# Patient Record
Sex: MALE | Race: WHITE | Employment: OTHER | URBAN - NONMETROPOLITAN AREA
[De-identification: names, ages, dates, MRNs, and addresses within clinical notes are randomized per-mention and may not be internally consistent; named-entity substitution may affect disease eponyms.]

---

## 2017-01-01 DIAGNOSIS — M54.16 LUMBAR RADICULOPATHY: ICD-10-CM

## 2017-01-13 RX ORDER — GABAPENTIN 100 MG/1
CAPSULE ORAL
Qty: 90 CAPSULE | Refills: 5 | OUTPATIENT
Start: 2017-01-13

## 2017-03-16 PROBLEM — S61.401A OPEN WOUND OF RIGHT HAND: Status: ACTIVE | Noted: 2017-03-16

## 2017-03-16 PROBLEM — M46.42 DISCITIS OF CERVICAL REGION: Status: ACTIVE | Noted: 2017-03-16

## 2017-03-17 PROBLEM — Z91.199 NONCOMPLIANCE BY REFUSING INTERVENTION OR SUPPORT: Status: ACTIVE | Noted: 2017-03-17

## 2017-03-17 PROBLEM — E87.20 LACTIC ACIDOSIS: Status: ACTIVE | Noted: 2017-03-17

## 2018-07-26 ENCOUNTER — HOSPITAL ENCOUNTER (OUTPATIENT)
Age: 63
Discharge: HOME | End: 2018-07-26

## 2018-07-26 ENCOUNTER — HOSPITAL ENCOUNTER (EMERGENCY)
Age: 63
End: 2018-07-26

## 2018-07-26 PROCEDURE — 2709999900 HC NON-CHARGEABLE SUPPLY

## 2018-07-26 PROCEDURE — 4500000002 HC ER NO CHARGE

## 2020-11-03 PROBLEM — I25.10 CAD (CORONARY ARTERY DISEASE): Status: RESOLVED | Noted: 2020-11-03 | Resolved: 2020-11-03

## 2020-11-03 PROBLEM — I10 HYPERTENSION: Status: RESOLVED | Noted: 2020-11-03 | Resolved: 2020-11-03

## 2020-11-12 ENCOUNTER — HOSPITAL ENCOUNTER (OUTPATIENT)
Age: 65
Discharge: HOME | End: 2020-11-12
Payer: COMMERCIAL

## 2020-11-12 LAB
BASOPHILS # BLD: 0.5 %
BASOPHILS ABSOLUTE: 0 THOU/MM3 (ref 0–0.1)
BUN BLDV-MCNC: 9 MG/DL (ref 7–22)
CREAT SERPL-MCNC: 0.9 MG/DL (ref 0.4–1.2)
EOSINOPHIL # BLD: 2.5 %
EOSINOPHILS ABSOLUTE: 0.2 THOU/MM3 (ref 0–0.4)
ERYTHROCYTE [DISTWIDTH] IN BLOOD BY AUTOMATED COUNT: 15.3 % (ref 11.5–14.5)
ERYTHROCYTE [DISTWIDTH] IN BLOOD BY AUTOMATED COUNT: 48.2 FL (ref 35–45)
GFR SERPL CREATININE-BSD FRML MDRD: 85 ML/MIN/1.73M2
HCT VFR BLD CALC: 45.1 % (ref 42–52)
HEMOGLOBIN: 14.3 GM/DL (ref 14–18)
IMMATURE GRANS (ABS): 0.03 THOU/MM3 (ref 0–0.07)
IMMATURE GRANULOCYTES: 0.3 %
LYMPHOCYTES # BLD: 34.4 %
LYMPHOCYTES ABSOLUTE: 3.1 THOU/MM3 (ref 1–4.8)
MCH RBC QN AUTO: 27.2 PG (ref 26–33)
MCHC RBC AUTO-ENTMCNC: 31.7 GM/DL (ref 32.2–35.5)
MCV RBC AUTO: 85.9 FL (ref 80–94)
MONOCYTES # BLD: 5.9 %
MONOCYTES ABSOLUTE: 0.5 THOU/MM3 (ref 0.4–1.3)
NUCLEATED RED BLOOD CELLS: 0 /100 WBC
PLATELET # BLD: 287 THOU/MM3 (ref 130–400)
PMV BLD AUTO: 12 FL (ref 9.4–12.4)
RBC # BLD: 5.25 MILL/MM3 (ref 4.7–6.1)
SEG NEUTROPHILS: 56.4 %
SEGMENTED NEUTROPHILS ABSOLUTE COUNT: 5 THOU/MM3 (ref 1.8–7.7)
WBC # BLD: 8.9 THOU/MM3 (ref 4.8–10.8)

## 2020-11-12 PROCEDURE — 85025 COMPLETE CBC W/AUTO DIFF WBC: CPT

## 2020-11-12 PROCEDURE — 82565 ASSAY OF CREATININE: CPT

## 2020-11-12 PROCEDURE — 36415 COLL VENOUS BLD VENIPUNCTURE: CPT

## 2020-11-12 PROCEDURE — 84520 ASSAY OF UREA NITROGEN: CPT

## 2021-03-18 ENCOUNTER — HOSPITAL ENCOUNTER (OUTPATIENT)
Age: 66
Setting detail: SPECIMEN
Discharge: HOME | End: 2021-03-18
Payer: COMMERCIAL

## 2021-03-18 LAB
ABSOLUTE EOS #: 0.28 K/UL (ref 0–0.44)
ABSOLUTE IMMATURE GRANULOCYTE: 0.03 K/UL (ref 0–0.3)
ABSOLUTE LYMPH #: 2.98 K/UL (ref 1.1–3.7)
ABSOLUTE MONO #: 0.68 K/UL (ref 0.1–1.2)
ALBUMIN SERPL-MCNC: 4.3 G/DL (ref 3.5–5.2)
ALBUMIN/GLOBULIN RATIO: 1.2 (ref 1–2.5)
ALP BLD-CCNC: 81 U/L (ref 40–129)
ALT SERPL-CCNC: 9 U/L (ref 5–41)
ANION GAP SERPL CALCULATED.3IONS-SCNC: 15 MMOL/L (ref 9–17)
AST SERPL-CCNC: 15 U/L
BASOPHILS # BLD: 1 % (ref 0–2)
BASOPHILS ABSOLUTE: 0.1 K/UL (ref 0–0.2)
BILIRUB SERPL-MCNC: 0.21 MG/DL (ref 0.3–1.2)
BUN BLDV-MCNC: 13 MG/DL (ref 8–23)
BUN/CREAT BLD: ABNORMAL (ref 9–20)
CALCIUM SERPL-MCNC: 9.9 MG/DL (ref 8.6–10.4)
CHLORIDE BLD-SCNC: 103 MMOL/L (ref 98–107)
CHOLESTEROL/HDL RATIO: 4.3
CHOLESTEROL: 121 MG/DL
CO2: 24 MMOL/L (ref 20–31)
CREAT SERPL-MCNC: 0.87 MG/DL (ref 0.7–1.2)
DIFFERENTIAL TYPE: ABNORMAL
EOSINOPHILS RELATIVE PERCENT: 3 % (ref 1–4)
GFR AFRICAN AMERICAN: >60 ML/MIN
GFR NON-AFRICAN AMERICAN: >60 ML/MIN
GFR SERPL CREATININE-BSD FRML MDRD: ABNORMAL ML/MIN/{1.73_M2}
GFR SERPL CREATININE-BSD FRML MDRD: ABNORMAL ML/MIN/{1.73_M2}
GLUCOSE BLD-MCNC: 120 MG/DL (ref 70–99)
HCT VFR BLD CALC: 44.3 % (ref 40.7–50.3)
HDLC SERPL-MCNC: 28 MG/DL
HEMOGLOBIN: 13.6 G/DL (ref 13–17)
IMMATURE GRANULOCYTES: 0 %
LDL CHOLESTEROL: 66 MG/DL (ref 0–130)
LYMPHOCYTES # BLD: 30 % (ref 24–43)
MCH RBC QN AUTO: 25.1 PG (ref 25.2–33.5)
MCHC RBC AUTO-ENTMCNC: 30.7 G/DL (ref 28.4–34.8)
MCV RBC AUTO: 81.7 FL (ref 82.6–102.9)
MONOCYTES # BLD: 7 % (ref 3–12)
NRBC AUTOMATED: 0 PER 100 WBC
PDW BLD-RTO: 15.2 % (ref 11.8–14.4)
PLATELET # BLD: 339 K/UL (ref 138–453)
PLATELET ESTIMATE: ABNORMAL
PMV BLD AUTO: 11.4 FL (ref 8.1–13.5)
POTASSIUM SERPL-SCNC: 4.6 MMOL/L (ref 3.7–5.3)
PROSTATE SPECIFIC ANTIGEN: 2.14 UG/L
RBC # BLD: 5.42 M/UL (ref 4.21–5.77)
RBC # BLD: ABNORMAL 10*6/UL
SEG NEUTROPHILS: 59 % (ref 36–65)
SEGMENTED NEUTROPHILS ABSOLUTE COUNT: 5.76 K/UL (ref 1.5–8.1)
SODIUM BLD-SCNC: 142 MMOL/L (ref 135–144)
TOTAL PROTEIN: 7.9 G/DL (ref 6.4–8.3)
TRIGL SERPL-MCNC: 134 MG/DL
TSH SERPL DL<=0.05 MIU/L-ACNC: 1.02 MIU/L (ref 0.3–5)
VLDLC SERPL CALC-MCNC: ABNORMAL MG/DL (ref 1–30)
WBC # BLD: 9.8 K/UL (ref 3.5–11.3)
WBC # BLD: ABNORMAL 10*3/UL

## 2021-11-18 ENCOUNTER — HOSPITAL ENCOUNTER (INPATIENT)
Age: 66
LOS: 5 days | Discharge: HOME | DRG: 192 | End: 2021-11-23
Attending: EMERGENCY MEDICINE | Admitting: FAMILY MEDICINE
Payer: COMMERCIAL

## 2021-11-18 ENCOUNTER — APPOINTMENT (OUTPATIENT)
Dept: CT IMAGING | Age: 66
DRG: 192 | End: 2021-11-18
Payer: COMMERCIAL

## 2021-11-18 ENCOUNTER — APPOINTMENT (OUTPATIENT)
Dept: INTERVENTIONAL RADIOLOGY/VASCULAR | Age: 66
DRG: 192 | End: 2021-11-18
Payer: COMMERCIAL

## 2021-11-18 DIAGNOSIS — L97.912 ULCERS OF BOTH LOWER EXTREMITIES WITH FAT LAYER EXPOSED (HCC): ICD-10-CM

## 2021-11-18 DIAGNOSIS — I48.91 ATRIAL FIBRILLATION, UNSPECIFIED TYPE (HCC): ICD-10-CM

## 2021-11-18 DIAGNOSIS — R06.09 DOE (DYSPNEA ON EXERTION): ICD-10-CM

## 2021-11-18 DIAGNOSIS — T14.8XXA WOUND INFECTION: ICD-10-CM

## 2021-11-18 DIAGNOSIS — L89.323 PRESSURE INJURY OF LEFT PERINEAL ISCHIAL REGION, STAGE 3 (HCC): Primary | ICD-10-CM

## 2021-11-18 DIAGNOSIS — L08.9 WOUND INFECTION: ICD-10-CM

## 2021-11-18 DIAGNOSIS — R77.8 ELEVATED TROPONIN: ICD-10-CM

## 2021-11-18 DIAGNOSIS — L97.922 ULCERS OF BOTH LOWER EXTREMITIES WITH FAT LAYER EXPOSED (HCC): ICD-10-CM

## 2021-11-18 DIAGNOSIS — I50.9 ACUTE CONGESTIVE HEART FAILURE, UNSPECIFIED HEART FAILURE TYPE (HCC): ICD-10-CM

## 2021-11-18 DIAGNOSIS — L97.919 NONHEALING ULCER OF RIGHT LOWER LEG, UNSPECIFIED ULCER STAGE (HCC): ICD-10-CM

## 2021-11-18 LAB
ALBUMIN SERPL-MCNC: 3.7 G/DL (ref 3.5–5.1)
ALP BLD-CCNC: 81 U/L (ref 38–126)
ALT SERPL-CCNC: 11 U/L (ref 11–66)
ANION GAP SERPL CALCULATED.3IONS-SCNC: 17 MEQ/L (ref 8–16)
AST SERPL-CCNC: 16 U/L (ref 5–40)
BASOPHILS # BLD: 0.5 %
BASOPHILS ABSOLUTE: 0.1 THOU/MM3 (ref 0–0.1)
BILIRUB SERPL-MCNC: 0.3 MG/DL (ref 0.3–1.2)
BILIRUBIN DIRECT: < 0.2 MG/DL (ref 0–0.3)
BUN BLDV-MCNC: 11 MG/DL (ref 7–22)
CALCIUM SERPL-MCNC: 8.6 MG/DL (ref 8.5–10.5)
CHLORIDE BLD-SCNC: 102 MEQ/L (ref 98–111)
CO2: 24 MEQ/L (ref 23–33)
CREAT SERPL-MCNC: 0.8 MG/DL (ref 0.4–1.2)
EOSINOPHIL # BLD: 1.1 %
EOSINOPHILS ABSOLUTE: 0.1 THOU/MM3 (ref 0–0.4)
ERYTHROCYTE [DISTWIDTH] IN BLOOD BY AUTOMATED COUNT: 15.5 % (ref 11.5–14.5)
ERYTHROCYTE [DISTWIDTH] IN BLOOD BY AUTOMATED COUNT: 48.1 FL (ref 35–45)
GFR SERPL CREATININE-BSD FRML MDRD: > 90 ML/MIN/1.73M2
GLUCOSE BLD-MCNC: 108 MG/DL (ref 70–108)
HCT VFR BLD CALC: 39.7 % (ref 42–52)
HEMOGLOBIN: 12 GM/DL (ref 14–18)
IMMATURE GRANS (ABS): 0.07 THOU/MM3 (ref 0–0.07)
IMMATURE GRANULOCYTES: 0.5 %
LACTIC ACID, SEPSIS: 2.6 MMOL/L (ref 0.5–1.9)
LACTIC ACID, SEPSIS: 2.8 MMOL/L (ref 0.5–1.9)
LYMPHOCYTES # BLD: 24.1 %
LYMPHOCYTES ABSOLUTE: 3.2 THOU/MM3 (ref 1–4.8)
MCH RBC QN AUTO: 26 PG (ref 26–33)
MCHC RBC AUTO-ENTMCNC: 30.2 GM/DL (ref 32.2–35.5)
MCV RBC AUTO: 86.1 FL (ref 80–94)
MONOCYTES # BLD: 5.3 %
MONOCYTES ABSOLUTE: 0.7 THOU/MM3 (ref 0.4–1.3)
NUCLEATED RED BLOOD CELLS: 0 /100 WBC
OSMOLALITY CALCULATION: 284.9 MOSMOL/KG (ref 275–300)
PLATELET # BLD: 439 THOU/MM3 (ref 130–400)
PMV BLD AUTO: 10.6 FL (ref 9.4–12.4)
POTASSIUM REFLEX MAGNESIUM: 3.6 MEQ/L (ref 3.5–5.2)
PRO-BNP: 3616 PG/ML (ref 0–900)
PROCALCITONIN: 0.06 NG/ML (ref 0.01–0.09)
RBC # BLD: 4.61 MILL/MM3 (ref 4.7–6.1)
SEG NEUTROPHILS: 68.5 %
SEGMENTED NEUTROPHILS ABSOLUTE COUNT: 9.2 THOU/MM3 (ref 1.8–7.7)
SODIUM BLD-SCNC: 143 MEQ/L (ref 135–145)
TOTAL PROTEIN: 7.7 G/DL (ref 6.1–8)
TROPONIN T: 0.05 NG/ML
WBC # BLD: 13.4 THOU/MM3 (ref 4.8–10.8)

## 2021-11-18 PROCEDURE — 4A023N7 MEASUREMENT OF CARDIAC SAMPLING AND PRESSURE, LEFT HEART, PERCUTANEOUS APPROACH: ICD-10-PCS | Performed by: INTERNAL MEDICINE

## 2021-11-18 PROCEDURE — 84484 ASSAY OF TROPONIN QUANT: CPT

## 2021-11-18 PROCEDURE — 87040 BLOOD CULTURE FOR BACTERIA: CPT

## 2021-11-18 PROCEDURE — 6360000002 HC RX W HCPCS: Performed by: STUDENT IN AN ORGANIZED HEALTH CARE EDUCATION/TRAINING PROGRAM

## 2021-11-18 PROCEDURE — 6360000004 HC RX CONTRAST MEDICATION: Performed by: EMERGENCY MEDICINE

## 2021-11-18 PROCEDURE — 83605 ASSAY OF LACTIC ACID: CPT

## 2021-11-18 PROCEDURE — 80048 BASIC METABOLIC PNL TOTAL CA: CPT

## 2021-11-18 PROCEDURE — 2580000003 HC RX 258: Performed by: STUDENT IN AN ORGANIZED HEALTH CARE EDUCATION/TRAINING PROGRAM

## 2021-11-18 PROCEDURE — 99282 EMERGENCY DEPT VISIT SF MDM: CPT

## 2021-11-18 PROCEDURE — 80076 HEPATIC FUNCTION PANEL: CPT

## 2021-11-18 PROCEDURE — 6370000000 HC RX 637 (ALT 250 FOR IP): Performed by: STUDENT IN AN ORGANIZED HEALTH CARE EDUCATION/TRAINING PROGRAM

## 2021-11-18 PROCEDURE — 93005 ELECTROCARDIOGRAM TRACING: CPT | Performed by: STUDENT IN AN ORGANIZED HEALTH CARE EDUCATION/TRAINING PROGRAM

## 2021-11-18 PROCEDURE — B2111ZZ FLUOROSCOPY OF MULTIPLE CORONARY ARTERIES USING LOW OSMOLAR CONTRAST: ICD-10-PCS | Performed by: INTERNAL MEDICINE

## 2021-11-18 PROCEDURE — 36415 COLL VENOUS BLD VENIPUNCTURE: CPT

## 2021-11-18 PROCEDURE — 84145 PROCALCITONIN (PCT): CPT

## 2021-11-18 PROCEDURE — 1200000003 HC TELEMETRY R&B

## 2021-11-18 PROCEDURE — 6370000000 HC RX 637 (ALT 250 FOR IP)

## 2021-11-18 PROCEDURE — 93925 LOWER EXTREMITY STUDY: CPT

## 2021-11-18 PROCEDURE — 99222 1ST HOSP IP/OBS MODERATE 55: CPT | Performed by: FAMILY MEDICINE

## 2021-11-18 PROCEDURE — 4A0335C MEASUREMENT OF ARTERIAL FLOW, CORONARY, PERCUTANEOUS APPROACH: ICD-10-PCS | Performed by: INTERNAL MEDICINE

## 2021-11-18 PROCEDURE — 85025 COMPLETE CBC W/AUTO DIFF WBC: CPT

## 2021-11-18 PROCEDURE — B2151ZZ FLUOROSCOPY OF LEFT HEART USING LOW OSMOLAR CONTRAST: ICD-10-PCS | Performed by: INTERNAL MEDICINE

## 2021-11-18 PROCEDURE — 83880 ASSAY OF NATRIURETIC PEPTIDE: CPT

## 2021-11-18 PROCEDURE — 96365 THER/PROPH/DIAG IV INF INIT: CPT

## 2021-11-18 RX ORDER — ONDANSETRON 4 MG/1
4 TABLET, ORALLY DISINTEGRATING ORAL EVERY 8 HOURS PRN
Status: DISCONTINUED | OUTPATIENT
Start: 2021-11-18 | End: 2021-11-19

## 2021-11-18 RX ORDER — ASPIRIN 81 MG/1
81 TABLET ORAL DAILY
Status: DISCONTINUED | OUTPATIENT
Start: 2021-11-19 | End: 2021-11-21

## 2021-11-18 RX ORDER — ACETAMINOPHEN 325 MG/1
650 TABLET ORAL EVERY 6 HOURS PRN
Status: DISCONTINUED | OUTPATIENT
Start: 2021-11-18 | End: 2021-11-22 | Stop reason: SDUPTHER

## 2021-11-18 RX ORDER — ONDANSETRON 2 MG/ML
4 INJECTION INTRAMUSCULAR; INTRAVENOUS EVERY 6 HOURS PRN
Status: DISCONTINUED | OUTPATIENT
Start: 2021-11-18 | End: 2021-11-19

## 2021-11-18 RX ORDER — ACETAMINOPHEN 650 MG/1
650 SUPPOSITORY RECTAL EVERY 6 HOURS PRN
Status: DISCONTINUED | OUTPATIENT
Start: 2021-11-18 | End: 2021-11-23 | Stop reason: HOSPADM

## 2021-11-18 RX ORDER — TRAZODONE HYDROCHLORIDE 50 MG/1
50 TABLET ORAL NIGHTLY PRN
Status: DISCONTINUED | OUTPATIENT
Start: 2021-11-19 | End: 2021-11-19

## 2021-11-18 RX ORDER — 0.9 % SODIUM CHLORIDE 0.9 %
1000 INTRAVENOUS SOLUTION INTRAVENOUS ONCE
Status: DISCONTINUED | OUTPATIENT
Start: 2021-11-19 | End: 2021-11-19

## 2021-11-18 RX ORDER — PANTOPRAZOLE SODIUM 40 MG/1
40 TABLET, DELAYED RELEASE ORAL DAILY
Status: DISCONTINUED | OUTPATIENT
Start: 2021-11-19 | End: 2021-11-23 | Stop reason: HOSPADM

## 2021-11-18 RX ORDER — POLYETHYLENE GLYCOL 3350 17 G/17G
17 POWDER, FOR SOLUTION ORAL DAILY PRN
Status: DISCONTINUED | OUTPATIENT
Start: 2021-11-18 | End: 2021-11-23 | Stop reason: HOSPADM

## 2021-11-18 RX ORDER — CLOPIDOGREL BISULFATE 75 MG/1
75 TABLET ORAL DAILY
Status: DISCONTINUED | OUTPATIENT
Start: 2021-11-19 | End: 2021-11-23 | Stop reason: HOSPADM

## 2021-11-18 RX ORDER — SODIUM CHLORIDE 0.9 % (FLUSH) 0.9 %
5-40 SYRINGE (ML) INJECTION PRN
Status: DISCONTINUED | OUTPATIENT
Start: 2021-11-18 | End: 2021-11-22 | Stop reason: SDUPTHER

## 2021-11-18 RX ORDER — SODIUM CHLORIDE 0.9 % (FLUSH) 0.9 %
5-40 SYRINGE (ML) INJECTION EVERY 12 HOURS SCHEDULED
Status: DISCONTINUED | OUTPATIENT
Start: 2021-11-18 | End: 2021-11-22 | Stop reason: SDUPTHER

## 2021-11-18 RX ORDER — ASPIRIN 81 MG/1
324 TABLET, CHEWABLE ORAL ONCE
Status: COMPLETED | OUTPATIENT
Start: 2021-11-18 | End: 2021-11-18

## 2021-11-18 RX ORDER — GABAPENTIN 100 MG/1
100 CAPSULE ORAL 3 TIMES DAILY
Status: DISCONTINUED | OUTPATIENT
Start: 2021-11-19 | End: 2021-11-23 | Stop reason: HOSPADM

## 2021-11-18 RX ORDER — SODIUM CHLORIDE 9 MG/ML
25 INJECTION, SOLUTION INTRAVENOUS PRN
Status: DISCONTINUED | OUTPATIENT
Start: 2021-11-18 | End: 2021-11-22 | Stop reason: SDUPTHER

## 2021-11-18 RX ORDER — NICOTINE 21 MG/24HR
PATCH, TRANSDERMAL 24 HOURS TRANSDERMAL
Status: COMPLETED
Start: 2021-11-18 | End: 2021-11-19

## 2021-11-18 RX ORDER — 0.9 % SODIUM CHLORIDE 0.9 %
1000 INTRAVENOUS SOLUTION INTRAVENOUS ONCE
Status: COMPLETED | OUTPATIENT
Start: 2021-11-18 | End: 2021-11-18

## 2021-11-18 RX ORDER — NICOTINE 21 MG/24HR
1 PATCH, TRANSDERMAL 24 HOURS TRANSDERMAL DAILY
Status: DISCONTINUED | OUTPATIENT
Start: 2021-11-19 | End: 2021-11-23 | Stop reason: HOSPADM

## 2021-11-18 RX ADMIN — ASPIRIN 81 MG CHEWABLE TABLET 324 MG: 81 TABLET CHEWABLE at 21:47

## 2021-11-18 RX ADMIN — CEFTRIAXONE SODIUM 1000 MG: 1 INJECTION, POWDER, FOR SOLUTION INTRAMUSCULAR; INTRAVENOUS at 20:49

## 2021-11-18 RX ADMIN — SODIUM CHLORIDE 1000 ML: 9 INJECTION, SOLUTION INTRAVENOUS at 20:46

## 2021-11-18 RX ADMIN — IOPAMIDOL 80 ML: 755 INJECTION, SOLUTION INTRAVENOUS at 20:58

## 2021-11-18 RX ADMIN — VANCOMYCIN HYDROCHLORIDE 1500 MG: 5 INJECTION, POWDER, LYOPHILIZED, FOR SOLUTION INTRAVENOUS at 21:47

## 2021-11-18 ASSESSMENT — ENCOUNTER SYMPTOMS
COUGH: 0
SORE THROAT: 0
CONSTIPATION: 0
NAUSEA: 0
DIARRHEA: 0
ABDOMINAL PAIN: 0
SINUS PRESSURE: 0
COLOR CHANGE: 1
BACK PAIN: 0
VOMITING: 0
SHORTNESS OF BREATH: 1
SINUS PAIN: 0

## 2021-11-18 ASSESSMENT — PAIN SCALES - GENERAL: PAINLEVEL_OUTOF10: 5

## 2021-11-18 NOTE — ED PROVIDER NOTES
My student went in to evaluate the patient and completed an exam.  When he told the patient that I (a female) would be in to see him, he requested a male provider due to the wounds being on his buttocks. I discussed with Dr. Senia Ramirez who agreed to assume care of the patient.         Gallo Richardson, JAVAN - CNP  11/18/21 [id number]

## 2021-11-18 NOTE — ED TRIAGE NOTES
Pt presents to the ED for a wound check on his buttock. Pt states he has had it for 1 month. Pt states he has been putting a cream on it.  Pt denies pain upon arrival.

## 2021-11-19 ENCOUNTER — APPOINTMENT (OUTPATIENT)
Dept: GENERAL RADIOLOGY | Age: 66
DRG: 192 | End: 2021-11-19
Payer: COMMERCIAL

## 2021-11-19 LAB
ANION GAP SERPL CALCULATED.3IONS-SCNC: 15 MEQ/L (ref 8–16)
ANION GAP SERPL CALCULATED.3IONS-SCNC: 16 MEQ/L (ref 8–16)
BASOPHILS # BLD: 0.4 %
BASOPHILS ABSOLUTE: 0.1 THOU/MM3 (ref 0–0.1)
BUN BLDV-MCNC: 8 MG/DL (ref 7–22)
BUN BLDV-MCNC: 9 MG/DL (ref 7–22)
CALCIUM SERPL-MCNC: 7.9 MG/DL (ref 8.5–10.5)
CALCIUM SERPL-MCNC: 8.3 MG/DL (ref 8.5–10.5)
CHLORIDE BLD-SCNC: 102 MEQ/L (ref 98–111)
CHLORIDE BLD-SCNC: 102 MEQ/L (ref 98–111)
CO2: 21 MEQ/L (ref 23–33)
CO2: 24 MEQ/L (ref 23–33)
CREAT SERPL-MCNC: 0.8 MG/DL (ref 0.4–1.2)
CREAT SERPL-MCNC: 0.8 MG/DL (ref 0.4–1.2)
EKG ATRIAL RATE: 93 BPM
EKG P AXIS: 74 DEGREES
EKG P-R INTERVAL: 174 MS
EKG Q-T INTERVAL: 374 MS
EKG Q-T INTERVAL: 392 MS
EKG QRS DURATION: 78 MS
EKG QRS DURATION: 98 MS
EKG QTC CALCULATION (BAZETT): 487 MS
EKG QTC CALCULATION (BAZETT): 494 MS
EKG R AXIS: 43 DEGREES
EKG R AXIS: 55 DEGREES
EKG T AXIS: 70 DEGREES
EKG T AXIS: 74 DEGREES
EKG VENTRICULAR RATE: 105 BPM
EKG VENTRICULAR RATE: 93 BPM
EOSINOPHIL # BLD: 0.4 %
EOSINOPHILS ABSOLUTE: 0.1 THOU/MM3 (ref 0–0.4)
ERYTHROCYTE [DISTWIDTH] IN BLOOD BY AUTOMATED COUNT: 15.4 % (ref 11.5–14.5)
ERYTHROCYTE [DISTWIDTH] IN BLOOD BY AUTOMATED COUNT: 47.7 FL (ref 35–45)
GFR SERPL CREATININE-BSD FRML MDRD: > 90 ML/MIN/1.73M2
GFR SERPL CREATININE-BSD FRML MDRD: > 90 ML/MIN/1.73M2
GLUCOSE BLD-MCNC: 131 MG/DL (ref 70–108)
GLUCOSE BLD-MCNC: 146 MG/DL (ref 70–108)
GLUCOSE BLD-MCNC: 157 MG/DL (ref 70–108)
GLUCOSE BLD-MCNC: 159 MG/DL (ref 70–108)
GLUCOSE BLD-MCNC: 165 MG/DL (ref 70–108)
GLUCOSE BLD-MCNC: 167 MG/DL (ref 70–108)
HCT VFR BLD CALC: 41.7 % (ref 42–52)
HEMOGLOBIN: 12.7 GM/DL (ref 14–18)
IMMATURE GRANS (ABS): 0.08 THOU/MM3 (ref 0–0.07)
IMMATURE GRANULOCYTES: 0.5 %
LV EF: 35 %
LVEF MODALITY: NORMAL
LYMPHOCYTES # BLD: 14.1 %
LYMPHOCYTES ABSOLUTE: 2.2 THOU/MM3 (ref 1–4.8)
MAGNESIUM: 0.9 MG/DL (ref 1.6–2.4)
MCH RBC QN AUTO: 26 PG (ref 26–33)
MCHC RBC AUTO-ENTMCNC: 30.5 GM/DL (ref 32.2–35.5)
MCV RBC AUTO: 85.5 FL (ref 80–94)
MONOCYTES # BLD: 5.1 %
MONOCYTES ABSOLUTE: 0.8 THOU/MM3 (ref 0.4–1.3)
NUCLEATED RED BLOOD CELLS: 0 /100 WBC
PLATELET # BLD: 454 THOU/MM3 (ref 130–400)
PMV BLD AUTO: 10.9 FL (ref 9.4–12.4)
POTASSIUM SERPL-SCNC: 3.4 MEQ/L (ref 3.5–5.2)
POTASSIUM SERPL-SCNC: 3.9 MEQ/L (ref 3.5–5.2)
RBC # BLD: 4.88 MILL/MM3 (ref 4.7–6.1)
SEG NEUTROPHILS: 79.5 %
SEGMENTED NEUTROPHILS ABSOLUTE COUNT: 12.4 THOU/MM3 (ref 1.8–7.7)
SODIUM BLD-SCNC: 138 MEQ/L (ref 135–145)
SODIUM BLD-SCNC: 142 MEQ/L (ref 135–145)
WBC # BLD: 15.6 THOU/MM3 (ref 4.8–10.8)

## 2021-11-19 PROCEDURE — 93306 TTE W/DOPPLER COMPLETE: CPT

## 2021-11-19 PROCEDURE — 51798 US URINE CAPACITY MEASURE: CPT

## 2021-11-19 PROCEDURE — 51701 INSERT BLADDER CATHETER: CPT

## 2021-11-19 PROCEDURE — 93010 ELECTROCARDIOGRAM REPORT: CPT | Performed by: NUCLEAR MEDICINE

## 2021-11-19 PROCEDURE — 36415 COLL VENOUS BLD VENIPUNCTURE: CPT

## 2021-11-19 PROCEDURE — 93005 ELECTROCARDIOGRAM TRACING: CPT | Performed by: STUDENT IN AN ORGANIZED HEALTH CARE EDUCATION/TRAINING PROGRAM

## 2021-11-19 PROCEDURE — 80048 BASIC METABOLIC PNL TOTAL CA: CPT

## 2021-11-19 PROCEDURE — 2580000003 HC RX 258: Performed by: STUDENT IN AN ORGANIZED HEALTH CARE EDUCATION/TRAINING PROGRAM

## 2021-11-19 PROCEDURE — 6360000002 HC RX W HCPCS: Performed by: STUDENT IN AN ORGANIZED HEALTH CARE EDUCATION/TRAINING PROGRAM

## 2021-11-19 PROCEDURE — 99232 SBSQ HOSP IP/OBS MODERATE 35: CPT | Performed by: STUDENT IN AN ORGANIZED HEALTH CARE EDUCATION/TRAINING PROGRAM

## 2021-11-19 PROCEDURE — 71045 X-RAY EXAM CHEST 1 VIEW: CPT

## 2021-11-19 PROCEDURE — 6370000000 HC RX 637 (ALT 250 FOR IP): Performed by: STUDENT IN AN ORGANIZED HEALTH CARE EDUCATION/TRAINING PROGRAM

## 2021-11-19 PROCEDURE — 83735 ASSAY OF MAGNESIUM: CPT

## 2021-11-19 PROCEDURE — 2060000000 HC ICU INTERMEDIATE R&B

## 2021-11-19 PROCEDURE — 2500000003 HC RX 250 WO HCPCS: Performed by: STUDENT IN AN ORGANIZED HEALTH CARE EDUCATION/TRAINING PROGRAM

## 2021-11-19 PROCEDURE — 85025 COMPLETE CBC W/AUTO DIFF WBC: CPT

## 2021-11-19 PROCEDURE — 82948 REAGENT STRIP/BLOOD GLUCOSE: CPT

## 2021-11-19 RX ORDER — TRAZODONE HYDROCHLORIDE 50 MG/1
50 TABLET ORAL NIGHTLY PRN
Status: DISCONTINUED | OUTPATIENT
Start: 2021-11-19 | End: 2021-11-23 | Stop reason: HOSPADM

## 2021-11-19 RX ORDER — NICOTINE POLACRILEX 4 MG
15 LOZENGE BUCCAL PRN
Status: DISCONTINUED | OUTPATIENT
Start: 2021-11-19 | End: 2021-11-23 | Stop reason: HOSPADM

## 2021-11-19 RX ORDER — CYCLOBENZAPRINE HCL 10 MG
10 TABLET ORAL EVERY 8 HOURS PRN
Status: DISCONTINUED | OUTPATIENT
Start: 2021-11-19 | End: 2021-11-23 | Stop reason: HOSPADM

## 2021-11-19 RX ORDER — DEXTROSE MONOHYDRATE 25 G/50ML
12.5 INJECTION, SOLUTION INTRAVENOUS PRN
Status: DISCONTINUED | OUTPATIENT
Start: 2021-11-19 | End: 2021-11-23 | Stop reason: HOSPADM

## 2021-11-19 RX ORDER — DEXTROSE MONOHYDRATE 50 MG/ML
100 INJECTION, SOLUTION INTRAVENOUS PRN
Status: DISCONTINUED | OUTPATIENT
Start: 2021-11-19 | End: 2021-11-23 | Stop reason: HOSPADM

## 2021-11-19 RX ORDER — LISINOPRIL 20 MG/1
20 TABLET ORAL DAILY
Status: DISCONTINUED | OUTPATIENT
Start: 2021-11-19 | End: 2021-11-22

## 2021-11-19 RX ORDER — FUROSEMIDE 10 MG/ML
20 INJECTION INTRAMUSCULAR; INTRAVENOUS ONCE
Status: COMPLETED | OUTPATIENT
Start: 2021-11-19 | End: 2021-11-19

## 2021-11-19 RX ORDER — MAGNESIUM SULFATE IN WATER 40 MG/ML
4000 INJECTION, SOLUTION INTRAVENOUS ONCE
Status: COMPLETED | OUTPATIENT
Start: 2021-11-20 | End: 2021-11-20

## 2021-11-19 RX ADMIN — ACETAMINOPHEN 650 MG: 325 TABLET ORAL at 09:34

## 2021-11-19 RX ADMIN — METOPROLOL TARTRATE 25 MG: 25 TABLET, FILM COATED ORAL at 03:17

## 2021-11-19 RX ADMIN — LISINOPRIL 20 MG: 20 TABLET ORAL at 09:34

## 2021-11-19 RX ADMIN — SODIUM CHLORIDE 1000 ML: 9 INJECTION, SOLUTION INTRAVENOUS at 00:09

## 2021-11-19 RX ADMIN — GABAPENTIN 100 MG: 100 CAPSULE ORAL at 20:59

## 2021-11-19 RX ADMIN — ENOXAPARIN SODIUM 100 MG: 100 INJECTION SUBCUTANEOUS at 22:45

## 2021-11-19 RX ADMIN — VANCOMYCIN HYDROCHLORIDE 1000 MG: 1 INJECTION, POWDER, LYOPHILIZED, FOR SOLUTION INTRAVENOUS at 14:43

## 2021-11-19 RX ADMIN — AMIODARONE HYDROCHLORIDE 150 MG: 1.5 INJECTION, SOLUTION INTRAVENOUS at 23:54

## 2021-11-19 RX ADMIN — INSULIN LISPRO 1 UNITS: 100 INJECTION, SOLUTION INTRAVENOUS; SUBCUTANEOUS at 20:57

## 2021-11-19 RX ADMIN — SODIUM CHLORIDE, PRESERVATIVE FREE 10 ML: 5 INJECTION INTRAVENOUS at 21:01

## 2021-11-19 RX ADMIN — ASPIRIN 81 MG: 81 TABLET ORAL at 08:37

## 2021-11-19 RX ADMIN — GABAPENTIN 100 MG: 100 CAPSULE ORAL at 09:35

## 2021-11-19 RX ADMIN — METOPROLOL TARTRATE 25 MG: 25 TABLET, FILM COATED ORAL at 09:34

## 2021-11-19 RX ADMIN — GABAPENTIN 100 MG: 100 CAPSULE ORAL at 14:44

## 2021-11-19 RX ADMIN — TRAZODONE HYDROCHLORIDE 50 MG: 50 TABLET ORAL at 03:17

## 2021-11-19 RX ADMIN — PIPERACILLIN AND TAZOBACTAM 3375 MG: 3; .375 INJECTION, POWDER, LYOPHILIZED, FOR SOLUTION INTRAVENOUS at 06:49

## 2021-11-19 RX ADMIN — FUROSEMIDE 20 MG: 10 INJECTION, SOLUTION INTRAMUSCULAR; INTRAVENOUS at 02:46

## 2021-11-19 RX ADMIN — SODIUM CHLORIDE 25 ML: 9 INJECTION, SOLUTION INTRAVENOUS at 06:54

## 2021-11-19 RX ADMIN — PIPERACILLIN AND TAZOBACTAM 3375 MG: 3; .375 INJECTION, POWDER, LYOPHILIZED, FOR SOLUTION INTRAVENOUS at 14:41

## 2021-11-19 RX ADMIN — TRAZODONE HYDROCHLORIDE 50 MG: 50 TABLET ORAL at 21:04

## 2021-11-19 RX ADMIN — PANTOPRAZOLE SODIUM 40 MG: 40 TABLET, DELAYED RELEASE ORAL at 06:51

## 2021-11-19 RX ADMIN — METOPROLOL TARTRATE 25 MG: 25 TABLET, FILM COATED ORAL at 20:59

## 2021-11-19 ASSESSMENT — PAIN SCALES - GENERAL: PAINLEVEL_OUTOF10: 4

## 2021-11-19 NOTE — ED PROVIDER NOTES
stiffness. Skin: Positive for color change and wound. Neurological: Positive for numbness (Bilateral lower extremities, chronic.). Negative for dizziness, weakness, light-headedness and headaches. PAST MEDICAL AND SURGICAL HISTORY     Past Medical History:   Diagnosis Date    CAD (coronary artery disease)     COPD [facility]ronic obstructive [facility] disease) ([facility] [address]     Hx of blood clots 1996    LEFT LEG    Hyperlipidemia     Hypertension     Leg wound, left     Leg wound, right     Lumbar radiculopathy     Osteoarthritis     Seizure disorder ([facility] [address]     Spinal stenosis, lumbar      Past Surgical History:   Procedure Laterality Date    ANKLE SURGERY      ball joint    BACK SURGERY  1994    CARDIAC CATHETERIZATION  2005    1 STENT PLACED    CARDIAC [facility]HETERIZ[facility]  5/9/13    [facility]    CORONARY ANGIOPLASTY WITH STENT PLACEMENT      FOREARM SURGERY      left    FRACTURE SURGERY Right 2000    LEG    KNEE SURGERY      left    LEG SURGERY      LUMBAR SPINE SURGERY      TOE SURGERY      left middle toe    TYMPANOSTOMY TUBE PLACEMENT  1979         MEDICATIONS     Current Facility-Administered Medications:     0.9 % sodium chloride bolus, 1,000 mL, IntraVENous, Once, Jed Us DO, Last Rate: 1,000 mL/hr at 11/18/21 2046, 1,000 mL at 11/18/21 2046    vancomycin (VANCOCIN) 1,500 mg in dextrose 5 % 500 mL IVPB, 1,500 mg, IntraVENous, Once, Jed Us DO    aspirin chewable tablet 324 mg, 324 mg, Oral, Once, Jed Us DO    Current Outpatient Medications:     HYDROcodone-acetaminophen (NORCO) 5-325 MG per tablet, Take 1 tablet by mouth every 6 hours as needed for Pain  WARNING: This medication may make you drowsy. Do not operate heavy machinery or motor vehicles during use. ., Disp: 12 tablet, Rfl: 0    aspirin EC 81 MG EC tablet, Take 1 tablet by mouth daily, Disp: 30 tablet, Rfl: 11    metoprolol tartrate (LOPRESSOR) 25 MG tablet, take 1 tablet by mouth twice a day, Disp: 60 tablet, Rfl: 5    SPIRIVA HANDIHALER 18 MCG inhalation capsule, inhale the contents of one capsule in the handihaler once daily, Disp: 30 capsule, Rfl: 5    clopidogrel (PLAVIX) 75 MG tablet, take 1 tablet by mouth once daily, Disp: 30 tablet, Rfl: 5    albuterol sulfate HFA (PROAIR HFA) 108 (90 BASE) MCG/ACT inhaler, Inhale 2 puffs into the lungs every 4 hours as needed for Wheezing, Disp: 1 Inhaler, Rfl: 5    gabapentin (NEURONTIN) 100 MG capsule, TAKE ONE CAPSULE BY MOUTH 3 TIMES A DAY, Disp: 90 capsule, Rfl: 5    pantoprazole (PROTONIX) 40 MG tablet, Take 1 tablet by mouth daily, Disp: 30 tablet, Rfl: 5    cyclobenzaprine (FLEXERIL) 10 MG tablet, Take 1 tablet by mouth every 8 hours as needed for Muscle spasms, Disp: 90 tablet, Rfl: 5    lisinopril (PRINIVIL;ZESTRIL) 20 MG tablet, Take 1 tablet by mouth daily, Disp: 30 tablet, Rfl: 5    traZODone (DESYREL) 50 MG tablet, take 1 tablet by mouth at bedtime, Disp: 30 tablet, Rfl: 3    traZODone (DESYREL) 50 MG tablet, take 1 tablet by mouth at bedtime, Disp: 30 tablet, Rfl: 5    nitroGLYCERIN (NITROSTAT) 0.4 MG SL tablet, Place 1 tablet under the tongue every 5 minutes as needed for Chest pain, Disp: 25 tablet, Rfl: 3    aclidinium (TUDORZA PRESSAIR) 400 MCG/ACT AEPB, Inhale 1 each into the lungs 2 times daily, Disp: 1 each, Rfl: 5    naratriptan (AMERGE) 2.5 MG tablet, Take 1 tablet by mouth once as needed for Migraine for up to 1 dose., Disp: 9 tablet, Rfl: 2    Elastic Bandages & Supports (LUMBAR BACK BRACE/SUPPORT PAD) MISC, 1 each by Does not apply route daily as needed. , Disp: 1 each, Rfl: 0    naratriptan (AMERGE) 2.5 MG tablet, Take 1 tablet by mouth once as needed for Migraine for up to 1 dose. 2.5 mg at onset of headache, may repeat in 4 hours if needed, Disp: 9 tablet, Rfl: 3    cyclobenzaprine (FLEXERIL) 10 MG tablet, take 1 tablet by mouth three times a day if needed, Disp: 90 tablet, Rfl: 3    Tens Unit MISC, by Does not apply route. Use as directed., Disp: 1 each, Rfl: 0    traMADol (ULTRAM) 50 MG tablet, Take 1 tablet by mouth daily as needed for Pain., Disp: 90 tablet, Rfl: 1    Respiratory Therapy Supplies (NEBULIZER COMPRESSOR) KIT, by Does not apply route., Disp: 1 kit, Rfl: 0    albuterol (PROVENTIL) (2.5 MG/3ML) 0.083% nebulizer solution, Take 3 mLs by nebulization every 6 hours as needed for Wheezing., Disp: 120 each, Rfl: 3    acetaminophen (TYLENOL) 325 MG tablet, Take 650 mg by mouth every 6 hours as needed. , Disp: , Rfl:       SOCIAL HISTORY     Social History     Social History Narrative    Not on file     Social History     Tobacco Use    Smoking status: Current Some Day Smoker     Packs/day: 0.50     Years: 40.00     Pack years: 20.00     Types: Cigarettes    Smokeless tobacco: Never Used   Substance Use Topics    Alcohol use: No     Alcohol/week: 0.0 standard drinks    Drug use: No         ALLERGIES     Allergies   Allergen Reactions    Influenza Vaccines Anaphylaxis         FAMILY HISTORY     Family History   Problem Relation Age of Onset    Diabetes Mother     Kidney Disease Mother     COPD Father     Heart Disease Father     Cancer Father         bone    Stroke Father     Heart Disease Sister     Diabetes Brother          PREVIOUS RECORDS   Previous records reviewed: This is this patient's first visit to [facility] ED, no previous records available on EMR. .        PHYSICAL EXAM     ED Triage Vitals [11/18/21 1756]   BP Temp Temp Source Pulse Resp SpO2 Height Weight   (!) 154/90 98.1 °F (36.7 °C) Oral 100 18 97 % -- --     Initial vital signs and nursing assessment reviewed and normal. Body mass index is 29.69 kg/m². Pulsoximetry is normal per my interpretation. Additional Vital Signs:  Vitals:    11/18/21 1917   BP:    Pulse:    Resp:    Temp:    SpO2: 93%       Physical Exam  Constitutional:       General: He is not in acute distress. Appearance: Normal appearance. He is not ill-appearing or diaphoretic. HENT:      Head: Normocephalic and atraumatic. Mouth/Throat:      Mouth: Mucous membranes are moist.      Pharynx: Oropharynx is clear. No oropharyngeal exudate or posterior oropharyngeal erythema. Eyes:      Extraocular Movements: Extraocular movements intact. Conjunctiva/sclera: Conjunctivae normal.   Cardiovascular:      Rate and Rhythm: Normal rate. Rhythm irregular. Pulses: Normal pulses. Heart sounds: Normal heart sounds. No murmur heard. No friction rub. No gallop. Pulmonary:      Effort: Pulmonary effort is normal.      Breath sounds: Rales (Bilateral basilar) present. No wheezing or rhonchi. Abdominal:      Palpations: Abdomen is soft. Tenderness: There is no abdominal tenderness. There is no guarding or rebound. Musculoskeletal:         General: Swelling, tenderness and signs of injury (Left ischial ulcer and bilateral lower extremity ulcers pictures attached) present. Cervical back: Normal range of motion. No rigidity. No muscular tenderness. Right lower leg: No edema. Left lower leg: No edema. Skin:     General: Skin is warm and dry. Capillary Refill: Capillary refill takes less than 2 seconds. Findings: No bruising, erythema or lesion. Neurological:      General: No focal deficit present. Mental Status: He is alert and oriented to person, place, and time. Sensory: Sensory deficit (Bilateral lower extremities) present. MEDICAL DECISION MAKING   Initial Assessment:   1. Pleasant 51-year-old male resting comfortably cot. Concern for worsening ulcers. Stage III left ischial decubitus and bilateral chronic lower extremity. Concern for worsening infection with lack of wound care. Also complains of orthopnea during his stay which is supported by BNP elevation and atrial fibrillation with normal ventricular response.   Plan:    Podiatry consult   Infectious lab work-up   Wound care   Aspirin   Troponin   EKG   Empiric antibiotics   Blood cultures   Expect disposition admission to the hospital for work-up of patient's comorbidities and poor vascular status with wound care.         ED RESULTS   Laboratory results:  Labs Reviewed   CBC WITH AUTO DIFFERENTIAL - Abnormal; Notable for the following components:       Result Value    WBC 13.4 (*)     RBC 4.61 (*)     Hemoglobin 12.0 (*)     Hematocrit 39.7 (*)     MCHC 30.2 (*)     RDW-CV 15.5 (*)     RDW-SD 48.1 (*)     Platelets 176 (*)     Segs Absolute 9.2 (*)     All other components within normal limits   TROPONIN - Abnormal; Notable for the following components:    Troponin T 0.048 (*)     All other components within normal limits   BRAIN NATRIURETIC PEPTIDE - Abnormal; Notable for the following components:    Pro-BNP 3616.0 (*)     All other components within normal limits   LACTATE, SEPSIS - Abnormal; Notable for the following components:    Lactic Acid, Sepsis 2.6 (*)     All other components within normal limits   ANION GAP - Abnormal; Notable for the following components:    Anion Gap 17.0 (*)     All other components within normal limits   CULTURE, BLOOD 1   CULTURE, BLOOD 2   BASIC METABOLIC PANEL W/ REFLEX TO MG FOR LOW K   HEPATIC FUNCTION PANEL   PROCALCITONIN   GLOMERULAR FILTRATION RATE, ESTIMATED   OSMOLALITY   LACTATE, SEPSIS       Radiologic studies results:  VL DUP LOWER EXTREMITY ARTERIES BILATERAL    (Results Pending)       ED Medications administered this visit:   Medications   0.9 % sodium chloride bolus (1,000 mLs IntraVENous New Bag 11/18/21 2046)   vancomycin (VANCOCIN) 1,500 mg in dextrose 5 % 500 mL IVPB (has no administration in time range)   aspirin chewable tablet 324 mg (has no administration in time range)   cefTRIAXone (ROCEPHIN) 1000 mg IVPB in 50 mL D5W minibag (1,000 mg IntraVENous New Bag 11/18/21 2049)   iopamidol (ISOVUE-370) 76 % injection 80 mL (80 mLs IntraVENous Given 11/18/21 2058)         ED

## 2021-11-19 NOTE — PROGRESS NOTES
Hospitalist Progress Note      Patient:  Андрей Henson    Unit/Bed:5K-07/007-A  YOB: 1955  MRN: [id number]   Acct: [de-identified]   PCP: Carolynn Kuo MD  Date of Admission: 11/18/2021    Assessment/Plan:    1. Peripheral arterial disease with nonhealing arterial ulceration:  a. Patient has previously undergone arteriogram with attempted recannulization 11/2020.  b. ABIs obtained in the ED showed mild stenosis of the proximal popliteal region on the right with occluded arteries on the left which appear chronic in nature. c. Continue ASA, Plavix, and statin  d. Ischemia appears chronic and stable. If concerns for worsening symptoms, may benefit from CTA with runoff to further vascular system  e. Podiatry following for management and wound care. 2. Stage III perineum pressure ulcer:   a. Patient states that he has apartment that he lives in. Complains of foul smell from ulcer, but denies any pain. b. Empiric vancomycin/zosyn. c. Wound care consultation. 3. Acute exacerbation of CHF, unspecified type:   a. Chest x-ray with evidence of volume overload and vascular congestion. b. Patient received IV Lasix upon presentation. Will hold on further diuretics as patient is asymptomatic following diuretic therapy and not requiring supplemental oxygen. c. Strict I's and O's. Daily weights. d. Repeat echocardiogram pending. 4. Elevated troponin:   a. Suspect this is likely due to #3.  b. EKG without acute ischemia. c. Patient with known severe vascular disease. If reduced EF on echocardiogram, would likely benefit from ischemic work up. 5. Diabetes mellitus type 2:  a. A1c pending.  b. SSI and Accu-Cheks. c. Hypoglycemic orders in place. 6. Hypertension:   a. Continue home medications  7. COPD, not in acute exacerbation:   a. Continue home medications  8. CAD status post PCI:  a.  Continue aspirin, statin, Plavix, beta-blocker, and Temp 98.3 °F (36.8 °C) (Oral)   Resp 20   Ht 6' 1\" (1.854 m)   Wt 225 lb (102.1 kg)   SpO2 94%   BMI 29.69 kg/m²   General appearance: Elderly chronically ill-appearing male. Resting bed. No acute distress. HEENT: Pupils equal, round, and reactive to light. Conjunctivae/corneas clear. Neck: Supple, with full range of motion. No jugular venous distention. Trachea midline. Respiratory:  Normal respiratory effort. Clear to auscultation, bilaterally without Rales/Wheezes/Rhonchi. Cardiovascular: Regular rate and rhythm with normal S1/S2 without murmurs, rubs or gallops. Lower extremities have delayed capillary refill. Unable to palpate either posterior tibial or dorsalis pedal pulses. Abdomen: Soft, non-tender, non-distended with normal bowel sounds. Musculoskeletal: passive and active ROM x 4 extremities. Skin: Skin color, texture, turgor normal.  No rashes or lesions. Stage III pressure ulcer noted to the patient's lateral left buttocks. Neurologic:  Neurovascularly intact without any focal sensory/motor deficits. Cranial nerves: II-XII intact, grossly non-focal.  Psychiatric: Alert and oriented, thought content appropriate, normal insight  Capillary Refill: Brisk,< 3 seconds   Peripheral Pulses: +2 palpable, equal bilaterally     Labs:   Recent Labs     11/18/21 1922 11/19/21  0802   WBC 13.4* 15.6*   HGB 12.0* 12.7*   HCT 39.7* 41.7*   * 454*     Recent Labs     11/18/21 1922 11/19/21  0802    142   K 3.6 3.9    102   CO2 24 24   BUN 11 9   CREATININE 0.8 0.8   CALCIUM 8.6 8.3*     Recent Labs     11/18/21 1922   AST 16   ALT 11   BILIDIR <0.2   BILITOT 0.3   ALKPHOS 81     No results for input(s): INR in the last 72 hours. No results for input(s): Kalpana Middleton in the last 72 hours.     Microbiology:    Blood culture #1:   Lab Results   Component Value Date    BC No growth-preliminary  11/18/2021       Blood culture #2:No results found for: Tania Olvera    Organism:No results found for: ORG    No results found for: LABGRAM    MRSA culture only:No results found for: [facility]    Urine culture: No results found for: LABURIN    Respiratory culture: No results found for: CULTRESP    Aerobic and Anaerobic :  No results found for: LABAERO  No results found for: LABANAE    Urinalysis:      Lab Results   Component Value Date    NITRU NEGATIVE 03/16/2017    WBCUA 2-4 03/16/2017    BACTERIA NONE 03/16/2017    RBCUA 0-2 03/16/2017    BLOODU NEGATIVE 03/16/2017    SPECGRAV 1.023 03/16/2017    GLUCOSEU NEGATIVE 01/20/2013       Radiology:  XR CHEST PORTABLE   Final Result   1. Borderline heart size. No effusion. 2. Mildly increased initial markings left lung base and throughout the right lung, consistent with interstitial pneumonia/edema. Central pulmonary vascular markings are prominent, suggesting possible low-grade or incipient heart failure. **This report has been created using voice recognition software. It may contain minor errors which are inherent in voice recognition technology. **      Final report electronically signed by Dr. Rolf Seaman on 11/19/2021 12:23 AM      VL DUP LOWER EXTREMITY ARTERIES BILATERAL   Final Result   1. Findings suggestive of mild stenosis in the proximal popliteal region right side. Good pulsatility is seen in the right posterior tibial and peroneal arteries. 2. The patient's femoral/tibial bypass graft left side is known to be totally occluded and could not be visualized on today's study. . Severely dampened pulsatility in the left common femoral artery, suggesting moderate-severe inflow stenosis. Total    occlusion left SFA, popliteal artery, as well as the peroneal and posterior tibial arteries. Recanalized flow is seen in the left anterior tibial artery with severely dampened. 3. Despite the findings mentioned above, patient denies any problems with the left leg. **This report has been created using voice recognition software. It may contain minor errors which are inherent in voice recognition technology. **      Final report electronically signed by Dr. Rica Perez on 11/18/2021 10:07 PM        XR CHEST PORTABLE    Result Date: 11/19/2021  PROCEDURE: XR CHEST PORTABLE CLINICAL INFORMATION: SOB COMPARISON: 3/16/2017 TECHNIQUE: A single mobile view of the chest was obtained. 1. Borderline heart size. No effusion. 2. Mildly increased initial markings left lung base and throughout the right lung, consistent with interstitial pneumonia/edema. Central pulmonary vascular markings are prominent, suggesting possible low-grade or incipient heart failure. **This report has been created using voice recognition software. It may contain minor errors which are inherent in voice recognition technology. ** Final report electronically signed by Dr. Rica Perez on 11/19/2021 12:23 AM    VL DUP LOWER EXTREMITY ARTERIES BILATERAL    Result Date: 11/18/2021  PROCEDURE: VL DUP LOWER EXTREMITY ARTERIES BILATERAL CLINICAL INFORMATION: Patient came in for wound on buttock. ER unable to palpate DP and PT pulses - patient states his legs are at their baseline. . Patient has a left fem-tib graft. Occluded per US 11/5/20-, performed at outside institution. COMPARISON: No prior study. TECHNIQUE: Multiple permanent grayscale and color flow sonographic images of the major arteries of both lower extremities were obtained from the level of the groin to the level of the ankle . Spectral Doppler waveforms were obtained and velocity measurements were measured. FINDINGS: RIGHT ARTERY (PSV cm/sec) ? ??????????????????????????????????? CFA ---------------> 168 PSV cm/sec PROF -------------> 129 PSV cm/sec SFA PROX ------->108 PSV cm/sec SFA MID ---------->53 PSV cm/sec SFA DIST -------->68 PSV cm/sec POP A PROX --->26 PSV cm/sec POP A DIST ---->48 PSV cm/sec PTA --------------->52 PSV cm/sec PERONEAL ----->41 PSV cm/sec ELYSIA ----------------> 20 PSV cm/sec LEFT ARTERY (PSV cm/sec) ? ??????????????????????? CFA ---------------> 31 PSV cm/sec PROF -------------> 90 PSV cm/sec SFA PROX ------->14 PSV cm/sec SFA MID ---------->occluded SFA DIST -------->occluded POP A PROX --->occluded POP A DIST ---->occluded PTA --------------->occluded PERONEAL ----->occluded ELYSIA ----------------> 8 PSV cm/sec VELOCITY MEASUREMENTS: ABIs and not performed due to patient's refusal. RIGHT LEG: Excellent pulsatility is seen in the common femoral artery, profunda, and throughout the SFA. Moderate multifocal atherosclerotic plaque is scattered throughout the SFA and in the common femoral artery. There is a small plaque in the right common femoral artery demonstrates mild mobility. There appears be moderately dampened pulsatility in the proximal popliteal artery. Good pulsatility seen distally in the popliteal artery. Good pulsatility is seen in the posterior limb peroneal arteries. Moderately dampened pulsatility in the right anterior tibial artery. LEFT LEG: Severely dampened pulsatility is seen in the left common femoral artery, indicative of significant inflow stenosis. There is more than doubling of velocity in the left profunda, suggesting at least 50% stenosis at the origin of the profunda. Severely dampened pulsatility is also seen in the proximal SFA. The mid and distal SFA is totally occluded. The popliteal artery is also totally occluded as are the posterior tibial and peroneal arteries. Pulsatile flow is seen in the left anterior tibial artery but is severely dampened. The femoral-tibial bypass graft could not be seen sonographically. 1. Findings suggestive of mild stenosis in the proximal popliteal region right side. Good pulsatility is seen in the right posterior tibial and peroneal arteries. 2. The patient's femoral/tibial bypass graft left side is known to be totally occluded and could not be visualized on today's study. . Severely dampened pulsatility in the left common femoral artery, suggesting moderate-severe inflow stenosis. Total occlusion left SFA, popliteal artery, as well as the peroneal and posterior tibial arteries. Recanalized flow is seen in the left anterior tibial artery with severely dampened. 3. Despite the findings mentioned above, patient denies any problems with the left leg. **This report has been created using voice recognition software. It may contain minor errors which are inherent in voice recognition technology. ** Final report electronically signed by Dr. Rolf Seaman on 11/18/2021 10:07 PM      Electronically signed by Sonny Guadarrama DO on 11/19/2021 at 2:59 PM

## 2021-11-19 NOTE — PROGRESS NOTES
Pt admitted to  5K7 via stretcher D from ED. Complaints: wounds. IV none infusing into the antecubital left, condition patent and no redness at a rate of 0 mls/ hour. IV site free of s/s of infection or infiltration. Vital signs obtained. Assessment and data collection initiated. Two nurse skin assessment performed by Jalil Randle and Rebekah Stroud RN. Oriented to room. Policies and procedures for  explained. All questions answered with no further questions at this time. Fall prevention and safety brochure discussed with patient. Bed alarm on. Call light in reach. Oriented to room. Renetta Ardon, RN, RN 11/19/2021 6:04 AM     Explained patients right to have family, representative or physician notified of their admission. Patient has Declined for physician to be notified. Patient has Declined for family/representative to be notified.

## 2021-11-19 NOTE — ED NOTES
Pt reporting having an incontinent episode, pt says he went on himself due to not being allowed to go to the bathroom despite having call light beside him, wife beside him and being offered the toilet 30 min prior.            Mally Bell RN  11/19/21 [id number]

## 2021-11-19 NOTE — ED NOTES
Pt sitting in bed with wife at bedside. Offered to take pt to restroom after his wife went and pt said \"No, I will wait until I go upstairs\".       Xochilt Catherine RN  11/19/21 [id number]

## 2021-11-19 NOTE — PROGRESS NOTES
Pharmacy Note  Vancomycin Consult    Arina Lozano is a 61 y.o. male started on Vancomycin for skin and soft tissue infection; consult received from Dr. Zak Graff to manage therapy. Also receiving the following antibiotics: zosyn. Patient Active Problem List   Diagnosis    Seizure disorder ([facility]    Osteoarthritis    COPD (chronic obstructive pulmonary disease) ([email or web address]    Spinal stenosis, lumbar    Lumbar radiculopathy    Tobacco abuse    GERD (gastroesophageal reflux disease)    Skin ulceration (HCC)    Non-healing ulcer of lower leg (HCC)    Bilateral claudication of lower limb ([facility]    Current smoker    Dyslipidemia    Abnormal cardiovascular function study    Obesity    CAD, multiple vessel    Chronic total occlusion of artery of the extremities (HCC)    Coronary artery chronic total occlusion    DAVILA (dyspnea on exertion)    HTN (hypertension)    Discitis of cervical region    Open wound of right hand    Noncompliance by refusing intervention or support    Lactic acidosis    Wound infection     Allergies:  Influenza vaccines     Temp max: 98.4    Recent Labs     11/18/21  1922   BUN 11   CREATININE 0.8   WBC 13.4*       Intake/Output Summary (Last 24 hours) at 11/19/2021 0751  Last data filed at 11/19/2021 0644  Gross per 24 hour   Intake 500 ml   Output 1000 ml   Net -500 ml       Cultures/Sensitivites  Date Source Result   11/18/21 Blood cx X2 ngtd         Ht Readings from Last 1 Encounters:   11/18/21 6' 1\" (1.854 m)        Wt Readings from Last 1 Encounters:   11/18/21 225 lb (102.1 kg)       Body mass index is 29.69 kg/m². Estimated Creatinine Clearance: 117 mL/min (based on SCr of 0.8 mg/dL). Goal Trough AUC: < 500 mg/L.hr, trough 10-15 mg/L    Assessment/Plan:  Will initiate Vancomycin with a one time loading dose of 1500 mg x1 (already given on 11/18/21 @ 2147), followed by 1000 mg IV every 12 hours.  Timing of trough level will be determined based on culture results, renal function, and clinical response. Thank you for the consult. Will continue to follow.      Gabriel FayeD, BCPS   11/19/2021  1:39 PM

## 2021-11-19 NOTE — PROGRESS NOTES
Physician Progress Note      Chinyere Singh  CSN #:                  [id number]  :                       1955  ADMIT DATE:       2021 5:52 PM  DISCH DATE:  RESPONDING  PROVIDER #:        ELMER LUCAS          QUERY TEXT:    Pt admitted with PAD with non-healing arterial ulcers, stage 3 perineum   pressure ulcer, and chronic BLE wounds. Pt noted to have lactic acid 2.6,   2.8. If possible, please respond below document in the progress notes and   discharge summary if you are evaluating and/or treating any of the following: The medical record reflects the following:  Risk Factors: PAD with non-healing arterial ulcers, stage 3 perineum pressure   ulcer, chronic BLE wounds, possible CHF exacerbation, DM, age 61  Clinical Indicators: lactic acid 2.6, 2.8  Treatment: IVF, labs    Thank you! Raymond Acevedo, RN, BSN, RHIT, CCDS  RN Clinical   [phone]  Options provided:  -- Lactic Acidosis  -- Clinically insignificant elevated lactic acid  -- Other - I will add my own diagnosis  -- Disagree - Not applicable / Not valid  -- Disagree - Clinically unable to determine / Unknown  -- Refer to Clinical Documentation Reviewer    PROVIDER RESPONSE TEXT:    This patient has lactic acidosis.     Query created by: Sundeep Arechiga on 2021 11:28 AM      Electronically signed by:  Tiffanie Newman 2021 2:59 PM

## 2021-11-19 NOTE — CONSULTS
Podiatric Consult Note  Shilpi Granados  Subjective :     Patient seen bedside today. Patient appeared pleasant, was oriented to person, place and time and in no acute distress. The patient is diabetic and unaware of his blood sugar levels or A1C. The patient states that he came to the ED for a wound on his buttocks. The patient states that he has \"heart problems\". Patient states that the wounds noted to the left dorsal foot have been present for months and not changed. The patient was unaware of the wound noted to the left medial hallux. The patient states that the lateral leg wound has been present for 10 years, since he had leg surgery. The patient relates  the 2 wounds on the right leg have been present for a couple weeks, since using paper tape. The patient states that the wound located on the right 2nd toe had a scab that fell off. The patient states that he has been doing dressing changes with gauze and a cream. Patient denies any N/V/F/C/SOB or CP. Patient has no further pedal concerns at this point in time. Current Medications:    Current Facility-Administered Medications: 0.9 % sodium chloride bolus, 1,000 mL, IntraVENous, Once    Objective     BP (!) 154/90   Pulse 100   Temp 98.1 °F (36.7 °C) (Oral)   Resp 18   SpO2 97%      I/O:No intake or output data in the 24 hours ending 11/18/21 2015           Wt Readings from Last 3 Encounters:   03/16/17 224 lb 1.6 oz (101.7 kg)   03/16/17 265 lb (120.2 kg)   03/08/17 265 lb (120.2 kg)       LABS:    Recent Labs     11/18/21 1922   WBC 13.4*   HGB 12.0*   HCT 39.7*   *        Recent Labs     11/18/21 1922      K 3.6      CO2 24   BUN 11   CREATININE 0.8        Recent Labs     11/18/21 1922   PROT 7.7      No results for input(s): CKTOTAL, CKMB, CKMBINDEX, TROPONINI in the last 72 hours.     Focused Lower Extremity Examination:    Vitals:    BP (!) 154/90   Pulse 100   Temp 98.1 °F (36.7 °C) (Oral)   Resp 18   SpO2 97% Dermatologic: There are 2 full thickness wounds noted to the dorsal aspect of the left foot. The bases are fibrogranular with hyperkeratotic rim. There is a full thickness wounds with a fibrotic base noted to the left medial aspect of the hallux. There are 2 wounds noted to the anterior aspect of the right leg that have a granular base. There is a wound noted to the lateral aspect of the right leg with a fibrogranular base. There is a wound noted to the distal end of the right 2nd toe that is granular and has mild sanguinous drainage present. No wounds probe to bone. There is no malodor, no purulence present. Vascular: Dorsalis pedis and posterior tibial pulses are non-palpable bilaterally. Left DP and PT pulse is non-audible on doppler. Right DP pulse is audible on doppler. Skin temperature is cool to cool from proximal tibial tuberosity to distal digits. Edema noted to the left foot. There is erythema present that is cool to touch consistent with vascular changes. Neurovascular: Light touch sensation grossly intact to the RLE. Light touch sensation grossly absent to the LLE. 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 Pain with palpation of the right leg wounds. No pain with palpation noted to the left foot wounds. Images  CT ABDOMEN PELVIS W IV CONTRAST Additional Contrast? None    (Results Pending)       ASSESSMENT: Pt. is a 61 y.o. male with:  Principle  1.  Chronic, stable wounds b/l lower extermities     Chronic  Patient Active Problem List   Diagnosis    Seizure disorder ([address]    Osteoarthritis    COPD (chronic obstructive pulmonary disease) ([address]    Spinal stenosis, lumbar    Lumbar radiculopathy    Tobacco abuse    GERD (gastroesophageal reflux disease)    Skin ulceration (HCC)    Non-healing ulcer of lower leg ([address]    Bilateral claudication of lower limb ([address]    Current smoker    Dyslipidemia    Abnormal cardiovascular function study  Obesity    CAD, multiple vessel    Chronic total occlusion of artery of the extremities (HCC)    Coronary artery chronic total occlusion    DAVILA (dyspnea on exertion)    HTN (hypertension)    Discitis of cervical region    Open wound of right hand    Noncompliance by refusing intervention or support    Lactic acidosis       PLAN:     Patient was examined and evaluated  WBC 13.4; afebrile; patient has another wound located in the perineum region  Continue dressing changes consisting of betadine, gauze, kerlix. Arterial studies order placed. All of patient's and patient's wife's questions were answered to satisfaction.    Podiatry will continue to follow    Michelle Gudino DPM-PGY1  11/18/2021   8:15 PM

## 2021-11-19 NOTE — CARE COORDINATION
11/19/21, 2:25 PM EST  DISCHARGE PLANNING EVALUATION:    Shilpi Granados       Admitted: 11/18/2021/ [facility] day: 1   Location: -07/007-A Reason for admit: Wound infection [T14. 8XXA, L08.9]  Elevated troponin [R77.8]  Ulcers of both lower extremities with fat layer exposed ([facility] [address] [K86.406, L97.922]  Atrial fibrillation, unspecified type (HCC) [I48.91]  Acute congestive heart failure, unspecified heart failure type ([address] [I50.9]  Pressure injury of left perineal ischial region, stage 3 ([facility] [address] [L89.323]   PMH:  has a past medical history of CAD (coronary artery disease), COPD (chronic obstructive pulmonary disease) ([facility] [address] Hx of blood clots, Hyperlipidemia, Hypertension, Leg wound, left, Leg wound, right, Lumbar radiculopathy, Osteoarthritis, Seizure disorder ([facility] [address] and Spinal stenosis, lumbar. Procedure: N/A  Barriers to Discharge:  Patient presents due to wounds on his bilateral lower extremities and buttock. General Surgery consult, WBC 15.6. Podiatry consult, Rocephin and Lasix x 1 dose, Nicotine patch, Lovenox, Zosyn, IV Vancomycin q 12 hr. Per pharmacy dosing, prn medications, HmgA1c in a.m., carb choice diet, daily weights, strict I & O, telemetry, turn q 2 hr., up as tolerated, wound/ostomy consult. PCP: Vani Asher MD  Readmission Risk Score: 14 ( )%    Patient Goals/Plan/Treatment Preferences: Met with Nicky Bermeo. He resides in an apartment with his S/O. Nicky Bermeo verifies his insurance and PCP. He is able to afford his medications and denies a need for DME. He may need assistance with transportation at discharge. Nicky Bermeo states he and his S/O were residing in a vehicle for a period of time due to places that they were renting were full of mold. They have secured an apartment that is \"mold free\". Nicky Bermeo states his S/O is able to manage his wound care and dressing changes at home. He denies a need for [place]. He states his S/O's son is very helpful and he has 13 grandchildren. Transportation/Food Security/Housekeeping Addressed:  No issues identified.

## 2021-11-19 NOTE — PROGRESS NOTES
Podiatric Progress Note  Daphne Gonzalez  Subjective :   11/19/2021  Patient seen at bedside this morning on behalf of Dr. Rick Jaquez. Patient appeared pleasant, was oriented to person, place and time and in no acute distress. Patient denies any pain to bilateral lower extremities. He also denies any N/V/F/C/SOB/CP or bilateral calf tenderness. Patient has no further pedal concerns at this point in time. HPI:  Patient seen bedside today. Patient appeared pleasant, was oriented to person, place and time and in no acute distress. The patient is diabetic and unaware of his blood sugar levels or A1C. The patient states that he came to the ED for a wound on his buttocks. The patient states that he has \"heart problems\". Patient states that the wounds noted to the left dorsal foot have been present for months and not changed. The patient was unaware of the wound noted to the left medial hallux. The patient states that the lateral leg wound has been present for 10 years, since he had leg surgery. The patient relates  the 2 wounds on the right leg have been present for a couple weeks, since using paper tape. The patient states that the wound located on the right 2nd toe had a scab that fell off. The patient states that he has been doing dressing changes with gauze and a cream. Patient denies any N/V/F/C/SOB or CP. Patient has no further pedal concerns at this point in time.     Current Medications:    Current Facility-Administered Medications: metoprolol tartrate (LOPRESSOR) tablet 25 mg, 25 mg, Oral, BID  lisinopril (PRINIVIL;ZESTRIL) tablet 20 mg, 20 mg, Oral, Daily  cyclobenzaprine (FLEXERIL) tablet 10 mg, 10 mg, Oral, Q8H PRN  traZODone (DESYREL) tablet 50 mg, 50 mg, Oral, Nightly PRN  piperacillin-tazobactam (ZOSYN) 3,375 mg in dextrose 5 % 50 mL IVPB extended infusion (mini-bag), 3,375 mg, IntraVENous, Q8H  vancomycin 1000 mg IVPB in 250 mL D5W addavial, 1,000 mg, IntraVENous, Q12H  vancomycin (VANCOCIN) intermittent dosing (placeholder), , Other, RX Placeholder  insulin lispro (HUMALOG) injection vial 0-6 Units, 0-6 Units, SubCUTAneous, TID WC  insulin lispro (HUMALOG) injection vial 0-3 Units, 0-3 Units, SubCUTAneous, Nightly  glucose (GLUTOSE) 40 % oral gel 15 g, 15 g, Oral, PRN  dextrose 50 % IV solution, 12.5 g, IntraVENous, PRN  glucagon (rDNA) injection 1 mg, 1 mg, IntraMUSCular, PRN  dextrose 5 % solution, 100 mL/hr, IntraVENous, PRN  nicotine (NICODERM CQ) 21 MG/24HR 1 patch, 1 patch, TransDERmal, Daily  sodium chloride flush 0.9 % injection 5-40 mL, 5-40 mL, IntraVENous, 2 times per day  sodium chloride flush 0.9 % injection 5-40 mL, 5-40 mL, IntraVENous, PRN  0.9 % sodium chloride infusion, 25 mL, IntraVENous, PRN  enoxaparin (LOVENOX) injection 40 mg, 40 mg, SubCUTAneous, Daily  polyethylene glycol (GLYCOLAX) packet 17 g, 17 g, Oral, Daily PRN  acetaminophen (TYLENOL) tablet 650 mg, 650 mg, Oral, Q6H PRN **OR** acetaminophen (TYLENOL) suppository 650 mg, 650 mg, Rectal, Q6H PRN  aspirin EC tablet 81 mg, 81 mg, Oral, Daily  clopidogrel (PLAVIX) tablet 75 mg, 75 mg, Oral, Daily  gabapentin (NEURONTIN) capsule 100 mg, 100 mg, Oral, TID  pantoprazole (PROTONIX) tablet 40 mg, 40 mg, Oral, Daily    Objective     /84   Pulse 110   Temp 98.4 °F (36.9 °C) (Oral)   Resp 20   Ht 6' 1\" (1.854 m)   Wt 225 lb (102.1 kg)   SpO2 94%   BMI 29.69 kg/m²      I/O:    Intake/Output Summary (Last 24 hours) at 11/19/2021 0808  Last data filed at 11/19/2021 0644  Gross per 24 hour   Intake 500 ml   Output 1000 ml   Net -500 ml              Wt Readings from Last 3 Encounters:   11/18/21 225 lb (102.1 kg)   03/16/17 224 lb 1.6 oz (101.7 kg)   03/16/17 265 lb (120.2 kg)       LABS:    Recent Labs     11/18/21 1922   WBC 13.4*   HGB 12.0*   HCT 39.7*   *        Recent Labs     11/18/21 1922      K 3.6      CO2 24   BUN 11   CREATININE 0.8        Recent Labs     11/18/21 1922   PROT 7.7      No results for input(s): CKTOTAL, CKMB, CKMBINDEX, TROPONINI in the last 72 hours. Focused Lower Extremity Examination:    Vitals:    /84   Pulse 110   Temp 98.4 °F (36.9 °C) (Oral)   Resp 20   Ht 6' 1\" (1.854 m)   Wt 225 lb (102.1 kg)   SpO2 94%   BMI 29.69 kg/m²        Dermatologic: There are 2 full thickness wounds noted to the dorsal lateral aspect of the left foot. The bases are fibrogranular with hyperkeratotic rim. There is a full thickness wound with a fibrotic base noted to the left medial aspect of the hallux. There are 2 wounds noted to the anterior aspect of the right leg that have a granular base. There is a wound noted to the lateral aspect of the right leg with a fibrogranular base. There is a wound noted to the distal end of the right 2nd toe with fibrogranular wound bed. No malodor, drainage, undermining, or probe to bone noted 20 of the above wound.      Vascular: Dorsalis pedis and posterior tibial pulses are non-palpable bilaterally. Left DP and PT pulse is non-audible on doppler. Right DP pulse is audible on doppler. Skin temperature is cool to cool from proximal tibial tuberosity to distal digits. Edema noted to the left foot. There is erythema present that is cool to touch consistent with vascular changes.     Neurovascular: Light touch sensation grossly intact to the RLE. Light touch sensation grossly absent to the LLE.      Musculoskeletal: Prior left 2nd toe amputation noted. Pain with palpation of the right leg wounds. No pain with palpation noted to the left foot wounds. Foot and ankle in grossly rectus alignment bilaterally. IMAGING:  Arterial Doppler, bilateral lower extremities  Impression   1. Findings suggestive of mild stenosis in the proximal popliteal region right side. Good pulsatility is seen in the right posterior tibial and peroneal arteries.    2. The patient's femoral/tibial bypass graft left side is known to be totally occluded and could not be visualized on today's study. . Severely dampened pulsatility in the left common femoral artery, suggesting moderate-severe inflow stenosis. Total    occlusion left SFA, popliteal artery, as well as the peroneal and posterior tibial arteries. Recanalized flow is seen in the left anterior tibial artery with severely dampened. 3. Despite the findings mentioned above, patient denies any problems with the left leg.               **This report has been created using voice recognition software.  It may contain minor errors which are inherent in voice recognition technology. **       Final report electronically signed by Dr. Reanna De La Cruz on 11/18/2021 10:07 PM         ASSESSMENT: Pt. is a 61 y.o. male with:  Principle  Chronic, stable wounds b/l lower extermities     Chronic  Patient Active Problem List   Diagnosis    Seizure disorder ([address]    Osteoarthritis    COPD (chronic obstructive pulmonary disease) ([address]    Spinal stenosis, lumbar    Lumbar radiculopathy    Tobacco abuse    GERD (gastroesophageal reflux disease)    Skin ulceration ([address]    Non-healing ulcer of lower leg ([address]    Bilateral claudication of lower limb ([address]    Current smoker    Dyslipidemia    Abnormal cardiovascular function study    Obesity    CAD, multiple vessel    Chronic total occlusion of artery of the extremities (HCC)    Coronary artery chronic total occlusion    DAVILA (dyspnea on exertion)    HTN (hypertension)    Discitis of cervical region    Open wound of right hand    Noncompliance by refusing intervention or support    Lactic acidosis    Wound infection       PLAN:   Chronic, stable wounds b/l lower extermities   -Patient was examined and evaluated  -WBC 15.6; patient afebrile  -Patient currently on IV Rocephin  -Reviewed arterial Doppler studies; see impression above  -Change dressings; applied Betadine to all lesions, and covered with dry sterile dressings  -Nursing to change dressings (Betadine to all lesions, and cover with dry sterile dressings)  -Discussed with patient results of arterial studies, and how poor blood flow can contribute to chronic wounds to lower extremities  -Discussed with patient we will continue with conservative treatment for now  -Discussed with patient that he would benefit from being discharged into an SNF/ECF for further care; patient expressed that he would prefer to be sent home as he states that he and his significant other are capable of changing dressings on their own  -Podiatry will continue to follow patient while he is in-house  -Patient verbalized understanding and agreement with the treatment plan as stated. All of his questions were answered to his satisfaction.       Angela Zamudio DPM, PGY-2  Podiatric Surgical Resident  11/19/2021   8:08 AM

## 2021-11-19 NOTE — H&P
History & Physical       Patient: Estella Desouza  YOB: 1955    MRN: [id number]     Acct: [de-identified]    PCP: Nandini Zayas MD    Date of Admission: 11/18/2021    Date of Service: Patient seen / examined on 11/18/21 and admitted to Inpatient with expected LOS greater than two midnights due to medical therapy. ASSESSMENT / PLAN:    PAD with Non-healing Arterial Ulcers  Patient has history of PAD with history of an arteriogram done in 11/2020 where he underwent arteriogram with attempted recanalization of the left from distal vein graft with subsequent angioplasty of the left profunda femoris and placement of a TPA infusion catheter within the profunda femoris along with thrombolytic therapy. had pedal pulses with Dopplers bilaterally and was on Plavix and aspirin and discharged home. Continues to have nonhealing ulcers in left and right leg. EDOUARD done in ED showed mild stenosis in the proximal popliteal region on the right side. Patient's multiple bypass grafts left side is noted to be totally occluded. Total occlusion of the left SFA, popliteal artery, as well as peroneal and posterior tibial artery was seen. Patient notes intermittent clear drainage with ulcers however denies any pain and is able to ambulate  -Podiatry Following  -May need a CTA run off due to hx of procedural intervention done previously  -Resume statin, asa, and plavix   -Will get A1c to assess diabetic control  -Tobacco Cession counseling   -Wrap legs    Stage 3 Perineum Pressure Ulcer   Patient has been noted to live in the car for the past 2 months. Notes clear drainage and some foul-smelling with the ulcer.  -Wound Care   -Please turn patient every 2 hours  -Zosyn and Vanc for antibiotic coverage   -Anaerobic and aerobic culture    Acute on ? Heart Failure exacerbation, Underdetermined which type currently. No prior hx of HF diagnosis made. Patient has complaints of orthopnea.  Elevated Pro-BNP along with vascular congestion seen on CXR  -Trial one time dose of 20 mg of IV lasix once  -Strict I/Os  -Obtain complete ECHO  -Daily weights  -If reduced EF, will need ischemic workup    Elevated Troponin  Likely demand due to HF exacerbation  -No need to further trend     [id number]  Last A1c in 2012 was 5.8. Patient is carb controlled. -Get A1c   -Monitor Sugars  With Accu checks    HTN  -Controlled  -Resume Home meds     COPD  -Currently on RA  -Resume Home inhalars    CAD s/p PCI  Patient states he had a stent placed in heart last year at [facility]. Denies any chest pain currently. EKG unchanged  -Resume statin, ace, BB, plavix and asa    GERD  Resume Protonix    Chronic tobacco Use  Patient smokes 1PPD  -Order Nicotine    Homeless   -Patient has been homeless for 2 months and sleeping in his car  -Social work consult    Chief Complaint:  Sacral Wound     History of Present Illness:  Job Jarrett is a 61 y.o. male who presents to the emergency department for evaluation of wounds. Care was assumed from nurse practitioner Dejon Parry after the patient refused female provider. Patient has been having worsening left buttock pain after his fourth live in his car for approximately 1 month. He states that he began noticing tissue necrosis and ulcer formation. It is started leaking in the past few days which is what drove him to be seen at the hospital today. Chart review reveals multiple comorbidities and significant peripheral vascular disease as well as diabetes. Patient denies any headache, cough, shortness of breath, chest pain, abdominal pain. He does admit to decreased sensation of the bilateral lower extremities which is chronic.     After further questioning he does say that when he lays down flat he does get fairly short of breath.     The patient has no other acute complaints at this time. EKG done in ED read as Atrial Fibrillation, upon examining, EKG showed p waves.  Recheck ECG was also in Sinus. Past Medical History:    Past Medical History:   Diagnosis Date    CAD (coronary artery disease)     [facility]     Hx of blood clots 1996    LEFT LEG    Hyperlipidemia     Hypertension     Leg wound, left     Leg wound, right     Lumbar radiculopathy     Osteoarthritis     Seizure disorder ([facility]     Spinal stenosis, lumbar      Past Surgical History:    Past Surgical History:   Procedure Laterality Date    ANKLE SURGERY      ball joint    BACK SURGERY  1994    CARDIAC CATHETERIZATION  2005    1 STENT PLACED    CARDIAC [facility]HETERIZATION  5/9/13    [facility]    CORONARY ANGIOPLASTY WITH STENT PLACEMENT      FOREARM SURGERY      left    FRACTURE SURGERY Right 2000    LEG    KNEE SURGERY      left    LEG SURGERY      LUMBAR SPINE SURGERY      TOE SURGERY      left middle toe    TYMPANOSTOMY TUBE PLACEMENT  1979      Medications Prior to Admission:   No current facility-administered medications on file prior to encounter. Current Outpatient Medications on File Prior to Encounter   Medication Sig Dispense Refill    HYDROcodone-acetaminophen (NORCO) 5-325 MG per tablet Take 1 tablet by mouth every 6 hours as needed for Pain  WARNING: This medication may make you drowsy. Do not operate heavy machinery or motor vehicles during use. . 12 tablet 0    aspirin EC 81 MG EC tablet Take 1 tablet by mouth daily 30 tablet 11    metoprolol tartrate (LOPRESSOR) 25 MG tablet take 1 tablet by mouth twice a day 60 tablet 5    SPIRIVA HANDIHALER 18 MCG inhalation capsule inhale the contents of one capsule in the handihaler once daily 30 capsule 5    clopidogrel (PLAVIX) 75 MG tablet take 1 tablet by mouth once daily 30 tablet 5    albuterol sulfate HFA (PROAIR HFA) 108 (90 BASE) MCG/ACT inhaler Inhale 2 puffs into the lungs every 4 hours as needed for Wheezing 1 Inhaler 5    gabapentin (NEURONTIN) 100 MG capsule TAKE ONE CAPSULE BY MOUTH 3 TIMES A DAY 90 capsule 5    pantoprazole (PROTONIX) 40 MG tablet Take 1 tablet by mouth daily 30 tablet 5    cyclobenzaprine (FLEXERIL) 10 MG tablet Take 1 tablet by mouth every 8 hours as needed for Muscle spasms 90 tablet 5    lisinopril (PRINIVIL;ZESTRIL) 20 MG tablet Take 1 tablet by mouth daily 30 tablet 5    traZODone (DESYREL) 50 MG tablet take 1 tablet by mouth at bedtime 30 tablet 3    traZODone (DESYREL) 50 MG tablet take 1 tablet by mouth at bedtime 30 tablet 5    nitroGLYCERIN (NITROSTAT) 0.4 MG SL tablet Place 1 tablet under the tongue every 5 minutes as needed for Chest pain 25 tablet 3    aclidinium (TUDORZA PRESSAIR) 400 MCG/ACT AEPB Inhale 1 each into the lungs 2 times daily 1 each 5    naratriptan (AMERGE) 2.5 MG tablet Take 1 tablet by mouth once as needed for Migraine for up to 1 dose. 9 tablet 2    Elastic Bandages & Supports (LUMBAR BACK BRACE/SUPPORT PAD) MISC 1 each by Does not apply route daily as needed. 1 each 0    naratriptan (AMERGE) 2.5 MG tablet Take 1 tablet by mouth once as needed for Migraine for up to 1 dose. 2.5 mg at onset of headache, may repeat in 4 hours if needed 9 tablet 3    cyclobenzaprine (FLEXERIL) 10 MG tablet take 1 tablet by mouth three times a day if needed 90 tablet 3    Tens Unit MISC by Does not apply route. Use as directed. 1 each 0    traMADol (ULTRAM) 50 MG tablet Take 1 tablet by mouth daily as needed for Pain. 90 tablet 1    Respiratory Therapy Supplies (NEBULIZER COMPRESSOR) KIT by Does not apply route. 1 kit 0    albuterol (PROVENTIL) (2.5 MG/3ML) 0.083% nebulizer solution Take 3 mLs by nebulization every 6 hours as needed for Wheezing. 120 each 3    acetaminophen (TYLENOL) 325 MG tablet Take 650 mg by mouth every 6 hours as needed. Allergies:   Influenza vaccines    Social History:   Social History     Socioeconomic History    Marital status:       Spouse name: Not on file    Number of children: 2    Years of education: 5    Highest education level: Not on file   Occupational History    Occupation: [employer]   Tobacco Use    Smoking status: Current Some Day Smoker     Packs/day: 0.50     Years: 40.00     Pack years: 20.00     Types: Cigarettes    Smokeless tobacco: Never Used   Substance and Sexual Activity    Alcohol use: No     Alcohol/week: 0.0 standard drinks    Drug use: No    Sexual activity: Not on file   Other Topics Concern    Not on file   Social History Narrative    Not on file     Social Determinants of Health     Financial Resource Strain:     Difficulty of Paying Living Expenses: Not on file   Food Insecurity:     Worried About Running Out of Food in the Last Year: Not on file    Genie of Food in the Last Year: Not on file   Transportation Needs:     Lack of Transportation (Medical): Not on file    Lack of Transportation (Non-Medical):  Not on file   Physical Activity:     Days of Exercise per Week: Not on file    Minutes of Exercise per Session: Not on file   Stress:     Feeling of Stress : Not on file   Social Connections:     Frequency of Communication with Friends and Family: Not on file    Frequency of Social Gatherings with Friends and Family: Not on file    Attends Restoration Services: Not on file    Active Member of [address] [place] [address] [employer] or Organizations: Not on file    Attends Club or Organization Meetings: Not on file    Marital Status: Not on file   Intimate Partner Violence:     Fear of Current or Ex-Partner: Not on file    Emotionally Abused: Not on file    Physically Abused: Not on file    Sexually Abused: Not on file   Housing Stability:     Unable to Pay for Housing in the Last Year: Not on file    Number of [place] in the Last Year: Not on file    Unstable Housing in the Last Year: Not on file     Family History:    Family History   Problem Relation Age of Onset    Diabetes Mother     Kidney Disease Mother     COPD Father     Heart Disease Father     Cancer Father         bone    Stroke Father  Heart Disease Sister     Diabetes Brother      REVIEW OF SYSTEMS:  A 14-point ROS was obtained and negative, with the exception of pertinent positives as listed below:    PHYSICAL EXAM:  Vitals:    11/18/21 1914 11/18/21 1917 11/18/21 2046 11/18/21 2151   BP: (!) 134/56      Pulse:    99   Resp:       Temp:       TempSrc:       SpO2:  93%  93%   Weight:   225 lb (102.1 kg)    Height:   6' 1\" (1.854 m)      General appearance: Alert / well-appearing. Cooperative. NAD. HEENT:  Normocephalic / atraumatic. PERRL. EOM intact. Conjunctivae appear normal.  Neck: Supple. No JVD. Respiratory: Normal respiratory effort on RA. CTAB. No wheezes / rales / rhonchi. Cardiovascular: RRR. Normal S1/S2. No murmurs / rubs / gallops. Abdomen: Soft / non-tender / non-distended. BS present. Musculoskeletal: No cyanosis or edema. Skin: Warm / dry. Normal turgor. Arterial Ulcers present in Left and Right legs; No drainage at this time; Pressure Ulcer in [place]   Neurologic: A/O x 3. Speech normal. Answers questions appropriately. CN intact. No obvious focal neurologic deficits. Psychiatric: Thought content / judgment / insight appear appropriate. Capillary refill: Brisk bilaterally. Peripheral pulses: +2 bilaterally.     Labs:   Results for orders placed or performed during the hospital encounter of 11/18/21   CBC Auto Differential   Result Value Ref Range    WBC 13.4 (H) 4.8 - 10.8 thou/mm3    RBC 4.61 (L) 4.70 - 6.10 mill/mm3    Hemoglobin 12.0 (L) 14.0 - 18.0 gm/dl    Hematocrit 39.7 (L) 42.0 - 52.0 %    MCV 86.1 80.0 - 94.0 fL    MCH 26.0 26.0 - 33.0 pg    MCHC 30.2 (L) 32.2 - 35.5 gm/dl    RDW-CV 15.5 (H) 11.5 - 14.5 %    RDW-SD 48.1 (H) 35.0 - 45.0 fL    Platelets 902 (H) 190 - 400 thou/mm3    MPV 10.6 9.4 - 12.4 fL    Seg Neutrophils 68.5 %    Lymphocytes 24.1 %    Monocytes 5.3 %    Eosinophils 1.1 %    Basophils 0.5 %    Immature Granulocytes 0.5 %    Segs Absolute 9.2 (H) 1.8 - 7.7 thou/mm3    Lymphocytes Absolute 3.2 1.0 - 4.8 thou/mm3    Monocytes Absolute 0.7 0.4 - 1.3 thou/mm3    Eosinophils Absolute 0.1 0.0 - 0.4 thou/mm3    Basophils Absolute 0.1 0.0 - 0.1 thou/mm3    Immature Grans (Abs) 0.07 0.00 - 0.07 thou/mm3    nRBC 0 /100 wbc   Basic Metabolic Panel w/ Reflex to MG   Result Value Ref Range    Sodium 143 135 - 145 meq/L    Potassium reflex Magnesium 3.6 3.5 - 5.2 meq/L    Chloride 102 98 - 111 meq/L    CO2 24 23 - 33 meq/L    Glucose 108 70 - 108 mg/dL    BUN 11 7 - 22 mg/dL    CREATININE 0.8 0.4 - 1.2 mg/dL    Calcium 8.6 8.5 - 10.5 mg/dL   Hepatic Function Panel   Result Value Ref Range    Albumin 3.7 3.5 - 5.1 g/dL    Total Bilirubin 0.3 0.3 - 1.2 mg/dL    Bilirubin, Direct <0.2 0.0 - 0.3 mg/dL    Alkaline Phosphatase 81 38 - 126 U/L    AST 16 5 - 40 U/L    ALT 11 11 - 66 U/L    Total Protein 7.7 6.1 - 8.0 g/dL   Troponin   Result Value Ref Range    Troponin T 0.048 (A) ng/ml   Brain Natriuretic Peptide   Result Value Ref Range    Pro-BNP 3616.0 (H) 0.0 - 900.0 pg/mL   Lactate, Sepsis   Result Value Ref Range    Lactic Acid, Sepsis 2.6 (H) 0.5 - 1.9 mmol/L   Lactate, Sepsis   Result Value Ref Range    Lactic Acid, Sepsis 2.8 (H) 0.5 - 1.9 mmol/L   Procalcitonin   Result Value Ref Range    Procalcitonin 0.06 0.01 - 0.09 ng/mL   Anion Gap   Result Value Ref Range    Anion Gap 17.0 (H) 8.0 - 16.0 meq/L   Glomerular Filtration Rate, Estimated   Result Value Ref Range    Est, Glom Filt Rate >90 ml/min/1.73m2   Osmolality   Result Value Ref Range    Osmolality Calc 284.9 275.0 - 300.0 mOsmol/kg   EKG Emergency   Result Value Ref Range    Ventricular Rate 105 BPM    QRS Duration 78 ms    Q-T Interval 374 ms    QTc Calculation (Bazett) 494 ms    R Axis 55 degrees    T Axis 70 degrees       EKG / Radiology:     EKG:  Reviewed by me --    CXR:   Reviewed by me --    SHELLEY DUP LOWER EXTREMITY ARTERIES BILATERAL    Result Date: 11/18/2021  PROCEDURE: SHELLEY DUP LOWER EXTREMITY ARTERIES BILATERAL CLINICAL INFORMATION: Patient came in for wound on buttock. ER unable to palpate DP and PT pulses - patient states his legs are at their baseline. . Patient has a left fem-tib graft. Occluded per US 11/5/20-, performed at outside institution. COMPARISON: No prior study. TECHNIQUE: Multiple permanent grayscale and color flow sonographic images of the major arteries of both lower extremities were obtained from the level of the groin to the level of the ankle . Spectral Doppler waveforms were obtained and velocity measurements were measured. FINDINGS: RIGHT ARTERY (PSV cm/sec) ? ??????????????????????????????????? CFA ---------------> 168 PSV cm/sec PROF -------------> 129 PSV cm/sec SFA PROX ------->108 PSV cm/sec SFA MID ---------->53 PSV cm/sec SFA DIST -------->68 PSV cm/sec POP A PROX --->26 PSV cm/sec POP A DIST ---->48 PSV cm/sec PTA --------------->52 PSV cm/sec PERONEAL ----->41 PSV cm/sec ELYSIA ----------------> 20 PSV cm/sec LEFT ARTERY (PSV cm/sec) ? ??????????????????????? CFA ---------------> 31 PSV cm/sec PROF -------------> 90 PSV cm/sec SFA PROX ------->14 PSV cm/sec SFA MID ---------->occluded SFA DIST -------->occluded POP A PROX --->occluded POP A DIST ---->occluded PTA --------------->occluded PERONEAL ----->occluded ELYSIA ----------------> 8 PSV cm/sec VELOCITY MEASUREMENTS: ABIs and not performed due to patient's refusal. RIGHT LEG: Excellent pulsatility is seen in the common femoral artery, profunda, and throughout the SFA. Moderate multifocal atherosclerotic plaque is scattered throughout the SFA and in the common femoral artery. There is a small plaque in the right common femoral artery demonstrates mild mobility. There appears be moderately dampened pulsatility in the proximal popliteal artery. Good pulsatility seen distally in the popliteal artery. Good pulsatility is seen in the posterior limb peroneal arteries. Moderately dampened pulsatility in the right anterior tibial artery.  LEFT LEG: Severely dampened pulsatility is seen in the left common femoral artery, indicative of significant inflow stenosis. There is more than doubling of velocity in the left profunda, suggesting at least 50% stenosis at the origin of the profunda. Severely dampened pulsatility is also seen in the proximal SFA. The mid and distal SFA is totally occluded. The popliteal artery is also totally occluded as are the posterior tibial and peroneal arteries. Pulsatile flow is seen in the left anterior tibial artery but is severely dampened. The femoral-tibial bypass graft could not be seen sonographically. 1. Findings suggestive of mild stenosis in the proximal popliteal region right side. Good pulsatility is seen in the right posterior tibial and peroneal arteries. 2. The patient's femoral/tibial bypass graft left side is known to be totally occluded and could not be visualized on today's study. . Severely dampened pulsatility in the left common femoral artery, suggesting moderate-severe inflow stenosis. Total occlusion left SFA, popliteal artery, as well as the peroneal and posterior tibial arteries. Recanalized flow is seen in the left anterior tibial artery with severely dampened. 3. Despite the findings mentioned above, patient denies any problems with the left leg. **This report has been created using voice recognition software. It may contain minor errors which are inherent in voice recognition technology. ** Final report electronically signed by Dr. Jigar Knapp on 11/18/2021 10:07 PM    FEN/GI/DVT:  IVF: None  Electrolytes: Monitor and replace per protocols  Diet: Fluid Restricted @ 2000 ml  GI PPX: No  DVT Prophylaxis: Lovenox    CODE STATUS:  Full    Thank you Kendrick Berumen MD for the opportunity to be involved in this patient's care.     Electronically signed by Anitra Nelson DO on 11/18/2021 at 10:54 PM

## 2021-11-20 LAB
ANION GAP SERPL CALCULATED.3IONS-SCNC: 15 MEQ/L (ref 8–16)
ANION GAP SERPL CALCULATED.3IONS-SCNC: 17 MEQ/L (ref 8–16)
AVERAGE GLUCOSE: 153 MG/DL (ref 70–126)
AVERAGE GLUCOSE: 153 MG/DL (ref 70–126)
BUN BLDV-MCNC: 7 MG/DL (ref 7–22)
BUN BLDV-MCNC: 8 MG/DL (ref 7–22)
CALCIUM IONIZED: 0.92 MMOL/L (ref 1.12–1.32)
CALCIUM SERPL-MCNC: 7.9 MG/DL (ref 8.5–10.5)
CALCIUM SERPL-MCNC: 8.5 MG/DL (ref 8.5–10.5)
CHLORIDE BLD-SCNC: 100 MEQ/L (ref 98–111)
CHLORIDE BLD-SCNC: 100 MEQ/L (ref 98–111)
CHOLESTEROL, TOTAL: 96 MG/DL (ref 100–199)
CO2: 22 MEQ/L (ref 23–33)
CO2: 23 MEQ/L (ref 23–33)
CREAT SERPL-MCNC: 0.9 MG/DL (ref 0.4–1.2)
CREAT SERPL-MCNC: 0.9 MG/DL (ref 0.4–1.2)
GFR SERPL CREATININE-BSD FRML MDRD: 85 ML/MIN/1.73M2
GFR SERPL CREATININE-BSD FRML MDRD: 85 ML/MIN/1.73M2
GLUCOSE BLD-MCNC: 142 MG/DL (ref 70–108)
GLUCOSE BLD-MCNC: 152 MG/DL (ref 70–108)
GLUCOSE BLD-MCNC: 174 MG/DL (ref 70–108)
GLUCOSE BLD-MCNC: 178 MG/DL (ref 70–108)
GLUCOSE BLD-MCNC: 187 MG/DL (ref 70–108)
HBA1C MFR BLD: 7.1 % (ref 4.4–6.4)
HBA1C MFR BLD: 7.1 % (ref 4.4–6.4)
HDLC SERPL-MCNC: 25 MG/DL
LACTIC ACID: 1.6 MMOL/L (ref 0.5–2)
LDL CHOLESTEROL CALCULATED: 48 MG/DL
MAGNESIUM: 0.9 MG/DL (ref 1.6–2.4)
MAGNESIUM: 1.8 MG/DL (ref 1.6–2.4)
POTASSIUM SERPL-SCNC: 3.5 MEQ/L (ref 3.5–5.2)
POTASSIUM SERPL-SCNC: 4.1 MEQ/L (ref 3.5–5.2)
SODIUM BLD-SCNC: 137 MEQ/L (ref 135–145)
SODIUM BLD-SCNC: 140 MEQ/L (ref 135–145)
TRIGL SERPL-MCNC: 116 MG/DL (ref 0–199)
TSH SERPL DL<=0.05 MIU/L-ACNC: 2.09 UIU/ML (ref 0.4–4.2)
VANCOMYCIN TROUGH: 7 UG/ML (ref 10–20)

## 2021-11-20 PROCEDURE — 83036 HEMOGLOBIN GLYCOSYLATED A1C: CPT

## 2021-11-20 PROCEDURE — 6370000000 HC RX 637 (ALT 250 FOR IP): Performed by: STUDENT IN AN ORGANIZED HEALTH CARE EDUCATION/TRAINING PROGRAM

## 2021-11-20 PROCEDURE — 36415 COLL VENOUS BLD VENIPUNCTURE: CPT

## 2021-11-20 PROCEDURE — 80048 BASIC METABOLIC PNL TOTAL CA: CPT

## 2021-11-20 PROCEDURE — 99223 1ST HOSP IP/OBS HIGH 75: CPT | Performed by: INTERNAL MEDICINE

## 2021-11-20 PROCEDURE — 2060000000 HC ICU INTERMEDIATE R&B

## 2021-11-20 PROCEDURE — 99233 SBSQ HOSP IP/OBS HIGH 50: CPT | Performed by: STUDENT IN AN ORGANIZED HEALTH CARE EDUCATION/TRAINING PROGRAM

## 2021-11-20 PROCEDURE — 80061 LIPID PANEL: CPT

## 2021-11-20 PROCEDURE — 84443 ASSAY THYROID STIM HORMONE: CPT

## 2021-11-20 PROCEDURE — 83735 ASSAY OF MAGNESIUM: CPT

## 2021-11-20 PROCEDURE — 83605 ASSAY OF LACTIC ACID: CPT

## 2021-11-20 PROCEDURE — 82330 ASSAY OF CALCIUM: CPT

## 2021-11-20 PROCEDURE — 6360000002 HC RX W HCPCS: Performed by: STUDENT IN AN ORGANIZED HEALTH CARE EDUCATION/TRAINING PROGRAM

## 2021-11-20 PROCEDURE — 82948 REAGENT STRIP/BLOOD GLUCOSE: CPT

## 2021-11-20 PROCEDURE — 80202 ASSAY OF VANCOMYCIN: CPT

## 2021-11-20 PROCEDURE — 2500000003 HC RX 250 WO HCPCS: Performed by: STUDENT IN AN ORGANIZED HEALTH CARE EDUCATION/TRAINING PROGRAM

## 2021-11-20 PROCEDURE — 2580000003 HC RX 258: Performed by: STUDENT IN AN ORGANIZED HEALTH CARE EDUCATION/TRAINING PROGRAM

## 2021-11-20 RX ORDER — POTASSIUM CHLORIDE 7.45 MG/ML
10 INJECTION INTRAVENOUS PRN
Status: DISCONTINUED | OUTPATIENT
Start: 2021-11-20 | End: 2021-11-23 | Stop reason: HOSPADM

## 2021-11-20 RX ORDER — POTASSIUM CHLORIDE 20 MEQ/1
40 TABLET, EXTENDED RELEASE ORAL PRN
Status: DISCONTINUED | OUTPATIENT
Start: 2021-11-20 | End: 2021-11-23 | Stop reason: HOSPADM

## 2021-11-20 RX ADMIN — POTASSIUM BICARBONATE 40 MEQ: 782 TABLET, EFFERVESCENT ORAL at 05:02

## 2021-11-20 RX ADMIN — VANCOMYCIN HYDROCHLORIDE 1000 MG: 1 INJECTION, POWDER, LYOPHILIZED, FOR SOLUTION INTRAVENOUS at 04:04

## 2021-11-20 RX ADMIN — GABAPENTIN 100 MG: 100 CAPSULE ORAL at 19:45

## 2021-11-20 RX ADMIN — GABAPENTIN 100 MG: 100 CAPSULE ORAL at 14:04

## 2021-11-20 RX ADMIN — METOPROLOL TARTRATE 25 MG: 25 TABLET, FILM COATED ORAL at 19:45

## 2021-11-20 RX ADMIN — PIPERACILLIN AND TAZOBACTAM 3375 MG: 3; .375 INJECTION, POWDER, LYOPHILIZED, FOR SOLUTION INTRAVENOUS at 20:43

## 2021-11-20 RX ADMIN — ENOXAPARIN SODIUM 100 MG: 100 INJECTION SUBCUTANEOUS at 23:20

## 2021-11-20 RX ADMIN — PANTOPRAZOLE SODIUM 40 MG: 40 TABLET, DELAYED RELEASE ORAL at 05:18

## 2021-11-20 RX ADMIN — SODIUM CHLORIDE, PRESERVATIVE FREE 10 ML: 5 INJECTION INTRAVENOUS at 19:48

## 2021-11-20 RX ADMIN — MAGNESIUM SULFATE HEPTAHYDRATE 4000 MG: 4 INJECTION, SOLUTION INTRAVENOUS at 00:07

## 2021-11-20 RX ADMIN — ACETAMINOPHEN 650 MG: 325 TABLET ORAL at 09:38

## 2021-11-20 RX ADMIN — INSULIN LISPRO 1 UNITS: 100 INJECTION, SOLUTION INTRAVENOUS; SUBCUTANEOUS at 19:43

## 2021-11-20 RX ADMIN — PIPERACILLIN AND TAZOBACTAM 3375 MG: 3; .375 INJECTION, POWDER, LYOPHILIZED, FOR SOLUTION INTRAVENOUS at 05:07

## 2021-11-20 RX ADMIN — METOPROLOL TARTRATE 25 MG: 25 TABLET, FILM COATED ORAL at 09:54

## 2021-11-20 RX ADMIN — AMIODARONE HYDROCHLORIDE 0.5 MG/MIN: 1.8 INJECTION, SOLUTION INTRAVENOUS at 06:07

## 2021-11-20 RX ADMIN — AMIODARONE HYDROCHLORIDE 1 MG/MIN: 1.8 INJECTION, SOLUTION INTRAVENOUS at 00:14

## 2021-11-20 RX ADMIN — PIPERACILLIN AND TAZOBACTAM 3375 MG: 3; .375 INJECTION, POWDER, LYOPHILIZED, FOR SOLUTION INTRAVENOUS at 14:04

## 2021-11-20 RX ADMIN — ASPIRIN 81 MG: 81 TABLET ORAL at 09:38

## 2021-11-20 RX ADMIN — SODIUM CHLORIDE, PRESERVATIVE FREE 10 ML: 5 INJECTION INTRAVENOUS at 09:43

## 2021-11-20 RX ADMIN — GABAPENTIN 100 MG: 100 CAPSULE ORAL at 09:38

## 2021-11-20 RX ADMIN — ENOXAPARIN SODIUM 100 MG: 100 INJECTION SUBCUTANEOUS at 11:35

## 2021-11-20 RX ADMIN — CLOPIDOGREL BISULFATE 75 MG: 75 TABLET ORAL at 09:39

## 2021-11-20 RX ADMIN — INSULIN LISPRO 1 UNITS: 100 INJECTION, SOLUTION INTRAVENOUS; SUBCUTANEOUS at 11:33

## 2021-11-20 RX ADMIN — VANCOMYCIN HYDROCHLORIDE 1000 MG: 1 INJECTION, POWDER, LYOPHILIZED, FOR SOLUTION INTRAVENOUS at 15:40

## 2021-11-20 RX ADMIN — AMIODARONE HYDROCHLORIDE 0.5 MG/MIN: 1.8 INJECTION, SOLUTION INTRAVENOUS at 15:43

## 2021-11-20 RX ADMIN — INSULIN LISPRO 1 UNITS: 100 INJECTION, SOLUTION INTRAVENOUS; SUBCUTANEOUS at 16:24

## 2021-11-20 RX ADMIN — CALCIUM GLUCONATE 2000 MG: 98 INJECTION, SOLUTION INTRAVENOUS at 04:06

## 2021-11-20 ASSESSMENT — PAIN SCALES - GENERAL
PAINLEVEL_OUTOF10: 0

## 2021-11-20 NOTE — PROGRESS NOTES
Report called to 4A to nurse, Lilian Rios. All questions answered. Patient sent with all belongings.

## 2021-11-20 NOTE — PROGRESS NOTES
Hospitalist Progress Note      Patient:  Juan Barker    Unit/Bed:4A-03/003-A  YOB: 1955  MRN: [id number]   Acct: [de-identified]   PCP: Sherine Bustos MD  Date of Admission: 11/18/2021    Assessment/Plan:    1. New onset atrial fibrillation with RVR  a. Patient with A. fib RVR overnight 11/19/2021.  b. Blood pressure borderline low. Started on amiodarone. c. Continue treatment dose Lovenox. d. Cardiology consultation for ischemic evaluation. e. Check TSH, A1c, and lipid panel  f. Patient likely has underlying sleep apnea which may have contributed to his atrial fibrillation. 2. Peripheral arterial disease with nonhealing arterial ulceration:  a. Patient has previously undergone arteriogram with attempted recannulization 11/2020.  b. ABIs obtained in the ED showed mild stenosis of the proximal popliteal region on the right with occluded arteries on the left which appear chronic in nature. c. Continue ASA, Plavix, and statin  d. Podiatry following for management and wound care. 3. Stage III perineum pressure ulcer:   a. Patient states that he has apartment that he lives in. Complains of foul smell from ulcer, but denies any pain. b. Continue empiric vancomycin/zosyn. c. Wound care consultation. d. General surgery consultation. Appreciate their assistance. 4. Acute exacerbation of CHF, unspecified type:   a. Chest x-ray with evidence of volume overload and vascular congestion. b. Melissa Cates on further IV diuresis as patient is not requiring supplemental oxygen  c. Strict I's and O's. Daily weights. d. Repeat echocardiogram pending. 5. Elevated troponin:   a. Suspect this is likely due to #3.  b. EKG without acute ischemia. c. Cardiology following. 6. Diabetes mellitus type 2:  a. A1c pending.  b. SSI and Accu-Cheks. c. Hypoglycemic orders in place. 7. Hypertension:   a. Continue home medications  8.  COPD, not in acute exacerbation:   a. Continue home medications  9. CAD status post PCI:  a. Continue aspirin, statin, Plavix, beta-blocker, and ACE inhibitor  10. GERD:   a. Continue home Protonix      Disposition: Patient may benefit from SNF at time of discharge as he has reportedly been living out of his car for 1-2 months. Chief Complaint:  Sacral wound    Hospital Course:    Patient was to the hospital from home with complaints of a foul-smelling wound on his left buttock region. Patient states has been there for several days and has noticed leaking/drainage from the site. He denies any pain, fevers, chills. Subjective (past 24 hours):   Overnight patient did have atrial fibrillation with RVR. Patient reports that he was sleeping. Denied chest pain or shortness of breath. Denies any history of arrhythmias including atrial fibrillation. He does state that he has previously been worked up for obstructive sleep apnea, but this was greater than 30 years ago. Patient's wife reports that he does have apneic episodes and snores. Past medical history, family history, social history and allergies reviewed again and is unchanged since admission. ROS (12 point review of systems completed. Pertinent positives noted.  Otherwise ROS is negative)     Medications:  Reviewed    Infusion Medications    dextrose      amiodarone 0.5 mg/min (11/20/21 0607)    sodium chloride 25 mL (11/19/21 0654)     Scheduled Medications    calcium replacement protocol   Other RX Placeholder    metoprolol tartrate  25 mg Oral BID    lisinopril  20 mg Oral Daily    piperacillin-tazobactam  3,375 mg IntraVENous Q8H    vancomycin  1,000 mg IntraVENous Q12H    vancomycin (VANCOCIN) intermittent dosing (placeholder)   Other RX Placeholder    insulin lispro  0-6 Units SubCUTAneous TID     insulin lispro  0-3 Units SubCUTAneous Nightly    enoxaparin  1 mg/kg SubCUTAneous Q12H    nicotine  1 patch TransDERmal Daily    sodium chloride flush  5-40 mL IntraVENous 2 times per day    aspirin EC  81 mg Oral Daily    clopidogrel  75 mg Oral Daily    gabapentin  100 mg Oral TID    pantoprazole  40 mg Oral Daily     PRN Meds: potassium chloride **OR** potassium alternative oral replacement **OR** potassium chloride, potassium chloride, cyclobenzaprine, traZODone, glucose, dextrose, glucagon (rDNA), dextrose, sodium chloride flush, sodium chloride, polyethylene glycol, acetaminophen **OR** acetaminophen      Intake/Output Summary (Last 24 hours) at 11/20/2021 1127  Last data filed at 11/20/2021 0346  Gross per 24 hour   Intake 545.82 ml   Output 375 ml   Net 170.82 ml       Diet:  ADULT DIET; Regular; 4 carb choices (60 gm/meal); 2000 ml    Exam:  /72   Pulse 81   Temp 97.5 °F (36.4 °C) (Oral)   Resp 16   Ht 6' 1\" (1.854 m)   Wt 239 lb 4.8 oz (108.5 kg)   SpO2 97%   BMI 31.57 kg/m²   General appearance: Elderly chronically ill-appearing male. Resting bed. No acute distress. HEENT: Pupils equal, round, and reactive to light. Conjunctivae/corneas clear. Neck: Supple, with full range of motion. No jugular venous distention. Trachea midline. Respiratory:  Normal respiratory effort. Clear to auscultation, bilaterally without Rales/Wheezes/Rhonchi. Cardiovascular: Regular rate and rhythm with normal S1/S2 without murmurs, rubs or gallops. Lower extremities have delayed capillary refill. Unable to palpate either posterior tibial or dorsalis pedal pulses. Abdomen: Soft, non-tender, non-distended with normal bowel sounds. Musculoskeletal: passive and active ROM x 4 extremities. Skin: Skin color, texture, turgor normal.  No rashes or lesions. Stage III pressure ulcer noted to the patient's lateral left buttocks. Neurologic:  Neurovascularly intact without any focal sensory/motor deficits.  Cranial nerves: II-XII intact, grossly non-focal.  Psychiatric: Alert and oriented, thought content appropriate, normal insight  Capillary Refill: Brisk,< 3 seconds   Peripheral Pulses: +2 palpable, equal bilaterally     Labs:   Recent Labs     11/18/21 1922 11/19/21  0802   WBC 13.4* 15.6*   HGB 12.0* 12.7*   HCT 39.7* 41.7*   * 454*     Recent Labs     11/19/21  2246 11/20/21  0015 11/20/21  0541    137 140   K 3.4* 3.5 4.1    100 100   CO2 21* 22* 23   BUN 8 8 7   CREATININE 0.8 0.9 0.9   CALCIUM 7.9* 7.9* 8.5     Recent Labs     11/18/21 1922   AST 16   ALT 11   BILIDIR <0.2   BILITOT 0.3   ALKPHOS 81     No results for input(s): INR in the last 72 hours. No results for input(s): Ivett Mattoon in the last 72 hours. Microbiology:    Blood culture #1:   Lab Results   Component Value Date    BC No growth-preliminary  11/18/2021       Blood culture #2:No results found for: Modesto Viramontes    Organism:No results found for: ORG    No results found for: LABGRAM    MRSA culture only:No results found for: [facility]    Urine culture: No results found for: LABURIN    Respiratory culture: No results found for: CULTRESP    Aerobic and Anaerobic :  No results found for: LABAERO  No results found for: LABANAE    Urinalysis:      Lab Results   Component Value Date    NITRU NEGATIVE 03/16/2017    WBCUA 2-4 03/16/2017    BACTERIA NONE 03/16/2017    RBCUA 0-2 03/16/2017    BLOODU NEGATIVE 03/16/2017    SPECGRAV 1.023 03/16/2017    GLUCOSEU NEGATIVE 01/20/2013       Radiology:  XR CHEST PORTABLE   Final Result   1. Normal heart size. No effusion seen. 2. Mildly increased interstitial markings scattered in the right lung and left lung base, consistent with mild interstitial pneumonitis/edema. 3. Overall appearance improved from prior. **This report has been created using voice recognition software. It may contain minor errors which are inherent in voice recognition technology. **      Final report electronically signed by Dr. Ramo Yanez on 11/19/2021 10:30 PM      XR CHEST PORTABLE   Final Result   1. Borderline heart size. No effusion. 2. Mildly increased initial markings left lung base and throughout the right lung, consistent with interstitial pneumonia/edema. Central pulmonary vascular markings are prominent, suggesting possible low-grade or incipient heart failure. **This report has been created using voice recognition software. It may contain minor errors which are inherent in voice recognition technology. **      Final report electronically signed by Dr. Kemar Pillai on 11/19/2021 12:23 AM      VL DUP LOWER EXTREMITY ARTERIES BILATERAL   Final Result   1. Findings suggestive of mild stenosis in the proximal popliteal region right side. Good pulsatility is seen in the right posterior tibial and peroneal arteries. 2. The patient's femoral/tibial bypass graft left side is known to be totally occluded and could not be visualized on today's study. . Severely dampened pulsatility in the left common femoral artery, suggesting moderate-severe inflow stenosis. Total    occlusion left SFA, popliteal artery, as well as the peroneal and posterior tibial arteries. Recanalized flow is seen in the left anterior tibial artery with severely dampened. 3. Despite the findings mentioned above, patient denies any problems with the left leg. **This report has been created using voice recognition software. It may contain minor errors which are inherent in voice recognition technology. **      Final report electronically signed by Dr. Kemar Pillai on 11/18/2021 10:07 PM        XR CHEST PORTABLE    Result Date: 11/19/2021  PROCEDURE: XR CHEST PORTABLE CLINICAL INFORMATION: SOB COMPARISON: 3/16/2017 TECHNIQUE: A single mobile view of the chest was obtained. 1. Borderline heart size. No effusion. 2. Mildly increased initial markings left lung base and throughout the right lung, consistent with interstitial pneumonia/edema. Central pulmonary vascular markings are prominent, suggesting possible low-grade or incipient heart failure. **This report has been created using voice recognition software. It may contain minor errors which are inherent in voice recognition technology. ** Final report electronically signed by Dr. Dell Lee on 11/19/2021 12:23 AM    VL DUP LOWER EXTREMITY ARTERIES BILATERAL    Result Date: 11/18/2021  PROCEDURE: VL DUP LOWER EXTREMITY ARTERIES BILATERAL CLINICAL INFORMATION: Patient came in for wound on buttock. ER unable to palpate DP and PT pulses - patient states his legs are at their baseline. . Patient has a left fem-tib graft. Occluded per US 11/5/20-, performed at outside institution. COMPARISON: No prior study. TECHNIQUE: Multiple permanent grayscale and color flow sonographic images of the major arteries of both lower extremities were obtained from the level of the groin to the level of the ankle . Spectral Doppler waveforms were obtained and velocity measurements were measured. FINDINGS: RIGHT ARTERY (PSV cm/sec) ? ??????????????????????????????????? CFA ---------------> 168 PSV cm/sec PROF -------------> 129 PSV cm/sec SFA PROX ------->108 PSV cm/sec SFA MID ---------->53 PSV cm/sec SFA DIST -------->68 PSV cm/sec POP A PROX --->26 PSV cm/sec POP A DIST ---->48 PSV cm/sec PTA --------------->52 PSV cm/sec PERONEAL ----->41 PSV cm/sec ELYSIA ----------------> 20 PSV cm/sec LEFT ARTERY (PSV cm/sec) ? ??????????????????????? CFA ---------------> 31 PSV cm/sec PROF -------------> 90 PSV cm/sec SFA PROX ------->14 PSV cm/sec SFA MID ---------->occluded SFA DIST -------->occluded POP A PROX --->occluded POP A DIST ---->occluded PTA --------------->occluded PERONEAL ----->occluded ELYSIA ----------------> 8 PSV cm/sec VELOCITY MEASUREMENTS: ABIs and not performed due to patient's refusal. RIGHT LEG: Excellent pulsatility is seen in the common femoral artery, profunda, and throughout the SFA. Moderate multifocal atherosclerotic plaque is scattered throughout the SFA and in the common femoral artery.  There is a small plaque in the right common femoral artery demonstrates mild mobility. There appears be moderately dampened pulsatility in the proximal popliteal artery. Good pulsatility seen distally in the popliteal artery. Good pulsatility is seen in the posterior limb peroneal arteries. Moderately dampened pulsatility in the right anterior tibial artery. LEFT LEG: Severely dampened pulsatility is seen in the left common femoral artery, indicative of significant inflow stenosis. There is more than doubling of velocity in the left profunda, suggesting at least 50% stenosis at the origin of the profunda. Severely dampened pulsatility is also seen in the proximal SFA. The mid and distal SFA is totally occluded. The popliteal artery is also totally occluded as are the posterior tibial and peroneal arteries. Pulsatile flow is seen in the left anterior tibial artery but is severely dampened. The femoral-tibial bypass graft could not be seen sonographically. 1. Findings suggestive of mild stenosis in the proximal popliteal region right side. Good pulsatility is seen in the right posterior tibial and peroneal arteries. 2. The patient's femoral/tibial bypass graft left side is known to be totally occluded and could not be visualized on today's study. . Severely dampened pulsatility in the left common femoral artery, suggesting moderate-severe inflow stenosis. Total occlusion left SFA, popliteal artery, as well as the peroneal and posterior tibial arteries. Recanalized flow is seen in the left anterior tibial artery with severely dampened. 3. Despite the findings mentioned above, patient denies any problems with the left leg. **This report has been created using voice recognition software. It may contain minor errors which are inherent in voice recognition technology. ** Final report electronically signed by Dr. Pablo Lucia on 11/18/2021 10:07 PM      Electronically signed by Gabrielle Amador DO on 11/20/2021 at 11:27 AM

## 2021-11-20 NOTE — SIGNIFICANT EVENT
Patient became tachypnic at 36 and heart rate tachycardic at 160s overnight. Patient was asymptomatic at that time. EKG obtained showed atrial fibrillation with RVR. This is new onset A. fib. I ordered therapeutic Lovenox at that time due to Jas-Vasc>2. Patient had received his Lopressor 25 mg around 8 PM last night. Patient's heart rate had come down to 120s however patient was seen in the telemetry what looks to be an atrial flutter with nonconducted beats. CXR was ordered and looked overall improved from previous. ECHO just done on 11/19 was reviewed and showed a new low EF of 35%. Moderate global hypokinesis of the left ventricle was also seen. Last echo was done in 2017 with an EF of 55%. He was transferred to [facility]. Decision to start amiodarone bolus and gtt due to soft pressures and new EKG findings. Tele briefly also showed 23 beats of V-tach. Stat labs were ordered and showed Ionized Calcium at 0.92, K 3.4, and Mg at 0.9. 4,000 mg of Magnesium was ordered and to start STAT. K and Ca replacement protocol was also ordered at this time. Will check Repeat BMP and Mg when Mg replacement is finished. Cardiology Consult placed. Patient likely needs a cath to evaluate for new ischemia due to new low EF and rhythm abnormalities.        Electronically signed by Luz Rick DO on 11/20/21 at 1:20 AM EST

## 2021-11-20 NOTE — CONSULTS
[facility]  Cardiology Consult        Patient:  Uma Damico  YOB: 1955  MRN: [id number]     Acct: [de-identified]    PCP: Ramin Atkinson MD    Date of Admission: 11/18/2021      Reason for Consultation:  LV dysfunction, Acute CHF exacerbation      History Of Present Illness:    61 y.o.  male with history of CAD status post PCI, PAD, COPD, smoker, hypertension, hyperlipidemia, seizure disorder who initially presented to the hospital with complaints of perineal pressure ulcer. According to the spouse there is a foul-smelling discharge from the ulcer. He also has multiple ulcers in lower extremities. Patient has been to multiple podiatrists in the past, who have told him that he needs bilateral below the knee amputation due to severe PAD. Currently patient is on IV antibiotics. Patient does report some shortness of breath and orthopnea, has elevated proBNP and congestive pattern on the chest x-ray. He was given 1 dose of Lasix. He underwent echocardiogram yesterday which showed ejection fraction of 35%. His prior echocardiogram from 2017 shows ejection fraction of 55%. Patient reports that he has followed up with cardiology at [facility] and underwent multiple stents there several years ago. He has not followed up with any cardiologist since that. Electrocardiogram shows atrial fibrillation with RVR at a rate of 137 bpm.  Troponin was mildly elevated 0.048.       Past Medical History:          Diagnosis Date    CAD (coronary artery disease)     [facility] [address]     Hx of blood clots 1996    LEFT LEG    Hyperlipidemia     Hypertension     Leg wound, left     Leg wound, right     Lumbar radiculopathy     Osteoarthritis     Se[facility] disorder [facility] [address]     Spinal stenosis, lumbar        Past Surgical History:          Procedure Laterality Date    ANKLE SURGERY      ball joint   [address] [facility] tablet take 1 tablet by mouth once daily 7/25/16   Curtis Curtis MD   gabapentin (NEURONTIN) 100 MG capsule TAKE ONE CAPSULE BY MOUTH 3 TIMES A DAY 6/13/16   Curtis Curtis MD   traZODone (DESYREL) 50 MG tablet take 1 tablet by mouth at bedtime 6/7/16   Curtis Curtis MD   nitroGLYCERIN (NITROSTAT) 0.4 MG SL tablet Place 1 tablet under the tongue every 5 minutes as needed for Chest pain 2/2/16   Curtis Curtis MD   aclidinium [facility]) 400 MCG/ACT AEPB Inhale 1 each into the lungs 2 times daily 9/24/15   Curtis Curtis MD   naratriptan (AMERGE) 2.5 MG tablet Take 1 tablet by mouth once as needed for Migraine for up to 1 dose. 10/6/14 10/6/14  Linden Barahona DO   Elastic Bandages & Supports (LUMBAR BACK BRACE/SUPPORT PAD) MISC 1 each by Does not apply route daily as needed. 10/6/14   Linden Barahona DO   naratriptan (AMERGE) 2.5 MG tablet Take 1 tablet by mouth once as needed for Migraine for up to 1 dose. 2.5 mg at onset of headache, may repeat in 4 hours if needed 8/21/14 8/21/14  Linden Barahona DO   cyclobenzaprine (FLEXERIL) 10 MG tablet take 1 tablet by mouth three times a day if needed 5/26/14   Linden Barahona DO   Tens Unit MISC by Does not apply route. Use as directed. 4/24/14   Linden Barahona DO   traMADol (ULTRAM) 50 MG tablet Take 1 tablet by mouth daily as needed for Pain. 4/24/14   Linden Barahona DO   Respiratory Therapy Supplies (NEBULIZER COMPRESSOR) KIT by Does not apply route. 11/6/13   Linden Barahona DO   albuterol (PROVENTIL) (2.5 MG/3ML) 0.083% nebulizer solution Take 3 mLs by nebulization every 6 hours as needed for Wheezing.  11/6/13   Linden Barahona DO       Current Facility-Administered Medications   Medication Dose Route Frequency Provider Last Rate Last Admin    calcium replacement protocol   Other RX Placeholder Rebeka Brito, DO        potassium chloride (KLOR-CON M) extended release tablet 40 mEq  40 mEq Oral PRN Rebeka Brito, DO Or    potassium bicarb-citric acid (EFFER-K) effervescent tablet 40 mEq  40 mEq Oral PRN Rebeka Bux, DO   40 mEq at 11/20/21 0502    Or    potassium chloride 10 mEq/100 mL IVPB (Peripheral Line)  10 mEq IntraVENous PRN Rebeka Bux, DO        potassium chloride 10 mEq/100 mL IVPB (Peripheral Line)  10 mEq IntraVENous PRN Rebeka Bux, DO        metoprolol tartrate (LOPRESSOR) tablet 25 mg  25 mg Oral BID Rebeka Bux, DO   25 mg at 11/20/21 0954    lisinopril (PRINIVIL;ZESTRIL) tablet 20 mg  20 mg Oral Daily Rebeka Bux, DO   20 mg at 11/19/21 0934    cyclobenzaprine (FLEXERIL) tablet 10 mg  10 mg Oral Q8H PRN Rebeka Bux, DO        traZODone (DESYREL) tablet 50 mg  50 mg Oral Nightly PRN Rebeka Bux, DO   50 mg at 11/19/21 2104    piperacillin-tazobactam (ZOSYN) 3,375 mg in dextrose 5 % 50 mL IVPB extended infusion (mini-bag)  3,375 mg IntraVENous Q8H Rebeka Bux, DO 12.5 mL/hr at 11/20/21 1404 3,375 mg at 11/20/21 1404    vancomycin 1000 mg IVPB in 250 mL D5W addavial  1,000 mg IntraVENous Q12H Rebeka Bux,  mL/hr at 11/20/21 1540 1,000 mg at 11/20/21 1540    vancomycin (VANCOCIN) intermittent dosing (placeholder)   Other RX Placeholder Rebeka Bux, DO        insulin lispro (HUMALOG) injection vial 0-6 Units  0-6 Units SubCUTAneous TID [facility], DO   1 Units at 11/20/21 1624    insulin lispro (HUMALOG) injection vial 0-3 Units  0-3 Units SubCUTAneous Nightly [facility], DO   1 Units at 11/19/21 2057    glucose (GLUTOSE) 40 % oral gel 15 g  15 g Oral PRN Will South, DO        dextrose 50 % IV solution  12.5 g IntraVENous PRN Will South, DO        glucagon (rDNA) injection 1 mg  1 mg IntraMUSCular PRN [facility], DO        dextrose 5 % solution  100 mL/hr IntraVENous PRN Will Dos Santos DO        enoxaparin (LOVENOX) injection 100 mg  1 mg/kg SubCUTAneous Q12H Rebeka Brito DO   100 mg at 11/20/21 1135    amiodarone (NEXTERONE) 360 mg in dextrose 5% 200 ml  0.5 mg/min IntraVENous Continuous Rebeka Bux, DO 16.7 mL/hr at 11/20/21 1543 0.5 mg/min at 11/20/21 1543    nicotine (NICODERM CQ) 21 MG/24HR 1 patch  1 patch TransDERmal Daily Rebeka Bux, DO   1 patch at 11/20/21 0954    sodium chloride flush 0.9 % injection 5-40 mL  5-40 mL IntraVENous 2 times per day Rebeka Bux, DO   10 mL at 11/20/21 0943    sodium chloride flush 0.9 % injection 5-40 mL  5-40 mL IntraVENous PRN Rebeka Bux, DO        0.9 % sodium chloride infusion  25 mL IntraVENous PRN Rebeka Bux,  mL/hr at 11/19/21 0654 25 mL at 11/19/21 0654    polyethylene glycol (GLYCOLAX) packet 17 g  17 g Oral Daily PRN Rebeka Bux, DO        acetaminophen (TYLENOL) tablet 650 mg  650 mg Oral Q6H PRN Rebeka Bux, DO   650 mg at 11/20/21 5115    Or    acetaminophen (TYLENOL) suppository 650 mg  650 mg Rectal Q6H PRN Rebeka Bux, DO        aspirin EC tablet 81 mg  81 mg Oral Daily Rebeka Bux, DO   81 mg at 11/20/21 0938    clopidogrel (PLAVIX) tablet 75 mg  75 mg Oral Daily Rebeka Bux, DO   75 mg at 11/20/21 7715    gabapentin (NEURONTIN) capsule 100 mg  100 mg Oral TID Rebeka Bux, DO   100 mg at 11/20/21 1404    pantoprazole (PROTONIX) tablet 40 mg  40 mg Oral Daily Rebeka Bux, DO   40 mg at 11/20/21 0518       Allergies:  Influenza vaccines    Social History:    TOBACCO:   reports that he has been smoking cigarettes. He has a 20.00 pack-year smoking history. He has never used smokeless tobacco.  ETOH:   reports no history of alcohol use. Family History:        Problem Relation Age of Onset    Diabetes Mother     Kidney Disease Mother     COPD Father     Heart Disease Father     Cancer Father         bone    Stroke Father     Heart Disease Sister     Diabetes Brother          Review of Systems -   General ROS: negative  Psychological ROS: negative  Hematological and Lymphatic ROS: No history of blood clots or bleeding disorder.    Respiratory ROS: no cough, shortness of breath, or wheezing  Cardiovascular ROS: As per HPI  Gastrointestinal ROS: negative  Genito-Urinary ROS: no dysuria, trouble voiding, or hematuria  Musculoskeletal ROS: negative  Neurological ROS: no TIA or stroke symptoms  Dermatological ROS: negative    All others reviewed and are negative.        /78   Pulse 88   Temp 98 °F (36.7 °C) (Oral)   Resp 16   Ht 6' 1\" (1.854 m)   Wt 239 lb 4.8 oz (108.5 kg)   SpO2 97%   BMI 31.57 kg/m²       Physical Examination:   General appearance - alert, in no distress  Mental status - alert, oriented to person, place, and time  Neck - supple, no significant adenopathy, no JVD, or carotid bruits  Chest - clear to auscultation, positive Rales   heart - normal rate, irregular rhythm, normal S1, S2, no murmurs, rubs, clicks or gallops  Abdomen - soft, nontender, nondistended, no masses or organomegaly  Neurological - alert, oriented, normal speech, no focal findings or movement disorder noted  Musculoskeletal - no joint tenderness, deformity or swelling  Extremities -multiple nonhealing ulcers  Skin -multiple long nonhealing ulcers on the lower extremities      LABS:    Recent Labs     11/18/21 1922   TROPONINT 0.048*     CBC:   Lab Results   Component Value Date    WBC 15.6 11/19/2021    RBC 4.88 11/19/2021    RBC 5.49 02/02/2012    HGB 12.7 11/19/2021    HCT 41.7 11/19/2021    MCV 85.5 11/19/2021    MCH 26.0 11/19/2021    MCHC 30.5 11/19/2021    RDW 15.2 03/18/2021     11/19/2021    MPV 10.9 11/19/2021     BMP:    Lab Results   Component Value Date     11/20/2021    K 4.1 11/20/2021    K 3.6 11/18/2021     11/20/2021    CO2 23 11/20/2021    BUN 7 11/20/2021    LABALBU 3.7 11/18/2021    LABALBU 4.7 02/02/2012    CREATININE 0.9 11/20/2021    CALCIUM 8.5 11/20/2021    GFRAA >60 03/18/2021    LABGLOM 85 11/20/2021    GLUCOSE 174 11/20/2021    GLUCOSE 117 02/02/2012     Hepatic Function Panel:    Lab Results   Component Value Date    ALKPHOS 81 11/18/2021    ALT 11 11/18/2021    AST 16 11/18/2021    PROT 7.7 11/18/2021    BILITOT 0.3 11/18/2021    BILIDIR <0.2 11/18/2021    LABALBU 3.7 11/18/2021    LABALBU 4.7 02/02/2012     Magnesium:    Lab Results   Component Value Date    MG 1.8 11/20/2021     Warfarin PT/INR:  No components found for: Cherre Olp  HgBA1c:    Lab Results   Component Value Date    LABA1C 7.1 11/20/2021     FLP:    Lab Results   Component Value Date    TRIG 116 11/20/2021    HDL 25 11/20/2021    LDLCALC 48 11/20/2021     TSH:    Lab Results   Component Value Date    TSH 2.090 11/20/2021     BNP: No components found for: PRO-BNP      Assessment/Plan:    Patient Active Problem List   Diagnosis    Se[facility] disorder [facility] [address]    Osteoarthritis    COPD (chronic obstructive pulmonary disease) ([facility] [address]    Spinal stenosis, lumbar    Lumbar radiculopathy    Tobacco abuse    GERD (gastroesophageal reflux disease)    Skin ulceration (HCC)    Non-healing ulcer of lower leg (HCC)    Bilateral claudication of lower limb (HCC)    Current smoker    Dyslipidemia    Abnormal cardiovascular function study    Obesity    CAD, multiple vessel    Chronic total occlusion of artery of the extremities (HCC)    Coronary artery chronic total occlusion    DAVILA (dyspnea on exertion)    HTN (hypertension)    Discitis of cervical region    Open wound of right hand    Noncompliance by refusing intervention or support    Lactic acidosis    Wound infection     Briefly this is a 79-year-old poorly kept man with history of CAD status post PCI, PAD status post PTA/lower extremity bypass, multiple nonhealing lower extremity ulcers, smoker, COPD, medically noncompliant who presents initially for a perineal ulcer. His electrocardiogram shows atrial fibrillation with RVR, mildly elevated troponins, new echocardiographic findings of LV dysfunction compared to his echo from 2017.   Cardiology was consulted for further management    Telemetry  Needs ID evaluation and clearance for invasive ischemic work-up  Discussed diagnostic cardiac catheterization  Risks benefits and alternatives were discussed  Continue aspirin, statin, Plavix, metoprolol  Will need anticoagulation on discharge  We will schedule for cath after clearance from ID  Wound care  Continue rest of the management    Please do note hesitate to contact me for any further questions. Thank you for the opportunity to be involved in this patient's care.     Code Status: Full Code    Electronically signed by Khurram Em MD on 11/20/2021 at 5:05 PM

## 2021-11-20 NOTE — PROGRESS NOTES
Pharmacy Vancomycin Consult     Vancomycin Day: 2  Current Dosinmg q12h  Current indication: SSTI    Temp max:  afebrile    Recent Labs     21  1922 21  1922 21  0802 21  2246 21  0015 21  0541   BUN 11   < > 9   < > 8 7   CREATININE 0.8   < > 0.8   < > 0.9 0.9   WBC 13.4*  --  15.6*  --   --   --     < > = values in this interval not displayed. Intake/Output Summary (Last 24 hours) at 2021 1709  Last data filed at 2021 1338  Gross per 24 hour   Intake 1677.8 ml   Output 375 ml   Net 1302.8 ml     Cultures/Sensitivites  Date Source Result   21 Blood cx X2 ngtd       Ht Readings from Last 1 Encounters:   21 6' 1\" (1.854 m)        Wt Readings from Last 1 Encounters:   21 239 lb 4.8 oz (108.5 kg)       Body mass index is 31.57 kg/m². Estimated Creatinine Clearance: 107 mL/min (based on SCr of 0.9 mg/dL). Trough: 7.0    Assessment/Plan:  Increase dose to 1250mg q12h        ERNA Escobedo [facility]. D., BCPS, BCCCP 2021 5:25 PM

## 2021-11-21 LAB
ANION GAP SERPL CALCULATED.3IONS-SCNC: 15 MEQ/L (ref 8–16)
BASOPHILS # BLD: 0.7 %
BASOPHILS ABSOLUTE: 0.1 THOU/MM3 (ref 0–0.1)
BUN BLDV-MCNC: 9 MG/DL (ref 7–22)
CALCIUM SERPL-MCNC: 8.3 MG/DL (ref 8.5–10.5)
CHLORIDE BLD-SCNC: 97 MEQ/L (ref 98–111)
CO2: 23 MEQ/L (ref 23–33)
CREAT SERPL-MCNC: 0.9 MG/DL (ref 0.4–1.2)
EKG ATRIAL RATE: 129 BPM
EKG Q-T INTERVAL: 216 MS
EKG Q-T INTERVAL: 348 MS
EKG Q-T INTERVAL: 354 MS
EKG Q-T INTERVAL: 392 MS
EKG QRS DURATION: 104 MS
EKG QRS DURATION: 108 MS
EKG QRS DURATION: 78 MS
EKG QRS DURATION: 98 MS
EKG QTC CALCULATION (BAZETT): 374 MS
EKG QTC CALCULATION (BAZETT): 456 MS
EKG QTC CALCULATION (BAZETT): 495 MS
EKG QTC CALCULATION (BAZETT): 525 MS
EKG R AXIS: 39 DEGREES
EKG R AXIS: 47 DEGREES
EKG R AXIS: 49 DEGREES
EKG R AXIS: 58 DEGREES
EKG T AXIS: -128 DEGREES
EKG T AXIS: 58 DEGREES
EKG T AXIS: 74 DEGREES
EKG T AXIS: 79 DEGREES
EKG VENTRICULAR RATE: 100 BPM
EKG VENTRICULAR RATE: 137 BPM
EKG VENTRICULAR RATE: 180 BPM
EKG VENTRICULAR RATE: 96 BPM
EOSINOPHIL # BLD: 1.9 %
EOSINOPHILS ABSOLUTE: 0.3 THOU/MM3 (ref 0–0.4)
ERYTHROCYTE [DISTWIDTH] IN BLOOD BY AUTOMATED COUNT: 15.5 % (ref 11.5–14.5)
ERYTHROCYTE [DISTWIDTH] IN BLOOD BY AUTOMATED COUNT: 46.6 FL (ref 35–45)
GFR SERPL CREATININE-BSD FRML MDRD: 85 ML/MIN/1.73M2
GLUCOSE BLD-MCNC: 130 MG/DL (ref 70–108)
GLUCOSE BLD-MCNC: 171 MG/DL (ref 70–108)
GLUCOSE BLD-MCNC: 185 MG/DL (ref 70–108)
GLUCOSE BLD-MCNC: 191 MG/DL (ref 70–108)
GLUCOSE BLD-MCNC: 211 MG/DL (ref 70–108)
HCT VFR BLD CALC: 36.6 % (ref 42–52)
HEMOGLOBIN: 11.5 GM/DL (ref 14–18)
IMMATURE GRANS (ABS): 0.07 THOU/MM3 (ref 0–0.07)
IMMATURE GRANULOCYTES: 0.5 %
LYMPHOCYTES # BLD: 16.9 %
LYMPHOCYTES ABSOLUTE: 2.2 THOU/MM3 (ref 1–4.8)
MCH RBC QN AUTO: 26.4 PG (ref 26–33)
MCHC RBC AUTO-ENTMCNC: 31.4 GM/DL (ref 32.2–35.5)
MCV RBC AUTO: 83.9 FL (ref 80–94)
MONOCYTES # BLD: 6.6 %
MONOCYTES ABSOLUTE: 0.9 THOU/MM3 (ref 0.4–1.3)
NUCLEATED RED BLOOD CELLS: 0 /100 WBC
PLATELET # BLD: 396 THOU/MM3 (ref 130–400)
PMV BLD AUTO: 10.6 FL (ref 9.4–12.4)
POTASSIUM SERPL-SCNC: 3.8 MEQ/L (ref 3.5–5.2)
RBC # BLD: 4.36 MILL/MM3 (ref 4.7–6.1)
SEG NEUTROPHILS: 73.4 %
SEGMENTED NEUTROPHILS ABSOLUTE COUNT: 9.7 THOU/MM3 (ref 1.8–7.7)
SODIUM BLD-SCNC: 135 MEQ/L (ref 135–145)
WBC # BLD: 13.2 THOU/MM3 (ref 4.8–10.8)

## 2021-11-21 PROCEDURE — 2580000003 HC RX 258: Performed by: PHARMACIST

## 2021-11-21 PROCEDURE — 85025 COMPLETE CBC W/AUTO DIFF WBC: CPT

## 2021-11-21 PROCEDURE — 99232 SBSQ HOSP IP/OBS MODERATE 35: CPT | Performed by: NURSE PRACTITIONER

## 2021-11-21 PROCEDURE — 93010 ELECTROCARDIOGRAM REPORT: CPT | Performed by: NUCLEAR MEDICINE

## 2021-11-21 PROCEDURE — 6370000000 HC RX 637 (ALT 250 FOR IP): Performed by: STUDENT IN AN ORGANIZED HEALTH CARE EDUCATION/TRAINING PROGRAM

## 2021-11-21 PROCEDURE — 82948 REAGENT STRIP/BLOOD GLUCOSE: CPT

## 2021-11-21 PROCEDURE — 2580000003 HC RX 258: Performed by: STUDENT IN AN ORGANIZED HEALTH CARE EDUCATION/TRAINING PROGRAM

## 2021-11-21 PROCEDURE — 6360000002 HC RX W HCPCS: Performed by: PHARMACIST

## 2021-11-21 PROCEDURE — 6370000000 HC RX 637 (ALT 250 FOR IP): Performed by: NURSE PRACTITIONER

## 2021-11-21 PROCEDURE — 6360000002 HC RX W HCPCS: Performed by: STUDENT IN AN ORGANIZED HEALTH CARE EDUCATION/TRAINING PROGRAM

## 2021-11-21 PROCEDURE — 99233 SBSQ HOSP IP/OBS HIGH 50: CPT | Performed by: STUDENT IN AN ORGANIZED HEALTH CARE EDUCATION/TRAINING PROGRAM

## 2021-11-21 PROCEDURE — 93005 ELECTROCARDIOGRAM TRACING: CPT | Performed by: NURSE PRACTITIONER

## 2021-11-21 PROCEDURE — 80048 BASIC METABOLIC PNL TOTAL CA: CPT

## 2021-11-21 PROCEDURE — 2500000003 HC RX 250 WO HCPCS: Performed by: STUDENT IN AN ORGANIZED HEALTH CARE EDUCATION/TRAINING PROGRAM

## 2021-11-21 PROCEDURE — 6360000002 HC RX W HCPCS: Performed by: NURSE PRACTITIONER

## 2021-11-21 PROCEDURE — 36415 COLL VENOUS BLD VENIPUNCTURE: CPT

## 2021-11-21 PROCEDURE — 2060000000 HC ICU INTERMEDIATE R&B

## 2021-11-21 RX ORDER — AMIODARONE HYDROCHLORIDE 200 MG/1
200 TABLET ORAL 2 TIMES DAILY
Status: DISCONTINUED | OUTPATIENT
Start: 2021-11-21 | End: 2021-11-23 | Stop reason: HOSPADM

## 2021-11-21 RX ORDER — FUROSEMIDE 10 MG/ML
20 INJECTION INTRAMUSCULAR; INTRAVENOUS 2 TIMES DAILY
Status: DISCONTINUED | OUTPATIENT
Start: 2021-11-21 | End: 2021-11-22

## 2021-11-21 RX ORDER — SODIUM HYPOCHLORITE 1.25 MG/ML
SOLUTION TOPICAL DAILY
Status: DISCONTINUED | OUTPATIENT
Start: 2021-11-21 | End: 2021-11-21

## 2021-11-21 RX ORDER — POTASSIUM CHLORIDE 20 MEQ/1
20 TABLET, EXTENDED RELEASE ORAL 2 TIMES DAILY WITH MEALS
Status: DISCONTINUED | OUTPATIENT
Start: 2021-11-21 | End: 2021-11-23 | Stop reason: HOSPADM

## 2021-11-21 RX ADMIN — POTASSIUM CHLORIDE 20 MEQ: 1500 TABLET, EXTENDED RELEASE ORAL at 10:04

## 2021-11-21 RX ADMIN — METOPROLOL TARTRATE 25 MG: 25 TABLET, FILM COATED ORAL at 15:36

## 2021-11-21 RX ADMIN — AMIODARONE HYDROCHLORIDE 0.5 MG/MIN: 1.8 INJECTION, SOLUTION INTRAVENOUS at 05:21

## 2021-11-21 RX ADMIN — METOPROLOL TARTRATE 25 MG: 25 TABLET, FILM COATED ORAL at 08:59

## 2021-11-21 RX ADMIN — PIPERACILLIN AND TAZOBACTAM 3375 MG: 3; .375 INJECTION, POWDER, LYOPHILIZED, FOR SOLUTION INTRAVENOUS at 20:38

## 2021-11-21 RX ADMIN — GABAPENTIN 100 MG: 100 CAPSULE ORAL at 08:59

## 2021-11-21 RX ADMIN — GABAPENTIN 100 MG: 100 CAPSULE ORAL at 13:53

## 2021-11-21 RX ADMIN — INSULIN LISPRO 1 UNITS: 100 INJECTION, SOLUTION INTRAVENOUS; SUBCUTANEOUS at 11:25

## 2021-11-21 RX ADMIN — ENOXAPARIN SODIUM 100 MG: 100 INJECTION SUBCUTANEOUS at 23:53

## 2021-11-21 RX ADMIN — AMIODARONE HYDROCHLORIDE 200 MG: 200 TABLET ORAL at 10:04

## 2021-11-21 RX ADMIN — LISINOPRIL 20 MG: 20 TABLET ORAL at 08:59

## 2021-11-21 RX ADMIN — FUROSEMIDE 20 MG: 40 INJECTION, SOLUTION INTRAMUSCULAR; INTRAVENOUS at 10:05

## 2021-11-21 RX ADMIN — INSULIN LISPRO 2 UNITS: 100 INJECTION, SOLUTION INTRAVENOUS; SUBCUTANEOUS at 07:34

## 2021-11-21 RX ADMIN — POTASSIUM CHLORIDE 20 MEQ: 1500 TABLET, EXTENDED RELEASE ORAL at 18:00

## 2021-11-21 RX ADMIN — TRAZODONE HYDROCHLORIDE 50 MG: 50 TABLET ORAL at 00:41

## 2021-11-21 RX ADMIN — VANCOMYCIN HYDROCHLORIDE 1250 MG: 5 INJECTION, POWDER, LYOPHILIZED, FOR SOLUTION INTRAVENOUS at 00:39

## 2021-11-21 RX ADMIN — METOPROLOL TARTRATE 25 MG: 25 TABLET, FILM COATED ORAL at 22:13

## 2021-11-21 RX ADMIN — AMIODARONE HYDROCHLORIDE 200 MG: 200 TABLET ORAL at 20:36

## 2021-11-21 RX ADMIN — PANTOPRAZOLE SODIUM 40 MG: 40 TABLET, DELAYED RELEASE ORAL at 05:20

## 2021-11-21 RX ADMIN — SODIUM CHLORIDE, PRESERVATIVE FREE 10 ML: 5 INJECTION INTRAVENOUS at 20:36

## 2021-11-21 RX ADMIN — TRAZODONE HYDROCHLORIDE 50 MG: 50 TABLET ORAL at 21:42

## 2021-11-21 RX ADMIN — ASPIRIN 81 MG: 81 TABLET ORAL at 08:59

## 2021-11-21 RX ADMIN — PIPERACILLIN AND TAZOBACTAM 3375 MG: 3; .375 INJECTION, POWDER, LYOPHILIZED, FOR SOLUTION INTRAVENOUS at 04:33

## 2021-11-21 RX ADMIN — PIPERACILLIN AND TAZOBACTAM 3375 MG: 3; .375 INJECTION, POWDER, LYOPHILIZED, FOR SOLUTION INTRAVENOUS at 13:59

## 2021-11-21 RX ADMIN — GABAPENTIN 100 MG: 100 CAPSULE ORAL at 20:36

## 2021-11-21 RX ADMIN — ENOXAPARIN SODIUM 100 MG: 100 INJECTION SUBCUTANEOUS at 10:05

## 2021-11-21 RX ADMIN — INSULIN LISPRO 1 UNITS: 100 INJECTION, SOLUTION INTRAVENOUS; SUBCUTANEOUS at 20:29

## 2021-11-21 RX ADMIN — CLOPIDOGREL BISULFATE 75 MG: 75 TABLET ORAL at 08:59

## 2021-11-21 RX ADMIN — FUROSEMIDE 20 MG: 40 INJECTION, SOLUTION INTRAMUSCULAR; INTRAVENOUS at 18:01

## 2021-11-21 ASSESSMENT — PAIN SCALES - GENERAL
PAINLEVEL_OUTOF10: 0
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
## 2021-11-21 NOTE — PROGRESS NOTES
Cardiology Progress Note      Patient:  Daphne Gonzalez  YOB: 1955  MRN: [id number]   Acct: [de-identified]  Admit Date:  11/18/2021  Primary Cardiologist: [facility] cardiology  Seen by Dr. Violeta Dodd    Per prior cardiology consult note-  Reason for Consultation:  LV dysfunction, Acute CHF exacerbation        History Of Present Illness:    61 y.o.  male with history of CAD status post PCI, PAD, COPD, smoker, hypertension, hyperlipidemia, seizure disorder who initially presented to the hospital with complaints of perineal pressure ulcer. According to the spouse there is a foul-smelling discharge from the ulcer. He also has multiple ulcers in lower extremities. Patient has been to multiple podiatrists in the past, who have told him that he needs bilateral below the knee amputation due to severe PAD. Currently patient is on IV antibiotics. Patient does report some shortness of breath and orthopnea, has elevated proBNP and congestive pattern on the chest x-ray. He was given 1 dose of Lasix. He underwent echocardiogram yesterday which showed ejection fraction of 35%. His prior echocardiogram from 2017 shows ejection fraction of 55%. Patient reports that he has followed up with cardiology at [facility] and underwent multiple stents there several years ago. He has not followed up with any cardiologist since that. Electrocardiogram shows atrial fibrillation with RVR at a rate of 137 bpm.  Troponin was mildly elevated 0.048.     Subjective (Events in last 24 hours):     Pt sitting at bedside   States \"I feel great - ready to go home\"    Tele PAF RVR to SR -- pt denies palpitations - dizziness - chest pain     BP stable     ID to see today for Pressure ulcers - give recs        Objective:   /87   Pulse 98   Temp 98.2 °F (36.8 °C) (Oral)   Resp 16   Ht 6' 1\" (1.854 m)   Wt 247 lb 6.4 oz (112.2 kg)   SpO2 93%   BMI 32.64 kg/m²        TELEMETRY: PAFB RVR HR SR 90 to AFB 's with mild activity     Physical Exam:  General Appearance: alert and oriented to person, place and time, in no acute distress  Cardiovascular: irregular rhythm, normal S1 and S2, no murmurs, rubs, clicks, or gallops, distal pulses intact,  Pulmonary/Chest: clear upper - diminished lower to auscultation bilaterally- no wheezes, rales or rhonchi, normal air movement, no respiratory distress  Abdomen: soft, non-tender, non-distended, normal bowel sounds, no masses Extremities: no cyanosis, clubbing or edema, pulses weak    Musculoskeletal: normal range of motion, no joint swelling, deformity or tenderness  Neurological: alert, oriented, normal speech, no focal findings or movement disorder noted    Medications:    amiodarone  200 mg Oral BID    calcium replacement protocol   Other RX Placeholder    vancomycin  1,250 mg IntraVENous Q12H    metoprolol tartrate  25 mg Oral BID    lisinopril  20 mg Oral Daily    piperacillin-tazobactam  3,375 mg IntraVENous Q8H    vancomycin (VANCOCIN) intermittent dosing (placeholder)   Other RX Placeholder    insulin lispro  0-6 Units SubCUTAneous TID WC    insulin lispro  0-3 Units SubCUTAneous Nightly    enoxaparin  1 mg/kg SubCUTAneous Q12H    nicotine  1 patch TransDERmal Daily    sodium chloride flush  5-40 mL IntraVENous 2 times per day    aspirin EC  81 mg Oral Daily    clopidogrel  75 mg Oral Daily    gabapentin  100 mg Oral TID    pantoprazole  40 mg Oral Daily      dextrose      amiodarone 0.5 mg/min (11/21/21 0521)    sodium chloride 25 mL (11/19/21 0654)     potassium chloride, 40 mEq, PRN   Or  potassium alternative oral replacement, 40 mEq, PRN   Or  potassium chloride, 10 mEq, PRN  potassium chloride, 10 mEq, PRN  cyclobenzaprine, 10 mg, Q8H PRN  traZODone, 50 mg, Nightly PRN  glucose, 15 g, PRN  dextrose, 12.5 g, PRN  glucagon (rDNA), 1 mg, PRN  dextrose, 100 mL/hr, PRN  sodium chloride flush, 5-40 mL, PRN  sodium chloride, 25 mL, PRN  polyethylene glycol, 17 g, Daily PRN  acetaminophen, 650 mg, Q6H PRN   Or  acetaminophen, 650 mg, Q6H PRN        Diagnostics:    Echo:   Electronically signed by Jason Muniz MD (Interpreting   physician) on 11/19/2021 at 04:19 PM   ----------------------------------------------------------------      Findings      Mitral Valve   Trace mitral regurgitation is present. Aortic Valve   The aortic valve leaflets were not well visualized. Aortic valve appears tricuspid. Aortic valve leaflets are somewhat thickened. Tricuspid Valve   Trivial tricuspid regurgitation visualized. Pulmonic Valve   The pulmonic valve was not well visualized . Trivial pulmonic regurgitation visualized. Left Atrium   Left atrial size was normal.      Left Ventricle   Ejection fraction is visually estimated at 35%. There was moderate global hypokinesis of the left ventricle. Right Atrium   Right atrial size was normal.      Right Ventricle   The right ventricular size was normal with normal systolic function and   wall thickness. Pericardial Effusion   The pericardium was normal in appearance with no evidence of a pericardial   effusion. Pleural Effusion   No evidence of pleural effusion. Aorta / Great Vessels   -Aortic root dimension within normal limits.   -The Pulmonary artery is within normal limits. -IVC size is within normal limits with normal respiratory phasic changes. Stress:     Left Heart Cath:     Lab Data:    Cardiac Enzymes:  No results for input(s): CKTOTAL, CKMB, CKMBINDEX, TROPONINI in the last 72 hours.     CBC:   Lab Results   Component Value Date    WBC 13.2 11/21/2021    RBC 4.36 11/21/2021    RBC 5.49 02/02/2012    HGB 11.5 11/21/2021    HCT 36.6 11/21/2021     11/21/2021       CMP:    Lab Results   Component Value Date     11/21/2021    K 3.8 11/21/2021    K 3.6 11/18/2021    CL 97 11/21/2021    CO2 23 11/21/2021    BUN 9 11/21/2021    CREATININE 0.9 11/21/2021    GFRAA >60 03/18/2021    LABGLOM 85 11/21/2021    GLUCOSE 191 11/21/2021    GLUCOSE 117 02/02/2012    CALCIUM 8.3 11/21/2021       Hepatic Function Panel:    Lab Results   Component Value Date    ALKPHOS 81 11/18/2021    ALT 11 11/18/2021    AST 16 11/18/2021    PROT 7.7 11/18/2021    BILITOT 0.3 11/18/2021    BILIDIR <0.2 11/18/2021    LABALBU 3.7 11/18/2021    LABALBU 4.7 02/02/2012       Magnesium:    Lab Results   Component Value Date    MG 1.8 11/20/2021       PT/INR:    Lab Results   Component Value Date    PROTIME 12.3 11/17/2020    INR 1.07 11/17/2020       HgBA1c:    Lab Results   Component Value Date    LABA1C 7.1 11/20/2021       FLP:    Lab Results   Component Value Date    TRIG 116 11/20/2021    HDL 25 11/20/2021    LDLCALC 48 11/20/2021       TSH:    Lab Results   Component Value Date    TSH 2.090 11/20/2021         Assessment:    Perineal pressure ulcer -- ID to make recs     New onset AFB RVR of unknown duration    [id number]- at least 3     New CDMP EF 35% (last echo here 2017 55% EF)    Acute diastolic chf w/ new EF 35%    Mild elevated trop - demand ischemia       Unknown CAD - pt states stents @ [facility]  Unknown PAD - pt states stents and fem-pop @ [facility]      HLP   LDL 48  HTN    Current smoker    Noncompliance with MD follow-ups / ? Medications       Plan:  · Need records from [facility] of cath- stress -echo- last office note - peripheral angiogram  · ID to see today for recs  · Pt will need cardiac cath for new low EF   · Pt will need NOAC for AFB - he may need CV if we cant achieve rate control  · Change to lopressor 25 mg po QID    · Change to amiodarone 200 mg po BID   ·  in AFB   · Add lasix 20 mg iv BID   · Hold ACE for possible cath need   · Follow        CHF guidelines:  BB: yes  ACE / ARB/ entresto: off for possible cath   Diuretics: yes  Aldactone: no   Farxiga: check with insurance    Weigh yourself daily: Should you gain 2-3 pounds in 1 days time or 3-5 pounds in 2 days time-- call our office ([phone]) for further recommendations    Salt restriction 2 gm ( 2,000 mg) daily   Fluid restriction 2 L daily      Electronically signed by JAVAN Garcia CNP on 11/21/2021 at 9:02 AM

## 2021-11-21 NOTE — PROGRESS NOTES
Call placed to [facility] to leave a message to obtain patient's cardiology records per order.  left with patient's information and 4a unit's fax and phone numbers.

## 2021-11-21 NOTE — PROGRESS NOTES
Accu-Cheks. c. Hypoglycemic orders in place. 7. Hypertension:   a. Continue home medications  8. COPD, not in acute exacerbation:   a. Continue home medications  9. CAD status post PCI:  a. Continue aspirin, statin, Plavix, and beta-blocker. ACE inhibitor held prior to left heart catheterization. 10. GERD:   a. Continue home Protonix      Disposition: Pending left heart catheterization and ID clearance. Potential discharge in 48 to 72 hours. Chief Complaint:  Sacral wound    Hospital Course:    11/18: Patient was to the hospital from home with complaints of a foul-smelling wound on his left buttock region. Patient states has been there for several days and has noticed leaking/drainage from the site. He denies any pain, fevers, chills. 11/20: Overnight, patient noted of atrial fibrillation with RVR. Patient was asymptomatic and sleeping during this episode. He was transferred to cardiovascular unit and started on amiodarone due to borderline low blood pressures. Cardiology was consulted for ischemic evaluation. Echocardiogram obtained shows new reduced EF. Subjective (past 24 hours):   Patient states he feels well today. Denies any pain in his lower extremities or buttock wound. Denies any fevers or chills. Denies any palpitations or chest pains. Past medical history, family history, social history and allergies reviewed again and is unchanged since admission. ROS (12 point review of systems completed. Pertinent positives noted.  Otherwise ROS is negative)     Medications:  Reviewed    Infusion Medications    dextrose      amiodarone 0.5 mg/min (11/21/21 0521)    sodium chloride 25 mL (11/19/21 1960)     Scheduled Medications    amiodarone  200 mg Oral BID    metoprolol tartrate  25 mg Oral Q6H    furosemide  20 mg IntraVENous BID    potassium chloride  20 mEq Oral BID WC    calcium replacement protocol   Other RX Placeholder    vancomycin  1,250 mg IntraVENous Q12H    [Held by provider] lisinopril  20 mg Oral Daily    piperacillin-tazobactam  3,375 mg IntraVENous Q8H    vancomycin (VANCOCIN) intermittent dosing (placeholder)   Other RX Placeholder    insulin lispro  0-6 Units SubCUTAneous TID WC    insulin lispro  0-3 Units SubCUTAneous Nightly    enoxaparin  1 mg/kg SubCUTAneous Q12H    nicotine  1 patch TransDERmal Daily    sodium chloride flush  5-40 mL IntraVENous 2 times per day    clopidogrel  75 mg Oral Daily    gabapentin  100 mg Oral TID    pantoprazole  40 mg Oral Daily     PRN Meds: potassium chloride **OR** potassium alternative oral replacement **OR** potassium chloride, potassium chloride, cyclobenzaprine, traZODone, glucose, dextrose, glucagon (rDNA), dextrose, sodium chloride flush, sodium chloride, polyethylene glycol, acetaminophen **OR** acetaminophen      Intake/Output Summary (Last 24 hours) at 11/21/2021 1044  Last data filed at 11/21/2021 0315  Gross per 24 hour   Intake 2493.22 ml   Output 500 ml   Net 1993.22 ml       Diet:  ADULT DIET; Regular; 4 carb choices (60 gm/meal); Low Sodium (2 gm); 2000 ml    Exam:  /87   Pulse 98   Temp 98.2 °F (36.8 °C) (Oral)   Resp 16   Ht 6' 1\" (1.854 m)   Wt 247 lb 6.4 oz (112.2 kg)   SpO2 93%   BMI 32.64 kg/m²   General appearance: Elderly chronically ill-appearing male. Resting bed. No acute distress. HEENT: Pupils equal, round, and reactive to light. Conjunctivae/corneas clear. Neck: Supple, with full range of motion. No jugular venous distention. Trachea midline. Respiratory:  Normal respiratory effort. Clear to auscultation, bilaterally without Rales/Wheezes/Rhonchi. Cardiovascular: Irregularly irregular rhythm. Rate approximately 8090. No appreciable murmurs. Extremities are warm and well perfused. I am not able to palpate either lower extremity dorsalis pedal or posterior tibial pulses. Abdomen: Soft, non-tender, non-distended with normal bowel sounds.   Musculoskeletal: passive and active ROM x 4 extremities. Skin: Skin color, texture, turgor normal.  No rashes or lesions. Stage III pressure ulcer noted to the patient's lateral left buttocks. Neurologic:  Neurovascularly intact without any focal sensory/motor deficits. Cranial nerves: II-XII intact, grossly non-focal.  Psychiatric: Alert and oriented, thought content appropriate, normal insight  Capillary Refill: Brisk,< 3 seconds   Peripheral Pulses: +2 palpable, equal bilaterally     Labs:   Recent Labs     11/18/21 1922 11/19/21  0802 11/21/21  0752   WBC 13.4* 15.6* 13.2*   HGB 12.0* 12.7* 11.5*   HCT 39.7* 41.7* 36.6*   * 454* 396     Recent Labs     11/20/21  0015 11/20/21  0541 11/21/21  0752    140 135   K 3.5 4.1 3.8    100 97*   CO2 22* 23 23   BUN 8 7 9   CREATININE 0.9 0.9 0.9   CALCIUM 7.9* 8.5 8.3*     Recent Labs     11/18/21 1922   AST 16   ALT 11   BILIDIR <0.2   BILITOT 0.3   ALKPHOS 81     No results for input(s): INR in the last 72 hours. No results for input(s): [place] in the last 72 hours. Microbiology:    Blood culture #1:   Lab Results   Component Value Date    BC No growth-preliminary  11/18/2021       Blood culture #2:No results found for: Tania Olvera    Organism:No results found for: ORG    No results found for: LABGRAM    MRSA culture only:No results found for: [facility]    Urine culture: No results found for: LABURIN    Respiratory culture: No results found for: CULTRESP    Aerobic and Anaerobic :  No results found for: LABAERO  No results found for: LABANAE    Urinalysis:      Lab Results   Component Value Date    NITRU NEGATIVE 03/16/2017    WBCUA 2-4 03/16/2017    BACTERIA NONE 03/16/2017    RBCUA 0-2 03/16/2017    BLOODU NEGATIVE 03/16/2017    SPECGRAV 1.023 03/16/2017    GLUCOSEU NEGATIVE 01/20/2013       Radiology:  XR CHEST PORTABLE   Final Result   1. Normal heart size. No effusion seen.    2. Mildly increased interstitial markings scattered in the right lung and left lung base, consistent with mild interstitial pneumonitis/edema. 3. Overall appearance improved from prior. **This report has been created using voice recognition software. It may contain minor errors which are inherent in voice recognition technology. **      Final report electronically signed by Dr. Reanna De La Cruz on 11/19/2021 10:30 PM      XR CHEST PORTABLE   Final Result   1. Borderline heart size. No effusion. 2. Mildly increased initial markings left lung base and throughout the right lung, consistent with interstitial pneumonia/edema. Central pulmonary vascular markings are prominent, suggesting possible low-grade or incipient heart failure. **This report has been created using voice recognition software. It may contain minor errors which are inherent in voice recognition technology. **      Final report electronically signed by Dr. Reanna De La Cruz on 11/19/2021 12:23 AM      VL DUP LOWER EXTREMITY ARTERIES BILATERAL   Final Result   1. Findings suggestive of mild stenosis in the proximal popliteal region right side. Good pulsatility is seen in the right posterior tibial and peroneal arteries. 2. The patient's femoral/tibial bypass graft left side is known to be totally occluded and could not be visualized on today's study. . Severely dampened pulsatility in the left common femoral artery, suggesting moderate-severe inflow stenosis. Total    occlusion left SFA, popliteal artery, as well as the peroneal and posterior tibial arteries. Recanalized flow is seen in the left anterior tibial artery with severely dampened. 3. Despite the findings mentioned above, patient denies any problems with the left leg. **This report has been created using voice recognition software. It may contain minor errors which are inherent in voice recognition technology. **      Final report electronically signed by Dr. Reanna De La Cruz on 11/18/2021 10:07 PM        XR CHEST PORTABLE    Result Date: 11/19/2021  PROCEDURE: XR CHEST PORTABLE CLINICAL INFORMATION: SOB COMPARISON: 3/16/2017 TECHNIQUE: A single mobile view of the chest was obtained. 1. Borderline heart size. No effusion. 2. Mildly increased initial markings left lung base and throughout the right lung, consistent with interstitial pneumonia/edema. Central pulmonary vascular markings are prominent, suggesting possible low-grade or incipient heart failure. **This report has been created using voice recognition software. It may contain minor errors which are inherent in voice recognition technology. ** Final report electronically signed by Dr. Adamaris Landis on 11/19/2021 12:23 AM    VL DUP LOWER EXTREMITY ARTERIES BILATERAL    Result Date: 11/18/2021  PROCEDURE: VL DUP LOWER EXTREMITY ARTERIES BILATERAL CLINICAL INFORMATION: Patient came in for wound on buttock. ER unable to palpate DP and PT pulses - patient states his legs are at their baseline. . Patient has a left fem-tib graft. Occluded per US 11/5/20-, performed at outside institution. COMPARISON: No prior study. TECHNIQUE: Multiple permanent grayscale and color flow sonographic images of the major arteries of both lower extremities were obtained from the level of the groin to the level of the ankle . Spectral Doppler waveforms were obtained and velocity measurements were measured. FINDINGS: RIGHT ARTERY (PSV cm/sec) ? ??????????????????????????????????? CFA ---------------> 168 PSV cm/sec PROF -------------> 129 PSV cm/sec SFA PROX ------->108 PSV cm/sec SFA MID ---------->53 PSV cm/sec SFA DIST -------->68 PSV cm/sec POP A PROX --->26 PSV cm/sec POP A DIST ---->48 PSV cm/sec PTA --------------->52 PSV cm/sec PERONEAL ----->41 PSV cm/sec ELYSIA ----------------> 20 PSV cm/sec LEFT ARTERY (PSV cm/sec) ? ??????????????????????? CFA ---------------> 31 PSV cm/sec PROF -------------> 90 PSV cm/sec SFA PROX ------->14 PSV cm/sec SFA MID ---------->occluded SFA DIST -------->occluded POP A artery with severely dampened. 3. Despite the findings mentioned above, patient denies any problems with the left leg. **This report has been created using voice recognition software. It may contain minor errors which are inherent in voice recognition technology. ** Final report electronically signed by Dr. Moise Johansen on 11/18/2021 10:07 PM      Electronically signed by Freya Bowman DO on 11/21/2021 at 10:44 AM

## 2021-11-22 ENCOUNTER — APPOINTMENT (OUTPATIENT)
Dept: CARDIAC CATH/INVASIVE PROCEDURES | Age: 66
DRG: 192 | End: 2021-11-22
Payer: COMMERCIAL

## 2021-11-22 LAB
ACTIVATED CLOTTING TIME: 238 SECONDS (ref 1–150)
ANION GAP SERPL CALCULATED.3IONS-SCNC: 13 MEQ/L (ref 8–16)
BASOPHILS # BLD: 0.8 %
BASOPHILS ABSOLUTE: 0.1 THOU/MM3 (ref 0–0.1)
BUN BLDV-MCNC: 10 MG/DL (ref 7–22)
CALCIUM SERPL-MCNC: 8.3 MG/DL (ref 8.5–10.5)
CHLORIDE BLD-SCNC: 101 MEQ/L (ref 98–111)
CO2: 25 MEQ/L (ref 23–33)
CREAT SERPL-MCNC: 1.1 MG/DL (ref 0.4–1.2)
EOSINOPHIL # BLD: 3.4 %
EOSINOPHILS ABSOLUTE: 0.4 THOU/MM3 (ref 0–0.4)
ERYTHROCYTE [DISTWIDTH] IN BLOOD BY AUTOMATED COUNT: 15.7 % (ref 11.5–14.5)
ERYTHROCYTE [DISTWIDTH] IN BLOOD BY AUTOMATED COUNT: 48.3 FL (ref 35–45)
GFR SERPL CREATININE-BSD FRML MDRD: 67 ML/MIN/1.73M2
GLUCOSE BLD-MCNC: 122 MG/DL (ref 70–108)
GLUCOSE BLD-MCNC: 160 MG/DL (ref 70–108)
GLUCOSE BLD-MCNC: 162 MG/DL (ref 70–108)
GLUCOSE BLD-MCNC: 164 MG/DL (ref 70–108)
GLUCOSE BLD-MCNC: 175 MG/DL (ref 70–108)
HCT VFR BLD CALC: 36.9 % (ref 42–52)
HEMOGLOBIN: 11.2 GM/DL (ref 14–18)
IMMATURE GRANS (ABS): 0.06 THOU/MM3 (ref 0–0.07)
IMMATURE GRANULOCYTES: 0.5 %
LYMPHOCYTES # BLD: 28.4 %
LYMPHOCYTES ABSOLUTE: 3.4 THOU/MM3 (ref 1–4.8)
MCH RBC QN AUTO: 26.1 PG (ref 26–33)
MCHC RBC AUTO-ENTMCNC: 30.4 GM/DL (ref 32.2–35.5)
MCV RBC AUTO: 86 FL (ref 80–94)
MONOCYTES # BLD: 8.1 %
MONOCYTES ABSOLUTE: 1 THOU/MM3 (ref 0.4–1.3)
NUCLEATED RED BLOOD CELLS: 0 /100 WBC
PLATELET # BLD: 403 THOU/MM3 (ref 130–400)
PMV BLD AUTO: 11.1 FL (ref 9.4–12.4)
POTASSIUM SERPL-SCNC: 4.1 MEQ/L (ref 3.5–5.2)
RBC # BLD: 4.29 MILL/MM3 (ref 4.7–6.1)
SEG NEUTROPHILS: 58.8 %
SEGMENTED NEUTROPHILS ABSOLUTE COUNT: 7 THOU/MM3 (ref 1.8–7.7)
SODIUM BLD-SCNC: 139 MEQ/L (ref 135–145)
WBC # BLD: 11.9 THOU/MM3 (ref 4.8–10.8)

## 2021-11-22 PROCEDURE — 6370000000 HC RX 637 (ALT 250 FOR IP): Performed by: INTERNAL MEDICINE

## 2021-11-22 PROCEDURE — 94760 N-INVAS EAR/PLS OXIMETRY 1: CPT

## 2021-11-22 PROCEDURE — 82948 REAGENT STRIP/BLOOD GLUCOSE: CPT

## 2021-11-22 PROCEDURE — 93458 L HRT ARTERY/VENTRICLE ANGIO: CPT | Performed by: INTERNAL MEDICINE

## 2021-11-22 PROCEDURE — 6370000000 HC RX 637 (ALT 250 FOR IP): Performed by: STUDENT IN AN ORGANIZED HEALTH CARE EDUCATION/TRAINING PROGRAM

## 2021-11-22 PROCEDURE — 2580000003 HC RX 258: Performed by: STUDENT IN AN ORGANIZED HEALTH CARE EDUCATION/TRAINING PROGRAM

## 2021-11-22 PROCEDURE — 2060000000 HC ICU INTERMEDIATE R&B

## 2021-11-22 PROCEDURE — 94640 AIRWAY INHALATION TREATMENT: CPT

## 2021-11-22 PROCEDURE — C1887 CATHETER, GUIDING: HCPCS

## 2021-11-22 PROCEDURE — 6370000000 HC RX 637 (ALT 250 FOR IP): Performed by: NURSE PRACTITIONER

## 2021-11-22 PROCEDURE — C1769 GUIDE WIRE: HCPCS

## 2021-11-22 PROCEDURE — C1894 INTRO/SHEATH, NON-LASER: HCPCS

## 2021-11-22 PROCEDURE — 6360000002 HC RX W HCPCS: Performed by: NURSE PRACTITIONER

## 2021-11-22 PROCEDURE — 93571 IV DOP VEL&/PRESS C FLO 1ST: CPT | Performed by: INTERNAL MEDICINE

## 2021-11-22 PROCEDURE — 80048 BASIC METABOLIC PNL TOTAL CA: CPT

## 2021-11-22 PROCEDURE — 93571 IV DOP VEL&/PRESS C FLO 1ST: CPT

## 2021-11-22 PROCEDURE — 6360000002 HC RX W HCPCS

## 2021-11-22 PROCEDURE — 93458 L HRT ARTERY/VENTRICLE ANGIO: CPT

## 2021-11-22 PROCEDURE — 99233 SBSQ HOSP IP/OBS HIGH 50: CPT | Performed by: STUDENT IN AN ORGANIZED HEALTH CARE EDUCATION/TRAINING PROGRAM

## 2021-11-22 PROCEDURE — 6360000004 HC RX CONTRAST MEDICATION: Performed by: INTERNAL MEDICINE

## 2021-11-22 PROCEDURE — 2580000003 HC RX 258: Performed by: INTERNAL MEDICINE

## 2021-11-22 PROCEDURE — 2500000003 HC RX 250 WO HCPCS

## 2021-11-22 PROCEDURE — 85347 COAGULATION TIME ACTIVATED: CPT

## 2021-11-22 PROCEDURE — 36415 COLL VENOUS BLD VENIPUNCTURE: CPT

## 2021-11-22 PROCEDURE — 85025 COMPLETE CBC W/AUTO DIFF WBC: CPT

## 2021-11-22 PROCEDURE — 6360000002 HC RX W HCPCS: Performed by: STUDENT IN AN ORGANIZED HEALTH CARE EDUCATION/TRAINING PROGRAM

## 2021-11-22 PROCEDURE — 99232 SBSQ HOSP IP/OBS MODERATE 35: CPT | Performed by: NURSE PRACTITIONER

## 2021-11-22 RX ORDER — SODIUM CHLORIDE 0.9 % (FLUSH) 0.9 %
5-40 SYRINGE (ML) INJECTION PRN
Status: DISCONTINUED | OUTPATIENT
Start: 2021-11-22 | End: 2021-11-23 | Stop reason: HOSPADM

## 2021-11-22 RX ORDER — SODIUM CHLORIDE 0.9 % (FLUSH) 0.9 %
5-40 SYRINGE (ML) INJECTION EVERY 12 HOURS SCHEDULED
Status: DISCONTINUED | OUTPATIENT
Start: 2021-11-22 | End: 2021-11-23 | Stop reason: HOSPADM

## 2021-11-22 RX ORDER — SPIRONOLACTONE 25 MG/1
25 TABLET ORAL DAILY
Status: DISCONTINUED | OUTPATIENT
Start: 2021-11-22 | End: 2021-11-23 | Stop reason: HOSPADM

## 2021-11-22 RX ORDER — METOPROLOL SUCCINATE 100 MG/1
100 TABLET, EXTENDED RELEASE ORAL DAILY
Status: DISCONTINUED | OUTPATIENT
Start: 2021-11-22 | End: 2021-11-22

## 2021-11-22 RX ORDER — SODIUM CHLORIDE 9 MG/ML
25 INJECTION, SOLUTION INTRAVENOUS PRN
Status: DISCONTINUED | OUTPATIENT
Start: 2021-11-22 | End: 2021-11-23 | Stop reason: HOSPADM

## 2021-11-22 RX ORDER — METOPROLOL SUCCINATE 25 MG/1
25 TABLET, EXTENDED RELEASE ORAL 2 TIMES DAILY
Status: DISCONTINUED | OUTPATIENT
Start: 2021-11-22 | End: 2021-11-23

## 2021-11-22 RX ORDER — FUROSEMIDE 20 MG/1
20 TABLET ORAL DAILY
Status: DISCONTINUED | OUTPATIENT
Start: 2021-11-23 | End: 2021-11-23 | Stop reason: HOSPADM

## 2021-11-22 RX ORDER — LISINOPRIL 5 MG/1
5 TABLET ORAL DAILY
Status: DISCONTINUED | OUTPATIENT
Start: 2021-11-23 | End: 2021-11-23 | Stop reason: HOSPADM

## 2021-11-22 RX ORDER — ACETAMINOPHEN 325 MG/1
650 TABLET ORAL EVERY 4 HOURS PRN
Status: DISCONTINUED | OUTPATIENT
Start: 2021-11-22 | End: 2021-11-23 | Stop reason: HOSPADM

## 2021-11-22 RX ORDER — ALBUTEROL SULFATE 90 UG/1
2 AEROSOL, METERED RESPIRATORY (INHALATION) EVERY 6 HOURS PRN
Status: DISCONTINUED | OUTPATIENT
Start: 2021-11-22 | End: 2021-11-23 | Stop reason: HOSPADM

## 2021-11-22 RX ADMIN — IOPAMIDOL 70 ML: 755 INJECTION, SOLUTION INTRAVENOUS at 12:53

## 2021-11-22 RX ADMIN — GABAPENTIN 100 MG: 100 CAPSULE ORAL at 09:14

## 2021-11-22 RX ADMIN — GABAPENTIN 100 MG: 100 CAPSULE ORAL at 13:34

## 2021-11-22 RX ADMIN — ENOXAPARIN SODIUM 100 MG: 100 INJECTION SUBCUTANEOUS at 23:17

## 2021-11-22 RX ADMIN — PIPERACILLIN AND TAZOBACTAM 3375 MG: 3; .375 INJECTION, POWDER, LYOPHILIZED, FOR SOLUTION INTRAVENOUS at 05:26

## 2021-11-22 RX ADMIN — POTASSIUM CHLORIDE 20 MEQ: 1500 TABLET, EXTENDED RELEASE ORAL at 09:14

## 2021-11-22 RX ADMIN — SODIUM HYPOCHLORITE: 2.5 SOLUTION TOPICAL at 09:14

## 2021-11-22 RX ADMIN — METOPROLOL TARTRATE 25 MG: 25 TABLET, FILM COATED ORAL at 09:14

## 2021-11-22 RX ADMIN — SODIUM CHLORIDE, PRESERVATIVE FREE 10 ML: 5 INJECTION INTRAVENOUS at 09:15

## 2021-11-22 RX ADMIN — INSULIN LISPRO 1 UNITS: 100 INJECTION, SOLUTION INTRAVENOUS; SUBCUTANEOUS at 16:10

## 2021-11-22 RX ADMIN — SODIUM CHLORIDE, PRESERVATIVE FREE 10 ML: 5 INJECTION INTRAVENOUS at 21:51

## 2021-11-22 RX ADMIN — ALBUTEROL SULFATE 2 PUFF: 90 AEROSOL, METERED RESPIRATORY (INHALATION) at 22:18

## 2021-11-22 RX ADMIN — GABAPENTIN 100 MG: 100 CAPSULE ORAL at 21:51

## 2021-11-22 RX ADMIN — CLOPIDOGREL BISULFATE 75 MG: 75 TABLET ORAL at 09:14

## 2021-11-22 RX ADMIN — METOPROLOL TARTRATE 25 MG: 25 TABLET, FILM COATED ORAL at 03:14

## 2021-11-22 RX ADMIN — POTASSIUM CHLORIDE 20 MEQ: 1500 TABLET, EXTENDED RELEASE ORAL at 16:10

## 2021-11-22 RX ADMIN — TRAZODONE HYDROCHLORIDE 50 MG: 50 TABLET ORAL at 21:51

## 2021-11-22 RX ADMIN — FUROSEMIDE 20 MG: 40 INJECTION, SOLUTION INTRAMUSCULAR; INTRAVENOUS at 09:13

## 2021-11-22 RX ADMIN — AMIODARONE HYDROCHLORIDE 200 MG: 200 TABLET ORAL at 09:14

## 2021-11-22 RX ADMIN — METOPROLOL SUCCINATE 25 MG: 25 TABLET, FILM COATED, EXTENDED RELEASE ORAL at 22:03

## 2021-11-22 RX ADMIN — INSULIN LISPRO 1 UNITS: 100 INJECTION, SOLUTION INTRAVENOUS; SUBCUTANEOUS at 21:52

## 2021-11-22 RX ADMIN — AMIODARONE HYDROCHLORIDE 200 MG: 200 TABLET ORAL at 21:51

## 2021-11-22 ASSESSMENT — PAIN SCALES - GENERAL
PAINLEVEL_OUTOF10: 0

## 2021-11-22 NOTE — CARE COORDINATION
DISCHARGE/PLANNING EVALUATION  11/22/21, 9:27 AM EST    Reason for Referral: discharge planning  Mental Status: alert and orientated  Decision Making: self  Family/Social/Home Environment: lives with girlfriend. Per staff, pt was living in a car as apartment had mold in it, however now has a new apartment. Pt lives with his girlfriend, sister lives nearby. Pt's girlfriend has two sons in the area and grandchildren that are supportive  Current Services including food security, transportation and housekeeping: girlfriend does the housekeeping, cooking, laundry. Current Equipment:manual wheelchair, canes, shower chair   [facility]  Concerns or Barriers to Discharge: none  Post acute provider list with quality measures, geographic area and applicable managed care information provided. Questions regarding selection process answered:offered and declines    Teach Back Method used with pt and girlfriend at bedside regarding care plan and needs  Patient and girlfriend verbalize understanding of the plan of care and contribute to goal setting. Patient goals, treatment preferences and discharge plan: Return to home with girlfriend. Girlfriend will do dressing changes, pt denies needing home health. Pt first states he may need a ride home, however in conversation, girlfriend's son works nearby and can transport after getting off work.      Electronically signed by Camila Hobbs on 11/22/2021 at 9:27 AM

## 2021-11-22 NOTE — PLAN OF CARE
Problem: Discharge Planning:  Goal: Discharged to appropriate level of care  Description: Discharged to appropriate level of care  Outcome: Completed   SW consult received and completed. See  note 11/22.

## 2021-11-22 NOTE — PROGRESS NOTES
Podiatric Progress Note  Oralia Peterson  Subjective :   111/22/2021  Patient seen at bedside on behalf of Dr. Brandyn Nair. Patient appeared pleasant, was oriented to person, place, time, and in no acute distress. Patient says that he is supposed to get a cardiac catheterization today. He says that his legs are not bothering him right now. He is curious when he will be able to leave the hospital.  Patient denies any nausea, vomiting, fever, chills, chest pain, shortness of breath. No other pedal complaints    11/19/2021  Patient seen at bedside this morning on behalf of Dr. Brandyn Nair. Patient appeared pleasant, was oriented to person, place and time and in no acute distress. Patient denies any pain to bilateral lower extremities. He also denies any N/V/F/C/SOB/CP or bilateral calf tenderness. Patient has no further pedal concerns at this point in time. HPI:  Patient seen bedside today. Patient appeared pleasant, was oriented to person, place and time and in no acute distress. The patient is diabetic and unaware of his blood sugar levels or A1C. The patient states that he came to the ED for a wound on his buttocks. The patient states that he has \"heart problems\". Patient states that the wounds noted to the left dorsal foot have been present for months and not changed. The patient was unaware of the wound noted to the left medial hallux. The patient states that the lateral leg wound has been present for 10 years, since he had leg surgery. The patient relates  the 2 wounds on the right leg have been present for a couple weeks, since using paper tape. The patient states that the wound located on the right 2nd toe had a scab that fell off. The patient states that he has been doing dressing changes with gauze and a cream. Patient denies any N/V/F/C/SOB or CP. Patient has no further pedal concerns at this point in time.     Current Medications:    Current Facility-Administered Medications: amiodarone (CORDARONE) tablet 200 mg, 200 mg, Oral, BID  metoprolol tartrate (LOPRESSOR) tablet 25 mg, 25 mg, Oral, Q6H  furosemide (LASIX) injection 20 mg, 20 mg, IntraVENous, BID  potassium chloride (KLOR-CON M) extended release tablet 20 mEq, 20 mEq, Oral, BID WC  Sodium Hypochlorite (DAKINS) 0.25 % external solution, , Irrigation, Daily  calcium replacement protocol, , Other, RX Placeholder  potassium chloride (KLOR-CON M) extended release tablet 40 mEq, 40 mEq, Oral, PRN **OR** potassium bicarb-citric acid (EFFER-K) effervescent tablet 40 mEq, 40 mEq, Oral, PRN **OR** potassium chloride 10 mEq/100 mL IVPB (Peripheral Line), 10 mEq, IntraVENous, PRN  potassium chloride 10 mEq/100 mL IVPB (Peripheral Line), 10 mEq, IntraVENous, PRN  [Held by provider] lisinopril (PRINIVIL;ZESTRIL) tablet 20 mg, 20 mg, Oral, Daily  cyclobenzaprine (FLEXERIL) tablet 10 mg, 10 mg, Oral, Q8H PRN  traZODone (DESYREL) tablet 50 mg, 50 mg, Oral, Nightly PRN  piperacillin-tazobactam (ZOSYN) 3,375 mg in dextrose 5 % 50 mL IVPB extended infusion (mini-bag), 3,375 mg, IntraVENous, Q8H  insulin lispro (HUMALOG) injection vial 0-6 Units, 0-6 Units, SubCUTAneous, TID WC  insulin lispro (HUMALOG) injection vial 0-3 Units, 0-3 Units, SubCUTAneous, Nightly  glucose (GLUTOSE) 40 % oral gel 15 g, 15 g, Oral, PRN  dextrose 50 % IV solution, 12.5 g, IntraVENous, PRN  glucagon (rDNA) injection 1 mg, 1 mg, IntraMUSCular, PRN  dextrose 5 % solution, 100 mL/hr, IntraVENous, PRN  enoxaparin (LOVENOX) injection 100 mg, 1 mg/kg, SubCUTAneous, Q12H  [COMPLETED] amiodarone (NEXTERONE) 150 mg in dextrose 5% 100 ml, 150 mg, IntraVENous, Once **FOLLOWED BY** [] amiodarone (NEXTERONE) 360 mg in dextrose 5% 200 ml, 1 mg/min, IntraVENous, Continuous **FOLLOWED BY** amiodarone (NEXTERONE) 360 mg in dextrose 5% 200 ml, 0.5 mg/min, IntraVENous, Continuous  nicotine (NICODERM CQ) 21 MG/24HR 1 patch, 1 patch, TransDERmal, Daily  sodium chloride flush 0.9 % injection 5-40 mL, 5-40 mL, IntraVENous, 2 times per day  sodium chloride flush 0.9 % injection 5-40 mL, 5-40 mL, IntraVENous, PRN  0.9 % sodium chloride infusion, 25 mL, IntraVENous, PRN  polyethylene glycol (GLYCOLAX) packet 17 g, 17 g, Oral, Daily PRN  acetaminophen (TYLENOL) tablet 650 mg, 650 mg, Oral, Q6H PRN **OR** acetaminophen (TYLENOL) suppository 650 mg, 650 mg, Rectal, Q6H PRN  clopidogrel (PLAVIX) tablet 75 mg, 75 mg, Oral, Daily  gabapentin (NEURONTIN) capsule 100 mg, 100 mg, Oral, TID  pantoprazole (PROTONIX) tablet 40 mg, 40 mg, Oral, Daily    Objective     /67   Pulse 80   Temp 98.4 °F (36.9 °C) (Oral)   Resp 14   Ht 6' 1\" (1.854 m)   Wt 245 lb 3.2 oz (111.2 kg)   SpO2 91%   BMI 32.35 kg/m²      I/O:    Intake/Output Summary (Last 24 hours) at 11/22/2021 0821  Last data filed at 11/22/2021 0314  Gross per 24 hour   Intake 4118.17 ml   Output 2125 ml   Net 1993.17 ml              Wt Readings from Last 3 Encounters:   11/22/21 245 lb 3.2 oz (111.2 kg)   03/16/17 224 lb 1.6 oz (101.7 kg)   03/16/17 265 lb (120.2 kg)       LABS:    Recent Labs     11/21/21  0752 11/22/21  0344   WBC 13.2* 11.9*   HGB 11.5* 11.2*   HCT 36.6* 36.9*    403*        Recent Labs     11/22/21  0344      K 4.1      CO2 25   BUN 10   CREATININE 1.1        No results for input(s): PROT, INR, APTT in the last 72 hours. No results for input(s): CKTOTAL, CKMB, CKMBINDEX, TROPONINI in the last 72 hours. Focused Lower Extremity Examination:    Vitals:    /67   Pulse 80   Temp 98.4 °F (36.9 °C) (Oral)   Resp 14   Ht 6' 1\" (1.854 m)   Wt 245 lb 3.2 oz (111.2 kg)   SpO2 91%   BMI 32.35 kg/m²        Dermatologic: There are 2 full thickness wounds noted to the dorsal lateral aspect of the left foot. The bases are fibrogranular with hyperkeratotic rim. There is a full thickness wound with a fibrotic base noted to the left medial aspect of the hallux.   There is a stable gangrenous eschar to the dorsal aspect of the left fourth digit. There are 2 wounds noted to the anterior aspect of the right leg that have a granular base. There is a wound noted to the lateral aspect of the right leg with a fibrogranular base. There is a wound noted to the distal end of the right 2nd toe with fibrogranular wound bed. No malodor, drainage, undermining, or probe to bone noted to any of the wounds mentioned previously.      Vascular: Dorsalis pedis and posterior tibial pulses are non-palpable bilaterally. Left DP and PT pulse is non-audible on doppler. Right DP pulse is audible on doppler. Skin temperature is cool to cool from proximal tibial tuberosity to distal digits. Edema noted to the right leg more so than the left. There is erythema present that is cool to touch consistent with vascular changes.     Neurovascular: Light touch sensation grossly intact to the RLE. Light touch sensation grossly absent to the LLE.      Musculoskeletal: Prior left 2nd toe amputation noted. Pain with palpation of the right leg wounds. No pain with palpation noted to the left foot wounds. Foot and ankle in grossly rectus alignment bilaterally. Right leg    Right leg    Right foot    Left foot    Left foot          IMAGING:  Arterial Doppler, bilateral lower extremities  Impression   1. Findings suggestive of mild stenosis in the proximal popliteal region right side. Good pulsatility is seen in the right posterior tibial and peroneal arteries. 2. The patient's femoral/tibial bypass graft left side is known to be totally occluded and could not be visualized on today's study. . Severely dampened pulsatility in the left common femoral artery, suggesting moderate-severe inflow stenosis. Total    occlusion left SFA, popliteal artery, as well as the peroneal and posterior tibial arteries. Recanalized flow is seen in the left anterior tibial artery with severely dampened.    3. Despite the findings mentioned above, patient denies any problems with the left leg.             **This report has been created using voice recognition software.  It may contain minor errors which are inherent in voice recognition technology. **       Final report electronically signed by Dr. Daniela Orellana on 11/18/2021 10:07 PM         ASSESSMENT: Pt. is a 61 y.o. male with:  Principle  Chronic, stable wounds b/l lower extermities     Chronic  Patient Active Problem List   Diagnosis    Seizure disorder ([address]    Osteoarthritis    COPD (chronic obstructive pulmonary disease) ([address]    Spinal stenosis, lumbar    Lumbar radiculopathy    Tobacco abuse    GERD (gastroesophageal reflux disease)    Skin ulceration ([address]    Non-healing ulcer of lower leg ([address]    Bilateral claudication of lower limb ([address]    Current smoker    Dyslipidemia    Abnormal cardiovascular function study    Obesity    CAD, multiple vessel    Chronic total occlusion of artery of the extremities (HCC)    Coronary artery chronic total occlusion    DAVILA (dyspnea on exertion)    HTN (hypertension)    Discitis of cervical region    Open wound of right hand    Noncompliance by refusing intervention or support    Lactic acidosis    Wound infection       PLAN:   Chronic, stable wounds b/l lower extermities   -Patient was examined and evaluated  -WBC 11.9; patient afebrile  -Continue Zosyn per infectious disease  -Reviewed arterial Doppler studies; see impression above  -Change dressings; applied Betadine to all lesions, and covered with dry sterile dressings  -Nursing to change dressings (Betadine to all lesions, and cover with dry sterile dressings)  -Discussed with patient results of arterial studies, and how poor blood flow can contribute to chronic wounds to lower extremities  -Discussed with patient we will continue with conservative treatment for now  -Discussed with patient that he would benefit from being discharged into an SNF/ECF for further care; patient expressed that he would prefer to be sent home as he states that he and his significant other are capable of changing dressings on their own  -Podiatry will continue to follow patient while he is in-house  -Patient verbalized understanding and agreement with the treatment plan as stated. All of his questions were answered to his satisfaction. DISPO: Patient is stable for discharge per podiatry, continue daily dressing changes. Follow-up outpatient with Dr. Kimberlyn Barnes.     Kayy Yap DPM, PGY-2  Podiatric Surgical Resident  11/22/2021   8:21 AM

## 2021-11-22 NOTE — PRE SEDATION
[facility]  Sedation/Analgesia History & Physical    Pt Name: Anastasiya Gonzalez  Account number: [de-identified]  MRN: [id number]  YOB: 1955  Provider Performing Procedure: Christiano Ramirez MD MD  Referring Provider: Christa Altman DO   Primary Care Physician: Phoenix Nam MD  Date: 11/22/2021    PRE-PROCEDURE    Code Status: FULL CODE  Brief History/Pre-Procedure Diagnosis:   New onset CMP  EF 35%  CAD  Prior PCI     Consent: : I have discussed with the patient risks, benefits, and alternatives (and relevant risks, benefits, and side effects related to alternatives or not receiving care), and likelihood of the success. The patient and/or representative appear to understand and agree to proceed. The discussion encompasses risks, benefits, and side effects related to the alternatives and the risks related to not receiving the proposed care, treatment, and services. The indication, risks and benefits of the procedure and possible therapeutic consequences and alternatives were discussed with the patient. The patient was given the opportunity to ask questions and to have them answered to his/her satisfaction. Risks of the procedure include but are not limited to the following: Bleeding, hematoma including retroperitoneal hemmorhage, infection, pain and discomfort, injury to the aorta and other blood vessels, rhythm disturbance, low blood pressure, myocardial infarction, stroke, kidney damage/failure, myocardial perforation, allergic reactions to sedatives/contrast material, loss of pulse/vascular compromise requiring surgery, aneurysm/pseudoaneurysm formation, possible loss of a limb/hand/leg, needing blood transfusion, requiring emergent open heart surgery or emergent coronary intervention, the need for intubation/respiratory support, the requirement for defibrillation/cardioversion, and death. Alternatives to and omission of the suggested procedure were discussed.  The patient had no further questions and wished to proceed; the consent form was signed. MEDICAL HISTORY  []ASHD/ANGINA/MI/CHF   []Hypertension  []Diabetes  []Hyperlipidemia  []Smoking  []Family Hx of ASHD  []Additional information:       has a past medical history of CAD (coronary artery disease), COPD (chronic obstructive pulmonary disease) ([facility] [address] Hx of blood clots, Hyperlipidemia, Hypertension, Leg wound, left, Leg wound, right, Lumbar radiculopathy, Osteoarthritis, Seizure disorder ([facility] [address] and Spinal stenosis, lumbar. SURGICAL HISTORY   has a past surgical history that includes Coronary angioplasty with stent; knee surgery; Forearm surgery; Toe Surgery; Leg Surgery; Ankle surgery; Lumbar spine surgery; Tympanostomy tube placement (1979); fracture surgery (Right, 2000); back surgery (1994); Cardiac catheterization (2005); and Cardiac catheterization (5/9/13).   Additional information:       ALLERGIES   Allergies as of 11/18/2021 - Fully Reviewed 11/18/2021   Allergen Reaction Noted    Influenza vaccines Anaphylaxis 03/08/2017     Additional information:       MEDICATIONS   Aspirin  [] 81 mg  [] 325 mg  [] None  Antiplatelet drug therapy use last 5 days  []No []Yes  Coumadin Use Last 5 Days []No []Yes  Other anticoagulant use last 5 days  []No []Yes    Current Facility-Administered Medications:     [START ON 11/23/2021] furosemide (LASIX) tablet 20 mg, 20 mg, Oral, Daily, JAVAN Neal CNP    spironolactone (ALDACTONE) tablet 25 mg, 25 mg, Oral, Daily, JAVAN Vernon CNP    [START ON 11/23/2021] lisinopril (PRINIVIL;ZESTRIL) tablet 5 mg, 5 mg, Oral, Daily, JAVAN Vernon CNP    metoprolol succinate (TOPROL XL) extended release tablet 100 mg, 100 mg, Oral, Daily, JAVAN Vernon CNP    amiodarone (CORDARONE) tablet 200 mg, 200 mg, Oral, BID, JAVAN Vernon - CNP, 200 mg at 11/22/21 0914    potassium chloride (KLOR-CON M) extended release tablet 20 mEq, 20 mEq, Oral, BID , María Polanco, APRN - CNP, 20 mEq at 11/22/21 0914    Sodium Hypochlorite (DAKINS) 0.25 % external solution, , Irrigation, Daily, Dina Quijano MD, Given at 11/22/21 0914    calcium replacement protocol, , Other, RX Placeholder, Rebeka Bux, DO    potassium chloride (KLOR-CON M) extended release tablet 40 mEq, 40 mEq, Oral, PRN **OR** potassium bicarb-citric acid (EFFER-K) effervescent tablet 40 mEq, 40 mEq, Oral, PRN, 40 mEq at 11/20/21 0502 **OR** potassium chloride 10 mEq/100 mL IVPB (Peripheral Line), 10 mEq, IntraVENous, PRN, Rebeka Carox, DO    potassium chloride 10 mEq/100 mL IVPB (Peripheral Line), 10 mEq, IntraVENous, PRN, Rebeka Carox, DO    cyclobenzaprine (FLEXERIL) tablet 10 mg, 10 mg, Oral, Q8H PRN, Rebeka Carox, DO    traZODone (DESYREL) tablet 50 mg, 50 mg, Oral, Nightly PRN, Rebeka Carox, DO, 50 mg at 11/21/21 2142    piperacillin-tazobactam (ZOSYN) 3,375 mg in dextrose 5 % 50 mL IVPB extended infusion (mini-bag), 3,375 mg, IntraVENous, Q8H, Rebeka Brito DO, Stopped at 11/22/21 0914    insulin lispro (HUMALOG) injection vial 0-6 Units, 0-6 Units, SubCUTAneous, TID Martine JJ DO, 1 Units at 11/21/21 1125    insulin lispro (HUMALOG) injection vial 0-3 Units, 0-3 Units, SubCUTAneous, Nightly, Martine Gonzalez DO, 1 Units at 11/21/21 2029    glucose (GLUTOSE) 40 % oral gel 15 g, 15 g, Oral, PRN, Martine Gonzalez DO    dextrose 50 % IV solution, 12.5 g, IntraVENous, PRN, Martine Gonzalez DO    glucagon (rDNA) injection 1 mg, 1 mg, IntraMUSCular, PRN, Martine Gonzalez DO    dextrose 5 % solution, 100 mL/hr, IntraVENous, PRN, Martine Gonzalez DO    enoxaparin (LOVENOX) injection 100 mg, 1 mg/kg, SubCUTAneous, Q12H, Rebeka Brito DO, 100 mg at 11/21/21 3583    nicotine (NICODERM CQ) 21 MG/24HR 1 patch, 1 patch, TransDERmal, Daily, Rebeka Brito DO, 1 patch at 11/22/21 0914    sodium chloride flush 0.9 % injection 5-40 mL, 5-40 mL, IntraVENous, 2 times per day, Rebeka Bux, DO, 10 mL at 11/22/21 0915    sodium chloride flush 0.9 % injection 5-40 mL, 5-40 mL, IntraVENous, PRN, Rebeka Bux, DO    0.9 % sodium chloride infusion, 25 mL, IntraVENous, PRN, Rebeka Bux, DO, Stopped at 11/19/21 1934    polyethylene glycol (GLYCOLAX) packet 17 g, 17 g, Oral, Daily PRN, Rebeka Bux, DO    acetaminophen (TYLENOL) tablet 650 mg, 650 mg, Oral, Q6H PRN, 650 mg at 11/20/21 0938 **OR** acetaminophen (TYLENOL) suppository 650 mg, 650 mg, Rectal, Q6H PRN, Rebeka Bux, DO    clopidogrel (PLAVIX) tablet 75 mg, 75 mg, Oral, Daily, Rebeka Bux, DO, 75 mg at 11/22/21 4921    gabapentin (NEURONTIN) capsule 100 mg, 100 mg, Oral, TID, Rebeka Bux, DO, 100 mg at 11/22/21 0914    pantoprazole (PROTONIX) tablet 40 mg, 40 mg, Oral, Daily, Rebeka Bux, DO, 40 mg at 11/21/21 0520  Prior to Admission medications    Medication Sig Start Date End Date Taking? Authorizing Provider   aspirin EC 81 MG EC tablet Take 1 tablet by mouth daily 11/28/16  Yes JAVAN Darling CNP   metoprolol tartrate (LOPRESSOR) 25 MG tablet take 1 tablet by mouth twice a day 11/7/16  Yes Miranda Nolen MD   albuterol sulfate HFA (PROAIR HFA) 108 (90 BASE) MCG/ACT inhaler Inhale 2 puffs into the lungs every 4 hours as needed for Wheezing 6/13/16  Yes Miranda Nolen MD   pantoprazole (PROTONIX) 40 MG tablet Take 1 tablet by mouth daily 6/13/16  Yes Miranda Nolen MD   cyclobenzaprine (FLEXERIL) 10 MG tablet Take 1 tablet by mouth every 8 hours as needed for Muscle spasms 6/13/16  Yes Miranda Nolen MD   lisinopril (PRINIVIL;ZESTRIL) 20 MG tablet Take 1 tablet by mouth daily 6/13/16  Yes Miranda Nolen MD   traZODone (DESYREL) 50 MG tablet take 1 tablet by mouth at bedtime 6/6/16  Yes Miranda Nolen MD   acetaminophen (TYLENOL) 325 MG tablet Take 650 mg by mouth every 6 hours as needed.      Yes Historical Provider, MD HYDROcodone-acetaminophen (NORCO) 5-325 MG per tablet Take 1 tablet by mouth every 6 hours as needed for Pain  WARNING: This medication may make you drowsy. Do not operate heavy machinery or motor vehicles during use. . 3/8/17   Cheli Hendrix PA-C   SPIRIVA HANDIHALER 18 MCG inhalation capsule inhale the contents of one capsule in the handihaler once daily 8/1/16   María Tomas MD   clopidogrel (PLAVIX) 75 MG tablet take 1 tablet by mouth once daily 7/25/16   María Tomas MD   gabapentin (NEURONTIN) 100 MG capsule TAKE ONE CAPSULE BY MOUTH 3 TIMES A DAY 6/13/16   María oTmas MD   traZODone (DESYREL) 50 MG tablet take 1 tablet by mouth at bedtime 6/7/16   María Tomas MD   nitroGLYCERIN (NITROSTAT) 0.4 MG SL tablet Place 1 tablet under the tongue every 5 minutes as needed for Chest pain 2/2/16   María Tomas MD   aclidinium (TUDORZA PRESSAIR) 400 MCG/ACT AEPB Inhale 1 each into the lungs 2 times daily 9/24/15   María Tomas MD   naratriptan (AMERGE) 2.5 MG tablet Take 1 tablet by mouth once as needed for Migraine for up to 1 dose. 10/6/14 10/6/14  Joe Gilliland, DO   Elastic Bandages & Supports (LUMBAR BACK BRACE/SUPPORT PAD) MISC 1 each by Does not apply route daily as needed. 10/6/14   Joe Gilliland, DO   naratriptan (AMERGE) 2.5 MG tablet Take 1 tablet by mouth once as needed for Migraine for up to 1 dose. 2.5 mg at onset of headache, may repeat in 4 hours if needed 8/21/14 8/21/14  Joe Gilliland, DO   cyclobenzaprine (FLEXERIL) 10 MG tablet take 1 tablet by mouth three times a day if needed 5/26/14   Joe Gilliland, DO   Tens Unit MISC by Does not apply route. Use as directed. 4/24/14   Joe Gilliland, DO   traMADol (ULTRAM) 50 MG tablet Take 1 tablet by mouth daily as needed for Pain. 4/24/14   Joe Gilliland, DO   Respiratory Therapy Supplies (NEBULIZER COMPRESSOR) KIT by Does not apply route.  11/6/13   DO genaro Hernandezol (PROVENTIL) (2.5 MG/3ML) 0.083% nebulizer solution Take 3 mLs by nebulization every 6 hours as needed for Wheezing. 11/6/13   Linden Barahona DO     Additional information:       VITAL SIGNS   Vitals:    11/22/21 1108   BP: 117/79   Pulse: 76   Resp: 20   Temp: 97.7 °F (36.5 °C)   SpO2: 94%       PHYSICAL:   General: No acute distress  HEENT:  Unremarkable for age  Neck: without increased JVD, carotid pulses 2+ bilaterally without bruits  Heart: RRR, S1 & S2 WNL. No murmurs   Lungs: Clear to auscultation    Abdomen: BS present, without HSM, masses, or tenderness    Extremities: without C,C,E.  Pulses 2+ bilaterally   Mental Status: Alert & Oriented        PLANNED PROCEDURE   [x]Cath  [x]PCI                []Pacemaker/AICD  []ELLIE             []Cardioversion []Peripheral angiography/PTA  []Other:      SEDATION  Planned agent:[]Midazolam []Meperidine []Sublimaze []Morphine  []Diazepam  []Other:     ASA Classification:  []1 []2 [x]3 []4 []5   Class 1: A normal healthy patient  Class 2: Pt with mild to moderate systemic disease  Class 3: Severe systemic disease or disturbance  Class 4: Severe systemic disorders that are already life threatening. Class 5: Moribund pt with little chances of survival, for more than 24 hours. Mallampati I Airway Classification:   []1 []2 [x]3 []4     [x]Pre-procedure diagnostic studies complete and results available. Comment:    [x]Previous sedation/anesthesia experiences assessed. Comment:    [x]The patient is an appropriate candidate to undergo the planned procedure sedation and anesthesia. (Refer to nursing sedation/analgesia documentation record)  [x]Formulation and discussion of sedation/procedure plan, risks, and expectations with patient and/or responsible adult completed. [x]Patient examined immediately prior to the procedure.  (Refer to nursing sedation/analgesia documentation record)      Piotr Mera MD, Amol Bass    Electronically signed 11/22/2021 at 12:10 PM

## 2021-11-22 NOTE — PROCEDURES
[address] [place] [address]                            CARDIAC CATHETERIZATION    PATIENT NAME: Chayito Pinedo                   :        1955  MED REC NO:   [id number]                           ROOM:       0003  ACCOUNT NO:   [de-identified]                           ADMIT DATE: 2021  PROVIDER:     Nacho Acevedo MD    DATE OF PROCEDURE:  2021    INDICATION:  1. New-onset moderate-to-severe cardiomyopathy. 2.  Echocardiogram revealed drop in ejection fraction of 35%. 3.  Elevated troponin. 4.  Angina. PROCEDURES PERFORMED:  1. Left cardiac catheterization with selective coronary angiogram.  2.  Left ventriculography. 3.  Fractional flow reserve measurement of the left anterior descending  artery. DESCRIPTION OF PROCEDURE:  After informed consent, the patient was  brought to the cardiac catheterization room. He was prepped and draped  in a sterile fashion. 2% lidocaine was injected in the skin and  subcutaneous tissue overlying the right radial artery. Under ultrasound  guidance using modified Seldinger technique, I obtained access in the  right radial artery. 5/6 Slender sheath was inserted. Standard  antithrombotic/antispasmodic medications were given. I used 6-Syriac  JR4, 6-Syriac JL3.5 diagnostic catheter to complete the cardiac  catheterization procedure. Heparin was given. ACT was confirmed to be  above 250.  6-Syriac EBU 3.5 guide catheter was used to cannulate the  left main coronary artery. Fractional flow reserve wire was prepared  outside the patient's body per 's recommendations. I then  advanced the FFR wire to the left main coronary artery after flushing  the guide catheter with normal saline. Equalization of pressures was  performed. I then advanced the FFR wire distal to the target lesion  area in the mid LAD. At baseline, FFR was 0.93.   Adenosine at 140  mcg/kg per minute was initiated and then was stopped at 3 minutes. At  maximal hyperemia, FFR was measured at 0.91. Repeat angiogram revealed  no complications including no dissection, distal embolization or  perforation. FINDINGS:  LEFT VENTRICULOGRAM:  Moderate global hypokinesis was noticed. Ejection  fraction estimated at 35% to 40%. HEMODYNAMIC RESULTS:  Left ventricular end-diastolic pressure 21 mmHg. No significant pressure gradient across the aortic valve upon pullback. Fractional flow reserve measurement of the left anterior descending  artery baseline 0.93, at maximal hyperemia 0.91. CORONARY ANGIOGRAM:  1. Left main coronary artery:  Distal left main coronary artery has 10%  to 20% stenosis. It bifurcates into left circumflex and left anterior  descending artery. 2.  Left circumflex artery:  Proximal LCX with 30% stenosis. Mid  segment of left circumflex artery has chronic total occlusion. The  distal part of the vessel fills via left-to-left collaterals from the  LAD. 3.  Left anterior descending artery:  Proximal LAD is patent with mild  luminal irregularities. D1 is a small vessel with mild diffuse disease. D2 is a large vessel that is patent with mild luminal irregularities. D3 is a small vessel with an ostial 60% to 70% stenosis. Mid segment of  LAD has a 50% lesion just after D2. The stent in mid LAD has mild 30%  in-stent restenosis. Distal LAD with luminal irregularities. 4.  Right coronary artery:  Dominant vessel. Proximal RCA has chronic  total occlusion. The distal part of the RCA fills faintly via bridging  collaterals with RPL and RPDA receiving collaterals from left-to-right  as mentioned above. MEDICATIONS:  See MAR. COMPLICATIONS:  None. ESTIMATED BLOOD LOSS:  Less than 50 mL. ACCESS:  Right radial artery access. Vasc Band was applied. Hemostasis  was achieved. IMPRESSION:  1. 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   2.  Chronic total occlusion of the mid left circumflex artery. 3.  Distal RCA and distal left circumflex artery receives collateral  flow. 4.  50% intermediate stenosis of mid segment of left anterior descending  artery. Hemodynamically insignificant by FFR measurement, FFR 0.91.  5.  Moderate ischemic cardiomyopathy, ejection fraction 35% to 40%    RECOMMENDATIONS:  Aggressive risk factor modification and optimal  medical management for coronary artery disease. Optimize antianginal  medications. Guideline-directed medical therapy for congestive heart  failure with reduced ejection fraction. The patient also was diagnosed  with atrial fibrillation during this hospitalization, will need  anticoagulation and good control of AFib. At this time, continue  aggressive medical therapy for coronary artery disease. Should the  patient develop symptoms resistant to medical therapy, may consider  referral for coronary artery bypass graft surgery in the future. Outpatient followup with Cardiology.         Carmel Ramirez MD    D: 11/22/2021 13:12:40       T: 11/22/2021 13:17:44     [email or web address]  Job#: [id number]     Doc#: [id number]    CC:

## 2021-11-22 NOTE — PROGRESS NOTES
Hospitalist Progress Note      Patient:  Sandee Villegas    Unit/Bed:4A-03/003-A  YOB: 1955  MRN: [id number]   Acct: [de-identified]   PCP: Thaddeus Alejandro MD  Date of Admission: 11/18/2021    Assessment/Plan:    1. New onset atrial fibrillation with RVR  a. Patient with A. fib RVR overnight 11/19/2021.  b. Continue with amiodarone 200 mg twice daily and metoprolol. Patient has been in and out of atrial fibrillation overnight. Heart rate is pretty well controlled. Patient without chest pain or palpitations. c. Patient scheduled for left heart catheterization today for ischemic evaluation. Patient's been cleared by ID for infection standpoint. Antibiotics to be discontinued. d. Case was discussed extensively with cardiology. Patient their assistance.  e. Patient would likely benefit from outpatient sleep study evaluating for possible sleep apnea. 2. Peripheral arterial disease with nonhealing arterial ulceration:  a. Patient has previously undergone arteriogram with attempted recannulization 11/2020.  b. ABIs obtained in the ED showed mild stenosis of the proximal popliteal region on the right with occluded arteries on the left which appear chronic in nature. c. Continue ASA, Plavix, and statin  d. Podiatry following for management and wound care. 3. Stage III perineum pressure ulcer:   a. Antibiotics have been discontinued. Suspect colonization rather than active infection. b. ID following. Appreciate their assistance. 4. Acute exacerbation of CHF, unspecified type:   a. Patient appears euvolemic at this time. Holding on further IV diuresis. Will reassess following heart catheterization. b. Strict I's and O's. Daily weights. c. Repeat echocardiogram pending. 5. Elevated troponin:   a. Patient undergo left heart catheterization for ischemic evaluation today given new onset atrial fibrillation. b. Cardiology following. 6. Diabetes mellitus type 2:  a. A1c 7.1.  b. SSI and Accu-Cheks. c. Hypoglycemic orders in place. 7. Hypertension:   a. Continue home medications  8. COPD, not in acute exacerbation:   a. Continue home medications  9. CAD status post PCI:  a. Continue aspirin, statin, Plavix, and beta-blocker. ACE inhibitor held prior to left heart catheterization. 10. GERD:   a. Continue home Protonix      Disposition: Pending left heart catheterization and ID clearance. Potential discharge in 48 to 72 hours. Chief Complaint:  Sacral wound    Hospital Course:    11/18: Patient was to the hospital from home with complaints of a foul-smelling wound on his left buttock region. Patient states has been there for several days and has noticed leaking/drainage from the site. He denies any pain, fevers, chills. 11/20: Overnight, patient noted of atrial fibrillation with RVR. Patient was asymptomatic and sleeping during this episode. He was transferred to cardiovascular unit and started on amiodarone due to borderline low blood pressures. Cardiology was consulted for ischemic evaluation. Echocardiogram obtained shows new reduced EF. Subjective (past 24 hours):   Patient doing well. No acute events overnight. Heart rate remains well controlled. Patient has any chest pains or palpitations. Denies fevers or cold chills. Patient anticipating discharge from the hospital 1 to 2 days. Past medical history, family history, social history and allergies reviewed again and is unchanged since admission. ROS (12 point review of systems completed. Pertinent positives noted.  Otherwise ROS is negative)     Medications:  Reviewed    Infusion Medications    dextrose      sodium chloride Stopped (11/19/21 1934)     Scheduled Medications    [START ON 11/23/2021] furosemide  20 mg Oral Daily    spironolactone  25 mg Oral Daily    [START ON 11/23/2021] lisinopril  5 mg Oral Daily    metoprolol succinate  100 mg Oral Daily    amiodarone  200 mg Oral BID    potassium chloride  20 mEq Oral BID     Sodium Hypochlorite   Irrigation Daily    calcium replacement protocol   Other RX Placeholder    insulin lispro  0-6 Units SubCUTAneous TID     insulin lispro  0-3 Units SubCUTAneous Nightly    enoxaparin  1 mg/kg SubCUTAneous Q12H    nicotine  1 patch TransDERmal Daily    sodium chloride flush  5-40 mL IntraVENous 2 times per day    clopidogrel  75 mg Oral Daily    gabapentin  100 mg Oral TID    pantoprazole  40 mg Oral Daily     PRN Meds: potassium chloride **OR** potassium alternative oral replacement **OR** potassium chloride, potassium chloride, cyclobenzaprine, traZODone, glucose, dextrose, glucagon (rDNA), dextrose, sodium chloride flush, sodium chloride, polyethylene glycol, acetaminophen **OR** acetaminophen      Intake/Output Summary (Last 24 hours) at 11/22/2021 1225  Last data filed at 11/22/2021 1003  Gross per 24 hour   Intake 3718.17 ml   Output 1875 ml   Net 1843.17 ml       Diet:  ADULT DIET; Regular; 4 carb choices (60 gm/meal); Low Sodium (2 gm); 2000 ml    Exam:  /79   Pulse 76   Temp 97.7 °F (36.5 °C) (Oral)   Resp 20   Ht 6' 1\" (1.854 m)   Wt 245 lb 3.2 oz (111.2 kg)   SpO2 94%   BMI 32.35 kg/m²   General appearance: Elderly chronically ill-appearing male. Resting bed. No acute distress. HEENT: Pupils equal, round, and reactive to light. Conjunctivae/corneas clear. Neck: Supple, with full range of motion. No jugular venous distention. Trachea midline. Respiratory:  Normal respiratory effort. Clear to auscultation, bilaterally without Rales/Wheezes/Rhonchi. Cardiovascular: Irregularly irregular rhythm. Rate approximately 8090. No appreciable murmurs. Extremities are warm and well perfused. I am not able to palpate either lower extremity dorsalis pedal or posterior tibial pulses. Abdomen: Soft, non-tender, non-distended with normal bowel sounds.   Musculoskeletal: passive and active ROM x 4 extremities. Skin: Skin color, texture, turgor normal.  No rashes or lesions. Stage III pressure ulcer noted to the patient's lateral left buttocks. Neurologic:  Neurovascularly intact without any focal sensory/motor deficits. Cranial nerves: II-XII intact, grossly non-focal.  Psychiatric: Alert and oriented, thought content appropriate, normal insight  Capillary Refill: Brisk,< 3 seconds   Peripheral Pulses: +2 palpable, equal bilaterally     Labs:   Recent Labs     11/21/21  0752 11/22/21  0344   WBC 13.2* 11.9*   HGB 11.5* 11.2*   HCT 36.6* 36.9*    403*     Recent Labs     11/20/21  0541 11/21/21  0752 11/22/21  0344    135 139   K 4.1 3.8 4.1    97* 101   CO2 23 23 25   BUN 7 9 10   CREATININE 0.9 0.9 1.1   CALCIUM 8.5 8.3* 8.3*     No results for input(s): AST, ALT, BILIDIR, BILITOT, ALKPHOS in the last 72 hours. No results for input(s): INR in the last 72 hours. No results for input(s): Patrisha Meigs in the last 72 hours. Microbiology:    Blood culture #1:   Lab Results   Component Value Date    BC No growth-preliminary  11/18/2021       Blood culture #2:No results found for: Nathanael Bazzi    Organism:No results found for: ORG    No results found for: LABGRAM    MRSA culture only:No results found for: [facility]    Urine culture: No results found for: LABURIN    Respiratory culture: No results found for: CULTRESP    Aerobic and Anaerobic :  No results found for: LABAERO  No results found for: LABANAE    Urinalysis:      Lab Results   Component Value Date    NITRU NEGATIVE 03/16/2017    WBCUA 2-4 03/16/2017    BACTERIA NONE 03/16/2017    RBCUA 0-2 03/16/2017    BLOODU NEGATIVE 03/16/2017    SPECGRAV 1.023 03/16/2017    GLUCOSEU NEGATIVE 01/20/2013       Radiology:  XR CHEST PORTABLE   Final Result   1. Normal heart size. No effusion seen.    2. Mildly increased interstitial markings scattered in the right lung and left lung base, consistent with mild interstitial pneumonitis/edema. 3. Overall appearance improved from prior. **This report has been created using voice recognition software. It may contain minor errors which are inherent in voice recognition technology. **      Final report electronically signed by Dr. Akua Walton on 11/19/2021 10:30 PM      XR CHEST PORTABLE   Final Result   1. Borderline heart size. No effusion. 2. Mildly increased initial markings left lung base and throughout the right lung, consistent with interstitial pneumonia/edema. Central pulmonary vascular markings are prominent, suggesting possible low-grade or incipient heart failure. **This report has been created using voice recognition software. It may contain minor errors which are inherent in voice recognition technology. **      Final report electronically signed by Dr. Akua Walton on 11/19/2021 12:23 AM      VL DUP LOWER EXTREMITY ARTERIES BILATERAL   Final Result   1. Findings suggestive of mild stenosis in the proximal popliteal region right side. Good pulsatility is seen in the right posterior tibial and peroneal arteries. 2. The patient's femoral/tibial bypass graft left side is known to be totally occluded and could not be visualized on today's study. . Severely dampened pulsatility in the left common femoral artery, suggesting moderate-severe inflow stenosis. Total    occlusion left SFA, popliteal artery, as well as the peroneal and posterior tibial arteries. Recanalized flow is seen in the left anterior tibial artery with severely dampened. 3. Despite the findings mentioned above, patient denies any problems with the left leg. **This report has been created using voice recognition software. It may contain minor errors which are inherent in voice recognition technology. **      Final report electronically signed by Dr. Akua Walton on 11/18/2021 10:07 PM        XR CHEST PORTABLE    Result Date: 11/19/2021  PROCEDURE: XR CHEST PORTABLE CLINICAL INFORMATION: SOB COMPARISON: 3/16/2017 TECHNIQUE: A single mobile view of the chest was obtained. 1. Borderline heart size. No effusion. 2. Mildly increased initial markings left lung base and throughout the right lung, consistent with interstitial pneumonia/edema. Central pulmonary vascular markings are prominent, suggesting possible low-grade or incipient heart failure. **This report has been created using voice recognition software. It may contain minor errors which are inherent in voice recognition technology. ** Final report electronically signed by Dr. Moise Johansen on 11/19/2021 12:23 AM    VL DUP LOWER EXTREMITY ARTERIES BILATERAL    Result Date: 11/18/2021  PROCEDURE: VL DUP LOWER EXTREMITY ARTERIES BILATERAL CLINICAL INFORMATION: Patient came in for wound on buttock. ER unable to palpate DP and PT pulses - patient states his legs are at their baseline. . Patient has a left fem-tib graft. Occluded per US 11/5/20-, performed at outside institution. COMPARISON: No prior study. TECHNIQUE: Multiple permanent grayscale and color flow sonographic images of the major arteries of both lower extremities were obtained from the level of the groin to the level of the ankle . Spectral Doppler waveforms were obtained and velocity measurements were measured. FINDINGS: RIGHT ARTERY (PSV cm/sec) ? ??????????????????????????????????? CFA ---------------> 168 PSV cm/sec PROF -------------> 129 PSV cm/sec SFA PROX ------->108 PSV cm/sec SFA MID ---------->53 PSV cm/sec SFA DIST -------->68 PSV cm/sec POP A PROX --->26 PSV cm/sec POP A DIST ---->48 PSV cm/sec PTA --------------->52 PSV cm/sec PERONEAL ----->41 PSV cm/sec ELYSIA ----------------> 20 PSV cm/sec LEFT ARTERY (PSV cm/sec) ? ??????????????????????? CFA ---------------> 31 PSV cm/sec PROF -------------> 90 PSV cm/sec SFA PROX ------->14 PSV cm/sec SFA MID ---------->occluded SFA DIST -------->occluded POP A PROX --->occluded POP A DIST ---->occluded PTA --------------->occluded PERONEAL ----->occluded ELYSIA ----------------> 8 PSV cm/sec VELOCITY MEASUREMENTS: ABIs and not performed due to patient's refusal. RIGHT LEG: Excellent pulsatility is seen in the common femoral artery, profunda, and throughout the SFA. Moderate multifocal atherosclerotic plaque is scattered throughout the SFA and in the common femoral artery. There is a small plaque in the right common femoral artery demonstrates mild mobility. There appears be moderately dampened pulsatility in the proximal popliteal artery. Good pulsatility seen distally in the popliteal artery. Good pulsatility is seen in the posterior limb peroneal arteries. Moderately dampened pulsatility in the right anterior tibial artery. LEFT LEG: Severely dampened pulsatility is seen in the left common femoral artery, indicative of significant inflow stenosis. There is more than doubling of velocity in the left profunda, suggesting at least 50% stenosis at the origin of the profunda. Severely dampened pulsatility is also seen in the proximal SFA. The mid and distal SFA is totally occluded. The popliteal artery is also totally occluded as are the posterior tibial and peroneal arteries. Pulsatile flow is seen in the left anterior tibial artery but is severely dampened. The femoral-tibial bypass graft could not be seen sonographically. 1. Findings suggestive of mild stenosis in the proximal popliteal region right side. Good pulsatility is seen in the right posterior tibial and peroneal arteries. 2. The patient's femoral/tibial bypass graft left side is known to be totally occluded and could not be visualized on today's study. . Severely dampened pulsatility in the left common femoral artery, suggesting moderate-severe inflow stenosis. Total occlusion left SFA, popliteal artery, as well as the peroneal and posterior tibial arteries. Recanalized flow is seen in the left anterior tibial artery with severely dampened.  3. Despite the findings mentioned above, patient denies any problems with the left leg. **This report has been created using voice recognition software. It may contain minor errors which are inherent in voice recognition technology. ** Final report electronically signed by Dr. Cynthia Perez on 11/18/2021 10:07 PM      Electronically signed by Delene Spatz, DO on 11/22/2021 at 12:25 PM

## 2021-11-22 NOTE — PROGRESS NOTES
Cardiology Progress Note      Patient:  Jose Pittman  YOB: 1955  MRN: [id number]   Acct: [de-identified]  Admit Date:  11/18/2021  Primary Cardiologist: [facility] cardiology  Seen by Dr. Dejan Benito    Per prior cardiology consult note-  Reason for Consultation:  LV dysfunction, Acute CHF exacerbation        History Of Present Illness:    61 y.o.  male with history of CAD status post PCI, PAD, COPD, smoker, hypertension, hyperlipidemia, seizure disorder who initially presented to the hospital with complaints of perineal pressure ulcer. According to the spouse there is a foul-smelling discharge from the ulcer. He also has multiple ulcers in lower extremities. Patient has been to multiple podiatrists in the past, who have told him that he needs bilateral below the knee amputation due to severe PAD. Currently patient is on IV antibiotics. Patient does report some shortness of breath and orthopnea, has elevated proBNP and congestive pattern on the chest x-ray. He was given 1 dose of Lasix. He underwent echocardiogram yesterday which showed ejection fraction of 35%. His prior echocardiogram from 2017 shows ejection fraction of 55%. Patient reports that he has followed up with cardiology at [facility] and underwent multiple stents there several years ago. He has not followed up with any cardiologist since that. Electrocardiogram shows atrial fibrillation with RVR at a rate of 137 bpm.  Troponin was mildly elevated 0.048.     Subjective (Events in last 24 hours):     Pt sitting at bedside   States \"I feel great - ready to go home\"    Tele PAF RVR to SR -- pt denies palpitations - dizziness - chest pain     BP stable     ID to see today for Pressure ulcers - give recs      11/22/2021  Pt at bedside - states \"im feeling great today - ready to go home\"     Discussed the need for cath -   Discussed pt home status - he states they were homeless for 2 months but are living in an apartment at present and he is taking his medications and he will take his medications and follow with us     He understand that he has been told he will likely need leg amputations in the future but for now hes doing ok    Tele SR - occ PAFB RVR   bp stable     Discussed the need for possible ICD in future and the importance of taking care of his heart and taking meds and following up with cardiology to avoid this       Pt agrees to cardiac cath today     Objective:   /78   Pulse 77   Temp 97.3 °F (36.3 °C) (Oral)   Resp 18   Ht 6' 1\" (1.854 m)   Wt 245 lb 3.2 oz (111.2 kg)   SpO2 93%   BMI 32.35 kg/m²        TELEMETRY: SR - occ AFB RVR noted on tele - mostly SR HR 60-70     Physical Exam:  General Appearance: alert and oriented to person, place and time, in no acute distress  Cardiovascular: irregular rhythm, normal S1 and S2, no murmurs, rubs, clicks, or gallops, distal pulses intact,  Pulmonary/Chest: clear upper - diminished lower to auscultation bilaterally- no wheezes, rales or rhonchi, normal air movement, no respiratory distress  Abdomen: soft, non-tender, non-distended, normal bowel sounds, no masses Extremities: no cyanosis, clubbing or edema, pulses weak    Musculoskeletal: normal range of motion, no joint swelling, deformity or tenderness  Neurological: alert, oriented, normal speech, no focal findings or movement disorder noted    Medications:    amiodarone  200 mg Oral BID    metoprolol tartrate  25 mg Oral Q6H    furosemide  20 mg IntraVENous BID    potassium chloride  20 mEq Oral BID WC    Sodium Hypochlorite   Irrigation Daily    calcium replacement protocol   Other RX Placeholder    [Held by provider] lisinopril  20 mg Oral Daily    piperacillin-tazobactam  3,375 mg IntraVENous Q8H    insulin lispro  0-6 Units SubCUTAneous TID WC    insulin lispro  0-3 Units SubCUTAneous Nightly    enoxaparin  1 mg/kg SubCUTAneous Q12H    nicotine  1 patch TransDERmal Daily    sodium chloride flush  5-40 mL IntraVENous 2 times per day    clopidogrel  75 mg Oral Daily    gabapentin  100 mg Oral TID    pantoprazole  40 mg Oral Daily      dextrose      amiodarone Stopped (11/21/21 1202)    sodium chloride Stopped (11/19/21 1934)     potassium chloride, 40 mEq, PRN   Or  potassium alternative oral replacement, 40 mEq, PRN   Or  potassium chloride, 10 mEq, PRN  potassium chloride, 10 mEq, PRN  cyclobenzaprine, 10 mg, Q8H PRN  traZODone, 50 mg, Nightly PRN  glucose, 15 g, PRN  dextrose, 12.5 g, PRN  glucagon (rDNA), 1 mg, PRN  dextrose, 100 mL/hr, PRN  sodium chloride flush, 5-40 mL, PRN  sodium chloride, 25 mL, PRN  polyethylene glycol, 17 g, Daily PRN  acetaminophen, 650 mg, Q6H PRN   Or  acetaminophen, 650 mg, Q6H PRN        Diagnostics:    Echo:   Electronically signed by Darren Thomas MD (Interpreting   physician) on 11/19/2021 at 04:19 PM   ----------------------------------------------------------------      Findings      Mitral Valve   Trace mitral regurgitation is present. Aortic Valve   The aortic valve leaflets were not well visualized. Aortic valve appears tricuspid. Aortic valve leaflets are somewhat thickened. Tricuspid Valve   Trivial tricuspid regurgitation visualized. Pulmonic Valve   The pulmonic valve was not well visualized . Trivial pulmonic regurgitation visualized. Left Atrium   Left atrial size was normal.      Left Ventricle   Ejection fraction is visually estimated at 35%. There was moderate global hypokinesis of the left ventricle. Right Atrium   Right atrial size was normal.      Right Ventricle   The right ventricular size was normal with normal systolic function and   wall thickness. Pericardial Effusion   The pericardium was normal in appearance with no evidence of a pericardial   effusion. Pleural Effusion   No evidence of pleural effusion.       Aorta / Great Vessels   -Aortic root dimension within normal limits.   -The Pulmonary artery is within normal limits. -IVC size is within normal limits with normal respiratory phasic changes. Stress:     Left Heart Cath:     Lab Data:    Cardiac Enzymes:  No results for input(s): CKTOTAL, CKMB, CKMBINDEX, TROPONINI in the last 72 hours. CBC:   Lab Results   Component Value Date    WBC 11.9 11/22/2021    RBC 4.29 11/22/2021    RBC 5.49 02/02/2012    HGB 11.2 11/22/2021    HCT 36.9 11/22/2021     11/22/2021       CMP:    Lab Results   Component Value Date     11/22/2021    K 4.1 11/22/2021    K 3.6 11/18/2021     11/22/2021    CO2 25 11/22/2021    BUN 10 11/22/2021    CREATININE 1.1 11/22/2021    GFRAA >60 03/18/2021    LABGLOM 67 11/22/2021    GLUCOSE 122 11/22/2021    GLUCOSE 117 02/02/2012    CALCIUM 8.3 11/22/2021       Hepatic Function Panel:    Lab Results   Component Value Date    ALKPHOS 81 11/18/2021    ALT 11 11/18/2021    AST 16 11/18/2021    PROT 7.7 11/18/2021    BILITOT 0.3 11/18/2021    BILIDIR <0.2 11/18/2021    LABALBU 3.7 11/18/2021    LABALBU 4.7 02/02/2012       Magnesium:    Lab Results   Component Value Date    MG 1.8 11/20/2021       PT/INR:    Lab Results   Component Value Date    PROTIME 12.3 11/17/2020    INR 1.07 11/17/2020       HgBA1c:    Lab Results   Component Value Date    LABA1C 7.1 11/20/2021       FLP:    Lab Results   Component Value Date    TRIG 116 11/20/2021    HDL 25 11/20/2021    LDLCALC 48 11/20/2021       TSH:    Lab Results   Component Value Date    TSH 2.090 11/20/2021         Assessment:    Perineal pressure ulcer -- ID note seen      New onset AFB RVR of unknown duration   Mostly SR - occ AFB RVR    [id number]- at least 3     New CDMP EF 35% (last echo here 2017 55% EF)  ?  CAD or tachy induced    Acute diastolic chf w/ new EF 51%- resolving     Mild elevated trop - demand ischemia       Unknown CAD - pt states stents @ [facility]- knows may need amputation in furture     Unknown PAD - pt states stents and LT nonfunctional fem-pop @ [facility]      HLP LDL 48  HTN - stable     Current smoker -- cessation discussed     Noncompliance with MD follow-ups / ? Medications       Plan:  · Need records from [facility] of cath- stress -echo- last office note - peripheral angiogram  · Cath today - pt taking medds - gets from Dr. Khoa Johnson (PCP)   · Change to po lasix - add aldactone - ACE back for chf guidelines   · farxiga free   · Follow - likely home tomorrow        CHF guidelines:  BB: yes  ACE / ARB/ entresto: add today to start tomorrow  Diuretics: yes  Aldactone: no   Add today   Farxiga: free    Weigh yourself daily: Should you gain 2-3 pounds in 1 days time or 3-5 pounds in 2 days time-- call our office ([phone]) for further recommendations    Salt restriction 2 gm ( 2,000 mg) daily   Fluid restriction 2 L daily      Electronically signed by JAVAN Reynaga CNP on 11/22/2021 at 9:44 AM

## 2021-11-22 NOTE — BRIEF OP NOTE
[facility]  Sedation/Analgesia Post Sedation Record    Pt Name: Melissa Bingham  Account number: [de-identified]  MRN: [id number]  YOB: 1955  Procedure Performed By: Layla Phoenix MD MD   Primary Care Physician: Simona Doran MD  Date: 11/22/2021    POST-PROCEDURE    Physicians/Assistants: Layla Phoenix MD MD     Procedure Performed:Cath / FFR      Sedation/Anesthesia: Versed/ Fentanyl and 2% xylocaine local anesthesia. Estimated Blood Loss: < 50 ml. Specimens Removed: None         Disposition of Specimen: N/A        Complications: No Immediate Complications. Post-procedure Diagnosis/Findings:         of RCA (fills via collaterals)     of mid LCX (fills via collaterals)    Intermediate mid LAD stenosis, hemodynamically insignificant for FFR (FFR 0.91)   Moderate ischemic CMP, EF 35-40%    Recommendations:  Bed rest for the next 2 hours  Access site care  Medical therapy is recommended at this time. Will need outpatient follow up with Cardiology. May consider CABG in the future if patient develops continued symptoms despite medical therapy   Aggressive risk factor modification for CAD  GDMT for HFrEF       Above findings and plan of care were discussed with patient, questions were answered, agreeable with plan.        Layla Phoenix MD, Richard Taylor   Electronically signed 11/22/2021 at 1:07 PM  Interventional Cardiology

## 2021-11-22 NOTE — CARE COORDINATION
11/22/21, 2:05 PM EST    DISCHARGE ON GOING EVALUATION    María Valdivia day: 4  Location: -03/003-A Reason for admit: Wound infection [T14. 8XXA, L08.9]  Elevated troponin [R77.8]  Ulcers of both lower extremities with fat layer exposed ([facility] [address] [B91.615, L97.922]  Atrial fibrillation, unspecified type ([facility]) [I48.91]  Acute congestive heart failure, unspecified heart failure type ([facility] [address] [I50.9]  Pressure injury of left perineal ischial region, stage 3 [facility]) [H53.263]   Procedure:   11/22 Cardiac Cath  Barriers to Discharge: telemetry, PT/OT, Cardiology, Podiatry following. Plavix, Lovenox, Amiodarone po, diabetes management, Protonix, electrolyte replacement protocols. PCP: Geeta English MD  Readmission Risk Score: 14 ( )%  Patient Goals/Plan/Treatment Preferences: From home with sig other. Denies needs. Plan is to return.

## 2021-11-22 NOTE — CARE COORDINATION
11/22/21, 7:24 AM EST    DISCHARGE BARRIERS        Patient transferred to . Report given to unit Gwinda Brunner, regarding discharge plan for this patient.

## 2021-11-23 VITALS
DIASTOLIC BLOOD PRESSURE: 86 MMHG | HEART RATE: 96 BPM | HEIGHT: 73 IN | BODY MASS INDEX: 32.32 KG/M2 | RESPIRATION RATE: 16 BRPM | SYSTOLIC BLOOD PRESSURE: 143 MMHG | WEIGHT: 243.9 LBS | OXYGEN SATURATION: 96 % | TEMPERATURE: 98.4 F

## 2021-11-23 LAB
ANION GAP SERPL CALCULATED.3IONS-SCNC: 16 MEQ/L (ref 8–16)
BASOPHILS # BLD: 0.4 %
BASOPHILS ABSOLUTE: 0 THOU/MM3 (ref 0–0.1)
BUN BLDV-MCNC: 10 MG/DL (ref 7–22)
CALCIUM SERPL-MCNC: 8.7 MG/DL (ref 8.5–10.5)
CHLORIDE BLD-SCNC: 101 MEQ/L (ref 98–111)
CO2: 22 MEQ/L (ref 23–33)
CREAT SERPL-MCNC: 1.1 MG/DL (ref 0.4–1.2)
EOSINOPHIL # BLD: 1.8 %
EOSINOPHILS ABSOLUTE: 0.2 THOU/MM3 (ref 0–0.4)
ERYTHROCYTE [DISTWIDTH] IN BLOOD BY AUTOMATED COUNT: 15.6 % (ref 11.5–14.5)
ERYTHROCYTE [DISTWIDTH] IN BLOOD BY AUTOMATED COUNT: 49.6 FL (ref 35–45)
GFR SERPL CREATININE-BSD FRML MDRD: 67 ML/MIN/1.73M2
GLUCOSE BLD-MCNC: 128 MG/DL (ref 70–108)
GLUCOSE BLD-MCNC: 131 MG/DL (ref 70–108)
GLUCOSE BLD-MCNC: 155 MG/DL (ref 70–108)
HCT VFR BLD CALC: 38.2 % (ref 42–52)
HEMOGLOBIN: 11.3 GM/DL (ref 14–18)
IMMATURE GRANS (ABS): 0.06 THOU/MM3 (ref 0–0.07)
IMMATURE GRANULOCYTES: 0.5 %
LYMPHOCYTES # BLD: 16.8 %
LYMPHOCYTES ABSOLUTE: 2 THOU/MM3 (ref 1–4.8)
MCH RBC QN AUTO: 25.9 PG (ref 26–33)
MCHC RBC AUTO-ENTMCNC: 29.6 GM/DL (ref 32.2–35.5)
MCV RBC AUTO: 87.6 FL (ref 80–94)
MONOCYTES # BLD: 6.3 %
MONOCYTES ABSOLUTE: 0.7 THOU/MM3 (ref 0.4–1.3)
NUCLEATED RED BLOOD CELLS: 0 /100 WBC
PLATELET # BLD: 378 THOU/MM3 (ref 130–400)
PMV BLD AUTO: 11.1 FL (ref 9.4–12.4)
POTASSIUM SERPL-SCNC: 4.3 MEQ/L (ref 3.5–5.2)
RBC # BLD: 4.36 MILL/MM3 (ref 4.7–6.1)
SEG NEUTROPHILS: 74.2 %
SEGMENTED NEUTROPHILS ABSOLUTE COUNT: 8.7 THOU/MM3 (ref 1.8–7.7)
SODIUM BLD-SCNC: 139 MEQ/L (ref 135–145)
WBC # BLD: 11.7 THOU/MM3 (ref 4.8–10.8)

## 2021-11-23 PROCEDURE — 6370000000 HC RX 637 (ALT 250 FOR IP): Performed by: STUDENT IN AN ORGANIZED HEALTH CARE EDUCATION/TRAINING PROGRAM

## 2021-11-23 PROCEDURE — 6370000000 HC RX 637 (ALT 250 FOR IP): Performed by: NURSE PRACTITIONER

## 2021-11-23 PROCEDURE — 36415 COLL VENOUS BLD VENIPUNCTURE: CPT

## 2021-11-23 PROCEDURE — 82948 REAGENT STRIP/BLOOD GLUCOSE: CPT

## 2021-11-23 PROCEDURE — 85025 COMPLETE CBC W/AUTO DIFF WBC: CPT

## 2021-11-23 PROCEDURE — 80048 BASIC METABOLIC PNL TOTAL CA: CPT

## 2021-11-23 PROCEDURE — 2580000003 HC RX 258: Performed by: INTERNAL MEDICINE

## 2021-11-23 PROCEDURE — 99239 HOSP IP/OBS DSCHRG MGMT >30: CPT | Performed by: INTERNAL MEDICINE

## 2021-11-23 PROCEDURE — 99232 SBSQ HOSP IP/OBS MODERATE 35: CPT | Performed by: NURSE PRACTITIONER

## 2021-11-23 RX ORDER — METOPROLOL SUCCINATE 50 MG/1
50 TABLET, EXTENDED RELEASE ORAL DAILY
Status: DISCONTINUED | OUTPATIENT
Start: 2021-11-24 | End: 2021-11-23 | Stop reason: HOSPADM

## 2021-11-23 RX ORDER — NICOTINE 21 MG/24HR
1 PATCH, TRANSDERMAL 24 HOURS TRANSDERMAL EVERY 24 HOURS
Qty: 30 PATCH | Refills: 0 | Status: SHIPPED | OUTPATIENT
Start: 2021-11-23 | End: 2021-12-17

## 2021-11-23 RX ORDER — LISINOPRIL 5 MG/1
5 TABLET ORAL DAILY
Qty: 30 TABLET | Refills: 3 | Status: SHIPPED | OUTPATIENT
Start: 2021-11-24 | End: 2022-03-28 | Stop reason: SDUPTHER

## 2021-11-23 RX ORDER — METOPROLOL SUCCINATE 50 MG/1
50 TABLET, EXTENDED RELEASE ORAL DAILY
Qty: 30 TABLET | Refills: 3 | Status: SHIPPED | OUTPATIENT
Start: 2021-11-23 | End: 2021-12-07

## 2021-11-23 RX ORDER — SPIRONOLACTONE 25 MG/1
25 TABLET ORAL DAILY
Qty: 30 TABLET | Refills: 3 | Status: SHIPPED | OUTPATIENT
Start: 2021-11-24 | End: 2022-04-12

## 2021-11-23 RX ORDER — FUROSEMIDE 20 MG/1
20 TABLET ORAL DAILY
Qty: 60 TABLET | Refills: 3 | Status: SHIPPED | OUTPATIENT
Start: 2021-11-24 | End: 2022-04-12

## 2021-11-23 RX ORDER — AMIODARONE HYDROCHLORIDE 200 MG/1
200 TABLET ORAL DAILY
Qty: 30 TABLET | Refills: 3 | Status: SHIPPED | OUTPATIENT
Start: 2021-11-23 | End: 2022-03-28 | Stop reason: SDUPTHER

## 2021-11-23 RX ORDER — METOPROLOL SUCCINATE 25 MG/1
25 TABLET, EXTENDED RELEASE ORAL ONCE
Status: COMPLETED | OUTPATIENT
Start: 2021-11-23 | End: 2021-11-23

## 2021-11-23 RX ADMIN — INSULIN LISPRO 1 UNITS: 100 INJECTION, SOLUTION INTRAVENOUS; SUBCUTANEOUS at 11:38

## 2021-11-23 RX ADMIN — CLOPIDOGREL BISULFATE 75 MG: 75 TABLET ORAL at 08:20

## 2021-11-23 RX ADMIN — SODIUM CHLORIDE, PRESERVATIVE FREE 10 ML: 5 INJECTION INTRAVENOUS at 08:24

## 2021-11-23 RX ADMIN — PANTOPRAZOLE SODIUM 40 MG: 40 TABLET, DELAYED RELEASE ORAL at 06:12

## 2021-11-23 RX ADMIN — FUROSEMIDE 20 MG: 20 TABLET ORAL at 08:20

## 2021-11-23 RX ADMIN — POTASSIUM CHLORIDE 20 MEQ: 1500 TABLET, EXTENDED RELEASE ORAL at 08:20

## 2021-11-23 RX ADMIN — SPIRONOLACTONE 25 MG: 25 TABLET ORAL at 08:20

## 2021-11-23 RX ADMIN — AMIODARONE HYDROCHLORIDE 200 MG: 200 TABLET ORAL at 08:21

## 2021-11-23 RX ADMIN — GABAPENTIN 100 MG: 100 CAPSULE ORAL at 08:20

## 2021-11-23 RX ADMIN — METOPROLOL SUCCINATE 25 MG: 25 TABLET, FILM COATED, EXTENDED RELEASE ORAL at 08:20

## 2021-11-23 RX ADMIN — SODIUM HYPOCHLORITE: 2.5 SOLUTION TOPICAL at 08:22

## 2021-11-23 RX ADMIN — LISINOPRIL 5 MG: 5 TABLET ORAL at 08:20

## 2021-11-23 RX ADMIN — METOPROLOL SUCCINATE 25 MG: 100 TABLET, EXTENDED RELEASE ORAL at 11:36

## 2021-11-23 RX ADMIN — APIXABAN 5 MG: 5 TABLET, FILM COATED ORAL at 11:36

## 2021-11-23 ASSESSMENT — PAIN SCALES - GENERAL: PAINLEVEL_OUTOF10: 0

## 2021-11-23 NOTE — PROGRESS NOTES
Discharge instructions reviewed with patient/significant other. Understanding verbalized. Pt given [employer] solution sent from [employer]. Cab requested for 3:45. Awaiting E to deliver equipment.

## 2021-11-23 NOTE — PROGRESS NOTES
Arina Lozano was evaluated today and a DME order was entered for a standard wheelchair because he requires this to successfully complete daily living tasks of eating, bathing, toileting, personal cares, ambulating, grooming, hygiene, dressing upper body, dressing lower body, meal preparation and taking own medications. A standard manual wheelchair is necessary due to patient's impaired ambulation and mobility restrictions and would be unable to resolve these daily living tasks using a cane or walker. The patient is capable of using a standard wheelchair safely in their home and can maneuver within their home with adequate access. There is a caregiver available to provide necessary assistance. The need for this equipment was discussed with the patient and he understands, is in agreement, and has not expressed an unwillingness to use the wheelchair.

## 2021-11-23 NOTE — DISCHARGE SUMMARY
Hospitalist discharge note      Patient:  Supriya Prasad    Unit/Bed:4A-03/003-A  YOB: 1955  MRN: [id number]   Acct: [de-identified]   PCP: Sylvester Schlatter, MD  Date of Admission: 11/18/2021    Assessment/Plan:    1. New onset atrial fibrillation with RVR-see management started on Lopressor as well as apixaban by cardiology  a. Patient with A. fib RVR overnight 11/19/2021.  b. Continue with amiodarone 200 mg twice daily and metoprolol. Patient has been in and out of atrial fibrillation overnight. Heart rate is pretty well controlled. Patient without chest pain or palpitations. c. Patient scheduled for left heart catheterization today for ischemic evaluation. Patient's been cleared by ID for infection standpoint. Antibiotics to be discontinued. d. Case was discussed extensively with cardiology. Patient their assistance.  e. Patient would likely benefit from outpatient sleep study evaluating for possible sleep apnea. 2. Peripheral arterial disease with nonhealing arterial ulceration:  a. Patient has previously undergone arteriogram with attempted recannulization 11/2020.  b. ABIs obtained in the ED showed mild stenosis of the proximal popliteal region on the right with occluded arteries on the left which appear chronic in nature. c. Continue ASA, Plavix, and statin  d. Podiatry following for management and wound care. 3. Stage III perineum pressure ulcer:   a. Antibiotics have been discontinued by ID. Suspect colonization rather than active infection. b. ID following. Appreciate their assistance. 4. Acute exacerbation of CHF, acute systolic  a. Compensated at discharge-had cardiac cath done on 11/22-only medical management per cardiologist-CHF clinic  5. Elevated troponin:   a. Patient undergo left heart catheterization for ischemic evaluation today given new onset atrial fibrillation.    Cardiac catheterization done on 11/8: Medical management  anticoagulated per cardiology-cardiology following. 6. Diabetes mellitus type 2:  a. A1c 7.1.  b. SSI and Accu-Cheks. c. Hypoglycemic orders in place. 7. Hypertension:   a. Continue home medications  8. COPD, not in acute exacerbation:   a. Continue home medications  9. CAD status post PCI:  a. Continue aspirin, statin, Plavix, and beta-blocker. ACE inhibitor held prior to left heart catheterization. 10. GERD:   a. Continue home Protonix    Stable for discharge today home  PCP in 1 week   cardiology scheduled as outpatient      Discharge time 35 minutes  Discharge  Chief Complaint:  Sacral wound    Hospital Course:    11/18: Patient was to the hospital from home with complaints of a foul-smelling wound on his left buttock region. Patient states has been there for several days and has noticed leaking/drainage from the site. He denies any pain, fevers, chills. 11/20: Overnight, patient noted of atrial fibrillation with RVR. Patient was asymptomatic and sleeping during this episode. He was transferred to cardiovascular unit and started on amiodarone due to borderline low blood pressures. Cardiology was consulted for ischemic evaluation. Echocardiogram obtained shows new reduced EF.            Medication List      START taking these medications    amiodarone 200 MG tablet  Commonly known as: CORDARONE  Take 1 tablet by mouth daily     apixaban 5 MG Tabs tablet  Commonly known as: ELIQUIS  Take 1 tablet by mouth 2 times daily     dapagliflozin 10 MG tablet  Commonly known as: Farxiga  Take 1 tablet by mouth every morning     furosemide 20 MG tablet  Commonly known as: LASIX  Take 1 tablet by mouth daily  Start taking on: November 24, 2021     metoprolol succinate 50 MG extended release tablet  Commonly known as: TOPROL XL  Take 1 tablet by mouth daily     nicotine 21 MG/24HR  Commonly known as: NICODERM CQ  Place 1 patch onto the skin every 24 hours     Sodium Hypochlorite 0.25 % Soln  Commonly known as: DAKINS  Irrigate with 473 mLs as directed daily USE ON THE WOUNDS AS DIRECTED     spironolactone 25 MG tablet  Commonly known as: ALDACTONE  Take 1 tablet by mouth daily  Start taking on: November 24, 2021        CHANGE how you take these medications    lisinopril 5 MG tablet  Commonly known as: PRINIVIL;ZESTRIL  Take 1 tablet by mouth daily  Start taking on: November 24, 2021  What changed:   · medication strength  · how much to take        CONTINUE taking these medications    acetaminophen 325 MG tablet  Commonly known as: TYLENOL  Notes to patient: As directed     aclidinium 400 MCG/ACT Aepb inhaler  Commonly known as: Tudorza Pressair  Inhale 1 each into the lungs 2 times daily     * albuterol (2.5 MG/3ML) 0.083% nebulizer solution  Commonly known as: PROVENTIL  Take 3 mLs by nebulization every 6 hours as needed for Wheezing. * albuterol sulfate  (90 Base) MCG/ACT inhaler  Commonly known as: ProAir HFA  Inhale 2 puffs into the lungs every 4 hours as needed for Wheezing     clopidogrel 75 MG tablet  Commonly known as: PLAVIX  take 1 tablet by mouth once daily     * cyclobenzaprine 10 MG tablet  Commonly known as: FLEXERIL  take 1 tablet by mouth three times a day if needed     * cyclobenzaprine 10 MG tablet  Commonly known as: Flexeril  Take 1 tablet by mouth every 8 hours as needed for Muscle spasms     gabapentin 100 MG capsule  Commonly known as: NEURONTIN  TAKE ONE CAPSULE BY MOUTH 3 TIMES A DAY     HYDROcodone-acetaminophen 5-325 MG per tablet  Commonly known as: Norco  Take 1 tablet by mouth every 6 hours as needed for Pain  WARNING: This medication may make you drowsy. Do not operate heavy machinery or motor vehicles during use. .     Lumbar Back Brace/Support Pad Misc  1 each by Does not apply route daily as needed. * naratriptan 2.5 MG tablet  Commonly known as: AMERGE  Take 1 tablet by mouth once as needed for Migraine for up to 1 dose.  2.5 mg at onset of headache, may repeat in 4 hours if needed     * naratriptan 2.5 MG tablet  Commonly known as: AMERGE  Take 1 tablet by mouth once as needed for Migraine for up to 1 dose. Nebulizer Compressor Kit  by Does not apply route. nitroGLYCERIN 0.4 MG SL tablet  Commonly known as: Nitrostat  Place 1 tablet under the tongue every 5 minutes as needed for Chest pain     pantoprazole 40 MG tablet  Commonly known as: Protonix  Take 1 tablet by mouth daily     Spiriva HandiHaler 18 MCG inhalation capsule  Generic drug: tiotropium  inhale the contents of one capsule in the handihaler once daily     Tens Unit Misc  by Does not apply route. Use as directed. traMADol 50 MG tablet  Commonly known as: ULTRAM  Take 1 tablet by mouth daily as needed for Pain. * traZODone 50 MG tablet  Commonly known as: DESYREL  take 1 tablet by mouth at bedtime     * traZODone 50 MG tablet  Commonly known as: DESYREL  take 1 tablet by mouth at bedtime         * This list has 8 medication(s) that are the same as other medications prescribed for you. Read the directions carefully, and ask your doctor or other care provider to review them with you.             STOP taking these medications    aspirin EC 81 MG EC tablet     metoprolol tartrate 25 MG tablet  Commonly known as: LOPRESSOR           Where to Get Your Medications      These medications were sent to [facility] , [address]  [address] [place], [place] [address]HAN[facility] [address]    Phone: [phone]   · amiodarone 200 MG tablet  · apixaban 5 MG Tabs tablet  · dapagliflozin 10 MG tablet  · furosemide 20 MG tablet  · lisinopril 5 MG tablet  · metoprolol succinate 50 MG extended release tablet  · nicotine 21 MG/24HR  · Sodium Hypochlorite 0.25 % Soln  · spironolactone 25 MG tablet         Intake/Output Summary (Last 24 hours) at 11/23/2021 1546  Last data filed at 11/23/2021 1248  Gross per 24 hour   Intake 790 ml   Output 250 ml   Net 540 ml       Diet:  ADULT DIET; Regular    Exam:  BP (!) 143/86   Pulse 96   Temp 98.4 °F (36.9 °C) (Oral)   Resp 16   Ht 6' 1\" (1.854 m)   Wt 243 lb 14.4 oz (110.6 kg)   SpO2 96%   BMI 32.18 kg/m²   General appearance: Elderly chronically ill-appearing male. Resting bed. No acute distress. HEENT: Pupils equal, round, and reactive to light. Conjunctivae/corneas clear. Neck: Supple, with full range of motion. No jugular venous distention. Trachea midline. Respiratory:  Normal respiratory effort. Clear to auscultation, bilaterally without Rales/Wheezes/Rhonchi. Cardiovascular: Irregularly irregular rhythm. Rate approximately 8090. No appreciable murmurs. Extremities are warm and well perfused. I am not able to palpate either lower extremity dorsalis pedal or posterior tibial pulses. Abdomen: Soft, non-tender, non-distended with normal bowel sounds. Musculoskeletal: passive and active ROM x 4 extremities. Skin: Skin color, texture, turgor normal.  No rashes or lesions. Stage III pressure ulcer noted to the patient's lateral left buttocks. Neurologic:  Neurovascularly intact without any focal sensory/motor deficits. Cranial nerves: II-XII intact, grossly non-focal.  Psychiatric: Alert and oriented, thought content appropriate, normal insight  Capillary Refill: Brisk,< 3 seconds   Peripheral Pulses: +2 palpable, equal bilaterally     Labs:   Recent Labs     11/21/21  0752 11/22/21  0344 11/23/21  0413   WBC 13.2* 11.9* 11.7*   HGB 11.5* 11.2* 11.3*   HCT 36.6* 36.9* 38.2*    403* 378     Recent Labs     11/21/21  0752 11/22/21  0344 11/23/21  0413    139 139   K 3.8 4.1 4.3   CL 97* 101 101   CO2 23 25 22*   BUN 9 10 10   CREATININE 0.9 1.1 1.1   CALCIUM 8.3* 8.3* 8.7     No results for input(s): AST, ALT, BILIDIR, BILITOT, ALKPHOS in the last 72 hours. No results for input(s): INR in the last 72 hours.   No results for input(s): [place] Gil in the last 72 hours.    Microbiology:    Blood culture #1:   Lab Results   Component Value Date    BC No growth-preliminary  11/18/2021       Blood culture #2:No results found for: [place]    Organism:No results found for: ORG    No results found for: LABGRAM    MRSA culture only:No results found for: [facility]    Urine culture: No results found for: LABURIN    Respiratory culture: No results found for: CULTRESP    Aerobic and Anaerobic :  No results found for: LABAERO  No results found for: LABANAE    Urinalysis:      Lab Results   Component Value Date    NITRU NEGATIVE 03/16/2017    WBCUA 2-4 03/16/2017    BACTERIA NONE 03/16/2017    RBCUA 0-2 03/16/2017    BLOODU NEGATIVE 03/16/2017    SPECGRAV 1.023 03/16/2017    GLUCOSEU NEGATIVE 01/20/2013       Radiology:  XR CHEST PORTABLE   Final Result   1. Normal heart size. No effusion seen. 2. Mildly increased interstitial markings scattered in the right lung and left lung base, consistent with mild interstitial pneumonitis/edema. 3. Overall appearance improved from prior. **This report has been created using voice recognition software. It may contain minor errors which are inherent in voice recognition technology. **      Final report electronically signed by Dr. Zaria Trivedi on 11/19/2021 10:30 PM      XR CHEST PORTABLE   Final Result   1. Borderline heart size. No effusion. 2. Mildly increased initial markings left lung base and throughout the right lung, consistent with interstitial pneumonia/edema. Central pulmonary vascular markings are prominent, suggesting possible low-grade or incipient heart failure. **This report has been created using voice recognition software. It may contain minor errors which are inherent in voice recognition technology. **      Final report electronically signed by Dr. Zaria Trivedi on 11/19/2021 12:23 AM      VL DUP LOWER EXTREMITY ARTERIES BILATERAL   Final Result   1.  Findings suggestive of mild stenosis in the proximal popliteal region right side. Good pulsatility is seen in the right posterior tibial and peroneal arteries. 2. The patient's femoral/tibial bypass graft left side is known to be totally occluded and could not be visualized on today's study. . Severely dampened pulsatility in the left common femoral artery, suggesting moderate-severe inflow stenosis. Total    occlusion left SFA, popliteal artery, as well as the peroneal and posterior tibial arteries. Recanalized flow is seen in the left anterior tibial artery with severely dampened. 3. Despite the findings mentioned above, patient denies any problems with the left leg. **This report has been created using voice recognition software. It may contain minor errors which are inherent in voice recognition technology. **      Final report electronically signed by Dr. Ruma Griffin on 11/18/2021 10:07 PM        XR CHEST PORTABLE    Result Date: 11/19/2021  PROCEDURE: XR CHEST PORTABLE CLINICAL INFORMATION: SOB COMPARISON: 3/16/2017 TECHNIQUE: A single mobile view of the chest was obtained. 1. Borderline heart size. No effusion. 2. Mildly increased initial markings left lung base and throughout the right lung, consistent with interstitial pneumonia/edema. Central pulmonary vascular markings are prominent, suggesting possible low-grade or incipient heart failure. **This report has been created using voice recognition software. It may contain minor errors which are inherent in voice recognition technology. ** Final report electronically signed by Dr. Ruma Griffin on 11/19/2021 12:23 AM    VL DUP LOWER EXTREMITY ARTERIES BILATERAL    Result Date: 11/18/2021  PROCEDURE: VL DUP LOWER EXTREMITY ARTERIES BILATERAL CLINICAL INFORMATION: Patient came in for wound on buttock. ER unable to palpate DP and PT pulses - patient states his legs are at their baseline. . Patient has a left fem-tib graft. Occluded per US 11/5/20-, performed at outside institution.  COMPARISON: No prior study. TECHNIQUE: Multiple permanent grayscale and color flow sonographic images of the major arteries of both lower extremities were obtained from the level of the groin to the level of the ankle . Spectral Doppler waveforms were obtained and velocity measurements were measured. FINDINGS: RIGHT ARTERY (PSV cm/sec) ? ??????????????????????????????????? CFA ---------------> 168 PSV cm/sec PROF -------------> 129 PSV cm/sec SFA PROX ------->108 PSV cm/sec SFA MID ---------->53 PSV cm/sec SFA DIST -------->68 PSV cm/sec POP A PROX --->26 PSV cm/sec POP A DIST ---->48 PSV cm/sec PTA --------------->52 PSV cm/sec PERONEAL ----->41 PSV cm/sec ELYSIA ----------------> 20 PSV cm/sec LEFT ARTERY (PSV cm/sec) ? ??????????????????????? CFA ---------------> 31 PSV cm/sec PROF -------------> 90 PSV cm/sec SFA PROX ------->14 PSV cm/sec SFA MID ---------->occluded SFA DIST -------->occluded POP A PROX --->occluded POP A DIST ---->occluded PTA --------------->occluded PERONEAL ----->occluded ELYSIA ----------------> 8 PSV cm/sec VELOCITY MEASUREMENTS: ABIs and not performed due to patient's refusal. RIGHT LEG: Excellent pulsatility is seen in the common femoral artery, profunda, and throughout the SFA. Moderate multifocal atherosclerotic plaque is scattered throughout the SFA and in the common femoral artery. There is a small plaque in the right common femoral artery demonstrates mild mobility. There appears be moderately dampened pulsatility in the proximal popliteal artery. Good pulsatility seen distally in the popliteal artery. Good pulsatility is seen in the posterior limb peroneal arteries. Moderately dampened pulsatility in the right anterior tibial artery. LEFT LEG: Severely dampened pulsatility is seen in the left common femoral artery, indicative of significant inflow stenosis. There is more than doubling of velocity in the left profunda, suggesting at least 50% stenosis at the origin of the profunda. Severely dampened pulsatility is also seen in the proximal SFA. The mid and distal SFA is totally occluded. The popliteal artery is also totally occluded as are the posterior tibial and peroneal arteries. Pulsatile flow is seen in the left anterior tibial artery but is severely dampened. The femoral-tibial bypass graft could not be seen sonographically. 1. Findings suggestive of mild stenosis in the proximal popliteal region right side. Good pulsatility is seen in the right posterior tibial and peroneal arteries. 2. The patient's femoral/tibial bypass graft left side is known to be totally occluded and could not be visualized on today's study. . Severely dampened pulsatility in the left common femoral artery, suggesting moderate-severe inflow stenosis. Total occlusion left SFA, popliteal artery, as well as the peroneal and posterior tibial arteries. Recanalized flow is seen in the left anterior tibial artery with severely dampened. 3. Despite the findings mentioned above, patient denies any problems with the left leg. **This report has been created using voice recognition software. It may contain minor errors which are inherent in voice recognition technology. ** Final report electronically signed by Dr. Florentin Hudson on 11/18/2021 10:07 PM      Electronically signed by Stephanie Lomeli MD on 11/23/2021 at 3:46 PM

## 2021-11-23 NOTE — PROGRESS NOTES
Cardiology Progress Note      Patient:  Fabiano Gomes  YOB: 1955  MRN: [id number]   Acct: [de-identified]  Admit Date:  11/18/2021  Primary Cardiologist: [facility] cardiology- not seen for 5 years or so   Seen by Dr. Katerin Coulter    Per prior cardiology consult note-  Reason for Consultation:  LV dysfunction, Acute CHF exacerbation        History Of Present Illness:    61 y.o.  male with history of CAD status post PCI, PAD, COPD, smoker, hypertension, hyperlipidemia, seizure disorder who initially presented to the hospital with complaints of perineal pressure ulcer. According to the spouse there is a foul-smelling discharge from the ulcer. He also has multiple ulcers in lower extremities. Patient has been to multiple podiatrists in the past, who have told him that he needs bilateral below the knee amputation due to severe PAD. Currently patient is on IV antibiotics. Patient does report some shortness of breath and orthopnea, has elevated proBNP and congestive pattern on the chest x-ray. He was given 1 dose of Lasix. He underwent echocardiogram yesterday which showed ejection fraction of 35%. His prior echocardiogram from 2017 shows ejection fraction of 55%. Patient reports that he has followed up with cardiology at [facility] and underwent multiple stents there several years ago. He has not followed up with any cardiologist since that. Electrocardiogram shows atrial fibrillation with RVR at a rate of 137 bpm.  Troponin was mildly elevated 0.048.     Subjective (Events in last 24 hours):     Pt sitting at bedside   States \"I feel great - ready to go home\"    Tele PAF RVR to SR -- pt denies palpitations - dizziness - chest pain     BP stable     ID to see today for Pressure ulcers - give recs      11/22/2021  Pt at bedside - states \"im feeling great today - ready to go home\"     Discussed the need for cath -   Discussed pt home status - he states they were homeless for 2 months but are living in an apartment at present and he is taking his medications and he will take his medications and follow with us     He understand that he has been told he will likely need leg amputations in the future but for now hes doing ok    Tele SR - occ PAFB RVR   bp stable     Discussed the need for possible ICD in future and the importance of taking care of his heart and taking meds and following up with cardiology to avoid this       Pt agrees to cardiac cath today         11/23/21  Pt in bed - he looks good   He states he is felling great and ready to leave     VSS  Tele SR w/ PVC's HR 90  BP stable       Discussed cath results and followups -- he agrees to this       Objective:   /65   Pulse 96   Temp 99.4 °F (37.4 °C) (Oral)   Resp 16   Ht 6' 1\" (1.854 m)   Wt 243 lb 14.4 oz (110.6 kg)   SpO2 92%   BMI 32.18 kg/m²        TELEMETRY: SR- occ PVC's HR 90    Physical Exam:  General Appearance: alert and oriented to person, place and time, in no acute distress  Cardiovascular: regular rhythm, normal S1 and S2, no murmurs, rubs, clicks, or gallops, distal pulses intact,  Pulmonary/Chest: clear upper - diminished lower to auscultation bilaterally- no wheezes, rales or rhonchi, normal air movement, no respiratory distress  Abdomen: soft, non-tender, non-distended, normal bowel sounds, no masses Extremities: no cyanosis, clubbing or edema, pulses weak    Musculoskeletal: normal range of motion, no joint swelling, deformity or tenderness  Neurological: alert, oriented, normal speech, no focal findings or movement disorder noted    Medications:    furosemide  20 mg Oral Daily    spironolactone  25 mg Oral Daily    lisinopril  5 mg Oral Daily    sodium chloride flush  5-40 mL IntraVENous 2 times per day    metoprolol succinate  25 mg Oral BID    amiodarone  200 mg Oral BID    potassium chloride  20 mEq Oral BID WC    Sodium Hypochlorite   Irrigation Daily    calcium replacement protocol   Other RX Placeholder    insulin lispro  0-6 Units SubCUTAneous TID     insulin lispro  0-3 Units SubCUTAneous Nightly    enoxaparin  1 mg/kg SubCUTAneous Q12H    nicotine  1 patch TransDERmal Daily    clopidogrel  75 mg Oral Daily    gabapentin  100 mg Oral TID    pantoprazole  40 mg Oral Daily      sodium chloride      dextrose       sodium chloride flush, 5-40 mL, PRN  sodium chloride, 25 mL, PRN  acetaminophen, 650 mg, Q4H PRN  albuterol sulfate HFA, 2 puff, Q6H PRN  potassium chloride, 40 mEq, PRN   Or  potassium alternative oral replacement, 40 mEq, PRN   Or  potassium chloride, 10 mEq, PRN  potassium chloride, 10 mEq, PRN  cyclobenzaprine, 10 mg, Q8H PRN  traZODone, 50 mg, Nightly PRN  glucose, 15 g, PRN  dextrose, 12.5 g, PRN  glucagon (rDNA), 1 mg, PRN  dextrose, 100 mL/hr, PRN  polyethylene glycol, 17 g, Daily PRN  acetaminophen, 650 mg, Q6H PRN        Diagnostics:    Echo:   Electronically signed by Zoe Huddleston MD (Interpreting   physician) on 11/19/2021 at 04:19 PM   ----------------------------------------------------------------      Findings      Mitral Valve   Trace mitral regurgitation is present. Aortic Valve   The aortic valve leaflets were not well visualized. Aortic valve appears tricuspid. Aortic valve leaflets are somewhat thickened. Tricuspid Valve   Trivial tricuspid regurgitation visualized. Pulmonic Valve   The pulmonic valve was not well visualized . Trivial pulmonic regurgitation visualized. Left Atrium   Left atrial size was normal.      Left Ventricle   Ejection fraction is visually estimated at 35%. There was moderate global hypokinesis of the left ventricle. Right Atrium   Right atrial size was normal.      Right Ventricle   The right ventricular size was normal with normal systolic function and   wall thickness. Pericardial Effusion   The pericardium was normal in appearance with no evidence of a pericardial   effusion.       Pleural Effusion   No evidence of pleural effusion. Aorta / Great Vessels   -Aortic root dimension within normal limits.   -The Pulmonary artery is within normal limits. -IVC size is within normal limits with normal respiratory phasic changes. Left Heart Cath:   FINDINGS:  LEFT VENTRICULOGRAM:  Moderate global hypokinesis was noticed. Ejection  fraction estimated at 35% to 40%.     HEMODYNAMIC RESULTS:  Left ventricular end-diastolic pressure 21 mmHg. No significant pressure gradient across the aortic valve upon pullback.     Fractional flow reserve measurement of the left anterior descending  artery baseline 0.93, at maximal hyperemia 0.91.     CORONARY ANGIOGRAM:  1. Left main coronary artery:  Distal left main coronary artery has 10%  to 20% stenosis. It bifurcates into left circumflex and left anterior  descending artery. 2.  Left circumflex artery:  Proximal LCX with 30% stenosis. Mid  segment of left circumflex artery has chronic total occlusion. The  distal part of the vessel fills via left-to-left collaterals from the  LAD. 3.  Left anterior descending artery:  Proximal LAD is patent with mild  luminal irregularities. D1 is a small vessel with mild diffuse disease. D2 is a large vessel that is patent with mild luminal irregularities. D3 is a small vessel with an ostial 60% to 70% stenosis. Mid segment of  LAD has a 50% lesion just after D2. The stent in mid LAD has mild 30%  in-stent restenosis. Distal LAD with luminal irregularities. 4.  Right coronary artery:  Dominant vessel. Proximal RCA has chronic  total occlusion. The distal part of the RCA fills faintly via bridging  collaterals with RPL and RPDA receiving collaterals from left-to-right  as mentioned above. IMPRESSION:  1. 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 2.  Chronic total occlusion of the mid left circumflex artery. 3.  Distal RCA and distal left circumflex artery receives collateral  flow.   4.  50% intermediate stenosis of mid segment of left anterior descending  artery. Hemodynamically insignificant by FFR measurement, FFR 0.91.  5.  Moderate ischemic cardiomyopathy, ejection fraction 35% to 40%     RECOMMENDATIONS:  Aggressive risk factor modification and optimal  medical management for coronary artery disease. Optimize antianginal  medications. Guideline-directed medical therapy for congestive heart  failure with reduced ejection fraction. The patient also was diagnosed  with atrial fibrillation during this hospitalization, will need  anticoagulation and good control of AFib. At this time, continue  aggressive medical therapy for coronary artery disease. Should the  patient develop symptoms resistant to medical therapy, may consider  referral for coronary artery bypass graft surgery in the future. Outpatient followup with Cardiology.           Rene Jacobsen MD  D: 11/22/2021       Lab Data:    Cardiac Enzymes:  No results for input(s): CKTOTAL, CKMB, CKMBINDEX, TROPONINI in the last 72 hours.     CBC:   Lab Results   Component Value Date    WBC 11.7 11/23/2021    RBC 4.36 11/23/2021    RBC 5.49 02/02/2012    HGB 11.3 11/23/2021    HCT 38.2 11/23/2021     11/23/2021       CMP:    Lab Results   Component Value Date     11/23/2021    K 4.3 11/23/2021    K 3.6 11/18/2021     11/23/2021    CO2 22 11/23/2021    BUN 10 11/23/2021    CREATININE 1.1 11/23/2021    GFRAA >60 03/18/2021    LABGLOM 67 11/23/2021    GLUCOSE 128 11/23/2021    GLUCOSE 117 02/02/2012    CALCIUM 8.7 11/23/2021       Hepatic Function Panel:    Lab Results   Component Value Date    ALKPHOS 81 11/18/2021    ALT 11 11/18/2021    AST 16 11/18/2021    PROT 7.7 11/18/2021    BILITOT 0.3 11/18/2021    BILIDIR <0.2 11/18/2021    LABALBU 3.7 11/18/2021    LABALBU 4.7 02/02/2012       Magnesium:    Lab Results   Component Value Date    MG 1.8 11/20/2021       PT/INR:    Lab Results   Component Value Date    PROTIME 12.3 11/17/2020    INR 1.07 11/17/2020       HgBA1c:    Lab Results   Component Value Date    LABA1C 7.1 11/20/2021       FLP:    Lab Results   Component Value Date    TRIG 116 11/20/2021    HDL 25 11/20/2021    LDLCALC 48 11/20/2021       TSH:    Lab Results   Component Value Date    TSH 2.090 11/20/2021         Assessment:    Perineal pressure ulcer -- ID note seen      New onset AFB RVR of unknown duration   Mostly SR - occ AFB RVR    [id number]- at least 3     New CDMP EF 35% (last echo here 2017 55% EF)  ? CAD or tachy induced    Acute diastolic chf w/ new EF 52%- resolving     Mild elevated trop - demand ischemia     S\p cardiac cath 11/22/2021:  1. 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 2.  Chronic total occlusion of the mid left circumflex artery. 3.  Distal RCA and distal left circumflex artery receives collateral  flow. 4.  50% intermediate stenosis of mid segment of left anterior descending  artery.   Hemodynamically insignificant by FFR measurement, FFR 0.91.  5.  Moderate ischemic cardiomyopathy, ejection fraction 35% to 40%         Unknown PAD - pt states stents and LT nonfunctional fem-pop @ [facility]   knows may need amputation in furture         HLP   LDL 48  HTN - stable     Current smoker -- cessation discussed     Noncompliance with MD follow-ups / ? Medications       Plan:  · Ok to DC from cardiology   · chf clinic referral   · farxiga free   · noac pricing per pharmacy - eliquis free         CHF guidelines:  BB: yes  ACE / ARB/ entresto:yes  Diuretics: yes  Aldactone: yes  Dorethia Lombard: free    Weigh yourself daily: Should you gain 2-3 pounds in 1 days time or 3-5 pounds in 2 days time-- call our chf office [phone]) for further recommendations    Salt restriction 2 gm ( 2,000 mg) daily   Fluid restriction 2 L daily      Electronically signed by JAVAN Koehler CNP on 11/23/2021 at 8:41 AM

## 2021-11-23 NOTE — PROGRESS NOTES
Educated patient regarding heart failure management by explaining the importance of obtaining daily weights at the same time every day with approximately the same amount of clothing, how to recognize symptoms, low sodium diet and taking prescribed medications as ordered. Patient was given our heart failure booklet, new patient appointment scheduled in OP CHF clinic . Patient was receptive to the education given and verbalized understanding.  Wife at bedside at time of education

## 2021-11-23 NOTE — FLOWSHEET NOTE
Encouragement      11/23/21 1534   Encounter Summary   Services provided to: Patient   Referral/Consult From: [facility] Significant other   Continue Visiting No  (11/23)   Complexity of Encounter Low   Length of Encounter 15 minutes   Routine   Type Initial   Assessment Approachable; Calm   Intervention Nurtured hope;  Active listening   Outcome Acceptance; Encouraged

## 2021-11-23 NOTE — CARE COORDINATION
Radha Joliet requires a bedside commode due to being confined to one level of the home, and is physically incapable of utilizing regular toilet facilities. Current body weight is Weight: 243 lb 14.4 oz (110.6 kg).

## 2021-11-23 NOTE — PLAN OF CARE
Problem: Falls - Risk of:  Goal: Will remain free from falls  Description: Will remain free from falls  Outcome: Ongoing  Goal: Absence of physical injury  Description: Absence of physical injury  Outcome: Ongoing     Problem: Skin Integrity:  Goal: Will show no infection signs and symptoms  Description: Will show no infection signs and symptoms  Outcome: Ongoing  Goal: Absence of new skin breakdown  Description: Absence of new skin breakdown  Outcome: Ongoing   Care plan reviewed with patient and s/o. Patient and s/o verbalize understanding of the plan of care and contribute to goal setting.

## 2021-11-23 NOTE — CARE COORDINATION
11/23/21, 2:18 PM EST    DISCHARGE PLANNING EVALUATION      Nurse reports pt needing a cab home. SW met with pt and girlfriend again, reports pt's step son vehicle broke this morning. Pt reports he cannot pay for cab. SW did give nurse cab transport form, she will fax closer to discharge time.

## 2021-11-23 NOTE — PROGRESS NOTES
ID Progress Note    Patient - Renan Child,  Age - 61 y.o.    - 1955      Room Number - 4A-03/003-A   MRN -  [id number]   Acct # - [de-identified]  Date of Admission -  2021  5:52 PM    SUBJECTIVE:   No new issues. Feeling good. Would like to go home. OBJECTIVE   VITALS    height is 6' 1\" (1.854 m) and weight is 243 lb 14.4 oz (110.6 kg). His oral temperature is 99.4 °F (37.4 °C). His blood pressure is 134/65 and his pulse is 96. His respiration is 16 and oxygen saturation is 92%. Wt Readings from Last 3 Encounters:   21 243 lb 14.4 oz (110.6 kg)   17 224 lb 1.6 oz (101.7 kg)   17 265 lb (120.2 kg)       I/O (24 Hours)    Intake/Output Summary (Last 24 hours) at 2021 1471  Last data filed at 2021 0330  Gross per 24 hour   Intake 310 ml   Output 750 ml   Net -440 ml       General Appearance  Awake, alert, oriented,  not  In acute distress  HEENT - normocephalic, atraumatic, pink conjunctiva,  anicteric sclera  Neck - Supple, no mass  Lungs -  Bilateral air entry, diminished breath sounds  Cardiovascular - Heart sounds are normal.  Regular rate and rhythm without murmur, gallop or rub. Abdomen - soft, not distended, nontender, no organomegally,  Neurologic - Alert and oriented. No focal deficit. Skin - 8 x 8 cm left gluteal wound.   Extremities - No edema, no cyanosis                            MEDICATIONS:      [START ON 2021] metoprolol succinate  50 mg Oral Daily    metoprolol succinate  25 mg Oral Once    furosemide  20 mg Oral Daily    spironolactone  25 mg Oral Daily    lisinopril  5 mg Oral Daily    sodium chloride flush  5-40 mL IntraVENous 2 times per day    amiodarone  200 mg Oral BID    potassium chloride  20 mEq Oral BID WC    Sodium Hypochlorite   Irrigation Daily    calcium replacement protocol   Other RX Placeholder    insulin lispro  0-6 Units SubCUTAneous TID WC    insulin lispro  0-3 Units SubCUTAneous Nightly    enoxaparin  1 mg/kg SubCUTAneous Q12H    nicotine  1 patch TransDERmal Daily    clopidogrel  75 mg Oral Daily    gabapentin  100 mg Oral TID    pantoprazole  40 mg Oral Daily      sodium chloride      dextrose       sodium chloride flush, sodium chloride, acetaminophen, albuterol sulfate HFA, potassium chloride **OR** potassium alternative oral replacement **OR** potassium chloride, potassium chloride, cyclobenzaprine, traZODone, glucose, dextrose, glucagon (rDNA), dextrose, polyethylene glycol, [DISCONTINUED] acetaminophen **OR** acetaminophen  LABS:   CBC   Recent Labs     11/23/21 0413   WBC 11.7*   HGB 11.3*   HCT 38.2*   MCV 87.6        BMP:   Recent Labs     11/23/21 0413      K 4.3      CO2 22*   BUN 10   CREATININE 1.1   GLUCOSE 128*     LIVER PROFILE No results for input(s): AST, ALT, LIPASE, BILIDIR, BILITOT, ALKPHOS in the last 72 hours. Invalid input(s): AMYLASE,  ALB  INR No results for input(s): INR in the last 72 hours. PTT   Lab Results   Component Value Date    APTT 62.6 (H) 11/17/2020       CULTURES:   UA: No results for input(s): SPECGRAV, PHUR, COLORU, CLARITYU, MUCUS, PROTEINU, BLOODU, RBCUA, WBCUA, BACTERIA, NITRU, GLUCOSEU, BILIRUBINUR, UROBILINOGEN, KETUA, LABCAST, LABCASTTY, AMORPHOS in the last 72 hours. Invalid input(s): CRYSTALS  Micro:   Lab Results   Component Value Date    BC No growth-preliminary  11/18/2021         Impression and Recommendation:   -Nonhealing left gluteal wound: will apply Dakin quarter strength to the wound daily and as needed  -Nonhealing chronic leg wound: will apply Dakin solution to keep the wounds clean. -Advised patient to wash his legs with soap and water and put moisturizing cream.   -Discussed that on offloading to help the gluteal wound.  -His wounds are chronic and likely colonized - low suspicion for cellulitis: No need for antibiotic treatment  -Ok with discharge.   Advised against smoking.  -Follow-up at the wound clinic with [place] CNP.  I am not in net work with his insurance      Fabiana Monahan MD 11/23/2021 9:21 AM

## 2021-11-23 NOTE — CARE COORDINATION
11/23/21, 2:16 PM EST    Patient goals/plan/ treatment preferences discussed by  and . Patient goals/plan/ treatment preferences reviewed with patient/ family. Patient/ family verbalize understanding of discharge plan and are in agreement with goal/plan/treatment preferences. Understanding was demonstrated using the teach back method. AVS provided by RN at time of discharge, which includes all necessary medical information pertaining to the patients current course of illness, treatment, post-discharge goals of care, and treatment preferences. Pt to be discharged to home with sig other. New DME of bedside commode and wheelchair. He denies any other needs or services.

## 2021-11-24 ENCOUNTER — TELEPHONE (OUTPATIENT)
Dept: WOUND CARE | Age: 66
End: 2021-11-24

## 2021-11-24 LAB
BLOOD CULTURE, ROUTINE: NORMAL
BLOOD CULTURE, ROUTINE: NORMAL

## 2021-11-30 ENCOUNTER — TELEPHONE (OUTPATIENT)
Dept: WOUND CARE | Age: 66
End: 2021-11-30

## 2021-11-30 NOTE — TELEPHONE ENCOUNTER
Referral received, called patient to schedule him. Offered patient 12/10/21, patient needed an appointment after 12/14/21. Offered patient 12/16/21 at 8 am. Patient requested afternoon. Offered patient 12/17/21 at 1pm, patient accepted with appointment time and day.

## 2021-12-07 ENCOUNTER — OFFICE VISIT (OUTPATIENT)
Dept: CARDIOLOGY CLINIC | Age: 66
End: 2021-12-07
Payer: COMMERCIAL

## 2021-12-07 VITALS
HEIGHT: 73 IN | BODY MASS INDEX: 32.87 KG/M2 | HEART RATE: 88 BPM | WEIGHT: 248 LBS | OXYGEN SATURATION: 96 % | SYSTOLIC BLOOD PRESSURE: 122 MMHG | DIASTOLIC BLOOD PRESSURE: 70 MMHG

## 2021-12-07 DIAGNOSIS — I50.22 CHRONIC SYSTOLIC CONGESTIVE HEART FAILURE (HCC): ICD-10-CM

## 2021-12-07 DIAGNOSIS — I50.22 CHRONIC SYSTOLIC CHF (CONGESTIVE HEART FAILURE), NYHA CLASS 2 (HCC): Primary | ICD-10-CM

## 2021-12-07 LAB
ANION GAP SERPL CALCULATED.3IONS-SCNC: 13 MEQ/L (ref 8–16)
BUN BLDV-MCNC: 15 MG/DL (ref 7–22)
CALCIUM SERPL-MCNC: 9.6 MG/DL (ref 8.5–10.5)
CHLORIDE BLD-SCNC: 98 MEQ/L (ref 98–111)
CO2: 23 MEQ/L (ref 23–33)
CREAT SERPL-MCNC: 0.9 MG/DL (ref 0.4–1.2)
GFR SERPL CREATININE-BSD FRML MDRD: 85 ML/MIN/1.73M2
GLUCOSE BLD-MCNC: 126 MG/DL (ref 70–108)
POTASSIUM SERPL-SCNC: 4.9 MEQ/L (ref 3.5–5.2)
SODIUM BLD-SCNC: 134 MEQ/L (ref 135–145)

## 2021-12-07 PROCEDURE — 3017F COLORECTAL CA SCREEN DOC REV: CPT | Performed by: PHYSICIAN ASSISTANT

## 2021-12-07 PROCEDURE — 99214 OFFICE O/P EST MOD 30 MIN: CPT | Performed by: PHYSICIAN ASSISTANT

## 2021-12-07 PROCEDURE — 4004F PT TOBACCO SCREEN RCVD TLK: CPT | Performed by: PHYSICIAN ASSISTANT

## 2021-12-07 PROCEDURE — G8484 FLU IMMUNIZE NO ADMIN: HCPCS | Performed by: PHYSICIAN ASSISTANT

## 2021-12-07 PROCEDURE — G8427 DOCREV CUR MEDS BY ELIG CLIN: HCPCS | Performed by: PHYSICIAN ASSISTANT

## 2021-12-07 PROCEDURE — 1111F DSCHRG MED/CURRENT MED MERGE: CPT | Performed by: PHYSICIAN ASSISTANT

## 2021-12-07 PROCEDURE — G8417 CALC BMI ABV UP PARAM F/U: HCPCS | Performed by: PHYSICIAN ASSISTANT

## 2021-12-07 PROCEDURE — 36415 COLL VENOUS BLD VENIPUNCTURE: CPT | Performed by: PHYSICIAN ASSISTANT

## 2021-12-07 RX ORDER — ISOSORBIDE MONONITRATE 30 MG/1
30 TABLET, EXTENDED RELEASE ORAL DAILY
COMMUNITY
End: 2022-04-12

## 2021-12-07 RX ORDER — METOPROLOL SUCCINATE 25 MG/1
25 TABLET, EXTENDED RELEASE ORAL DAILY
Qty: 30 TABLET | Refills: 3 | Status: SHIPPED | OUTPATIENT
Start: 2021-12-07 | End: 2022-04-25 | Stop reason: SDUPTHER

## 2021-12-07 RX ORDER — PENTOXIFYLLINE 400 MG/1
400 TABLET, EXTENDED RELEASE ORAL
COMMUNITY
End: 2022-06-10

## 2021-12-07 RX ORDER — ATORVASTATIN CALCIUM 40 MG/1
40 TABLET, FILM COATED ORAL DAILY
COMMUNITY

## 2021-12-07 NOTE — PATIENT INSTRUCTIONS
For some reason toprol xl was not prescribed - add toprol xl 25 daily  BMP today in office  Order lifevest  F/up 6 weeks  Repeat limited echo 2/18/22 to reevaluate EF

## 2021-12-07 NOTE — PROGRESS NOTES
Venipuncture obtained from [place] [facility]. Patient tolerated procedure without complications or complaints.

## 2021-12-07 NOTE — PROGRESS NOTES
Heart Failure Clinic       Visit Date: 12/7/2021  Cardiologist:  Dr. Grace Rosa  Primary Care Physician: Dr. Azalea Collins MD    Pepper Morales is a 61 y.o. male who presents today for:  Chief Complaint   Patient presents with    New Patient    Congestive Heart Failure       HPI:   Pepper Morales is a 61 y.o. male who presents to the office for a follow a new patient visit in the heart failure clinic. Accompanied by girlfriend    TYPE HF: chronic systolic CHF   Cause: ICMP  Device: no    Dry Wt:  Not able to stand well due to injury to spine in past, fell off roof long ago around age [age]    Hospitalization:  11/18/21 - 11/23/21  Presented to ED for evaluation of wounds. Pt treated for new afib rvr, PAD with nonhealing arterial ulceration, stage III perineum pressure ulcer, acute systolic CHF, s/p cath with CAD, DM, HTN, COPD, GERD    Concerns today: overall doing well since discharge from hospital. No complaints  Activity: unable to ambulate due he hx spine injury, uses wheelchair  Diet: tries to avoid salt, makes him dizzy.   Drinks 20 oz pop daily, 1 cup coffee daily, and <1 bottle water daily    Patient has:  Chest Pain: no   SOB: not at rest  Orthopnea/PND: no  PETER: never tested, pt doesn't want it done  Edema: no  Fatigue: no  Abdominal bloating: no  Cough: yes, chronic with white sputum production  Appetite: good  Home weight: unable to weigh due to spine injury in past  Home blood pressure: doesn't check    Past Medical History:   Diagnosis Date    CAD (coronary artery disease)     COPD (chronic obstructive pulmonary disease) ([address]     Hx of blood clots 1996    LEFT LEG    Hyperlipidemia     Hypertension     Leg wound, left     Leg wound, right     Lumbar radiculopathy     Osteoarthritis     Seizure disorder ([address]     Spinal stenosis, lumbar      Past Surgical History:   Procedure Laterality Date    ANKLE SURGERY      ball joint   [address]    CARDIAC CATHETERIZATION  2005    1 STENT PLACED    CARDIAC [facility]/13    [facility]    CORONARY ANGIOPLASTY WITH STENT PLACEMENT      DIAGNOSTIC CARDIAC CATH LAB PROCEDURE  11/18/2021    FOREARM SURGERY      left    FRACTURE SURGERY Right 2000    LEG    KNEE SURGERY      left    LEG SURGERY      LUMBAR SPINE SURGERY      TOE SURGERY      left middle toe    TYMPANOSTOMY TUBE PLACEMENT  1979     Family History   Problem Relation Age of Onset    Diabetes Mother     Kidney Disease Mother     COPD Father     Heart Disease Father     Cancer Father         bone    Stroke Father     Heart Disease Sister     Diabetes Brother      Social History     Tobacco Use    Smoking status: Current Some Day Smoker     Packs/day: 0.50     Years: 40.00     Pack years: 20.00     Types: Cigarettes    Smokeless tobacco: Never Used   Substance Use Topics    Alcohol use: No     Alcohol/week: 0.0 standard drinks     Current Outpatient Medications   Medication Sig Dispense Refill    atorvastatin (LIPITOR) 40 MG tablet Take 40 mg by mouth daily      pentoxifylline (TRENTAL) 400 MG extended release tablet Take 400 mg by mouth 3 times daily (with meals)      isosorbide mononitrate (IMDUR) 30 MG extended release tablet Take 30 mg by mouth daily      metFORMIN (GLUCOPHAGE) 1000 MG tablet Take 1,000 mg by mouth 2 times daily (with meals)      amiodarone (CORDARONE) 200 MG tablet Take 1 tablet by mouth daily 30 tablet 3    lisinopril (PRINIVIL;ZESTRIL) 5 MG tablet Take 1 tablet by mouth daily 30 tablet 3    furosemide (LASIX) 20 MG tablet Take 1 tablet by mouth daily 60 tablet 3    spironolactone (ALDACTONE) 25 MG tablet Take 1 tablet by mouth daily 30 tablet 3    dapagliflozin (FARXIGA) 10 MG tablet Take 1 tablet by mouth every morning 90 tablet 1    apixaban (ELIQUIS) 5 MG TABS tablet Take 1 tablet by mouth 2 times daily 60 tablet 3    Sodium Hypochlorite (DAKINS) 0.25 % SOLN Irrigate with 473 mLs as directed daily USE ON THE WOUNDS AS DIRECTED 473 mL 0    nicotine (NICODERM CQ) 21 MG/24HR Place 1 patch onto the skin every 24 hours 30 patch 0    clopidogrel (PLAVIX) 75 MG tablet take 1 tablet by mouth once daily 30 tablet 5    albuterol sulfate HFA (PROAIR HFA) 108 (90 BASE) MCG/ACT inhaler Inhale 2 puffs into the lungs every 4 hours as needed for Wheezing 1 Inhaler 5    gabapentin (NEURONTIN) 100 MG capsule TAKE ONE CAPSULE BY MOUTH 3 TIMES A DAY 90 capsule 5    pantoprazole (PROTONIX) 40 MG tablet Take 1 tablet by mouth daily 30 tablet 5    traZODone (DESYREL) 50 MG tablet take 1 tablet by mouth at bedtime 30 tablet 3    nitroGLYCERIN (NITROSTAT) 0.4 MG SL tablet Place 1 tablet under the tongue every 5 minutes as needed for Chest pain 25 tablet 3    Elastic Bandages & Supports (LUMBAR BACK BRACE/SUPPORT PAD) MISC 1 each by Does not apply route daily as needed. 1 each 0    Tens Unit MISC by Does not apply route. Use as directed. 1 each 0    Respiratory Therapy Supplies (NEBULIZER COMPRESSOR) KIT by Does not apply route. 1 kit 0    acetaminophen (TYLENOL) 325 MG tablet Take 650 mg by mouth every 6 hours as needed.  cyclobenzaprine (FLEXERIL) 10 MG tablet Take 1 tablet by mouth every 8 hours as needed for Muscle spasms 90 tablet 5    naratriptan (AMERGE) 2.5 MG tablet Take 1 tablet by mouth once as needed for Migraine for up to 1 dose. 9 tablet 2    naratriptan (AMERGE) 2.5 MG tablet Take 1 tablet by mouth once as needed for Migraine for up to 1 dose. 2.5 mg at onset of headache, may repeat in 4 hours if needed 9 tablet 3    cyclobenzaprine (FLEXERIL) 10 MG tablet take 1 tablet by mouth three times a day if needed 90 tablet 3     No current facility-administered medications for this visit.      Allergies   Allergen Reactions    Influenza Vaccines Anaphylaxis    Pneumococcal Vaccines        SUBJECTIVE:   ROS:  As stated above otherwise unremarkable    OBJECTIVE:   Today's Vitals:  /70   Pulse 88   Ht 6' 1\" (1.854 m)   Wt 248 lb (112.5 kg)   SpO2 96%   BMI 32.72 kg/m²     Physical Exam:  General Appearance: alert and oriented to person, place and time, in no acute distress  Cardiovascular: normal rate, regular rhythm, normal S1 and S2, no murmurs, rubs, clicks, or gallops, distal pulses intact, no carotid bruits, no JVD  Pulmonary/Chest: clear to auscultation bilaterally- no wheezes, rales or rhonchi, normal air movement, no respiratory distress  Abdomen: soft, non-tender, non-distended, normal bowel sounds, no masses   Extremities: no cyanosis, clubbing or edema, pulse   Skin: warm and dry  Head: normocephalic and atraumatic  Eyes: pupils equal, round, and reactive to light  Neck: supple and non-tender without mass, no thyromegaly     Wt Readings from Last 3 Encounters:   12/07/21 248 lb (112.5 kg)   11/23/21 243 lb 14.4 oz (110.6 kg)   03/16/17 224 lb 1.6 oz (101.7 kg)     BP Readings from Last 3 Encounters:   12/07/21 122/70   11/23/21 (!) 143/86   03/18/17 137/79     Pulse Readings from Last 3 Encounters:   12/07/21 88   11/23/21 96   03/18/17 89     Body mass index is 32.72 kg/m². ECHO:   TTE 11/19/21  Summary   Technically difficult examination. Ejection fraction is visually estimated at 35%. There was moderate global hypokinesis of the left ventricle. The aortic valve leaflets were not well visualized. Aortic valve appears tricuspid. Aortic valve leaflets are somewhat thickened. Signature      ----------------------------------------------------------------   Electronically signed by Kely Fernandez MD (Interpreting   physician) on 11/19/2021 at 04:19 PM    CATH/STRESS:   Cath 11/22/21  FINDINGS:  LEFT VENTRICULOGRAM:  Moderate global hypokinesis was noticed. Ejection  fraction estimated at 35% to 40%.     HEMODYNAMIC RESULTS:  Left ventricular end-diastolic pressure 21 mmHg.    No significant pressure gradient across the aortic valve upon pullback.     Fractional flow reserve measurement of the left anterior descending  artery baseline 0.93, at maximal hyperemia 0.91.     CORONARY ANGIOGRAM:  1. Left main coronary artery:  Distal left main coronary artery has 10%  to 20% stenosis. It bifurcates into left circumflex and left anterior  descending artery. 2.  Left circumflex artery:  Proximal LCX with 30% stenosis. Mid  segment of left circumflex artery has chronic total occlusion. The  distal part of the vessel fills via left-to-left collaterals from the  LAD. 3.  Left anterior descending artery:  Proximal LAD is patent with mild  luminal irregularities. D1 is a small vessel with mild diffuse disease. D2 is a large vessel that is patent with mild luminal irregularities. D3 is a small vessel with an ostial 60% to 70% stenosis. Mid segment of  LAD has a 50% lesion just after D2. The stent in mid LAD has mild 30%  in-stent restenosis. Distal LAD with luminal irregularities. 4.  Right coronary artery:  Dominant vessel. Proximal RCA has chronic  total occlusion. The distal part of the RCA fills faintly via bridging  collaterals with RPL and RPDA receiving collaterals from left-to-right  as mentioned above.     MEDICATIONS:  See MAR.     COMPLICATIONS:  None.     ESTIMATED BLOOD LOSS:  Less than 50 mL.     ACCESS:  Right radial artery access. Vasc Band was applied. Hemostasis  was achieved.     IMPRESSION:  1. 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 2.  Chronic total occlusion of the mid left circumflex artery. 3.  Distal RCA and distal left circumflex artery receives collateral  flow. 4.  50% intermediate stenosis of mid segment of left anterior descending  artery. Hemodynamically insignificant by FFR measurement, FFR 0.91.  5.  Moderate ischemic cardiomyopathy, ejection fraction 35% to 40%     RECOMMENDATIONS:  Aggressive risk factor modification and optimal  medical management for coronary artery disease. Optimize antianginal  medications. Guideline-directed medical therapy for congestive heart  failure with reduced ejection fraction. The patient also was diagnosed  with atrial fibrillation during this hospitalization, will need  anticoagulation and good control of AFib. At this time, continue  aggressive medical therapy for coronary artery disease. Should the  patient develop symptoms resistant to medical therapy, may consider  referral for coronary artery bypass graft surgery in the future. Outpatient followup with Cardiology.           Eula Hager MD      Results reviewed:  BNP:   Lab Results   Component Value Date    BNP 28.1 01/20/2013     CBC:   Lab Results   Component Value Date    WBC 11.7 11/23/2021    RBC 4.36 11/23/2021    RBC 5.49 02/02/2012    HGB 11.3 11/23/2021    HCT 38.2 11/23/2021     11/23/2021     CMP:    Lab Results   Component Value Date     11/23/2021    K 4.3 11/23/2021    K 3.6 11/18/2021     11/23/2021    CO2 22 11/23/2021    BUN 10 11/23/2021    CREATININE 1.1 11/23/2021    GFRAA >60 03/18/2021    LABGLOM 67 11/23/2021    GLUCOSE 128 11/23/2021    GLUCOSE 117 02/02/2012    CALCIUM 8.7 11/23/2021     Hepatic Function Panel:    Lab Results   Component Value Date    ALKPHOS 81 11/18/2021    ALT 11 11/18/2021    AST 16 11/18/2021    PROT 7.7 11/18/2021    BILITOT 0.3 11/18/2021    BILIDIR <0.2 11/18/2021    LABALBU 3.7 11/18/2021    LABALBU 4.7 02/02/2012     Magnesium:    Lab Results   Component Value Date    MG 1.8 11/20/2021     PT/INR:    Lab Results   Component Value Date    PROTIME 12.3 11/17/2020    INR 1.07 11/17/2020     Lipids:    Lab Results   Component Value Date    TRIG 116 11/20/2021    HDL 25 11/20/2021    LDLCALC 48 11/20/2021       ASSESSMENT AND PLAN:   The patient's condition/symptoms are Stable: No clinical evidence of fluid overload today. Continue current medical regimen without changes at present time.      Chronic systolic CHF  ICMP - ef 35 per TTE 11/19/21, ef 55 per TTE 3/18/17    CAD - s/p cath 11/22/21 -   1. 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 2.  Chronic total occlusion of the mid left circumflex artery. 3.  Distal RCA and distal left circumflex artery receives collateral  flow. 4.  50% intermediate stenosis of mid segment of left anterior descending  artery. Hemodynamically insignificant by FFR measurement, FFR 0.91. PAD - hx PTA/lower ext bypass  HTN  Dyslipidemia  DM  afib  COPD  Tobacco abuse - 1/2 ppd since age 7  Hx noncompliance with f/ups if sick - now taking his meds for past 12 years     Diagnosis Orders   1. Chronic systolic CHF (congestive heart failure), NYHA class 2 (HCC)       Continue:  GDMT:   ACE/ARB/ARNI - lisinopril 5 daily   BB - no?? But was suppose to be upon [facility]   Diuretic - lasix 20 daily  AA - aldactone 25 daily  SGLT2 -  farxiga 10 daily  Vasodilator - imdur 30 daily  Current medications lipitor 40, amio 200 daily, eliquis 5 bid, plavix 75 daily    For some reason toprol xl was not prescribed - add toprol xl 25 daily  BMP today in office  Order lifevest  F/up 6 weeks  Repeat limited echo 2/18/22 to reevaluate EF      Tolerating above noted HF meds, no ill side effects noted. Will continue to monitor kidney function and electrolytes. Will optimize as tolerated. Pt is compliant w/ medications. Total visit time of 40 minutes has been spent with patient on education of symptoms, management, medication, and plan of care; as well as review of chart: labs, ECHO, radiology reports, etc.   I personally spent more then 50% of the appt time face to face with the patient. · Daily weights  · Fluid restriction of 2 Liters per day  · Limit sodium in diet to around 5213-5928 mg/day  · Monitor BP  · Activity as tolerated     Patient was instructed to call the [facility] for any changes in symptoms as noted in AVS.      No follow-ups on file.  or sooner if needed     Patient given educational materials - see patient instructions. We discussed the importance of weighing oneself and recording daily. We also discussed the importance of a low sodium diet, higher sodium foods to avoid and better low sodium food options. Patient verbalizes understanding of plan of care using teach back method, and is agreeable to the treatment plan.        Electronically signed by Erendira Riley PA-C on 12/7/2021 at 10:53 AM

## 2021-12-14 ENCOUNTER — TELEPHONE (OUTPATIENT)
Dept: CARDIOLOGY CLINIC | Age: 66
End: 2021-12-14

## 2021-12-14 DIAGNOSIS — I50.22 CHRONIC SYSTOLIC CHF (CONGESTIVE HEART FAILURE), NYHA CLASS 2 (HCC): Primary | ICD-10-CM

## 2021-12-14 NOTE — TELEPHONE ENCOUNTER
----- Message from Clemente Woo PA-C sent at 12/14/2021 10:02 AM EST -----  Repeat BMP 1/18/21 - before visit with CHF clinic

## 2021-12-17 ENCOUNTER — HOSPITAL ENCOUNTER (OUTPATIENT)
Dept: WOUND CARE | Age: 66
Discharge: HOME | End: 2021-12-17
Payer: COMMERCIAL

## 2021-12-17 VITALS
TEMPERATURE: 97.7 F | DIASTOLIC BLOOD PRESSURE: 62 MMHG | OXYGEN SATURATION: 94 % | BODY MASS INDEX: 29.03 KG/M2 | HEIGHT: 73 IN | SYSTOLIC BLOOD PRESSURE: 112 MMHG | WEIGHT: 219 LBS | HEART RATE: 75 BPM | RESPIRATION RATE: 16 BRPM

## 2021-12-17 DIAGNOSIS — L89.323 PRESSURE INJURY OF LEFT BUTTOCK, STAGE 3 (HCC): ICD-10-CM

## 2021-12-17 DIAGNOSIS — L89.323 PRESSURE INJURY OF LEFT PERINEAL ISCHIAL REGION, STAGE 3 (HCC): Primary | ICD-10-CM

## 2021-12-17 PROCEDURE — 99203 OFFICE O/P NEW LOW 30 MIN: CPT | Performed by: NURSE PRACTITIONER

## 2021-12-17 PROCEDURE — 99213 OFFICE O/P EST LOW 20 MIN: CPT

## 2021-12-17 RX ORDER — BACITRACIN, NEOMYCIN, POLYMYXIN B 400; 3.5; 5 [USP'U]/G; MG/G; [USP'U]/G
OINTMENT TOPICAL ONCE
Status: CANCELLED | OUTPATIENT
Start: 2021-12-17 | End: 2021-12-17

## 2021-12-17 RX ORDER — BETAMETHASONE DIPROPIONATE 0.05 %
OINTMENT (GRAM) TOPICAL ONCE
Status: CANCELLED | OUTPATIENT
Start: 2021-12-17 | End: 2021-12-17

## 2021-12-17 RX ORDER — LIDOCAINE HYDROCHLORIDE 20 MG/ML
JELLY TOPICAL ONCE
Status: CANCELLED | OUTPATIENT
Start: 2021-12-17 | End: 2021-12-17

## 2021-12-17 RX ORDER — GINSENG 100 MG
CAPSULE ORAL ONCE
Status: CANCELLED | OUTPATIENT
Start: 2021-12-17 | End: 2021-12-17

## 2021-12-17 RX ORDER — CLOBETASOL PROPIONATE 0.5 MG/G
OINTMENT TOPICAL ONCE
Status: CANCELLED | OUTPATIENT
Start: 2021-12-17 | End: 2021-12-17

## 2021-12-17 RX ORDER — BACITRACIN ZINC AND POLYMYXIN B SULFATE 500; 1000 [USP'U]/G; [USP'U]/G
OINTMENT TOPICAL ONCE
Status: CANCELLED | OUTPATIENT
Start: 2021-12-17 | End: 2021-12-17

## 2021-12-17 RX ORDER — LIDOCAINE 50 MG/G
OINTMENT TOPICAL ONCE
Status: CANCELLED | OUTPATIENT
Start: 2021-12-17 | End: 2021-12-17

## 2021-12-17 RX ORDER — LIDOCAINE 40 MG/G
CREAM TOPICAL ONCE
Status: CANCELLED | OUTPATIENT
Start: 2021-12-17 | End: 2021-12-17

## 2021-12-17 RX ORDER — LIDOCAINE HYDROCHLORIDE 40 MG/ML
SOLUTION TOPICAL ONCE
Status: CANCELLED | OUTPATIENT
Start: 2021-12-17 | End: 2021-12-17

## 2021-12-17 RX ORDER — GENTAMICIN SULFATE 1 MG/G
OINTMENT TOPICAL ONCE
Status: CANCELLED | OUTPATIENT
Start: 2021-12-17 | End: 2021-12-17

## 2021-12-17 RX ORDER — SODIUM HYPOCHLORITE 1.25 MG/ML
SOLUTION TOPICAL DAILY
Qty: 1 EACH | Refills: 0 | Status: SHIPPED | OUTPATIENT
Start: 2021-12-17 | End: 2022-09-29

## 2021-12-17 ASSESSMENT — PAIN SCALES - GENERAL: PAINLEVEL_OUTOF10: 0

## 2021-12-17 NOTE — PROGRESS NOTES
[address] [facility] and Procedure Note      Renan Child  MEDICAL RECORD NUMBER:  [id number]  AGE: 61 y.o. GENDER: male  : 1955  EPISODE DATE:  2021    SUBJECTIVE:     Chief Complaint   Patient presents with    Wound Check     Gluteal wound         HISTORY OF PRESENT ILLNESS      Renan Child is a 61 y.o. male who presents today for evaluation of gluteal wound. Patient states that wound developed several months ago at at time when he was homeless and sleeping in his car. He states he is currently living in a home, spends majority of his time now in recliner or bed. Current wound care includes Dakin's soaked gauze, currently being changed every other day by his significant other. No further needs or concerns identified.     PAST MEDICAL HISTORY             Diagnosis Date    CAD (coronary artery disease)     COPD (chronic obstructive pulmonary disease) ([address]     Hx of blood clots     LEFT LEG    Hyperlipidemia     Hypertension     Leg wound, left     Leg wound, right     Lumbar radiculopathy     Osteoarthritis     Seizure disorder ([address]     Spinal stenosis, lumbar        PAST SURGICAL HISTORY     Past Surgical History:   Procedure Laterality Date    ANKLE SURGERY      ball joint    BACK SURGERY      CARDIAC CATHETERIZATION  2005    1 STENT PLACED    CARDIAC CATHETERIZATION  13    [facility]    CORONARY ANGIOPLASTY WITH STENT PLACEMENT      DIAGNOSTIC CARDIAC CATH LAB PROCEDURE  2021    FOREARM SURGERY      left    FRACTURE SURGERY Right 2000    LEG    KNEE SURGERY      left    LEG SURGERY      LUMBAR SPINE SURGERY      TOE SURGERY      left middle toe    TYMPANOSTOMY TUBE PLACEMENT  1979       FAMILY HISTORY     Family History   Problem Relation Age of Onset    Diabetes Mother     Kidney Disease Mother     COPD Father     Heart Disease Father     Cancer Father         bone    Stroke Father     Heart Disease Sister     Diabetes Brother        SOCIAL HISTORY     Social History     Tobacco Use    Smoking status: Current Some Day Smoker     Packs/day: 0.50     Years: 40.00     Pack years: 20.00     Types: Cigarettes    Smokeless tobacco: Never Used   Substance Use Topics    Alcohol use: No     Alcohol/week: 0.0 standard drinks    Drug use: No       ALLERGIES     Allergies   Allergen Reactions    Influenza Vaccines Anaphylaxis    Pneumococcal Vaccines        MEDICATIONS     Current Outpatient Medications on File Prior to Encounter   Medication Sig Dispense Refill    atorvastatin (LIPITOR) 40 MG tablet Take 40 mg by mouth daily      pentoxifylline (TRENTAL) 400 MG extended release tablet Take 400 mg by mouth 3 times daily (with meals)      isosorbide mononitrate (IMDUR) 30 MG extended release tablet Take 30 mg by mouth daily      metFORMIN (GLUCOPHAGE) 1000 MG tablet Take 1,000 mg by mouth 2 times daily (with meals)      metoprolol succinate (TOPROL XL) 25 MG extended release tablet Take 1 tablet by mouth daily 30 tablet 3    amiodarone (CORDARONE) 200 MG tablet Take 1 tablet by mouth daily 30 tablet 3    lisinopril (PRINIVIL;ZESTRIL) 5 MG tablet Take 1 tablet by mouth daily 30 tablet 3    furosemide (LASIX) 20 MG tablet Take 1 tablet by mouth daily 60 tablet 3    spironolactone (ALDACTONE) 25 MG tablet Take 1 tablet by mouth daily 30 tablet 3    dapagliflozin (FARXIGA) 10 MG tablet Take 1 tablet by mouth every morning 90 tablet 1    apixaban (ELIQUIS) 5 MG TABS tablet Take 1 tablet by mouth 2 times daily 60 tablet 3    clopidogrel (PLAVIX) 75 MG tablet take 1 tablet by mouth once daily 30 tablet 5    gabapentin (NEURONTIN) 100 MG capsule TAKE ONE CAPSULE BY MOUTH 3 TIMES A DAY 90 capsule 5    pantoprazole (PROTONIX) 40 MG tablet Take 1 tablet by mouth daily 30 tablet 5    traZODone (DESYREL) 50 MG tablet take 1 tablet by mouth at bedtime 30 tablet 3    acetaminophen (TYLENOL) 325 MG tablet Take 650 mg by mouth every 6 intolerance  Hematology: Denies easy brusing or bleeding, hx of clotting disorder  Dermatology: +wound Denies skin rash, eczema, pruritis    PHYSICAL EXAM:     /62   Pulse 75   Temp 97.7 °F (36.5 °C) (Infrared)   Resp 16   Ht 6' 1\" (1.854 m)   Wt 219 lb (99.3 kg)   SpO2 94%   BMI 28.89 kg/m²   Wt Readings from Last 3 Encounters:   12/17/21 219 lb (99.3 kg)   12/07/21 248 lb (112.5 kg)   11/23/21 243 lb 14.4 oz (110.6 kg)       General:  Awake, alert, no apparent distress. Appears stated age  [de-identified]: conjuctivae are clear without exudate or hemorrhage, anicteric sclera, moist oral mucosa. Chest:  Respirations regular, non-labored. Chest rise and fall equal bilaterally. Neurological: Awake, alert  Psychiatric:  Appropriate mood and affect  Skin:  Warm and dry  Wound:    Left buttock wound bed granular with slough. Periwound macerated with blanchable erythema. Wound 11/19/21 Coccyx Stage 2 (Active)   Wound Etiology Pressure Stage  2 11/23/21 0815   Dressing Status Clean; Dry; Intact 11/23/21 0815   Dressing/Treatment Foam 11/23/21 0815   Wound Assessment Pink/red 11/23/21 0330   Drainage Amount None 11/23/21 0815   Odor None 11/23/21 0330   Aura-wound Assessment Blanchable erythema 11/23/21 0815   Number of days: 28       Wound 11/19/21 Buttocks Left  (Active)   Wound Image   12/17/21 1313   Wound Etiology Pressure Stage  3 12/17/21 1313   Dressing Status Intact;  New dressing applied 12/17/21 1313   Wound Cleansed Cleansed with saline 12/17/21 1313   Dressing/Treatment Other (comment); ABD 12/17/21 1313   Offloading for Diabetic Foot Ulcers No offloading required 12/17/21 1313   Wound Length (cm) 4.5 cm 12/17/21 1313   Wound Width (cm) 4.6 cm 12/17/21 1313   Wound Depth (cm) 0.2 cm 12/17/21 1313   Wound Surface Area (cm^2) 20.7 cm^2 12/17/21 1313   Wound Volume (cm^3) 4.14 cm^3 12/17/21 1313   Undermining Starts ___ O'Clock 3 12/17/21 1313   Undermining Ends___ O'Clock 6 12/17/21 1313 Undermining Maxium Distance (cm) 0.2 12/17/21 1313   Wound Assessment Granulation tissue; [facility] 12/17/21 1313   Drainage Amount Large 12/17/21 1313   Drainage Description Yellow; Green 12/17/21 1313   Odor None 12/17/21 1313   Aura-wound Assessment Blanchable erythema; Maceration; Dry/flaky 12/17/21 1313   Margins Attached edges 12/17/21 1313   Wound Thickness Description not for Pressure Injury Full thickness 12/17/21 1313   Number of days: 28       Wound 11/20/21 Pretibial Right; Mid; Lower x2 (Active)   Wound Etiology Venous 11/23/21 0815   Dressing Status Clean; Dry; Intact 11/23/21 0815   Wound Cleansed Betadine/povidone iodine 11/23/21 0815   Dressing/Treatment Gauze dressing/dressing sponge; Roll gauze 11/23/21 0815   Wound Assessment Dry 11/23/21 0815   Drainage Amount None 11/23/21 0815   Odor None 11/23/21 0330   Aura-wound Assessment Dry/flaky 11/23/21 0815   Number of days: 27       Wound 11/20/21 Foot Anterior; Right; Lateral (Active)   Wound Etiology Venous 11/23/21 0815   Dressing Status Clean; Dry; Intact 11/23/21 0815   Wound Cleansed Betadine/povidone iodine 11/23/21 0815   Dressing/Treatment Gauze dressing/dressing sponge; Roll gauze 11/23/21 0815   Wound Assessment Dry 11/23/21 0815   Drainage Amount None 11/23/21 0815   Odor None 11/23/21 0330   Aura-wound Assessment Dry/flaky 11/23/21 0815   Number of days: 27       Wound 11/20/21 Toe (Comment  which one) Anterior; Right 2nd toe (Active)   Wound Etiology Venous 11/23/21 0815   Dressing Status Clean; Dry; Intact 11/23/21 0815   Wound Cleansed Betadine/povidone iodine 11/23/21 0815   Dressing/Treatment Gauze dressing/dressing sponge; Roll gauze 11/23/21 0815   Wound Assessment Dry 11/23/21 0815   Drainage Amount None 11/23/21 0815   Odor None 11/23/21 0330   Aura-wound Assessment Dry/flaky 11/23/21 0815   Number of days: 27       Wound 11/20/21 Toe (Comment  which one) Anterior;  Left x3 - Great toe, 3rd, and 4th toe (Active)   Wound Etiology Venous 11/23/21 0815   Dressing Status Clean; Dry; Intact 11/23/21 0815   Wound Cleansed Betadine/povidone iodine 11/23/21 0815   Dressing/Treatment Gauze dressing/dressing sponge; Roll gauze 11/23/21 0815   Wound Assessment Dry 11/23/21 0815   Drainage Amount None 11/23/21 0815   Odor None 11/23/21 0330   Aura-wound Assessment Dry/flaky 11/23/21 0815   Number of days: 27       Wound 11/20/21 Pretibial Left; Lateral; Lower x2 (Active)   Wound Etiology Venous 11/23/21 0815   Dressing Status Clean; Dry; Intact 11/23/21 0815   Wound Cleansed Betadine/povidone iodine 11/23/21 0815   Dressing/Treatment Gauze dressing/dressing sponge; Roll gauze 11/23/21 0815   Wound Assessment Dry 11/23/21 0330   Drainage Amount None 11/23/21 0815   Odor None 11/23/21 0330   Aura-wound Assessment Dry/flaky 11/23/21 0815   Number of days: 27         LABS/IMAGING   UA: No results for input(s): SPECGRAV, PHUR, COLORU, CLARITYU, MUCUS, PROTEINU, BLOODU, RBCUA, WBCUA, BACTERIA, NITRU, GLUCOSEU, BILIRUBINUR, UROBILINOGEN, KETUA, LABCAST, LABCASTTY, AMORPHOS in the last 72 hours.     Invalid input(s): CRYSTALS  Micro:   Lab Results   Component Value Date    BC No growth-preliminary No growth  11/18/2021     ASSESSMENT     -Stage 3 pressure injury, left buttock    Ulcer Identification:  Ulcer Type: pressure  Contributing Factors: chronic pressure, decreased mobility, shear force and smoking    Depth of Diabetic/Pressure/Non Pressure Ulcers or Wound:  Pressure ulcer, Stage 3     Patient Active Problem List   Diagnosis Code    Seizure disorder ([facility] G40.909    Osteoarthritis M19.90    COPD (chronic obstructive pulmonary disease) ([address] J44.9    Spinal stenosis, lumbar M48.061    Lumbar radiculopathy M54.16    Tobacco abuse Z72.0    GERD (gastroesophageal reflux disease) K21.9    Skin ulceration ([address] L98.499    Non-healing ulcer of lower leg ([address] L97.909    Bilateral claudication of lower limb ([address] I73.9    Current smoker F17.200    Dyslipidemia E78.5    Abnormal cardiovascular function study R94.30    Obesity E66.9    CAD, multiple vessel I25.10    Chronic total occlusion of artery of the extremities (HCC) I70.92    Coronary artery chronic total occlusion I25.82    DAVILA (dyspnea on exertion) R06.00    HTN (hypertension) I10    Discitis of cervical region M46.42    Open wound of right hand S61.401A    Noncompliance by refusing intervention or support Z53.29    Lactic acidosis E87.2    Wound infection T14. 8XXA, L08.9    Pressure injury of left perineal ischial region, stage 3 ([address] W40.518       PLAN     Patient examined and evaluated. All available lab work, radiology studies, and progress notes pertaining to Sandee Villegas reviewed prior to or during patient visit today.    -Stage 3 pressure ulcer, left buttock- Educated Sandee Villegas on the importance of offloading wound(s) for wound healing/prevention. Pressure reduction techniques reviewed. Waffle cushion given to patient today, educated on proper use. Patient verbalized understanding. Will continue Dakin's moistened gauze. Discussed importance of changing daily as opposed to every other day. Also discussed importance of squeezing out excess solution prior to application. Gauze should be moist, not soaking on application. Apply thin layer of zinc oxide cream to periwound to protect from moisture. Patient and visitor, present for visit, verbalized understanding.      -On arrival to room patient visitor found to be in supply cupboard, placing handful's of supplies into her book-bag. Explained to her that these cupboards are for staff use only, this behavior is not acceptable, and patient will be discharged from clinic should this be observed again.      -No indications of infection to wound at today's visit. Education provided on signs and symptoms of infection.   Call clinic or seek emergency care should these occur.    -Patient has multiple wounds to lower extremities for which he follows with Dr. Adela Li. Not assessed at today's visit, continue management per Dr. Adela Li. Benjamín Molina continues to smoke 0.5 PPD. Discussed importance of smoking cessation on improvement of general health. Educated patient on the effects of smoking on wound progression and the importance of cutting back/quitting to promote better healing. Patient denies readiness to quit at this time. Follow up 3 weeks. Call clinic with any needs or concerns prior to scheduled visit. All questions and concerns addressed prior to discharge from today's visit. Please see attached Discharge Instructions    Written patient dismissal instructions given to patient and signed by patient or POA. Treatment:   Orders Placed This Encounter   Procedures    Apply dressing     Left buttock- Apply zinc oxide cream to toyn wound. Apply dakins moist gauze to wound bed. Cover with ABD pad. Standing Status:   Standing     Number of Occurrences:   1         I spent a total of 35 minutes reviewing previous notes, test results and face to face with Benjamín Molina  on 12/17/2021. Greater than 50% of this time was spent on counseling, reviewing and discussing test results, answering questions, instructions on treatment and/or medications ordered, and coordinating the care based on my plan and assessment as noted. Discharge Instructions         Discharge Instructions       Visit Discharge/Physician Orders:  -Need to keep pressure off of buttock. Turn and reposition frequently, at least every 2 hours while awake. -Use waffle cushion in wheelchair and any chair you sit in. You can put a pillowcase on it. -Prescription for Dakins solution sent into pharmacy. -Dressing supplies ordered through [facility] and will be delivered to your home. Call if you do not receive.     Wound Location: Left buttock    Dressing orders:     1) Gather wound care supplies and arrange on clean table. 2) Wash your hands with soap and water or use alcohol based hand  for 20 seconds (sing \"Happy Birthday\" twice). 3) Cleanse wounds with normal saline or wound cleanser and gauze. Pat dry with clean gauze. 4) Left buttock- Apply zinc oxide cream to skin around wound to protect it. Apply Dakins moistened gauze (squeeze out excess) to wound to wound bed. Cover with ABD pad. Secure with tape. Change daily. Keep all dressings clean & dry. Do not shower, take baths or get wound wet, unless otherwise instructed by your Wound Care doctor. Follow up visit:   3 Weeks on Monday January 10th at 3:00 pm    Keep next scheduled appointment. Please give 24 hour notice if unable to keep appointment. [phone]    If you experience any of the following, please call the Wound Care Service during business hours: Monday through Friday 8:00 am - 4:30 pm  ([phone]). *Increase in pain   *Temperature over 101   *Increase in drainage from your wound or a foul odor   *Uncontrolled swelling   *Need for compression bandage changes due to slippage, breakthrough drainage    If you need medical attention outside of business hours, please contact your Primary Care Doctor or go to the nearest emergency room.                    Electronically signed by JAVAN Macias CNP on 12/17/2021 at 2:58 PM

## 2021-12-17 NOTE — PLAN OF CARE
Problem: Wound:  Goal: Will show signs of wound healing; wound closure and no evidence of infection  Description: Will show signs of wound healing; wound closure and no evidence of infection  Outcome: Ongoing   Patient presents to wound clinic for left buttock wound. Dressing supplies ordered through [facility]. Left buttock- Apply zinc oxide cream to skin around wound to protect it. Apply Dakins moistened gauze to wound to wound bed. Cover with ABD pad. Secure with tape. Change daily. Follow up visit:   3 Weeks on Monday January 10th at 3:00 pm.    Care plan reviewed with patient and s/o. Patient and s/o verbalize understanding of the plan of care and contribute to goal setting.

## 2021-12-17 NOTE — PROGRESS NOTES
[address] f: [phone] f: [phone] p: [phone] [email or web address]      Ordering Center:     [place] [address] [address] [id number]  [phone]  WOUND CARE Dept: [phone]   FAX NUMBER [phone]    Patient Information:      Anastasiya Gonzalez  [address] [facility]  [address] [place] [address] Road [address]   [phone]   : 1955  AGE: 61 y.o. GENDER: male   EPISODE DATE: 2021    Insurance:      PRIMARY INSURANCE:  Plan: [facility] MEDICAID  Coverage: [employer]  Effective Date: 2015  Group Number: [unfilled]  Subscriber Number: [id number] - (Medicaid Managed)    Payor/Plan Subscr  Sex Relation Sub. Ins. ID Effective Group Num   1. Kelly Maxwell 1955 Male Self [id number] 8/1/15 [id number]                                   [address]       Patient Wound Information:      Problem List Items Addressed This Visit     None          WOUNDS REQUIRING DRESSING SUPPLIES:     Wound 21 Coccyx Stage 2 (Active)   Wound Etiology Pressure Stage  2 21 0815   Dressing Status Clean; Dry; Intact 21 0815   Dressing/Treatment Foam 21 0815   Wound Assessment Pink/red 21 0330   Drainage Amount None 21 0815   Odor None 21 0330   Aura-wound Assessment Blanchable erythema 21 0815   Number of days: 28       Wound 21 Buttocks Left Stage 4, tunneling 0.5mm (Active)   Wound Image   21 1313   Wound Etiology Pressure Stage  3 21 1313   Dressing Status Intact;  New dressing applied 21 1313   Wound Cleansed Cleansed with saline 21 1313   Dressing/Treatment Other (comment); ABD 21 1313   Offloading for Diabetic Foot Ulcers No offloading required 21 1313   Wound Length (cm) 4.5 cm 21 1313   Wound Width (cm) 4.6 cm 21 1313   Wound Depth (cm) 0.2 cm 21 1313   Wound Surface Area (cm^2) 20.7 cm^2 12/17/21 1313   Wound Volume (cm^3) 4.14 cm^3 12/17/21 1313   Undermining Starts ___ O'Clock 3 12/17/21 1313   Undermining Ends___ O'Clock 6 12/17/21 1313   Undermining Maxium Distance (cm) 0.2 12/17/21 1313   Wound Assessment Granulation tissue; [facility] 12/17/21 1313   Drainage Amount Large 12/17/21 1313   Drainage Description Yellow; Green 12/17/21 1313   Odor None 12/17/21 1313   Aura-wound Assessment Blanchable erythema;  Maceration; Dry/flaky 12/17/21 1313   Margins Attached edges 12/17/21 1313   Wound Thickness Description not for Pressure Injury Full thickness 12/17/21 1313   Number of days: 28       Wound 11/20/21 Pretibial Right; Mid; Lower x2 (Active)   Wound Etiology Venous 11/23/21 0815   Dressing Status Clean; Dry; Intact 11/23/21 0815   Wound Cleansed Betadine/povidone iodine 11/23/21 0815   Dressing/Treatment Gauze dressing/dressing sponge; Roll gauze 11/23/21 0815   Wound Assessment Dry 11/23/21 0815   Drainage Amount None 11/23/21 0815   Odor None 11/23/21 0330   Aura-wound Assessment Dry/flaky 11/23/21 0815   Number of days: 27       Wound 11/20/21 Foot Anterior; Right; Lateral (Active)   Wound Etiology Venous 11/23/21 0815   Dressing Status Clean; Dry; Intact 11/23/21 0815   Wound Cleansed Betadine/povidone iodine 11/23/21 0815   Dressing/Treatment Gauze dressing/dressing sponge; Roll gauze 11/23/21 0815   Wound Assessment Dry 11/23/21 0815   Drainage Amount None 11/23/21 0815   Odor None 11/23/21 0330   Aura-wound Assessment Dry/flaky 11/23/21 0815   Number of days: 27       Wound 11/20/21 Toe (Comment  which one) Anterior; Right 2nd toe (Active)   Wound Etiology Venous 11/23/21 0815   Dressing Status Clean; Dry; Intact 11/23/21 0815   Wound Cleansed Betadine/povidone iodine 11/23/21 0815   Dressing/Treatment Gauze dressing/dressing sponge; Roll gauze 11/23/21 0815   Wound Assessment Dry 11/23/21 0815   Drainage Amount None 11/23/21 0815   Odor None 11/23/21 0330   Aura-wound Assessment Dry/flaky 11/23/21 0815   Number of days: 27       Wound 11/20/21 Toe (Comment  which one) Anterior;  Left x3 - Great toe, 3rd, and 4th toe (Active)   Wound Etiology Venous 11/23/21 0815   Dressing Status Clean; Dry; Intact 11/23/21 0815   Wound Cleansed Betadine/povidone iodine 11/23/21 0815   Dressing/Treatment Gauze dressing/dressing sponge; Roll gauze 11/23/21 0815   Wound Assessment Dry 11/23/21 0815   Drainage Amount None 11/23/21 0815   Odor None 11/23/21 0330   Aura-wound Assessment Dry/flaky 11/23/21 0815   Number of days: 27       Wound 11/20/21 Pretibial Left; Lateral; Lower x2 (Active)   Wound Etiology Venous 11/23/21 0815   Dressing Status Clean; Dry; Intact 11/23/21 0815   Wound Cleansed Betadine/povidone iodine 11/23/21 0815   Dressing/Treatment Gauze dressing/dressing sponge; Roll gauze 11/23/21 0815   Wound Assessment Dry 11/23/21 0330   Drainage Amount None 11/23/21 0815   Odor None 11/23/21 0330   Aura-wound Assessment Dry/flaky 11/23/21 0815   Number of days: 27          Supplies Requested :      WOUND #: 1   PRIMARY DRESSING:  Other: 4x4 gauze   Cover and Secure with: 4X4 non woven gauze pad  ABD pad     FREQUENCY OF DRESSING CHANGES:  Daily       ADDITIONAL ITEMS:  [] Gloves Small  [x] Gloves Medium [] Gloves Large [] Gloves XLarge  [] Tape 1\" [x] Tape 2\" [] Tape 3\"  [x] Medipore Tape  [x] Saline  [] Skin Prep   [] Adhesive Remover   [] Cotton Tip Applicators   [] Other:    Patient Wound(s) Debrided: [x] Yes 12/17/2021   [] No    Debribement Type: Mechanical     Is the patient currently on an antibiotic for their Wound(s): [] Yes [x] No    Patient currently being seen by Home Health: [] Yes   [x] No    Duration for needed supplies:  []15  []30  []60  [x]90 Days    Electronically signed by Radha Duncan RN on 12/17/2021 at 2:05 PM     Provider Information:      Lindsey Jones NAME: Felicitas Carr [facility]     NPI: [id number]

## 2022-01-10 ENCOUNTER — HOSPITAL ENCOUNTER (OUTPATIENT)
Dept: WOUND CARE | Age: 67
Discharge: HOME | End: 2022-01-10
Payer: COMMERCIAL

## 2022-01-10 VITALS
RESPIRATION RATE: 16 BRPM | TEMPERATURE: 97.3 F | SYSTOLIC BLOOD PRESSURE: 126 MMHG | OXYGEN SATURATION: 96 % | HEART RATE: 94 BPM | DIASTOLIC BLOOD PRESSURE: 62 MMHG

## 2022-01-10 DIAGNOSIS — L89.323 PRESSURE INJURY OF LEFT BUTTOCK, STAGE 3 (HCC): Primary | ICD-10-CM

## 2022-01-10 PROBLEM — L08.9 WOUND INFECTION: Status: RESOLVED | Noted: 2021-11-18 | Resolved: 2022-01-10

## 2022-01-10 PROBLEM — T14.8XXA WOUND INFECTION: Status: RESOLVED | Noted: 2021-11-18 | Resolved: 2022-01-10

## 2022-01-10 PROCEDURE — 99213 OFFICE O/P EST LOW 20 MIN: CPT | Performed by: NURSE PRACTITIONER

## 2022-01-10 PROCEDURE — 99213 OFFICE O/P EST LOW 20 MIN: CPT

## 2022-01-10 NOTE — PROGRESS NOTES
Liejuan carlosensee-Ufer 61 [address] f: [phone] f: [phone] p: [phone] [email or web address]      Ordering Center:   [place] [address] [address] [id number]  [phone]  WOUND CARE Dept: [phone]   FAX NUMBER [phone]    Patient Information:      Amita Greenwood  [address] [facility]   [phone]   : 1955  AGE: 61 y.o. GENDER: male   EPISODE DATE: 1/10/2022    Insurance:      PRIMARY INSURANCE:  Plan: [facility] MEDICAID  Coverage: [employer]  Effective Date: 2015  Group Number: [unfilled]  Subscriber Number: [id number] - (Medicaid Managed)    Payor/Plan Subscr  Sex Relation Sub. Ins. ID Effective Group Num   1Ronnie Delgado 1955 Male Self [id number] 8/1/15 [facility] [address]       Patient Wound Information:      Problem List Items Addressed This Visit     * (Principal) Pressure injury of left buttock, stage 3 (HCC) - Primary          WOUNDS REQUIRING DRESSING SUPPLIES:     Wound 21 Coccyx Stage 2 (Active)   Number of days: 52       Wound 21 Buttocks Left Stage 4, tunneling 0.5mm (Active)   Wound Image   21 1313   Wound Etiology Pressure Stage  3 01/10/22 1416   Dressing Status Intact; Old drainage noted;New dressing applied 01/10/22 1416   Wound Cleansed Cleansed with saline 01/10/22 141   Dressing/Treatment Alginate with Ag;ABD 01/10/22 141   Offloading for Diabetic Foot Ulcers No offloading required 01/10/22 1416   Wound Length (cm) 5.8 cm 01/10/22 1416   Wound Width (cm) 3.8 cm 01/10/22 141   Wound Depth (cm) 0.1 cm 01/10/22 141   Wound Surface Area (cm^2) 22.04 cm^2 01/10/22 141   Change in Wound Size % (l*w) -6.47 01/10/22 1416   Wound Volume (cm^3) 2. 204 cm^3 01/10/22 141   Wound Healing % 47 01/10/22 141   Undermining Starts ___ O'Clock 3 01/10/22 1416   Undermining Ends___ O'Clock 6 01/10/22 1416   Undermining Maxium Distance (cm) 0.6 01/10/22 1416   Wound Assessment Pale granulation tissue;Slough 01/10/22 1416   Drainage Amount Moderate 01/10/22 1416   Drainage Description Serosanguinous;Green 01/10/22 1416   Odor None 01/10/22 1416   Aura-wound Assessment Hyperpigmented;Blanchable erythema 01/10/22 1416   Margins Attached edges; Unattached edges 01/10/22 1416   Wound Thickness Description not for Pressure Injury Full thickness 01/10/22 1416   Number of days: 52       Wound 11/20/21 Pretibial Right; Mid; Lower x2 (Active)   Number of days: 51       Wound 11/20/21 Foot Anterior; Right; Lateral (Active)   Number of days: 51       Wound 11/20/21 Toe (Comment  which one) Anterior; Right 2nd toe (Active)   Number of days: 51       Wound 11/20/21 Toe (Comment  which one) Anterior;  Left x3 - Great toe, 3rd, and 4th toe (Active)   Number of days: 51       Wound 11/20/21 Pretibial Left; Lateral; Lower x2 (Active)   Number of days: 51          Supplies Requested :      WOUND #: 1   PRIMARY DRESSING:  Alginate with silver pad   Cover and Secure with: ABD pad     FREQUENCY OF DRESSING CHANGES:  Daily           ADDITIONAL ITEMS:  [] Gloves Small  [] Gloves Medium [x] Gloves Large [] Gloves XLarge  [] Tape 1\" [x] Tape 2\" [] Tape 3\"  [] Medipore Tape  [x] Saline  [] Skin Prep   [] Adhesive Remover   [] Cotton Tip Applicators   [] Other:    Patient Wound(s) Debrided: [x] Yes if yes please add date 1/10/22   [] No    Debribement Type: Mechanical     Patient currently being seen by Home Health: [] Yes   [x] No    Duration for needed supplies:  []15  [x]30  []60  []90 Days    Electronically signed by Salena Huynh RN on 1/10/2022 at 3:43 PM     Provider Information:      Dalila Hamlin NAME: Maral Walker KATIE     NPI: [id number]

## 2022-01-10 NOTE — PROGRESS NOTES
[facility] and Procedure Note      Sharri Reid  MEDICAL RECORD NUMBER:  [id number]  AGE: 61 y.o. GENDER: male  : 1955  EPISODE DATE:  1/10/2022    SUBJECTIVE:     Chief Complaint   Patient presents with    Wound Check         HISTORY OF PRESENT ILLNESS      Sharri Reid is a 61 y.o. male who presents today for evaluation of gluteal wound. Patient states that wound developed several months ago at at time when he was homeless and sleeping in his car. He states he is currently living in a home, spends majority of his time now in recliner or bed. Current wound care includes Dakin's soaked gauze, currently being changed daily by his significant other. Patient was educated on importance of offloading wound for healing, was provided with offloading cushion at last visit. Patient states that he continues to sit for long periods of time, does not use waffle cushion. No further needs or concerns identified.     PAST MEDICAL HISTORY             Diagnosis Date    CAD (coronary artery disease)     COPD (chronic obstructive pulmonary disease) ([address]     Hx of blood clots     LEFT LEG    Hyperlipidemia     Hypertension     Leg wound, left     Leg wound, right     Lumbar radiculopathy     Osteoarthritis     [facility]ure disorder [facility]     Spinal stenosis, lumbar        PAST SURGICAL HISTORY     Past Surgical History:   Procedure Laterality Date    ANKLE SURGERY      ball joint    BACK SURGERY      CARDIAC CATHETERIZATION      1 STENT PLACED    CARDIAC CATHETERIZATION  13    [facility]    CORONARY ANGIOPLASTY WITH STENT PLACEMENT      DIAGNOSTIC CARDIAC CATH LAB PROCEDURE  2021    FOREARM SURGERY      left    FRACTURE SURGERY Right 2000    LEG    KNEE SURGERY      left    LEG SURGERY      LUMBAR SPINE SURGERY      TOE SURGERY      left middle toe    TYMPANOSTOMY TUBE PLACEMENT  1979       FAMILY HISTORY     Family History   Problem Relation Age of Onset    Diabetes Mother     Kidney Disease Mother     COPD Father     Heart Disease Father     Cancer Father         bone    Stroke Father     Heart Disease Sister     Diabetes Brother        SOCIAL HISTORY     Social History     Tobacco Use    Smoking status: Current Some Day Smoker     Packs/day: 0.50     Years: 40.00     Pack years: 20.00     Types: Cigarettes    Smokeless tobacco: Never Used   Vaping Use    Vaping Use: Not on file   Substance Use Topics    Alcohol use: No     Alcohol/week: 0.0 standard drinks    Drug use: No       ALLERGIES     Allergies   Allergen Reactions    Influenza Vaccines Anaphylaxis    Pneumococcal Vaccines        MEDICATIONS     Current Outpatient Medications on File Prior to Encounter   Medication Sig Dispense Refill    sodium hypochlorite (DAKINS) 0.125 % SOLN external solution Apply topically daily 1 each 0    atorvastatin (LIPITOR) 40 MG tablet Take 40 mg by mouth daily      pentoxifylline (TRENTAL) 400 MG extended release tablet Take 400 mg by mouth 3 times daily (with meals)      isosorbide mononitrate (IMDUR) 30 MG extended release tablet Take 30 mg by mouth daily      metFORMIN (GLUCOPHAGE) 1000 MG tablet Take 1,000 mg by mouth 2 times daily (with meals)      Incentive Logic Lifevest Wearable Defibrillator  1 each 0    metoprolol succinate (TOPROL XL) 25 MG extended release tablet Take 1 tablet by mouth daily 30 tablet 3    amiodarone (CORDARONE) 200 MG tablet Take 1 tablet by mouth daily 30 tablet 3    lisinopril (PRINIVIL;ZESTRIL) 5 MG tablet Take 1 tablet by mouth daily 30 tablet 3    furosemide (LASIX) 20 MG tablet Take 1 tablet by mouth daily 60 tablet 3    spironolactone (ALDACTONE) 25 MG tablet Take 1 tablet by mouth daily 30 tablet 3    dapagliflozin (FARXIGA) 10 MG tablet Take 1 tablet by mouth every morning 90 tablet 1    apixaban (ELIQUIS) 5 MG TABS tablet Take 1 tablet by mouth 2 times daily 60 tablet 3    clopidogrel (PLAVIX) 75 MG tablet take 1 tablet by mouth once daily 30 tablet 5    albuterol sulfate HFA (PROAIR HFA) 108 (90 BASE) MCG/ACT inhaler Inhale 2 puffs into the lungs every 4 hours as needed for Wheezing 1 Inhaler 5    gabapentin (NEURONTIN) 100 MG capsule TAKE ONE CAPSULE BY MOUTH 3 TIMES A DAY 90 capsule 5    pantoprazole (PROTONIX) 40 MG tablet Take 1 tablet by mouth daily 30 tablet 5    cyclobenzaprine (FLEXERIL) 10 MG tablet Take 1 tablet by mouth every 8 hours as needed for Muscle spasms 90 tablet 5    traZODone (DESYREL) 50 MG tablet take 1 tablet by mouth at bedtime 30 tablet 3    nitroGLYCERIN (NITROSTAT) 0.4 MG SL tablet Place 1 tablet under the tongue every 5 minutes as needed for Chest pain 25 tablet 3    naratriptan (AMERGE) 2.5 MG tablet Take 1 tablet by mouth once as needed for Migraine for up to 1 dose. 9 tablet 2    Elastic Bandages & Supports (LUMBAR BACK BRACE/SUPPORT PAD) MISC 1 each by Does not apply route daily as needed. 1 each 0    naratriptan (AMERGE) 2.5 MG tablet Take 1 tablet by mouth once as needed for Migraine for up to 1 dose. 2.5 mg at onset of headache, may repeat in 4 hours if needed 9 tablet 3    cyclobenzaprine (FLEXERIL) 10 MG tablet take 1 tablet by mouth three times a day if needed 90 tablet 3    Tens Unit MISC by Does not apply route. Use as directed. 1 each 0    Respiratory Therapy Supplies (NEBULIZER COMPRESSOR) KIT by Does not apply route. 1 kit 0    acetaminophen (TYLENOL) 325 MG tablet Take 650 mg by mouth every 6 hours as needed. No current facility-administered medications on file prior to encounter.        REVIEW OF SYSTEMS:     Constitutional: Denies fever, chills, night sweats, fatigue, weight loss/gain, loss of appetite   Head: Denies headache,  dizziness, loss of consciousness  Respiratory: Denies shortness of breath, cough, wheezing, dyspnea with exertion  Cardiovascular:Denies chest pain, palpitations, edema  Gastrointestinal: Denies nausea, vomiting, constipation, diarrhea, abdominal pain   JORJE: Denies dysuria, frequency, urgency, hematuria  Musculoskeletal: +weakness Denies joint pain, swelling , stiffness  Endocrine: Denies polyuria, polydipsia, cold or heat intolerance  Hematology: Denies easy brusing or bleeding, hx of clotting disorder  Dermatology: +wound Denies skin rash, eczema, pruritis    PHYSICAL EXAM:     /62   Pulse 94   Temp 97.3 °F (36.3 °C) (Infrared)   Resp 16   SpO2 96%   Wt Readings from Last 3 Encounters:   12/17/21 219 lb (99.3 kg)   12/07/21 248 lb (112.5 kg)   11/23/21 243 lb 14.4 oz (110.6 kg)       General:  Awake, alert, no apparent distress. Appears stated age  [de-identified]: conjuctivae are clear without exudate or hemorrhage, anicteric sclera, moist oral mucosa. Chest:  Respirations regular, non-labored. Chest rise and fall equal bilaterally. Lifevest in place  Neurological: Awake, alert  Psychiatric:  Appropriate mood and affect  Skin:  Warm and dry  Wound:    Left buttock wound bed granular with slough, undermining 0.6 mm from 3-6 O'clock. Periwound macerated with blanchable erythema. Appearance slightly worsened from last visit    Wound 11/19/21 Coccyx Stage 2 (Active)   Number of days: 52       Wound 11/19/21 Buttocks Left Stage 4, tunneling 0.5mm (Active)   Wound Image   12/17/21 1313   Wound Etiology Pressure Stage  3 01/10/22 1416   Dressing Status Intact; Old drainage noted;New dressing applied 01/10/22 1416   Wound Cleansed Cleansed with saline 01/10/22 1416   Dressing/Treatment Alginate with Ag;ABD 01/10/22 1416   Offloading for Diabetic Foot Ulcers No offloading required 01/10/22 1416   Wound Length (cm) 5.8 cm 01/10/22 1416   Wound Width (cm) 3.8 cm 01/10/22 1416   Wound Depth (cm) 0.1 cm 01/10/22 1416   Wound Surface Area (cm^2) 22.04 cm^2 01/10/22 1416   Change in Wound Size % (l*w) -6.47 01/10/22 1416   Wound Volume (cm^3) 2. 204 cm^3 01/10/22 1416   Wound Healing % 47 01/10/22 1416   Undermining Starts ___ O'Clock 3 01/10/22 1416   Undermining Ends___ O'Clock 6 01/10/22 1416   Undermining Maxium Distance (cm) 0.6 01/10/22 1416   Wound Assessment Pale granulation tissue;Slough 01/10/22 1416   Drainage Amount Moderate 01/10/22 1416   Drainage Description Serosanguinous;Green 01/10/22 1416   Odor None 01/10/22 1416   Aura-wound Assessment Hyperpigmented;Blanchable erythema 01/10/22 1416   Margins Attached edges; Unattached edges 01/10/22 1416   Wound Thickness Description not for Pressure Injury Full thickness 01/10/22 1416   Number of days: 52       Wound 11/20/21 Pretibial Right; Mid; Lower x2 (Active)   Number of days: 51       Wound 11/20/21 Foot Anterior; Right; Lateral (Active)   Number of days: 51       Wound 11/20/21 Toe (Comment  which one) Anterior; Right 2nd toe (Active)   Number of days: 51       Wound 11/20/21 Toe (Comment  which one) Anterior; Left x3 - Great toe, 3rd, and 4th toe (Active)   Number of days: 51       Wound 11/20/21 Pretibial Left; Lateral; Lower x2 (Active)   Number of days: 51          LABS/IMAGING   UA: No results for input(s): SPECGRAV, PHUR, COLORU, CLARITYU, MUCUS, PROTEINU, BLOODU, RBCUA, WBCUA, BACTERIA, NITRU, GLUCOSEU, BILIRUBINUR, UROBILINOGEN, KETUA, LABCAST, LABCASTTY, AMORPHOS in the last 72 hours.     Invalid input(s): CRYSTALS  Micro:   Lab Results   Component Value Date    BC No growth-preliminary No growth  11/18/2021     ASSESSMENT     -Stage 3 pressure injury, left buttock    Ulcer Identification:  Ulcer Type: pressure  Contributing Factors: chronic pressure, decreased mobility, shear force and smoking    Depth of Diabetic/Pressure/Non Pressure Ulcers or Wound:  Pressure ulcer, Stage 3     Patient Active Problem List   Diagnosis Code    Seizure disorder ([facility] G40.909    Osteoarthritis M19.90    COPD [facility] disease) [facility] J44.9    Spinal stenosis, lumbar M48.061    Lumbar radiculopathy M54.16    Tobacco abuse Z72.0    GERD (gastroesophageal reflux disease) K21.9  Skin ulceration ([facility] L98.499    Non-healing ulcer of lower leg ([facility] L97.909    Bilateral claudication of lower limb (HCC) I73.9    Current smoker F17.200    Dyslipidemia E78.5    Abnormal cardiovascular function study R94.30    Obesity E66.9    CAD, multiple vessel I25.10    Chronic total occlusion of artery of the extremities (HCC) I70.92    Coronary artery chronic total occlusion I25.82    DAVILA (dyspnea on exertion) R06.00    HTN (hypertension) I10    Discitis of cervical region M46.42    Open wound of right hand S61.401A    Noncompliance by refusing intervention or support Z53.29    Lactic acidosis E87.2    Wound infection T14. 8XXA, L08.9    Pressure injury of left buttock, stage 3 ([facility] B06.325       PLAN     Patient examined and evaluated. All available lab work, radiology studies, and progress notes pertaining to Kaden Aquino reviewed prior to or during patient visit today.    -Stage 3 pressure ulcer, left buttock- RE-educated patient and significant other on the importance of offloading wound(s) for wound healing/prevention. Pressure reduction techniques reviewed. Explained that wound will not heal and may continue to worsen with ongoing pressure. Patient verbalized understanding. Patient was to be using Dakin's moistened gauze to wound, patient significant other states that they have instead been using alcohol, they did not get supplies as ordered. Will discontinue Dakin's, start use of silver alginate alternatively, supply order placed today. Call clinic or supply company if supplies are not received within the next few days. Patient and significant other  verbalized understanding.      -No indications of infection to wound at today's visit. Education provided on signs and symptoms of infection. Call clinic or seek emergency care should these occur.    -Patient has multiple wounds to lower extremities for which he follows with Dr. Babar Allen.   Not assessed at today's visit, continue management per Dr. Nito Cruz. Slick Johnston continues to smoke 0.5 PPD. Discussed importance of smoking cessation on improvement of general health. Educated patient on the effects of smoking on wound progression and the importance of cutting back/quitting to promote better healing. Patient denies readiness to quit at this time. Follow up 3 weeks. Call clinic with any needs or concerns prior to scheduled visit. All questions and concerns addressed prior to discharge from today's visit. Please see attached Discharge Instructions    Written patient dismissal instructions given to patient and signed by patient or POA. I spent a total of 25 minutes reviewing previous notes, test results and face to face with Slick Johnston  on 1/10/2022. Greater than 50% of this time was spent on counseling, reviewing and discussing test results, answering questions, instructions on treatment and/or medications ordered, and coordinating the care based on my plan and assessment as noted. Discharge Instructions         Discharge Instructions       Visit Discharge/Physician Orders:  -Need to keep pressure off of buttock. Turn and reposition frequently, at least every 2 hours while awake. -Use waffle cushion in wheelchair and any chair you sit in. You can put a pillowcase on it if needed. -Dressing supplies ordered through [facility] and will be delivered to your home. Call if you do not receive.     Wound Location: Left buttock     Dressing orders:      1) Gather wound care supplies and arrange on clean table.      2) Wash your hands with soap and water or use alcohol based hand  for 20 seconds (sing \"Happy Birthday\" twice).    3) Cleanse wounds with normal saline or wound cleanser and gauze. Pat dry with clean gauze.    4) Left buttock- Skin prep to tony wound. Apply silver alginate to wound. Cover with ABD pad. Secure with tape.  Change daily.     Keep all dressings clean & dry.     Do not shower, take baths or get wound wet, unless otherwise instructed by your Wound Care doctor.      Follow up visit:   3 Weeks on Wednesday February 2nd at 2:00 pm       Keep next scheduled appointment. Please give 24 hour notice if unable to keep appointment. [phone]     If you experience any of the following, please call the Wound Care Service during business hours: Monday through Friday 8:00 am - 4:30 pm  ([phone]).               *Increase in pain              *Temperature over 101              *Increase in drainage from your wound or a foul odor              *Uncontrolled swelling              *Need for compression bandage changes due to slippage, breakthrough drainage     If you need medical attention outside of business hours, please contact your Primary Care Doctor or go to the nearest emergency room.                  Electronically signed by JAVAN Castanon CNP on 1/10/2022 at 3:05 PM

## 2022-01-10 NOTE — PLAN OF CARE
Problem: Wound:  Goal: Will show signs of wound healing; wound closure and no evidence of infection  Description: Will show signs of wound healing; wound closure and no evidence of infection  Outcome: Ongoing  Note: Patient seen for buttocks wound. Wound is measuring bigger today. No s/s of infection noted. Dressings supplies changed today. New supplies ordered through [employer]. Follow up in 3 weeks. See AVS for order changes. Care plan reviewed with patient and wife. Patient and wife verbalize understanding of the plan of care and contribute to goal setting.

## 2022-01-18 ENCOUNTER — TELEPHONE (OUTPATIENT)
Dept: CARDIOLOGY CLINIC | Age: 67
End: 2022-01-18

## 2022-01-18 NOTE — TELEPHONE ENCOUNTER
Pt was in-patient and monitor was placed. Pt states monitor is causing a rash and pt is asking if he can take monitor off and send it back.

## 2022-01-19 ENCOUNTER — TELEPHONE (OUTPATIENT)
Dept: CARDIOLOGY CLINIC | Age: 67
End: 2022-01-19

## 2022-01-19 NOTE — TELEPHONE ENCOUNTER
Marin Pink, on emergency contact, called to cancel appt in chf clinic today 1/19/22, patient sick. They did not want to r/s at this time and will call office back to r/s.    Sees Dr Malachi Reynolds 1/31/22

## 2022-02-02 ENCOUNTER — HOSPITAL ENCOUNTER (OUTPATIENT)
Dept: WOUND CARE | Age: 67
Discharge: HOME | End: 2022-02-02

## 2022-02-17 ENCOUNTER — TELEPHONE (OUTPATIENT)
Dept: CARDIOLOGY CLINIC | Age: 67
End: 2022-02-17

## 2022-02-17 NOTE — TELEPHONE ENCOUNTER
Ischemic cardiomyopathy patient. Should have a life vest on. Lets try to get the patient rescheduled for both ECHO and f/u with me and Dr. Sierra Brumfield.

## 2022-02-17 NOTE — TELEPHONE ENCOUNTER
Informed of importance of getting Echo for reevaluation of EF and if needing ICD   They noted understanding but still wanted to cancel appointment and not reschedule

## 2022-02-17 NOTE — TELEPHONE ENCOUNTER
Mattert Score on emergency contact list called to cancel echo scheduled for tomorrow   Patient has diarrhea and feels sick  Patient has canceled Cardiology appointment, CHF clinic appointment as well and have not been rescheduled because patient is sick

## 2022-03-02 ENCOUNTER — TELEPHONE (OUTPATIENT)
Dept: WOUND CARE | Age: 67
End: 2022-03-02

## 2022-03-10 ENCOUNTER — TELEPHONE (OUTPATIENT)
Dept: WOUND CARE | Age: 67
End: 2022-03-10

## 2022-03-28 RX ORDER — LISINOPRIL 5 MG/1
5 TABLET ORAL DAILY
Qty: 30 TABLET | Refills: 3 | Status: SHIPPED | OUTPATIENT
Start: 2022-03-28 | End: 2022-06-10

## 2022-03-28 RX ORDER — AMIODARONE HYDROCHLORIDE 200 MG/1
200 TABLET ORAL DAILY
Qty: 90 TABLET | Refills: 3 | Status: ON HOLD | OUTPATIENT
Start: 2022-03-28 | End: 2022-10-13 | Stop reason: HOSPADM

## 2022-04-11 ENCOUNTER — HOSPITAL ENCOUNTER (OUTPATIENT)
Dept: NON INVASIVE DIAGNOSTICS | Age: 67
Discharge: HOME | End: 2022-04-11
Payer: COMMERCIAL

## 2022-04-11 DIAGNOSIS — I50.22 CHRONIC SYSTOLIC CHF (CONGESTIVE HEART FAILURE), NYHA CLASS 2 (HCC): ICD-10-CM

## 2022-04-11 PROCEDURE — 93307 TTE W/O DOPPLER COMPLETE: CPT

## 2022-04-12 ENCOUNTER — OFFICE VISIT (OUTPATIENT)
Dept: CARDIOLOGY CLINIC | Age: 67
End: 2022-04-12
Payer: COMMERCIAL

## 2022-04-12 VITALS
HEART RATE: 70 BPM | SYSTOLIC BLOOD PRESSURE: 138 MMHG | BODY MASS INDEX: 32.74 KG/M2 | WEIGHT: 247 LBS | HEIGHT: 73 IN | DIASTOLIC BLOOD PRESSURE: 68 MMHG | OXYGEN SATURATION: 94 %

## 2022-04-12 DIAGNOSIS — I50.22 CHRONIC SYSTOLIC CHF (CONGESTIVE HEART FAILURE), NYHA CLASS 2 (HCC): Primary | ICD-10-CM

## 2022-04-12 PROCEDURE — G8427 DOCREV CUR MEDS BY ELIG CLIN: HCPCS | Performed by: NURSE PRACTITIONER

## 2022-04-12 PROCEDURE — G8417 CALC BMI ABV UP PARAM F/U: HCPCS | Performed by: NURSE PRACTITIONER

## 2022-04-12 PROCEDURE — 1123F ACP DISCUSS/DSCN MKR DOCD: CPT | Performed by: NURSE PRACTITIONER

## 2022-04-12 PROCEDURE — 4040F PNEUMOC VAC/ADMIN/RCVD: CPT | Performed by: NURSE PRACTITIONER

## 2022-04-12 PROCEDURE — 4004F PT TOBACCO SCREEN RCVD TLK: CPT | Performed by: NURSE PRACTITIONER

## 2022-04-12 PROCEDURE — 99213 OFFICE O/P EST LOW 20 MIN: CPT | Performed by: NURSE PRACTITIONER

## 2022-04-12 PROCEDURE — 3017F COLORECTAL CA SCREEN DOC REV: CPT | Performed by: NURSE PRACTITIONER

## 2022-04-12 RX ORDER — FUROSEMIDE 20 MG/1
20 TABLET ORAL DAILY
Qty: 60 TABLET | Refills: 3 | Status: SHIPPED | OUTPATIENT
Start: 2022-04-12

## 2022-04-12 RX ORDER — LISINOPRIL 5 MG/1
5 TABLET ORAL DAILY
Qty: 30 TABLET | Refills: 3 | Status: CANCELLED | OUTPATIENT
Start: 2022-04-12

## 2022-04-12 RX ORDER — ISOSORBIDE MONONITRATE 30 MG/1
30 TABLET, EXTENDED RELEASE ORAL DAILY
Qty: 90 TABLET | Refills: 3 | Status: CANCELLED | OUTPATIENT
Start: 2022-04-12

## 2022-04-12 ASSESSMENT — ENCOUNTER SYMPTOMS
ABDOMINAL DISTENTION: 0
SHORTNESS OF BREATH: 1
COUGH: 0
VOMITING: 0
NAUSEA: 0

## 2022-04-12 NOTE — PROGRESS NOTES
Heart Failure Clinic       Visit Date: 4/12/2022  Cardiologist:  Dr. Searcy Meckel  Primary Care Physician: Dr. Perfecto Mcclure MD    Jeri Romero is a 74 y.o. male who presents today for:  Chief Complaint   Patient presents with    Congestive Heart Failure       HPI:   Jeri Romero is a 74 y.o. male who presents to the office for a follow up patient visit in the heart failure clinic. New patient on 12/7/21  Accompanied by girlfriend Haja Hopkins    TYPE HF: HFrEF (40-45%, 4/2022)  (35%, 11/2021)  Cause: ischemic improved form 35 to 40-45%  Device: none  HX: Obstructive CAD s/p stent 2013 , HTN, HLD, COPD    Dry Wt:  (248 on 12/7/22) (247 on 4/12/22)    Hospitalization:      Date of Admission: 11/18/2021    1. New onset atrial fibrillation with RVR-see management started on Lopressor as well as apixaban by cardiology  a. Patient with A. fib RVR overnight 11/19/2021.  b. Continue with amiodarone 200 mg twice daily and metoprolol. Patient has been in and out of atrial fibrillation overnight. Heart rate is pretty well controlled. Patient without chest pain or palpitations. c. Patient scheduled for left heart catheterization today for ischemic evaluation. Patient's been cleared by ID for infection standpoint. Antibiotics to be discontinued. d. Case was discussed extensively with cardiology. Patient their assistance.  e. Patient would likely benefit from outpatient sleep study evaluating for possible sleep apnea. 2. Peripheral arterial disease with nonhealing arterial ulceration:  a. Patient has previously undergone arteriogram with attempted recannulization 11/2020.  b. ABIs obtained in the ED showed mild stenosis of the proximal popliteal region on the right with occluded arteries on the left which appear chronic in nature. c. Continue ASA, Plavix, and statin  d. Podiatry following for management and wound care. 3. Stage III perineum pressure ulcer:   a. Antibiotics have been discontinued by ID. Suspect colonization rather than active infection. b. ID following. Appreciate their assistance. 4. Acute exacerbation of CHF, acute systolic  a. Compensated at discharge-had cardiac cath done on 11/22-only medical management per cardiologist-CHF clinic      Concerns today: patient has not been taking his lasix, eliquis, aldactone, lisinopril. Has not gained weight but notes bloating, DAVILA, and fatigue.  Improved EF on ECHO    Activity: ADLS performed, wheelchair mostly  Diet: educated today    Patient has:  Chest Pain: no  SOB: yes  Orthopnea/PND: yes  PETER: no  Edema: no  Fatigue: yes  Abdominal bloating: yes  Cough: no  Appetite: good  Home weight: no  Home blood pressure: no    Past Medical History:   Diagnosis Date    CAD (coronary artery disease)     COPD [facility]chronic obstructive [facility] disease) [facility] [address]     Hx of blood clots 1996    LEFT LEG    Hyperlipidemia     Hypertension     Leg wound, left     Leg wound, right     Lumbar radiculopathy     Osteoarthritis     Seizure disorder ([facility] [address]     Spinal stenosis, lumbar      Past Surgical History:   Procedure Laterality Date    ANKLE SURGERY      ball joint    BACK SURGERY  1994    CARDIAC CATHETERIZATION  2005    1 STENT PLACED    C[facility]9/[facility]    CORONARY ANGIOPLASTY WITH STENT PLACEMENT      DIAGNOSTIC CARDIAC CATH LAB PROCEDURE  11/18/2021    FOREARM SURGERY      left    FRACTURE SURGERY Right 2000    LEG    KNEE SURGERY      left    LEG SURGERY      LUMBAR SPINE SURGERY      TOE SURGERY      left middle toe    TYMPANOSTOMY TUBE PLACEMENT  1979     Family History   Problem Relation Age of Onset    Diabetes Mother     Kidney Disease Mother     COPD Father     Heart Disease Father     Cancer Father         bone    Stroke Father     Heart Disease Sister     Diabetes Brother      Social History     Tobacco Use    Smoking status: Current Some Day Smoker     Packs/day: 0.50     Years: 40.00     Pack years: 20.00 Types: Cigarettes    Smokeless tobacco: Never Used   Substance Use Topics    Alcohol use: No     Alcohol/week: 0.0 standard drinks     Current Outpatient Medications   Medication Sig Dispense Refill    apixaban (ELIQUIS) 5 MG TABS tablet Take 1 tablet by mouth 2 times daily 180 tablet 3    furosemide (LASIX) 20 MG tablet Take 1 tablet by mouth daily 60 tablet 3    lisinopril (PRINIVIL;ZESTRIL) 5 MG tablet Take 1 tablet by mouth daily 30 tablet 3    amiodarone (CORDARONE) 200 MG tablet Take 1 tablet by mouth daily 90 tablet 3    sodium hypochlorite (DAKINS) 0.125 % SOLN external solution Apply topically daily 1 each 0    atorvastatin (LIPITOR) 40 MG tablet Take 40 mg by mouth daily      pentoxifylline (TRENTAL) 400 MG extended release tablet Take 400 mg by mouth 3 times daily (with meals)      metFORMIN (GLUCOPHAGE) 1000 MG tablet Take 1,000 mg by mouth 2 times daily (with meals)      metoprolol succinate (TOPROL XL) 25 MG extended release tablet Take 1 tablet by mouth daily 30 tablet 3    dapagliflozin (FARXIGA) 10 MG tablet Take 1 tablet by mouth every morning 90 tablet 1    clopidogrel (PLAVIX) 75 MG tablet take 1 tablet by mouth once daily 30 tablet 5    albuterol sulfate HFA (PROAIR HFA) 108 (90 BASE) MCG/ACT inhaler Inhale 2 puffs into the lungs every 4 hours as needed for Wheezing 1 Inhaler 5    gabapentin (NEURONTIN) 100 MG capsule TAKE ONE CAPSULE BY MOUTH 3 TIMES A DAY 90 capsule 5    pantoprazole (PROTONIX) 40 MG tablet Take 1 tablet by mouth daily 30 tablet 5    traZODone (DESYREL) 50 MG tablet take 1 tablet by mouth at bedtime 30 tablet 3    nitroGLYCERIN (NITROSTAT) 0.4 MG SL tablet Place 1 tablet under the tongue every 5 minutes as needed for Chest pain 25 tablet 3    Elastic Bandages & Supports (LUMBAR BACK BRACE/SUPPORT PAD) MISC 1 each by Does not apply route daily as needed. 1 each 0    Tens Unit MISC by Does not apply route. Use as directed.  1 each 0    Respiratory Therapy Supplies (NEBULIZER COMPRESSOR) KIT by Does not apply route. 1 kit 0    acetaminophen (TYLENOL) 325 MG tablet Take 650 mg by mouth every 6 hours as needed.  naratriptan (AMERGE) 2.5 MG tablet Take 1 tablet by mouth once as needed for Migraine for up to 1 dose. 9 tablet 2    naratriptan (AMERGE) 2.5 MG tablet Take 1 tablet by mouth once as needed for Migraine for up to 1 dose. 2.5 mg at onset of headache, may repeat in 4 hours if needed 9 tablet 3     No current facility-administered medications for this visit. Allergies   Allergen Reactions    Influenza Vaccines Anaphylaxis    Pneumococcal Vaccines        SUBJECTIVE:   Review of Systems   Constitutional: Positive for fatigue. Negative for activity change, appetite change and diaphoresis. Respiratory: Positive for shortness of breath. Negative for cough. Cardiovascular: Negative for chest pain, palpitations and leg swelling. Gastrointestinal: Negative for abdominal distention, nausea and vomiting. Neurological: Negative for weakness, light-headedness and headaches. Hematological: Negative for adenopathy. Psychiatric/Behavioral: Negative for sleep disturbance. OBJECTIVE:   Today's Vitals:  /68   Pulse 70   Ht 6' 1\" (1.854 m)   Wt 247 lb (112 kg)   SpO2 94%   BMI 32.59 kg/m²     Physical Exam  Vitals reviewed. Constitutional:       General: He is not in acute distress. Appearance: Normal appearance. He is well-developed. He is not diaphoretic. HENT:      Head: Normocephalic and atraumatic. Eyes:      Conjunctiva/sclera: Conjunctivae normal.   Cardiovascular:      Rate and Rhythm: Normal rate and regular rhythm. Heart sounds: Normal heart sounds. No murmur heard. Pulmonary:      Effort: Pulmonary effort is normal. No respiratory distress. Breath sounds: Normal breath sounds. No wheezing or rales. Abdominal:      General: Bowel sounds are normal. There is no distension.       Palpations: Abdomen is soft.      Tenderness: There is no abdominal tenderness. Musculoskeletal:         General: Normal range of motion. Cervical back: Normal range of motion and neck supple. Right lower leg: No edema. Left lower leg: No edema. Skin:     General: Skin is warm and dry. Capillary Refill: Capillary refill takes less than 2 seconds. Neurological:      Mental Status: He is alert and oriented to person, place, and time. Coordination: Coordination normal.   Psychiatric:         Behavior: Behavior normal.         Wt Readings from Last 3 Encounters:   04/12/22 247 lb (112 kg)   12/17/21 219 lb (99.3 kg)   12/07/21 248 lb (112.5 kg)     BP Readings from Last 3 Encounters:   04/12/22 138/68   01/10/22 126/62   12/17/21 112/62     Pulse Readings from Last 3 Encounters:   04/12/22 70   01/10/22 94   12/17/21 75     Body mass index is 32.59 kg/m². ECHO:     Summary   Technically difficult examination. Limited Echo   Left ventricular size is normal and systolic function is moderately   reduced. Ejection fraction was estimated at 40-45%. LV wall thickness is   within normal limits. There was moderate global hypokinesis of the left ventricle. The right ventricular size was normal with normal systolic function and   wall thickness. Compared to the echo from 11/2021, the EF has mildly improved. Signature      ----------------------------------------------------------------   Electronically signed by Epifanio Pierce MD (Interpreting   physician) on 04/12/2022 at 12:22 PM      Summary   Technically difficult examination. Ejection fraction is visually estimated at 35%. There was moderate global hypokinesis of the left ventricle. The aortic valve leaflets were not well visualized. Aortic valve appears tricuspid. Aortic valve leaflets are somewhat thickened.       Signature      ----------------------------------------------------------------   Electronically signed by Braeden Hall MD (Interpreting   physician) on 11/19/2021 at 04:19 PM    CATH/STRESS:       HEMODYNAMIC RESULTS:  Left ventricular end-diastolic pressure 21 mmHg. No significant pressure gradient across the aortic valve upon pullback. IMPRESSION:  1. 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 2.  Chronic total occlusion of the mid left circumflex artery. 3.  Distal RCA and distal left circumflex artery receives collateral  flow. 4.  50% intermediate stenosis of mid segment of left anterior descending  artery. Hemodynamically insignificant by FFR measurement, FFR 0.91.  5.  Moderate ischemic cardiomyopathy, ejection fraction 35% to 40%     RECOMMENDATIONS:  Aggressive risk factor modification and optimal  medical management for coronary artery disease. Optimize antianginal  medications. Guideline-directed medical therapy for congestive heart  failure with reduced ejection fraction. The patient also was diagnosed  with atrial fibrillation during this hospitalization, will need  anticoagulation and good control of AFib. At this time, continue  aggressive medical therapy for coronary artery disease. Should the  patient develop symptoms resistant to medical therapy, may consider  referral for coronary artery bypass graft surgery in the future.    Outpatient followup with Cardiology.           Luis Enrique Piña MD     D: 11/22/2021 13:12:40       T: 11/22/2021 13:17:44     [email or web address]  Job#: [id number]     Doc#: [id number]         Results reviewed:  BNP:   Lab Results   Component Value Date    BNP 28.1 01/20/2013     CBC:   Lab Results   Component Value Date    WBC 11.7 11/23/2021    RBC 4.36 11/23/2021    RBC 5.49 02/02/2012    HGB 11.3 11/23/2021    HCT 38.2 11/23/2021     11/23/2021     CMP:    Lab Results   Component Value Date     12/07/2021    K 4.9 12/07/2021    K 3.6 11/18/2021    CL 98 12/07/2021    CO2 23 12/07/2021    BUN 15 12/07/2021    CREATININE 0.9 12/07/2021    GFRAA >60 03/18/2021 LABGLOM 85 12/07/2021    GLUCOSE 126 12/07/2021    GLUCOSE 117 02/02/2012    CALCIUM 9.6 12/07/2021     Hepatic Function Panel:    Lab Results   Component Value Date    ALKPHOS 81 11/18/2021    ALT 11 11/18/2021    AST 16 11/18/2021    PROT 7.7 11/18/2021    BILITOT 0.3 11/18/2021    BILIDIR <0.2 11/18/2021    LABALBU 3.7 11/18/2021    LABALBU 4.7 02/02/2012     Magnesium:    Lab Results   Component Value Date    MG 1.8 11/20/2021     PT/INR:    Lab Results   Component Value Date    PROTIME 12.3 11/17/2020    INR 1.07 11/17/2020     Lipids:    Lab Results   Component Value Date    TRIG 116 11/20/2021    HDL 25 11/20/2021    LDLCALC 48 11/20/2021       ASSESSMENT AND PLAN:   The patient's condition/symptoms are stable       Diagnosis Orders   1. Chronic systolic CHF (congestive heart failure), NYHA class 2 (HCC)  Basic Metabolic Panel    Magnesium    Brain Natriuretic Peptide     Continue:  GDMT:   ACE/ARB/ARNI - not taking   BB - Toprol 25 daily   Diuretic - not taking  AA - not taking  SGLT2 -  farxiga  Vasodilator - nitro (not taking Imdur)  Other - Plavix, (not taking eliquis)    Tolerating above noted HF meds, no ill side effects noted. Will continue to monitor kidney function and electrolytes. Will optimize as tolerated. Pt is NOT compliant w/ medications. Total visit time of 25 minutes has been spent with patient on education of symptoms, management, medication, and plan of care; as well as review of chart: labs, ECHO, radiology reports, etc.   I personally spent more then 50% of the appt time face to face with the patient. · Daily weights  · Fluid restriction of 2 Liters per day  · Limit sodium in diet to around 2764-3615 mg/day  · Monitor BP  · Activity as tolerated       Ischemic Cardiomyopathy improved. Not compliant w/ meds, seems to be lower functioning. Stable, compensated today on exam  Lab reviewed - KL 4.9, Cr. .9  Repeat blood work in two weeks as we are starting the lasix back up.  Waiting on Aldactone to see where K level is with next lab draw. BP/HR stable   Start taking medications as prescribed, list printed. If you do not get these medications please call our office. Continue diet/fluid adherence  Continue daily wts. F/U w/ Cardiology  F/U in clinic in 6 months      Patient was instructed to call the [address] for any changes in symptoms as noted in AVS.      Return in about 6 months (around 10/12/2022). or sooner if needed     Patient given educational materials - see patient instructions. We discussed the importance of weighing oneself and recording daily. We also discussed the importance of a low sodium diet, higher sodium foods to avoid and better low sodium food options. Patient verbalizes understanding of plan of care using teach back method, and is agreeable to the treatment plan.        Electronically signed by JAVAN Soriano CNP on 4/12/2022 at 1:44 PM

## 2022-04-12 NOTE — PATIENT INSTRUCTIONS
You may receive a survey regarding the care you received during your visit. Your input is valuable to us. We encourage you to complete and return your survey. We hope you will choose us in the future for your healthcare needs. Continue:  · Continue current medications  · Daily weights and record  · Fluid restriction of 2 Liters per day  · Limit sodium in diet to around 5120-0245 mg/day  · Monitor BP  · Activity as tolerated     Call the Heart Failure Clinic for any of the following symptoms: [phone]   Weight gain of 2-3 pounds in 1 day or 5 pounds in 1 week   Increased shortness of breath   Shortness of breath while laying down   Cough   Chest pain   Swelling in feet, ankles or legs   Tenderness or bloating in the abdomen   Fatigue    Decreased appetite or nausea    Confusion       Repeat blood work in two weeks  BP/HR stable   Start taking medications as prescribed, list printed. If you do not get these medications please call our office. Continue diet/fluid adherence  Continue daily wts.   F/U w/ Cardiology  F/U in clinic in 6 months

## 2022-04-22 ENCOUNTER — HOSPITAL ENCOUNTER (OUTPATIENT)
Age: 67
Discharge: HOME | End: 2022-04-22
Payer: COMMERCIAL

## 2022-04-22 DIAGNOSIS — I50.22 CHRONIC SYSTOLIC CHF (CONGESTIVE HEART FAILURE), NYHA CLASS 2 (HCC): ICD-10-CM

## 2022-04-22 LAB
ANION GAP SERPL CALCULATED.3IONS-SCNC: 17 MEQ/L (ref 8–16)
BUN BLDV-MCNC: 20 MG/DL (ref 7–22)
CALCIUM SERPL-MCNC: 10.2 MG/DL (ref 8.5–10.5)
CHLORIDE BLD-SCNC: 99 MEQ/L (ref 98–111)
CO2: 23 MEQ/L (ref 23–33)
CREAT SERPL-MCNC: 1 MG/DL (ref 0.4–1.2)
GFR SERPL CREATININE-BSD FRML MDRD: 75 ML/MIN/1.73M2
GLUCOSE BLD-MCNC: 182 MG/DL (ref 70–108)
MAGNESIUM: 2.1 MG/DL (ref 1.6–2.4)
POTASSIUM SERPL-SCNC: 4.7 MEQ/L (ref 3.5–5.2)
PRO-BNP: 127.1 PG/ML (ref 0–900)
SODIUM BLD-SCNC: 139 MEQ/L (ref 135–145)

## 2022-04-22 PROCEDURE — 83735 ASSAY OF MAGNESIUM: CPT

## 2022-04-22 PROCEDURE — 80048 BASIC METABOLIC PNL TOTAL CA: CPT

## 2022-04-22 PROCEDURE — 36415 COLL VENOUS BLD VENIPUNCTURE: CPT

## 2022-04-22 PROCEDURE — 83880 ASSAY OF NATRIURETIC PEPTIDE: CPT

## 2022-04-25 ENCOUNTER — TELEPHONE (OUTPATIENT)
Dept: CARDIOLOGY CLINIC | Age: 67
End: 2022-04-25

## 2022-04-25 DIAGNOSIS — I50.22 CHRONIC SYSTOLIC CHF (CONGESTIVE HEART FAILURE), NYHA CLASS 2 (HCC): Primary | ICD-10-CM

## 2022-04-25 RX ORDER — METOPROLOL SUCCINATE 25 MG/1
25 TABLET, EXTENDED RELEASE ORAL DAILY
Qty: 30 TABLET | Refills: 0 | Status: SHIPPED | OUTPATIENT
Start: 2022-04-25 | End: 2022-06-10 | Stop reason: SDUPTHER

## 2022-04-25 NOTE — TELEPHONE ENCOUNTER
Was going to add Aldactone however K is 4.7 with no k. Now that he is taking his lasix, how are his symptoms?

## 2022-04-25 NOTE — TELEPHONE ENCOUNTER
Spoke with girlfriend  Someone else signed Christoph's name for the medication delivery   The signature was not how he normally signs his name so the pharmacy is going to contact insurance for an override to get new fills for the patient and deliver meds again.   Girlfriend will contact office when they received medications

## 2022-04-25 NOTE — TELEPHONE ENCOUNTER
Patient says he has SOB. Asked if taking his Lasix, does not have??? Has not been taking  Called to pharmacy [facility], Eliquis and Lasix were picked up the same day the RX was sent. Called back to patient and GF. Insisted they do not have medications and did not pick them up as they don't have transportation. So blood work patient had done, was not based off any medication changes. Called to pharmacy again. Eliquis and Lasix were delivered on 4/13 and signed by patient. Pharmacy did confirm they did deliever Farxiga, Metoprolol, Amiodarone, Lisinopril, Lipitor, Plavix, and Imdur. Called back to patient.  No answer, UTLM

## 2022-05-16 ENCOUNTER — HOSPITAL ENCOUNTER (OUTPATIENT)
Age: 67
Discharge: HOME | End: 2022-05-16
Payer: COMMERCIAL

## 2022-05-16 DIAGNOSIS — I50.22 CHRONIC SYSTOLIC CHF (CONGESTIVE HEART FAILURE), NYHA CLASS 2 (HCC): ICD-10-CM

## 2022-05-16 LAB
ANION GAP SERPL CALCULATED.3IONS-SCNC: 17 MEQ/L (ref 8–16)
BUN BLDV-MCNC: 15 MG/DL (ref 7–22)
CALCIUM SERPL-MCNC: 9.6 MG/DL (ref 8.5–10.5)
CHLORIDE BLD-SCNC: 94 MEQ/L (ref 98–111)
CO2: 24 MEQ/L (ref 23–33)
CREAT SERPL-MCNC: 1 MG/DL (ref 0.4–1.2)
GFR SERPL CREATININE-BSD FRML MDRD: 75 ML/MIN/1.73M2
GLUCOSE BLD-MCNC: 132 MG/DL (ref 70–108)
POTASSIUM SERPL-SCNC: 4.4 MEQ/L (ref 3.5–5.2)
SODIUM BLD-SCNC: 135 MEQ/L (ref 135–145)

## 2022-05-16 PROCEDURE — 36415 COLL VENOUS BLD VENIPUNCTURE: CPT

## 2022-05-16 PROCEDURE — 80048 BASIC METABOLIC PNL TOTAL CA: CPT

## 2022-06-10 ENCOUNTER — OFFICE VISIT (OUTPATIENT)
Dept: CARDIOLOGY CLINIC | Age: 67
End: 2022-06-10
Payer: COMMERCIAL

## 2022-06-10 VITALS
WEIGHT: 244 LBS | HEART RATE: 60 BPM | HEIGHT: 73 IN | DIASTOLIC BLOOD PRESSURE: 72 MMHG | BODY MASS INDEX: 32.34 KG/M2 | SYSTOLIC BLOOD PRESSURE: 132 MMHG

## 2022-06-10 DIAGNOSIS — I73.9 PAD (PERIPHERAL ARTERY DISEASE) (HCC): Primary | ICD-10-CM

## 2022-06-10 PROCEDURE — G8417 CALC BMI ABV UP PARAM F/U: HCPCS | Performed by: INTERNAL MEDICINE

## 2022-06-10 PROCEDURE — 99214 OFFICE O/P EST MOD 30 MIN: CPT | Performed by: INTERNAL MEDICINE

## 2022-06-10 PROCEDURE — G8427 DOCREV CUR MEDS BY ELIG CLIN: HCPCS | Performed by: INTERNAL MEDICINE

## 2022-06-10 PROCEDURE — 4004F PT TOBACCO SCREEN RCVD TLK: CPT | Performed by: INTERNAL MEDICINE

## 2022-06-10 PROCEDURE — 1124F ACP DISCUSS-NO DSCNMKR DOCD: CPT | Performed by: INTERNAL MEDICINE

## 2022-06-10 PROCEDURE — 3017F COLORECTAL CA SCREEN DOC REV: CPT | Performed by: INTERNAL MEDICINE

## 2022-06-10 RX ORDER — ISOSORBIDE MONONITRATE 30 MG/1
30 TABLET, EXTENDED RELEASE ORAL DAILY
Status: ON HOLD | COMMUNITY
End: 2022-10-13 | Stop reason: HOSPADM

## 2022-06-10 RX ORDER — METOPROLOL TARTRATE 50 MG/1
50 TABLET, FILM COATED ORAL 2 TIMES DAILY
Status: ON HOLD | COMMUNITY
End: 2022-10-13 | Stop reason: HOSPADM

## 2022-06-10 NOTE — PROGRESS NOTES
[id number] [facility] [address] [facility] [address]  Dept: [phone]  Dept Fax: [phone]  Loc: [phone]    Visit Date: 6/10/2022    Mr. Valentino Sheets is a 74 y.o. male  who presented for:  No chief complaint on file. CAD ; follow up    HPI:   75 yo F m ch xof Severe CAD s/p PCI, PAD, COPD, Smoker, HTN, HLD, Seizure disorder is here after hospital follow up  Found to have Afib, placed on [address] [place] [address] Road. Does reports pains in the both legs. He states he sometimes hyatt snot feels anything in the left leg. Had vascular duplex which showed severe inflow disease. Had left lower bypass which failed in the past.. has seen Dr. Moreno Hayes 3 yrs ago. Was told his leg needs to be cut off.           Current Outpatient Medications:     isosorbide mononitrate (IMDUR) 30 MG extended release tablet, Take 30 mg by mouth daily, Disp: , Rfl:     metoprolol tartrate (LOPRESSOR) 50 MG tablet, Take 50 mg by mouth 2 times daily, Disp: , Rfl:     apixaban (ELIQUIS) 5 MG TABS tablet, Take 1 tablet by mouth 2 times daily, Disp: 180 tablet, Rfl: 3    furosemide (LASIX) 20 MG tablet, Take 1 tablet by mouth daily, Disp: 60 tablet, Rfl: 3    amiodarone (CORDARONE) 200 MG tablet, Take 1 tablet by mouth daily, Disp: 90 tablet, Rfl: 3    sodium hypochlorite (DAKINS) 0.125 % SOLN external solution, Apply topically daily, Disp: 1 each, Rfl: 0    atorvastatin (LIPITOR) 40 MG tablet, Take 40 mg by mouth daily, Disp: , Rfl:     metFORMIN (GLUCOPHAGE) 1000 MG tablet, Take 1,000 mg by mouth 2 times daily (with meals), Disp: , Rfl:     dapagliflozin (FARXIGA) 10 MG tablet, Take 1 tablet by mouth every morning, Disp: 90 tablet, Rfl: 1    clopidogrel (PLAVIX) 75 MG tablet, take 1 tablet by mouth once daily, Disp: 30 tablet, Rfl: 5    albuterol sulfate HFA (PROAIR HFA) 108 (90 BASE) MCG/ACT inhaler, Inhale 2 puffs into the lungs every 4 hours as needed for Wheezing, Disp: 1 Inhaler, Rfl: 5    gabapentin (NEURONTIN) 100 MG capsule, TAKE ONE CAPSULE BY MOUTH 3 TIMES A DAY, Disp: 90 capsule, Rfl: 5    pantoprazole (PROTONIX) 40 MG tablet, Take 1 tablet by mouth daily, Disp: 30 tablet, Rfl: 5    traZODone (DESYREL) 50 MG tablet, take 1 tablet by mouth at bedtime, Disp: 30 tablet, Rfl: 3    nitroGLYCERIN (NITROSTAT) 0.4 MG SL tablet, Place 1 tablet under the tongue every 5 minutes as needed for Chest pain, Disp: 25 tablet, Rfl: 3    Elastic Bandages & Supports (LUMBAR BACK BRACE/SUPPORT PAD) MISC, 1 each by Does not apply route daily as needed. , Disp: 1 each, Rfl: 0    Tens Unit MISC, by Does not apply route. Use as directed., Disp: 1 each, Rfl: 0    Respiratory Therapy Supplies (NEBULIZER COMPRESSOR) KIT, by Does not apply route., Disp: 1 kit, Rfl: 0    acetaminophen (TYLENOL) 325 MG tablet, Take 650 mg by mouth every 6 hours as needed. , Disp: , Rfl:     naratriptan (AMERGE) 2.5 MG tablet, Take 1 tablet by mouth once as needed for Migraine for up to 1 dose., Disp: 9 tablet, Rfl: 2    Past Medical History  Judy Willams  has a past medical history of CAD (coronary artery disease), COPD (chronic obstructive pulmonary disease) ([facility] [address] Hx of blood clots, Hyperlipidemia, Hypertension, Leg wound, left, Leg wound, right, Lumbar radiculopathy, Osteoarthritis, Seizure disorder ([facility] [address] and Spinal stenosis, lumbar. Social History  Judy Willams  reports that he has been smoking cigarettes. He has a 20.00 pack-year smoking history. He has never used smokeless tobacco. He reports that he does not drink alcohol and does not use drugs. Family History  Ruben family history includes COPD in his father; Cancer in his father; Diabetes in his brother and mother; Heart Disease in his father and sister; Kidney Disease in his mother; Stroke in his father.     Past Surgical History   Past Surgical History:   Procedure Laterality Date    ANKLE SURGERY      ball joint    BACK SURGERY  1994    CARDIAC CATHETERIZATION 2005 [address]  5/9/13    [facility]    CORONARY ANGIOPLASTY WITH STENT PLACEMENT      DIAGNOSTIC CARDIAC CATH LAB PROCEDURE  11/18/2021    FOREARM SURGERY      left    FRACTURE SURGERY Right 2000    LEG    KNEE SURGERY      left    LEG SURGERY      LUMBAR SPINE SURGERY      TOE SURGERY      left middle toe    TYMPANOSTOMY TUBE PLACEMENT  1979       Subjective:     REVIEW OF SYSTEMS  Constitutional: denies sweats, chills and fever  HENT: denies  congestion, sinus pressure, sneezing and sore throat. Eyes: denies  pain, discharge, redness and itching. Respiratory: denies apnea, cough  Gastrointestinal: denies blood in stool, constipation, diarrhea   Endocrine: denies cold intolerance, heat intolerance, polydipsia. Genitourinary: denies dysuria, enuresis, flank pain and hematuria. Musculoskeletal: denies arthralgias, joint swelling and neck pain. Neurological: denies numbness and headaches. Psychiatric/Behavioral: denies agitation, confusion, decreased concentration and dysphoric mood    All others reviewed and are negative. Objective:     /72   Pulse 60   Ht 6' 1\" (1.854 m)   Wt 244 lb (110.7 kg)   BMI 32.19 kg/m²     Wt Readings from Last 3 Encounters:   06/10/22 244 lb (110.7 kg)   04/12/22 247 lb (112 kg)   12/17/21 219 lb (99.3 kg)     BP Readings from Last 3 Encounters:   06/10/22 132/72   04/12/22 138/68   01/10/22 126/62       PHYSICAL EXAM  Constitutional: Oriented to person, place, and time. Appears well-developed and well-nourished. HENT:   Head: Normocephalic and atraumatic. Eyes: EOM are normal. Pupils are equal, round, and reactive to light. Neck: Normal range of motion. Neck supple. No JVD present. Cardiovascular: Normal rate , normal heart sounds and intact distal pulses. Pulmonary/Chest: Effort normal and breath sounds normal. No respiratory distress. No wheezes. No rales. Abdominal: Soft.  Bowel sounds are normal. No distension. There is no tenderness. Musculoskeletal: Normal range of motion. No edema. Neurological: Alert and oriented to person, place, and time. No cranial nerve deficit. Coordination normal.   Skin: Skin is warm and dry. Psychiatric: Normal mood and affect.        No results found for: CKTOTAL, CKMB, CKMBINDEX    Lab Results   Component Value Date    WBC 11.7 11/23/2021    RBC 4.36 11/23/2021    RBC 5.49 02/02/2012    HGB 11.3 11/23/2021    HCT 38.2 11/23/2021    MCV 87.6 11/23/2021    MCH 25.9 11/23/2021    MCHC 29.6 11/23/2021    RDW 15.2 03/18/2021     11/23/2021    MPV 11.1 11/23/2021       Lab Results   Component Value Date     05/16/2022    K 4.4 05/16/2022    K 3.6 11/18/2021    CL 94 05/16/2022    CO2 24 05/16/2022    BUN 15 05/16/2022    LABALBU 3.7 11/18/2021    LABALBU 4.7 02/02/2012    CREATININE 1.0 05/16/2022    CALCIUM 9.6 05/16/2022    GFRAA >60 03/18/2021    LABGLOM 75 05/16/2022    GLUCOSE 132 05/16/2022    GLUCOSE 117 02/02/2012       Lab Results   Component Value Date    ALKPHOS 81 11/18/2021    ALT 11 11/18/2021    AST 16 11/18/2021    PROT 7.7 11/18/2021    BILITOT 0.3 11/18/2021    BILIDIR <0.2 11/18/2021    LABALBU 3.7 11/18/2021    LABALBU 4.7 02/02/2012       Lab Results   Component Value Date    MG 2.1 04/22/2022       Lab Results   Component Value Date    INR 1.07 11/17/2020    INR 1.12 11/17/2020    INR 1.11 11/17/2020    PROTIME 12.3 11/17/2020    PROTIME 12.9 11/17/2020    PROTIME 12.8 11/17/2020         Lab Results   Component Value Date    LABA1C 7.1 11/20/2021       Lab Results   Component Value Date    TRIG 116 11/20/2021    HDL 25 11/20/2021    LDLCALC 48 11/20/2021       Lab Results   Component Value Date    TSH 2.090 11/20/2021         Testing Reviewed:      I haveindividually reviewed the below cardiac tests    EKG:    ECHO: Results for orders placed during the hospital encounter of 11/18/21    ECHO Complete 2D W Doppler W Color    Narrative  Transthoracic Echocardiography Report (TTE)    Demographics    Patient Name    Stephany Rodriguez  Gender               Male  C    MR #            [id number]       Race                     Ethnicity    Account #       [de-identified]       Room Number          0007    Accession       [id number]      Date of Study        11/19/2021  Number    Date of Birth   1955      Referring Physician  Dillan Parr    Age             61 year(s)      Sonographer          Jase Anderson RUST    Interpreting         Echo reader of the  Physician            tyler Miller MD    Procedure    Type of Study    TTE procedure:ECHOCARDIOGRAM COMPLETE 2D W DOPPLER W COLOR. Procedure Date  Date: 11/19/2021 Start: 11:34 AM    Study Location: Bedside  Technical Quality: Poor visualization due to poor acoustical window. Indications:Heart Failure. Additional Medical History:Smoker, Osteoarthritis, COPD, GERD, Dyslipidemia,  Coronary artery disease, Dyspnea on exertion, Hypertension, Hyperlipidemia. Patient Status: Routine    Height: 73 inches Weight: 225 pounds BSA: 2.26 m^2 BMI: 29.69 kg/m^2    BP: 125/84 mmHg    Conclusions    Summary  Technically difficult examination. Ejection fraction is visually estimated at 35%. There was moderate global hypokinesis of the left ventricle. The aortic valve leaflets were not well visualized. Aortic valve appears tricuspid. Aortic valve leaflets are somewhat thickened. Signature    ----------------------------------------------------------------  Electronically signed by Sarnaya Miller MD (Interpreting  physician) on 11/19/2021 at 04:19 PM  ----------------------------------------------------------------    Findings    Mitral Valve  Trace mitral regurgitation is present. Aortic Valve  The aortic valve leaflets were not well visualized. Aortic valve appears tricuspid. Aortic valve leaflets are somewhat thickened.     Tricuspid Valve  Trivial tricuspid 57.4  MR Velocity: 486 cm/s                             cm/s  PV Peak Gradient: 1.32  mmHg    [email or web address]      STRESS:    CATH:    Assessment/Plan       Diagnosis Orders   1. PAD (peripheral artery disease) (HCC)       Severe bilateral leg pains  Severe CAD, RCA   Afib  COPD  Smoker    Advised patient to call office or seek immediate medical attention if there is any new onset of  any chest pain, sob, palpitations, lightheadedness, dizziness, orthopnea, PND or pedal edema. EF is 40-45%  Reviewed ABIs  Absent pulses  Failed fem-pop on left  Needs CTA with runoff to evlauate further  Will likley also need IR angiogram post runoff  The patient is asked to make an attempt to improve diet and exercise patterns to aid in medical management of this problem. Advised more plant based nutrition/meditarrean diet   Advised patient to call office or seek immediate medical attention if there is any new onset of  any chest pain, sob, palpitations, lightheadedness, dizziness, orthopnea, PND or pedal edema. All medication side effects were discussed in details. Thank youfor allowing me to participate in the care of this patient. Please do not hesitate to contact me for any further questions. Return in about 4 weeks (around 7/8/2022), or if symptoms worsen or fail to improve, for Review testing, Regular follow up.        Electronically signed by Ravi Boland MD [facility]  6/10/2022 at 12:01 PM EDT

## 2022-06-27 ENCOUNTER — HOSPITAL ENCOUNTER (OUTPATIENT)
Dept: CT IMAGING | Age: 67
Discharge: HOME | End: 2022-06-27
Payer: COMMERCIAL

## 2022-06-27 DIAGNOSIS — I73.9 PAD (PERIPHERAL ARTERY DISEASE) (HCC): ICD-10-CM

## 2022-06-27 LAB — POC CREATININE WHOLE BLOOD: 1.3 MG/DL (ref 0.5–1.2)

## 2022-06-27 PROCEDURE — 6360000004 HC RX CONTRAST MEDICATION: Performed by: INTERNAL MEDICINE

## 2022-06-27 PROCEDURE — 82565 ASSAY OF CREATININE: CPT

## 2022-06-27 PROCEDURE — 75635 CT ANGIO ABDOMINAL ARTERIES: CPT

## 2022-06-27 RX ADMIN — IOPAMIDOL 120 ML: 755 INJECTION, SOLUTION INTRAVENOUS at 13:07

## 2022-09-29 ENCOUNTER — OFFICE VISIT (OUTPATIENT)
Dept: CARDIOLOGY CLINIC | Age: 67
End: 2022-09-29
Payer: COMMERCIAL

## 2022-09-29 VITALS
BODY MASS INDEX: 32.19 KG/M2 | DIASTOLIC BLOOD PRESSURE: 60 MMHG | SYSTOLIC BLOOD PRESSURE: 128 MMHG | HEIGHT: 73 IN | HEART RATE: 76 BPM

## 2022-09-29 DIAGNOSIS — L03.90 CELLULITIS, UNSPECIFIED CELLULITIS SITE: ICD-10-CM

## 2022-09-29 DIAGNOSIS — I73.9 PAD (PERIPHERAL ARTERY DISEASE) (HCC): Primary | ICD-10-CM

## 2022-09-29 PROCEDURE — 1124F ACP DISCUSS-NO DSCNMKR DOCD: CPT | Performed by: INTERNAL MEDICINE

## 2022-09-29 PROCEDURE — 99214 OFFICE O/P EST MOD 30 MIN: CPT | Performed by: INTERNAL MEDICINE

## 2022-09-29 PROCEDURE — G8427 DOCREV CUR MEDS BY ELIG CLIN: HCPCS | Performed by: INTERNAL MEDICINE

## 2022-09-29 PROCEDURE — G8417 CALC BMI ABV UP PARAM F/U: HCPCS | Performed by: INTERNAL MEDICINE

## 2022-09-29 PROCEDURE — 4004F PT TOBACCO SCREEN RCVD TLK: CPT | Performed by: INTERNAL MEDICINE

## 2022-09-29 PROCEDURE — 3017F COLORECTAL CA SCREEN DOC REV: CPT | Performed by: INTERNAL MEDICINE

## 2022-09-29 NOTE — PROGRESS NOTES
[id number] [facility] [address] [facility] [address]  Dept: [phone]  Dept Fax: [phone]  Loc: [phone]    Visit Date: 9/29/2022    Mr. Gael Hu is a 74 y.o. male  who presented for:  Chief Complaint   Patient presents with    Check-Up    Congestive Heart Failure     PAD      CAD ; follow up    HPI:   75 yo M ch xof Severe CAD s/p PCI, PAD, COPD, Smoker, HTN, HLD, Seizure disorder is here after hospital follow up. Found to have Afib, placed on [address]. Does reports pains in the both legs. He states he sometimes hyatt snot feels anything in the left leg. Had vascular duplex which showed severe inflow disease. Had left lower bypass which failed in the past.. has seen Dr. Chelly Staley 3 yrs ago. Was told his leg needs to be cut off. He underwent CTA with LE runoff in June 27th which showed occluded Left bypass with collateral flow. He comes today with both legs with very high likely of cellulitis. Thinks this has started about about in mid July. He cannot go to wound care clinic.           Current Outpatient Medications:     isosorbide mononitrate (IMDUR) 30 MG extended release tablet, Take 30 mg by mouth daily, Disp: , Rfl:     metoprolol tartrate (LOPRESSOR) 50 MG tablet, Take 50 mg by mouth 2 times daily, Disp: , Rfl:     apixaban (ELIQUIS) 5 MG TABS tablet, Take 1 tablet by mouth 2 times daily, Disp: 180 tablet, Rfl: 3    furosemide (LASIX) 20 MG tablet, Take 1 tablet by mouth daily, Disp: 60 tablet, Rfl: 3    amiodarone (CORDARONE) 200 MG tablet, Take 1 tablet by mouth daily, Disp: 90 tablet, Rfl: 3    atorvastatin (LIPITOR) 40 MG tablet, Take 40 mg by mouth daily, Disp: , Rfl:     metFORMIN (GLUCOPHAGE) 1000 MG tablet, Take 1,000 mg by mouth 2 times daily (with meals), Disp: , Rfl:     dapagliflozin (FARXIGA) 10 MG tablet, Take 1 tablet by mouth every morning, Disp: 90 tablet, Rfl: 1    clopidogrel (PLAVIX) 75 MG tablet, take 1 tablet by mouth once daily, Disp: 30 tablet, Rfl: 5    albuterol sulfate HFA (PROAIR HFA) 108 (90 BASE) MCG/ACT inhaler, Inhale 2 puffs into the lungs every 4 hours as needed for Wheezing, Disp: 1 Inhaler, Rfl: 5    gabapentin (NEURONTIN) 100 MG capsule, TAKE ONE CAPSULE BY MOUTH 3 TIMES A DAY, Disp: 90 capsule, Rfl: 5    pantoprazole (PROTONIX) 40 MG tablet, Take 1 tablet by mouth daily, Disp: 30 tablet, Rfl: 5    traZODone (DESYREL) 50 MG tablet, take 1 tablet by mouth at bedtime, Disp: 30 tablet, Rfl: 3    nitroGLYCERIN (NITROSTAT) 0.4 MG SL tablet, Place 1 tablet under the tongue every 5 minutes as needed for Chest pain, Disp: 25 tablet, Rfl: 3    acetaminophen (TYLENOL) 325 MG tablet, Take 650 mg by mouth every 6 hours as needed. , Disp: , Rfl:     naratriptan (AMERGE) 2.5 MG tablet, Take 1 tablet by mouth once as needed for Migraine for up to 1 dose., Disp: 9 tablet, Rfl: 2    Elastic Bandages & Supports (LUMBAR BACK BRACE/SUPPORT PAD) MISC, 1 each by Does not apply route daily as needed. , Disp: 1 each, Rfl: 0    Tens Unit MISC, by Does not apply route. Use as directed., Disp: 1 each, Rfl: 0    Respiratory Therapy Supplies (NEBULIZER COMPRESSOR) KIT, by Does not apply route., Disp: 1 kit, Rfl: 0    Past Medical History  Matthew Herrera  has a past medical history of CAD (coronary artery disease), COPD (chronic obstructive pulmonary disease) ([facility] [address] Hx of blood clots, Hyperlipidemia, Hypertension, Leg wound, left, Leg wound, right, Lumbar radiculopathy, Osteoarthritis, Seizure disorder ([address] and Spinal stenosis, lumbar. Social History  Matthew Herrera  reports that he has been smoking cigarettes. He has a 20.00 pack-year smoking history. He has never used smokeless tobacco. He reports that he does not drink alcohol and does not use drugs.     Family History  Ruben family history includes COPD in his father; Cancer in his father; Diabetes in his brother and mother; Heart Disease in his father and sister; Kidney Disease in his Pulmonary/Chest: Effort normal and breath sounds normal. No respiratory distress. No wheezes. No rales. Abdominal: Soft. Bowel sounds are normal. No distension. There is no tenderness. Musculoskeletal: Normal range of motion. No edema. Neurological: Alert and oriented to person, place, and time. No cranial nerve deficit. Coordination normal.   Skin: Skin is warm and dry. Psychiatric: Normal mood and affect.        No results found for: CKTOTAL, CKMB, CKMBINDEX    Lab Results   Component Value Date/Time    WBC 11.7 11/23/2021 04:13 AM    RBC 4.36 11/23/2021 04:13 AM    RBC 5.49 02/02/2012 11:40 AM    HGB 11.3 11/23/2021 04:13 AM    HCT 38.2 11/23/2021 04:13 AM    MCV 87.6 11/23/2021 04:13 AM    MCH 25.9 11/23/2021 04:13 AM    MCHC 29.6 11/23/2021 04:13 AM    RDW 15.2 03/18/2021 12:01 PM     11/23/2021 04:13 AM    MPV 11.1 11/23/2021 04:13 AM       Lab Results   Component Value Date/Time     05/16/2022 02:00 PM    K 4.4 05/16/2022 02:00 PM    K 3.6 11/18/2021 07:22 PM    CL 94 05/16/2022 02:00 PM    CO2 24 05/16/2022 02:00 PM    BUN 15 05/16/2022 02:00 PM    LABALBU 3.7 11/18/2021 07:22 PM    LABALBU 4.7 02/02/2012 11:40 AM    CREATININE 1.0 05/16/2022 02:00 PM    CALCIUM 9.6 05/16/2022 02:00 PM    GFRAA >60 03/18/2021 12:01 PM    LABGLOM 75 05/16/2022 02:00 PM    GLUCOSE 132 05/16/2022 02:00 PM    GLUCOSE 117 02/02/2012 11:40 AM       Lab Results   Component Value Date/Time    ALKPHOS 81 11/18/2021 07:22 PM    ALT 11 11/18/2021 07:22 PM    AST 16 11/18/2021 07:22 PM    PROT 7.7 11/18/2021 07:22 PM    BILITOT 0.3 11/18/2021 07:22 PM    BILIDIR <0.2 11/18/2021 07:22 PM    LABALBU 3.7 11/18/2021 07:22 PM    LABALBU 4.7 02/02/2012 11:40 AM       Lab Results   Component Value Date/Time    MG 2.1 04/22/2022 02:02 PM       Lab Results   Component Value Date    INR 1.07 11/17/2020    INR 1.12 11/17/2020    INR 1.11 11/17/2020    PROTIME 12.3 11/17/2020    PROTIME 12.9 11/17/2020    PROTIME 12.8 11/17/2020         Lab Results   Component Value Date/Time    LABA1C 7.1 11/20/2021 12:30 PM       Lab Results   Component Value Date/Time    TRIG 116 11/20/2021 12:30 PM    HDL 25 11/20/2021 12:30 PM    LDLCALC 48 11/20/2021 12:30 PM       Lab Results   Component Value Date/Time    TSH 2.090 11/20/2021 12:30 PM         Testing Reviewed:      I haveindividually reviewed the below cardiac tests    EKG:    ECHO: Results for orders placed during the hospital encounter of 11/18/21    ECHO Complete 2D W Doppler W Color    Narrative  Transthoracic Echocardiography Report (TTE)    Demographics    Patient Name    Earney Head  Gender               Male  C    MR #            [id number]       Race                     Ethnicity    Account #       [de-identified]       Room Number          0007    Accession       [id number]      Date of Study        11/19/2021  Number    Date of Birth   1955      Referring Physician  Yesika Park    Age             61 year(s)      Sonographer          Amalia Paiz RDCS    Interpreting         Echo reader of the  Physician            week Calla Lesch MD    Procedure    Type of Study    TTE procedure:ECHOCARDIOGRAM COMPLETE 2D W DOPPLER W COLOR. Procedure Date  Date: 11/19/2021 Start: 11:34 AM    Study Location: Bedside  Technical Quality: Poor visualization due to poor acoustical window. Indications:Heart Failure. Additional Medical History:Smoker, Osteoarthritis, COPD, GERD, Dyslipidemia,  Coronary artery disease, Dyspnea on exertion, Hypertension, Hyperlipidemia. Patient Status: Routine    Height: 73 inches Weight: 225 pounds BSA: 2.26 m^2 BMI: 29.69 kg/m^2    BP: 125/84 mmHg    Conclusions    Summary  Technically difficult examination. Ejection fraction is visually estimated at 35%. There was moderate global hypokinesis of the left ventricle. The aortic valve leaflets were not well visualized. Aortic valve appears tricuspid.   Aortic valve leaflets are somewhat thickened. Signature    ----------------------------------------------------------------  Electronically signed by Jason Muniz MD (Interpreting  physician) on 11/19/2021 at 04:19 PM  ----------------------------------------------------------------    Findings    Mitral Valve  Trace mitral regurgitation is present. Aortic Valve  The aortic valve leaflets were not well visualized. Aortic valve appears tricuspid. Aortic valve leaflets are somewhat thickened. Tricuspid Valve  Trivial tricuspid regurgitation visualized. Pulmonic Valve  The pulmonic valve was not well visualized . Trivial pulmonic regurgitation visualized. Left Atrium  Left atrial size was normal.    Left Ventricle  Ejection fraction is visually estimated at 35%. There was moderate global hypokinesis of the left ventricle. Right Atrium  Right atrial size was normal.    Right Ventricle  The right ventricular size was normal with normal systolic function and  wall thickness. Pericardial Effusion  The pericardium was normal in appearance with no evidence of a pericardial  effusion. Pleural Effusion  No evidence of pleural effusion. Aorta / Great Vessels  -Aortic root dimension within normal limits.  -The Pulmonary artery is within normal limits. -IVC size is within normal limits with normal respiratory phasic changes.     M-Mode/2D Measurements & Calculations    LV Diastolic    LV Systolic Dimension: 4.4  AV Cusp Separation: 2.2 cmAO  Dimension: 5.3  cm                          Root Dimension: 3.2 cmLA Area:  cm              LV Volume Diastolic: 383.2  65.7 cm^2  LV FS:17 %      ml  LV PW           LV Volume Systolic: 53.0 ml  Diastolic: 1 cm LV EDV/LV EDV Index: 147.1  Septum          ml/65 m^2LV ESV/LV ESV      RV Diastolic Dimension: 3 cm  Diastolic: 1 cm Index: 52.9 ml/42 m^2  EF Calculated: 35.8 %  LA volume/Index: 47.7 ml  LV Length: 9.2 cm           /21m^2  LV Area  Diastolic: 39.9  cm^2  LV Area  Systolic: 31  cm^2    Doppler Measurements & Calculations    MV Peak E-Wave: 114 cm/s  AV Peak Velocity: 116   LVOT Peak Velocity: 72  cm/s                    cm/s  MV Peak Gradient: 5.2     AV Peak Gradient: 5.38  LVOT Peak Gradient: 2  mmHg                      mmHg                    mmHg    MV Deceleration Time: 183                         TV Peak E-Wave: 63.8  msec                                              cm/s    IVRT: 53 msec           TV Peak Gradient: 1.63  mmHg    AV DVI (Vmax):0.62      PV Peak Velocity: 57.4  MR Velocity: 486 cm/s                             cm/s  PV Peak Gradient: 1.32  mmHg    [email or web address]      STRESS:    CATH:    Assessment/Plan       Diagnosis Orders   1. PAD (peripheral artery disease) ([facility] [address]        2. Cellulitis, unspecified cellulitis site            Severe bilateral leg pains and wounds/ Cellulitis  PAD with occluded LLE fem-popl bypass  Severe CAD, RCA   Afib  COPD  Smoker    Non-compliant patient  Discussed about ER admission and IV antibiotics  Patient does nto want to go  Wants to go home and come back tomorrow  Needs peripheral angiogram but first needs infection of bilateral feet treated  Needs wound care and pain management  EF is 40-45%  Failed fem-pop on left  The patient is asked to make an attempt to improve diet and exercise patterns to aid in medical management of this problem. Advised more plant based nutrition/meditarrean diet   Advised patient to call office or seek immediate medical attention if there is any new onset of  any chest pain, sob, palpitations, lightheadedness, dizziness, orthopnea, PND or pedal edema. All medication side effects were discussed in details. Thank youfor allowing me to participate in the care of this patient. Please do not hesitate to contact me for any further questions. Return in about 6 months (around 3/29/2023), or if symptoms worsen or fail to improve, for Review testing, Regular follow up.        Electronically signed by Shea Jacob MD [address]  9/29/2022 at 12:01 PM EDT

## 2022-09-29 NOTE — PROGRESS NOTES
Pt here for 3 mo check up     Pt c/o both feet, sores, red, seeping, sores on bottom also , pain bilateral up to knees,notice sores for a over a month

## 2022-10-03 ENCOUNTER — HOSPITAL ENCOUNTER (INPATIENT)
Age: 67
LOS: 10 days | Discharge: HOME | DRG: 710 | End: 2022-10-13
Attending: EMERGENCY MEDICINE | Admitting: HOSPITALIST
Payer: COMMERCIAL

## 2022-10-03 ENCOUNTER — APPOINTMENT (OUTPATIENT)
Dept: GENERAL RADIOLOGY | Age: 67
DRG: 710 | End: 2022-10-03
Payer: COMMERCIAL

## 2022-10-03 ENCOUNTER — APPOINTMENT (OUTPATIENT)
Dept: CT IMAGING | Age: 67
DRG: 710 | End: 2022-10-03
Payer: COMMERCIAL

## 2022-10-03 DIAGNOSIS — L03.116 CELLULITIS OF LEFT LOWER EXTREMITY: ICD-10-CM

## 2022-10-03 DIAGNOSIS — L03.115 CELLULITIS OF RIGHT LOWER EXTREMITY: Primary | ICD-10-CM

## 2022-10-03 DIAGNOSIS — I73.9 SEVERE PERIPHERAL ARTERIAL DISEASE (HCC): ICD-10-CM

## 2022-10-03 DIAGNOSIS — M79.604 PAIN IN BOTH LOWER EXTREMITIES: ICD-10-CM

## 2022-10-03 DIAGNOSIS — M79.605 PAIN IN BOTH LOWER EXTREMITIES: ICD-10-CM

## 2022-10-03 DIAGNOSIS — S88.912A AMPUTATION OF LEFT LOWER EXTREMITY, INITIAL ENCOUNTER (HCC): ICD-10-CM

## 2022-10-03 LAB
ALBUMIN SERPL-MCNC: 2.5 G/DL (ref 3.5–5.1)
ALP BLD-CCNC: 103 U/L (ref 38–126)
ALT SERPL-CCNC: 11 U/L (ref 11–66)
ANION GAP SERPL CALCULATED.3IONS-SCNC: 17 MEQ/L (ref 8–16)
AST SERPL-CCNC: 13 U/L (ref 5–40)
AVERAGE GLUCOSE: 216 MG/DL (ref 70–126)
BASOPHILS # BLD: 0.3 %
BASOPHILS ABSOLUTE: 0.1 THOU/MM3 (ref 0–0.1)
BILIRUB SERPL-MCNC: 0.3 MG/DL (ref 0.3–1.2)
BILIRUBIN DIRECT: < 0.2 MG/DL (ref 0–0.3)
BUN BLDV-MCNC: 6 MG/DL (ref 7–22)
C-REACTIVE PROTEIN: 26.66 MG/DL (ref 0–1)
CALCIUM SERPL-MCNC: 9.1 MG/DL (ref 8.5–10.5)
CHLORIDE BLD-SCNC: 99 MEQ/L (ref 98–111)
CHP ED QC CHECK: YES
CO2: 24 MEQ/L (ref 23–33)
CREAT SERPL-MCNC: 0.9 MG/DL (ref 0.4–1.2)
EOSINOPHIL # BLD: 0.2 %
EOSINOPHILS ABSOLUTE: 0 THOU/MM3 (ref 0–0.4)
ERYTHROCYTE [DISTWIDTH] IN BLOOD BY AUTOMATED COUNT: 18.4 % (ref 11.5–14.5)
ERYTHROCYTE [DISTWIDTH] IN BLOOD BY AUTOMATED COUNT: 50.8 FL (ref 35–45)
GFR SERPL CREATININE-BSD FRML MDRD: 85 ML/MIN/1.73M2
GLUCOSE BLD-MCNC: 173 MG/DL (ref 70–108)
GLUCOSE BLD-MCNC: 202 MG/DL
GLUCOSE BLD-MCNC: 202 MG/DL (ref 70–108)
HBA1C MFR BLD: 9.2 % (ref 4.4–6.4)
HCT VFR BLD CALC: 40.1 % (ref 42–52)
HEMOGLOBIN: 12.6 GM/DL (ref 14–18)
IMMATURE GRANS (ABS): 0.23 THOU/MM3 (ref 0–0.07)
IMMATURE GRANULOCYTES: 1.3 %
LACTIC ACID: 3.6 MMOL/L (ref 0.5–2)
LYMPHOCYTES # BLD: 8.9 %
LYMPHOCYTES ABSOLUTE: 1.6 THOU/MM3 (ref 1–4.8)
MAGNESIUM: 1.6 MG/DL (ref 1.6–2.4)
MCH RBC QN AUTO: 25.4 PG (ref 26–33)
MCHC RBC AUTO-ENTMCNC: 31.4 GM/DL (ref 32.2–35.5)
MCV RBC AUTO: 80.7 FL (ref 80–94)
MONOCYTES # BLD: 6.4 %
MONOCYTES ABSOLUTE: 1.1 THOU/MM3 (ref 0.4–1.3)
NUCLEATED RED BLOOD CELLS: 0 /100 WBC
PLATELET # BLD: 556 THOU/MM3 (ref 130–400)
PMV BLD AUTO: 9.3 FL (ref 9.4–12.4)
POTASSIUM REFLEX MAGNESIUM: 3.3 MEQ/L (ref 3.5–5.2)
RBC # BLD: 4.97 MILL/MM3 (ref 4.7–6.1)
SEDIMENTATION RATE, ERYTHROCYTE: 90 MM/HR (ref 0–10)
SEG NEUTROPHILS: 82.9 %
SEGMENTED NEUTROPHILS ABSOLUTE COUNT: 14.6 THOU/MM3 (ref 1.8–7.7)
SODIUM BLD-SCNC: 140 MEQ/L (ref 135–145)
TOTAL PROTEIN: 7.6 G/DL (ref 6.1–8)
WBC # BLD: 17.6 THOU/MM3 (ref 4.8–10.8)

## 2022-10-03 PROCEDURE — 80076 HEPATIC FUNCTION PANEL: CPT

## 2022-10-03 PROCEDURE — 73600 X-RAY EXAM OF ANKLE: CPT

## 2022-10-03 PROCEDURE — 83036 HEMOGLOBIN GLYCOSYLATED A1C: CPT

## 2022-10-03 PROCEDURE — 87205 SMEAR GRAM STAIN: CPT

## 2022-10-03 PROCEDURE — 2060000000 HC ICU INTERMEDIATE R&B

## 2022-10-03 PROCEDURE — 87040 BLOOD CULTURE FOR BACTERIA: CPT

## 2022-10-03 PROCEDURE — 2580000003 HC RX 258

## 2022-10-03 PROCEDURE — 83550 IRON BINDING TEST: CPT

## 2022-10-03 PROCEDURE — 85651 RBC SED RATE NONAUTOMATED: CPT

## 2022-10-03 PROCEDURE — 73590 X-RAY EXAM OF LOWER LEG: CPT

## 2022-10-03 PROCEDURE — 36415 COLL VENOUS BLD VENIPUNCTURE: CPT

## 2022-10-03 PROCEDURE — 93005 ELECTROCARDIOGRAM TRACING: CPT

## 2022-10-03 PROCEDURE — 82728 ASSAY OF FERRITIN: CPT

## 2022-10-03 PROCEDURE — 83605 ASSAY OF LACTIC ACID: CPT

## 2022-10-03 PROCEDURE — 99285 EMERGENCY DEPT VISIT HI MDM: CPT

## 2022-10-03 PROCEDURE — 87147 CULTURE TYPE IMMUNOLOGIC: CPT

## 2022-10-03 PROCEDURE — 83735 ASSAY OF MAGNESIUM: CPT

## 2022-10-03 PROCEDURE — 86140 C-REACTIVE PROTEIN: CPT

## 2022-10-03 PROCEDURE — 6360000004 HC RX CONTRAST MEDICATION: Performed by: EMERGENCY MEDICINE

## 2022-10-03 PROCEDURE — 75635 CT ANGIO ABDOMINAL ARTERIES: CPT

## 2022-10-03 PROCEDURE — 6370000000 HC RX 637 (ALT 250 FOR IP)

## 2022-10-03 PROCEDURE — 87070 CULTURE OTHR SPECIMN AEROBIC: CPT

## 2022-10-03 PROCEDURE — 73620 X-RAY EXAM OF FOOT: CPT

## 2022-10-03 PROCEDURE — 80048 BASIC METABOLIC PNL TOTAL CA: CPT

## 2022-10-03 PROCEDURE — 85025 COMPLETE CBC W/AUTO DIFF WBC: CPT

## 2022-10-03 PROCEDURE — 83540 ASSAY OF IRON: CPT

## 2022-10-03 PROCEDURE — 82948 REAGENT STRIP/BLOOD GLUCOSE: CPT

## 2022-10-03 PROCEDURE — 6360000002 HC RX W HCPCS: Performed by: EMERGENCY MEDICINE

## 2022-10-03 PROCEDURE — 87186 SC STD MICRODIL/AGAR DIL: CPT

## 2022-10-03 PROCEDURE — 87075 CULTR BACTERIA EXCEPT BLOOD: CPT

## 2022-10-03 PROCEDURE — 87077 CULTURE AEROBIC IDENTIFY: CPT

## 2022-10-03 PROCEDURE — 96374 THER/PROPH/DIAG INJ IV PUSH: CPT

## 2022-10-03 RX ORDER — INSULIN LISPRO 100 [IU]/ML
0-4 INJECTION, SOLUTION INTRAVENOUS; SUBCUTANEOUS
Status: DISCONTINUED | OUTPATIENT
Start: 2022-10-04 | End: 2022-10-04

## 2022-10-03 RX ORDER — POLYETHYLENE GLYCOL 3350 17 G/17G
17 POWDER, FOR SOLUTION ORAL DAILY PRN
Status: DISCONTINUED | OUTPATIENT
Start: 2022-10-03 | End: 2022-10-08

## 2022-10-03 RX ORDER — TRAZODONE HYDROCHLORIDE 50 MG/1
50 TABLET ORAL NIGHTLY PRN
Status: DISCONTINUED | OUTPATIENT
Start: 2022-10-04 | End: 2022-10-13 | Stop reason: HOSPADM

## 2022-10-03 RX ORDER — ATORVASTATIN CALCIUM 40 MG/1
40 TABLET, FILM COATED ORAL DAILY
Status: DISCONTINUED | OUTPATIENT
Start: 2022-10-04 | End: 2022-10-13 | Stop reason: HOSPADM

## 2022-10-03 RX ORDER — ONDANSETRON 4 MG/1
4 TABLET, ORALLY DISINTEGRATING ORAL EVERY 8 HOURS PRN
Status: DISCONTINUED | OUTPATIENT
Start: 2022-10-03 | End: 2022-10-13 | Stop reason: HOSPADM

## 2022-10-03 RX ORDER — INSULIN LISPRO 100 [IU]/ML
0-4 INJECTION, SOLUTION INTRAVENOUS; SUBCUTANEOUS NIGHTLY
Status: DISCONTINUED | OUTPATIENT
Start: 2022-10-03 | End: 2022-10-04

## 2022-10-03 RX ORDER — ALBUTEROL SULFATE 90 UG/1
2 AEROSOL, METERED RESPIRATORY (INHALATION) EVERY 4 HOURS PRN
Status: DISCONTINUED | OUTPATIENT
Start: 2022-10-03 | End: 2022-10-11

## 2022-10-03 RX ORDER — PANTOPRAZOLE SODIUM 40 MG/1
40 TABLET, DELAYED RELEASE ORAL
Status: DISCONTINUED | OUTPATIENT
Start: 2022-10-04 | End: 2022-10-13 | Stop reason: HOSPADM

## 2022-10-03 RX ORDER — SODIUM CHLORIDE, SODIUM LACTATE, POTASSIUM CHLORIDE, CALCIUM CHLORIDE 600; 310; 30; 20 MG/100ML; MG/100ML; MG/100ML; MG/100ML
INJECTION, SOLUTION INTRAVENOUS CONTINUOUS
Status: DISCONTINUED | OUTPATIENT
Start: 2022-10-03 | End: 2022-10-05

## 2022-10-03 RX ORDER — ISOSORBIDE MONONITRATE 30 MG/1
30 TABLET, EXTENDED RELEASE ORAL DAILY
Status: DISCONTINUED | OUTPATIENT
Start: 2022-10-04 | End: 2022-10-06

## 2022-10-03 RX ORDER — SODIUM CHLORIDE 0.9 % (FLUSH) 0.9 %
10 SYRINGE (ML) INJECTION EVERY 12 HOURS SCHEDULED
Status: DISCONTINUED | OUTPATIENT
Start: 2022-10-03 | End: 2022-10-13 | Stop reason: HOSPADM

## 2022-10-03 RX ORDER — METOPROLOL TARTRATE 50 MG/1
50 TABLET, FILM COATED ORAL 2 TIMES DAILY
Status: DISCONTINUED | OUTPATIENT
Start: 2022-10-03 | End: 2022-10-07

## 2022-10-03 RX ORDER — SODIUM CHLORIDE 9 MG/ML
INJECTION, SOLUTION INTRAVENOUS PRN
Status: DISCONTINUED | OUTPATIENT
Start: 2022-10-03 | End: 2022-10-13 | Stop reason: HOSPADM

## 2022-10-03 RX ORDER — NICOTINE 21 MG/24HR
1 PATCH, TRANSDERMAL 24 HOURS TRANSDERMAL DAILY
Status: DISCONTINUED | OUTPATIENT
Start: 2022-10-04 | End: 2022-10-04

## 2022-10-03 RX ORDER — ONDANSETRON 2 MG/ML
4 INJECTION INTRAMUSCULAR; INTRAVENOUS EVERY 6 HOURS PRN
Status: DISCONTINUED | OUTPATIENT
Start: 2022-10-03 | End: 2022-10-13 | Stop reason: HOSPADM

## 2022-10-03 RX ORDER — AMIODARONE HYDROCHLORIDE 200 MG/1
200 TABLET ORAL DAILY
Status: DISCONTINUED | OUTPATIENT
Start: 2022-10-04 | End: 2022-10-05

## 2022-10-03 RX ORDER — FUROSEMIDE 20 MG/1
20 TABLET ORAL DAILY
Status: DISCONTINUED | OUTPATIENT
Start: 2022-10-04 | End: 2022-10-10

## 2022-10-03 RX ORDER — SODIUM CHLORIDE 0.9 % (FLUSH) 0.9 %
10 SYRINGE (ML) INJECTION PRN
Status: DISCONTINUED | OUTPATIENT
Start: 2022-10-03 | End: 2022-10-13 | Stop reason: HOSPADM

## 2022-10-03 RX ORDER — MORPHINE SULFATE 4 MG/ML
4 INJECTION, SOLUTION INTRAMUSCULAR; INTRAVENOUS EVERY 4 HOURS PRN
Status: DISCONTINUED | OUTPATIENT
Start: 2022-10-03 | End: 2022-10-13 | Stop reason: HOSPADM

## 2022-10-03 RX ORDER — GABAPENTIN 100 MG/1
100 CAPSULE ORAL 3 TIMES DAILY
Status: DISCONTINUED | OUTPATIENT
Start: 2022-10-03 | End: 2022-10-13 | Stop reason: HOSPADM

## 2022-10-03 RX ORDER — ACETAMINOPHEN 325 MG/1
650 TABLET ORAL EVERY 6 HOURS PRN
Status: DISCONTINUED | OUTPATIENT
Start: 2022-10-03 | End: 2022-10-13 | Stop reason: HOSPADM

## 2022-10-03 RX ORDER — DEXTROSE MONOHYDRATE 100 MG/ML
INJECTION, SOLUTION INTRAVENOUS CONTINUOUS PRN
Status: DISCONTINUED | OUTPATIENT
Start: 2022-10-03 | End: 2022-10-13 | Stop reason: HOSPADM

## 2022-10-03 RX ADMIN — MORPHINE SULFATE 4 MG: 4 INJECTION, SOLUTION INTRAMUSCULAR; INTRAVENOUS at 20:01

## 2022-10-03 RX ADMIN — METOPROLOL TARTRATE 50 MG: 50 TABLET, FILM COATED ORAL at 23:37

## 2022-10-03 RX ADMIN — IOPAMIDOL 80 ML: 755 INJECTION, SOLUTION INTRAVENOUS at 21:18

## 2022-10-03 RX ADMIN — SODIUM CHLORIDE, PRESERVATIVE FREE 10 ML: 5 INJECTION INTRAVENOUS at 23:37

## 2022-10-03 RX ADMIN — GABAPENTIN 100 MG: 100 CAPSULE ORAL at 23:37

## 2022-10-03 ASSESSMENT — PAIN DESCRIPTION - DESCRIPTORS: DESCRIPTORS: ACHING;SORE

## 2022-10-03 ASSESSMENT — PAIN - FUNCTIONAL ASSESSMENT: PAIN_FUNCTIONAL_ASSESSMENT: PREVENTS OR INTERFERES SOME ACTIVE ACTIVITIES AND ADLS

## 2022-10-03 ASSESSMENT — PAIN DESCRIPTION - ORIENTATION: ORIENTATION: RIGHT;LEFT

## 2022-10-03 ASSESSMENT — PAIN SCALES - GENERAL
PAINLEVEL_OUTOF10: 5
PAINLEVEL_OUTOF10: 10

## 2022-10-03 ASSESSMENT — ENCOUNTER SYMPTOMS
COLOR CHANGE: 1
WHEEZING: 0
SHORTNESS OF BREATH: 1
EYE REDNESS: 0
ABDOMINAL PAIN: 1
RHINORRHEA: 1
EYE PAIN: 0
DIARRHEA: 1

## 2022-10-03 ASSESSMENT — PAIN DESCRIPTION - PAIN TYPE: TYPE: ACUTE PAIN

## 2022-10-03 ASSESSMENT — PAIN DESCRIPTION - LOCATION: LOCATION: BUTTOCKS;LEG;BACK

## 2022-10-03 NOTE — ED NOTES
Assumed care at this time. Lab at bedside. Pt resting on cot. No distress noted.       Weston Almanza RN  10/03/22 1921

## 2022-10-03 NOTE — ED PROVIDER NOTES
[place] ENCOUNTER          Pt Name: Anya Keys  MRN: [id number]  [place] 1955  Date of evaluation: 10/3/2022  Treating Resident Physician: Jocelin Corado DO  Supervising Physician: Odalis Soliman DO    History obtained from the patient. CHIEF COMPLAINT       Chief Complaint   Patient presents with    Wound Infection    Leg Pain           HISTORY OF PRESENT ILLNESS    HPI  Anya Keys is a 74 y.o. male with a h/o PAD, COPD, ulcers, DM, CAD, and noncompliance who presents to the emergency department for evaluation of severely infected BLE x1 week. Pt with red, weeping sores up to the mid tibia, and foul smell. Also has sacral decubitus ulcer that is weeping. Pt says that his legs have been getting worse over the past week. He c/o abdominal pain and profuse diarrhea. Per chart review these sores have been present for over a month. Has had vascular duplex in the past which showed severe inflow disease. Has had left lower bypass in the past which failed. 3 years ago he was told he needed to have his leg cut off. Pt denies fever, chills, chest pain. The patient has no other acute complaints at this time. REVIEW OF SYSTEMS   Review of Systems   Constitutional:  Negative for chills and fever. HENT:  Positive for congestion and rhinorrhea. Eyes:  Negative for pain and redness. Respiratory:  Positive for shortness of breath. Negative for wheezing. Cardiovascular:  Negative for chest pain and palpitations. Gastrointestinal:  Positive for abdominal pain and diarrhea. Genitourinary:  Negative for difficulty urinating and dysuria. Musculoskeletal:  Positive for gait problem and myalgias. Skin:  Positive for color change and wound. Neurological:  Negative for facial asymmetry and speech difficulty.        PAST MEDICAL AND SURGICAL HISTORY     Past Medical History:   Diagnosis Date    CAD (coronary artery disease) COPD (chronic obstructive pulmonary disease) (HCC)     Diabetes mellitus (HCC)     Hx of blood clots 1996    LEFT LEG    Hyperlipidemia     Hypertension     Leg wound, left     Leg wound, right     Lumbar radiculopathy     Osteoarthritis     Seizure disorder ([facility] [address]     Spinal stenosis, lumbar      Past Surgical History:   Procedure Laterality Date    ANKLE SURGERY      ball joint    [facility]  2005    1 STENT PLACED    CARDIAC CATHETERIZATION  5/9/13    [facility]    CORONARY ANGIOPLASTY WITH STENT PLACEMENT      DIAGNOSTIC CARDIAC CATH LAB PROCEDURE  11/18/2021    FOREARM SURGERY      left    FRACTURE SURGERY Right 2000    LEG    KNEE SURGERY      left    LEG SURGERY      LUMBAR SPINE SURGERY      TOE SURGERY      left middle toe    TYMPANOSTOMY TUBE PLACEMENT  1979         MEDICATIONS     Current Facility-Administered Medications:     morphine injection 4 mg, 4 mg, IntraVENous, Q4H PRN, Radha Wilson DO, 4 mg at 10/03/22 2001    Current Outpatient Medications:     isosorbide mononitrate (IMDUR) 30 MG extended release tablet, Take 30 mg by mouth daily, Disp: , Rfl:     metoprolol tartrate (LOPRESSOR) 50 MG tablet, Take 50 mg by mouth 2 times daily, Disp: , Rfl:     apixaban (ELIQUIS) 5 MG TABS tablet, Take 1 tablet by mouth 2 times daily, Disp: 180 tablet, Rfl: 3    furosemide (LASIX) 20 MG tablet, Take 1 tablet by mouth daily, Disp: 60 tablet, Rfl: 3    amiodarone (CORDARONE) 200 MG tablet, Take 1 tablet by mouth daily, Disp: 90 tablet, Rfl: 3    atorvastatin (LIPITOR) 40 MG tablet, Take 40 mg by mouth daily, Disp: , Rfl:     metFORMIN (GLUCOPHAGE) 1000 MG tablet, Take 1,000 mg by mouth 2 times daily (with meals), Disp: , Rfl:     dapagliflozin (FARXIGA) 10 MG tablet, Take 1 tablet by mouth every morning, Disp: 90 tablet, Rfl: 1    clopidogrel (PLAVIX) 75 MG tablet, take 1 tablet by mouth once daily, Disp: 30 tablet, Rfl: 5    albuterol sulfate HFA (PROAIR HFA) 108 (90 BASE) MCG/ACT inhaler, Inhale 2 puffs into the lungs every 4 hours as needed for Wheezing, Disp: 1 Inhaler, Rfl: 5    gabapentin (NEURONTIN) 100 MG capsule, TAKE ONE CAPSULE BY MOUTH 3 TIMES A DAY, Disp: 90 capsule, Rfl: 5    pantoprazole (PROTONIX) 40 MG tablet, Take 1 tablet by mouth daily, Disp: 30 tablet, Rfl: 5    traZODone (DESYREL) 50 MG tablet, take 1 tablet by mouth at bedtime, Disp: 30 tablet, Rfl: 3    nitroGLYCERIN (NITROSTAT) 0.4 MG SL tablet, Place 1 tablet under the tongue every 5 minutes as needed for Chest pain, Disp: 25 tablet, Rfl: 3    naratriptan (AMERGE) 2.5 MG tablet, Take 1 tablet by mouth once as needed for Migraine for up to 1 dose., Disp: 9 tablet, Rfl: 2    Elastic Bandages & Supports (LUMBAR BACK BRACE/SUPPORT PAD) MISC, 1 each by Does not apply route daily as needed. , Disp: 1 each, Rfl: 0    Tens Unit MISC, by Does not apply route. Use as directed., Disp: 1 each, Rfl: 0    Respiratory Therapy Supplies (NEBULIZER COMPRESSOR) KIT, by Does not apply route., Disp: 1 kit, Rfl: 0    acetaminophen (TYLENOL) 325 MG tablet, Take 650 mg by mouth every 6 hours as needed. , Disp: , Rfl:       SOCIAL HISTORY     Social History     Social History Narrative    Not on file     Social History     Tobacco Use    Smoking status: Some Days     Packs/day: 0.50     Years: 40.00     Pack years: 20.00     Types: Cigarettes    Smokeless tobacco: Never   Substance Use Topics    Alcohol use: No     Alcohol/week: 0.0 standard drinks    Drug use: No         ALLERGIES     Allergies   Allergen Reactions    Influenza Vaccines Anaphylaxis    Pneumococcal Vaccines          FAMILY HISTORY     Family History   Problem Relation Age of Onset    Diabetes Mother     Kidney Disease Mother     COPD Father     Heart Disease Father     Cancer Father         bone    Stroke Father     Heart Disease Sister     Diabetes Brother          PREVIOUS RECORDS   Previous records reviewed:  Last seen on 11/20/2021 for wound check .         PHYSICAL EXAM ED Triage Vitals [10/03/22 1843]   BP Temp Temp Source Heart Rate Resp SpO2 Height Weight   134/79 98.6 °F (37 °C) Oral (!) 103 20 98 % 6' 1\" (1.854 m) 224 lb (101.6 kg)     Initial vital signs and nursing assessment reviewed and abnormal from heart rate 103 . Body mass index is 29.55 kg/m². Pulsoximetry is normal per my interpretation. Additional Vital Signs:  Vitals:    10/03/22 2004   BP: 118/66   Pulse: (!) 107   Resp: 20   Temp:    SpO2: 98%       Physical Exam  Constitutional:       Appearance: Normal appearance. He is ill-appearing. He is not diaphoretic. HENT:      Head: Normocephalic and atraumatic. Mouth/Throat:      Mouth: Mucous membranes are moist.   Cardiovascular:      Rate and Rhythm: Normal rate and regular rhythm. Heart sounds: No murmur heard. No friction rub. No gallop. Pulmonary:      Effort: Pulmonary effort is normal.      Breath sounds: Normal breath sounds. No wheezing, rhonchi or rales. Abdominal:      Palpations: Abdomen is soft. Tenderness: There is abdominal tenderness. There is no guarding or rebound. Comments: obese   Musculoskeletal:      Cervical back: Normal range of motion and neck supple. Skin:     Comments: 4x4 cm sacral decubitus ulcer, weeping pus, erythematous. Additional sacral decubitus ulcer 1x1 cm. Hyperemic BLE up to the mid tibia, erythematous, weeping, foul smell. Chronic appearing wound on left elbow   Neurological:      Mental Status: He is alert and oriented to person, place, and time. Comments: Decreased movement in BLE, L>R. This is chronic. Pt does not walk           MEDICAL DECISION MAKING   Initial Assessment:   Pt is a 73 y/o male with a h/o DM, PAD, ulcers, arterial occlusion, and CAD who presents with BLE that look severely infected. They are foul smelling, erythematous, hyperemic up to the mid tibia, weeping. We are unable to doppler pulses in the DP, PT, and popliteal fossa region.  Suspect this is a chronic issue 2/2 arterial occlusion. He also has a 4 cm x 4 cm sacral decubitus ulcer that is not new, it is weeping pus, and a wound to the left elbow. Also concerned that the Pt could be septic. Ddx: Sepsis, cellulitis, osteomyelitis, nec fasc, arterial occlusion, sacral decubitus ulcer  Plan:   CBC, BMP, LFTs, blood culture x2, ESR, CRP, lactic acid, wound culture.   XR from foot to tib/fib BLE  Will give abx based upon culture results  Morphine for pain  Handoff to Dr Ele Honeycutt        ED RESULTS   Laboratory results:  Labs Reviewed   CBC WITH AUTO DIFFERENTIAL - Abnormal; Notable for the following components:       Result Value    WBC 17.6 (*)     Hemoglobin 12.6 (*)     Hematocrit 40.1 (*)     MCH 25.4 (*)     MCHC 31.4 (*)     RDW-CV 18.4 (*)     RDW-SD 50.8 (*)     Platelets 017 (*)     MPV 9.3 (*)     Segs Absolute 14.6 (*)     Immature Grans (Abs) 0.23 (*)     All other components within normal limits   BASIC METABOLIC PANEL W/ REFLEX TO MG FOR LOW K - Abnormal; Notable for the following components:    Potassium reflex Magnesium 3.3 (*)     Glucose 173 (*)     BUN 6 (*)     All other components within normal limits   HEPATIC FUNCTION PANEL - Abnormal; Notable for the following components:    Albumin 2.5 (*)     All other components within normal limits   C-REACTIVE PROTEIN - Abnormal; Notable for the following components:    CRP 26.66 (*)     All other components within normal limits   LACTIC ACID - Abnormal; Notable for the following components:    Lactic Acid 3.6 (*)     All other components within normal limits   ANION GAP - Abnormal; Notable for the following components:    Anion Gap 17.0 (*)     All other components within normal limits   GLOMERULAR FILTRATION RATE, ESTIMATED - Abnormal; Notable for the following components:    Est, Glom Filt Rate 85 (*)     All other components within normal limits   POCT GLUCOSE - Abnormal; Notable for the following components:    POC Glucose 202 (*)     All other components within normal limits   POCT GLUCOSE - Normal   CULTURE, BLOOD 1   CULTURE, BLOOD 2   CULTURE, ANAEROBIC AND AEROBIC   MAGNESIUM   SEDIMENTATION RATE       Radiologic studies results:  XR TIBIA FIBULA RIGHT (2 VIEWS)    (Results Pending)   XR TIBIA FIBULA LEFT (2 VIEWS)    (Results Pending)   XR FOOT RIGHT (2 VIEWS)    (Results Pending)   XR FOOT LEFT (2 VIEWS)    (Results Pending)   XR ANKLE LEFT (2 VIEWS)    (Results Pending)   XR ANKLE RIGHT (2 VIEWS)    (Results Pending)       ED Medications administered this visit:   Medications   morphine injection 4 mg (4 mg IntraVENous Given 10/3/22 2001)         ED COURSE     ED Course as of 10/03/22 2011   Mon Oct 03, 2022   1950 Lactic Acid(!): 3.6 [AC]      ED Course User Index  [AC] Pravin Kramer DO             MEDICATION CHANGES     New Prescriptions    No medications on file         FINAL DISPOSITION     Final diagnoses:   Cellulitis of right lower extremity   Cellulitis of left lower extremity     Condition: condition: stable  Dispo: Hand off to Dr Barbara Conklin      This transcription was electronically signed. Parts of this transcriptions may have been dictated by use of voice recognition software and electronically transcribed, and parts may have been transcribed with the assistance of an ED scribe. The transcription may contain errors not detected in proofreading. Please refer to my supervising physician's documentation if my documentation differs.     Electronically Signed: Pravin Kramer DO, 10/03/22, 8:11 PM        Pravin Kramer DO  Resident  10/03/22 2011

## 2022-10-04 LAB
ADENOVIRUS F 40 41 PCR: NOT DETECTED
ANION GAP SERPL CALCULATED.3IONS-SCNC: 14 MEQ/L (ref 8–16)
ASTROVIRUS PCR: NOT DETECTED
BUN BLDV-MCNC: 5 MG/DL (ref 7–22)
CALCIUM SERPL-MCNC: 8.3 MG/DL (ref 8.5–10.5)
CAMPYLOBACTER PCR: NOT DETECTED
CHLORIDE BLD-SCNC: 100 MEQ/L (ref 98–111)
CLOSTRIDIUM DIFFICILE, PCR: NOT DETECTED
CO2: 25 MEQ/L (ref 23–33)
CREAT SERPL-MCNC: 0.9 MG/DL (ref 0.4–1.2)
CRYPTOSPORIDIUM PCR: NOT DETECTED
CYCLOSPORA CAYETANENSIS PCR: NOT DETECTED
E COLI 0157 PCR: ABNORMAL
E COLI ENTEROAGGREGATIVE PCR: NOT DETECTED
E COLI ENTEROPATHOGENIC PCR: DETECTED
E COLI ENTEROTOXIGENIC PCR: NOT DETECTED
E COLI SHIGA LIKE TOXIN PCR: NOT DETECTED
E COLI SHIGELLA/ENTEROINVASIVE PCR: NOT DETECTED
E HISTOLYTICA GI FILM ARRAY: NOT DETECTED
EKG Q-T INTERVAL: 292 MS
EKG QRS DURATION: 88 MS
EKG QTC CALCULATION (BAZETT): 402 MS
EKG R AXIS: 71 DEGREES
EKG T AXIS: 130 DEGREES
EKG VENTRICULAR RATE: 114 BPM
ERYTHROCYTE [DISTWIDTH] IN BLOOD BY AUTOMATED COUNT: 17.6 % (ref 11.5–14.5)
ERYTHROCYTE [DISTWIDTH] IN BLOOD BY AUTOMATED COUNT: 51 FL (ref 35–45)
FERRITIN: 128 NG/ML (ref 22–322)
GFR SERPL CREATININE-BSD FRML MDRD: 85 ML/MIN/1.73M2
GIARDIA LAMBLIA PCR: NOT DETECTED
GLUCOSE BLD-MCNC: 171 MG/DL (ref 70–108)
GLUCOSE BLD-MCNC: 212 MG/DL (ref 70–108)
GLUCOSE BLD-MCNC: 243 MG/DL (ref 70–108)
GLUCOSE BLD-MCNC: 261 MG/DL (ref 70–108)
GLUCOSE BLD-MCNC: 330 MG/DL (ref 70–108)
GLUCOSE BLD-MCNC: 342 MG/DL (ref 70–108)
HCT VFR BLD CALC: 35.3 % (ref 42–52)
HEMOGLOBIN: 10.8 GM/DL (ref 14–18)
INR BLD: 1.47 (ref 0.85–1.13)
IRON SATURATION: 9 % (ref 20–50)
IRON: 20 UG/DL (ref 65–195)
LACTIC ACID: 1.8 MMOL/L (ref 0.5–2)
LACTIC ACID: 2.1 MMOL/L (ref 0.5–2)
LACTIC ACID: 2.7 MMOL/L (ref 0.5–2)
MAGNESIUM: 1.7 MG/DL (ref 1.6–2.4)
MCH RBC QN AUTO: 25.1 PG (ref 26–33)
MCHC RBC AUTO-ENTMCNC: 30.6 GM/DL (ref 32.2–35.5)
MCV RBC AUTO: 82.1 FL (ref 80–94)
NOROVIRUS GI GII PCR: NOT DETECTED
PLATELET # BLD: 471 THOU/MM3 (ref 130–400)
PLESIOMONAS SHIGELLOIDES PCR: NOT DETECTED
PMV BLD AUTO: 9.5 FL (ref 9.4–12.4)
POTASSIUM REFLEX MAGNESIUM: 3.2 MEQ/L (ref 3.5–5.2)
RBC # BLD: 4.3 MILL/MM3 (ref 4.7–6.1)
ROTAVIRUS A PCR: NOT DETECTED
SALMONELLA PCR: NOT DETECTED
SAPOVIRUS PCR: NOT DETECTED
SODIUM BLD-SCNC: 139 MEQ/L (ref 135–145)
TOTAL IRON BINDING CAPACITY: 213 UG/DL (ref 171–450)
VIBRIO CHOLERAE PCR: NOT DETECTED
VIBRIO PCR: NOT DETECTED
WBC # BLD: 18.2 THOU/MM3 (ref 4.8–10.8)
YERSINIA ENTEROCOLITICA PCR: NOT DETECTED

## 2022-10-04 PROCEDURE — 6360000002 HC RX W HCPCS: Performed by: PHARMACIST

## 2022-10-04 PROCEDURE — 87507 IADNA-DNA/RNA PROBE TQ 12-25: CPT

## 2022-10-04 PROCEDURE — 83735 ASSAY OF MAGNESIUM: CPT

## 2022-10-04 PROCEDURE — 83605 ASSAY OF LACTIC ACID: CPT

## 2022-10-04 PROCEDURE — 2060000000 HC ICU INTERMEDIATE R&B

## 2022-10-04 PROCEDURE — 6360000002 HC RX W HCPCS

## 2022-10-04 PROCEDURE — 99255 IP/OBS CONSLTJ NEW/EST HI 80: CPT | Performed by: INTERNAL MEDICINE

## 2022-10-04 PROCEDURE — 85027 COMPLETE CBC AUTOMATED: CPT

## 2022-10-04 PROCEDURE — 93010 ELECTROCARDIOGRAM REPORT: CPT | Performed by: INTERNAL MEDICINE

## 2022-10-04 PROCEDURE — 6370000000 HC RX 637 (ALT 250 FOR IP): Performed by: INTERNAL MEDICINE

## 2022-10-04 PROCEDURE — 2580000003 HC RX 258: Performed by: PHARMACIST

## 2022-10-04 PROCEDURE — 6370000000 HC RX 637 (ALT 250 FOR IP)

## 2022-10-04 PROCEDURE — 2580000003 HC RX 258

## 2022-10-04 PROCEDURE — 80048 BASIC METABOLIC PNL TOTAL CA: CPT

## 2022-10-04 PROCEDURE — 36415 COLL VENOUS BLD VENIPUNCTURE: CPT

## 2022-10-04 PROCEDURE — 85610 PROTHROMBIN TIME: CPT

## 2022-10-04 PROCEDURE — 82948 REAGENT STRIP/BLOOD GLUCOSE: CPT

## 2022-10-04 PROCEDURE — 6360000002 HC RX W HCPCS: Performed by: EMERGENCY MEDICINE

## 2022-10-04 RX ORDER — INSULIN LISPRO 100 [IU]/ML
0-8 INJECTION, SOLUTION INTRAVENOUS; SUBCUTANEOUS
Status: DISCONTINUED | OUTPATIENT
Start: 2022-10-04 | End: 2022-10-10

## 2022-10-04 RX ORDER — POTASSIUM CHLORIDE 20 MEQ/1
40 TABLET, EXTENDED RELEASE ORAL ONCE
Status: COMPLETED | OUTPATIENT
Start: 2022-10-04 | End: 2022-10-04

## 2022-10-04 RX ORDER — INSULIN LISPRO 100 [IU]/ML
0-4 INJECTION, SOLUTION INTRAVENOUS; SUBCUTANEOUS NIGHTLY
Status: DISCONTINUED | OUTPATIENT
Start: 2022-10-04 | End: 2022-10-10

## 2022-10-04 RX ORDER — SODIUM HYPOCHLORITE 1.25 MG/ML
SOLUTION TOPICAL DAILY
Status: DISCONTINUED | OUTPATIENT
Start: 2022-10-04 | End: 2022-10-13 | Stop reason: HOSPADM

## 2022-10-04 RX ORDER — INSULIN GLARGINE 100 [IU]/ML
5 INJECTION, SOLUTION SUBCUTANEOUS NIGHTLY
Status: DISCONTINUED | OUTPATIENT
Start: 2022-10-04 | End: 2022-10-06

## 2022-10-04 RX ADMIN — PIPERACILLIN SODIUM,TAZOBACTAM SODIUM 3375 MG: 3; .375 INJECTION, POWDER, FOR SOLUTION INTRAVENOUS at 12:13

## 2022-10-04 RX ADMIN — SODIUM HYPOCHLORITE: 1.25 SOLUTION TOPICAL at 17:10

## 2022-10-04 RX ADMIN — PIPERACILLIN SODIUM,TAZOBACTAM SODIUM 3375 MG: 3; .375 INJECTION, POWDER, FOR SOLUTION INTRAVENOUS at 17:02

## 2022-10-04 RX ADMIN — GABAPENTIN 100 MG: 100 CAPSULE ORAL at 20:02

## 2022-10-04 RX ADMIN — SODIUM CHLORIDE, POTASSIUM CHLORIDE, SODIUM LACTATE AND CALCIUM CHLORIDE: 600; 310; 30; 20 INJECTION, SOLUTION INTRAVENOUS at 12:07

## 2022-10-04 RX ADMIN — SODIUM CHLORIDE, POTASSIUM CHLORIDE, SODIUM LACTATE AND CALCIUM CHLORIDE: 600; 310; 30; 20 INJECTION, SOLUTION INTRAVENOUS at 00:01

## 2022-10-04 RX ADMIN — ACETAMINOPHEN 650 MG: 325 TABLET ORAL at 13:26

## 2022-10-04 RX ADMIN — VANCOMYCIN HYDROCHLORIDE 1250 MG: 5 INJECTION, POWDER, LYOPHILIZED, FOR SOLUTION INTRAVENOUS at 23:38

## 2022-10-04 RX ADMIN — PANTOPRAZOLE SODIUM 40 MG: 40 TABLET, DELAYED RELEASE ORAL at 03:18

## 2022-10-04 RX ADMIN — INSULIN GLARGINE 5 UNITS: 100 INJECTION, SOLUTION SUBCUTANEOUS at 20:53

## 2022-10-04 RX ADMIN — AMIODARONE HYDROCHLORIDE 200 MG: 200 TABLET ORAL at 09:14

## 2022-10-04 RX ADMIN — INSULIN LISPRO 1 UNITS: 100 INJECTION, SOLUTION INTRAVENOUS; SUBCUTANEOUS at 12:45

## 2022-10-04 RX ADMIN — FUROSEMIDE 20 MG: 20 TABLET ORAL at 09:14

## 2022-10-04 RX ADMIN — MORPHINE SULFATE 4 MG: 4 INJECTION, SOLUTION INTRAMUSCULAR; INTRAVENOUS at 00:26

## 2022-10-04 RX ADMIN — INSULIN LISPRO 3 UNITS: 100 INJECTION, SOLUTION INTRAVENOUS; SUBCUTANEOUS at 17:15

## 2022-10-04 RX ADMIN — ISOSORBIDE MONONITRATE 30 MG: 30 TABLET, EXTENDED RELEASE ORAL at 09:14

## 2022-10-04 RX ADMIN — PIPERACILLIN SODIUM,TAZOBACTAM SODIUM 3375 MG: 3; .375 INJECTION, POWDER, FOR SOLUTION INTRAVENOUS at 01:52

## 2022-10-04 RX ADMIN — ATORVASTATIN CALCIUM 40 MG: 40 TABLET, FILM COATED ORAL at 09:14

## 2022-10-04 RX ADMIN — GABAPENTIN 100 MG: 100 CAPSULE ORAL at 09:14

## 2022-10-04 RX ADMIN — ACETAMINOPHEN 650 MG: 325 TABLET ORAL at 00:25

## 2022-10-04 RX ADMIN — VANCOMYCIN HYDROCHLORIDE 1000 MG: 1 INJECTION, POWDER, LYOPHILIZED, FOR SOLUTION INTRAVENOUS at 00:33

## 2022-10-04 RX ADMIN — VANCOMYCIN HYDROCHLORIDE 1250 MG: 5 INJECTION, POWDER, LYOPHILIZED, FOR SOLUTION INTRAVENOUS at 12:30

## 2022-10-04 RX ADMIN — METOPROLOL TARTRATE 50 MG: 50 TABLET, FILM COATED ORAL at 09:14

## 2022-10-04 RX ADMIN — INSULIN LISPRO 4 UNITS: 100 INJECTION, SOLUTION INTRAVENOUS; SUBCUTANEOUS at 00:33

## 2022-10-04 RX ADMIN — EMPAGLIFLOZIN 10 MG: 10 TABLET, FILM COATED ORAL at 09:14

## 2022-10-04 RX ADMIN — SODIUM CHLORIDE, POTASSIUM CHLORIDE, SODIUM LACTATE AND CALCIUM CHLORIDE: 600; 310; 30; 20 INJECTION, SOLUTION INTRAVENOUS at 10:33

## 2022-10-04 RX ADMIN — ACETAMINOPHEN 650 MG: 325 TABLET ORAL at 23:27

## 2022-10-04 RX ADMIN — GABAPENTIN 100 MG: 100 CAPSULE ORAL at 13:26

## 2022-10-04 RX ADMIN — POTASSIUM CHLORIDE 40 MEQ: 1500 TABLET, EXTENDED RELEASE ORAL at 03:17

## 2022-10-04 RX ADMIN — METOPROLOL TARTRATE 50 MG: 50 TABLET, FILM COATED ORAL at 20:02

## 2022-10-04 RX ADMIN — SODIUM CHLORIDE, PRESERVATIVE FREE 10 ML: 5 INJECTION INTRAVENOUS at 20:02

## 2022-10-04 ASSESSMENT — PAIN DESCRIPTION - ORIENTATION
ORIENTATION: LEFT
ORIENTATION: RIGHT;LEFT

## 2022-10-04 ASSESSMENT — PAIN DESCRIPTION - FREQUENCY
FREQUENCY: CONTINUOUS
FREQUENCY: CONTINUOUS

## 2022-10-04 ASSESSMENT — PAIN DESCRIPTION - ONSET
ONSET: ON-GOING
ONSET: ON-GOING

## 2022-10-04 ASSESSMENT — PAIN DESCRIPTION - DESCRIPTORS
DESCRIPTORS: PINS AND NEEDLES
DESCRIPTORS: DULL
DESCRIPTORS: PINS AND NEEDLES
DESCRIPTORS: ACHING;SORE;STABBING

## 2022-10-04 ASSESSMENT — PAIN DESCRIPTION - LOCATION
LOCATION: LEG;FOOT
LOCATION: LEG

## 2022-10-04 ASSESSMENT — PAIN SCALES - GENERAL
PAINLEVEL_OUTOF10: 8
PAINLEVEL_OUTOF10: 4
PAINLEVEL_OUTOF10: 10
PAINLEVEL_OUTOF10: 4
PAINLEVEL_OUTOF10: 6
PAINLEVEL_OUTOF10: 7
PAINLEVEL_OUTOF10: 7

## 2022-10-04 ASSESSMENT — PAIN - FUNCTIONAL ASSESSMENT
PAIN_FUNCTIONAL_ASSESSMENT: PREVENTS OR INTERFERES SOME ACTIVE ACTIVITIES AND ADLS
PAIN_FUNCTIONAL_ASSESSMENT: PREVENTS OR INTERFERES SOME ACTIVE ACTIVITIES AND ADLS
PAIN_FUNCTIONAL_ASSESSMENT: PREVENTS OR INTERFERES WITH MANY ACTIVE NOT PASSIVE ACTIVITIES

## 2022-10-04 ASSESSMENT — PAIN DESCRIPTION - PAIN TYPE
TYPE: CHRONIC PAIN
TYPE: CHRONIC PAIN

## 2022-10-04 NOTE — FLOWSHEET NOTE
10/04/22 1003   Treatment Team Notification   Reason for Communication Evaluate  (Podiatry Consult)   Team Member Name Dr. Ruben Conklin   Treatment Team Role Resident   Method of Communication Secure Message   Response Waiting for response   Notification Time 1003     Perfect Serve message sent to  regarding podiatry consult. Pt has PAD and chronic non-healing wounds.

## 2022-10-04 NOTE — CONSULTS
[facility]'s  Consult    Patient's Name/Date of Birth: Suzy Becker / 1955 (88 y.o.)  Date: October 4, 2022   Referring Provider: Paul Garcia MD    CHIEF COMPLAINT: Bilateral leg pain     HPI: This is a pleasant 74 y.o. male presents with hx severe bilateral PAD with previous LE failed bypass, obstructive CAD s/p DANA LAD x2 at [facility], paroxysmal Atrial Fibrillation on Eliquis, chronic Systolic HFimpEF 48% on GDMT, stage III sacral ulcer, peripheral neuropathy, COPD, HTN, Seizure disorder, and daily smoker who presents to [facility] ED for bilateral LE pain 2/2 his severe PAD. Patient unable to ambulate. Pain is severe and he had just seen Dr. Kamini Hood a week ago for this. Used to see Dr. Bayard Duverney years ago who back then told him eventually would need bilateral amputations. CTA abdomen & runoff showing chronic occlusions all throughout LLE. We have been consulted for further evaluation for possibility of any intervention for his PVD. Echo: Results for orders placed during the hospital encounter of 11/18/21    ECHO Complete 2D W Doppler W Color    Narrative  Transthoracic Echocardiography Report (TTE)    Demographics    Patient Name    Letty James  Gender               Male  C    MR #            [id number]       Race                     Ethnicity    Account #       [de-identified]       Room Number          0007    Accession       [id number]      Date of Study        11/19/2021  Number    Date of Birth   1955      Referring Physician  Alanna Murphy    Age             61 year(s)      Sonographer          Michael Littlejohn RDCS    Interpreting         Echo reader of the  Physician            tyler Thornton MD    Procedure    Type of Study    TTE procedure:ECHOCARDIOGRAM COMPLETE 2D W DOPPLER W COLOR.     Procedure Date  Date: 11/19/2021 Start: 11:34 AM    Study Location: Bedside  Technical Quality: Poor visualization due to poor acoustical window. Indications:Heart Failure. Additional Medical History:Smoker, Osteoarthritis, COPD, GERD, Dyslipidemia,  Coronary artery disease, Dyspnea on exertion, Hypertension, Hyperlipidemia. Patient Status: Routine    Height: 73 inches Weight: 225 pounds BSA: 2.26 m^2 BMI: 29.69 kg/m^2    BP: 125/84 mmHg    Conclusions    Summary  Technically difficult examination. Ejection fraction is visually estimated at 35%. There was moderate global hypokinesis of the left ventricle. The aortic valve leaflets were not well visualized. Aortic valve appears tricuspid. Aortic valve leaflets are somewhat thickened. Signature    ----------------------------------------------------------------  Electronically signed by Ilya Wade MD (Interpreting  physician) on 11/19/2021 at 04:19 PM  ----------------------------------------------------------------    Findings    Mitral Valve  Trace mitral regurgitation is present. Aortic Valve  The aortic valve leaflets were not well visualized. Aortic valve appears tricuspid. Aortic valve leaflets are somewhat thickened. Tricuspid Valve  Trivial tricuspid regurgitation visualized. Pulmonic Valve  The pulmonic valve was not well visualized . Trivial pulmonic regurgitation visualized. Left Atrium  Left atrial size was normal.    Left Ventricle  Ejection fraction is visually estimated at 35%. There was moderate global hypokinesis of the left ventricle. Right Atrium  Right atrial size was normal.    Right Ventricle  The right ventricular size was normal with normal systolic function and  wall thickness. Pericardial Effusion  The pericardium was normal in appearance with no evidence of a pericardial  effusion. Pleural Effusion  No evidence of pleural effusion. Aorta / Great Vessels  -Aortic root dimension within normal limits.  -The Pulmonary artery is within normal limits.   -IVC size is within normal limits with normal respiratory phasic changes. M-Mode/2D Measurements & Calculations    LV Diastolic    LV Systolic Dimension: 4.4  AV Cusp Separation: 2.2 cmAO  Dimension: 5.3  cm                          Root Dimension: 3.2 cmLA Area:  cm              LV Volume Diastolic: 216.1  07.5 cm^2  LV FS:17 %      ml  LV PW           LV Volume Systolic: 64.1 ml  Diastolic: 1 cm LV EDV/LV EDV Index: 147.1  Septum          ml/65 m^2LV ESV/LV ESV      RV Diastolic Dimension: 3 cm  Diastolic: 1 cm Index: 55.9 ml/42 m^2  EF Calculated: 35.8 %  LA volume/Index: 47.7 ml  LV Length: 9.2 cm           /21m^2  LV Area  Diastolic: 55.7  cm^2  LV Area  Systolic: 31  cm^2    Doppler Measurements & Calculations    MV Peak E-Wave: 114 cm/s  AV Peak Velocity: 116   LVOT Peak Velocity: 72  cm/s                    cm/s  MV Peak Gradient: 5.2     AV Peak Gradient: 5.38  LVOT Peak Gradient: 2  mmHg                      mmHg                    mmHg    MV Deceleration Time: 183                         TV Peak E-Wave: 63.8  msec                                              cm/s    IVRT: 53 msec           TV Peak Gradient: 1.63  mmHg    AV DVI (Vmax):0.62      PV Peak Velocity: 57.4  MR Velocity: 486 cm/s                             cm/s  PV Peak Gradient: 1.32  mmHg    [email or web address] [id number]       All labs, EKG's, diagnostic testing and images as well as cardiac cath, stress testing were reviewed during this encounter    Past Medical History:   Diagnosis Date    CAD (coronary artery disease)     COPD (chronic obstructive pulmonary disease) (HCC)     Diabetes mellitus ([facility] [address]     Hx of blood clots 1996    LEFT LEG    Hyperlipidemia     Hypertension     Leg wound, left     Leg wound, right     Lumbar radiculopathy     Osteoarthritis     Seizure disorder ([facility] [address]     Spinal stenosis, lumbar      Past Surgical History:   Procedure Laterality Date    ANKLE SURGERY ball joint    [address]    CARDIAC CATHETERIZATION  2005    1 STENT PLACED    CARDIAC CATHETERIZATION  5/9/13    [facility]    CORONARY ANGIOPLASTY WITH STENT PLACEMENT      DIAGNOSTIC CARDIAC CATH LAB PROCEDURE  11/18/2021    FOREARM SURGERY      left    FRACTURE SURGERY Right 2000    LEG    KNEE SURGERY      left    LEG SURGERY      LUMBAR SPINE SURGERY      TOE SURGERY      left middle toe    TYMPANOSTOMY TUBE PLACEMENT  1979     Current Facility-Administered Medications   Medication Dose Route Frequency Provider Last Rate Last Admin    piperacillin-tazobactam (ZOSYN) 3,375 mg in dextrose 5 % 50 mL IVPB extended infusion (mini-bag)  3,375 mg IntraVENous Q8H James Greenwood MD 12.5 mL/hr at 10/04/22 1213 3,375 mg at 10/04/22 1213    vancomycin (VANCOCIN) 1250 mg in dextrose 5 % 250 mL IVPB  1,250 mg IntraVENous Q12H Lethea Blew, .7 mL/hr at 10/04/22 1230 1,250 mg at 10/04/22 1230    sodium hypochlorite (DAKINS) 0.125 % external solution   Irrigation Daily Day Alvarado MD        morphine injection 4 mg  4 mg IntraVENous Q4H PRN Burnie Right, DO   4 mg at 10/04/22 0026    albuterol sulfate HFA (PROVENTIL;VENTOLIN;PROAIR) 108 (90 Base) MCG/ACT inhaler 2 puff  2 puff Inhalation Q4H PRN James Greenwood MD        amiodarone (CORDARONE) tablet 200 mg  200 mg Oral Daily James Greenwood MD   200 mg at 10/04/22 0914    atorvastatin (LIPITOR) tablet 40 mg  40 mg Oral Daily James Greenwood MD   40 mg at 10/04/22 0914    empagliflozin (JARDIANCE) tablet 10 mg  10 mg Oral Daily James Greenwood MD   10 mg at 10/04/22 0914    furosemide (LASIX) tablet 20 mg  20 mg Oral Daily James Greenwood MD   20 mg at 10/04/22 0914    gabapentin (NEURONTIN) capsule 100 mg  100 mg Oral TID James Greenwood MD   100 mg at 10/04/22 0914    isosorbide mononitrate (IMDUR) extended release tablet 30 mg  30 mg Oral Daily James Greenwood MD   30 mg at 10/04/22 0914    metoprolol tartrate (LOPRESSOR) tablet 50 mg  50 mg Oral BID Johana Homans, MD   50 mg at 10/04/22 0914    pantoprazole (PROTONIX) tablet 40 mg  40 mg Oral QAM AC Johana Homans, MD   40 mg at 10/04/22 0318    traZODone (DESYREL) tablet 50 mg  50 mg Oral Nightly PRN Johana Homans, MD        sodium chloride flush 0.9 % injection 10 mL  10 mL IntraVENous 2 times per day Johana Homans, MD   10 mL at 10/03/22 2337    sodium chloride flush 0.9 % injection 10 mL  10 mL IntraVENous PRN Johana Homans, MD        0.9 % sodium chloride infusion   IntraVENous PRN Johana Homans, MD        ondansetron (ZOFRAN-ODT) disintegrating tablet 4 mg  4 mg Oral Q8H PRN Johana Homans, MD        Or    ondansetron [facility]) injection 4 mg  4 mg IntraVENous Q6H PRN Johana Homans, MD        polyethylene glycol (GLYCOLAX) packet 17 g  17 g Oral Daily PRN Johana Homans, MD        acetaminophen (TYLENOL) tablet 650 mg  650 mg Oral Q6H PRN Johana Homans, MD   650 mg at 10/04/22 0025    Or    acetaminophen (TYLENOL) suppository 650 mg  650 mg Rectal Q6H PRN Johana Homans, MD        insulin lispro (HUMALOG) injection vial 0-4 Units  0-4 Units SubCUTAneous TID WC Johana Homans, MD   1 Units at 10/04/22 1245    insulin lispro (HUMALOG) injection vial 0-4 Units  0-4 Units SubCUTAneous Nightly Johana Homans, MD   4 Units at 10/04/22 0033    glucose chewable tablet 16 g  4 tablet Oral PRN Johana Homans, MD        dextrose bolus 10% 125 mL  125 mL IntraVENous PRN Johana Homans, MD        Or    dextrose bolus 10% 250 mL  250 mL IntraVENous PRN Johana Homans, MD        glucagon (rDNA) injection 1 mg  1 mg SubCUTAneous PRN Johana Homans, MD        dextrose 10 % infusion   IntraVENous Continuous PRN Johana Homans, MD        nicotine (NICODERM CQ) 21 MG/24HR 1 patch  1 patch TransDERmal Daily Johana Homans, MD   1 patch at 10/04/22 0153    lactated ringers infusion   IntraVENous Continuous Johana Homans, MD 75 mL/hr at 10/04/22 1207 New Bag at 10/04/22 1207    vancomycin (VANCOCIN) intermittent dosing (placeholder)   Other RX Placeholder Fredrick Calderon MD         Prior to Admission medications    Medication Sig Start Date End Date Taking?  Authorizing Provider   isosorbide mononitrate (IMDUR) 30 MG extended release tablet Take 30 mg by mouth daily    Historical Provider, MD   metoprolol tartrate (LOPRESSOR) 50 MG tablet Take 50 mg by mouth 2 times daily    Historical Provider, MD   apixaban (ELIQUIS) 5 MG TABS tablet Take 1 tablet by mouth 2 times daily 4/12/22   JAVAN Mohr CNP   furosemide (LASIX) 20 MG tablet Take 1 tablet by mouth daily 4/12/22   JAVAN Mohr CNP   amiodarone (CORDARONE) 200 MG tablet Take 1 tablet by mouth daily 3/28/22   JAVAN Foote CNP   atorvastatin (LIPITOR) 40 MG tablet Take 40 mg by mouth daily    Historical Provider, MD   metFORMIN (GLUCOPHAGE) 1000 MG tablet Take 1,000 mg by mouth 2 times daily (with meals)    Historical Provider, MD   dapagliflozin (FARXIGA) 10 MG tablet Take 1 tablet by mouth every morning 11/23/21   JAVAN Foote CNP   clopidogrel (PLAVIX) 75 MG tablet take 1 tablet by mouth once daily 7/25/16   Sneha Solorio MD   albuterol sulfate HFA (PROAIR HFA) 108 (90 BASE) MCG/ACT inhaler Inhale 2 puffs into the lungs every 4 hours as needed for Wheezing 6/13/16   Sneha Solorio MD   gabapentin (NEURONTIN) 100 MG capsule TAKE ONE CAPSULE BY MOUTH 3 TIMES A DAY 6/13/16   Sneha Solorio MD   pantoprazole (PROTONIX) 40 MG tablet Take 1 tablet by mouth daily 6/13/16   Sneha Solorio MD   traZODone (DESYREL) 50 MG tablet take 1 tablet by mouth at bedtime 6/7/16   Sneha Solorio MD   nitroGLYCERIN (NITROSTAT) 0.4 MG SL tablet Place 1 tablet under the tongue every 5 minutes as needed for Chest pain 2/2/16   Sneha Solorio MD   naratriptan (AMERGE) 2.5 MG tablet Take 1 tablet by mouth once as needed for Migraine for up to 1 dose. 10/6/14 10/6/14  Eligio Bores, DO   Elastic Bandages & Supports (LUMBAR BACK BRACE/SUPPORT PAD) MISC 1 each by Does not apply route daily as needed. 10/6/14   Eligio Bores, DO   Tens Unit MISC by Does not apply route. Use as directed. 4/24/14   [place] Bores, DO   Respiratory Therapy Supplies (NEBULIZER COMPRESSOR) KIT by Does not apply route. 11/6/13   Eligio Bores, DO   acetaminophen (TYLENOL) 325 MG tablet Take 650 mg by mouth every 6 hours as needed.       Historical Provider, MD   Scheduled Meds:   piperacillin-tazobactam  3,375 mg IntraVENous Q8H    vancomycin  1,250 mg IntraVENous Q12H    sodium hypochlorite   Irrigation Daily    amiodarone  200 mg Oral Daily    atorvastatin  40 mg Oral Daily    empagliflozin  10 mg Oral Daily    furosemide  20 mg Oral Daily    gabapentin  100 mg Oral TID    isosorbide mononitrate  30 mg Oral Daily    metoprolol tartrate  50 mg Oral BID    pantoprazole  40 mg Oral QAM AC    sodium chloride flush  10 mL IntraVENous 2 times per day    insulin lispro  0-4 Units SubCUTAneous TID WC    insulin lispro  0-4 Units SubCUTAneous Nightly    nicotine  1 patch TransDERmal Daily    vancomycin (VANCOCIN) intermittent dosing (placeholder)   Other RX Placeholder     Continuous Infusions:   sodium chloride      dextrose      lactated ringers 75 mL/hr at 10/04/22 1207     PRN Meds:.morphine, albuterol sulfate HFA, traZODone, sodium chloride flush, sodium chloride, ondansetron **OR** ondansetron, polyethylene glycol, acetaminophen **OR** acetaminophen, glucose, dextrose bolus **OR** dextrose bolus, glucagon (rDNA), dextrose    Allergies   Allergen Reactions    Influenza Vaccines Anaphylaxis    Pneumococcal Vaccines      Family History   Problem Relation Age of Onset    Diabetes Mother     Kidney Disease Mother     COPD Father     Heart Disease Father     Cancer Father         bone    Stroke Father     Heart Disease Sister     Diabetes Brother      Social History     Socioeconomic History    Marital status:      Spouse name: Not on file    Number of children: 2    Years of education: 9    Highest education level: Not on file   Occupational History    Occupation: [employer]SI   Tobacco Use    Smoking status: Some Days     Packs/day: 0.50     Years: 40.00     Pack years: 20.00     Types: Cigarettes    Smokeless tobacco: Never   Vaping Use    Vaping Use: Not on file   Substance and Sexual Activity    Alcohol use: No     Alcohol/week: 0.0 standard drinks    Drug use: No    Sexual activity: Not on file   Other Topics Concern    Not on file   Social History Narrative    Not on file     Social Determinants of Health     Financial Resource Strain: Not on file   Food Insecurity: Not on file   Transportation Needs: Not on file   Physical Activity: Not on file   Stress: Not on file   Social Connections: Not on file   Intimate Partner Violence: Not on file   Housing Stability: Not on file     ROS:   Constitutional: + recent wt change. Eyes:  Denies any blurring or double vision, no glaucoma  Ears/Nose/Mouth/Throat:  Denies any chronic sinus/rhinitis, bleeding gums  Cardiovascular:  As described above. Respiratory:  Denies any frequent cough, wheezing or coughing up blood  Genitourinary:  Denies difficulty with urination and kidney stones  Gastrointestinal:  Denies any chronic problems with abdominal pain, nausea, vomiting or diarrhea  Musculoskeletal:  Denies any joint pain, +back pain, +difficulty walking  Integumentary:  +rash  Neurological: +numbness & tingling  Endocrine:  Denies any polydipsia. Hematologic/Lymphatic:  Denies any hemorrhage or lymphatic drainage problems.     Labs:  CBC:   Recent Labs     10/03/22  1910 10/04/22  0323   WBC 17.6* 18.2*   HGB 12.6* 10.8*   HCT 40.1* 35.3*   MCV 80.7 82.1   * 471*     BMP:   Recent Labs     10/03/22  1910 10/04/22  0323    139   K 3.3* 3.2*   CL 99 100   CO2 24 25   BUN 6* 5*   CREATININE 0.9 0.9   MG 1.6 1.7 Accucheck Glucoses:   Recent Labs     10/03/22  1901 10/04/22  0030 10/04/22  0814 10/04/22  1244   POCGLU 202* 330* 212* 243*     Cardiac Enzymes: No results for input(s): CKTOTAL, CKMB, CKMBINDEX, TROPONINI in the last 72 hours. PT/INR:   Recent Labs     10/04/22  1113   INR 1.47*     APTT: No results for input(s): APTT in the last 72 hours.   Liver Profile:  Lab Results   Component Value Date/Time    AST 13 10/03/2022 07:10 PM    ALT 11 10/03/2022 07:10 PM    BILIDIR <0.2 10/03/2022 07:10 PM    BILITOT 0.3 10/03/2022 07:10 PM    ALKPHOS 103 10/03/2022 07:10 PM     Lab Results   Component Value Date/Time    CHOL 96 11/20/2021 12:30 PM    HDL 25 11/20/2021 12:30 PM    TRIG 116 11/20/2021 12:30 PM     TSH:   Lab Results   Component Value Date/Time    TSH 2.090 11/20/2021 12:30 PM     UA:   Lab Results   Component Value Date/Time    COLORU YELLOW 03/16/2017 09:15 PM    PHUR 5.5 03/16/2017 09:15 PM    LABCAST NONE SEEN 03/16/2017 09:15 PM    LABCAST NONE SEEN 03/16/2017 09:15 PM    WBCUA 2-4 03/16/2017 09:15 PM    RBCUA 0-2 03/16/2017 09:15 PM    YEAST NONE SEEN 03/16/2017 09:15 PM    BACTERIA NONE 03/16/2017 09:15 PM    SPECGRAV 1.023 03/16/2017 09:15 PM    LEUKOCYTESUR TRACE 03/16/2017 09:15 PM    LEUKOCYTESUR NEGATIVE 01/20/2013 06:30 PM    UROBILINOGEN 0.2 03/16/2017 09:15 PM    BILIRUBINUR NEGATIVE 03/16/2017 09:15 PM    BLOODU NEGATIVE 03/16/2017 09:15 PM    GLUCOSEU NEGATIVE 01/20/2013 06:30 PM     Physical Exam:  Vitals:    10/04/22 1230   BP: (!) 98/54   Pulse: 88   Resp: 18   Temp: 97.9 °F (36.6 °C)   SpO2: 93%      Intake/Output Summary (Last 24 hours) at 10/4/2022 1314  Last data filed at 10/4/2022 0958  Gross per 24 hour   Intake 1114.52 ml   Output 250 ml   Net 864.52 ml      General:  Chronically ill appearing   Neck: Supple, no JVD, no carotid bruits  Heart: Regular rhythm normal S1 and S2, no rubs, murmurs or gallops  Lungs: clear to ascultation no rales, wheezes, or rhonchi  Abdomen: positive bowel sounds, soft, non-tender, non-distended, no bruits, no masses  Extremities:absent pulses in LLE, see below   Neurologic: alert and oriented x 3, cranial nerves 2-12 grossly intact, motor and sensory intact, moving all extremities  Skin: see below  Psych: AO x 3, no depression/jay, no pressured speech, normal affect  Lymph: No obvious LAD              Assessment:  Severe Bilateral PAD   Hx previous LE failed bypass  Obstructive CAD s/p DANA LAD x2 at [facility]   Dyspnea on exertion  Preoperative cardiac risk assessment   Paroxysmal Atrial Fibrillation on Eliquis   Chronic Systolic HFimpEF 42% on GDMT   Bilateral LE cellulitis   Peripheral neuropathy   Nonhealing Sacral Ulcer, Stage III   Sepsis   Thrombocytosis   COPD   HTN   Seizure disorder   Active Tobacco Smoker     Plan:  Surgery was consulted for evaluation for possible LLE amputation(s) per primary   He has known h/o PAD. Prior left Fem-pop bypass, occluded  Vascular duplex which showed severe inflow disease  May benefit from evaluation for possible PTA to improve flow to lower extremities, enhance wound healing   Plan for peripheral angiogram in AM with Dr. Chanel Key (familiar with patient, recently saw in office)  NPO at midnight   Wound care and ID on board   Continue high-intensity statin   Patient is being considered for surgery (amputation). Preoperative cardiac risk assessment is warranted. Patient has extensive cardiovascular disease history, CAD, prior PCI, PAD, HFrEF, PAF. He has no chest pain, but reports significant dyspnea on exertion. Also, he has limited functional capacity   Nuclear stress test in AM  Revised Pre-Op Cardiac Index Risk score = Class IV (15%)   Further risk assessment pending stress test findings   Discontinue nicotine patch due to peripheral vasoconstrictive properties   Smoking cessation imperative   Cardiology to follow     Thank you for allowing us to participate in the care of this patient.   Please do not hesitate to call us with questions.     Electronically signed by Tahir Yusuf DO on 10/4/2022 at 1:14 PM    Interventional Cardiology - [facility]

## 2022-10-04 NOTE — CARE COORDINATION
Case Management Assessment  Initial Evaluation    Date/Time of Evaluation: 10/4/2022 2:08 PM  Assessment Completed by: Annette Almendarez RN    If patient is discharged prior to next notation, then this note serves as note for discharge by case management. Patient Name: Koby Schmid                   YOB: 1955  Diagnosis: PAD (peripheral artery disease) ([facility] [address] [I73.9]  Cellulitis of right lower extremity [L03.115]  Cellulitis of left lower extremity [O44.997]                   Date / Time: 10/3/2022  6:11 PM    Patient Admission Status: Inpatient     Current PCP: Azalea Dominguez MD  PCP verified by CM? Yes    Chart Reviewed: Yes      History Provided by: Patient, Significant Other  Patient Orientation: Alert and Oriented    Patient Cognition: Alert    Hospitalization in the last 30 days (Readmission):  No    If yes, Readmission Assessment in  Navigator will be completed. Advance Directives:     Code Status: Full Code       Discharge Planning  Patient lives with: Spouse/Significant Other Type of Home: Acute Rehab  Primary Care Giver: Private caregiver  Patient Support Systems include: Spouse/Significant Other   Current Financial resources: Medicaid  Current community resources:    Current services prior to admission: Durable Medical Equipment   Type of Home Care services:  has nebulizer, WC; will need BSC, grab bars, walker, tub transfer bench, ramp after IPR stay per family  ADLS  Prior functional level: Independent in ADLs/IADLs  Current functional level: Independent in ADLs/IADLs    Family can provide assistance at DC: Yes  Would you like Case Management to discuss the discharge plan with any other family members/significant others, and if so, who?  Janessa/CAMILLE Burks  Plans to Return to Present Housing: Other (see comment) (IPR or [facility] needed post-op)  Other Identified Issues/Barriers to RETURNING to current housing: LLE amputation v BLE amputation being considered and will need IPR (precert)  Potential Assistance needed at discharge: [facility]  Patient expects to discharge to: Acute rehab  Plan for transportation at discharge: car of family/friends  Financial  Payor: Lucy Loaiza / Plan: Iris Sheehan / Product Type: *No Product type* /     Does insurance require precert for IPR: Yes    Potential assistance Purchasing Medications: No  Meds-to-Beds request: Yes      [facility] [id number] - [place], [place] - [place] [id number]  Phone: [phone] Fax: [place], [place] - [address]  [address] [place], [place] [address] [phone] Sharron Lucio [phone]  [address] [place] [address]  [address] [place] [address]  Phone: [phone] Fax: [phone]      Factors facilitating achievement of predicted outcomes: Caregiver support, Friend support, Motivated, and Knowledge about rehab    Barriers to discharge:     BLE PAD  History: PCI, COPD, Smoker, AICD, EF 35%, non-healing sacral wound    10/3 CTA Abdominal Aorta w B/L Runoff  1. 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 Left dorsalis pedis and plantaris arteries are opacified. 2. No significant opacification in the distal right anterior tibial   artery, right dorsalis pedis artery, or distal right peroneal artery. This   may reflect underlying diminished flow or occlusion. This is stable. 3. Scattered atherosclerotic plaque in the aorta and mesenteric/renal   vessels without significant stenosis. 4. Asymmetric fatty atrophy of the left lower extremity musculature. Mild   subcutaneous edema in the dorsum of both feet. 5. Stable chronic hardware fracture of the left interpedicular screw at L4.       10/3 LLE (foot XR)  1.  Soft tissue swelling in the left foot with several soft tissue   ulceration defects. 2. Chronic appearing erosive changes in the left 1st metatarsophalangeal   joint with medial subluxation. 3. 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 4. No other acute appearing cortical erosions radiographically to suggest   osteomyelitis. 10/3 RLE (foot) XR  1. Ulceration defect and adjacent swelling in the dorsum of the midfoot. 2. No cortical erosions to suggest acute osteomyelitis. 10/6 BLE Angiogram planned     IV AB, IVF  V-tach Episode last 24h; lytes replacement    Unrealistic expectations, Lower extremity weakness, Medical complications, Wound Care, and only interested in Left AKA amputation but may need BLE amputation    Additional Case Management Notes:   Monitor therapy/IPR/Physiatry evals post-operatively    The Plan for Transition of Care is related to the following treatment goals of PAD (peripheral artery disease) ([facility] [address] [I73.9]  Cellulitis of right lower extremity [B50.624]  Cellulitis of left lower extremity [B25.556]    IF APPLICABLE: The Patient and/or patient representative Katerin Marquez and his family were provided with a choice of provider and agrees with the discharge plan. Freedom of choice list with basic dialogue that supports the patient's individualized plan of care/goals and shares the quality data associated with the providers was provided to: Patient, Patient Representative   Patient Representative Name: patient and SO/Reggie Mike Loaiza   offered provider list with abbreviated version of the quality measures, geographic area, and applicable managed care information provided. Offered option of searching Medicare. gov website by using the computer in patient's room to review complete quality measures information.  Questions regarding selection process answered:patient/reggie Burks  1st choice is IPR, 2nd choice is [facility] but only if SO allowed to stay overnight    The Patient and/or Patient Representative Agree with the Discharge Plan?  Yes    Blanca Cowan RN  Case Management Department

## 2022-10-04 NOTE — FLOWSHEET NOTE
10/04/22 0560   Safe Environment   Safety Measures Other (comment)  (virtual nurse safety round complete , pt replies to audio , stated hes getting cleaned up no camera activation , pt voiced no needs at this time)   Reviewed med list with Raina Gale at [facility] aid Rx , pt listed docs complete

## 2022-10-04 NOTE — ED PROVIDER NOTES
Transfer of Care Note:   Physician Signing out: Dr. Jaiden Molina  Receiving Physician: Abhishek Duke DO  Sign out time: 8:10PM      Brief history: This is a 73 y/o male presenting for evaluation of infections of bilateral lower extremities that have been worsening over the last week. Patient also has a sacral decubitus ulcer with purulent drainage. He has had left lower bypass graft in the past which has failed and has previously years ago been told will require amputation of lower extremity. Items pending that need to be checked:  Creatinine pending prior to CTA abdomen being performed        Tentative Impression of patient:  Stable, patient will likely require admission for IV antibiotics and pain control and discussion regarding AKA vs BKA bilaterally. Unable to detect any pulses with US in room on exam.    Expected disposition of patient:  Pending results, admitted. Additional Assessment and results:   I have personally performed a face to face diagnostic evaluation on this patient. The patient's initial evaluation and plan have been discussed with the prior physician who initially evaluated the patient. Nursing Notes, Past Medical Hx, Past Surgical Hx, Social Hx, Allergies, vital signs and Family Hx were all reviewed. Vitals:    10/03/22 2004   BP: 118/66   Pulse: (!) 107   Resp: 20   Temp:    SpO2: 98%     Physical Exam  Constitutional:       Appearance: Normal appearance. He is ill-appearing. HENT:      Head: Normocephalic and atraumatic. Cardiovascular:      Rate and Rhythm: Normal rate and regular rhythm. Heart sounds: Normal heart sounds. Pulmonary:      Effort: Pulmonary effort is normal. No respiratory distress. Breath sounds: Normal breath sounds. No wheezing. Abdominal:      General: Abdomen is flat. Palpations: Abdomen is soft. Tenderness: There is abdominal tenderness. Musculoskeletal:      Comments: Decreased ROM of bilateral lower extremities, chronic. Does not ambulate    Skin:     Findings: Erythema and lesion present. Comments: 4x4 cm sacral decubitus ulcer, weeping pus, erythematous. Additional sacral decubitus ulcer 1x1 cm. Hyperemic BLE up to the mid tibia, erythematous, weeping, foul smell. Chronic appearing wound on left elbow    Neurological:      Mental Status: He is alert.          Labs Reviewed   CBC WITH AUTO DIFFERENTIAL - Abnormal; Notable for the following components:       Result Value    WBC 17.6 (*)     Hemoglobin 12.6 (*)     Hematocrit 40.1 (*)     MCH 25.4 (*)     MCHC 31.4 (*)     RDW-CV 18.4 (*)     RDW-SD 50.8 (*)     Platelets 137 (*)     MPV 9.3 (*)     Segs Absolute 14.6 (*)     Immature Grans (Abs) 0.23 (*)     All other components within normal limits   BASIC METABOLIC PANEL W/ REFLEX TO MG FOR LOW K - Abnormal; Notable for the following components:    Potassium reflex Magnesium 3.3 (*)     Glucose 173 (*)     BUN 6 (*)     All other components within normal limits   HEPATIC FUNCTION PANEL - Abnormal; Notable for the following components:    Albumin 2.5 (*)     All other components within normal limits   C-REACTIVE PROTEIN - Abnormal; Notable for the following components:    CRP 26.66 (*)     All other components within normal limits   LACTIC ACID - Abnormal; Notable for the following components:    Lactic Acid 3.6 (*)     All other components within normal limits   ANION GAP - Abnormal; Notable for the following components:    Anion Gap 17.0 (*)     All other components within normal limits   GLOMERULAR FILTRATION RATE, ESTIMATED - Abnormal; Notable for the following components:    Est, Glom Filt Rate 85 (*)     All other components within normal limits   POCT GLUCOSE - Abnormal; Notable for the following components:    POC Glucose 202 (*)     All other components within normal limits   POCT GLUCOSE - Normal   CULTURE, BLOOD 1   CULTURE, BLOOD 2   CULTURE, ANAEROBIC AND AEROBIC   MAGNESIUM   SEDIMENTATION RATE         Medications morphine injection 4 mg (4 mg IntraVENous Given 10/3/22 2001)         XR TIBIA FIBULA RIGHT (2 VIEWS)    (Results Pending)   XR TIBIA FIBULA LEFT (2 VIEWS)    (Results Pending)   XR FOOT RIGHT (2 VIEWS)    (Results Pending)   XR FOOT LEFT (2 VIEWS)    (Results Pending)   XR ANKLE LEFT (2 VIEWS)    (Results Pending)   XR ANKLE RIGHT (2 VIEWS)    (Results Pending)         ED Course as of 10/03/22 2124   [facility] Oct 03, 2022   1950 Lactic Acid(!): 3.6 [AC]      ED Course User Index  [AC] Renato Rhoades DO         Further MDM and disposition:   Assessment: This is a 73 y/o male presenting with severely infected bilateral lower extremities with no pulses found on doppler exam in the room. This patient will require treatment with IV antibiotics and pain control and evaluation of vascular flow with CT abdomen with runoff. Plan:   Admit to hospitalist service. Final diagnoses:   Cellulitis of right lower extremity   Cellulitis of left lower extremity     New Prescriptions    No medications on file         Condition: condition: stable  Dispo: Admit to step-down unit     This transcription was electronically signed. It was dictated by use of voice recognition software and electronically transcribed. The transcription may contain errors not detected in proofreading.        Jose G Ham DO  Resident  10/03/22 2129

## 2022-10-04 NOTE — PLAN OF CARE
Problem: Pain  Goal: Verbalizes/displays adequate comfort level or baseline comfort level  Outcome: Progressing  Flowsheets (Taken 10/4/2022 0126)  Verbalizes/displays adequate comfort level or baseline comfort level:   Encourage patient to monitor pain and request assistance   Assess pain using appropriate pain scale   Administer analgesics based on type and severity of pain and evaluate response   Implement non-pharmacological measures as appropriate and evaluate response   Consider cultural and social influences on pain and pain management   Notify Licensed Independent Practitioner if interventions unsuccessful or patient reports new pain     Problem: ABCDS Injury Assessment  Goal: Absence of physical injury  Outcome: Progressing  Flowsheets (Taken 10/4/2022 0126)  Absence of Physical Injury: Implement safety measures based on patient assessment     Problem: Skin/Tissue Integrity  Goal: Absence of new skin breakdown  Description: 1. Monitor for areas of redness and/or skin breakdown  2. Assess vascular access sites hourly  3. Every 4-6 hours minimum:  Change oxygen saturation probe site  4. Every 4-6 hours:  If on nasal continuous positive airway pressure, respiratory therapy assess nares and determine need for appliance change or resting period. Outcome: Progressing  Note: Encourage turns every 2 years   Care plan reviewed with patient. Patient verbalize understanding of the plan of care and contribute to goal setting.

## 2022-10-04 NOTE — PLAN OF CARE
Problem: Discharge Planning  Goal: Discharge to home or other facility with appropriate resources  Outcome: Progressing  Flowsheets (Taken 10/4/2022 0900)  Discharge to home or other facility with appropriate resources:   Identify barriers to discharge with patient and caregiver   Arrange for needed discharge resources and transportation as appropriate   Identify discharge learning needs (meds, wound care, etc)   Refer to discharge planning if patient needs post-hospital services based on physician order or complex needs related to functional status, cognitive ability or social support system     Problem: Pain  Goal: Verbalizes/displays adequate comfort level or baseline comfort level  10/4/2022 1506 by Leelee Shin RN  Outcome: Progressing  Flowsheets (Taken 10/4/2022 0915)  Verbalizes/displays adequate comfort level or baseline comfort level:   Encourage patient to monitor pain and request assistance   Assess pain using appropriate pain scale   Administer analgesics based on type and severity of pain and evaluate response   Implement non-pharmacological measures as appropriate and evaluate response     Problem: ABCDS Injury Assessment  Goal: Absence of physical injury  10/4/2022 1506 by Leelee Sihn RN  Outcome: Progressing  Flowsheets (Taken 10/4/2022 0126 by Marck Glass RN)  Absence of Physical Injury: Implement safety measures based on patient assessment     Problem: Skin/Tissue Integrity  Goal: Absence of new skin breakdown  Description: 1. Monitor for areas of redness and/or skin breakdown  2. Assess vascular access sites hourly  3. Every 4-6 hours minimum:  Change oxygen saturation probe site  4. Every 4-6 hours:  If on nasal continuous positive airway pressure, respiratory therapy assess nares and determine need for appliance change or resting period.   10/4/2022 1506 by Leelee Shin RN  Outcome: Progressing     Problem: Chronic Conditions and Co-morbidities  Goal: Patient's chronic conditions and co-morbidity symptoms are monitored and maintained or improved  Outcome: Progressing  Flowsheets (Taken 10/4/2022 0900)  Care Plan - Patient's Chronic Conditions and Co-Morbidity Symptoms are Monitored and Maintained or Improved:   Monitor and assess patient's chronic conditions and comorbid symptoms for stability, deterioration, or improvement   Collaborate with multidisciplinary team to address chronic and comorbid conditions and prevent exacerbation or deterioration   Update acute care plan with appropriate goals if chronic or comorbid symptoms are exacerbated and prevent overall improvement and discharge    Care plan reviewed with patient and family. Patient and family verbalize understanding of the plan of care and contribute to goal setting.

## 2022-10-04 NOTE — PROGRESS NOTES
Vancomycin Day: 1  Current Regimen: 1000 mg IV every 12 hours     Patient's labs, cultures, vitals, and vancomycin regimen reviewed. SCr stable    Plan: Will increase vancomycin to 1250 mg IV q12h and check a level 10/05/22 @ 0900.     Kimberly Hussein, PharmD, BCPS   10/4/2022  9:23 AM

## 2022-10-04 NOTE — PROGRESS NOTES
Internal Medicine Resident Progress Note    Patient:  Meche Meraz    YOB: 1955  Unit/Bed:[facility]04/004-A  MRN: [id number]    Acct: [de-identified]   PCP: Elliot Ibarra MD    Date of Admission: 10/3/2022      Assessment/Plan:    Sepsis:   - SIRS 2/4 (, WBC 17.6) with suspected cellulitis/chronic LE wound source. Upon examination, patient does not appear to be critically ill,   maintaining good saturations on room air in setting of COPD. No fluids administered in the ED. Blood pressure stable upon examination. Plan:   - IV LR started at 75 mL/h  - IV vancomycin and Zosyn started  - Continue to monitor for any acute signs/symptoms of decompensation  - Blood cultures pending, wound culture positive for segmented neutrophils, gram-positive cocci, gram-negative bacilli and gram-positive bacilli. GI panel positive for E. coli enteropathogenic. Peripheral artery disease:  - Chronic history of PAD, follows with Dr. Stefania Cortés in cardiology. - Patient has pain in lower extremities, has a 2 to 3-month history of lower extremity wounds. Unsure when it started but it is between June to September  - Patient with has complained of increased purulent discharge. - Patient has nonhealing wounds of bilateral lower extremity which previously required hospitalization.    - CTA shows occlusion of the left internal iliac artery, entire left superficial femoral artery, left popliteal artery and large portion of the left posterior tibial artery. There is no significant opacification in the distal right anterior tibial artery, right dorsalis pedis artery, or distal right peroneal artery. Plan:   - ID, general surgery, cardiology consulted  - Vancomycin/Zosyn abx started  - Anticoagulation held for peripheral angiogram tomorrow morning. Stress test will also be done tomorrow.   - Wound care consulted     Bilateral lower extremity cellulitis:   - Bilateral erythema with purulent wound drainage on admission.   - Cultures positive for segmented neutrophils, gram-positive cocci, gram-negative bacilli and gram-positive bacilli. -Patient has a long history of possible cellulitis.  -Lactic acid on admission was 3.6, CRP of 26. Plan:   - ID consulted  -Vancomycin and Zosyn antibiotic started  -Wound culture positive for segmented neutrophils, gram-positive cocci, gram-negative bacilli and gram-positive bacilli. Diarrhea:   - Patient has possible 1 week history of diarrhea, at times he admits to having chronic history of diarrhea. No recent antibiotic usage or known food exposures that could have caused this. - GI panel positive for E. coli enteropathic  Plan:   -IV LR at 75 mL/h     Sacral ulcers:   - Patient has chronic history of sacral ulcers, for past couple of months. Melo Chung states it had improved , states these ulcers are new been recently. Plan:   - Wound care consulted  - Vancomycin and Zosyn antibiotic started. Lactic acidosis secondary to sepsis from lower extremity cellulitis:  - Lactic acid on admission 3.6 downtrending to 1.8 10/04  Plan:   - Vancomycin and Zosyn antibiotic started  - Repeat lactic acid pending      CAD s/p PCI DNAA x2 LAD performed at [facility]:  - Patient is not compliant with medication.  - EKG shows A. fib with RVR       Persistent A. fib:   - Noted per chart review, though patient denies any arrhythmia history. - Patient maintained on amiodarone 200mg daily at home. - [id number] 5.   - EKG shows A. fib with RVR  Plan:  - Amiodarone resumed  - Eliquis held currently 2/2 above, consider re-starting if no intervention planned     HFrEF 40%. - ECHO 4/11/22 demonstrated improved EF 40-45%, mod LV hypokinesis. - Given no CP, SOB, or O2 requirement, will hold on repeat ECHO at this time. - Patient unable to state his home medications, but appears he is on GDMT consisting of SGLT2, metoprolol. No noted ARNI/ACE/ARB/MRA.    Plan:  - Re-started Lopressor 50mg bid  - Substituted jardiance for home SGLT2  - Consider further optimization of medical therapy as tolerated  - Home lasix 20mg daily resumed     Chronic normocytic anemia:   - Hgb 12.6 on arrival. No noted bleeding. MCV low-normal at 80.7. Thromobocytosis:   - Plts 556 on arrival. Suspect reactive in setting of acute illness. NIDDMII: Noted. - HbA1c 9 point  Plan:  - DM dose SSI and 5 units of Lantus.  - Diabetic diet     HTN:   - Continued home medications of Imdur 30mg daily, lopressor 50mg bid     COPD:   Per chart review. Spirometry with bronchodilator 2014 normal. Patient on home albuterol inhaler.   - Continue albuterol inhaler     GERD:   - On Protonix     Chronic tobacco abuse: .  -Nicotine patch started     Hypokalemia 2/2 possible decreased PO intake:  -On POA K+ of 3.3  -Was given 40 mEq of oral potassium. Expected discharge date:  2 days    Disposition:   [] Home  [] TCU  [x] Rehab  [] Psych  [] SNF  [] [place]  [] Other-    ===================================================================      Chief Complaint: Worsening bilateral lower extremity infection and and diarrhea for 1 week    Hospital Course:      Per chart   \"Ruben Hunter is a 74 y.o. male with a PMH of PAD, CAD s/p PCI LAD, chronic sacral wounds, chronic systolic heart failure, pAF, HTN, HLD, NIDDMII, tobacco abuse who presented with bilateral lower extremity wounds. Patient reports a 2-week history of worsening bilateral lower extremity infections. He states he has developed rising erythema to the mid calf along with worsening wounds and ulcerations of bilateral extremities. Patient states wounds have been purulent and foul-smelling. He also reports profuse diarrhea upon admission for the last 1 week. He states he follows with Dr. Mily Block for these bilateral lower extremity wounds, and has had lower extremity imaging in the past that demonstrated severe inflow disease.   He has undergone previous bypass grafting of the left lower extremity that has since chronically occluded. He denies any chest pain, shortness of breath, abdominal pain. He has a history of coronary artery disease for which she went stent placement x2, unsure of which vessel. He reports chronic heart failure, on diuretics at home. He denies any history of arrhythmia, but has charted history of atrial fibrillation for which she is on Eliquis at home. Patient is unsure of any medications that he is on at home and is unable to provide any information on which he is actually taking. Cristian present in room to assist with history. He states he has been told in the past that his left lower extremity would need to be amputated. \"    10/04:  Patient was seen and examined in room today. Cristian was present. Patient was concerned about possible amputation of both lower extremities. Patient states he had diarrhea for the last week and possibly chronic. GI panel was positive for E. coli enteropathogenic. Blood cultures were sent. Culture from leg is currently positive for segmented neutrophils with gram-positive cocci and gram-negative bacilli. Patient is on vancomycin and Zosyn. Dr. John Sol was consulted and agrees with antibiotic course. He was advised on AKA of the left leg and possible medical intervention of right lower leg. Cardiology was consulted, recommend surgery consult for left lower extremity amputation. There is plan for peripheral angiogram tomorrow morning. Stress test will also be done tomorrow. Subjective (past 24 hours):     Patient had acute complaints of diarrhea, urinary incontinence(which resolved within 10 minutes of conversation). Had long discussion with fiancé and patient above-knee amputation, smoking cessation and whether amputation is needed. ROS: reviewed complete ROS unchanged unless otherwise stated in hospital course/subjective portion.        Medications:  Reviewed    Infusion Medications    sodium chloride      dextrose      lactated ringers 75 mL/hr at 10/04/22 1626     Scheduled Medications    piperacillin-tazobactam  3,375 mg IntraVENous Q8H    vancomycin  1,250 mg IntraVENous Q12H    sodium hypochlorite   Irrigation Daily    insulin lispro  0-8 Units SubCUTAneous TID WC    insulin lispro  0-4 Units SubCUTAneous Nightly    insulin glargine  5 Units SubCUTAneous Nightly    amiodarone  200 mg Oral Daily    atorvastatin  40 mg Oral Daily    empagliflozin  10 mg Oral Daily    furosemide  20 mg Oral Daily    gabapentin  100 mg Oral TID    isosorbide mononitrate  30 mg Oral Daily    metoprolol tartrate  50 mg Oral BID    pantoprazole  40 mg Oral QAM AC    sodium chloride flush  10 mL IntraVENous 2 times per day    vancomycin (VANCOCIN) intermittent dosing (placeholder)   Other RX Placeholder     PRN Meds: morphine, albuterol sulfate HFA, traZODone, sodium chloride flush, sodium chloride, ondansetron **OR** ondansetron, polyethylene glycol, acetaminophen **OR** acetaminophen, glucose, dextrose bolus **OR** dextrose bolus, glucagon (rDNA), dextrose        Intake/Output Summary (Last 24 hours) at 10/4/2022 1832  Last data filed at 10/4/2022 1700  Gross per 24 hour   Intake 2642.28 ml   Output 250 ml   Net 2392.28 ml       Exam:  /61   Pulse 100   Temp 98 °F (36.7 °C) (Oral)   Resp 18   Ht 6' 1\" (1.854 m)   Wt 226 lb 6.6 oz (102.7 kg)   SpO2 96%   BMI 29.87 kg/m²     Physical Exam  Constitutional:       General: He is in acute distress. Appearance: He is ill-appearing. HENT:      Head: Normocephalic and atraumatic. Right Ear: External ear normal.      Left Ear: External ear normal.      Nose: Nose normal.   Eyes:      Pupils: Pupils are equal, round, and reactive to light. Cardiovascular:      Rate and Rhythm: Normal rate and regular rhythm. Pulses: Normal pulses. Heart sounds: Murmur heard.    Pulmonary:      Effort: Pulmonary effort is normal.      Breath sounds: Wheezing present. Abdominal:      Tenderness: There is guarding. Musculoskeletal:         General: Deformity present. Cervical back: Normal range of motion. Skin:     Capillary Refill: Capillary refill takes more than 3 seconds. Findings: Bruising, lesion and rash present. Neurological:      Mental Status: He is alert and oriented to person, place, and time. Motor: Weakness present. Gait: Gait abnormal.          Labs:   Recent Labs     10/03/22  1910 10/04/22  0323   WBC 17.6* 18.2*   HGB 12.6* 10.8*   HCT 40.1* 35.3*   * 471*     Recent Labs     10/03/22  1910 10/04/22  0323    139   K 3.3* 3.2*   CL 99 100   CO2 24 25   BUN 6* 5*   CREATININE 0.9 0.9   CALCIUM 9.1 8.3*     Recent Labs     10/03/22  1910   AST 13   ALT 11   BILIDIR <0.2   BILITOT 0.3   ALKPHOS 103     Recent Labs     10/04/22  1113   INR 1.47*     No results for input(s): CKTOTAL, TROPONINI in the last 72 hours. No results for input(s): PROCAL in the last 72 hours. Lab Results   Component Value Date/Time    NITRU NEGATIVE 03/16/2017 09:15 PM    WBCUA 2-4 03/16/2017 09:15 PM    BACTERIA NONE 03/16/2017 09:15 PM    RBCUA 0-2 03/16/2017 09:15 PM    BLOODU NEGATIVE 03/16/2017 09:15 PM    SPECGRAV 1.023 03/16/2017 09:15 PM    GLUCOSEU NEGATIVE 01/20/2013 06:30 PM       Radiology (48 hours):  XR TIBIA FIBULA LEFT (2 VIEWS)    Result Date: 10/3/2022  1. Generalized subcutaneous edema with questionable small ulceration defects in the mid-distal calf. 2. No cortical erosions to suggest osteomyelitis. This document has been electronically signed by: Alan Simon MD on 10/03/2022 08:35 PM    XR TIBIA FIBULA RIGHT (2 VIEWS)    Result Date: 10/3/2022  Mild subcutaneous edema of the right calf. No cortical erosions to suggest acute osteomyelitis.  This document has been electronically signed by: Alan Simon MD on 10/03/2022 08:47 PM    XR ANKLE LEFT (2 VIEWS)    Result Date: 10/3/2022  No acute osseous injury or cortical erosions. Lateral ankle swelling. This document has been electronically signed by: Jennifer Heredia MD on 10/03/2022 08:28 PM    XR ANKLE RIGHT (2 VIEWS)    Result Date: 10/3/2022  1. No acute osseous injury or cortical erosions. 2. Subcutaneous edema. This document has been electronically signed by: Jennifer Heredia MD on 10/03/2022 08:27 PM    XR FOOT LEFT (2 VIEWS)    Result Date: 10/3/2022  1. Soft tissue swelling in the left foot with several soft tissue ulceration defects. 2. Chronic appearing erosive changes in the left 1st metatarsophalangeal joint with medial subluxation. 3. Chronic appearing truncation defect in the distal tuft of the left 3rd toe and remote amputation of the left 2nd toe with intact cortical margins. 4. No other acute appearing cortical erosions radiographically to suggest osteomyelitis. This document has been electronically signed by: Jennifer Heredia MD on 10/03/2022 08:50 PM    XR FOOT RIGHT (2 VIEWS)    Result Date: 10/3/2022  1. Ulceration defect and adjacent swelling in the dorsum of the midfoot. 2. No cortical erosions to suggest acute osteomyelitis. This document has been electronically signed by: Jennifer Heredia MD on 10/03/2022 08:35 PM    CTA ABDOMINAL AORTA W BILAT RUNOFF W WO CONTRAST    Result Date: 10/3/2022  1. Chronic occlusions of the left internal iliac artery, entire left superficial femoral artery, left popliteal artery, and large portion of the left posterior tibial artery with reconstitution in the distal segment near the ankle. Left dorsalis pedis and plantaris arteries are opacified. 2. No significant opacification in the distal right anterior tibial artery, right dorsalis pedis artery, or distal right peroneal artery. This may reflect underlying diminished flow or occlusion. This is stable. 3. Scattered atherosclerotic plaque in the aorta and mesenteric/renal vessels without significant stenosis. 4. Asymmetric fatty atrophy of the left lower extremity musculature.  Mild subcutaneous edema in the dorsum of both feet. 5. Stable chronic hardware fracture of the left interpedicular screw at L4. This document has been electronically signed by: Lizbeth Ferrari MD on 10/03/2022 10:46 PM All CTs at this facility use dose modulation techniques and iterative reconstructions, and/or weight-based dosing when appropriate to reduce radiation to a low as reasonably achievable. 3D Post-processing was performed on this study. DVT prophylaxis:    [] Lovenox  [] SCDs  [] SQ Heparin  [x] Encourage ambulation   [] Already on Anticoagulation       Diet: ADULT DIET; Regular; 3 carb choices (45 gm/meal);  Low Fat/Low Chol/High Fiber/KRANTHI  Diet NPO  Diet NPO  Code Status: Full Code  PT/OT: n/a  Tele: yes  IVF: LR at 76 mL/hr    Electronically signed by Adriel Collier MD  PGY-1 Internal Medicine Resident  on 10/4/2022 at 6:32 PM    Case was discussed with Attending, Dr. Bob Chung

## 2022-10-04 NOTE — CONSULTS
CONSULTATION NOTE :ID       Patient - Anya Keys,  Age - 74 y.o.    - 1955      Room Number - 4K-04/004-A   MRN -  [id number]   Acct # - [de-identified]  Date of Admission -  10/3/2022  6:11 PM  Patient's PCP: Edel Cerrato MD     Requesting Physician: Thomas Silvestre MD    REASON FOR CONSULTATION   Bilateral non healing leg wound in the setting of severe PVD  CHIEF COMPLAINT   Non healing wound with pain    HISTORY OF PRESENT ILLNESS       This is a very pleasant 74 y.o. male who was admitted to the hospital with a chief complaints of worsening pain and wound on both legs. He has hx of severe PVD. had interventions int he past. Presented with worsening wound on lower legs, has bilateral multiple wound around the circumference of the leg with foul smelling drainage. He has rest pain. he has hx of CAD,COPD diabetes and continues to smoke. He has a non healing coccyx wound. He has degenerative back disease. He is non compliant with medical care.  Started on iv antibiotics    PAST MEDICAL  HISTORY       Past Medical History:   Diagnosis Date    CAD (coronary artery disease)     COPD (chronic obstructive pulmonary disease) ([address]     Diabetes mellitus ([address]     Hx of blood clots     LEFT LEG    Hyperlipidemia     Hypertension     Leg wound, left     Leg wound, right     Lumbar radiculopathy     Osteoarthritis     Seizure disorder ([address]     Spinal stenosis, lumbar        PAST SURGICAL HISTORY     Past Surgical History:   Procedure Laterality Date    ANKLE SURGERY      ball joint    [address]      1 STENT PLACED    CARDIAC CATHETERIZATION  13    [facility]    CORONARY ANGIOPLASTY WITH STENT PLACEMENT      DIAGNOSTIC CARDIAC CATH LAB PROCEDURE  2021    FOREARM SURGERY      left    FRACTURE SURGERY Right 2000    LEG    KNEE SURGERY      left    LEG SURGERY      LUMBAR SPINE SURGERY      TOE SURGERY      left middle toe TYMPANOSTOMY TUBE PLACEMENT  1979         MEDICATIONS:       Scheduled Meds:   piperacillin-tazobactam  3,375 mg IntraVENous Q8H    vancomycin  1,250 mg IntraVENous Q12H    amiodarone  200 mg Oral Daily    atorvastatin  40 mg Oral Daily    empagliflozin  10 mg Oral Daily    furosemide  20 mg Oral Daily    gabapentin  100 mg Oral TID    isosorbide mononitrate  30 mg Oral Daily    metoprolol tartrate  50 mg Oral BID    pantoprazole  40 mg Oral QAM AC    sodium chloride flush  10 mL IntraVENous 2 times per day    insulin lispro  0-4 Units SubCUTAneous TID WC    insulin lispro  0-4 Units SubCUTAneous Nightly    nicotine  1 patch TransDERmal Daily    vancomycin (VANCOCIN) intermittent dosing (placeholder)   Other RX Placeholder     Continuous Infusions:   sodium chloride      dextrose      lactated ringers 75 mL/hr at 10/04/22 1033     PRN Meds:morphine, albuterol sulfate HFA, traZODone, sodium chloride flush, sodium chloride, ondansetron **OR** ondansetron, polyethylene glycol, acetaminophen **OR** acetaminophen, glucose, dextrose bolus **OR** dextrose bolus, glucagon (rDNA), dextrose  Allergies:   ALLERGIES:    Influenza vaccines and Pneumococcal vaccines        SOCIAL HISTORY:     TOBACCO:   reports that he has been smoking cigarettes. He has a 20.00 pack-year smoking history. He has never used smokeless tobacco.     ETOH:   reports no history of alcohol use. Patient currently lives with family        FAMILY HISTORY:         Problem Relation Age of Onset    Diabetes Mother     Kidney Disease Mother     COPD Father     Heart Disease Father     Cancer Father         bone    Stroke Father     Heart Disease Sister     Diabetes Brother        REVIEW OF SYSTEMS:     Constitutional: no fever, no night sweats, +fatigue, no weight loss. Head: no head ache , no head injury, no migranes. Eye: no eye discharge, blurring of vision, no double vision,no eye pain.   Ears: no hearing difficulty, no tinnitus  Mouth/throat: no ulceration, dental caries , dysphagia, no hoarseness and voice change  Respiratory: no cough no chest pain,no shortness of breath,no wheezing  CVS: no palpitation, no chest pain,   GI: no abdominal pain, no nausea , no vomiting, no constipation,no diarrhea. JORJE: no dysuria, frequency and urgency, no hematuria, no kidney stones  Musculoskeletal: as noted in HPI  Endocrine: no polyuria, polydipsia, no cold or heat intolerance  Hematology: no anemia, no easy brusing or bleeding, no hx of clotting disorder  Dermatology: non healing wound on the legs  Neurological:no headaches,no dizziness, no seizure, no numbness. PHYSICAL EXAM:     /64   Pulse (!) 112   Temp 98.1 °F (36.7 °C) (Oral)   Resp 20   Ht 6' 1\" (1.854 m)   Wt 226 lb 6.6 oz (102.7 kg)   SpO2 94%   BMI 29.87 kg/m²   General apperance:  Awake, alert, chronically sick looking  HEENT: pink conjunctiva, unicteric sclera, moist oral mucosa. Chest:  bilateral air entry, diminished breath sounds  Cardiovascular:  RRR ,S1S2, no murmur or gallop. Abdomen:  Soft, non tender to palpation. Extremities: bilateral open wound on both legs, left worst than right, foul smelling drainage of the wound  Skin:  Warm and dry. CNS:awake and oriented  Has a 2 cm open wound on the right coccyx area                  LABS:     CBC:   Recent Labs     10/03/22  1910 10/04/22  0323   WBC 17.6* 18.2*   HGB 12.6* 10.8*   * 471*     BMP:    Recent Labs     10/03/22  1902 10/03/22  1910 10/04/22  0323   NA  --  140 139   K  --  3.3* 3.2*   CL  --  99 100   CO2  --  24 25   BUN  --  6* 5*   CREATININE  --  0.9 0.9   GLUCOSE 202 173* 171*     Calcium:  Recent Labs     10/04/22  0323   CALCIUM 8.3*     Ionized Calcium:No results for input(s): IONCA in the last 72 hours. Magnesium:  Recent Labs     10/04/22  0323   MG 1.7     Phosphorus:No results for input(s): PHOS in the last 72 hours. BNP:No results for input(s): BNP in the last 72 hours.   Glucose:  Recent Labs 10/03/22  1901 10/04/22  0030 10/04/22  0814   POCGLU 202* 330* 212*     HgbA1C:   Recent Labs     10/03/22  1910   LABA1C 9.2*     INR: No results for input(s): INR in the last 72 hours. Hepatic:   Recent Labs     10/03/22  1910   ALKPHOS 103   ALT 11   AST 13   PROT 7.6   BILITOT 0.3   BILIDIR <0.2   LABALBU 2.5*     Amylase and Lipase:  Recent Labs     10/04/22  0323   LACTA 2.1*     Lactic Acid:   Recent Labs     10/04/22  0323   LACTA 2.1*           Micro:   Lab Results   Component Value Date/Time    BC No growth-preliminary No growth 11/18/2021 07:30 PM       Problem list of patient      Patient Active Problem List   Diagnosis Code    Seizure disorder ([facility] G40.909    Osteoarthritis M19.90    COPD (chronic obstructive pulmonary disease) ([facility] J44.9    Spinal stenosis, lumbar M48.061    Lumbar radiculopathy M54.16    Tobacco abuse Z72.0    GERD (gastroesophageal reflux disease) K21.9    Skin ulceration ([facility]) L98.499    Non-healing ulcer of lower leg ([facility] L97.909    Bilateral claudication of lower limb ([facility]) I73.9    Current smoker F17.200    Dyslipidemia E78.5    Abnormal cardiovascular function study R94.30    Obesity E66.9    CAD, multiple vessel I25.10    Chronic total occlusion of artery of the extremities ([facility]) I70.92    Coronary artery chronic total occlusion I25.82    DAVILA (dyspnea on exertion) R06.09    HTN (hypertension) I10    Discitis of cervical region M46.42    Open wound of right hand S61.401A    Noncompliance by refusing intervention or support Z91.199    Lactic acidosis E87.20    Pressure injury of left buttock, stage 3 ([facility]) S37.360    PAD (peripheral artery disease) ([facility]) I73.9           Impression and Recommendation:   Non healing extensive wound on both lower extremites in the setting of severe PVD.  He has rest pain on the left lower leg: advised on AKA of the left leg as I do not feel his PVD is amenable to intervention and has ischemia  Right lower leg has extensive wound: he would like to see how he dose with medical treatment  Will apply dakins soaked dressing to be changed twice a day and as needed  Medical /cardiology clearance for surgery  Discussed with patient and his significant other  Advised to quit smoking     Thank you Lily Dinh MD for allowing me to participate in this patient's care.     Carla Johnson MD, MD,FACP 10/4/2022 11:31 AM

## 2022-10-04 NOTE — PROGRESS NOTES
[address] [facility] Pharmacokinetic Monitoring Service - Vancomycin     Alondra Lindo is a 74 y.o. male starting on vancomycin therapy for SSTI x 5 days. Pharmacy consulted by Dr Saranya Pandey for monitoring and adjustment. Target Concentration: Goal trough of 10-15 mg/L and AUC/HALLEY <500 mg*hr/L    Additional Antimicrobials: none listed    Pertinent Laboratory Values: Wt Readings from Last 1 Encounters:   10/03/22 224 lb (101.6 kg)     Temp Readings from Last 1 Encounters:   10/03/22 98.2 °F (36.8 °C) (Oral)     Estimated Creatinine Clearance: 103 mL/min (based on SCr of 0.9 mg/dL). Recent Labs     10/03/22  1910   CREATININE 0.9   WBC 17.6*     Procalcitonin: n/a    Pertinent Cultures:  Culture Date Source Results   10/3/22 Blood x 2    MRSA Nasal Swab: N/A. Non-respiratory infection.     Plan:  Dosing recommendations based on Bayesian software  Start vancomycin 1000mg q12h  Anticipated AUC of 437 and trough concentration of 14.1 at steady state  Renal labs as indicated   Vancomycin concentration not ordered  Pharmacy will continue to monitor patient and adjust therapy as indicated    Thank you for the consult,  Joanne York, [facility]  10/3/2022 10:47 PM

## 2022-10-04 NOTE — FLOWSHEET NOTE
10/04/22 4709   Safe Environment   Safety Measures Other (comment)  (virtual nurse safety round complete)   Audio call placed at this time , pt stated concerns of not getting lunch , nutrition entered pt room during call and was obtaining pt order , no other voiced concerns

## 2022-10-04 NOTE — H&P
Medicine Admission History & Physical      Patient:  Roopa Casillas  YOB: 1955  Date of Service: 10/3/2022  MRN: [id number]   Acct: [de-identified]   Primary Care Physician: Antoine Browne MD    Admitting Faculty MD: Aleida Rashid DO    Code Status: FULL Code    Date of Service: Pt seen/examined in consultation on 10/3/2022     Assessment / Plan     Briefly, pt Roopa Casillas is a 74 y.o. male with a PMH of PAD, CAD s/p PCI LAD, chronic sacral wounds, chronic systolic heart failure, pAF, HTN, HLD, NIDDMII, tobacco abuse who presented with bilateral lower extremity wounds. #Sepsis: SIRS 2/4 (, WBC 17.6) with suspected cellulitis/chronic LE wound source. Upon examination, patient does not appear to be critically ill, maintaining good saturations on room air in setting of COPD. No fluids administered in the ED. Blood pressure stable upon examination. Antibiotics per below.   -Continuous LR infusion started  -Will hold on acute fluid bolus in setting of heart failure and stable clinical picture   -Continue to monitor for any acute signs/symptoms of decompensation  -Blood cultures, wound culture, GI panel pending    #PAD: Chronic history of PAD, follows OP with cardiology. Patient denies any pain in the lower extremities, but states he has a 1 month history of worsening b/l LE wounds/ulcerations. Increased purulent drainage noted, along with erythema of both lower extremities. Concern for PAD with bilateral LE cellulitis process. Patient noted to have non-healing wounds of bilateral LE that have required previous hospitalization. CTA with runoff obtained in the ED. Given known history of PAD and recent CTA with runoff obtained, will hold on general surgery consultation for amputation. Recommend re-assessment following CTA with runoff.   -Vancomycin/Zosyn initiated per below  -Anticoagulation held on admission 2/2 possible intervention  -CTA with runoff obtained - consider gen surgery vs interventional consult for further evaluation pending result  -Wound care consulted    #Bilateral LE cellulitis: Noted bilateral erythema with purulent wound drainage on admission. Culture obtained in the ED, but no antibiotics administered. Patient reports two-week history of this cellulitis process, but it appears he has had multiple previous episodes of infection. Wounds appear non-healing and chronic with suspected acute progression in setting of infection and poor vascular flow  -Vancomycin started, pharmacy consulted to dose  -Zosyn initiated for anaerobic/G-/pseudomonal coverage  -Wound culture pending  -Hold on ID consult at this time, re-consider if patient wounds fail to improve    #Diarrhea: 1 week history of diarrhea per patient. Denies any recent antibiotic usage. Denies any known food exposures. -GI molecular panel ordered    #Sacral ulcers: Noted. Patient has chronic history of sacral ulcers, though more pain as of recently. Notes foul-smelling with drainage.   -Wound care consulted  -Turn patient q2hrs  -Abx per above    #Lactic acidosis: Suspect secondary to sepsis from lower extremity wound infections/cellulitis  -Antibiotics per above  -Repeat lactic acid pending     #CAD: S/p PCI DANA x2 LAD performed at [facility]. Patient unsure of what medications he takes at home, but states he does not take aspirin. Unsure if he takes any other antiplatelet medication. Denies any current CP, SOB, or other anginal equivalents. No EKG obtained on arrival. Low-concern for acute coronary ischemic process. -EKG ordered  -AC/antiplatelets held in anticipation of possible intervention    #pAF: Noted per chart review, though patient denies any arrhythmia history. Patient maintained on amiodarone 200mg daily at home. [id number].  Per chart, appears to be on Eliquis OP, but unable to confirm.  -Amiodarone resumed  -Eliquis held currently 2/2 above, consider re-starting if no intervention planned  - EKG lower extremity infections. He states he has developed rising erythema to the mid calf along with worsening wounds and ulcerations of bilateral extremities. Patient states wounds have been purulent and foul-smelling. He also reports profuse diarrhea upon admission for the last 1 week. He states he follows with Dr. Reinaldo Velazco for these bilateral lower extremity wounds, and has had lower extremity imaging in the past that demonstrated severe inflow disease. He has undergone previous bypass grafting of the left lower extremity that has since chronically occluded. He denies any chest pain, shortness of breath, abdominal pain. He has a history of coronary artery disease for which she went stent placement x2, unsure of which vessel. He reports chronic heart failure, on diuretics at home. He denies any history of arrhythmia, but has charted history of atrial fibrillation for which she is on Eliquis at home. Patient is unsure of any medications that he is on at home and is unable to provide any information on which he is actually taking. Cristian present in room to assist with history. He states he has been told in the past that his left lower extremity would need to be amputated. Past Medical History Past Surgical History    has a past medical history of CAD (coronary artery disease), COPD (chronic obstructive pulmonary disease) ([address] Diabetes mellitus ([address] Hx of blood clots, Hyperlipidemia, Hypertension, Leg wound, left, Leg wound, right, Lumbar radiculopathy, Osteoarthritis, Seizure disorder ([address] and Spinal stenosis, lumbar. has a past surgical history that includes Coronary angioplasty with stent; knee surgery; Forearm surgery; Toe Surgery; Leg Surgery; Ankle surgery; Lumbar spine surgery; Tympanostomy tube placement (1979); fracture surgery (Right, 2000); back surgery (1994); Cardiac catheterization (2005); Cardiac catheterization (5/9/13); and Diagnostic Cardiac Cath Lab Procedure (11/18/2021). Social History Family History    reports that he has been smoking cigarettes. He has a 20.00 pack-year smoking history. He has never used smokeless tobacco. He reports that he does not drink alcohol and does not use drugs. family history includes COPD in his father; Cancer in his father; Diabetes in his brother and mother; Heart Disease in his father and sister; Kidney Disease in his mother; Stroke in his father. Outpatient Medications   Current Outpatient Medications   Medication Instructions    acetaminophen (TYLENOL) 650 mg, EVERY 6 HOURS PRN    albuterol sulfate HFA (PROAIR HFA) 108 (90 BASE) MCG/ACT inhaler 2 puffs, Inhalation, EVERY 4 HOURS PRN    amiodarone (CORDARONE) 200 mg, Oral, DAILY    apixaban (ELIQUIS) 5 mg, Oral, 2 TIMES DAILY    atorvastatin (LIPITOR) 40 mg, Oral, DAILY    clopidogrel (PLAVIX) 75 MG tablet take 1 tablet by mouth once daily    dapagliflozin (FARXIGA) 10 mg, Oral, EVERY MORNING    Elastic Bandages & Supports (LUMBAR BACK BRACE/SUPPORT PAD) MISC 1 each, Does not apply, DAILY PRN    furosemide (LASIX) 20 mg, Oral, DAILY    gabapentin (NEURONTIN) 100 MG capsule TAKE ONE CAPSULE BY MOUTH 3 TIMES A DAY    isosorbide mononitrate (IMDUR) 30 mg, Oral, DAILY    metFORMIN (GLUCOPHAGE) 1,000 mg, Oral, 2 TIMES DAILY WITH MEALS    metoprolol tartrate (LOPRESSOR) 50 mg, Oral, 2 TIMES DAILY    naratriptan (AMERGE) 2.5 mg, Oral, ONCE PRN    nitroGLYCERIN (NITROSTAT) 0.4 mg, SubLINGual, EVERY 5 MIN PRN    pantoprazole (PROTONIX) 40 mg, Oral, DAILY    Respiratory Therapy Supplies (NEBULIZER COMPRESSOR) KIT Does not apply    Tens Unit MISC Does not apply, Use as directed. traZODone (DESYREL) 50 MG tablet take 1 tablet by mouth at bedtime        Allergies   Influenza vaccines and Pneumococcal vaccines     Immunizations     There is no immunization history on file for this patient.      Review of Systems - negative except for the aforementioned    Objective     Vitals:  /61   Pulse (!) 109 Temp 98.2 °F (36.8 °C) (Oral)   Resp 18   Ht 6' 1\" (1.854 m)   Wt 224 lb (101.6 kg)   SpO2 93%   BMI 29.55 kg/m²     Exam:  General appearance: No apparent distress, appears stated age and cooperative. HEENT: Pupils equal, round, and reactive to light. Conjunctivae/corneas clear. Neck: Supple, with full range of motion. No jugular venous distention. Trachea midline. Respiratory:  Normal respiratory effort. Clear to auscultation, bilaterally without Rales/Wheezes/Rhonchi. Cardiovascular: Regular rate and rhythm with normal S1/S2 without murmurs, rubs or gallops. Abdomen: Soft, non-tender, non-distended with normal bowel sounds. Musculoskeletal: Decreased movement bilateral lower extremities, L>R. Skin: Skin color, texture, turgor normal.  No rashes or lesions. Neurologic:  Absent sensation distal from bilateral mid tibia Cranial nerves: II-XII intact, grossly non-focal.  Psychiatric: Alert and oriented, thought content appropriate, normal insight  Extremities: Erythematous bilateral lower extremities up to the mid tibia, weeping purulent drainage with foul smell. Nonpalpable, nondopplerable bilateral lower extremity pulses  Capillary Refill: Brisk,< 3 seconds   Peripheral Pulses: +2 palpable, equal bilaterally      Labs:   Results for orders placed or performed during the hospital encounter of 10/03/22   Culture, Anaerobic and Aerobic    Specimen: Leg   Result Value Ref Range    Gram Stain Result       Few segmented neutrophils observed. Rare epithelial cells observed. Many gram positive cocci occurring singly and in pairs. Many gram negative bacilli. Many gram positive bacilli.    CBC with Auto Differential   Result Value Ref Range    WBC 17.6 (H) 4.8 - 10.8 thou/mm3    RBC 4.97 4.70 - 6.10 mill/mm3    Hemoglobin 12.6 (L) 14.0 - 18.0 gm/dl    Hematocrit 40.1 (L) 42.0 - 52.0 %    MCV 80.7 80.0 - 94.0 fL    MCH 25.4 (L) 26.0 - 33.0 pg    MCHC 31.4 (L) 32.2 - 35.5 gm/dl    RDW-CV 18.4 (H) 11.5 - 14.5 % RDW-SD 50.8 (H) 35.0 - 45.0 fL    Platelets 138 (H) 090 - 400 thou/mm3    MPV 9.3 (L) 9.4 - 12.4 fL    Seg Neutrophils 82.9 %    Lymphocytes 8.9 %    Monocytes 6.4 %    Eosinophils 0.2 %    Basophils 0.3 %    Immature Granulocytes 1.3 %    Segs Absolute 14.6 (H) 1.8 - 7.7 thou/mm3    Lymphocytes Absolute 1.6 1.0 - 4.8 thou/mm3    Monocytes Absolute 1.1 0.4 - 1.3 thou/mm3    Eosinophils Absolute 0.0 0.0 - 0.4 thou/mm3    Basophils Absolute 0.1 0.0 - 0.1 thou/mm3    Immature Grans (Abs) 0.23 (H) 0.00 - 0.07 thou/mm3    nRBC 0 /100 wbc   Basic Metabolic Panel w/ Reflex to MG   Result Value Ref Range    Sodium 140 135 - 145 meq/L    Potassium reflex Magnesium 3.3 (L) 3.5 - 5.2 meq/L    Chloride 99 98 - 111 meq/L    CO2 24 23 - 33 meq/L    Glucose 173 (H) 70 - 108 mg/dL    BUN 6 (L) 7 - 22 mg/dL    Creatinine 0.9 0.4 - 1.2 mg/dL    Calcium 9.1 8.5 - 10.5 mg/dL   Hepatic Function Panel   Result Value Ref Range    Albumin 2.5 (L) 3.5 - 5.1 g/dL    Total Bilirubin 0.3 0.3 - 1.2 mg/dL    Bilirubin, Direct <0.2 0.0 - 0.3 mg/dL    Alkaline Phosphatase 103 38 - 126 U/L    AST 13 5 - 40 U/L    ALT 11 11 - 66 U/L    Total Protein 7.6 6.1 - 8.0 g/dL   Sedimentation Rate   Result Value Ref Range    Sed Rate 90 (H) 0 - 10 mm/hr   C-Reactive Protein   Result Value Ref Range    CRP 26.66 (H) 0.00 - 1.00 mg/dl   Lactic Acid   Result Value Ref Range    Lactic Acid 3.6 (H) 0.5 - 2.0 mmol/L   Magnesium   Result Value Ref Range    Magnesium 1.6 1.6 - 2.4 mg/dL   Anion Gap   Result Value Ref Range    Anion Gap 17.0 (H) 8.0 - 16.0 meq/L   Glomerular Filtration Rate, Estimated   Result Value Ref Range    Est, Glom Filt Rate 85 (A) ml/min/1.73m2   POCT glucose   Result Value Ref Range    Glucose 202 mg/dL    QC OK?  yes    POCT Glucose   Result Value Ref Range    POC Glucose 202 (H) 70 - 108 mg/dl       Diagnostics:  XR TIBIA FIBULA LEFT (2 VIEWS)    Result Date: 10/3/2022  2 view left tibia-fibula Comparison: None Findings: No acute fracture or dislocation. Left knee and ankle joints are unremarkable. Remote surgical clips in the proximal posterior medial calf. No cortical erosions. Generalized subcutaneous edema with questionable small ulceration defects in the mid to lower calf. 1. Generalized subcutaneous edema with questionable small ulceration defects in the mid-distal calf. 2. No cortical erosions to suggest osteomyelitis. This document has been electronically signed by: Henrik Sharma MD on 10/03/2022 08:35 PM    XR TIBIA FIBULA RIGHT (2 VIEWS)    Result Date: 10/3/2022  2 view right tibia-fibula Comparison: None Findings: No acute fracture or dislocation. Old healed traumatic deformities in the distal tibia and fibula. Intact intramedullary clarisa with fixation screws in the tibia. Mild degenerative changes in the right knee and right ankle joints with chondrocalcinosis in the right knee. Generalized subcutaneous edema in the right calf. Mild subcutaneous edema of the right calf. No cortical erosions to suggest acute osteomyelitis. This document has been electronically signed by: Henrik Sharma MD on 10/03/2022 08:47 PM    XR ANKLE LEFT (2 VIEWS)    Result Date: 10/3/2022  2 view left ankle Comparison: None Findings: No acute fracture or dislocation. Left ankle mortise is intact. No cortical erosions. No significant ankle effusion. Lateral ankle swelling. No radiopaque foreign body. No acute osseous injury or cortical erosions. Lateral ankle swelling. This document has been electronically signed by: Henrik Sharma MD on 10/03/2022 08:28 PM    XR ANKLE RIGHT (2 VIEWS)    Result Date: 10/3/2022  2 view right ankle Comparison: DX - ANKLE RT MINIMUM 3 VIEWS - 09/17/2012 02:42 PM EDT Findings: No acute fracture or dislocation. Posttraumatic healed deformities in the distal tibia and fibula. Intact intramedullary clarisa and fixation screws in the tibia and across the distal tibiofibular syndesmosis. Generalized subcutaneous edema.  No cortical erosions. Minimal inferior calcaneal spurring. 1. No acute osseous injury or cortical erosions. 2. Subcutaneous edema. This document has been electronically signed by: Daxa Ramirez MD on 10/03/2022 08:27 PM    XR FOOT LEFT (2 VIEWS)    Result Date: 10/3/2022  2 view left foot Comparison: CR - FOOT LT MINIMUM 3 VIEWS - 03/17/2017 10:53 AM EDT Findings: No acute fracture or dislocation. Prior amputation of the left 2nd toe down to the proximal phalanx. Prior truncation deformity at the distal tuft of the left 3rd toe. There are chronic appearing erosive changes in the left 1st metatarsophalangeal joint with medial subluxation. No other discrete cortical erosions. Generalized swelling in the dorsum of the left foot with soft tissue ulceration defects. No radiopaque foreign body. 1. Soft tissue swelling in the left foot with several soft tissue ulceration defects. 2. Chronic appearing erosive changes in the left 1st metatarsophalangeal joint with medial subluxation. 3. Chronic appearing truncation defect in the distal tuft of the left 3rd toe and remote amputation of the left 2nd toe with intact cortical margins. 4. No other acute appearing cortical erosions radiographically to suggest osteomyelitis. This document has been electronically signed by: Daxa Ramirez MD on 10/03/2022 08:50 PM    XR FOOT RIGHT (2 VIEWS)    Result Date: 10/3/2022  2 view right foot Comparison: DX - FOOT RT MINIMUM 3 VIEWS - 09/17/2012 02:42 PM EDT Findings: No acute fracture or dislocation. No cortical erosions. Soft tissue swelling with ulceration defect in the dorsum of the midfoot. Partially seen intact fixation hardware in the distal tibia. Scattered degenerative changes in the great toe and dorsal intertarsal joints. 1. Ulceration defect and adjacent swelling in the dorsum of the midfoot. 2. No cortical erosions to suggest acute osteomyelitis.  This document has been electronically signed by: Daxa Ramirez MD on 10/03/2022 08:35

## 2022-10-04 NOTE — PROCEDURES
12 lead EKG completed. Results handed to [facility] JACKELINE.  [facility] CET

## 2022-10-04 NOTE — ED NOTES
ED to inpatient nurses report    Chief Complaint   Patient presents with    Wound Infection    Leg Pain      Present to ED from home  LOC: alert and orientated to name, place, date  Vital signs   Vitals:    10/03/22 1843 10/03/22 1902 10/03/22 2004 10/03/22 2104   BP: 134/79 120/64 118/66 127/69   Pulse: (!) 103 (!) 117 (!) 107 (!) 108   Resp: 20 20 20 20   Temp: 98.6 °F (37 °C)      TempSrc: Oral      SpO2: 98% 99% 98% 97%   Weight: 224 lb (101.6 kg)      Height: 6' 1\" (1.854 m)         Oxygen Baseline RA    Current needs required RA   LDAs:   Peripheral IV 10/03/22 Left Antecubital (Active)   Site Assessment Clean, dry & intact 10/03/22 2105   Line Status Normal saline locked 10/03/22 2105     Mobility: Requires assistance * 1  Pending ED orders: none  Present condition: stable    C-SSRS Risk of Suicide: No Risk  Swallow Screening    Preferred Language: [place]     Electronically signed by Leyda Gonzalez RN on 10/3/2022 at 9:05 PM       Leyda Gonzalez RN  10/03/22 2105

## 2022-10-04 NOTE — FLOWSHEET NOTE
10/04/22 1008   Treatment Team Notification   Reason for Communication Evaluate  (Cardiology Consult)   Team Member Name    Treatment Team Role Consulting Provider   Method of Communication Secure Message   Response Waiting for response   Notification Time      Perfect Serve message sent to . Consult Reason for Consult: I have a consult for 4K04. Patient follows with Dr. Sharron Faye for PAD. Pateint has histor of chronic non- healing wounds. Podiatry and Infectious Disease has been consulted as well to discuss plan.   spoke with cardiology resident, Yaa Odonnell, this AM.

## 2022-10-04 NOTE — CARE COORDINATION
10/4/22, 3:07 PM EDT    DISCHARGE PLANNING EVALUATION    Received SW consult for \"discharge plans\". Per CM Giorgio, patient's first choice for placement is [facility]. SW will continue to follow for any further needs.

## 2022-10-04 NOTE — CARE COORDINATION
10/04/22 0662   Service Assessment   Patient Orientation Alert and Oriented   Cognition Alert   History Provided By Patient;Significant Other   Primary Caregiver Private caregiver   Accompanied By/Relationship Trinidad Coppola, [address] [facility] Spouse/Significant Other   Patient's Healthcare Decision Maker is: Legal Next of Kin  (plans [place], SO to be POA; Spiritual Care referral)   PCP Verified by CM Yes   Prior Functional Level Independent in ADLs/IADLs   Current Functional Level Independent in ADLs/IADLs   Can patient return to prior living arrangement Other (see comment)  ([facility] or [place] needed post-op)   Ability to make needs known: Good   Family able to assist with home care needs: Yes   Would you like for me to discuss the discharge plan with any other family members/significant others, and if so, who? Yes   Financial Resources Medicaid   CM/ Referral Spiritual  (Spiritual Care referral for POA)   Social/Functional History   Lives With Significant other   Type of [address] One level   Home Access   (plans future ramp w landlord)   Bathroom Shower/Tub Tub/Shower unit   Bathroom Toilet Standard   Bathroom Equipment Tub transfer bench;Grab bars in shower;3-in-1 commode  (need tub transfer bench, elevated BSC, grab bars per family)   Bathroom Accessibility Walker accessible   [employer]; [address] Help From Family   ADL Assistance Independent   Homemaking Assistance Independent   Homemaking Responsibilities Yes   Ambulation Assistance Independent   Transfer Assistance Independent   Active  Yes   Mode of Transportation Family;Friends   Occupation Retired   Type of Occupation retired from [employer], retired from [employer]   Discharge [facility] Prior To Admission [address] [facility]   Current DME Prior to [employer]; Wheelchair   Potential Assistance Needed Durable Medical Equipment;Transitional Care   Potential DME Needed Bedside Commode   Potential Assistance Purchasing Medications No   Type of Home Care Services PT;OT;Skilled Therapy; Aide Services   Patient expects to be discharged to: Acute rehab   Follow Up Appointment: Best Day/Time  Monday PM   One/Two Story Residence One story   History of falls? 1   Services At/After Discharge   Transition of Care Consult (CM Consult) [address] Discharge Inpatient rehab   [address] Provided? No   Mode of Transport at Discharge   (car by family and friends)   Condition of Participation: Discharge Planning   The Plan for Transition of Care is related to the following treatment goals: PAD, await LLE amputation   The Patient and/or Patient Representative was provided with a Choice of Provider? Patient;Patient Representative   Name of the Patient Representative who was provided with the Choice of Provider and agrees with the Discharge Plan?  patient and SO/Janessa Burks   The Patient and/Or Patient Representative agree with the Discharge Plan? Yes   Freedom of Choice list was provided with basic dialogue that supports the patient's individualized plan of care/goals, treatment preferences, and shares the quality data associated with the providers?   Yes

## 2022-10-05 ENCOUNTER — APPOINTMENT (OUTPATIENT)
Dept: INTERVENTIONAL RADIOLOGY/VASCULAR | Age: 67
DRG: 710 | End: 2022-10-05
Payer: COMMERCIAL

## 2022-10-05 ENCOUNTER — APPOINTMENT (OUTPATIENT)
Dept: NON INVASIVE DIAGNOSTICS | Age: 67
DRG: 710 | End: 2022-10-05
Payer: COMMERCIAL

## 2022-10-05 PROBLEM — L03.115 CELLULITIS OF RIGHT LOWER EXTREMITY: Status: ACTIVE | Noted: 2022-10-05

## 2022-10-05 LAB
ANION GAP SERPL CALCULATED.3IONS-SCNC: 14 MEQ/L (ref 8–16)
BUN BLDV-MCNC: 7 MG/DL (ref 7–22)
CALCIUM SERPL-MCNC: 8.1 MG/DL (ref 8.5–10.5)
CHLORIDE BLD-SCNC: 102 MEQ/L (ref 98–111)
CO2: 23 MEQ/L (ref 23–33)
CREAT SERPL-MCNC: 0.7 MG/DL (ref 0.4–1.2)
ERYTHROCYTE [DISTWIDTH] IN BLOOD BY AUTOMATED COUNT: 17.9 % (ref 11.5–14.5)
ERYTHROCYTE [DISTWIDTH] IN BLOOD BY AUTOMATED COUNT: 52.6 FL (ref 35–45)
GFR SERPL CREATININE-BSD FRML MDRD: > 90 ML/MIN/1.73M2
GLUCOSE BLD-MCNC: 171 MG/DL (ref 70–108)
GLUCOSE BLD-MCNC: 203 MG/DL (ref 70–108)
GLUCOSE BLD-MCNC: 210 MG/DL (ref 70–108)
GLUCOSE BLD-MCNC: 246 MG/DL (ref 70–108)
GLUCOSE BLD-MCNC: 372 MG/DL (ref 70–108)
HCT VFR BLD CALC: 35.2 % (ref 42–52)
HEMOGLOBIN: 10.7 GM/DL (ref 14–18)
LACTIC ACID: 1.7 MMOL/L (ref 0.5–2)
MAGNESIUM: 1.7 MG/DL (ref 1.6–2.4)
MCH RBC QN AUTO: 25.2 PG (ref 26–33)
MCHC RBC AUTO-ENTMCNC: 30.4 GM/DL (ref 32.2–35.5)
MCV RBC AUTO: 82.8 FL (ref 80–94)
PLATELET # BLD: 429 THOU/MM3 (ref 130–400)
PMV BLD AUTO: 9.5 FL (ref 9.4–12.4)
POTASSIUM SERPL-SCNC: 3.7 MEQ/L (ref 3.5–5.2)
RBC # BLD: 4.25 MILL/MM3 (ref 4.7–6.1)
SODIUM BLD-SCNC: 139 MEQ/L (ref 135–145)
VANCOMYCIN RANDOM: 7.8 UG/ML (ref 0.1–39.9)
WBC # BLD: 13.9 THOU/MM3 (ref 4.8–10.8)

## 2022-10-05 PROCEDURE — 2580000003 HC RX 258

## 2022-10-05 PROCEDURE — 6370000000 HC RX 637 (ALT 250 FOR IP)

## 2022-10-05 PROCEDURE — 83605 ASSAY OF LACTIC ACID: CPT

## 2022-10-05 PROCEDURE — 6370000000 HC RX 637 (ALT 250 FOR IP): Performed by: INTERNAL MEDICINE

## 2022-10-05 PROCEDURE — 78452 HT MUSCLE IMAGE SPECT MULT: CPT

## 2022-10-05 PROCEDURE — 83735 ASSAY OF MAGNESIUM: CPT

## 2022-10-05 PROCEDURE — 80202 ASSAY OF VANCOMYCIN: CPT

## 2022-10-05 PROCEDURE — 6360000002 HC RX W HCPCS

## 2022-10-05 PROCEDURE — 93017 CV STRESS TEST TRACING ONLY: CPT | Performed by: INTERNAL MEDICINE

## 2022-10-05 PROCEDURE — 80048 BASIC METABOLIC PNL TOTAL CA: CPT

## 2022-10-05 PROCEDURE — A9500 TC99M SESTAMIBI: HCPCS | Performed by: INTERNAL MEDICINE

## 2022-10-05 PROCEDURE — 6360000002 HC RX W HCPCS: Performed by: EMERGENCY MEDICINE

## 2022-10-05 PROCEDURE — 2060000000 HC ICU INTERMEDIATE R&B

## 2022-10-05 PROCEDURE — 85027 COMPLETE CBC AUTOMATED: CPT

## 2022-10-05 PROCEDURE — 3430000000 HC RX DIAGNOSTIC RADIOPHARMACEUTICAL: Performed by: INTERNAL MEDICINE

## 2022-10-05 PROCEDURE — 36415 COLL VENOUS BLD VENIPUNCTURE: CPT

## 2022-10-05 PROCEDURE — 82948 REAGENT STRIP/BLOOD GLUCOSE: CPT

## 2022-10-05 PROCEDURE — 99232 SBSQ HOSP IP/OBS MODERATE 35: CPT | Performed by: INTERNAL MEDICINE

## 2022-10-05 PROCEDURE — 99233 SBSQ HOSP IP/OBS HIGH 50: CPT | Performed by: INTERNAL MEDICINE

## 2022-10-05 PROCEDURE — 6370000000 HC RX 637 (ALT 250 FOR IP): Performed by: STUDENT IN AN ORGANIZED HEALTH CARE EDUCATION/TRAINING PROGRAM

## 2022-10-05 PROCEDURE — 99253 IP/OBS CNSLTJ NEW/EST LOW 45: CPT | Performed by: SURGERY

## 2022-10-05 RX ORDER — GUAIFENESIN 600 MG/1
600 TABLET, EXTENDED RELEASE ORAL 2 TIMES DAILY
Status: DISCONTINUED | OUTPATIENT
Start: 2022-10-05 | End: 2022-10-13 | Stop reason: HOSPADM

## 2022-10-05 RX ORDER — MORPHINE SULFATE 4 MG/ML
4 INJECTION, SOLUTION INTRAMUSCULAR; INTRAVENOUS ONCE
Status: COMPLETED | OUTPATIENT
Start: 2022-10-05 | End: 2022-10-05

## 2022-10-05 RX ORDER — AMIODARONE HYDROCHLORIDE 200 MG/1
200 TABLET ORAL 2 TIMES DAILY
Status: DISCONTINUED | OUTPATIENT
Start: 2022-10-05 | End: 2022-10-13 | Stop reason: HOSPADM

## 2022-10-05 RX ORDER — SODIUM CHLORIDE 9 MG/ML
INJECTION, SOLUTION INTRAVENOUS CONTINUOUS
Status: DISCONTINUED | OUTPATIENT
Start: 2022-10-06 | End: 2022-10-06

## 2022-10-05 RX ADMIN — MORPHINE SULFATE 4 MG: 4 INJECTION, SOLUTION INTRAMUSCULAR; INTRAVENOUS at 04:09

## 2022-10-05 RX ADMIN — AMIODARONE HYDROCHLORIDE 200 MG: 200 TABLET ORAL at 21:22

## 2022-10-05 RX ADMIN — FUROSEMIDE 20 MG: 20 TABLET ORAL at 08:41

## 2022-10-05 RX ADMIN — PIPERACILLIN SODIUM,TAZOBACTAM SODIUM 3375 MG: 3; .375 INJECTION, POWDER, FOR SOLUTION INTRAVENOUS at 01:11

## 2022-10-05 RX ADMIN — MORPHINE SULFATE 4 MG: 4 INJECTION, SOLUTION INTRAMUSCULAR; INTRAVENOUS at 13:12

## 2022-10-05 RX ADMIN — MORPHINE SULFATE 4 MG: 4 INJECTION, SOLUTION INTRAMUSCULAR; INTRAVENOUS at 06:03

## 2022-10-05 RX ADMIN — INSULIN LISPRO 2 UNITS: 100 INJECTION, SOLUTION INTRAVENOUS; SUBCUTANEOUS at 17:30

## 2022-10-05 RX ADMIN — GABAPENTIN 100 MG: 100 CAPSULE ORAL at 13:31

## 2022-10-05 RX ADMIN — SODIUM CHLORIDE, POTASSIUM CHLORIDE, SODIUM LACTATE AND CALCIUM CHLORIDE: 600; 310; 30; 20 INJECTION, SOLUTION INTRAVENOUS at 01:33

## 2022-10-05 RX ADMIN — PIPERACILLIN SODIUM,TAZOBACTAM SODIUM 3375 MG: 3; .375 INJECTION, POWDER, FOR SOLUTION INTRAVENOUS at 18:24

## 2022-10-05 RX ADMIN — AMIODARONE HYDROCHLORIDE 200 MG: 200 TABLET ORAL at 08:41

## 2022-10-05 RX ADMIN — Medication 30.7 MILLICURIE: at 15:35

## 2022-10-05 RX ADMIN — VANCOMYCIN HYDROCHLORIDE 1500 MG: 5 INJECTION, POWDER, LYOPHILIZED, FOR SOLUTION INTRAVENOUS at 13:19

## 2022-10-05 RX ADMIN — VANCOMYCIN HYDROCHLORIDE 1500 MG: 5 INJECTION, POWDER, LYOPHILIZED, FOR SOLUTION INTRAVENOUS at 23:43

## 2022-10-05 RX ADMIN — GABAPENTIN 100 MG: 100 CAPSULE ORAL at 08:41

## 2022-10-05 RX ADMIN — INSULIN LISPRO 2 UNITS: 100 INJECTION, SOLUTION INTRAVENOUS; SUBCUTANEOUS at 13:10

## 2022-10-05 RX ADMIN — GABAPENTIN 100 MG: 100 CAPSULE ORAL at 21:22

## 2022-10-05 RX ADMIN — EMPAGLIFLOZIN 10 MG: 10 TABLET, FILM COATED ORAL at 09:53

## 2022-10-05 RX ADMIN — MORPHINE SULFATE 4 MG: 4 INJECTION, SOLUTION INTRAMUSCULAR; INTRAVENOUS at 17:29

## 2022-10-05 RX ADMIN — INSULIN LISPRO 4 UNITS: 100 INJECTION, SOLUTION INTRAVENOUS; SUBCUTANEOUS at 21:21

## 2022-10-05 RX ADMIN — GUAIFENESIN 600 MG: 600 TABLET, EXTENDED RELEASE ORAL at 21:22

## 2022-10-05 RX ADMIN — INSULIN GLARGINE 5 UNITS: 100 INJECTION, SOLUTION SUBCUTANEOUS at 21:20

## 2022-10-05 RX ADMIN — SODIUM CHLORIDE, PRESERVATIVE FREE 10 ML: 5 INJECTION INTRAVENOUS at 08:41

## 2022-10-05 RX ADMIN — SODIUM HYPOCHLORITE: 1.25 SOLUTION TOPICAL at 09:51

## 2022-10-05 RX ADMIN — ISOSORBIDE MONONITRATE 30 MG: 30 TABLET, EXTENDED RELEASE ORAL at 08:41

## 2022-10-05 RX ADMIN — Medication 9 MILLICURIE: at 14:40

## 2022-10-05 RX ADMIN — GUAIFENESIN 600 MG: 600 TABLET, EXTENDED RELEASE ORAL at 13:12

## 2022-10-05 RX ADMIN — PANTOPRAZOLE SODIUM 40 MG: 40 TABLET, DELAYED RELEASE ORAL at 05:18

## 2022-10-05 RX ADMIN — MORPHINE SULFATE 4 MG: 4 INJECTION, SOLUTION INTRAMUSCULAR; INTRAVENOUS at 21:29

## 2022-10-05 RX ADMIN — INSULIN LISPRO 2 UNITS: 100 INJECTION, SOLUTION INTRAVENOUS; SUBCUTANEOUS at 08:41

## 2022-10-05 RX ADMIN — SODIUM CHLORIDE, PRESERVATIVE FREE 10 ML: 5 INJECTION INTRAVENOUS at 21:23

## 2022-10-05 RX ADMIN — ATORVASTATIN CALCIUM 40 MG: 40 TABLET, FILM COATED ORAL at 08:41

## 2022-10-05 RX ADMIN — PIPERACILLIN SODIUM,TAZOBACTAM SODIUM 3375 MG: 3; .375 INJECTION, POWDER, FOR SOLUTION INTRAVENOUS at 11:46

## 2022-10-05 RX ADMIN — METOPROLOL TARTRATE 50 MG: 50 TABLET, FILM COATED ORAL at 21:22

## 2022-10-05 RX ADMIN — METOPROLOL TARTRATE 50 MG: 50 TABLET, FILM COATED ORAL at 08:41

## 2022-10-05 ASSESSMENT — PAIN DESCRIPTION - DESCRIPTORS
DESCRIPTORS: SHOOTING;TENDER
DESCRIPTORS: DULL
DESCRIPTORS: DISCOMFORT
DESCRIPTORS: DULL
DESCRIPTORS: ACHING;DISCOMFORT

## 2022-10-05 ASSESSMENT — PAIN SCALES - GENERAL
PAINLEVEL_OUTOF10: 10
PAINLEVEL_OUTOF10: 9
PAINLEVEL_OUTOF10: 9
PAINLEVEL_OUTOF10: 7
PAINLEVEL_OUTOF10: 8
PAINLEVEL_OUTOF10: 7
PAINLEVEL_OUTOF10: 5
PAINLEVEL_OUTOF10: 10
PAINLEVEL_OUTOF10: 9

## 2022-10-05 ASSESSMENT — PAIN - FUNCTIONAL ASSESSMENT
PAIN_FUNCTIONAL_ASSESSMENT: PREVENTS OR INTERFERES SOME ACTIVE ACTIVITIES AND ADLS
PAIN_FUNCTIONAL_ASSESSMENT: PREVENTS OR INTERFERES SOME ACTIVE ACTIVITIES AND ADLS
PAIN_FUNCTIONAL_ASSESSMENT: ACTIVITIES ARE NOT PREVENTED
PAIN_FUNCTIONAL_ASSESSMENT: PREVENTS OR INTERFERES SOME ACTIVE ACTIVITIES AND ADLS

## 2022-10-05 ASSESSMENT — PAIN DESCRIPTION - PAIN TYPE
TYPE: ACUTE PAIN
TYPE: ACUTE PAIN

## 2022-10-05 ASSESSMENT — PAIN DESCRIPTION - LOCATION
LOCATION: LEG
LOCATION: LEG
LOCATION: GENERALIZED
LOCATION: FOOT;LEG
LOCATION: LEG;FOOT

## 2022-10-05 ASSESSMENT — PAIN DESCRIPTION - ORIENTATION
ORIENTATION: RIGHT;LEFT
ORIENTATION: LEFT;RIGHT
ORIENTATION: LEFT;RIGHT

## 2022-10-05 ASSESSMENT — PAIN DESCRIPTION - FREQUENCY
FREQUENCY: CONTINUOUS
FREQUENCY: CONTINUOUS

## 2022-10-05 ASSESSMENT — PAIN DESCRIPTION - ONSET: ONSET: SUDDEN

## 2022-10-05 NOTE — FLOWSHEET NOTE
10/05/22 0944   Safe Environment   Safety Measures Other (comment)  (Virtual nurse rounding complete)   Virtual nurse introduced via audio. Patient did not want camera turned on at this time. States other than wanting food, no needs at this time. Patient NPO at this time due to pending test. States does have call light within reach.

## 2022-10-05 NOTE — PROGRESS NOTES
[facility] Pharmacokinetic Monitoring Service - Vancomycin    Consulting Provider: Dr. Vicente Jay    Indication: SSTI  Target Concentration: Goal trough of 10-15 mg/L and AUC/HALLEY <500 mg*hr/L  Day of Therapy: 2  Additional Antimicrobials: Zosyn    Pertinent Laboratory Values: Wt Readings from Last 1 Encounters:   10/05/22 227 lb 1.2 oz (103 kg)     Temp Readings from Last 1 Encounters:   10/05/22 99.9 °F (37.7 °C) (Oral)     Estimated Creatinine Clearance: 133 mL/min (based on SCr of 0.7 mg/dL). Recent Labs     10/04/22  0323 10/05/22  0313   CREATININE 0.9 0.7   WBC 18.2* 13.9*     Pertinent Cultures:  Culture Date Source Results   10/3/22 Leg swab Many GPC occurring singly and in pairs. Many GNB. Many GPB. 10/4/22 Blood x 2 NGTD   MRSA Nasal Swab: N/A. Non-respiratory infection.     Recent vancomycin administrations                     vancomycin (VANCOCIN) 1250 mg in dextrose 5 % 250 mL IVPB (mg) 1,250 mg New Bag 10/04/22 2338      Restarted  1308      Restarted  1303      Restarted  1302      Restarted  1301      Restarted  1301     1,250 mg New Bag  1230    vancomycin (VANCOCIN) 1,000 mg in sodium chloride 0.9 % 250 mL IVPB ([id number]) (mg) 1,000 mg New Bag 10/04/22 0033                    Assessment:  Date/Time Current Dose Concentration Timing of Concentration (h) AUC   10/5/22 @0919 1250 mg Q12hr 7.8 9 hr 41 min 359   Note: Serum concentrations collected for AUC dosing may appear elevated if collected in close proximity to the dose administered, this is not necessarily an indication of toxicity    Plan:  Current dosing regimen is sub-therapeutic  Increase dose to 1500 mg every 12 hours, anticipated  and trough 12.0  Pharmacy will continue to monitor patient and adjust therapy as indicated    Thank you for the consult,  Vamshi Mckinney, [address] [place] [address]  10/5/2022 10:36 AM

## 2022-10-05 NOTE — ACP (ADVANCE CARE PLANNING)
Advance Care Planning     Advance Care Planning Inpatient Note  [facility] Department    Today's Date: 10/4/2022  Unit: [facility] ICU STEPDOWN TELEMETRY 4K    Received request from IDT Member. Upon review of chart and communication with care team, patient's decision making abilities are not in question. . Patient, Healthcare Decision Maker, and [address]  were present in the room during visit. Goals of ACP Conversation:  Discuss advance care planning documents    Health Care Decision Makers:       Primary Decision Maker: Nicolasa Su - Other - [phone]    Secondary Decision Maker: Torie Beverly - [phone]  Summary:  Completed [facility]  Updated Healthcare Decision Maker    Advance Care Planning Documents (Patient Wishes):  Healthcare Power of /Advance Directive Appointment of Health Care Agent  Living Will/Advance Directive     Assessment:  Pt is a 67y. o. male, propped up in bed visiting with his fiancee, in 4K-04. Dick Mckee desired to complete an HCPOA and LW, due to some family dynamics that he wanted to avoid (family strife concerning some control issues with members) but also so he was confident that his wishes would be followed.  explained documents, assisted with their completion and sending forth to Medical Records for chart inclusion.  closed the time with prayer-met with gratitude by family members and patient. Interventions:  Provided education on documents for clarity and greater understanding  Assisted in the completion of documents according to patient's wishes at this time    Care Preferences Communicated:   No    Outcomes/Plan:  New advance directive completed. Returned original document(s) to patient, as well as copies for distribution to appointed agents  Copy of advance directive given to staff to scan into medical record.   Teach Back Method used to verify the patient's and/or Healthcare Decision Maker's understanding of key information in the advance directive documents    Electronically signed by Oneil Canela on 10/4/2022 at 10:15 PM

## 2022-10-05 NOTE — ADT AUTH CERT
Patient Demographics    Name Patient ID SSN Gender Identity Birth Date   Cathi Wesley [id number]  Male [birth year] (67 yrs)     Address Phone Email Employer    [address] [address] [phone] (Q)   [phone] (H) -- [address] Race Occupation Emp Status    WILLY White (non-) -- Disabled      Reg Status PCP Date Last Verified Next Review Date    Verified Rayo Mclean[address]  [phone] 10/03/22 10/31/22      Admission Date Discharge Date Admitting Provider     10/03/22 -- Melo Rodriguez DO       Marital Status Jehovah's witness       Other        Emergency Contact 1 Emergency Contact Galen 93 (O)   [facility]   [address]   [phone] (G)   [phone] Zach Dee) Santosh Gonzalez (D)   [facility] [phone] Zach Venegas     Subscriber Details  Hospital Account [de-identified]  CVG Subscriber Name/Sex/Relation Subscriber  Subscriber Address/Phone Subscriber Emp/Emp Phone   1.  Anshul Beltre   [id number] Elda Abdul Male   (Self) 1955 [address] [place], [place] [phone](V) DISABLED      Utilization Reviews       Pictures of BLE by Lisseth Samaniego RN       Review Status Review Entered   In Primary 10/5/2022 1441       Created By   Lisseth Samaniego RN      Criteria Review                 Cellulitis - Care Day 2 (10/4/2022) by Lisseth Samaniego RN       Review Status Review Entered   Completed 10/5/2022 1432       Created By   Lisseth Samaniego RN      Criteria Review      Care Day: 2 Care Date: 10/4/2022 Level of Care: Intermediate Care    Guideline Day 2    Clinical Status    (X) * Dehydration absent    10/5/2022 2:32 PM EDT by Andrea Anna 139      ( ) * Mental status at baseline    ( ) * Hemodynamic stability    ( ) * Afebrile or fever improved    Routes    (X) * Oral hydration    (X) * Oral medications    10/5/2022 2:32 PM EDT by Andrea Portillo      tolerating oral meds    (X) Diet as tolerated 10/5/2022 2:32 PM EDT by Jennifer De Luna      ADULT DIET; Regular; 3 carb choices (45 gm/meal); Low Fat/Low Chol/High Fiber/KRANTHI    Interventions    (X) WBC    10/5/2022 2:32 PM EDT by Jennifer De Luna      18.2    Medications    (X) IV antibiotics    10/5/2022 2:32 PM EDT by Jennifer De Luna      zosyn 3.38 g iv q8 hrs  vancocin 1.25 g iv bid    * Milestone   Additional Notes   DATE: 10/4         RELEVANT BASELINES: (lab values, vitals, o2 amount/delivery, etc.)   No home O2      PERTINENT UPDATES:   Patient was concerned about possible amputation of both lower extremities. Patient states he had diarrhea for the last week and possibly chronic. GI panel was positive for E. coli enteropathogenic. Blood cultures were sent. Culture from leg is currently positive for segmented neutrophils with gram-positive cocci and gram-negative bacilli. Patient is on vancomycin and Zosyn. Dr. Cecile Pereyra was consulted and agrees with antibiotic course. He was advised on AKA of the left leg and possible medical intervention of right lower leg. Cardiology was consulted, recommend surgery consult for left lower extremity amputation. There is plan for peripheral angiogram tomorrow morning. Stress test will also be done tomorrow. VITALS:   100.6  20  112  98/54  92% RA      ABNL/PERTINENT LABS/RADIOLOGY/DIAGNOSTIC STUDIES:   Iron 20   Iron sat 9   WBC 18.2 thou/mm3    RBC 4.30 mill/mm3    Hemoglobin 10.8 gm/dl    Hematocrit 35.3 %    MCH 25.1 pg    MCHC 30.6 gm/dl    RDW-CV 17.6 %    RDW-SD 51.0 fL    Platelets 261 thou/mm3    Lactic Acid 2.7    K 3.2   Glucose 171   Bun 5   Calcium 8.3   Inr 1.47   GI panel- E Coli Enteropathogenic PCR Detected                MD CONSULTS/ASSESSMENT AND PLAN:   IM pn   Assessment/Plan:       Sepsis:    - SIRS 2/4 (, WBC 17.6) with suspected cellulitis/chronic LE wound source.   Upon examination, patient does not appear to be critically ill,    maintaining good saturations on room air in setting of COPD. No fluids administered in the ED. Blood pressure stable upon examination. Plan:    - IV LR started at 75 mL/h   - IV vancomycin and Zosyn started   - Continue to monitor for any acute signs/symptoms of decompensation   - Blood cultures pending, wound culture positive for segmented neutrophils, gram-positive cocci, gram-negative bacilli and gram-positive bacilli. GI panel positive for E. coli enteropathogenic. Peripheral artery disease:   - Chronic history of PAD, follows with Dr. Guillermo Godoy in cardiology. - Patient has pain in lower extremities, has a 2 to 3-month history of lower extremity wounds. Unsure when it started but it is between June to September   - Patient with has complained of increased purulent discharge. - Patient has nonhealing wounds of bilateral lower extremity which previously required hospitalization.     - CTA shows occlusion of the left internal iliac artery, entire left superficial femoral artery, left popliteal artery and large portion of the left posterior tibial artery. There is no significant opacification in the distal right anterior tibial artery, right dorsalis pedis artery, or distal right peroneal artery. Plan:    - ID, general surgery, cardiology consulted   - Vancomycin/Zosyn abx started   - Anticoagulation held for peripheral angiogram tomorrow morning. Stress test will also be done tomorrow. - Wound care consulted       Bilateral lower extremity cellulitis:    - Bilateral erythema with purulent wound drainage on admission.    - Cultures positive for segmented neutrophils, gram-positive cocci, gram-negative bacilli and gram-positive bacilli. -Patient has a long history of possible cellulitis.   -Lactic acid on admission was 3.6, CRP of 26. Plan:    - ID consulted   -Vancomycin and Zosyn antibiotic started   -Wound culture positive for segmented neutrophils, gram-positive cocci, gram-negative bacilli and gram-positive bacilli. Diarrhea:    - Patient has possible 1 week history of diarrhea, at times he admits to having chronic history of diarrhea. No recent antibiotic usage or known food exposures that could have caused this. - GI panel positive for E. coli enteropathic   Plan:    -IV LR at 75 mL/h       Sacral ulcers:    - Patient has chronic history of sacral ulcers, for past couple of months. Pacheco Rivera states it had improved , states these ulcers are new been recently. Plan:    - Wound care consulted   - Vancomycin and Zosyn antibiotic started. Lactic acidosis secondary to sepsis from lower extremity cellulitis:   - Lactic acid on admission 3.6 downtrending to 1.8 10/04   Plan:    - Vancomycin and Zosyn antibiotic started   - Repeat lactic acid pending        CAD s/p PCI DANA x2 LAD performed at [facility]:   - Patient is not compliant with medication.   - EKG shows A. fib with RVR           Persistent A. fib:    - Noted per chart review, though patient denies any arrhythmia history. - Patient maintained on amiodarone 200mg daily at home. - [id number] 5.    - EKG shows A. fib with RVR   Plan:   - Amiodarone resumed   - Eliquis held currently 2/2 above, consider re-starting if no intervention planned       HFrEF 40%. - ECHO 4/11/22 demonstrated improved EF 40-45%, mod LV hypokinesis. - Given no CP, SOB, or O2 requirement, will hold on repeat ECHO at this time. - Patient unable to state his home medications, but appears he is on GDMT consisting of SGLT2, metoprolol. No noted ARNI/ACE/ARB/MRA. Plan:   - Re-started Lopressor 50mg bid   - Substituted jardiance for home SGLT2   - Consider further optimization of medical therapy as tolerated   - Home lasix 20mg daily resumed       Chronic normocytic anemia:    - Hgb 12.6 on arrival. No noted bleeding. MCV low-normal at 80.7. Thromobocytosis:    - Plts 556 on arrival. Suspect reactive in setting of acute illness. NIDDMII: Noted.     - HbA1c 9 point   Plan:   - DM dose SSI and 5 units of Lantus.   - Diabetic diet       HTN:    - Continued home medications of Imdur 30mg daily, lopressor 50mg bid       COPD:    Per chart review. Spirometry with bronchodilator 2014 normal. Patient on home albuterol inhaler.    - Continue albuterol inhaler       GERD:    - On Protonix       Chronic tobacco abuse: .   -Nicotine patch started       Hypokalemia 2/2 possible decreased PO intake:   -On POA K+ of 3.3   -Was given 40 mEq of oral potassium. ID consult       Impression and Recommendation:   Non healing extensive wound on both lower extremites in the setting of severe PVD. He has rest pain on the left lower leg: advised on AKA of the left leg as I do not feel his PVD is amenable to intervention and has ischemia   Right lower leg has extensive wound: he would like to see how he dose with medical treatment   Will apply dakins soaked dressing to be changed twice a day and as needed   Medical /cardiology clearance for surgery   Discussed with patient and his significant other   Advised to quit smoking                Cardio consult   Assessment:   1. Severe Bilateral PAD    2. Hx previous LE failed bypass   3. Obstructive CAD s/p DANA LAD x2 at [facility]    4. Dyspnea on exertion   5. Preoperative cardiac risk assessment    6. Paroxysmal Atrial Fibrillation on Eliquis    7. Chronic Systolic HFimpEF 35% on GDMT    8. Bilateral LE cellulitis    9. Peripheral neuropathy    10. Nonhealing Sacral Ulcer, Stage III    11. Sepsis    12. Thrombocytosis    13. COPD    15. HTN    15. Seizure disorder    16. Active Tobacco Smoker        Plan:   · Surgery was consulted for evaluation for possible LLE amputation(s) per primary    · He has known h/o PAD.  Prior left Fem-pop bypass, occluded   · Vascular duplex which showed severe inflow disease   · May benefit from evaluation for possible PTA to improve flow to lower extremities, enhance wound healing    · Plan for peripheral angiogram in AM with Dr. Martir Iglesias (familiar with patient, recently saw in office)   · NPO at midnight    · Wound care and ID on board    · Continue high-intensity statin    · Patient is being considered for surgery (amputation). Preoperative cardiac risk assessment is warranted. Patient has extensive cardiovascular disease history, CAD, prior PCI, PAD, HFrEF, PAF. He has no chest pain, but reports significant dyspnea on exertion. Also, he has limited functional capacity    · Nuclear stress test in AM   · Revised Pre-Op Cardiac Index Risk score = Class IV (15%)    · Further risk assessment pending stress test findings    · Discontinue nicotine patch due to peripheral vasoconstrictive properties    · Smoking cessation imperative    · Cardiology to follow                 Gen surgeon pn   Surg consult was received will see patient tomorrow did review the chart reviewed the aortogram reviewed the pictures from wound care.   It appears patient likely is going to need bilateral AKA's Will evaluate the patient tomorrow                     MEDICATIONS:   Cordarone 200 mg po daily   Lipitor 40 mg po daily   Jardiance 10 mg po daily   Lasix 20 mg po daily   Neurontin 100 mg po tid   Lantus 5 u sc nightly   SSI    Imdur 30 mg po daily   Lopressor 50 mg po bid   Nicoderm cq 1 patch daily   Protonix 40 mg po daily   Dakins 0.125% ext solution IR daily   LRS 75 ml/hr iv cont   Tylenol 650 mg po q6 hrs prn x3   Morphine 4 mg iv q4 hrs prn x1         ORDERS:   Consult gen surgery   Tele   Bmp, cbc, lactic acid   GI panel

## 2022-10-05 NOTE — PROGRESS NOTES
Comprehensive Nutrition Assessment    Type and Reason for Visit:  Initial, Positive Nutrition Screen (unplanned wt loss, poor po/appetite)    Nutrition Recommendations/Plan:   Monitor NPO status. Consider Juan BID when diet allows. Monitor physician progress notes. On (10/4) Dr Irma Giles noted it is likely pt may need bilateral AKAs & plans to reassess pt per cht. Pt with Hx of noncompliance of meds & diet. Malnutrition Assessment:  Malnutrition Status:  Insufficient data (10/05/22 1108)    Context:  Acute Illness     Findings of the 6 clinical characteristics of malnutrition:  Energy Intake:  No significant decrease in energy intake (% po noted prior to NPO (10/5))  Weight Loss:   (7% wt loss in 3 months (possibly related to medication noncompliance as well))     Body Fat Loss:  Unable to assess     Muscle Mass Loss:  Unable to assess    Fluid Accumulation:  Unable to assess     Strength:  Not Performed    Nutrition Assessment:     Pt. nutritionally compromised AEB wounds. At risk for further nutrition compromise r/t increased nutrient needs for wound healing, admit with Peripheral Artery Disease, Bilateral Lower Extremity Cellulitis, Diarrhea  & underlying medical condition (CAD s/p PCI DANA 2 x LAD at [facility], COPD, NIDDM II, HLD, noncompliant with meds & diet, HTN, GERD,Seizure Disorder). Nutrition Related Findings:    Pt. Report/Treatments/Miscellaneous: Pt N/A x2. Receiving nursing care/ with MD. Pt known to writer. Pt seen 3/17/17 & refused diet education & told me at that time he would eat whatever he wants. RN noted today pt wants to eat however pt is NPO. Intake was noted % prior to NPO order. GI Status: BM x 5 (10/4)  Pertinent Labs: (10/5) Glucose 171, POC Glucose 203, Ca Serum 8.1, (10/3) Hemoglobin A1C  9.2, Iron 20, Iron Sat 9.    Pertinent Meds: Lantus, Humalog, Zofran, Glycolax     Wound Type:  (stage IV sacrum, arterial wound pretibial rt mid; lower x2, arterial wound toe anterior left great toe, 3rd & 4th toe, arterial wound pretibial left;lateral;lower x 2)       Current Nutrition Intake & Therapies:    Average Meal Intake: NPO  Average Supplements Intake: NPO  Diet NPO    Anthropometric Measures:  Height: 6' 1\" (185.4 cm)  Ideal Body Weight (IBW): 184 lbs (84 kg)    Admission Body Weight: 226 lb 6.6 oz (102.7 kg) ((10/3) no edema)  Current Body Weight: 227 lb 1.2 oz (103 kg) (bedscale no edema  (10/5)),     Current BMI (kg/m2): 30  Usual Body Weight:  (per EMR: (6/20) 244#, (4/12) 247#, (12/7/21) 248#)                       BMI Categories: Overweight (BMI 25.0-29. 9)    Estimated Daily Nutrient Needs:  Energy Requirements Based On: Kcal/kg  Weight Used for Energy Requirements:  ((10/5) 103 kgm)  Energy (kcal/day): 1699-6567 (25-30kcals/kgm) for wound healing  Weight Used for Protein Requirements: Ideal (84kgm IBW)  Protein (g/day): 118-168 grams (1.4-2 grams protein/kgm IBW) for wound healing       Nutrition Diagnosis:   Increased nutrient needs related to increase demand for energy/nutrients as evidenced by wounds    Nutrition Interventions:   Food and/or Nutrient Delivery: Continue NPO  Nutrition Education/Counseling: No recommendation at this time  Coordination of Nutrition Care: Continue to monitor while inpatient, Interdisciplinary Rounds       Goals:     Goals: Initiate PO diet, within 2 days (as appropriate)       Nutrition Monitoring and Evaluation:   Behavioral-Environmental Outcomes: Other (Comment) (hx diet & medication noncompliance)  Food/Nutrient Intake Outcomes:  (monitor for diet start, possible OR surgery to re-eval 10/6,)       Discharge Planning:     Too soon to determine     Natasha LUPE Daniel, LD  Contact: [phone] No

## 2022-10-05 NOTE — FLOWSHEET NOTE
10/05/22 1344   Safe Environment   Safety Measures Other (comment)  (Virtual nurse rounding complete)   Patient responds via audio. States he is in pain and reports bedside just took care of it. Denies any other needs at this time. Does state has call light within reach.

## 2022-10-05 NOTE — PROGRESS NOTES
Progress note: Infectious diseases    Patient - Tj Love,  Age - 74 y.o.    - 1955      Room Number - 4K-04/004-A   MRN -  [id number]   [facility]  Date of Admission -  10/3/2022  6:11 PM    SUBJECTIVE:   He has pain on the left leg, no chest pain  OBJECTIVE   VITALS    height is 6' 1\" (1.854 m) and weight is 227 lb 1.2 oz (103 kg). His oral temperature is 97.9 °F (36.6 °C). His blood pressure is 105/62 and his pulse is 90. His respiration is 18 and oxygen saturation is 92%. Wt Readings from Last 3 Encounters:   10/05/22 227 lb 1.2 oz (103 kg)   06/10/22 244 lb (110.7 kg)   22 247 lb (112 kg)       I/O (24 Hours)    Intake/Output Summary (Last 24 hours) at 10/5/2022 1241  Last data filed at 10/5/2022 0316  Gross per 24 hour   Intake 3092.09 ml   Output 775 ml   Net 2317.09 ml       General Appearance  Awake, alert, oriented,  chronically sick looking. HEENT - normocephalic, atraumatic, pale conjunctiva,  anicteric sclera  Neck - Supple, no mass  Lungs -  Bilateral   air entry, no rhonchi, no wheeze  Cardiovascular - Heart sounds are normal.     Abdomen - soft, not distended, nontender,   Neurologic -awake and oriented  Skin - No bruising or bleeding  Extremities - wrapped both legs.           MEDICATIONS:      amiodarone  200 mg Oral BID    vancomycin  1,500 mg IntraVENous Q12H    guaiFENesin  600 mg Oral BID    piperacillin-tazobactam  3,375 mg IntraVENous Q8H    sodium hypochlorite   Irrigation Daily    insulin lispro  0-8 Units SubCUTAneous TID WC    insulin lispro  0-4 Units SubCUTAneous Nightly    insulin glargine  5 Units SubCUTAneous Nightly    atorvastatin  40 mg Oral Daily    [Held by provider] empagliflozin  10 mg Oral Daily    [Held by provider] furosemide  20 mg Oral Daily    gabapentin  100 mg Oral TID    isosorbide mononitrate  30 mg Oral Daily    metoprolol tartrate  50 mg Oral BID    pantoprazole  40 mg Oral QAM AC    sodium chloride flush  10 mL IntraVENous 2 times per day    vancomycin (VANCOCIN) intermittent dosing (placeholder)   Other RX Placeholder      sodium chloride      dextrose      lactated ringers 75 mL/hr at 10/05/22 0316     morphine, albuterol sulfate HFA, traZODone, sodium chloride flush, sodium chloride, ondansetron **OR** ondansetron, polyethylene glycol, acetaminophen **OR** acetaminophen, glucose, dextrose bolus **OR** dextrose bolus, glucagon (rDNA), dextrose      LABS:     CBC:   Recent Labs     10/03/22  1910 10/04/22  0323 10/05/22  0313   WBC 17.6* 18.2* 13.9*   HGB 12.6* 10.8* 10.7*   * 471* 429*     BMP:    Recent Labs     10/03/22  1910 10/04/22  0323 10/05/22  0313    139 139   K 3.3* 3.2* 3.7   CL 99 100 102   CO2 24 25 23   BUN 6* 5* 7   CREATININE 0.9 0.9 0.7   GLUCOSE 173* 171* 171*     Calcium:  Recent Labs     10/05/22  0313   CALCIUM 8.1*     Ionized Calcium:No results for input(s): IONCA in the last 72 hours. Magnesium:  Recent Labs     10/05/22  0313   MG 1.7     Phosphorus:No results for input(s): PHOS in the last 72 hours. BNP:No results for input(s): BNP in the last 72 hours. Glucose:  Recent Labs     10/04/22  1934 10/05/22  0714 10/05/22  1137   POCGLU 261* 203* 210*     HgbA1C:   Recent Labs     10/03/22  1910   LABA1C 9.2*     INR:   Recent Labs     10/04/22  1113   INR 1.47*     Hepatic:   Recent Labs     10/03/22  1910   ALKPHOS 103   ALT 11   AST 13   PROT 7.6   BILITOT 0.3   BILIDIR <0.2   LABALBU 2.5*     Amylase and Lipase:  Recent Labs     10/05/22  0314   LACTA 1.7     Lactic Acid:   Recent Labs     10/05/22  0314   LACTA 1.7     Troponin: No results for input(s): CKTOTAL, CKMB, TROPONINI in the last 72 hours. BNP: No results for input(s): BNP in the last 72 hours.     CULTURES:   UA: No results for input(s): SPECGRAV, [address] [facility], [address] [place] [address] 37, [address], MUCUS, [address] [facility] Gal[facility] [place] [address],  Rupal Peñaloza, KEN, Kathrine Beat, BILIRUBINUR, UROBILINOGEN, KETUA, LABCAST, LABCASTTY, AMORPHOS in the last 72 hours. Invalid input(s): CRYSTALS  Micro:   Lab Results   Component Value Date/Time    BC No growth 24 hours. 10/03/2022 07:29 PM          Problem list of patient:     Patient Active Problem List   Diagnosis Code    Seizure disorder ([facility] G40.909    Osteoarthritis M19.90    COPD (chronic obstructive pulmonary disease) ([facility].) J44.9    Spinal stenosis, lumbar M48.061    Lumbar radiculopathy M54.16    Tobacco abuse Z72.0    GERD (gastroesophageal reflux disease) K21.9    Skin ulceration ([facility] L98.499    Non-healing ulcer of lower leg ([facility] L97.909    Bilateral claudication of lower limb ([facility]) I73.9    Current smoker F17.200    Dyslipidemia E78.5    Abnormal cardiovascular function study R94.30    Obesity E66.9    CAD, multiple vessel I25.10    Chronic total occlusion of artery of the extremities ([facility]) I70.92    Coronary artery chronic total occlusion I25.82    DAVILA (dyspnea on exertion) R06.09    HTN (hypertension) I10    Discitis of cervical region M46.42    Open wound of right hand S61.401A    Noncompliance by refusing intervention or support Z91.199    Lactic acidosis E87.20    Pressure injury of left buttock, stage 3 ([facility]) K04.735    PAD (peripheral artery disease) ([facility]) I73.9         ASSESSMENT/PLAN   Severe PVD: extensive blockage  Non healing leg wounds.   CAD  G surgeon has been consulted         Jose Alfredo Oscar MD, MD, 1988 [address] 10/5/2022 12:41 PM

## 2022-10-05 NOTE — PROGRESS NOTES
1050 Patient received in IR for procedure with family taken to main radiology. 1054 This procedure has been fully reviewed with the patient and written informed consent has been obtained. 1100 Patient assisted to procedure table, monitor applied. Patient states that he is unable to lay flat on the procedure table due to his previously broken back. Patient repositioned and states that he is in too much pain to complete the procedure. Pt repositioned again but still unable to tolerate laying on the procedure table. Dr Power Cameron updated. 1106 Patient assisted back to bed; comfort ensured. 1107 Dr Power Whartonap in to speak with patient. [address] and updated Kevin Pacheco RN on K.   1121 Patient transported back to  via bed.

## 2022-10-05 NOTE — PLAN OF CARE
Problem: Pain  Goal: Verbalizes/displays adequate comfort level or baseline comfort level  10/5/2022 0021 by Kathleen Louis RN  Outcome: Progressing  Flowsheets (Taken 10/5/2022 0021)  Verbalizes/displays adequate comfort level or baseline comfort level:   Encourage patient to monitor pain and request assistance   Assess pain using appropriate pain scale   Administer analgesics based on type and severity of pain and evaluate response   Implement non-pharmacological measures as appropriate and evaluate response   Consider cultural and social influences on pain and pain management     Problem: ABCDS Injury Assessment  Goal: Absence of physical injury  10/5/2022 0021 by Kathleen Louis RN  Outcome: Progressing  Flowsheets (Taken 10/4/2022 0126)  Absence of Physical Injury: Implement safety measures based on patient assessment     Problem: Chronic Conditions and Co-morbidities  Goal: Patient's chronic conditions and co-morbidity symptoms are monitored and maintained or improved  10/5/2022 0021 by Kathleen Louis RN  Outcome: Progressing  Flowsheets (Taken 10/5/2022 0021)  Care Plan - Patient's Chronic Conditions and Co-Morbidity Symptoms are Monitored and Maintained or Improved:   Monitor and assess patient's chronic conditions and comorbid symptoms for stability, deterioration, or improvement   Collaborate with multidisciplinary team to address chronic and comorbid conditions and prevent exacerbation or deterioration   Update acute care plan with appropriate goals if chronic or comorbid symptoms are exacerbated and prevent overall improvement and discharge  10/4/2022 1506 by Megan Brownlee RN  Outcome: Progressing  Flowsheets (Taken 10/4/2022 0900)  Care Plan - Patient's Chronic Conditions and Co-Morbidity Symptoms are Monitored and Maintained or Improved:   Monitor and assess patient's chronic conditions and comorbid symptoms for stability, deterioration, or improvement   Collaborate with multidisciplinary team to address chronic and comorbid conditions and prevent exacerbation or deterioration   Update acute care plan with appropriate goals if chronic or comorbid symptoms are exacerbated and prevent overall improvement and discharge  Care plan reviewed with patient. Patient verbalize understanding of the plan of care and contribute to goal setting.

## 2022-10-05 NOTE — PROGRESS NOTES
Pt is a 67y. o. male, propped up in bed visiting with family members, in 2K-26.  visited to respond to a Consult, re: AD's. Please consult ACP note for further context. 10/04/22 1720   Encounter Summary   Encounter Overview/Reason  Advance Care Planning   Service Provided For: Patient and family together   Referral/Consult From: Multi-disciplinary team   Support System Family members   Last Encounter  10/04/22   Complexity of Encounter Moderate   Begin Time 1900   End Time  2005   Total Time Calculated 65 min   Advance Care Planning   Type Completed AD/ACP document(s)   Assessment/Intervention/Outcome   Assessment Calm;Coping;Peaceful; Hopeful   Intervention Active listening; Facilitated/Participated in Patient/Family Care Conference;Sustaining Presence/Ministry of presence;Prayer (assurance of)/[place]   Outcome Expressed Gratitude

## 2022-10-05 NOTE — PROGRESS NOTES
Internal Medicine Resident Progress Note    Patient:  Latisha Randall    YOB: 1955  Unit/Bed:4-04/004-A  MRN: [id number]    Acct: [de-identified]   PCP: Clotilde Jacques MD    Date of Admission: 10/3/2022      Assessment/Plan:    Sepsis 2/2 cellulitis   - SIRS 2/4 (, WBC 17.6) with suspected cellulitis/chronic LE wound source. - HR in the 90s, WBC 13.9,   - Leg cultures growing multiple colonies of enteric gram-negative bacilli. There is also gram-positive cocci  - Blood culture negative on 24 hours growth. plan:   - IV LR at 75 mL/h  - IV vancomycin and Zosyn   - Continue to monitor for any acute signs/symptoms of decompensation  - Wound culture positive for segmented neutrophils, gram-positive cocci, gram-negative bacilli and gram-positive bacilli. GI panel positive for E. coli enteropathogenic. Peripheral artery disease:  - Chronic history of PAD, follows with Dr. Gilberto Jacinto in cardiology. - Patient has pain in lower extremities, has a 2 to 3-month history of lower extremity wounds. Unsure when it started but it is between June to September  - Patient with has complained of increased purulent discharge. - Patient has nonhealing wounds of bilateral lower extremity which previously required hospitalization.    - CTA shows occlusion of the left internal iliac artery, entire left superficial femoral artery, left popliteal artery and large portion of the left posterior tibial artery. There is no significant opacification in the distal right anterior tibial artery, right dorsalis pedis artery, or distal right peroneal artery. Plan:   -Peripheral angiogram delayed till tomorrow. -General surgery will consult today - Vancomycin/Zosyn abx started  - Anticoagulation held for peripheral angiogram tomorrow morning. Stress test will also be done tomorrow.   - Wound care consulted     Bilateral lower extremity cellulitis:   - Bilateral erythema with purulent wound drainage on admission.   - Leg cultures growing multiple colonies of enteric gram-negative bacilli. There is also gram-positive cocci  - Patient has a long history of possible cellulitis. - Lactic acid on admission was 3.6, CRP of 26.   -Lactic acid trended down to 1.7. Plan:   - ID consulted  - Vancomycin and Zosyn antibiotic started     Diarrhea: (Resolved)  - Patient has possible 1 week history of diarrhea, at times he admits to having chronic history of diarrhea. No recent antibiotic usage or known food exposures that could have caused this. - GI panel positive for E. coli enteropathic  - Symptoms have resolved  Plan:   -IV LR at 75 mL/h     Sacral ulcers:   - Patient has chronic history of sacral ulcers, for past couple of months. Plan:   - Wound care consulted  - Vancomycin and Zosyn antibiotic started. Lactic acidosis secondary to sepsis from lower extremity cellulitis (Resolved):  - Lactic acid on admission 3.6 downtrending to 1.7 on 10/05. Plan:   - Vancomycin and Zosyn antibiotic started     CAD s/p PCI DANA x2 LAD performed at [facility]:  - Patient is not compliant with medication.  - EKG shows A. fib with RVR  - Stress test done today, shows LVEF of 35%. Not suggestive of myocardial ischemia. Persistent A. fib:   - Noted per chart review, though patient denies any arrhythmia history. - Patient maintained on amiodarone 200mg daily at home. - [id number] 5.   - EKG shows A. fib with RVR  Plan:  - Amiodarone 200 mg twice daily  - Eliquis held currently for surgical amputation of the bilateral lower extremity     HFrEF 40%. - ECHO 4/11/22 demonstrated improved EF 40-45%, mod LV hypokinesis. - Given no CP, SOB, or O2 requirement, will hold on repeat ECHO at this time. - Patient unable to state his home medications, but appears he is on GDMT consisting of SGLT2, metoprolol. No noted ARNI/ACE/ARB/MRA.    Plan:  - Re-started Lopressor 50mg bid  - Substituted jardiance for home SGLT2  - Consider further optimization of medical therapy as tolerated  - Home lasix 20mg daily resumed     Chronic normocytic anemia:   - Hgb 12.6 on arrival. No noted bleeding. MCV low-normal at 80.7. Thromobocytosis:   - Plts 556 on arrival. Suspect reactive in setting of acute illness. NIDDMII: Noted. - HbA1c 9 point  Plan:  - DM dose SSI and 5 units of Lantus.  - Diabetic diet     HTN:   - Continued home medications of Imdur 30mg daily, lopressor 50mg bid     COPD:   Per chart review. Spirometry with bronchodilator 2014 normal. Patient on home albuterol inhaler.   - Continue albuterol inhaler  -Courage use of incentive spirometry, Acapella and started on guaifenesin 600 mg twice daily. GERD:   - On Protonix     Chronic tobacco abuse: . - Nicotine patch started       Expected discharge date:  2 days    Disposition:   [] Home  [] TCU  [x] Rehab  [] Psych  [] SNF  [] [facility]  [] Other-    ===================================================================      Chief Complaint: Worsening bilateral lower extremity infection and and diarrhea for 1 week    Hospital Course:      Per chart   \"Ruben Rodas is a 74 y.o. male with a PMH of PAD, CAD s/p PCI LAD, chronic sacral wounds, chronic systolic heart failure, pAF, HTN, HLD, NIDDMII, tobacco abuse who presented with bilateral lower extremity wounds. Patient reports a 2-week history of worsening bilateral lower extremity infections. He states he has developed rising erythema to the mid calf along with worsening wounds and ulcerations of bilateral extremities. Patient states wounds have been purulent and foul-smelling. He also reports profuse diarrhea upon admission for the last 1 week. He states he follows with Dr. Myra Lara for these bilateral lower extremity wounds, and has had lower extremity imaging in the past that demonstrated severe inflow disease.   He has undergone previous bypass grafting of the left lower extremity that has since chronically occluded. He denies any chest pain, shortness of breath, abdominal pain. He has a history of coronary artery disease for which she went stent placement x2, unsure of which vessel. He reports chronic heart failure, on diuretics at home. He denies any history of arrhythmia, but has charted history of atrial fibrillation for which she is on Eliquis at home. Patient is unsure of any medications that he is on at home and is unable to provide any information on which he is actually taking. Cristian present in room to assist with history. He states he has been told in the past that his left lower extremity would need to be amputated. \"    10/04:  Patient was seen and examined in room today. Fiancé was present. Patient was concerned about possible amputation of both lower extremities. Patient states he had diarrhea for the last week and possibly chronic. GI panel was positive for E. coli enteropathogenic. Blood cultures were sent. Culture from leg is currently positive for segmented neutrophils with gram-positive cocci and gram-negative bacilli. Patient is on vancomycin and Zosyn. Dr. Yobani Hernández was consulted and agrees with antibiotic course. He was advised on AKA of the left leg and possible medical intervention of right lower leg. Cardiology was consulted, recommend surgery consult for left lower extremity amputation. There is plan for peripheral angiogram tomorrow morning. Stress test will also be done tomorrow. 10/05:  Patient was seen and examined in room today, fiancé was present. Patient is more amenable for bilateral BKA. Diarrhea has resolved. Cultures from leg show gram-negative bacilli. Blood cultures negative for growth in last 24 hours. Encouraged use of I-S, Acapella and started guaifenesin 600 mg twice daily. Stress test done today, shows LVEF of 35%. Not suggestive of myocardial ischemia. Subjective (past 24 hours):     Patient's complaint of diarrhea headaches removed.   Patient is more amenable to bilateral BKA. ROS: reviewed complete ROS unchanged unless otherwise stated in hospital course/subjective portion. Medications:  Reviewed    Infusion Medications    [START ON 10/6/2022] sodium chloride      sodium chloride      dextrose       Scheduled Medications    amiodarone  200 mg Oral BID    vancomycin  1,500 mg IntraVENous Q12H    guaiFENesin  600 mg Oral BID    piperacillin-tazobactam  3,375 mg IntraVENous Q8H    sodium hypochlorite   Irrigation Daily    insulin lispro  0-8 Units SubCUTAneous TID WC    insulin lispro  0-4 Units SubCUTAneous Nightly    insulin glargine  5 Units SubCUTAneous Nightly    atorvastatin  40 mg Oral Daily    [Held by provider] empagliflozin  10 mg Oral Daily    [Held by provider] furosemide  20 mg Oral Daily    gabapentin  100 mg Oral TID    isosorbide mononitrate  30 mg Oral Daily    metoprolol tartrate  50 mg Oral BID    pantoprazole  40 mg Oral QAM AC    sodium chloride flush  10 mL IntraVENous 2 times per day    vancomycin (VANCOCIN) intermittent dosing (placeholder)   Other RX Placeholder     PRN Meds: morphine, albuterol sulfate HFA, traZODone, sodium chloride flush, sodium chloride, ondansetron **OR** ondansetron, polyethylene glycol, acetaminophen **OR** acetaminophen, glucose, dextrose bolus **OR** dextrose bolus, glucagon (rDNA), dextrose        Intake/Output Summary (Last 24 hours) at 10/5/2022 1850  Last data filed at 10/5/2022 1824  Gross per 24 hour   Intake 1953.32 ml   Output 775 ml   Net 1178.32 ml         Exam:  /62   Pulse 90   Temp 97.9 °F (36.6 °C) (Oral)   Resp 18   Ht 6' 1\" (1.854 m)   Wt 227 lb 1.2 oz (103 kg)   SpO2 92%   BMI 29.96 kg/m²     Physical Exam  Constitutional:       General: He is in acute distress. Appearance: He is ill-appearing. HENT:      Head: Normocephalic and atraumatic.       Right Ear: External ear normal.      Left Ear: External ear normal.      Nose: Nose normal.   Eyes:      Pupils: Pupils are equal, round, and reactive to light. Cardiovascular:      Rate and Rhythm: Normal rate and regular rhythm. Pulses: Normal pulses. Heart sounds: Murmur heard. Pulmonary:      Effort: Pulmonary effort is normal.      Breath sounds: Wheezing present. Abdominal:      Tenderness: There is guarding. Musculoskeletal:         General: Deformity present. Cervical back: Normal range of motion. Skin:     Capillary Refill: Capillary refill takes more than 3 seconds. Findings: Bruising, lesion and rash present. Neurological:      Mental Status: He is alert and oriented to person, place, and time. Motor: Weakness present. Gait: Gait abnormal.          Labs:   Recent Labs     10/03/22  1910 10/04/22  0323 10/05/22  0313   WBC 17.6* 18.2* 13.9*   HGB 12.6* 10.8* 10.7*   HCT 40.1* 35.3* 35.2*   * 471* 429*       Recent Labs     10/03/22  1910 10/04/22  0323 10/05/22  0313    139 139   K 3.3* 3.2* 3.7   CL 99 100 102   CO2 24 25 23   BUN 6* 5* 7   CREATININE 0.9 0.9 0.7   CALCIUM 9.1 8.3* 8.1*       Recent Labs     10/03/22  1910   AST 13   ALT 11   BILIDIR <0.2   BILITOT 0.3   ALKPHOS 103       Recent Labs     10/04/22  1113   INR 1.47*       No results for input(s): CKTOTAL, TROPONINI in the last 72 hours. No results for input(s): PROCAL in the last 72 hours. Lab Results   Component Value Date/Time    NITRU NEGATIVE 03/16/2017 09:15 PM    WBCUA 2-4 03/16/2017 09:15 PM    BACTERIA NONE 03/16/2017 09:15 PM    RBCUA 0-2 03/16/2017 09:15 PM    BLOODU NEGATIVE 03/16/2017 09:15 PM    SPECGRAV 1.023 03/16/2017 09:15 PM    GLUCOSEU NEGATIVE 01/20/2013 06:30 PM       Radiology (48 hours):  XR TIBIA FIBULA LEFT (2 VIEWS)    Result Date: 10/3/2022  1. Generalized subcutaneous edema with questionable small ulceration defects in the mid-distal calf. 2. No cortical erosions to suggest osteomyelitis.  This document has been electronically signed by: Cristina Sutherland MD on 10/03/2022 08:35 PM    XR TIBIA FIBULA RIGHT (2 VIEWS)    Result Date: 10/3/2022  Mild subcutaneous edema of the right calf. No cortical erosions to suggest acute osteomyelitis. This document has been electronically signed by: Marlene Arevalo MD on 10/03/2022 08:47 PM    XR ANKLE LEFT (2 VIEWS)    Result Date: 10/3/2022  No acute osseous injury or cortical erosions. Lateral ankle swelling. This document has been electronically signed by: Marlene Arevalo MD on 10/03/2022 08:28 PM    XR ANKLE RIGHT (2 VIEWS)    Result Date: 10/3/2022  1. No acute osseous injury or cortical erosions. 2. Subcutaneous edema. This document has been electronically signed by: Marlene Arevalo MD on 10/03/2022 08:27 PM    XR FOOT LEFT (2 VIEWS)    Result Date: 10/3/2022  1. Soft tissue swelling in the left foot with several soft tissue ulceration defects. 2. Chronic appearing erosive changes in the left 1st metatarsophalangeal joint with medial subluxation. 3. Chronic appearing truncation defect in the distal tuft of the left 3rd toe and remote amputation of the left 2nd toe with intact cortical margins. 4. No other acute appearing cortical erosions radiographically to suggest osteomyelitis. This document has been electronically signed by: Marlene Arevalo MD on 10/03/2022 08:50 PM    XR FOOT RIGHT (2 VIEWS)    Result Date: 10/3/2022  1. Ulceration defect and adjacent swelling in the dorsum of the midfoot. 2. No cortical erosions to suggest acute osteomyelitis. This document has been electronically signed by: Marlene Arevalo MD on 10/03/2022 08:35 PM    CTA ABDOMINAL AORTA W BILAT RUNOFF W WO CONTRAST    Result Date: 10/3/2022  1. Chronic occlusions of the left internal iliac artery, entire left superficial femoral artery, left popliteal artery, and large portion of the left posterior tibial artery with reconstitution in the distal segment near the ankle. Left dorsalis pedis and plantaris arteries are opacified.  2. No significant opacification in the distal right anterior tibial artery, right dorsalis pedis artery, or distal right peroneal artery. This may reflect underlying diminished flow or occlusion. This is stable. 3. Scattered atherosclerotic plaque in the aorta and mesenteric/renal vessels without significant stenosis. 4. Asymmetric fatty atrophy of the left lower extremity musculature. Mild subcutaneous edema in the dorsum of both feet. 5. Stable chronic hardware fracture of the left interpedicular screw at L4. This document has been electronically signed by: Gerardo Desouza MD on 10/03/2022 10:46 PM All CTs at this facility use dose modulation techniques and iterative reconstructions, and/or weight-based dosing when appropriate to reduce radiation to a low as reasonably achievable. 3D Post-processing was performed on this study. DVT prophylaxis:    [] Lovenox  [] SCDs  [] SQ Heparin  [x] Encourage ambulation   [] Already on Anticoagulation       Diet: ADULT DIET; Regular; 4 carb choices (60 gm/meal);  No Added Salt (3-4 gm)  Diet NPO  Code Status: Full Code  PT/OT: n/a  Tele: yes  IVF: LR at 76 mL/hr    Electronically signed by Devonte [facility] MD  PGY-1 Internal Medicine Resident  on 10/5/2022 at 6:50 PM    Case was discussed with Attending, Dr. Sana Shin

## 2022-10-05 NOTE — PROGRESS NOTES
Surg consult was received will see patient tomorrow did review the chart reviewed the aortogram reviewed the pictures from wound care.   It appears patient likely is going to need bilateral AKA's Will evaluate the patient tomorrow

## 2022-10-06 ENCOUNTER — TELEPHONE (OUTPATIENT)
Dept: SURGERY | Age: 67
End: 2022-10-06

## 2022-10-06 PROBLEM — I48.0 PAROXYSMAL ATRIAL FIBRILLATION (HCC): Status: ACTIVE | Noted: 2022-10-06

## 2022-10-06 PROBLEM — Z79.4 TYPE 2 DIABETES MELLITUS WITH DIABETIC PERIPHERAL ANGIOPATHY AND GANGRENE, WITH LONG-TERM CURRENT USE OF INSULIN (HCC): Status: ACTIVE | Noted: 2022-10-06

## 2022-10-06 PROBLEM — L03.116 CELLULITIS OF LEFT LOWER EXTREMITY: Status: ACTIVE | Noted: 2022-10-06

## 2022-10-06 PROBLEM — E11.52 TYPE 2 DIABETES MELLITUS WITH DIABETIC PERIPHERAL ANGIOPATHY AND GANGRENE, WITH LONG-TERM CURRENT USE OF INSULIN (HCC): Status: ACTIVE | Noted: 2022-10-06

## 2022-10-06 PROBLEM — J43.1 PANLOBULAR EMPHYSEMA (HCC): Status: ACTIVE | Noted: 2022-10-06

## 2022-10-06 PROBLEM — L89.153 PRESSURE INJURY OF SACRAL REGION, STAGE 3 (HCC): Status: ACTIVE | Noted: 2022-10-06

## 2022-10-06 PROBLEM — I51.9 LEFT VENTRICULAR SYSTOLIC DYSFUNCTION: Status: ACTIVE | Noted: 2022-10-06

## 2022-10-06 LAB
ANION GAP SERPL CALCULATED.3IONS-SCNC: 15 MEQ/L (ref 8–16)
BACTERIA: ABNORMAL /HPF
BILIRUBIN URINE: NEGATIVE
BLOOD, URINE: NEGATIVE
BUN BLDV-MCNC: 5 MG/DL (ref 7–22)
CALCIUM SERPL-MCNC: 8.3 MG/DL (ref 8.5–10.5)
CASTS 2: ABNORMAL /LPF
CASTS UA: ABNORMAL /LPF
CHARACTER, URINE: CLEAR
CHLORIDE BLD-SCNC: 100 MEQ/L (ref 98–111)
CO2: 22 MEQ/L (ref 23–33)
COLOR: YELLOW
CREAT SERPL-MCNC: 0.8 MG/DL (ref 0.4–1.2)
CRYSTALS, UA: ABNORMAL
EPITHELIAL CELLS, UA: ABNORMAL /HPF
ERYTHROCYTE [DISTWIDTH] IN BLOOD BY AUTOMATED COUNT: 17.9 % (ref 11.5–14.5)
ERYTHROCYTE [DISTWIDTH] IN BLOOD BY AUTOMATED COUNT: 53.4 FL (ref 35–45)
GFR SERPL CREATININE-BSD FRML MDRD: > 90 ML/MIN/1.73M2
GLUCOSE BLD-MCNC: 150 MG/DL (ref 70–108)
GLUCOSE BLD-MCNC: 163 MG/DL (ref 70–108)
GLUCOSE BLD-MCNC: 323 MG/DL (ref 70–108)
GLUCOSE BLD-MCNC: 336 MG/DL (ref 70–108)
GLUCOSE BLD-MCNC: 343 MG/DL (ref 70–108)
GLUCOSE URINE: >= 1000 MG/DL
HCT VFR BLD CALC: 35.4 % (ref 42–52)
HEMOGLOBIN: 11 GM/DL (ref 14–18)
KETONES, URINE: ABNORMAL
LEUKOCYTE ESTERASE, URINE: NEGATIVE
MCH RBC QN AUTO: 25.6 PG (ref 26–33)
MCHC RBC AUTO-ENTMCNC: 31.1 GM/DL (ref 32.2–35.5)
MCV RBC AUTO: 82.5 FL (ref 80–94)
MISCELLANEOUS 2: ABNORMAL
NITRITE, URINE: NEGATIVE
PH UA: 6.5 (ref 5–9)
PLATELET # BLD: 459 THOU/MM3 (ref 130–400)
PMV BLD AUTO: 9.6 FL (ref 9.4–12.4)
POTASSIUM SERPL-SCNC: 3.3 MEQ/L (ref 3.5–5.2)
PROTEIN UA: ABNORMAL
RBC # BLD: 4.29 MILL/MM3 (ref 4.7–6.1)
RBC URINE: ABNORMAL /HPF
RENAL EPITHELIAL, UA: ABNORMAL
SODIUM BLD-SCNC: 137 MEQ/L (ref 135–145)
SPECIFIC GRAVITY, URINE: 1.02 (ref 1–1.03)
UROBILINOGEN, URINE: 0.2 EU/DL (ref 0–1)
WBC # BLD: 12.9 THOU/MM3 (ref 4.8–10.8)
WBC UA: ABNORMAL /HPF
YEAST: ABNORMAL

## 2022-10-06 PROCEDURE — 51701 INSERT BLADDER CATHETER: CPT

## 2022-10-06 PROCEDURE — 6370000000 HC RX 637 (ALT 250 FOR IP)

## 2022-10-06 PROCEDURE — 85027 COMPLETE CBC AUTOMATED: CPT

## 2022-10-06 PROCEDURE — 6360000002 HC RX W HCPCS: Performed by: EMERGENCY MEDICINE

## 2022-10-06 PROCEDURE — 6360000002 HC RX W HCPCS: Performed by: STUDENT IN AN ORGANIZED HEALTH CARE EDUCATION/TRAINING PROGRAM

## 2022-10-06 PROCEDURE — 6370000000 HC RX 637 (ALT 250 FOR IP): Performed by: STUDENT IN AN ORGANIZED HEALTH CARE EDUCATION/TRAINING PROGRAM

## 2022-10-06 PROCEDURE — 81001 URINALYSIS AUTO W/SCOPE: CPT

## 2022-10-06 PROCEDURE — 2060000000 HC ICU INTERMEDIATE R&B

## 2022-10-06 PROCEDURE — APPSS30 APP SPLIT SHARED TIME 16-30 MINUTES: Performed by: NURSE PRACTITIONER

## 2022-10-06 PROCEDURE — 6370000000 HC RX 637 (ALT 250 FOR IP): Performed by: INTERNAL MEDICINE

## 2022-10-06 PROCEDURE — 99233 SBSQ HOSP IP/OBS HIGH 50: CPT | Performed by: NURSE PRACTITIONER

## 2022-10-06 PROCEDURE — 36415 COLL VENOUS BLD VENIPUNCTURE: CPT

## 2022-10-06 PROCEDURE — 51798 US URINE CAPACITY MEASURE: CPT

## 2022-10-06 PROCEDURE — 99233 SBSQ HOSP IP/OBS HIGH 50: CPT | Performed by: INTERNAL MEDICINE

## 2022-10-06 PROCEDURE — 6360000002 HC RX W HCPCS

## 2022-10-06 PROCEDURE — 82948 REAGENT STRIP/BLOOD GLUCOSE: CPT

## 2022-10-06 PROCEDURE — 2580000003 HC RX 258

## 2022-10-06 PROCEDURE — 80048 BASIC METABOLIC PNL TOTAL CA: CPT

## 2022-10-06 RX ORDER — INSULIN GLARGINE 100 [IU]/ML
8 INJECTION, SOLUTION SUBCUTANEOUS NIGHTLY
Status: DISCONTINUED | OUTPATIENT
Start: 2022-10-06 | End: 2022-10-07

## 2022-10-06 RX ORDER — POTASSIUM CHLORIDE 20 MEQ/1
40 TABLET, EXTENDED RELEASE ORAL PRN
Status: DISCONTINUED | OUTPATIENT
Start: 2022-10-06 | End: 2022-10-13 | Stop reason: HOSPADM

## 2022-10-06 RX ORDER — POTASSIUM CHLORIDE 7.45 MG/ML
10 INJECTION INTRAVENOUS PRN
Status: DISCONTINUED | OUTPATIENT
Start: 2022-10-06 | End: 2022-10-13 | Stop reason: HOSPADM

## 2022-10-06 RX ORDER — SODIUM CHLORIDE 9 MG/ML
INJECTION, SOLUTION INTRAVENOUS CONTINUOUS
Status: DISCONTINUED | OUTPATIENT
Start: 2022-10-07 | End: 2022-10-07

## 2022-10-06 RX ORDER — TAMSULOSIN HYDROCHLORIDE 0.4 MG/1
0.4 CAPSULE ORAL DAILY
Status: DISCONTINUED | OUTPATIENT
Start: 2022-10-06 | End: 2022-10-13 | Stop reason: HOSPADM

## 2022-10-06 RX ORDER — MORPHINE SULFATE 4 MG/ML
4 INJECTION, SOLUTION INTRAMUSCULAR; INTRAVENOUS
Status: COMPLETED | OUTPATIENT
Start: 2022-10-06 | End: 2022-10-06

## 2022-10-06 RX ORDER — ISOSORBIDE MONONITRATE 30 MG/1
15 TABLET, EXTENDED RELEASE ORAL DAILY
Status: DISCONTINUED | OUTPATIENT
Start: 2022-10-06 | End: 2022-10-13 | Stop reason: HOSPADM

## 2022-10-06 RX ADMIN — MORPHINE SULFATE 4 MG: 4 INJECTION, SOLUTION INTRAMUSCULAR; INTRAVENOUS at 13:10

## 2022-10-06 RX ADMIN — SODIUM HYPOCHLORITE: 1.25 SOLUTION TOPICAL at 13:45

## 2022-10-06 RX ADMIN — PANTOPRAZOLE SODIUM 40 MG: 40 TABLET, DELAYED RELEASE ORAL at 05:50

## 2022-10-06 RX ADMIN — GABAPENTIN 100 MG: 100 CAPSULE ORAL at 20:56

## 2022-10-06 RX ADMIN — ACETAMINOPHEN 650 MG: 325 TABLET ORAL at 14:46

## 2022-10-06 RX ADMIN — AMIODARONE HYDROCHLORIDE 200 MG: 200 TABLET ORAL at 08:44

## 2022-10-06 RX ADMIN — VANCOMYCIN HYDROCHLORIDE 1500 MG: 5 INJECTION, POWDER, LYOPHILIZED, FOR SOLUTION INTRAVENOUS at 11:44

## 2022-10-06 RX ADMIN — POTASSIUM CHLORIDE 40 MEQ: 1500 TABLET, EXTENDED RELEASE ORAL at 05:50

## 2022-10-06 RX ADMIN — AMIODARONE HYDROCHLORIDE 200 MG: 200 TABLET ORAL at 20:56

## 2022-10-06 RX ADMIN — ACETAMINOPHEN 650 MG: 325 TABLET ORAL at 08:43

## 2022-10-06 RX ADMIN — GABAPENTIN 100 MG: 100 CAPSULE ORAL at 13:12

## 2022-10-06 RX ADMIN — MORPHINE SULFATE 4 MG: 4 INJECTION, SOLUTION INTRAMUSCULAR; INTRAVENOUS at 22:24

## 2022-10-06 RX ADMIN — PIPERACILLIN SODIUM,TAZOBACTAM SODIUM 3375 MG: 3; .375 INJECTION, POWDER, FOR SOLUTION INTRAVENOUS at 02:12

## 2022-10-06 RX ADMIN — METOPROLOL TARTRATE 50 MG: 50 TABLET, FILM COATED ORAL at 08:43

## 2022-10-06 RX ADMIN — SODIUM CHLORIDE, PRESERVATIVE FREE 10 ML: 5 INJECTION INTRAVENOUS at 08:44

## 2022-10-06 RX ADMIN — INSULIN LISPRO 6 UNITS: 100 INJECTION, SOLUTION INTRAVENOUS; SUBCUTANEOUS at 08:51

## 2022-10-06 RX ADMIN — METOPROLOL TARTRATE 50 MG: 50 TABLET, FILM COATED ORAL at 20:56

## 2022-10-06 RX ADMIN — GABAPENTIN 100 MG: 100 CAPSULE ORAL at 08:43

## 2022-10-06 RX ADMIN — SODIUM CHLORIDE, PRESERVATIVE FREE 10 ML: 5 INJECTION INTRAVENOUS at 20:57

## 2022-10-06 RX ADMIN — INSULIN LISPRO 6 UNITS: 100 INJECTION, SOLUTION INTRAVENOUS; SUBCUTANEOUS at 16:49

## 2022-10-06 RX ADMIN — INSULIN GLARGINE 8 UNITS: 100 INJECTION, SOLUTION SUBCUTANEOUS at 20:56

## 2022-10-06 RX ADMIN — TAMSULOSIN HYDROCHLORIDE 0.4 MG: 0.4 CAPSULE ORAL at 11:32

## 2022-10-06 RX ADMIN — INSULIN LISPRO 6 UNITS: 100 INJECTION, SOLUTION INTRAVENOUS; SUBCUTANEOUS at 11:49

## 2022-10-06 RX ADMIN — GUAIFENESIN 600 MG: 600 TABLET, EXTENDED RELEASE ORAL at 20:56

## 2022-10-06 RX ADMIN — MORPHINE SULFATE 4 MG: 4 INJECTION, SOLUTION INTRAMUSCULAR; INTRAVENOUS at 18:16

## 2022-10-06 RX ADMIN — PIPERACILLIN SODIUM,TAZOBACTAM SODIUM 3375 MG: 3; .375 INJECTION, POWDER, FOR SOLUTION INTRAVENOUS at 11:37

## 2022-10-06 RX ADMIN — GUAIFENESIN 600 MG: 600 TABLET, EXTENDED RELEASE ORAL at 08:44

## 2022-10-06 RX ADMIN — ATORVASTATIN CALCIUM 40 MG: 40 TABLET, FILM COATED ORAL at 08:44

## 2022-10-06 RX ADMIN — PIPERACILLIN SODIUM,TAZOBACTAM SODIUM 3375 MG: 3; .375 INJECTION, POWDER, FOR SOLUTION INTRAVENOUS at 18:12

## 2022-10-06 RX ADMIN — ACETAMINOPHEN 650 MG: 325 TABLET ORAL at 20:56

## 2022-10-06 RX ADMIN — MORPHINE SULFATE 4 MG: 4 INJECTION, SOLUTION INTRAMUSCULAR; INTRAVENOUS at 02:09

## 2022-10-06 RX ADMIN — ISOSORBIDE MONONITRATE 15 MG: 30 TABLET, EXTENDED RELEASE ORAL at 11:51

## 2022-10-06 ASSESSMENT — PAIN - FUNCTIONAL ASSESSMENT
PAIN_FUNCTIONAL_ASSESSMENT: PREVENTS OR INTERFERES SOME ACTIVE ACTIVITIES AND ADLS

## 2022-10-06 ASSESSMENT — PAIN DESCRIPTION - LOCATION
LOCATION: LEG
LOCATION: LEG;FOOT
LOCATION: LEG;FOOT
LOCATION: FOOT;LEG
LOCATION: LEG;FOOT

## 2022-10-06 ASSESSMENT — PAIN SCALES - GENERAL
PAINLEVEL_OUTOF10: 10
PAINLEVEL_OUTOF10: 10
PAINLEVEL_OUTOF10: 0
PAINLEVEL_OUTOF10: 9
PAINLEVEL_OUTOF10: 0
PAINLEVEL_OUTOF10: 9
PAINLEVEL_OUTOF10: 7
PAINLEVEL_OUTOF10: 8
PAINLEVEL_OUTOF10: 9
PAINLEVEL_OUTOF10: 9

## 2022-10-06 ASSESSMENT — PAIN DESCRIPTION - FREQUENCY
FREQUENCY: CONTINUOUS
FREQUENCY: CONTINUOUS

## 2022-10-06 ASSESSMENT — PAIN DESCRIPTION - DESCRIPTORS
DESCRIPTORS: ACHING
DESCRIPTORS: ACHING;DULL
DESCRIPTORS: ACHING;DULL
DESCRIPTORS: DULL

## 2022-10-06 ASSESSMENT — PAIN DESCRIPTION - ORIENTATION
ORIENTATION: LEFT
ORIENTATION: LEFT
ORIENTATION: RIGHT;LEFT
ORIENTATION: RIGHT;LEFT
ORIENTATION: LEFT

## 2022-10-06 ASSESSMENT — PAIN DESCRIPTION - ONSET
ONSET: ON-GOING
ONSET: ON-GOING

## 2022-10-06 ASSESSMENT — PAIN DESCRIPTION - PAIN TYPE
TYPE: ACUTE PAIN
TYPE: ACUTE PAIN

## 2022-10-06 NOTE — TELEPHONE ENCOUNTER
[address] [facility] [address]    Phone * [phone]   Surgical Scheduling Direct line Phone * [phone]  Fax * [phone]      Jade Jones      1955    male    [address] [facility] [place] [id number]  Marital Status:      Home Phone: [phone]   Cell Phone:   Telephone Information:   Mobile [phone]              Surgeon: Dr. Deepa Jackson  Surgery Date:10-   Time: 7:30am     Procedure: Bilateral above knee amputation  Inpatient      Diagnosis: Severe peripheral artery disease/ Pain lower extremities    Important Medical History: In Epic    Special Inst/Equip: On 4K-04    CPT Codes: 43907-35    Latex Allergy:   no Cardiac Device:  no    Anesthesia Type: General    Case Location:  Main OR     Preadmission Testing: Phone Call      PAT Date and Time: ________________________________    PAT Confirmation #: _________________________________    Post Op Visit:  ______________________________________    Need Preop Cardiac Clearance:   no    Does patient have Cardiologist/physician?  Dr. Alfredo Casillas #:  ______________________________________________    Antoinette Bride: __________________________________ Date:____________________        [employer] Name:  Eleonora Flowers

## 2022-10-06 NOTE — TELEPHONE ENCOUNTER
Patient scheduled for surgery with Dr. Wong Hernandez on 10- at 7:30am.  Patient to remain inpatient.

## 2022-10-06 NOTE — PROGRESS NOTES
General Surgery  [facility] [place]  Daily Progress Note      Patient:  Koby Schmid      Unit/Bed:4K-04/004-A    YOB: 1955    MRN: [id number]     Acct: [de-identified]     Admit date: 10/3/2022    Subjective: patient complaints of left leg pain. Discussed plans for surgical intervention once testing is completed. He states \"get it done sooner then later , I have a lot of pain and Im not sleeping\". All other ROS negative except noted in HPI      Patient Seen, Chart, Consults notes, Labs, Radiology studies reviewed. Past, Family, Social History unchanged from admission. Diet:  ADULT DIET;  Regular; 4 carb choices (60 gm/meal)    Medications:  Scheduled Meds:   isosorbide mononitrate  15 mg Oral Daily    tamsulosin  0.4 mg Oral Daily    amiodarone  200 mg Oral BID    vancomycin  1,500 mg IntraVENous Q12H    guaiFENesin  600 mg Oral BID    piperacillin-tazobactam  3,375 mg IntraVENous Q8H    sodium hypochlorite   Irrigation Daily    insulin lispro  0-8 Units SubCUTAneous TID WC    insulin lispro  0-4 Units SubCUTAneous Nightly    insulin glargine  5 Units SubCUTAneous Nightly    atorvastatin  40 mg Oral Daily    [Held by provider] empagliflozin  10 mg Oral Daily    [Held by provider] furosemide  20 mg Oral Daily    gabapentin  100 mg Oral TID    metoprolol tartrate  50 mg Oral BID    pantoprazole  40 mg Oral QAM AC    sodium chloride flush  10 mL IntraVENous 2 times per day    vancomycin (VANCOCIN) intermittent dosing (placeholder)   Other RX Placeholder     Continuous Infusions:   sodium chloride Stopped (10/06/22 1030)    sodium chloride      dextrose           Objective:    CBC:   Recent Labs     10/04/22  0323 10/05/22  0313 10/06/22  0340   WBC 18.2* 13.9* 12.9*   HGB 10.8* 10.7* 11.0*   * 429* 459*     BMP:    Recent Labs     10/04/22  0323 10/05/22  0313 10/06/22  0340    139 137   K 3.2* 3.7 3.3*    102 100   CO2 25 23 22*   BUN 5* 7 5*   CREATININE 0.9 0.7 0.8   GLUCOSE 171* 171* 163*     Calcium:  Recent Labs     10/06/22  0340   CALCIUM 8.3*     Ionized Calcium:No results for input(s): IONCA in the last 72 hours. Magnesium:  Recent Labs     10/05/22  0313   MG 1.7     Phosphorus:No results for input(s): PHOS in the last 72 hours. BNP:No results for input(s): BNP in the last 72 hours. Glucose:  Recent Labs     10/05/22  2114 10/06/22  0734 10/06/22  1147   POCGLU 372* 343* 336*     HgbA1C:   Recent Labs     10/03/22  1910   LABA1C 9.2*     INR:   Recent Labs     10/04/22  1113   INR 1.47*     Hepatic:   Recent Labs     10/03/22  1910   ALKPHOS 103   ALT 11   AST 13   PROT 7.6   BILITOT 0.3   BILIDIR <0.2   LABALBU 2.5*     Amylase and Lipase:  Recent Labs     10/05/22  0314   LACTA 1.7     Lactic Acid:   Recent Labs     10/05/22  0314   LACTA 1.7     Troponin: No results for input(s): CKTOTAL, CKMB, TROPONINT in the last 72 hours. BNP: No results for input(s): BNP in the last 72 hours. Lipids: No results for input(s): CHOL, TRIG, HDL, LDL, LDLCALC in the last 72 hours. ABGs:   Lab Results   Component Value Date/Time    PH 7.45 01/20/2013 05:10 PM    PCO2 30 01/20/2013 05:10 PM    PO2 112 01/20/2013 05:10 PM    HCO3 21 01/20/2013 05:10 PM    O2SAT 99 01/20/2013 05:10 PM       Radiology reports as per the Radiologist  Radiology: XR TIBIA FIBULA LEFT (2 VIEWS)    Result Date: 10/3/2022  2 view left tibia-fibula Comparison: None Findings: No acute fracture or dislocation. Left knee and ankle joints are unremarkable. Remote surgical clips in the proximal posterior medial calf. No cortical erosions. Generalized subcutaneous edema with questionable small ulceration defects in the mid to lower calf. 1. Generalized subcutaneous edema with questionable small ulceration defects in the mid-distal calf. 2. No cortical erosions to suggest osteomyelitis.  This document has been electronically signed by: Fili Velasco MD on 10/03/2022 08:35 PM    XR TIBIA FIBULA RIGHT (2 VIEWS)    Result Date: 10/3/2022  2 view right tibia-fibula Comparison: None Findings: No acute fracture or dislocation. Old healed traumatic deformities in the distal tibia and fibula. Intact intramedullary clarisa with fixation screws in the tibia. Mild degenerative changes in the right knee and right ankle joints with chondrocalcinosis in the right knee. Generalized subcutaneous edema in the right calf. Mild subcutaneous edema of the right calf. No cortical erosions to suggest acute osteomyelitis. This document has been electronically signed by: Rahat Blake MD on 10/03/2022 08:47 PM    XR ANKLE LEFT (2 VIEWS)    Result Date: 10/3/2022  2 view left ankle Comparison: None Findings: No acute fracture or dislocation. Left ankle mortise is intact. No cortical erosions. No significant ankle effusion. Lateral ankle swelling. No radiopaque foreign body. No acute osseous injury or cortical erosions. Lateral ankle swelling. This document has been electronically signed by: Rahat Blake MD on 10/03/2022 08:28 PM    XR ANKLE RIGHT (2 VIEWS)    Result Date: 10/3/2022  2 view right ankle Comparison: DX - ANKLE RT MINIMUM 3 VIEWS - 09/17/2012 02:42 PM EDT Findings: No acute fracture or dislocation. Posttraumatic healed deformities in the distal tibia and fibula. Intact intramedullary clarisa and fixation screws in the tibia and across the distal tibiofibular syndesmosis. Generalized subcutaneous edema. No cortical erosions. Minimal inferior calcaneal spurring. 1. No acute osseous injury or cortical erosions. 2. Subcutaneous edema. This document has been electronically signed by: Rahat Blake MD on 10/03/2022 08:27 PM    XR FOOT LEFT (2 VIEWS)    Result Date: 10/3/2022  2 view left foot Comparison: CR - FOOT LT MINIMUM 3 VIEWS - 03/17/2017 10:53 AM EDT Findings: No acute fracture or dislocation. Prior amputation of the left 2nd toe down to the proximal phalanx. Prior truncation deformity at the distal tuft of the left 3rd toe.  There are chronic appearing erosive changes in the left 1st metatarsophalangeal joint with medial subluxation. No other discrete cortical erosions. Generalized swelling in the dorsum of the left foot with soft tissue ulceration defects. No radiopaque foreign body. 1. Soft tissue swelling in the left foot with several soft tissue ulceration defects. 2. Chronic appearing erosive changes in the left 1st metatarsophalangeal joint with medial subluxation. 3. Chronic appearing truncation defect in the distal tuft of the left 3rd toe and remote amputation of the left 2nd toe with intact cortical margins. 4. No other acute appearing cortical erosions radiographically to suggest osteomyelitis. This document has been electronically signed by: Criselda Call MD on 10/03/2022 08:50 PM    XR FOOT RIGHT (2 VIEWS)    Result Date: 10/3/2022  2 view right foot Comparison: DX - FOOT RT MINIMUM 3 VIEWS - 09/17/2012 02:42 PM EDT Findings: No acute fracture or dislocation. No cortical erosions. Soft tissue swelling with ulceration defect in the dorsum of the midfoot. Partially seen intact fixation hardware in the distal tibia. Scattered degenerative changes in the great toe and dorsal intertarsal joints. 1. Ulceration defect and adjacent swelling in the dorsum of the midfoot. 2. No cortical erosions to suggest acute osteomyelitis. This document has been electronically signed by: Criselda Call MD on 10/03/2022 08:35 PM    CTA ABDOMINAL AORTA W BILAT RUNOFF W WO CONTRAST    Result Date: 10/3/2022  CT angiography abdomen and pelvis with bilateral lower extremity runoff using IV contrast. 3-D post processing. Comparison:06/27/2022 Findings: Multifocal plaque in the abdominal aorta without aneurysm or dissection. Scattered mild calcified plaque in the celiac artery, SMA and UMER origin, and left renal artery origin without significant stenosis. Right renal artery is patent. Scattered plaque in the iliac arteries.  Chronic occlusion of the left acute fracture or dislocation. No cortical erosions. 1. Chronic occlusions of the left internal iliac artery, entire left superficial femoral artery, left popliteal artery, and large portion of the left posterior tibial artery with reconstitution in the distal segment near the ankle. Left dorsalis pedis and plantaris arteries are opacified. 2. No significant opacification in the distal right anterior tibial artery, right dorsalis pedis artery, or distal right peroneal artery. This may reflect underlying diminished flow or occlusion. This is stable. 3. Scattered atherosclerotic plaque in the aorta and mesenteric/renal vessels without significant stenosis. 4. Asymmetric fatty atrophy of the left lower extremity musculature. Mild subcutaneous edema in the dorsum of both feet. 5. Stable chronic hardware fracture of the left interpedicular screw at L4. This document has been electronically signed by: Francy Loaiza MD on 10/03/2022 10:46 PM All CTs at this facility use dose modulation techniques and iterative reconstructions, and/or weight-based dosing when appropriate to reduce radiation to a low as reasonably achievable. 3D Post-processing was performed on this study. Physical Exam:  Vitals: BP (!) 102/53   Pulse 76   Temp 97.7 °F (36.5 °C) (Oral)   Resp 16   Ht 6' 1\" (1.854 m)   Wt 236 lb 1.8 oz (107.1 kg)   SpO2 92%   BMI 31.15 kg/m²   24 hour intake/output:  Intake/Output Summary (Last 24 hours) at 10/6/2022 1221  Last data filed at 10/6/2022 0915  Gross per 24 hour   Intake 1392.83 ml   Output 1545 ml   Net -152.17 ml     Last 3 weights:   Wt Readings from Last 3 Encounters:   10/06/22 236 lb 1.8 oz (107.1 kg)   06/10/22 244 lb (110.7 kg)   04/12/22 247 lb (112 kg)       General appearance - overweight and chronically ill appearing  HEENT: Normocephalic and Atraumatic  Chest - clear to auscultation, no wheezes, rales or rhonchi, symmetric air entry  Cardiovascular - normal rate and regular rhythm  Abdomen - soft, nontender, nondistended, no masses or organomegaly   Neurological - Alert and oriented and Normal speech  Integumentary - left leg cellulitis   Musculoskeletal - limited ROM       DVT prophylaxis: [] Lovenox                                 [] SCDs                                 [] SQ Heparin                                 [] Encourage ambulation           [] Already on Anticoagulation                 Assessment:  Severe PVD - need possible bilateral amputations  CAD  Cellulitis bilateral extremities chronic   Sacral ulcer   DB, HTN, COPD  Hyperkalemia   Leukocytosis improving       Principal Problem:    Cellulitis of right lower extremity  Active Problems:    Pressure injury of sacral region, stage 3 ([facility])    Paroxysmal atrial fibrillation ([facility])    Left ventricular systolic dysfunction    Panlobular emphysema ([facility])    COPD (chronic obstructive pulmonary disease) ([facility])    PVD (peripheral vascular disease) ([facility])    CAD, multiple vessel    Coronary artery chronic total occlusion    PAD (peripheral artery disease) ([facility])  Resolved Problems:    * No resolved hospital problems. *      Plan:  Stress test and arteriogram today   Carb control diet  IV hydration  Analgesia and antiemetics as needed  Antibiotic therapy  Monitor Labs, lytes per protocol  Discussed surgical plans with christiano Servin for surgical intervention once all testing is complete. Planned Left Above knee amputation, right side is questionable at this time.         Electronically signed by JAVAN Velez CNP on 10/6/2022 at 12:21 PM

## 2022-10-06 NOTE — PROGRESS NOTES
Progress note: Infectious diseases    Patient - Hadley Rollins,  Age - 74 y.o.    - 1955      Room Number - 4K-04/004-A   MRN -  [id number]   Acct # - [de-identified]  Date of Admission -  10/3/2022  6:11 PM    SUBJECTIVE:   No new issues. OBJECTIVE   VITALS    height is 6' 1\" (1.854 m) and weight is 236 lb 1.8 oz (107.1 kg). His oral temperature is 98 °F (36.7 °C). His blood pressure is 131/69 and his pulse is 92. His respiration is 18 and oxygen saturation is 91%. Wt Readings from Last 3 Encounters:   10/06/22 236 lb 1.8 oz (107.1 kg)   06/10/22 244 lb (110.7 kg)   22 247 lb (112 kg)       I/O (24 Hours)    Intake/Output Summary (Last 24 hours) at 10/6/2022 1119  Last data filed at 10/6/2022 0915  Gross per 24 hour   Intake 1392.83 ml   Output 1545 ml   Net -152.17 ml         General Appearance  Awake, alert, oriented,  chronically sick looking. HEENT - normocephalic, atraumatic, pale conjunctiva,  anicteric sclera  Neck - Supple, no mass  Lungs -  Bilateral   air entry, no rhonchi, no wheeze  Cardiovascular - Heart sounds are normal.     Abdomen - soft, not distended, nontender,   Neurologic -awake and oriented  Skin - No bruising or bleeding  Extremities - wrapped both legs.             MEDICATIONS:      isosorbide mononitrate  15 mg Oral Daily    tamsulosin  0.4 mg Oral Daily    amiodarone  200 mg Oral BID    vancomycin  1,500 mg IntraVENous Q12H    guaiFENesin  600 mg Oral BID    piperacillin-tazobactam  3,375 mg IntraVENous Q8H    sodium hypochlorite   Irrigation Daily    insulin lispro  0-8 Units SubCUTAneous TID WC    insulin lispro  0-4 Units SubCUTAneous Nightly    insulin glargine  5 Units SubCUTAneous Nightly    atorvastatin  40 mg Oral Daily    [Held by provider] empagliflozin  10 mg Oral Daily    [Held by provider] furosemide  20 mg Oral Daily    gabapentin  100 mg Oral TID    metoprolol tartrate  50 mg Oral BID    pantoprazole  40 mg Oral QAM AC    sodium chloride flush  10 mL IntraVENous 2 times per day    vancomycin (VANCOCIN) intermittent dosing (placeholder)   Other RX Placeholder      sodium chloride Stopped (10/06/22 1030)    sodium chloride      dextrose       potassium chloride **OR** potassium alternative oral replacement **OR** potassium chloride, morphine, morphine, albuterol sulfate HFA, traZODone, sodium chloride flush, sodium chloride, ondansetron **OR** ondansetron, polyethylene glycol, acetaminophen **OR** acetaminophen, glucose, dextrose bolus **OR** dextrose bolus, glucagon (rDNA), dextrose      LABS:     CBC:   Recent Labs     10/04/22  0323 10/05/22  0313 10/06/22  0340   WBC 18.2* 13.9* 12.9*   HGB 10.8* 10.7* 11.0*   * 429* 459*       BMP:    Recent Labs     10/04/22  0323 10/05/22  0313 10/06/22  0340    139 137   K 3.2* 3.7 3.3*    102 100   CO2 25 23 22*   BUN 5* 7 5*   CREATININE 0.9 0.7 0.8   GLUCOSE 171* 171* 163*       Calcium:  Recent Labs     10/06/22  0340   CALCIUM 8.3*       Ionized Calcium:No results for input(s): IONCA in the last 72 hours. Magnesium:  Recent Labs     10/05/22  0313   MG 1.7       Phosphorus:No results for input(s): PHOS in the last 72 hours. BNP:No results for input(s): BNP in the last 72 hours.   Glucose:  Recent Labs     10/05/22  1627 10/05/22  2114 10/06/22  0734   POCGLU 246* 372* 343*       HgbA1C:   Recent Labs     10/03/22  1910   LABA1C 9.2*       INR:   Recent Labs     10/04/22  1113   INR 1.47*       Hepatic:   Recent Labs     10/03/22  1910   ALKPHOS 103   ALT 11   AST 13   PROT 7.6   BILITOT 0.3   BILIDIR <0.2   LABALBU 2.5*       Amylase and Lipase:  Recent Labs     10/05/22  0314   LACTA 1.7       Lactic Acid:   Recent Labs     10/05/22  0314   LACTA 1.7          CULTURES:   UA:   Recent Labs     10/06/22  0915   PHUR 6.5   COLORU YELLOW   PROTEINU TRACE*   BLOODU NEGATIVE   RBCUA 3-5   WBCUA 0-2   BACTERIA NONE SEEN   NITRU NEGATIVE   GLUCOSEU >= 1000*   BILIRUBINUR NEGATIVE   UROBILINOGEN 0.2   KETUA TRACE*     Micro:   Lab Results   Component Value Date/Time    BC No growth 24 hours. No growth 48 hours. 10/03/2022 07:29 PM          Problem list of patient:     Patient Active Problem List   Diagnosis Code    Seizure disorder ([facility] G40.909    Osteoarthritis M19.90    COPD (chronic obstructive pulmonary disease) ([facility] J44.9    Spinal stenosis, lumbar M48.061    Lumbar radiculopathy M54.16    Tobacco abuse Z72.0    GERD (gastroesophageal reflux disease) K21.9    Skin ulceration ([facility] L98.499    Non-healing ulcer of lower leg ([facility] L97.909    Bilateral claudication of lower limb ([facility]) I73.9    Current smoker F17.200    Dyslipidemia E78.5    Abnormal cardiovascular function study R94.30    Obesity E66.9    PVD (peripheral vascular disease) ([facility]) I73.9    CAD, multiple vessel I25.10    Chronic total occlusion of artery of the extremities ([facility]) I70.92    Coronary artery chronic total occlusion I25.82    DAVILA (dyspnea on exertion) R06.09    HTN (hypertension) I10    Discitis of cervical region M46.42    Open wound of right hand S61.401A    Noncompliance by refusing intervention or support Z91.199    Lactic acidosis E87.20    Pressure injury of left buttock, stage 3 ([facility]) S31.311    PAD (peripheral artery disease) ([facility]) I73.9    Cellulitis of right lower extremity L03.115    Pressure injury of sacral region, stage 3 ([facility]) L89.153    Paroxysmal atrial fibrillation ([facility]) I48.0    Left ventricular systolic dysfunction L38.6    Panlobular emphysema ([facility]) J43.1         ASSESSMENT/PLAN   Severe PVD: extensive blockage  Non healing leg wounds.   CAD  He will have left AKA  Continue local wound care         Gillian Mason MD, MD, FACP 10/6/2022 11:19 AM

## 2022-10-06 NOTE — PROGRESS NOTES
Internal Medicine Resident Progress Note    Patient:  Tatiana Wooten    YOB: 1955  Unit/Bed:4-04/004-A  MRN: [id number]    Acct: [de-identified]   PCP: Brian Gilmore MD    Date of Admission: 10/3/2022      Assessment/Plan:    L LE Necrotic Non-healing Extensive Wound:  - Leg cultures growing multiple colonies of enteric gram-negative bacilli. There is also gram-positive cocci  - Pain on rest in Left lower leg.  - WBC count trending down at 12.9 on 10/06  - Lactic acid trended down to 1.7.  - Pt is in agreement for L AKA. Plan:   - ID consulted  - Gen surg consulted: Planning a possible Left above-knee amputation   - IV LR at 75 mL/h  - IV vancomycin and Zosyn   - Continue to monitor for any acute signs/symptoms of decompensation    R LE Non-healing Extensive Wound:  - Leg cultures growing multiple colonies of enteric gram-negative bacilli. There is also gram-positive cocci  - WBC count trending down. - Pt would like to attempt revascularization therapy for R LE .   - Arteriogram was refused by pt due to being unable to lay flat on bed. Plan:   - ID consulted  - Gen surg consulted: Planning a possible Left above-knee amputation   - Repeat arteriogram refused by pt today. - IV LR at 75 mL/h  - IV vancomycin and Zosyn   - Continue to monitor for any acute signs/symptoms of decompensation    Severe Peripheral artery disease:  - Chronic history of PAD, follows with Dr. Martir Iglesias in cardiology. - Patient has pain in lower extremities, has a 2 to 3-month history of lower extremity wounds. Unsure when it started but it is between June to September  - Resting pain in L lower leg.   - Patient has complained of increased purulent discharge.   - Patient has nonhealing wounds of bilateral lower extremity which previously required hospitalization.    - CTA shows occlusion of the left internal iliac artery, entire left superficial femoral artery, left popliteal artery and large portion of the left posterior tibial artery. There is no significant opacification in the distal right anterior tibial artery, right dorsalis pedis artery, or distal right peroneal artery. - Preoperative cardiac stress test shows LVEF of 35% and nuclear images not suggestive for myocardial ischemia  Plan:   - Repeat arteriogram was attempted yesterday, but cannot be completed. Pt refused arteriogram on 10/06.   - General surgery recommend possible L above knee amputation a  - Vancomycin/Zosyn abx continuing.   - Anticoagulation held for surgery, L AKA. - Wound care consulted      Urinary Incontinence:  - Pt had mild hx of urinary inconcinece on admission. Worsened on 10/06.   - Has no hx of BPH, denies increased urgency in past year, increased frequency, or night time frequency. Plan:  - Ordered straight catheter and Flomax 0.4mg. Stage 3 Sacral ulcer:  - Patient has chronic history of sacral ulcers, for past couple of months. - Per pt it seems to be widening.   - Very deep and goes into fat. Plan:   - Wound care is dressing up ulcer.   - Vancomycin and Zosyn antibiotic started. NIDDMII:   - Home med of Farxiga 10mg and Metformin 1000mg BID. Plan:  - Medium dose of SSI and 5 units of Lantus. - Will keep inpatient goal of sugars 140-180. - Accuchecks x4.   - Diabetic diet  - Hypoglycemia Protocol     Sepsis 2/2 cellulitis on B/L LE (resolved)  - On POA SIRS 2/4 (, WBC 17.6) with suspected cellulitis/chronic LE wound source. - Leg cultures growing multiple colonies of enteric gram-negative bacilli. There is also gram-positive cocci  - Blood culture negative on 48 hours growth. Plan:   - IV LR at 75 mL/h  - IV vancomycin and Zosyn     Diarrhea: (Resolved):  - Patient has possible 1 week history of diarrhea, at times he admits to having chronic history of diarrhea. No recent antibiotic usage or known food exposures that could have caused this.   - GI panel positive for E. coli enteropathic  - Symptoms have resolved  Plan:   -IV LR at 75 mL/h      Lactic acidosis secondary to sepsis from lower extremity cellulitis (Resolved):  - Lactic acid on admission 3.6 downtrending to 1.7 on 10/05. Plan:   - Vancomycin and Zosyn antibiotic started     CAD s/p PCI DANA x2 LAD performed at [facility]:  - Follows Dr. Sabrina Ambrocio.   - Patient is not compliant with medication.  - EKG shows A. fib with RVR  - Stress test done on 10/05, shows LVEF of 35%. Not suggestive of myocardial ischemia. Persistent A. fib:   - Follows Dr. Sabrina Ambrocio.   - Noted per chart review, though patient denies any arrhythmia history. - Patient maintained on amiodarone 200mg daily at home. - [id number] 5.   - HAS-BLED Score 4  - EKG on POA showed A. fib with RVR  Plan:  - Amiodarone 200 mg twice daily  - Eliquis held currently for surgical amputation of the bilateral lower extremity     HFrEF 40%:  - ECHO 4/11/22 demonstrated improved EF 40-45%, mod LV hypokinesis. - Given no CP, SOB, or O2 requirement, will hold on repeat ECHO at this time. - Patient unable to state his home medications, but appears he is on GDMT consisting of SGLT2, metoprolol. No noted ARNI/ACE/ARB/MRA. Plan:  - Re-started Lopressor 50mg bid  - Substituted jardiance for home SGLT2  - Consider further optimization of medical therapy as tolerated  - Home lasix 20mg daily resumed     Chronic normocytic anemia:   - Hgb 12.6 on arrival. No noted bleeding. MCV low-normal at 80.7. Thromobocytosis:   - Plts 556 on arrival. Suspect reactive in setting of acute illness. HTN:   - Continued home medications of Imdur 30mg daily, lopressor 50mg bid     COPD:   Per chart review. Spirometry with bronchodilator 2014 normal. Patient on home albuterol inhaler.   - Continue albuterol inhaler  - Courage use of incentive spirometry, Acapella and started on guaifenesin 600 mg twice daily. GERD:   - On Protonix     Chronic tobacco abuse: .   - Nicotine patch started       Expected discharge date:  2 days    Disposition:   [] Home  [] TCU  [x] Rehab  [] Psych  [] SNF  [] [facility]  [] Other-    ===================================================================      Chief Complaint: Worsening bilateral lower extremity infection and and diarrhea for 1 week    Hospital Course:      Per chart   \"Ruben Brown is a 74 y.o. male with a PMH of PAD, CAD s/p PCI LAD, chronic sacral wounds, chronic systolic heart failure, pAF, HTN, HLD, NIDDMII, tobacco abuse who presented with bilateral lower extremity wounds. Patient reports a 2-week history of worsening bilateral lower extremity infections. He states he has developed rising erythema to the mid calf along with worsening wounds and ulcerations of bilateral extremities. Patient states wounds have been purulent and foul-smelling. He also reports profuse diarrhea upon admission for the last 1 week. He states he follows with Dr. Mack Kramer for these bilateral lower extremity wounds, and has had lower extremity imaging in the past that demonstrated severe inflow disease. He has undergone previous bypass grafting of the left lower extremity that has since chronically occluded. He denies any chest pain, shortness of breath, abdominal pain. He has a history of coronary artery disease for which she went stent placement x2, unsure of which vessel. He reports chronic heart failure, on diuretics at home. He denies any history of arrhythmia, but has charted history of atrial fibrillation for which she is on Eliquis at home. Patient is unsure of any medications that he is on at home and is unable to provide any information on which he is actually taking. Cristian present in room to assist with history. He states he has been told in the past that his left lower extremity would need to be amputated. \"    10/04:  Patient was seen and examined in room today. Cristian was present. Patient was concerned about possible amputation of both lower extremities. Patient states he had diarrhea for the last week and possibly chronic. GI panel was positive for E. coli enteropathogenic. Blood cultures were sent. Culture from leg is currently positive for segmented neutrophils with gram-positive cocci and gram-negative bacilli. Patient is on vancomycin and Zosyn. Dr. Artemio Bradley was consulted and agrees with antibiotic course. He was advised on AKA of the left leg and possible medical intervention of right lower leg. Cardiology was consulted, recommend surgery consult for left lower extremity amputation. There is plan for peripheral angiogram tomorrow morning. Stress test will also be done tomorrow. 10/05:  Patient was seen and examined in room today, fiancé was present. Patient is more amenable for bilateral BKA. Diarrhea has resolved. Cultures from leg show gram-negative bacilli. Blood cultures negative for growth in last 24 hours. Encouraged use of I-S, Acapella and started guaifenesin 600 mg twice daily. Stress test done today, shows LVEF of 35%. Not suggestive of myocardial ischemia. 10/06:   Pt. Was seen and examined in room today. Pt was informed of possible L above knee amputation and attempt at revascularization of R LE. Peripheral angiogram will be re-attempted today. Remains on IV vanc and zosyn. WBC count trending down. No growth in blood cultures in last 48 hours. Cultures from leg show gram-negative bacilli. Patient had complaint of urinary incontinence, history of BPH or increased urinary urgency or frequency. Patient was straight cathed and started on Flomax 0.4 mg. Subjective (past 24 hours):     Pt is doing well and has no acute complaints. Did not want peripheral arteriogram, stated he is unable to lay flat for more than a few minutes. Is agreeable to get left AKA and does not want treatment on the right lower extremity    ROS: reviewed complete ROS unchanged unless otherwise stated in hospital course/subjective portion. Medications:  Reviewed    Infusion Medications    sodium chloride      sodium chloride      dextrose       Scheduled Medications    amiodarone  200 mg Oral BID    vancomycin  1,500 mg IntraVENous Q12H    guaiFENesin  600 mg Oral BID    piperacillin-tazobactam  3,375 mg IntraVENous Q8H    sodium hypochlorite   Irrigation Daily    insulin lispro  0-8 Units SubCUTAneous TID WC    insulin lispro  0-4 Units SubCUTAneous Nightly    insulin glargine  5 Units SubCUTAneous Nightly    atorvastatin  40 mg Oral Daily    [Held by provider] empagliflozin  10 mg Oral Daily    [Held by provider] furosemide  20 mg Oral Daily    gabapentin  100 mg Oral TID    isosorbide mononitrate  30 mg Oral Daily    metoprolol tartrate  50 mg Oral BID    pantoprazole  40 mg Oral QAM AC    sodium chloride flush  10 mL IntraVENous 2 times per day    vancomycin (VANCOCIN) intermittent dosing (placeholder)   Other RX Placeholder     PRN Meds: potassium chloride **OR** potassium alternative oral replacement **OR** potassium chloride, morphine, albuterol sulfate HFA, traZODone, sodium chloride flush, sodium chloride, ondansetron **OR** ondansetron, polyethylene glycol, acetaminophen **OR** acetaminophen, glucose, dextrose bolus **OR** dextrose bolus, glucagon (rDNA), dextrose        Intake/Output Summary (Last 24 hours) at 10/6/2022 0704  Last data filed at 10/6/2022 0603  Gross per 24 hour   Intake 1392.83 ml   Output 600 ml   Net 792.83 ml         Exam:  BP (!) 130/49   Pulse 89   Temp 98.6 °F (37 °C) (Oral)   Resp 16   Ht 6' 1\" (1.854 m)   Wt 236 lb 1.8 oz (107.1 kg)   SpO2 93%   BMI 31.15 kg/m²     Physical Exam  Constitutional:       General: He is in acute distress. Appearance: He is ill-appearing. HENT:      Head: Normocephalic and atraumatic. Right Ear: External ear normal.      Left Ear: External ear normal.      Nose: Nose normal.   Eyes:      Pupils: Pupils are equal, round, and reactive to light.    Cardiovascular: Rate and Rhythm: Normal rate and regular rhythm. Pulses: Normal pulses. Heart sounds: Murmur heard. Pulmonary:      Effort: Pulmonary effort is normal.      Breath sounds: Wheezing present. Abdominal:      Tenderness: There is guarding. Musculoskeletal:         General: Deformity present. Cervical back: Normal range of motion. Skin:     Capillary Refill: Capillary refill takes more than 3 seconds. Findings: Bruising, lesion and rash present. Neurological:      Mental Status: He is alert and oriented to person, place, and time. Motor: Weakness present. Gait: Gait abnormal.          Labs:   Recent Labs     10/04/22  0323 10/05/22  0313 10/06/22  0340   WBC 18.2* 13.9* 12.9*   HGB 10.8* 10.7* 11.0*   HCT 35.3* 35.2* 35.4*   * 429* 459*       Recent Labs     10/04/22  0323 10/05/22  0313 10/06/22  0340    139 137   K 3.2* 3.7 3.3*    102 100   CO2 25 23 22*   BUN 5* 7 5*   CREATININE 0.9 0.7 0.8   CALCIUM 8.3* 8.1* 8.3*       Recent Labs     10/03/22  1910   AST 13   ALT 11   BILIDIR <0.2   BILITOT 0.3   ALKPHOS 103       Recent Labs     10/04/22  1113   INR 1.47*       No results for input(s): CKTOTAL, TROPONINI in the last 72 hours. No results for input(s): PROCAL in the last 72 hours. Lab Results   Component Value Date/Time    NITRU NEGATIVE 03/16/2017 09:15 PM    WBCUA 2-4 03/16/2017 09:15 PM    BACTERIA NONE 03/16/2017 09:15 PM    RBCUA 0-2 03/16/2017 09:15 PM    BLOODU NEGATIVE 03/16/2017 09:15 PM    SPECGRAV 1.023 03/16/2017 09:15 PM    GLUCOSEU NEGATIVE 01/20/2013 06:30 PM       Radiology (48 hours):  XR TIBIA FIBULA LEFT (2 VIEWS)    Result Date: 10/3/2022  1. Generalized subcutaneous edema with questionable small ulceration defects in the mid-distal calf. 2. No cortical erosions to suggest osteomyelitis.  This document has been electronically signed by: Daxa Ramirez MD on 10/03/2022 08:35 PM    XR TIBIA FIBULA RIGHT (2 VIEWS)    Result Date: 10/3/2022  Mild subcutaneous edema of the right calf. No cortical erosions to suggest acute osteomyelitis. This document has been electronically signed by: Mitch Holder MD on 10/03/2022 08:47 PM    XR ANKLE LEFT (2 VIEWS)    Result Date: 10/3/2022  No acute osseous injury or cortical erosions. Lateral ankle swelling. This document has been electronically signed by: Mitch Holder MD on 10/03/2022 08:28 PM    XR ANKLE RIGHT (2 VIEWS)    Result Date: 10/3/2022  1. No acute osseous injury or cortical erosions. 2. Subcutaneous edema. This document has been electronically signed by: Mitch Holder MD on 10/03/2022 08:27 PM    XR FOOT LEFT (2 VIEWS)    Result Date: 10/3/2022  1. Soft tissue swelling in the left foot with several soft tissue ulceration defects. 2. Chronic appearing erosive changes in the left 1st metatarsophalangeal joint with medial subluxation. 3. Chronic appearing truncation defect in the distal tuft of the left 3rd toe and remote amputation of the left 2nd toe with intact cortical margins. 4. No other acute appearing cortical erosions radiographically to suggest osteomyelitis. This document has been electronically signed by: Mitch Holder MD on 10/03/2022 08:50 PM    XR FOOT RIGHT (2 VIEWS)    Result Date: 10/3/2022  1. Ulceration defect and adjacent swelling in the dorsum of the midfoot. 2. No cortical erosions to suggest acute osteomyelitis. This document has been electronically signed by: Mitch Holder MD on 10/03/2022 08:35 PM    CTA ABDOMINAL AORTA W BILAT RUNOFF W WO CONTRAST    Result Date: 10/3/2022  1. Chronic occlusions of the left internal iliac artery, entire left superficial femoral artery, left popliteal artery, and large portion of the left posterior tibial artery with reconstitution in the distal segment near the ankle. Left dorsalis pedis and plantaris arteries are opacified.  2. No significant opacification in the distal right anterior tibial artery, right dorsalis pedis artery, or distal right peroneal artery. This may reflect underlying diminished flow or occlusion. This is stable. 3. Scattered atherosclerotic plaque in the aorta and mesenteric/renal vessels without significant stenosis. 4. Asymmetric fatty atrophy of the left lower extremity musculature. Mild subcutaneous edema in the dorsum of both feet. 5. Stable chronic hardware fracture of the left interpedicular screw at L4. This document has been electronically signed by: Mitch Holder MD on 10/03/2022 10:46 PM All CTs at this facility use dose modulation techniques and iterative reconstructions, and/or weight-based dosing when appropriate to reduce radiation to a low as reasonably achievable. 3D Post-processing was performed on this study.        DVT prophylaxis:    [] Lovenox  [] SCDs  [] SQ Heparin  [x] Encourage ambulation   [] Already on Anticoagulation       Diet: Diet NPO  Code Status: Full Code  PT/OT: n/a  Tele: yes  IVF: LR at 75 mL/hr    Electronically signed by Robson Juarez MD  PGY-1 Internal Medicine Resident  on 10/6/2022 at 7:04 AM    Case was discussed with Attending, Dr. William Acosta

## 2022-10-06 NOTE — PLAN OF CARE
Problem: Discharge Planning  Goal: Discharge to home or other facility with appropriate resources  Outcome: Progressing  Flowsheets  Taken 10/6/2022 0422 by Marc Prieto RN  Discharge to home or other facility with appropriate resources:   Identify barriers to discharge with patient and caregiver   Arrange for needed discharge resources and transportation as appropriate   Identify discharge learning needs (meds, wound care, etc)    Problem: Pain  Goal: Verbalizes/displays adequate comfort level or baseline comfort level  Outcome: Progressing  Flowsheets (Taken 10/6/2022 0422)  Verbalizes/displays adequate comfort level or baseline comfort level:   Encourage patient to monitor pain and request assistance   Assess pain using appropriate pain scale   Administer analgesics based on type and severity of pain and evaluate response   Implement non-pharmacological measures as appropriate and evaluate response     Problem: ABCDS Injury Assessment  Goal: Absence of physical injury  Outcome: Progressing  Flowsheets (Taken 10/6/2022 0422)  Absence of Physical Injury: Implement safety measures based on patient assessment     Problem: Chronic Conditions and Co-morbidities  Goal: Patient's chronic conditions and co-morbidity symptoms are monitored and maintained or improved  Outcome: Progressing  Flowsheets  Taken 10/6/2022 0422 by Marc Prieto RN  Care Plan - Patient's Chronic Conditions and Co-Morbidity Symptoms are Monitored and Maintained or Improved:   Monitor and assess patient's chronic conditions and comorbid symptoms for stability, deterioration, or improvement   Collaborate with multidisciplinary team to address chronic and comorbid conditions and prevent exacerbation or deterioration   Update acute care plan with appropriate goals if chronic or comorbid symptoms are exacerbated and prevent overall improvement and discharge       Problem: Nutrition Deficit:  Goal: Optimize nutritional status  Outcome: Progressing  Flowsheets (Taken 10/6/2022 2532)  Nutrient intake appropriate for improving, restoring, or maintaining nutritional needs:   Assess nutritional status and recommend course of action   Monitor oral intake, labs, and treatment plans   Recommend appropriate diets, oral nutritional supplements, and vitamin/mineral supplements   Provide specific nutrition education to patient or family as appropriate

## 2022-10-06 NOTE — PROGRESS NOTES
Surg patient was seen today for my review of the chart patient likely needs left above-knee amputation. He had gone down for repeat arteriogram today but it cannot be completed due to patient request.  Apparently per his report there is going to be attempt at some revascularization of the right leg.   We will discussed with all consultants timing of at least left above-knee amputation

## 2022-10-06 NOTE — FLOWSHEET NOTE
10/06/22 0901   Safe Environment   Safety Measures Other (comment)  (VN safety check complete)   Pt responds to audio call , stated he didn't get much sleep last night stated he had to keep going to the bathroom , stated he wants to take a nap after his test in the late morning , pt asked for me to THE [facility] and see if im sleeping before you call if that's possible \" informed pt that with having his prior consent for this I would do so , no voiced complaints of pain , stated he has his call light

## 2022-10-06 NOTE — CARE COORDINATION
Collaborative Discharge Planning    Hadley Rollins  :  1955  MRN:  [id number]    ADMIT DATE:  10/3/2022      Discharge Planning Discharge Planning  Type of Residence: Acute Rehab  Living Arrangements: Spouse/Significant Other  Support Systems: Spouse/Significant Other  Current Services Prior To Admission: Durable Medical Equipment  Current DME Prior to Arrival: [place], Wheelchair  Potential Assistance Needed: Durable Medical Equipment, Transitional Care  Potential DME Needed: Bedside Commode  Potential Assistance Purchasing Medications: No  Type of Home Care Services: PT, OT, Skilled Therapy, Aide Services  Patient expects to be discharged to[de-identified] Acute rehab  Follow Up Appointment: Best Day/Time : Monday PM  One/Two Story Residence: One story  History of falls?: Yes  White Board Notes /Social Work Whiteboard Notes  /Social Work Whiteboard: 10 CM; lives w SO/Dhiraje Elli Trevino; has nebulizer, WC; monitor therapy/rehab needs    Discharge Plan Rehab  Lives w SO/Janessa Trevino; has nebulizer, WC; monitor therapy/IPR (precert, there 1st choice) needs  offered provider list with abbreviated version of the quality measures, geographic area, and applicable managed care information provided. Offered option of searching Medicare. gov website by using the computer in patient's room to review complete quality measures information. Questions regarding selection process answered:patient/family (after IPR, will need ramp, BSC, grab bars, walker, tub transfer bench per family)    Discharge Milestones and Delays: Clinical status    BLE PAD  History: PCI, COPD, Smoker, AICD, EF 35%, non-healing sacral wound     10/3 CTA Abdominal Aorta w B/L Runoff  1. 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  Left dorsalis pedis and plantaris arteries are opacified. 2. No significant opacification in the distal right anterior tibial   artery, right dorsalis pedis artery, or distal right peroneal artery. This   may reflect underlying diminished flow or occlusion. This is stable. 3. Scattered atherosclerotic plaque in the aorta and mesenteric/renal   vessels without significant stenosis. 4. Asymmetric fatty atrophy of the left lower extremity musculature. Mild   subcutaneous edema in the dorsum of both feet. 5. Stable chronic hardware fracture of the left interpedicular screw at L4.       10/3 LLE (foot XR)  1. Soft tissue swelling in the left foot with several soft tissue   ulceration defects. 2. Chronic appearing erosive changes in the left 1st metatarsophalangeal   joint with medial subluxation. 3. 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 4. No other acute appearing cortical erosions radiographically to suggest   osteomyelitis. 10/3 RLE (foot) XR  1. Ulceration defect and adjacent swelling in the dorsum of the midfoot. 2. No cortical erosions to suggest acute osteomyelitis.      10/6 Stress Test/BLE Angiogram planned      IV AB, IVF    Await Left [place] after testing completed    SIGNED:  Cora Roach RN   10/6/2022, 2:53 PM

## 2022-10-07 ENCOUNTER — APPOINTMENT (OUTPATIENT)
Dept: INTERVENTIONAL RADIOLOGY/VASCULAR | Age: 67
DRG: 710 | End: 2022-10-07
Payer: COMMERCIAL

## 2022-10-07 LAB
AEROBIC CULTURE: ABNORMAL
ANAEROBIC CULTURE: ABNORMAL
ANION GAP SERPL CALCULATED.3IONS-SCNC: 12 MEQ/L (ref 8–16)
BUN BLDV-MCNC: 6 MG/DL (ref 7–22)
CALCIUM SERPL-MCNC: 8 MG/DL (ref 8.5–10.5)
CHLORIDE BLD-SCNC: 102 MEQ/L (ref 98–111)
CO2: 22 MEQ/L (ref 23–33)
CREAT SERPL-MCNC: 0.7 MG/DL (ref 0.4–1.2)
ERYTHROCYTE [DISTWIDTH] IN BLOOD BY AUTOMATED COUNT: 17.9 % (ref 11.5–14.5)
ERYTHROCYTE [DISTWIDTH] IN BLOOD BY AUTOMATED COUNT: 52.7 FL (ref 35–45)
GFR SERPL CREATININE-BSD FRML MDRD: > 90 ML/MIN/1.73M2
GLUCOSE BLD-MCNC: 163 MG/DL (ref 70–108)
GLUCOSE BLD-MCNC: 201 MG/DL (ref 70–108)
GLUCOSE BLD-MCNC: 206 MG/DL (ref 70–108)
GLUCOSE BLD-MCNC: 221 MG/DL (ref 70–108)
GLUCOSE BLD-MCNC: 233 MG/DL (ref 70–108)
GRAM STAIN RESULT: ABNORMAL
HCT VFR BLD CALC: 35.3 % (ref 42–52)
HEMOGLOBIN: 10.6 GM/DL (ref 14–18)
MCH RBC QN AUTO: 24.9 PG (ref 26–33)
MCHC RBC AUTO-ENTMCNC: 30 GM/DL (ref 32.2–35.5)
MCV RBC AUTO: 83.1 FL (ref 80–94)
ORGANISM: ABNORMAL
PLATELET # BLD: 424 THOU/MM3 (ref 130–400)
PMV BLD AUTO: 9.6 FL (ref 9.4–12.4)
POTASSIUM SERPL-SCNC: 3.5 MEQ/L (ref 3.5–5.2)
RBC # BLD: 4.25 MILL/MM3 (ref 4.7–6.1)
SODIUM BLD-SCNC: 136 MEQ/L (ref 135–145)
VANCOMYCIN RANDOM: 21.5 UG/ML (ref 0.1–39.9)
WBC # BLD: 11.2 THOU/MM3 (ref 4.8–10.8)

## 2022-10-07 PROCEDURE — 6360000002 HC RX W HCPCS

## 2022-10-07 PROCEDURE — APPSS30 APP SPLIT SHARED TIME 16-30 MINUTES: Performed by: NURSE PRACTITIONER

## 2022-10-07 PROCEDURE — 6370000000 HC RX 637 (ALT 250 FOR IP)

## 2022-10-07 PROCEDURE — 36415 COLL VENOUS BLD VENIPUNCTURE: CPT

## 2022-10-07 PROCEDURE — 94669 MECHANICAL CHEST WALL OSCILL: CPT

## 2022-10-07 PROCEDURE — 80048 BASIC METABOLIC PNL TOTAL CA: CPT

## 2022-10-07 PROCEDURE — 2060000000 HC ICU INTERMEDIATE R&B

## 2022-10-07 PROCEDURE — 6370000000 HC RX 637 (ALT 250 FOR IP): Performed by: STUDENT IN AN ORGANIZED HEALTH CARE EDUCATION/TRAINING PROGRAM

## 2022-10-07 PROCEDURE — 6370000000 HC RX 637 (ALT 250 FOR IP): Performed by: INTERNAL MEDICINE

## 2022-10-07 PROCEDURE — G0269 OCCLUSIVE DEVICE IN VEIN ART: HCPCS

## 2022-10-07 PROCEDURE — 75630 X-RAY AORTA LEG ARTERIES: CPT | Performed by: INTERNAL MEDICINE

## 2022-10-07 PROCEDURE — 36200 PLACE CATHETER IN AORTA: CPT | Performed by: INTERNAL MEDICINE

## 2022-10-07 PROCEDURE — C1894 INTRO/SHEATH, NON-LASER: HCPCS

## 2022-10-07 PROCEDURE — 6360000002 HC RX W HCPCS: Performed by: STUDENT IN AN ORGANIZED HEALTH CARE EDUCATION/TRAINING PROGRAM

## 2022-10-07 PROCEDURE — 2580000003 HC RX 258: Performed by: INTERNAL MEDICINE

## 2022-10-07 PROCEDURE — 80202 ASSAY OF VANCOMYCIN: CPT

## 2022-10-07 PROCEDURE — 99233 SBSQ HOSP IP/OBS HIGH 50: CPT | Performed by: INTERNAL MEDICINE

## 2022-10-07 PROCEDURE — 2580000003 HC RX 258

## 2022-10-07 PROCEDURE — 75625 CONTRAST EXAM ABDOMINL AORTA: CPT

## 2022-10-07 PROCEDURE — 85027 COMPLETE CBC AUTOMATED: CPT

## 2022-10-07 PROCEDURE — 82948 REAGENT STRIP/BLOOD GLUCOSE: CPT

## 2022-10-07 PROCEDURE — 6360000002 HC RX W HCPCS: Performed by: EMERGENCY MEDICINE

## 2022-10-07 PROCEDURE — 99232 SBSQ HOSP IP/OBS MODERATE 35: CPT | Performed by: SURGERY

## 2022-10-07 PROCEDURE — 6360000002 HC RX W HCPCS: Performed by: INTERNAL MEDICINE

## 2022-10-07 PROCEDURE — 6360000004 HC RX CONTRAST MEDICATION: Performed by: INTERNAL MEDICINE

## 2022-10-07 PROCEDURE — 75716 ARTERY X-RAYS ARMS/LEGS: CPT

## 2022-10-07 PROCEDURE — 36246 INS CATH ABD/L-EXT ART 2ND: CPT

## 2022-10-07 RX ORDER — SODIUM CHLORIDE 0.9 % (FLUSH) 0.9 %
5-40 SYRINGE (ML) INJECTION PRN
OUTPATIENT
Start: 2022-10-07

## 2022-10-07 RX ORDER — SODIUM CHLORIDE 9 MG/ML
INJECTION, SOLUTION INTRAVENOUS CONTINUOUS
Status: DISCONTINUED | OUTPATIENT
Start: 2022-10-07 | End: 2022-10-08

## 2022-10-07 RX ORDER — MORPHINE SULFATE 4 MG/ML
8 INJECTION, SOLUTION INTRAMUSCULAR; INTRAVENOUS
Status: COMPLETED | OUTPATIENT
Start: 2022-10-07 | End: 2022-10-07

## 2022-10-07 RX ORDER — METOPROLOL SUCCINATE 50 MG/1
50 TABLET, EXTENDED RELEASE ORAL 2 TIMES DAILY
Status: DISCONTINUED | OUTPATIENT
Start: 2022-10-07 | End: 2022-10-08

## 2022-10-07 RX ORDER — FENTANYL CITRATE 50 UG/ML
INJECTION, SOLUTION INTRAMUSCULAR; INTRAVENOUS
Status: COMPLETED | OUTPATIENT
Start: 2022-10-07 | End: 2022-10-07

## 2022-10-07 RX ORDER — SODIUM CHLORIDE 9 MG/ML
INJECTION, SOLUTION INTRAVENOUS PRN
OUTPATIENT
Start: 2022-10-07

## 2022-10-07 RX ORDER — ACETAMINOPHEN 325 MG/1
650 TABLET ORAL EVERY 4 HOURS PRN
OUTPATIENT
Start: 2022-10-07

## 2022-10-07 RX ORDER — MIDAZOLAM HYDROCHLORIDE 1 MG/ML
INJECTION INTRAMUSCULAR; INTRAVENOUS
Status: COMPLETED | OUTPATIENT
Start: 2022-10-07 | End: 2022-10-07

## 2022-10-07 RX ORDER — INSULIN GLARGINE 100 [IU]/ML
12 INJECTION, SOLUTION SUBCUTANEOUS NIGHTLY
Status: DISCONTINUED | OUTPATIENT
Start: 2022-10-07 | End: 2022-10-08

## 2022-10-07 RX ORDER — SODIUM CHLORIDE 0.9 % (FLUSH) 0.9 %
5-40 SYRINGE (ML) INJECTION EVERY 12 HOURS SCHEDULED
OUTPATIENT
Start: 2022-10-07

## 2022-10-07 RX ORDER — SODIUM CHLORIDE 9 MG/ML
INJECTION, SOLUTION INTRAVENOUS CONTINUOUS
OUTPATIENT
Start: 2022-10-07

## 2022-10-07 RX ADMIN — SODIUM HYPOCHLORITE: 1.25 SOLUTION TOPICAL at 10:21

## 2022-10-07 RX ADMIN — BENZOCAINE AND LEVOMENTHOL: 200; 5 SPRAY TOPICAL at 06:44

## 2022-10-07 RX ADMIN — MORPHINE SULFATE 4 MG: 4 INJECTION, SOLUTION INTRAMUSCULAR; INTRAVENOUS at 16:52

## 2022-10-07 RX ADMIN — GUAIFENESIN 600 MG: 600 TABLET, EXTENDED RELEASE ORAL at 21:02

## 2022-10-07 RX ADMIN — POTASSIUM CHLORIDE 40 MEQ: 1500 TABLET, EXTENDED RELEASE ORAL at 06:01

## 2022-10-07 RX ADMIN — ISOSORBIDE MONONITRATE 15 MG: 30 TABLET, EXTENDED RELEASE ORAL at 08:53

## 2022-10-07 RX ADMIN — PANTOPRAZOLE SODIUM 40 MG: 40 TABLET, DELAYED RELEASE ORAL at 06:01

## 2022-10-07 RX ADMIN — SODIUM CHLORIDE: 9 INJECTION, SOLUTION INTRAVENOUS at 10:57

## 2022-10-07 RX ADMIN — GABAPENTIN 100 MG: 100 CAPSULE ORAL at 16:50

## 2022-10-07 RX ADMIN — AMIODARONE HYDROCHLORIDE 200 MG: 200 TABLET ORAL at 08:53

## 2022-10-07 RX ADMIN — TRAZODONE HYDROCHLORIDE 50 MG: 50 TABLET ORAL at 23:48

## 2022-10-07 RX ADMIN — AMIODARONE HYDROCHLORIDE 200 MG: 200 TABLET ORAL at 21:02

## 2022-10-07 RX ADMIN — GABAPENTIN 100 MG: 100 CAPSULE ORAL at 08:53

## 2022-10-07 RX ADMIN — MORPHINE SULFATE 4 MG: 4 INJECTION, SOLUTION INTRAMUSCULAR; INTRAVENOUS at 06:49

## 2022-10-07 RX ADMIN — FENTANYL CITRATE 50 MCG: 50 INJECTION, SOLUTION INTRAMUSCULAR; INTRAVENOUS at 14:08

## 2022-10-07 RX ADMIN — PIPERACILLIN SODIUM,TAZOBACTAM SODIUM 3375 MG: 3; .375 INJECTION, POWDER, FOR SOLUTION INTRAVENOUS at 02:51

## 2022-10-07 RX ADMIN — INSULIN LISPRO 2 UNITS: 100 INJECTION, SOLUTION INTRAVENOUS; SUBCUTANEOUS at 12:15

## 2022-10-07 RX ADMIN — MORPHINE SULFATE 4 MG: 4 INJECTION, SOLUTION INTRAMUSCULAR; INTRAVENOUS at 02:47

## 2022-10-07 RX ADMIN — VANCOMYCIN HYDROCHLORIDE 1500 MG: 5 INJECTION, POWDER, LYOPHILIZED, FOR SOLUTION INTRAVENOUS at 00:21

## 2022-10-07 RX ADMIN — METOPROLOL SUCCINATE 50 MG: 50 TABLET, EXTENDED RELEASE ORAL at 21:54

## 2022-10-07 RX ADMIN — INSULIN LISPRO 2 UNITS: 100 INJECTION, SOLUTION INTRAVENOUS; SUBCUTANEOUS at 16:52

## 2022-10-07 RX ADMIN — IOPAMIDOL 80 ML: 510 INJECTION, SOLUTION INTRAVASCULAR at 14:40

## 2022-10-07 RX ADMIN — FENTANYL CITRATE 50 MCG: 50 INJECTION, SOLUTION INTRAMUSCULAR; INTRAVENOUS at 14:25

## 2022-10-07 RX ADMIN — MORPHINE SULFATE 4 MG: 4 INJECTION, SOLUTION INTRAMUSCULAR; INTRAVENOUS at 11:26

## 2022-10-07 RX ADMIN — GUAIFENESIN 600 MG: 600 TABLET, EXTENDED RELEASE ORAL at 08:53

## 2022-10-07 RX ADMIN — Medication 1500 MG: at 23:48

## 2022-10-07 RX ADMIN — PIPERACILLIN SODIUM,TAZOBACTAM SODIUM 3375 MG: 3; .375 INJECTION, POWDER, FOR SOLUTION INTRAVENOUS at 11:03

## 2022-10-07 RX ADMIN — MORPHINE SULFATE 4 MG: 4 INJECTION, SOLUTION INTRAMUSCULAR; INTRAVENOUS at 21:02

## 2022-10-07 RX ADMIN — INSULIN GLARGINE 12 UNITS: 100 INJECTION, SOLUTION SUBCUTANEOUS at 21:18

## 2022-10-07 RX ADMIN — ATORVASTATIN CALCIUM 40 MG: 40 TABLET, FILM COATED ORAL at 08:54

## 2022-10-07 RX ADMIN — MIDAZOLAM 2 MG: 1 INJECTION INTRAMUSCULAR; INTRAVENOUS at 14:08

## 2022-10-07 RX ADMIN — Medication 1500 MG: at 11:28

## 2022-10-07 RX ADMIN — GABAPENTIN 100 MG: 100 CAPSULE ORAL at 21:02

## 2022-10-07 RX ADMIN — TAMSULOSIN HYDROCHLORIDE 0.4 MG: 0.4 CAPSULE ORAL at 08:53

## 2022-10-07 RX ADMIN — MIDAZOLAM 1 MG: 1 INJECTION INTRAMUSCULAR; INTRAVENOUS at 14:25

## 2022-10-07 RX ADMIN — INSULIN LISPRO 2 UNITS: 100 INJECTION, SOLUTION INTRAVENOUS; SUBCUTANEOUS at 08:49

## 2022-10-07 RX ADMIN — MORPHINE SULFATE 8 MG: 4 INJECTION, SOLUTION INTRAMUSCULAR; INTRAVENOUS at 13:36

## 2022-10-07 RX ADMIN — SODIUM CHLORIDE, PRESERVATIVE FREE 10 ML: 5 INJECTION INTRAVENOUS at 08:54

## 2022-10-07 RX ADMIN — METOPROLOL TARTRATE 50 MG: 50 TABLET, FILM COATED ORAL at 08:53

## 2022-10-07 RX ADMIN — PIPERACILLIN SODIUM,TAZOBACTAM SODIUM 3375 MG: 3; .375 INJECTION, POWDER, FOR SOLUTION INTRAVENOUS at 19:10

## 2022-10-07 RX ADMIN — ACETAMINOPHEN 650 MG: 325 TABLET ORAL at 06:01

## 2022-10-07 ASSESSMENT — PAIN SCALES - GENERAL
PAINLEVEL_OUTOF10: 9
PAINLEVEL_OUTOF10: 3
PAINLEVEL_OUTOF10: 3
PAINLEVEL_OUTOF10: 8
PAINLEVEL_OUTOF10: 2
PAINLEVEL_OUTOF10: 6
PAINLEVEL_OUTOF10: 8
PAINLEVEL_OUTOF10: 9
PAINLEVEL_OUTOF10: 10
PAINLEVEL_OUTOF10: 10
PAINLEVEL_OUTOF10: 4

## 2022-10-07 ASSESSMENT — PAIN DESCRIPTION - LOCATION
LOCATION: FOOT;LEG
LOCATION: LEG
LOCATION: LEG;FOOT

## 2022-10-07 ASSESSMENT — PAIN - FUNCTIONAL ASSESSMENT
PAIN_FUNCTIONAL_ASSESSMENT: PREVENTS OR INTERFERES SOME ACTIVE ACTIVITIES AND ADLS

## 2022-10-07 ASSESSMENT — PAIN DESCRIPTION - ORIENTATION
ORIENTATION: LEFT

## 2022-10-07 ASSESSMENT — PAIN DESCRIPTION - DESCRIPTORS
DESCRIPTORS: ACHING;DULL
DESCRIPTORS: ACHING;DULL
DESCRIPTORS: ACHING;DISCOMFORT
DESCRIPTORS: ACHING;DISCOMFORT
DESCRIPTORS: ACHING
DESCRIPTORS: ACHING

## 2022-10-07 ASSESSMENT — PAIN DESCRIPTION - FREQUENCY: FREQUENCY: CONTINUOUS

## 2022-10-07 NOTE — PROGRESS NOTES
Internal Medicine Resident Progress Note    Patient:  Gallo Tay    YOB: 1955  Unit/Bed:4-04/004-A  MRN: [id number]    Acct: [de-identified]   PCP: Ольга Delong MD    Date of Admission: 10/3/2022      Assessment/Plan:    L LE Necrotic Non-healing Extensive Wound:  - Leg cultures growing multiple colonies of enteric gram-negative bacilli. There is also gram-positive cocci  - Pain on rest in Left lower leg.  - WBC count trending down. - Lactic acid trended down to 1.7.  - Pt is in agreement for L AKA. Plan:   - ID consulted  - Wound care consulted  - Gen surg consulted: Left above-knee amputation on 10/10/22  - IV vancomycin (End date:10/12) and Zosyn (End date:10/8)  - Ordered Benzocaine spray for PRN pain  - Continue to monitor for any acute signs/symptoms of decompensation    R LE Non-healing Extensive Wound:  - Leg cultures growing multiple colonies of enteric gram-negative bacilli. There is also gram-positive cocci  - WBC count trending down. - Pt would like to attempt revascularization therapy for R LE .   - Arteriogram was refused by pt due to being unable to lay flat on bed. Plan:   - ID consulted  - Wound care consulted  - Repeat arteriogram refused by pt today. - IV vancomycin (End date:10/12) and Zosyn (End date:10/8)  - Ordered Benzocaine spray for PRN pain  - Continue to monitor for any acute signs/symptoms of decompensation    Severe Peripheral artery disease:  - Chronic history of PAD, follows with Dr. Lorena Watkins in cardiology. - Patient has pain in lower extremities, has a 2 to 3-month history of lower extremity wounds. Unsure when it started but it is between June to September  - Resting pain in L lower leg.   - Patient has complained of increased purulent discharge.   - Patient has nonhealing wounds of bilateral lower extremity which previously required hospitalization.    - CTA shows occlusion of the left internal iliac artery, entire left superficial have caused this. - GI panel positive for E. coli enteropathic  - Symptoms have resolved  Plan:   -IV LR at 75 mL/h D/C    Lactic acidosis secondary to sepsis from lower extremity cellulitis (Resolved):  - Lactic acid on admission 3.6 downtrending to 1.7 on 10/05. Plan:   - IV vancomycin (End date:10/12) and Zosyn (End date:10/8)     CAD s/p PCI DANA x2 LAD performed at [facility]:  Melodee Louder Dr. Merline Snook.   - Patient is not compliant with medication.  - EKG shows A. fib with RVR  - Stress test done on 10/05, shows LVEF of 35%. Not suggestive of myocardial ischemia. Persistent A. fib:   - Follows Dr. Merline Snook.   - Noted per chart review, though patient denies any arrhythmia history. - Patient maintained on amiodarone 200mg daily at home. - [id number] 5.   - HAS-BLED Score 4  - EKG on POA showed A. fib with RVR  Plan:  - Amiodarone 200 mg twice daily  - Eliquis held currently for surgical amputation of  L lower extremity     HFrEF 40%:  - ECHO 4/11/22 demonstrated improved EF 40-45%, mod LV hypokinesis. - Given no CP, SOB, or O2 requirement, will hold on repeat ECHO at this time. - Patient unable to state his home medications, but appears he is on GDMT consisting of SGLT2, metoprolol. No noted ARNI/ACE/ARB/MRA. Plan:  - Re-started Lopressor 50mg bid  - Currently holding SGLT2 inhibitor for surgery on 10/10  - Consider further optimization of medical therapy as tolerated  - Home lasix 20mg daily held. Chronic normocytic anemia:   - Hgb 12.6 on arrival. No noted bleeding. MCV low-normal at 80.7. Thromobocytosis:   - Plts 556 on arrival. Suspect reactive in setting of acute illness. HTN:   - Continued home medications of Imdur 30mg daily, lopressor 50mg bid     COPD:   Per chart review. Spirometry with bronchodilator 2014 normal. Patient on home albuterol inhaler.   - Continue albuterol inhaler  - Encourage use of incentive spirometry, Acapella and started on guaifenesin 600 mg twice daily. GERD:   - On Protonix     Chronic tobacco abuse: . - Nicotine patch started    Insomnia:  - Trazodone resumed. Expected discharge date:  2 days    Disposition:   [] Home  [] TCU  [x] Rehab  [] Psych  [] SNF  [] [place]  [] Other-    ===================================================================      Chief Complaint: Worsening bilateral lower extremity infection and and diarrhea for 1 week    Hospital Course:      Per chart   \"Ruben Borrero is a 74 y.o. male with a PMH of PAD, CAD s/p PCI LAD, chronic sacral wounds, chronic systolic heart failure, pAF, HTN, HLD, NIDDMII, tobacco abuse who presented with bilateral lower extremity wounds. Patient reports a 2-week history of worsening bilateral lower extremity infections. He states he has developed rising erythema to the mid calf along with worsening wounds and ulcerations of bilateral extremities. Patient states wounds have been purulent and foul-smelling. He also reports profuse diarrhea upon admission for the last 1 week. He states he follows with Dr. Jo Polanco for these bilateral lower extremity wounds, and has had lower extremity imaging in the past that demonstrated severe inflow disease. He has undergone previous bypass grafting of the left lower extremity that has since chronically occluded. He denies any chest pain, shortness of breath, abdominal pain. He has a history of coronary artery disease for which she went stent placement x2, unsure of which vessel. He reports chronic heart failure, on diuretics at home. He denies any history of arrhythmia, but has charted history of atrial fibrillation for which she is on Eliquis at home. Patient is unsure of any medications that he is on at home and is unable to provide any information on which he is actually taking. Cristian present in room to assist with history. He states he has been told in the past that his left lower extremity would need to be amputated. \"    Patient was concerned about possible amputation of both lower extremities. Patient came in with diarrhea, GI panel was positive for E. coli enteropathogenic. Resolved on day 2. ID consulted. No growth in blood cultures in last 48 hours. Cultures from leg show gram-negative bacilli, group B strep, Staphylococcus     IV vancomycin (End date:10/12) and Zosyn (End date:10/8). Cardiology was consulted, recommend surgery consult for left lower extremity amputation. Stress test (10/5/22) shows LVEF of 35%. Not suggestive of myocardial ischemia. Peripheral angiogram was supposed to be re-attempted on 10/6. Patient did not want peripheral angiogram, due to being unable to lay flat for long periods of time. Patient had complaint of urinary incontinence, no history of BPH or increased urinary urgency or frequency. Patient was straight cathed and started on Flomax 0.4 mg on 10/6. Straight catheter was removed on 10/6. Will continue with Flomax. Left AKA will be done on 10/10, with possible right AKA. Further discussions will be made over the weekend for possible right AKA. Subjective (past 24 hours):     Pt is doing well and has no acute complaints. Mild improvement in urinary incontinence, straight catheter was ordered yesterday and taken out. Patient would like to remain on Flomax. Discussions of peripheral angiogram and the importance of saving the right leg was done this morning. ROS: reviewed complete ROS unchanged unless otherwise stated in hospital course/subjective portion.        Medications:  Reviewed    Infusion Medications    sodium chloride      sodium chloride      dextrose       Scheduled Medications    isosorbide mononitrate  15 mg Oral Daily    tamsulosin  0.4 mg Oral Daily    insulin glargine  8 Units SubCUTAneous Nightly    amiodarone  200 mg Oral BID    vancomycin  1,500 mg IntraVENous Q12H    guaiFENesin  600 mg Oral BID    piperacillin-tazobactam  3,375 mg IntraVENous Q8H    sodium hypochlorite Irrigation Daily    insulin lispro  0-8 Units SubCUTAneous TID WC    insulin lispro  0-4 Units SubCUTAneous Nightly    atorvastatin  40 mg Oral Daily    [Held by provider] empagliflozin  10 mg Oral Daily    [Held by provider] furosemide  20 mg Oral Daily    gabapentin  100 mg Oral TID    metoprolol tartrate  50 mg Oral BID    pantoprazole  40 mg Oral QAM AC    sodium chloride flush  10 mL IntraVENous 2 times per day    vancomycin (VANCOCIN) intermittent dosing (placeholder)   Other RX Placeholder     PRN Meds: potassium chloride **OR** potassium alternative oral replacement **OR** potassium chloride, benzocaine-menthol, morphine, albuterol sulfate HFA, traZODone, sodium chloride flush, sodium chloride, ondansetron **OR** ondansetron, polyethylene glycol, acetaminophen **OR** acetaminophen, glucose, dextrose bolus **OR** dextrose bolus, glucagon (rDNA), dextrose        Intake/Output Summary (Last 24 hours) at 10/7/2022 0746  Last data filed at 10/7/2022 0600  Gross per 24 hour   Intake 518.27 ml   Output 1400 ml   Net -881.73 ml         Exam:  /68   Pulse 87   Temp 98.2 °F (36.8 °C) (Oral)   Resp 18   Ht 6' 1\" (1.854 m)   Wt 235 lb 10.8 oz (106.9 kg)   SpO2 93%   BMI 31.09 kg/m²     Physical Exam  Constitutional:       General: He is in acute distress. Appearance: He is ill-appearing. HENT:      Head: Normocephalic and atraumatic. Right Ear: External ear normal.      Left Ear: External ear normal.      Nose: Nose normal.   Eyes:      Pupils: Pupils are equal, round, and reactive to light. Cardiovascular:      Rate and Rhythm: Normal rate and regular rhythm. Pulses: Normal pulses. Heart sounds: Murmur heard. Pulmonary:      Effort: Pulmonary effort is normal.      Breath sounds: Wheezing present. Abdominal:      Tenderness: There is guarding. Musculoskeletal:         General: Deformity present. Cervical back: Normal range of motion.    Skin:     Capillary Refill: Capillary refill takes more than 3 seconds. Findings: Bruising, lesion and rash present. Neurological:      Mental Status: He is alert and oriented to person, place, and time. Motor: Weakness present. Gait: Gait abnormal.          Labs:   Recent Labs     10/05/22  0313 10/06/22  0340 10/07/22  0442   WBC 13.9* 12.9* 11.2*   HGB 10.7* 11.0* 10.6*   HCT 35.2* 35.4* 35.3*   * 459* 424*       Recent Labs     10/05/22  0313 10/06/22  0340 10/07/22  0442    137 136   K 3.7 3.3* 3.5    100 102   CO2 23 22* 22*   BUN 7 5* 6*   CREATININE 0.7 0.8 0.7   CALCIUM 8.1* 8.3* 8.0*       No results for input(s): AST, ALT, BILIDIR, BILITOT, ALKPHOS in the last 72 hours. Recent Labs     10/04/22  1113   INR 1.47*       No results for input(s): [place] in the last 72 hours. No results for input(s): PROCAL in the last 72 hours. Lab Results   Component Value Date/Time    NITRU NEGATIVE 10/06/2022 09:15 AM    WBCUA 0-2 10/06/2022 09:15 AM    BACTERIA NONE SEEN 10/06/2022 09:15 AM    RBCUA 3-5 10/06/2022 09:15 AM    BLOODU NEGATIVE 10/06/2022 09:15 AM    SPECGRAV 1.023 03/16/2017 09:15 PM    GLUCOSEU >= 1000 10/06/2022 09:15 AM       Radiology (48 hours):  XR TIBIA FIBULA LEFT (2 VIEWS)    Result Date: 10/3/2022  1. Generalized subcutaneous edema with questionable small ulceration defects in the mid-distal calf. 2. No cortical erosions to suggest osteomyelitis. This document has been electronically signed by: Evan Corona MD on 10/03/2022 08:35 PM    XR TIBIA FIBULA RIGHT (2 VIEWS)    Result Date: 10/3/2022  Mild subcutaneous edema of the right calf. No cortical erosions to suggest acute osteomyelitis. This document has been electronically signed by: Evan Corona MD on 10/03/2022 08:47 PM    XR ANKLE LEFT (2 VIEWS)    Result Date: 10/3/2022  No acute osseous injury or cortical erosions. Lateral ankle swelling.  This document has been electronically signed by: Evan Corona MD on 10/03/2022 08:28 PM    XR ANKLE RIGHT (2 VIEWS)    Result Date: 10/3/2022  1. No acute osseous injury or cortical erosions. 2. Subcutaneous edema. This document has been electronically signed by: Claritza Agustin MD on 10/03/2022 08:27 PM    XR FOOT LEFT (2 VIEWS)    Result Date: 10/3/2022  1. Soft tissue swelling in the left foot with several soft tissue ulceration defects. 2. Chronic appearing erosive changes in the left 1st metatarsophalangeal joint with medial subluxation. 3. Chronic appearing truncation defect in the distal tuft of the left 3rd toe and remote amputation of the left 2nd toe with intact cortical margins. 4. No other acute appearing cortical erosions radiographically to suggest osteomyelitis. This document has been electronically signed by: Claritza Agustin MD on 10/03/2022 08:50 PM    XR FOOT RIGHT (2 VIEWS)    Result Date: 10/3/2022  1. Ulceration defect and adjacent swelling in the dorsum of the midfoot. 2. No cortical erosions to suggest acute osteomyelitis. This document has been electronically signed by: Claritza Agustin MD on 10/03/2022 08:35 PM    CTA ABDOMINAL AORTA W BILAT RUNOFF W WO CONTRAST    Result Date: 10/3/2022  1. Chronic occlusions of the left internal iliac artery, entire left superficial femoral artery, left popliteal artery, and large portion of the left posterior tibial artery with reconstitution in the distal segment near the ankle. Left dorsalis pedis and plantaris arteries are opacified. 2. No significant opacification in the distal right anterior tibial artery, right dorsalis pedis artery, or distal right peroneal artery. This may reflect underlying diminished flow or occlusion. This is stable. 3. Scattered atherosclerotic plaque in the aorta and mesenteric/renal vessels without significant stenosis. 4. Asymmetric fatty atrophy of the left lower extremity musculature. Mild subcutaneous edema in the dorsum of both feet.  5. Stable chronic hardware fracture of the left interpedicular screw at L4. This document has been electronically signed by: Luciano Nuñez MD on 10/03/2022 10:46 PM All CTs at this facility use dose modulation techniques and iterative reconstructions, and/or weight-based dosing when appropriate to reduce radiation to a low as reasonably achievable. 3D Post-processing was performed on this study. DVT prophylaxis:    [] Lovenox  [] SCDs  [] SQ Heparin  [x] Encourage ambulation   [] Already on Anticoagulation       Diet: ADULT DIET;  Regular; 4 carb choices (60 gm/meal)  Code Status: Full Code  PT/OT: n/a  Tele: yes  IVF:  D/C    Electronically signed by Ruy Brownlee MD  PGY-1 Internal Medicine Resident  on 10/7/2022 at 7:46 AM    Case was discussed with Attending, Dr. Prudence Jose

## 2022-10-07 NOTE — PROGRESS NOTES
[address] [facility] Pharmacokinetic Monitoring Service - Vancomycin    Consulting Provider: Dr. Josh Joshi   Indication: SSTI  Target Concentration: Goal trough of 10-15 mg/L and AUC/HALLEY <500 mg*hr/L  Day of Therapy: 4  Additional Antimicrobials: Zosyn    Pertinent Laboratory Values: Wt Readings from Last 1 Encounters:   10/07/22 235 lb 10.8 oz (106.9 kg)     Temp Readings from Last 1 Encounters:   10/07/22 98.2 °F (36.8 °C) (Oral)     Estimated Creatinine Clearance: 135 mL/min (based on SCr of 0.7 mg/dL). Recent Labs     10/06/22  0340 10/07/22  0442   CREATININE 0.8 0.7   WBC 12.9* 11.2*     Pertinent Cultures:  Culture Date Source Results   10/3/22 Leg swab mixed growth including GNB, strep agalactiae, coag positive staphylococcus   10/4/22 Blood x 2 NGTD   MRSA Nasal Swab: N/A. Non-respiratory infection.     Recent vancomycin administrations                     vancomycin (VANCOCIN) 1500 mg in dextrose 5% 500 mL IVPB (mg) 1,500 mg New Bag 10/07/22 0021     1,500 mg New Bag 10/06/22 1144      Restarted  0146     1,500 mg New Bag 10/05/22 2343     1,500 mg New Bag  1319    vancomycin (VANCOCIN) 1250 mg in dextrose 5 % 250 mL IVPB (mg) 1,250 mg New Bag 10/04/22 2338      Restarted  1308      Restarted  1303      Restarted  1302      Restarted  1301      Restarted  1301     1,250 mg New Bag  1230                    Assessment:  Date/Time Current Dose Concentration Timing of Concentration (h) AUC   10/7/22 @0506 1500 mg Q12h 21.5 4 hr 45 min 426   Note: Serum concentrations collected for AUC dosing may appear elevated if collected in close proximity to the dose administered, this is not necessarily an indication of toxicity    Plan:  Current dosing regimen is therapeutic  Continue current dose  Pharmacy will continue to monitor patient and adjust therapy as indicated    Thank you for the consult,  Levy Buck, [facility]  10/7/2022 8:19 AM

## 2022-10-07 NOTE — PROGRESS NOTES
1340 Patient received in IR for procedure. 1344 This procedure has been fully reviewed with the patient and written informed consent has been obtained. 1351 Patient prepped for procedure. 1407 Procedure started with Dr. Sam Mendoza. 1408 Access with use of sonosite. Right femoral artery. 1428 Procedure completed; patient tolerated well. Angio seal device deployed to Right femoral artery. 1429 Bacitracin oint, gauze and op site to right femoral site; area soft to touch with no bleeding noted. 1435 Patient on bed; comfort ensured. Right femoral dressing remains dry and intact with area soft. 1436 Report called to [facility]  on 4K.  1436 Patient taken to 4K via bed. Right femoral dressing remains dry and intact with area soft.

## 2022-10-07 NOTE — PROGRESS NOTES
General Surgery  Dr Irma Giles  Daily Progress Note      Patient:  Hugo Cooley      Unit/Bed:4K-04/004-A    YOB: 1955    MRN: [id number]     Acct: [de-identified]     Admit date: 10/3/2022    Subjective: patient in bed, discussed plans for surgical intervention on Monday at 0730. At this time we are planning left above knee amputation, this can change pending the  arteriogram.  May need bilateral      All other ROS negative except noted in HPI      Patient Seen, Chart, Consults notes, Labs, Radiology studies reviewed. Past, Family, Social History unchanged from admission.     Diet:  Diet NPO    Medications:  Scheduled Meds:   vancomycin  1,500 mg IntraVENous Q12H    isosorbide mononitrate  15 mg Oral Daily    tamsulosin  0.4 mg Oral Daily    insulin glargine  8 Units SubCUTAneous Nightly    amiodarone  200 mg Oral BID    guaiFENesin  600 mg Oral BID    piperacillin-tazobactam  3,375 mg IntraVENous Q8H    sodium hypochlorite   Irrigation Daily    insulin lispro  0-8 Units SubCUTAneous TID WC    insulin lispro  0-4 Units SubCUTAneous Nightly    atorvastatin  40 mg Oral Daily    [Held by provider] empagliflozin  10 mg Oral Daily    [Held by provider] furosemide  20 mg Oral Daily    gabapentin  100 mg Oral TID    metoprolol tartrate  50 mg Oral BID    pantoprazole  40 mg Oral QAM AC    sodium chloride flush  10 mL IntraVENous 2 times per day    vancomycin (VANCOCIN) intermittent dosing (placeholder)   Other RX Placeholder     Continuous Infusions:   sodium chloride 75 mL/hr at 10/07/22 1057    sodium chloride      dextrose           Objective:    CBC:   Recent Labs     10/05/22  0313 10/06/22  0340 10/07/22  0442   WBC 13.9* 12.9* 11.2*   HGB 10.7* 11.0* 10.6*   * 459* 424*       BMP:    Recent Labs     10/05/22  0313 10/06/22  0340 10/07/22  0442    137 136   K 3.7 3.3* 3.5    100 102   CO2 23 22* 22*   BUN 7 5* 6*   CREATININE 0.7 0.8 0.7   GLUCOSE 171* 163* 163* Calcium:  Recent Labs     10/07/22  0442   CALCIUM 8.0*       Ionized Calcium:No results for input(s): IONCA in the last 72 hours. Magnesium:  Recent Labs     10/05/22  0313   MG 1.7       Phosphorus:No results for input(s): PHOS in the last 72 hours. BNP:No results for input(s): BNP in the last 72 hours. Glucose:  Recent Labs     10/06/22  1644 10/06/22  2036 10/07/22  0830   POCGLU 323* 150* 233*       HgbA1C:   No results for input(s): LABA1C in the last 72 hours. INR:   Recent Labs     10/04/22  1113   INR 1.47*       Hepatic:   No results for input(s): ALKPHOS, ALT, AST, PROT, BILITOT, BILIDIR, LABALBU in the last 72 hours. Amylase and Lipase:  Recent Labs     10/05/22  0314   LACTA 1.7       Lactic Acid:   Recent Labs     10/05/22  0314   LACTA 1.7       Troponin: No results for input(s): CKTOTAL, CKMB, TROPONINT in the last 72 hours. BNP: No results for input(s): BNP in the last 72 hours. Lipids: No results for input(s): CHOL, TRIG, HDL, LDL, LDLCALC in the last 72 hours. ABGs:   Lab Results   Component Value Date/Time    PH 7.45 01/20/2013 05:10 PM    PCO2 30 01/20/2013 05:10 PM    PO2 112 01/20/2013 05:10 PM    HCO3 21 01/20/2013 05:10 PM    O2SAT 99 01/20/2013 05:10 PM       Radiology reports as per the Radiologist  Radiology: XR TIBIA FIBULA LEFT (2 VIEWS)    Result Date: 10/3/2022  2 view left tibia-fibula Comparison: None Findings: No acute fracture or dislocation. Left knee and ankle joints are unremarkable. Remote surgical clips in the proximal posterior medial calf. No cortical erosions. Generalized subcutaneous edema with questionable small ulceration defects in the mid to lower calf. 1. Generalized subcutaneous edema with questionable small ulceration defects in the mid-distal calf. 2. No cortical erosions to suggest osteomyelitis.  This document has been electronically signed by: Jill Granados MD on 10/03/2022 08:35 PM    XR TIBIA FIBULA RIGHT (2 VIEWS)    Result Date: 10/3/2022  2 view right tibia-fibula Comparison: None Findings: No acute fracture or dislocation. Old healed traumatic deformities in the distal tibia and fibula. Intact intramedullary clarisa with fixation screws in the tibia. Mild degenerative changes in the right knee and right ankle joints with chondrocalcinosis in the right knee. Generalized subcutaneous edema in the right calf. Mild subcutaneous edema of the right calf. No cortical erosions to suggest acute osteomyelitis. This document has been electronically signed by: Tiffanie Palmer MD on 10/03/2022 08:47 PM    XR ANKLE LEFT (2 VIEWS)    Result Date: 10/3/2022  2 view left ankle Comparison: None Findings: No acute fracture or dislocation. Left ankle mortise is intact. No cortical erosions. No significant ankle effusion. Lateral ankle swelling. No radiopaque foreign body. No acute osseous injury or cortical erosions. Lateral ankle swelling. This document has been electronically signed by: Tiffanie Palmer MD on 10/03/2022 08:28 PM    XR ANKLE RIGHT (2 VIEWS)    Result Date: 10/3/2022  2 view right ankle Comparison: DX - ANKLE RT MINIMUM 3 VIEWS - 09/17/2012 02:42 PM EDT Findings: No acute fracture or dislocation. Posttraumatic healed deformities in the distal tibia and fibula. Intact intramedullary clarisa and fixation screws in the tibia and across the distal tibiofibular syndesmosis. Generalized subcutaneous edema. No cortical erosions. Minimal inferior calcaneal spurring. 1. No acute osseous injury or cortical erosions. 2. Subcutaneous edema. This document has been electronically signed by: Tfifanie Palmer MD on 10/03/2022 08:27 PM    XR FOOT LEFT (2 VIEWS)    Result Date: 10/3/2022  2 view left foot Comparison: CR - FOOT LT MINIMUM 3 VIEWS - 03/17/2017 10:53 AM EDT Findings: No acute fracture or dislocation. Prior amputation of the left 2nd toe down to the proximal phalanx. Prior truncation deformity at the distal tuft of the left 3rd toe.  There are chronic appearing erosive changes in the left 1st metatarsophalangeal joint with medial subluxation. No other discrete cortical erosions. Generalized swelling in the dorsum of the left foot with soft tissue ulceration defects. No radiopaque foreign body. 1. Soft tissue swelling in the left foot with several soft tissue ulceration defects. 2. Chronic appearing erosive changes in the left 1st metatarsophalangeal joint with medial subluxation. 3. Chronic appearing truncation defect in the distal tuft of the left 3rd toe and remote amputation of the left 2nd toe with intact cortical margins. 4. No other acute appearing cortical erosions radiographically to suggest osteomyelitis. This document has been electronically signed by: Jill Granados MD on 10/03/2022 08:50 PM    XR FOOT RIGHT (2 VIEWS)    Result Date: 10/3/2022  2 view right foot Comparison: DX - FOOT RT MINIMUM 3 VIEWS - 09/17/2012 02:42 PM EDT Findings: No acute fracture or dislocation. No cortical erosions. Soft tissue swelling with ulceration defect in the dorsum of the midfoot. Partially seen intact fixation hardware in the distal tibia. Scattered degenerative changes in the great toe and dorsal intertarsal joints. 1. Ulceration defect and adjacent swelling in the dorsum of the midfoot. 2. No cortical erosions to suggest acute osteomyelitis. This document has been electronically signed by: Jill Granados MD on 10/03/2022 08:35 PM    CTA ABDOMINAL AORTA W BILAT RUNOFF W WO CONTRAST    Result Date: 10/3/2022  CT angiography abdomen and pelvis with bilateral lower extremity runoff using IV contrast. 3-D post processing. Comparison:06/27/2022 Findings: Multifocal plaque in the abdominal aorta without aneurysm or dissection. Scattered mild calcified plaque in the celiac artery, SMA and UMER origin, and left renal artery origin without significant stenosis. Right renal artery is patent. Scattered plaque in the iliac arteries.  Chronic occlusion of the left internal iliac artery with mild reconstitution of a few distal branches. Calcified plaque in the bilateral common femoral arteries. Remote surgical changes near the left common femoral artery bifurcation. Chronic occlusion of the entire left superficial femoral artery, left popliteal artery, and left posterior tibial artery. There is reconstitution of the distal left posterior tibial artery in the distal calf near the ankle. Left profunda femoris artery, left anterior tibial artery, and left peroneal arteries are opacified. Left dorsalis pedis and plantar arteries are opacified. Scattered plaque in the right superficial femoral artery and right popliteal artery. Right profunda femoris artery is opacified. Spotty opacification of the right anterior tibial artery without significant opacification in the distal segment. Right dorsalis pedis artery is not opacified. Right peroneal artery is opacified until the distal segment near the ankle. Right posterior tibial artery is opacified to the ankle. Right plantaris artery is opacified. Liver, gallbladder, spleen, pancreas, and adrenal glands are unremarkable. Both kidneys enhance symmetrically without hydronephrosis. No bowel obstruction, pneumatosis, or pneumoperitoneum. Appendix is not seen. No ascites or enlarged abdominal or pelvic lymph nodes. Urinary bladder and prostate are unremarkable. No pelvic free fluid. Old L3 compression fracture with bilateral posterior L2-4 fusion. Old fracture involving the left interpedicular screw at L4. Intact fixation hardware in the right tibia. Asymmetric fatty atrophy of the left gluteal and left thigh muscles. Fatty atrophy of the bilateral calf muscles greater on the left. Mild edema in the dorsum of both feet. No soft tissue gas or fluid collections to suggest abscess. Chronic erosive changes in the left 1st metatarsophalangeal joint. Partially seen amputation of left second toe. Degenerative changes in the bilateral hindfoot.  No acute fracture or dislocation. No cortical erosions. 1. Chronic occlusions of the left internal iliac artery, entire left superficial femoral artery, left popliteal artery, and large portion of the left posterior tibial artery with reconstitution in the distal segment near the ankle. Left dorsalis pedis and plantaris arteries are opacified. 2. No significant opacification in the distal right anterior tibial artery, right dorsalis pedis artery, or distal right peroneal artery. This may reflect underlying diminished flow or occlusion. This is stable. 3. Scattered atherosclerotic plaque in the aorta and mesenteric/renal vessels without significant stenosis. 4. Asymmetric fatty atrophy of the left lower extremity musculature. Mild subcutaneous edema in the dorsum of both feet. 5. Stable chronic hardware fracture of the left interpedicular screw at L4. This document has been electronically signed by: Cristina Sutherland MD on 10/03/2022 10:46 PM All CTs at this facility use dose modulation techniques and iterative reconstructions, and/or weight-based dosing when appropriate to reduce radiation to a low as reasonably achievable. 3D Post-processing was performed on this study. Physical Exam:  Vitals: /68   Pulse 87   Temp 98.2 °F (36.8 °C) (Oral)   Resp 18   Ht 6' 1\" (1.854 m)   Wt 235 lb 10.8 oz (106.9 kg)   SpO2 93%   BMI 31.09 kg/m²   24 hour intake/output:  Intake/Output Summary (Last 24 hours) at 10/7/2022 1103  Last data filed at 10/7/2022 0600  Gross per 24 hour   Intake 518.27 ml   Output 455 ml   Net 63.27 ml       Last 3 weights:   Wt Readings from Last 3 Encounters:   10/07/22 235 lb 10.8 oz (106.9 kg)   06/10/22 244 lb (110.7 kg)   04/12/22 247 lb (112 kg)       General appearance - overweight and chronically ill appearing  HEENT: Normocephalic and Atraumatic  Chest - clear to auscultation, no wheezes, rales or rhonchi, symmetric air entry  Cardiovascular - normal rate and regular rhythm  Abdomen - soft, nontender, nondistended, no masses or organomegaly   Neurological - Alert and oriented and Normal speech  Integumentary - left leg cellulitis   Musculoskeletal - protect  limited ROM       DVT prophylaxis: [] Lovenox                                 [] SCDs                                 [] SQ Heparin                                 [] Encourage ambulation           [] Already on Anticoagulation                 Assessment:  Severe PVD - need possible bilateral amputations  CAD  Cellulitis bilateral extremities chronic   Sacral ulcer   DB, HTN, COPD  Hyperkalemia   Leukocytosis improving       Principal Problem:    Cellulitis of right lower extremity  Active Problems:    Pressure injury of sacral region, stage 3 ([facility])    Paroxysmal atrial fibrillation ([facility])    Left ventricular systolic dysfunction    Panlobular emphysema ([facility])    Cellulitis of left lower extremity    Type 2 diabetes mellitus with diabetic peripheral angiopathy and gangrene, with long-term current use of insulin ([facility])    COPD (chronic obstructive pulmonary disease) ([facility])    PVD (peripheral vascular disease) ([facility])    CAD, multiple vessel    Coronary artery chronic total occlusion    PAD (peripheral artery disease) ([facility])  Resolved Problems:    * No resolved hospital problems. *      Plan:  Stress test and arteriogram today   Carb control diet  IV hydration  Analgesia and antiemetics as needed  Antibiotic therapy  Monitor Labs, lytes per protocol  Discussed surgical plans with christiano Banegas for surgical intervention once all testing is complete. Refused arteriogram on Thursday, plan for today. Planned  Left Above knee amputation, right side is questionable at this time. Total time spent in care of patient:  20 minutes collectively between subjective/objective examination, chart review, documentation, clinical reasoning and discussion with attending regarding plan/interval changes.       Electronically signed by JAVAN Tobias CNP on 10/7/2022 at 11:03 AM    Patient seen and examined independently by me 10/7/2022     I personally supervised the PA/NP in the evaluation, management and development of the treatment plan for Maira Siddiqui  on the same date of service as above. I personally interviewed Maira Siddiqui   and  discussed his review of symptoms as able due to the patient's condition, as well as performed an individual physical exam on the same   date of service as above. In addition I discussed the patient's condition and treatment options with the patient, if able, and/or designated family if available. I have also reviewed and agree with the past medical,  family and social history updates as well as care plans unless otherwise noted below. All questions were answered. I examined independently and reviewed relevant data myself and may have done so in the context of team rounds. A full chart review was performed by me. I attest that this medical record entry accurately reflects signatures and notations that I made in my capacity as an M. D. when I treated and diagnosed Maira Siddiqui on the date of service above     I was responsible for all medical decision making involving this encounter. I identified and/or confirmed all problems associated with this patient encounter by my own direct physical examination of this patient and review of all radiology studies and labwork  that were ordered and available.     Active Hospital Problems    Diagnosis     Pressure injury of sacral region, stage 3 (HCC) [L89.153]      Priority: Medium    Paroxysmal atrial fibrillation (HCC) [I48.0]      Priority: Medium    Left ventricular systolic dysfunction [I42.0]      Priority: Medium    Panlobular emphysema (HCC) [J43.1]      Priority: Medium    Cellulitis of left lower extremity [P10.163]      Priority: Medium    Type 2 diabetes mellitus with diabetic peripheral angiopathy and gangrene, with long-term current use of insulin ([facility]) [E11.52, Z79.4]      Priority: Medium    Cellulitis of right lower extremity [L03.115]      Priority: Medium    PAD (peripheral artery disease) ([facility]) [I73.9]     CAD, multiple vessel [I25.10]     Coronary artery chronic total occlusion [I25.82]     PVD (peripheral vascular disease) ([facility]) [I73.9]     Ischemic rest pain of lower extremity [M79.606, I99.8]     COPD (chronic obstructive pulmonary disease) ([email or web address] [J44.9]         I  discussed the management of all of the identified problems with the APN or PA. I formulated the treatment plan for all identified problems and discussed those with the APN or PA . This management plan was then carried out and the patient's orders for care by the APN or PA. Total time personally spent on this patient encounter was 25 minutes which includes :  Preparing to see the patient( reviewing tests and chart)  Obtaining and reviewing separately obtained history  Performing a medically appropriate examination and evaluation  Ordering medications, tests, or procedures  Counseling and educating the patient/family/caregiver  Care coordination  Referring and communicating with other healthcare professionals  Documenting clinical information in the EHR  Independent interpretation of results and communicating the results to patient and care team  This includes a direct physical exam as well as all the other encounter activities described above. Time may be discontiguous. Time does not include procedures. Please see our orders that were directed and approved by me if there are any new ones for the updated patient care plan. Above discussed and I agree with documentation and orders placed by Chad Geiger CNP    See any additional comments if needed below for any other updated orders and plans. Patient seen and examined independently by me. Above discussed and I agree with CNP. Labs, cultures, and radiographs where available were reviewed.    See orders for the updated patient care plan.     Joey Ford MD MD, [place]   10/7/2022   10:16 PM

## 2022-10-07 NOTE — PROGRESS NOTES
Progress note: Infectious diseases    Patient - Bennettgalileo Licea,  Age - 74 y.o.    - 1955      Room Number - 4K-04/004-A   MRN -  [id number]   Acct # - [de-identified]  Date of Admission -  10/3/2022  6:11 PM    SUBJECTIVE:   No new issues. OBJECTIVE   VITALS    height is 6' 1\" (1.854 m) and weight is 235 lb 10.8 oz (106.9 kg). His oral temperature is 98.2 °F (36.8 °C). His blood pressure is 109/68 and his pulse is 87. His respiration is 30 and oxygen saturation is 93%. Wt Readings from Last 3 Encounters:   10/07/22 235 lb 10.8 oz (106.9 kg)   06/10/22 244 lb (110.7 kg)   22 247 lb (112 kg)       I/O (24 Hours)    Intake/Output Summary (Last 24 hours) at 10/7/2022 1210  Last data filed at 10/7/2022 0600  Gross per 24 hour   Intake 518.27 ml   Output 455 ml   Net 63.27 ml         General Appearance  Awake, alert, oriented,  chronically sick looking. HEENT - normocephalic, atraumatic, pale conjunctiva,  anicteric sclera  Neck - Supple, no mass  Lungs -  Bilateral   air entry, no rhonchi, no wheeze  Cardiovascular - Heart sounds are normal.     Abdomen - soft, not distended, nontender,   Neurologic -awake and oriented  Skin - No bruising or bleeding  Extremities - wrapped both legs. has foul smelling drainage            MEDICATIONS:      vancomycin  1,500 mg IntraVENous Q12H    isosorbide mononitrate  15 mg Oral Daily    tamsulosin  0.4 mg Oral Daily    insulin glargine  8 Units SubCUTAneous Nightly    amiodarone  200 mg Oral BID    guaiFENesin  600 mg Oral BID    piperacillin-tazobactam  3,375 mg IntraVENous Q8H    sodium hypochlorite   Irrigation Daily    insulin lispro  0-8 Units SubCUTAneous TID     insulin lispro  0-4 Units SubCUTAneous Nightly    atorvastatin  40 mg Oral Daily    [Held by provider] empagliflozin  10 mg Oral Daily    [Held by provider] furosemide  20 mg Oral Daily    gabapentin  100 mg Oral TID    metoprolol tartrate  50 mg Oral BID    pantoprazole  40 mg Oral QAM AC    sodium chloride flush  10 mL IntraVENous 2 times per day    vancomycin (VANCOCIN) intermittent dosing (placeholder)   Other RX Placeholder      sodium chloride 75 mL/hr at 10/07/22 1057    sodium chloride      dextrose       morphine, potassium chloride **OR** potassium alternative oral replacement **OR** potassium chloride, benzocaine-menthol, morphine, albuterol sulfate HFA, traZODone, sodium chloride flush, sodium chloride, ondansetron **OR** ondansetron, polyethylene glycol, acetaminophen **OR** acetaminophen, glucose, dextrose bolus **OR** dextrose bolus, glucagon (rDNA), dextrose      LABS:     CBC:   Recent Labs     10/05/22  0313 10/06/22  0340 10/07/22  0442   WBC 13.9* 12.9* 11.2*   HGB 10.7* 11.0* 10.6*   * 459* 424*       BMP:    Recent Labs     10/05/22  0313 10/06/22  0340 10/07/22  0442    137 136   K 3.7 3.3* 3.5    100 102   CO2 23 22* 22*   BUN 7 5* 6*   CREATININE 0.7 0.8 0.7   GLUCOSE 171* 163* 163*       Calcium:  Recent Labs     10/07/22  0442   CALCIUM 8.0*       Ionized Calcium:No results for input(s): IONCA in the last 72 hours. Magnesium:  Recent Labs     10/05/22  0313   MG 1.7       Phosphorus:No results for input(s): PHOS in the last 72 hours. BNP:No results for input(s): BNP in the last 72 hours. Glucose:  Recent Labs     10/06/22  1644 10/06/22  2036 10/07/22  0830   POCGLU 323* 150* 233*        Amylase and Lipase:  Recent Labs     10/05/22  0314   LACTA 1.7       Lactic Acid:   Recent Labs     10/05/22  0314   LACTA 1.7          CULTURES:   UA:   Recent Labs     10/06/22  0915   PHUR 6.5   COLORU YELLOW   PROTEINU TRACE*   BLOODU NEGATIVE   RBCUA 3-5   WBCUA 0-2   BACTERIA NONE SEEN   NITRU NEGATIVE   GLUCOSEU >= 1000*   BILIRUBINUR NEGATIVE   UROBILINOGEN 0.2   KETUA TRACE*       Micro:   Lab Results   Component Value Date/Time    BC No growth 24 hours. No growth 48 hours. 10/03/2022 07:29 PM          Problem list of patient:     Patient Active Problem List   Diagnosis Code    Seizure disorder ([facility] G40.909    Osteoarthritis M19.90    COPD (chronic obstructive pulmonary disease) ([facility] J44.9    Spinal stenosis, lumbar M48.061    Lumbar radiculopathy M54.16    Tobacco abuse Z72.0    GERD (gastroesophageal reflux disease) K21.9    Skin ulceration ([facility] L98.499    Non-healing ulcer of lower leg ([facility] L97.909    Bilateral claudication of lower limb ([facility]) I73.9    Current smoker F17.200    Dyslipidemia E78.5    Abnormal cardiovascular function study R94.30    Obesity E66.9    PVD (peripheral vascular disease) ([facility]) I73.9    CAD, multiple vessel I25.10    Chronic total occlusion of artery of the extremities ([facility]) I70.92    Coronary artery chronic total occlusion I25.82    DAVILA (dyspnea on exertion) R06.09    HTN (hypertension) I10    Discitis of cervical region M46.42    Open wound of right hand S61.401A    Noncompliance by refusing intervention or support Z91.199    Lactic acidosis E87.20    Pressure injury of left buttock, stage 3 ([facility]) V06.322    PAD (peripheral artery disease) ([facility]) I73.9    Cellulitis of right lower extremity L03.115    Pressure injury of sacral region, stage 3 ([facility]) L89.153    Paroxysmal atrial fibrillation ([facility]) I48.0    Left ventricular systolic dysfunction F96.0    Panlobular emphysema ([facility]) J43.1    Cellulitis of left lower extremity L03.116    Type 2 diabetes mellitus with diabetic peripheral angiopathy and gangrene, with long-term current use of insulin ([facility]) E11.52, Z79.4         ASSESSMENT/PLAN   Severe PVD:  with ischemic wound  Non healing leg wounds. CAD  Poorly controlled diabetes  Continue iv antibiotic. Awaiting amputation of left lower leg DAVID Roach MD, MD, FACP 10/7/2022 12:10 PM

## 2022-10-07 NOTE — BRIEF OP NOTE
[facility]  Sedation/Analgesia Post Sedation Record        Pt Name: Hugo Cooley  MRN: [id number]  YOB: 1955  Procedure Performed By: Karli Hickey MD MD, Minh Hood, [address]  Primary Care Physician: Emily Linares MD        POST-PROCEDURE    Physicians/Assistants: Karli Hickey MD MD, Minh Hood RPVI    Procedure Performed:  Lower ext angiogram                                  Sedation/Anesthesia:  Local Anesthesia and IV Conscious Sedation with continuous O2 monitoring    Estimated Blood Loss: 10 cc     Specimens Removed:  [x]None []Other:      Disposition of Specimen:  []Pathology []Other        Complications:   [x]None Immediate []Other:       Post Procedure Diagnosis/Findings:    Peripheral Artery Disease   Patent bilateral iliac artery  Occluded L SFA and Fem/Pop bypass graft  Extensive collateral network from deep femoral artery         Recommendations:    Medical management              Karli Hickey MD MD, Minh Hood, [address]  Electronically signed 10/7/2022 at 4:10 PM

## 2022-10-07 NOTE — PLAN OF CARE
stability, deterioration, or improvement   Collaborate with multidisciplinary team to address chronic and comorbid conditions and prevent exacerbation or deterioration     Problem: Nutrition Deficit:  Goal: Optimize nutritional status  Outcome: Progressing  Flowsheets (Taken 10/7/2022 0148)  Nutrient intake appropriate for improving, restoring, or maintaining nutritional needs:   Assess nutritional status and recommend course of action   Monitor oral intake, labs, and treatment plans   Recommend appropriate diets, oral nutritional supplements, and vitamin/mineral supplements

## 2022-10-08 PROBLEM — I70.222 CRITICAL LIMB ISCHEMIA OF LEFT LOWER EXTREMITY (HCC): Status: ACTIVE | Noted: 2022-10-08

## 2022-10-08 LAB
BLOOD CULTURE, ROUTINE: NORMAL
BLOOD CULTURE, ROUTINE: NORMAL
GLUCOSE BLD-MCNC: 177 MG/DL (ref 70–108)
GLUCOSE BLD-MCNC: 225 MG/DL (ref 70–108)
GLUCOSE BLD-MCNC: 253 MG/DL (ref 70–108)
GLUCOSE BLD-MCNC: 256 MG/DL (ref 70–108)

## 2022-10-08 PROCEDURE — 6360000002 HC RX W HCPCS: Performed by: INTERNAL MEDICINE

## 2022-10-08 PROCEDURE — 99233 SBSQ HOSP IP/OBS HIGH 50: CPT | Performed by: INTERNAL MEDICINE

## 2022-10-08 PROCEDURE — 6360000002 HC RX W HCPCS: Performed by: EMERGENCY MEDICINE

## 2022-10-08 PROCEDURE — 2060000000 HC ICU INTERMEDIATE R&B

## 2022-10-08 PROCEDURE — 6370000000 HC RX 637 (ALT 250 FOR IP): Performed by: STUDENT IN AN ORGANIZED HEALTH CARE EDUCATION/TRAINING PROGRAM

## 2022-10-08 PROCEDURE — 2580000003 HC RX 258

## 2022-10-08 PROCEDURE — 6370000000 HC RX 637 (ALT 250 FOR IP)

## 2022-10-08 PROCEDURE — 6370000000 HC RX 637 (ALT 250 FOR IP): Performed by: INTERNAL MEDICINE

## 2022-10-08 PROCEDURE — 82948 REAGENT STRIP/BLOOD GLUCOSE: CPT

## 2022-10-08 PROCEDURE — 6360000002 HC RX W HCPCS

## 2022-10-08 PROCEDURE — 2580000003 HC RX 258: Performed by: INTERNAL MEDICINE

## 2022-10-08 PROCEDURE — 94669 MECHANICAL CHEST WALL OSCILL: CPT

## 2022-10-08 RX ORDER — OXYCODONE HYDROCHLORIDE 5 MG/1
5 TABLET ORAL EVERY 4 HOURS PRN
Status: DISCONTINUED | OUTPATIENT
Start: 2022-10-08 | End: 2022-10-11

## 2022-10-08 RX ORDER — INSULIN GLARGINE 100 [IU]/ML
16 INJECTION, SOLUTION SUBCUTANEOUS NIGHTLY
Status: DISCONTINUED | OUTPATIENT
Start: 2022-10-08 | End: 2022-10-09

## 2022-10-08 RX ORDER — METOPROLOL SUCCINATE 25 MG/1
25 TABLET, EXTENDED RELEASE ORAL ONCE
Status: DISCONTINUED | OUTPATIENT
Start: 2022-10-08 | End: 2022-10-13 | Stop reason: HOSPADM

## 2022-10-08 RX ORDER — ENOXAPARIN SODIUM 100 MG/ML
40 INJECTION SUBCUTANEOUS EVERY 24 HOURS
Status: DISCONTINUED | OUTPATIENT
Start: 2022-10-08 | End: 2022-10-09

## 2022-10-08 RX ORDER — INSULIN GLARGINE 100 [IU]/ML
4 INJECTION, SOLUTION SUBCUTANEOUS ONCE
Status: DISCONTINUED | OUTPATIENT
Start: 2022-10-08 | End: 2022-10-10

## 2022-10-08 RX ORDER — POLYETHYLENE GLYCOL 3350 17 G/17G
17 POWDER, FOR SOLUTION ORAL DAILY
Status: DISCONTINUED | OUTPATIENT
Start: 2022-10-08 | End: 2022-10-11

## 2022-10-08 RX ORDER — OXYCODONE HYDROCHLORIDE 5 MG/1
10 TABLET ORAL EVERY 4 HOURS PRN
Status: DISCONTINUED | OUTPATIENT
Start: 2022-10-08 | End: 2022-10-11

## 2022-10-08 RX ORDER — POTASSIUM CHLORIDE 20 MEQ/1
20 TABLET, EXTENDED RELEASE ORAL
Status: DISCONTINUED | OUTPATIENT
Start: 2022-10-08 | End: 2022-10-13 | Stop reason: HOSPADM

## 2022-10-08 RX ADMIN — Medication 1500 MG: at 23:57

## 2022-10-08 RX ADMIN — MORPHINE SULFATE 4 MG: 4 INJECTION, SOLUTION INTRAMUSCULAR; INTRAVENOUS at 01:38

## 2022-10-08 RX ADMIN — AMIODARONE HYDROCHLORIDE 200 MG: 200 TABLET ORAL at 21:53

## 2022-10-08 RX ADMIN — OXYCODONE HYDROCHLORIDE 10 MG: 5 TABLET ORAL at 23:58

## 2022-10-08 RX ADMIN — GABAPENTIN 100 MG: 100 CAPSULE ORAL at 09:04

## 2022-10-08 RX ADMIN — SODIUM CHLORIDE: 9 INJECTION, SOLUTION INTRAVENOUS at 01:43

## 2022-10-08 RX ADMIN — SODIUM HYPOCHLORITE: 1.25 SOLUTION TOPICAL at 09:05

## 2022-10-08 RX ADMIN — PIPERACILLIN SODIUM,TAZOBACTAM SODIUM 3375 MG: 3; .375 INJECTION, POWDER, FOR SOLUTION INTRAVENOUS at 01:44

## 2022-10-08 RX ADMIN — PIPERACILLIN SODIUM,TAZOBACTAM SODIUM 3375 MG: 3; .375 INJECTION, POWDER, FOR SOLUTION INTRAVENOUS at 10:23

## 2022-10-08 RX ADMIN — MORPHINE SULFATE 4 MG: 4 INJECTION, SOLUTION INTRAMUSCULAR; INTRAVENOUS at 22:09

## 2022-10-08 RX ADMIN — MORPHINE SULFATE 4 MG: 4 INJECTION, SOLUTION INTRAMUSCULAR; INTRAVENOUS at 06:00

## 2022-10-08 RX ADMIN — GUAIFENESIN 600 MG: 600 TABLET, EXTENDED RELEASE ORAL at 09:04

## 2022-10-08 RX ADMIN — GABAPENTIN 100 MG: 100 CAPSULE ORAL at 21:54

## 2022-10-08 RX ADMIN — ACETAMINOPHEN 650 MG: 325 TABLET ORAL at 04:35

## 2022-10-08 RX ADMIN — SODIUM CHLORIDE, PRESERVATIVE FREE 10 ML: 5 INJECTION INTRAVENOUS at 09:05

## 2022-10-08 RX ADMIN — OXYCODONE HYDROCHLORIDE 10 MG: 5 TABLET ORAL at 12:31

## 2022-10-08 RX ADMIN — GUAIFENESIN 600 MG: 600 TABLET, EXTENDED RELEASE ORAL at 21:54

## 2022-10-08 RX ADMIN — MORPHINE SULFATE 4 MG: 4 INJECTION, SOLUTION INTRAMUSCULAR; INTRAVENOUS at 10:26

## 2022-10-08 RX ADMIN — INSULIN LISPRO 4 UNITS: 100 INJECTION, SOLUTION INTRAVENOUS; SUBCUTANEOUS at 12:26

## 2022-10-08 RX ADMIN — SODIUM CHLORIDE, PRESERVATIVE FREE 10 ML: 5 INJECTION INTRAVENOUS at 10:25

## 2022-10-08 RX ADMIN — POTASSIUM BICARBONATE 40 MEQ: 782 TABLET, EFFERVESCENT ORAL at 13:28

## 2022-10-08 RX ADMIN — MORPHINE SULFATE 4 MG: 4 INJECTION, SOLUTION INTRAMUSCULAR; INTRAVENOUS at 13:28

## 2022-10-08 RX ADMIN — AMIODARONE HYDROCHLORIDE 200 MG: 200 TABLET ORAL at 09:04

## 2022-10-08 RX ADMIN — POLYETHYLENE GLYCOL (3350) 17 G: 17 POWDER, FOR SOLUTION ORAL at 12:30

## 2022-10-08 RX ADMIN — INSULIN LISPRO 2 UNITS: 100 INJECTION, SOLUTION INTRAVENOUS; SUBCUTANEOUS at 09:04

## 2022-10-08 RX ADMIN — ENOXAPARIN SODIUM 40 MG: 100 INJECTION SUBCUTANEOUS at 13:28

## 2022-10-08 RX ADMIN — PIPERACILLIN SODIUM,TAZOBACTAM SODIUM 3375 MG: 3; .375 INJECTION, POWDER, FOR SOLUTION INTRAVENOUS at 17:18

## 2022-10-08 RX ADMIN — ATORVASTATIN CALCIUM 40 MG: 40 TABLET, FILM COATED ORAL at 09:04

## 2022-10-08 RX ADMIN — ISOSORBIDE MONONITRATE 15 MG: 30 TABLET, EXTENDED RELEASE ORAL at 09:04

## 2022-10-08 RX ADMIN — PANTOPRAZOLE SODIUM 40 MG: 40 TABLET, DELAYED RELEASE ORAL at 06:00

## 2022-10-08 RX ADMIN — POTASSIUM CHLORIDE 20 MEQ: 1500 TABLET, EXTENDED RELEASE ORAL at 12:30

## 2022-10-08 RX ADMIN — INSULIN GLARGINE 16 UNITS: 100 INJECTION, SOLUTION SUBCUTANEOUS at 21:59

## 2022-10-08 RX ADMIN — TAMSULOSIN HYDROCHLORIDE 0.4 MG: 0.4 CAPSULE ORAL at 09:04

## 2022-10-08 RX ADMIN — METOPROLOL SUCCINATE 50 MG: 50 TABLET, EXTENDED RELEASE ORAL at 09:04

## 2022-10-08 RX ADMIN — OXYCODONE HYDROCHLORIDE 10 MG: 5 TABLET ORAL at 19:58

## 2022-10-08 RX ADMIN — Medication 1500 MG: at 13:20

## 2022-10-08 RX ADMIN — METOPROLOL SUCCINATE 75 MG: 50 TABLET, EXTENDED RELEASE ORAL at 21:55

## 2022-10-08 RX ADMIN — GABAPENTIN 100 MG: 100 CAPSULE ORAL at 14:05

## 2022-10-08 RX ADMIN — SODIUM CHLORIDE, PRESERVATIVE FREE 10 ML: 5 INJECTION INTRAVENOUS at 21:56

## 2022-10-08 ASSESSMENT — PAIN SCALES - GENERAL
PAINLEVEL_OUTOF10: 6
PAINLEVEL_OUTOF10: 10
PAINLEVEL_OUTOF10: 9
PAINLEVEL_OUTOF10: 3
PAINLEVEL_OUTOF10: 3
PAINLEVEL_OUTOF10: 10
PAINLEVEL_OUTOF10: 8
PAINLEVEL_OUTOF10: 8
PAINLEVEL_OUTOF10: 9
PAINLEVEL_OUTOF10: 8
PAINLEVEL_OUTOF10: 8
PAINLEVEL_OUTOF10: 10
PAINLEVEL_OUTOF10: 9
PAINLEVEL_OUTOF10: 8

## 2022-10-08 ASSESSMENT — PAIN - FUNCTIONAL ASSESSMENT
PAIN_FUNCTIONAL_ASSESSMENT: PREVENTS OR INTERFERES SOME ACTIVE ACTIVITIES AND ADLS

## 2022-10-08 ASSESSMENT — PAIN DESCRIPTION - DESCRIPTORS
DESCRIPTORS: ACHING;DISCOMFORT

## 2022-10-08 ASSESSMENT — PAIN DESCRIPTION - ORIENTATION
ORIENTATION: LEFT

## 2022-10-08 ASSESSMENT — PAIN DESCRIPTION - LOCATION
LOCATION: LEG
LOCATION: BACK
LOCATION: LEG
LOCATION: BACK
LOCATION: LEG
LOCATION: LEG
LOCATION: FOOT

## 2022-10-08 ASSESSMENT — PAIN DESCRIPTION - ONSET: ONSET: ON-GOING

## 2022-10-08 ASSESSMENT — PAIN DESCRIPTION - FREQUENCY: FREQUENCY: CONTINUOUS

## 2022-10-08 ASSESSMENT — PAIN DESCRIPTION - PAIN TYPE: TYPE: ACUTE PAIN

## 2022-10-08 NOTE — PROGRESS NOTES
Assessment and Plan:        Cellulitis left leg; amputation next week  Paroxysmal afib; will adjust meds to try keep him in nsr. Dm- adjust his insulin to try get better control  Cad- stress shows no ischemia, only remote infarct  Pain- try po oxycodone  Constipation- meds ordered      CC:  cellulitis leg  HPI:  pt with cad, paroxysmal afib, copd, dm, presents with weeping legs x mos; he has ischemic left leg and is being planned for amputation. He had angio 10.7, full report pending. He continues to have significant pain left leg. ROS (12 point review of systems completed. Pertinent positives noted. Otherwise ROS is negative) :   PMH:  Per HPI  SHX:  pt with cad, dm, remote mi, paroxysmal afib; presents with legs weeping x mos.   Left leg ischemic, to have amputation  FHX: cva, dm, copd, heart  Allergies: See above    Medications:     sodium chloride      dextrose        insulin glargine  4 Units SubCUTAneous Once    insulin glargine  16 Units SubCUTAneous Nightly    metoprolol succinate  75 mg Oral BID    potassium chloride  20 mEq Oral Daily with breakfast    metoprolol succinate  25 mg Oral Once    enoxaparin  40 mg SubCUTAneous Q24H    vancomycin  1,500 mg IntraVENous Q12H    isosorbide mononitrate  15 mg Oral Daily    tamsulosin  0.4 mg Oral Daily    amiodarone  200 mg Oral BID    guaiFENesin  600 mg Oral BID    piperacillin-tazobactam  3,375 mg IntraVENous Q8H    sodium hypochlorite   Irrigation Daily    insulin lispro  0-8 Units SubCUTAneous TID WC    insulin lispro  0-4 Units SubCUTAneous Nightly    atorvastatin  40 mg Oral Daily    [Held by provider] empagliflozin  10 mg Oral Daily    [Held by provider] furosemide  20 mg Oral Daily    gabapentin  100 mg Oral TID    pantoprazole  40 mg Oral QAM AC    sodium chloride flush  10 mL IntraVENous 2 times per day    vancomycin (VANCOCIN) intermittent dosing (placeholder)   Other RX Placeholder       Vital Signs:   /73   Pulse 78   Temp 97.6 °F (36.4 °C) (Oral)   Resp 18   Ht 6' 1\" (1.854 m)   Wt 237 lb 10.5 oz (107.8 kg)   SpO2 95%   BMI 31.35 kg/m²      Intake/Output Summary (Last 24 hours) at 10/8/2022 1121  Last data filed at 10/8/2022 0435  Gross per 24 hour   Intake 650 ml   Output 620 ml   Net 30 ml        Physical Examination: General appearance - overweight and chronically ill appearing  Mental status - alert, oriented to person, place, and time  Neck - supple, no significant adenopathy, no JVD, or carotid bruits  Chest - clear to auscultation, no wheezes, rales or rhonchi, symmetric air entry  Heart - normal rate, regular rhythm, normal S1, S2, no murmurs, rubs, clicks or gallops  Abdomen - soft, nontender, nondistended, no masses or organomegaly  Neurological - alert, oriented, normal speech, no focal findings or movement disorder noted  Musculoskeletal - not examined  Extremities - legs wrapped  Skin - See above      Data: (All radiographs, tracings, PFTs, and imaging are personally viewed and interpreted unless otherwise noted).          Electronically signed by Whit De La Paz MD on 10/8/2022 at 11:21 AM

## 2022-10-08 NOTE — PLAN OF CARE
Problem: Respiratory - Adult  Goal: Achieves optimal ventilation and oxygenation  Outcome: Progressing   Pt is ordered on acapella therapy and rt protocol. We will monitor breath sounds and increase/decrease medication as needed. Continue therapy as ordered to achieve and maintain clear breath sounds and improve oxygenation.

## 2022-10-08 NOTE — FLOWSHEET NOTE
10/08/22 1754   Safe Environment   Safety Measures Other (comment)  (v)   Virtual RN rounds, patient denies needs at this time

## 2022-10-08 NOTE — FLOWSHEET NOTE
10/08/22 1004   Safe Environment   Safety Measures Other (comment)  (virtual safety round complete)   Virtual RN rounds, patient denies needs at this time

## 2022-10-08 NOTE — PROGRESS NOTES
Assessment data from previous assessment at 1132 remains unchanged. Patient in bed eating with wife at bedside. SN Rogelio/ZOHRAC.

## 2022-10-08 NOTE — PROGRESS NOTES
Reported off to primary nurse, Will. Pt in bed, given fresh water and coffee. Pleasant.  Ruchi MARTÍNEZ, [facility].

## 2022-10-08 NOTE — PROGRESS NOTES
Patient is alert and oriented x4 quadrants. Pleasant, wife at bedside. Speech clear and appropriate. Face symmetrical, bilateral hand grasps strong and equal bilaterally. Able to move all extremities. Heart tones regular and strong. Capillary refill 2 seconds in bilateral upper extremities. Pedal or tibial pulses unable to obtain at this time due to dressings intact on lower extremities. Respirations even, easy and unlabored. Lung sounds clear to ausculation throughout all lobes, diminished throughout. Bowel sounds active x4 quadrants. Abdomen soft, round and non-tender to palpation. Denies further needs at this time. Patient informed that a staff member will enter room in the next hour and to only use call light for emergencies. Call light in reach. Sherryle Greenland, SN/RADHA.

## 2022-10-08 NOTE — PROGRESS NOTES
Received report from primary nurse, Will. Patient in bed with eyes closed and call light in reach and wife at bedside. SN Tigre/RADHA.

## 2022-10-09 LAB
ANION GAP SERPL CALCULATED.3IONS-SCNC: 11 MEQ/L (ref 8–16)
BUN BLDV-MCNC: 5 MG/DL (ref 7–22)
CALCIUM SERPL-MCNC: 8 MG/DL (ref 8.5–10.5)
CHLORIDE BLD-SCNC: 102 MEQ/L (ref 98–111)
CO2: 23 MEQ/L (ref 23–33)
CREAT SERPL-MCNC: 0.7 MG/DL (ref 0.4–1.2)
ERYTHROCYTE [DISTWIDTH] IN BLOOD BY AUTOMATED COUNT: 17.9 % (ref 11.5–14.5)
ERYTHROCYTE [DISTWIDTH] IN BLOOD BY AUTOMATED COUNT: 54 FL (ref 35–45)
GFR SERPL CREATININE-BSD FRML MDRD: > 90 ML/MIN/1.73M2
GLUCOSE BLD-MCNC: 164 MG/DL (ref 70–108)
GLUCOSE BLD-MCNC: 198 MG/DL (ref 70–108)
GLUCOSE BLD-MCNC: 203 MG/DL (ref 70–108)
GLUCOSE BLD-MCNC: 217 MG/DL (ref 70–108)
GLUCOSE BLD-MCNC: 238 MG/DL (ref 70–108)
HCT VFR BLD CALC: 34.7 % (ref 42–52)
HEMOGLOBIN: 10.3 GM/DL (ref 14–18)
MAGNESIUM: 2 MG/DL (ref 1.6–2.4)
MCH RBC QN AUTO: 24.9 PG (ref 26–33)
MCHC RBC AUTO-ENTMCNC: 29.7 GM/DL (ref 32.2–35.5)
MCV RBC AUTO: 84 FL (ref 80–94)
PLATELET # BLD: 489 THOU/MM3 (ref 130–400)
PMV BLD AUTO: 9.8 FL (ref 9.4–12.4)
POTASSIUM SERPL-SCNC: 3.8 MEQ/L (ref 3.5–5.2)
RBC # BLD: 4.13 MILL/MM3 (ref 4.7–6.1)
SODIUM BLD-SCNC: 136 MEQ/L (ref 135–145)
WBC # BLD: 13.5 THOU/MM3 (ref 4.8–10.8)

## 2022-10-09 PROCEDURE — 2580000003 HC RX 258

## 2022-10-09 PROCEDURE — 6370000000 HC RX 637 (ALT 250 FOR IP)

## 2022-10-09 PROCEDURE — 2580000003 HC RX 258: Performed by: INTERNAL MEDICINE

## 2022-10-09 PROCEDURE — 83735 ASSAY OF MAGNESIUM: CPT

## 2022-10-09 PROCEDURE — 82948 REAGENT STRIP/BLOOD GLUCOSE: CPT

## 2022-10-09 PROCEDURE — 6360000002 HC RX W HCPCS: Performed by: EMERGENCY MEDICINE

## 2022-10-09 PROCEDURE — 6360000002 HC RX W HCPCS: Performed by: INTERNAL MEDICINE

## 2022-10-09 PROCEDURE — 85027 COMPLETE CBC AUTOMATED: CPT

## 2022-10-09 PROCEDURE — 2060000000 HC ICU INTERMEDIATE R&B

## 2022-10-09 PROCEDURE — 99233 SBSQ HOSP IP/OBS HIGH 50: CPT | Performed by: INTERNAL MEDICINE

## 2022-10-09 PROCEDURE — 94760 N-INVAS EAR/PLS OXIMETRY 1: CPT

## 2022-10-09 PROCEDURE — 36415 COLL VENOUS BLD VENIPUNCTURE: CPT

## 2022-10-09 PROCEDURE — 80048 BASIC METABOLIC PNL TOTAL CA: CPT

## 2022-10-09 PROCEDURE — 94669 MECHANICAL CHEST WALL OSCILL: CPT

## 2022-10-09 PROCEDURE — 6370000000 HC RX 637 (ALT 250 FOR IP): Performed by: INTERNAL MEDICINE

## 2022-10-09 PROCEDURE — 6370000000 HC RX 637 (ALT 250 FOR IP): Performed by: STUDENT IN AN ORGANIZED HEALTH CARE EDUCATION/TRAINING PROGRAM

## 2022-10-09 RX ORDER — OXYCODONE HYDROCHLORIDE AND ACETAMINOPHEN 5; 325 MG/1; MG/1
1 TABLET ORAL EVERY 4 HOURS PRN
Status: CANCELLED | OUTPATIENT
Start: 2022-10-09

## 2022-10-09 RX ORDER — TRAMADOL HYDROCHLORIDE 50 MG/1
100 TABLET ORAL EVERY 6 HOURS PRN
Status: CANCELLED | OUTPATIENT
Start: 2022-10-09

## 2022-10-09 RX ORDER — TRAMADOL HYDROCHLORIDE 50 MG/1
50 TABLET ORAL EVERY 6 HOURS PRN
Status: CANCELLED | OUTPATIENT
Start: 2022-10-09

## 2022-10-09 RX ORDER — ENOXAPARIN SODIUM 100 MG/ML
30 INJECTION SUBCUTANEOUS 2 TIMES DAILY
Status: DISCONTINUED | OUTPATIENT
Start: 2022-10-09 | End: 2022-10-10

## 2022-10-09 RX ORDER — INSULIN GLARGINE 100 [IU]/ML
12 INJECTION, SOLUTION SUBCUTANEOUS NIGHTLY
Status: DISCONTINUED | OUTPATIENT
Start: 2022-10-09 | End: 2022-10-10

## 2022-10-09 RX ADMIN — AMIODARONE HYDROCHLORIDE 200 MG: 200 TABLET ORAL at 21:17

## 2022-10-09 RX ADMIN — GABAPENTIN 100 MG: 100 CAPSULE ORAL at 08:41

## 2022-10-09 RX ADMIN — METOPROLOL SUCCINATE 75 MG: 50 TABLET, EXTENDED RELEASE ORAL at 21:18

## 2022-10-09 RX ADMIN — ATORVASTATIN CALCIUM 40 MG: 40 TABLET, FILM COATED ORAL at 08:41

## 2022-10-09 RX ADMIN — GABAPENTIN 100 MG: 100 CAPSULE ORAL at 15:00

## 2022-10-09 RX ADMIN — GABAPENTIN 100 MG: 100 CAPSULE ORAL at 21:17

## 2022-10-09 RX ADMIN — INSULIN LISPRO 2 UNITS: 100 INJECTION, SOLUTION INTRAVENOUS; SUBCUTANEOUS at 18:28

## 2022-10-09 RX ADMIN — PIPERACILLIN AND TAZOBACTAM 3375 MG: 3; .375 INJECTION, POWDER, FOR SOLUTION INTRAVENOUS at 15:00

## 2022-10-09 RX ADMIN — OXYCODONE HYDROCHLORIDE 10 MG: 5 TABLET ORAL at 04:08

## 2022-10-09 RX ADMIN — INSULIN LISPRO 2 UNITS: 100 INJECTION, SOLUTION INTRAVENOUS; SUBCUTANEOUS at 12:40

## 2022-10-09 RX ADMIN — AMIODARONE HYDROCHLORIDE 200 MG: 200 TABLET ORAL at 08:42

## 2022-10-09 RX ADMIN — Medication 1500 MG: at 12:30

## 2022-10-09 RX ADMIN — GUAIFENESIN 600 MG: 600 TABLET, EXTENDED RELEASE ORAL at 21:17

## 2022-10-09 RX ADMIN — TAMSULOSIN HYDROCHLORIDE 0.4 MG: 0.4 CAPSULE ORAL at 08:41

## 2022-10-09 RX ADMIN — MORPHINE SULFATE 4 MG: 4 INJECTION, SOLUTION INTRAMUSCULAR; INTRAVENOUS at 22:19

## 2022-10-09 RX ADMIN — OXYCODONE HYDROCHLORIDE 10 MG: 5 TABLET ORAL at 08:41

## 2022-10-09 RX ADMIN — MORPHINE SULFATE 4 MG: 4 INJECTION, SOLUTION INTRAMUSCULAR; INTRAVENOUS at 12:42

## 2022-10-09 RX ADMIN — ISOSORBIDE MONONITRATE 15 MG: 30 TABLET, EXTENDED RELEASE ORAL at 08:42

## 2022-10-09 RX ADMIN — PANTOPRAZOLE SODIUM 40 MG: 40 TABLET, DELAYED RELEASE ORAL at 08:40

## 2022-10-09 RX ADMIN — SODIUM CHLORIDE, PRESERVATIVE FREE 10 ML: 5 INJECTION INTRAVENOUS at 08:42

## 2022-10-09 RX ADMIN — MORPHINE SULFATE 4 MG: 4 INJECTION, SOLUTION INTRAMUSCULAR; INTRAVENOUS at 17:52

## 2022-10-09 RX ADMIN — POTASSIUM CHLORIDE 20 MEQ: 1500 TABLET, EXTENDED RELEASE ORAL at 08:40

## 2022-10-09 RX ADMIN — MORPHINE SULFATE 4 MG: 4 INJECTION, SOLUTION INTRAMUSCULAR; INTRAVENOUS at 03:18

## 2022-10-09 RX ADMIN — METOPROLOL SUCCINATE 75 MG: 50 TABLET, EXTENDED RELEASE ORAL at 08:42

## 2022-10-09 RX ADMIN — SODIUM CHLORIDE, PRESERVATIVE FREE 10 ML: 5 INJECTION INTRAVENOUS at 21:21

## 2022-10-09 RX ADMIN — INSULIN GLARGINE 12 UNITS: 100 INJECTION, SOLUTION SUBCUTANEOUS at 21:18

## 2022-10-09 RX ADMIN — SODIUM HYPOCHLORITE: 1.25 SOLUTION TOPICAL at 08:43

## 2022-10-09 RX ADMIN — GUAIFENESIN 600 MG: 600 TABLET, EXTENDED RELEASE ORAL at 08:42

## 2022-10-09 RX ADMIN — PIPERACILLIN AND TAZOBACTAM 3375 MG: 3; .375 INJECTION, POWDER, FOR SOLUTION INTRAVENOUS at 21:23

## 2022-10-09 ASSESSMENT — PAIN DESCRIPTION - ORIENTATION
ORIENTATION: LEFT

## 2022-10-09 ASSESSMENT — PAIN DESCRIPTION - LOCATION
LOCATION: LEG
LOCATION: FOOT
LOCATION: LEG
LOCATION: LEG

## 2022-10-09 ASSESSMENT — PAIN SCALES - GENERAL
PAINLEVEL_OUTOF10: 4
PAINLEVEL_OUTOF10: 8
PAINLEVEL_OUTOF10: 7
PAINLEVEL_OUTOF10: 3
PAINLEVEL_OUTOF10: 8
PAINLEVEL_OUTOF10: 10
PAINLEVEL_OUTOF10: 3
PAINLEVEL_OUTOF10: 8
PAINLEVEL_OUTOF10: 9
PAINLEVEL_OUTOF10: 9
PAINLEVEL_OUTOF10: 6
PAINLEVEL_OUTOF10: 9

## 2022-10-09 ASSESSMENT — PAIN - FUNCTIONAL ASSESSMENT
PAIN_FUNCTIONAL_ASSESSMENT: PREVENTS OR INTERFERES SOME ACTIVE ACTIVITIES AND ADLS

## 2022-10-09 ASSESSMENT — PAIN DESCRIPTION - DESCRIPTORS
DESCRIPTORS: ACHING
DESCRIPTORS: ACHING
DESCRIPTORS: ACHING;CRAMPING
DESCRIPTORS: ACHING;DISCOMFORT
DESCRIPTORS: ACHING;DISCOMFORT

## 2022-10-09 NOTE — PLAN OF CARE
Problem: Discharge Planning  Goal: Discharge to home or other facility with appropriate resources  Outcome: Progressing  Flowsheets  Taken 10/9/2022 0830 by Maira Olivas RN  Discharge to home or other facility with appropriate resources: Identify barriers to discharge with patient and caregiver  Taken 10/8/2022 1955 by Garth Morales RN  Discharge to home or other facility with appropriate resources: Identify barriers to discharge with patient and caregiver     Problem: Pain  Goal: Verbalizes/displays adequate comfort level or baseline comfort level  Outcome: Progressing  Flowsheets  Taken 10/9/2022 0312 by Garth Morales RN  Verbalizes/displays adequate comfort level or baseline comfort level: Assess pain using appropriate pain scale  Taken 10/8/2022 1955 by Garth Morales RN  Verbalizes/displays adequate comfort level or baseline comfort level:   Encourage patient to monitor pain and request assistance   Assess pain using appropriate pain scale   Administer analgesics based on type and severity of pain and evaluate response     Problem: ABCDS Injury Assessment  Goal: Absence of physical injury  Outcome: Progressing     Problem: Skin/Tissue Integrity  Goal: Absence of new skin breakdown  Description: 1. Monitor for areas of redness and/or skin breakdown  2. Assess vascular access sites hourly  3. Every 4-6 hours minimum:  Change oxygen saturation probe site  4. Every 4-6 hours:  If on nasal continuous positive airway pressure, respiratory therapy assess nares and determine need for appliance change or resting period.   Outcome: Progressing     Problem: Chronic Conditions and Co-morbidities  Goal: Patient's chronic conditions and co-morbidity symptoms are monitored and maintained or improved  Outcome: Progressing  Flowsheets  Taken 10/9/2022 0830 by Nabeel Beasley 34 - Patient's Chronic Conditions and Co-Morbidity Symptoms are Monitored and Maintained or Improved: Monitor and assess patient's chronic conditions and comorbid symptoms for stability, deterioration, or improvement  Taken 10/8/2022 1955 by Nabeel Dalal 34 - Patient's Chronic Conditions and Co-Morbidity Symptoms are Monitored and Maintained or Improved: Monitor and assess patient's chronic conditions and comorbid symptoms for stability, deterioration, or improvement     Problem: Nutrition Deficit:  Goal: Optimize nutritional status  Outcome: Progressing     Problem: Respiratory - Adult  Goal: Achieves optimal ventilation and oxygenation  Outcome: Progressing  Flowsheets  Taken 10/9/2022 0830 by Myrtle Pinedo RN  Achieves optimal ventilation and oxygenation: Assess for changes in respiratory status  Taken 10/8/2022 1955 by Brendan Hui RN  Achieves optimal ventilation and oxygenation: Assess for changes in respiratory status   Care plan reviewed with patient and family. Patient and family verbalize understanding of the plan of care and contribute to goal setting.

## 2022-10-09 NOTE — FLOWSHEET NOTE
10/09/22 1445   Safe Environment   Safety Measures Other (comment)  (virtual safety round complete)   Virtual RN rounds, patient denies needs at this time

## 2022-10-09 NOTE — FLOWSHEET NOTE
10/09/22 1049   Safe Environment   Safety Measures Other (comment)  (virtual safety round complete)   Virtual Nurse introduced, pt denies needs at this time.

## 2022-10-09 NOTE — PROGRESS NOTES
Internal Medicine Resident Progress Note    Patient:  Hugo Cooley    YOB: 1955  Unit/Bed:-04/004-A  MRN: [id number]    Acct: [de-identified]   PCP: Emily Linares MD    Date of Admission: 10/3/2022      Assessment/Plan:    L LE Necrotic Non-healing Extensive Wound:  - Leg cultures growing multiple colonies of enteric gram-negative bacilli. There is also gram-positive cocci  - Pain on rest in Left lower leg.  - WBC count trending down. - Lactic acid trended down to 1.7.  - Pt is in agreement for L AKA. Plan:   - ID consulted  - Wound care consulted  - Gen surg consulted: Left above-knee amputation on 10/10/22  - IV vancomycin (End date:10/12) and Zosyn completed(End date:10/8)  - Ordered Benzocaine spray for PRN pain  - PRN Roxicodone   - Continue to monitor for any acute signs/symptoms of decompensation    R LE Non-healing Extensive Wound:  - Leg cultures growing multiple colonies of enteric gram-negative bacilli. There is also gram-positive cocci  - WBC count trending down. - Pt would like to attempt revascularization therapy for R LE .   - Arteriogram was refused by pt due to being unable to lay flat on bed. Plan:   - ID consulted  - Wound care consulted  - Repeat arteriogram refused by pt today. - IV vancomycin (End date:10/12) and Zosyn completed(End date:10/8)  - Ordered Benzocaine spray for PRN pain  - PRN Roxicodone   - Continue to monitor for any acute signs/symptoms of decompensation    Severe Peripheral artery disease:  - Chronic history of PAD, follows with Dr. David Yadav in cardiology. - Patient has pain in lower extremities, has a 2 to 3-month history of lower extremity wounds. Unsure when it started but it is between June to September  - Resting pain in L lower leg.   - Patient has complained of increased purulent discharge.   - Patient has nonhealing wounds of bilateral lower extremity which previously required hospitalization.    - CTA shows occlusion of the left internal iliac artery, entire left superficial femoral artery, left popliteal artery and large portion of the left posterior tibial artery. There is no significant opacification in the distal right anterior tibial artery, right dorsalis pedis artery, or distal right peroneal artery. - Preoperative cardiac stress test shows LVEF of 35% and nuclear images not suggestive for myocardial ischemia  -Patient agreed for arteriogram: Patent B/L iliac artery, occluded L SFA and Fem/Pop bypass graft. Extensive collateral network from deep femoral artery formed. Plan:   - Gabapentin 100mg TID.   - Gen surg consulted: Left above-knee amputation on 10/10/22  - Outpatient goal to improve RLE PAD. Urinary Incontinence:  - Pt had mild hx of urinary inconcinece on admission. Worsened on 10/06.   - Has no hx of BPH, denies increased urgency in past year, increased frequency, or night time frequency. - UA was positive for 1000 glucose, most likely due to [address] home usage and diabetic progression.   - Straight catheter placed and removed 10/06  Plan:  -Continue with Flomax 0.4mg  -Straight catheter placed on 10/10. Stage 3 Sacral ulcer:  - Patient has chronic history of sacral ulcers, for past couple of months. - Very deep and goes into fat. Plan:   - Wound care is dressing up ulcer.   - IV vancomycin (End date:10/12) and Zosyn completed(End date:10/8)    NIDDMII:   - Home med of Farxiga 10mg and Metformin 1000mg BID. Plan:  - Medium dose of SSI and 16 units of Lantus.   - For surgery Lantus decreased to 12 units for tonight, will go back to regular schedule tomorrow s/p surgery  - Will keep inpatient goal of sugars 140-180. - Accuchecks x4.   - Diabetic diet  - Hypoglycemia Protocol     Sepsis 2/2 cellulitis on B/L LE (resolved)  - On POA SIRS 2/4 (, WBC 17.6) with suspected cellulitis/chronic LE wound source. - Leg cultures growing multiple colonies of enteric gram-negative bacilli.   There is also gram-positive cocci  - Blood culture negative on 48 hours growth. Plan:   - IV vancomycin (End date:10/12) and Zosyn completed(End date:10/8)    Diarrhea: (Resolved):  - Patient has possible 1 week history of diarrhea, at times he admits to having chronic history of diarrhea. No recent antibiotic usage or known food exposures that could have caused this. - GI panel positive for E. coli enteropathic  - Symptoms have resolved  Plan:   -IV LR at 75 mL/h D/C    Lactic acidosis secondary to sepsis from lower extremity cellulitis (Resolved):  - Lactic acid on admission 3.6 downtrending to 1.7 on 10/05. Plan:   - IV vancomycin (End date:10/12) and Zosyn (End date:10/8)     CAD s/p PCI DANA x2 LAD performed at [facility]:  [facility].   - Patient is not compliant with medication.  - EKG shows A. fib with RVR  - Stress test done on 10/05, shows LVEF of 35%. Not suggestive of myocardial ischemia. Persistent A. fib:   - Follows Dr. Whitney Loza.   - Noted per chart review, though patient denies any arrhythmia history. - Patient maintained on amiodarone 200mg daily at home. - [id number] 5.   - HAS-BLED Score 4  - EKG on POA showed A. fib with RVR  Plan:  - Amiodarone 200 mg twice daily  - Toprol-XL increased to 75 mg twice daily  - Eliquis held currently for surgical amputation of left AKA on 10/10    HFrEF 40%:  - ECHO 4/11/22 demonstrated improved EF 40-45%, mod LV hypokinesis. - Given no CP, SOB, or O2 requirement, will hold on repeat ECHO at this time. - Patient unable to state his home medications, but appears he is on GDMT consisting of SGLT2, metoprolol. No noted ARNI/ACE/ARB/MRA. Plan:  - Re-started Lopressor 50mg bid  - Currently holding SGLT2 inhibitor for surgery on 10/10  - Consider further optimization of medical therapy as tolerated  - Home lasix 20mg daily held. Chronic normocytic anemia:   - Hgb 12.6 on arrival. No noted bleeding. MCV low-normal at 80.7.      Thromobocytosis:   - Plts 556 on arrival. Suspect reactive in setting of acute illness. HTN:   - Continued home medications of Imdur 30mg daily, lopressor 50mg bid  Plan:  -Holding Imdur for surgery on 10/10 we will resume s/p     COPD:   Per chart review. Spirometry with bronchodilator 2014 normal. Patient on home albuterol inhaler.   - Continue albuterol inhaler  - Encourage use of incentive spirometry, Acapella and started on guaifenesin 600 mg twice daily. GERD:   - On Protonix     ? BPH:  Has urinary incontinence since admission, with some improvement with Flomax 0.4 mg.    Plan:  Follow-up with PCP for possible diagnosis and treatment    Chronic tobacco abuse: . - Nicotine patch started    Insomnia:  - Trazodone resumed. Expected discharge date:  2 days    Disposition:   [] Home  [] TCU  [x] Rehab  [] Psych  [] SNF  [] [facility]  [] Other-    ===================================================================      Chief Complaint: Worsening bilateral lower extremity infection and and diarrhea for 1 week    Hospital Course:      Per chart   \"Ruben Brown is a 74 y.o. male with a PMH of PAD, CAD s/p PCI LAD, chronic sacral wounds, chronic systolic heart failure, pAF, HTN, HLD, NIDDMII, tobacco abuse who presented with bilateral lower extremity wounds. Patient reports a 2-week history of worsening bilateral lower extremity infections. He states he has developed rising erythema to the mid calf along with worsening wounds and ulcerations of bilateral extremities. Patient states wounds have been purulent and foul-smelling. He also reports profuse diarrhea upon admission for the last 1 week. He states he follows with Dr. Mack Kramer for these bilateral lower extremity wounds, and has had lower extremity imaging in the past that demonstrated severe inflow disease. He has undergone previous bypass grafting of the left lower extremity that has since chronically occluded.   He denies any chest pain, shortness of breath, abdominal pain. He has a history of coronary artery disease for which she went stent placement x2, unsure of which vessel. He reports chronic heart failure, on diuretics at home. He denies any history of arrhythmia, but has charted history of atrial fibrillation for which she is on Eliquis at home. Patient is unsure of any medications that he is on at home and is unable to provide any information on which he is actually taking. Cristian present in room to assist with history. He states he has been told in the past that his left lower extremity would need to be amputated. \"    Patient was concerned about possible amputation of both lower extremities. Patient came in with diarrhea, GI panel was positive for E. coli enteropathogenic. Resolved on day 2. ID consulted. No growth in blood cultures in last 48 hours. Cultures from leg show gram-negative bacilli, group B strep, Staphylococcus     IV vancomycin (End date:10/12) and Zosyn completed (End date:10/8). Cardiology was consulted, recommend surgery consult for left lower extremity amputation. Stress test (10/5/22) shows LVEF of 35%. Not suggestive of myocardial ischemia. Peripheral angiogram was supposed to be re-attempted on 10/6. Patient did not want peripheral angiogram, due to being unable to lay flat for long periods of time. Patient had complaint of urinary incontinence, no history of BPH or increased urinary urgency or frequency. Patient was straight cathed and started on Flomax 0.4 mg on 10/6. Straight catheter was removed on 10/6. Will continue with Flomax. Peripheral arteriogram done Dr. Mack Kramer on 10/7, patent B/L iliac artery, occluded L SFA and femoral/popliteal bypass graft with extensive collateral network from deep femoral artery. Left AKA will be done on 10/10. Subjective (past 24 hours):   Patient seen and examined at bedside this morning. Resting comfortably in bed. Denies lightheadedness, CP, N/V, abdominal pain. Mild improvement in urinary incontinence, wants straight catheter tomorrow after surgery. ROS: reviewed complete ROS unchanged unless otherwise stated in hospital course/subjective portion.        Medications:  Reviewed    Infusion Medications    sodium chloride      dextrose       Scheduled Medications    [Held by provider] enoxaparin  30 mg SubCUTAneous BID    insulin glargine  12 Units SubCUTAneous Nightly    piperacillin-tazobactam  3,375 mg IntraVENous Q8H    [START ON 10/10/2022] piperacillin-tazobactam  3,375 mg IntraVENous 30 Min Pre-Op    [START ON 10/10/2022] piperacillin-tazobactam  3,375 mg IntraVENous Q8H    insulin glargine  4 Units SubCUTAneous Once    metoprolol succinate  75 mg Oral BID    potassium chloride  20 mEq Oral Daily with breakfast    metoprolol succinate  25 mg Oral Once    polyethylene glycol  17 g Oral Daily    vancomycin  1,500 mg IntraVENous Q12H    [Held by provider] isosorbide mononitrate  15 mg Oral Daily    tamsulosin  0.4 mg Oral Daily    amiodarone  200 mg Oral BID    guaiFENesin  600 mg Oral BID    sodium hypochlorite   Irrigation Daily    insulin lispro  0-8 Units SubCUTAneous TID WC    insulin lispro  0-4 Units SubCUTAneous Nightly    atorvastatin  40 mg Oral Daily    [Held by provider] furosemide  20 mg Oral Daily    gabapentin  100 mg Oral TID    pantoprazole  40 mg Oral QAM AC    sodium chloride flush  10 mL IntraVENous 2 times per day    vancomycin (VANCOCIN) intermittent dosing (placeholder)   Other RX Placeholder     PRN Meds: oxyCODONE **OR** oxyCODONE, magnesium hydroxide, bisacodyl, potassium chloride **OR** potassium alternative oral replacement **OR** potassium chloride, benzocaine-menthol, morphine, albuterol sulfate HFA, traZODone, sodium chloride flush, sodium chloride, ondansetron **OR** ondansetron, acetaminophen **OR** acetaminophen, glucose, dextrose bolus **OR** dextrose bolus, glucagon (rDNA), dextrose        Intake/Output Summary (Last 24 hours) at 10/9/2022 1354  Last data filed at 10/9/2022 1233  Gross per 24 hour   Intake 2486.06 ml   Output 525 ml   Net 1961.06 ml         Exam:  BP (!) 112/58   Pulse 73   Temp 98.5 °F (36.9 °C) (Oral)   Resp 20   Ht 6' 1\" (1.854 m)   Wt 236 lb 15.9 oz (107.5 kg)   SpO2 92%   BMI 31.27 kg/m²     Physical Exam  Constitutional:       General: He is in acute distress. Appearance: He is ill-appearing. HENT:      Head: Normocephalic and atraumatic. Right Ear: External ear normal.      Left Ear: External ear normal.      Nose: Nose normal.   Eyes:      Pupils: Pupils are equal, round, and reactive to light. Cardiovascular:      Rate and Rhythm: Normal rate and regular rhythm. Pulses: Normal pulses. Heart sounds: Murmur heard. Pulmonary:      Effort: Pulmonary effort is normal.      Breath sounds: Wheezing present. Abdominal:      Tenderness: There is guarding. Musculoskeletal:         General: Deformity present. Cervical back: Normal range of motion. Skin:     Capillary Refill: Capillary refill takes more than 3 seconds. Findings: Bruising, lesion and rash present. Neurological:      Mental Status: He is alert and oriented to person, place, and time. Motor: Weakness present. Gait: Gait abnormal.          Labs:   Recent Labs     10/07/22  0442 10/09/22  0837   WBC 11.2* 13.5*   HGB 10.6* 10.3*   HCT 35.3* 34.7*   * 489*       Recent Labs     10/07/22  0442 10/09/22  0425    136   K 3.5 3.8    102   CO2 22* 23   BUN 6* 5*   CREATININE 0.7 0.7   CALCIUM 8.0* 8.0*       No results for input(s): AST, ALT, BILIDIR, BILITOT, ALKPHOS in the last 72 hours. No results for input(s): INR in the last 72 hours. No results for input(s): Rohan Oliver in the last 72 hours. No results for input(s): PROCAL in the last 72 hours.    Lab Results   Component Value Date/Time    NITRU NEGATIVE 10/06/2022 09:15 AM    WBCUA 0-2 10/06/2022 09:15 AM    BACTERIA NONE SEEN 10/06/2022 09:15 AM    RBCUA 3-5 10/06/2022 09:15 AM    BLOODU NEGATIVE 10/06/2022 09:15 AM    SPECGRAV 1.023 03/16/2017 09:15 PM    GLUCOSEU >= 1000 10/06/2022 09:15 AM       Radiology (48 hours):  XR TIBIA FIBULA LEFT (2 VIEWS)    Result Date: 10/3/2022  1. Generalized subcutaneous edema with questionable small ulceration defects in the mid-distal calf. 2. No cortical erosions to suggest osteomyelitis. This document has been electronically signed by: Vilma Ambrocio MD on 10/03/2022 08:35 PM    XR TIBIA FIBULA RIGHT (2 VIEWS)    Result Date: 10/3/2022  Mild subcutaneous edema of the right calf. No cortical erosions to suggest acute osteomyelitis. This document has been electronically signed by: Vilma Ambrocio MD on 10/03/2022 08:47 PM    XR ANKLE LEFT (2 VIEWS)    Result Date: 10/3/2022  No acute osseous injury or cortical erosions. Lateral ankle swelling. This document has been electronically signed by: Vilma Ambrocio MD on 10/03/2022 08:28 PM    XR ANKLE RIGHT (2 VIEWS)    Result Date: 10/3/2022  1. No acute osseous injury or cortical erosions. 2. Subcutaneous edema. This document has been electronically signed by: Vilma Ambrocio MD on 10/03/2022 08:27 PM    XR FOOT LEFT (2 VIEWS)    Result Date: 10/3/2022  1. Soft tissue swelling in the left foot with several soft tissue ulceration defects. 2. Chronic appearing erosive changes in the left 1st metatarsophalangeal joint with medial subluxation. 3. Chronic appearing truncation defect in the distal tuft of the left 3rd toe and remote amputation of the left 2nd toe with intact cortical margins. 4. No other acute appearing cortical erosions radiographically to suggest osteomyelitis. This document has been electronically signed by: Vilma Ambrocio MD on 10/03/2022 08:50 PM    XR FOOT RIGHT (2 VIEWS)    Result Date: 10/3/2022  1. Ulceration defect and adjacent swelling in the dorsum of the midfoot. 2. No cortical erosions to suggest acute osteomyelitis.  This document has been electronically signed by: Amanda Pinzon MD on 10/03/2022 08:35 PM    CTA ABDOMINAL AORTA W BILAT RUNOFF W WO CONTRAST    Result Date: 10/3/2022  1. Chronic occlusions of the left internal iliac artery, entire left superficial femoral artery, left popliteal artery, and large portion of the left posterior tibial artery with reconstitution in the distal segment near the ankle. Left dorsalis pedis and plantaris arteries are opacified. 2. No significant opacification in the distal right anterior tibial artery, right dorsalis pedis artery, or distal right peroneal artery. This may reflect underlying diminished flow or occlusion. This is stable. 3. Scattered atherosclerotic plaque in the aorta and mesenteric/renal vessels without significant stenosis. 4. Asymmetric fatty atrophy of the left lower extremity musculature. Mild subcutaneous edema in the dorsum of both feet. 5. Stable chronic hardware fracture of the left interpedicular screw at L4. This document has been electronically signed by: Amanda Pinzon MD on 10/03/2022 10:46 PM All CTs at this facility use dose modulation techniques and iterative reconstructions, and/or weight-based dosing when appropriate to reduce radiation to a low as reasonably achievable. 3D Post-processing was performed on this study. DVT prophylaxis:    [] Lovenox  [] SCDs  [] SQ Heparin  [x] Encourage ambulation   [] Already on Anticoagulation       Diet: ADULT DIET;  Regular; 4 carb choices (60 gm/meal)  Diet NPO  Code Status: Full Code  PT/OT: n/a  Tele: yes  IVF:  D/C    Electronically signed by Puja Apple MD  PGY-1 Internal Medicine Resident  on 10/9/2022 at 1:54 PM    Case was discussed with Attending, Dr. Chai Stallings

## 2022-10-09 NOTE — H&P
[address] [address]  History and Physical Update      Pt Name: Ramona Herrera  MRN: [id number]  YOB: 1955  Date of evaluation: 10/9/2022    [x] I have examined the patient and reviewed the H&P/Consult and there are no changes to the patient or plans. [] I have examined the patient and reviewed the H&P/Consult and have noted the following changes:         Tiarra Caldwell MD  Electronically signed 10/9/2022 at 4:20 PM

## 2022-10-09 NOTE — PROGRESS NOTES
Pharmacist Review and Automatic Dose Adjustment of Prophylactic Enoxaparin    *Review reason for admission/hospital problem list*    The reviewing pharmacist has made an adjustment to the ordered enoxaparin dose or converted to UFH per the approved [facility] protocol and table as identified below. Cora Finley is a 74 y.o. male. Recent Labs     10/07/22  0442 10/09/22  0425   CREATININE 0.7 0.7       Estimated Creatinine Clearance: 135 mL/min (based on SCr of 0.7 mg/dL). Recent Labs     10/07/22  0442 10/09/22  0837   HGB 10.6* 10.3*   HCT 35.3* 34.7*   * 489*     No results for input(s): INR in the last 72 hours. Height:   Ht Readings from Last 1 Encounters:   10/03/22 6' 1\" (1.854 m)     Weight:  Wt Readings from Last 1 Encounters:   10/09/22 236 lb 15.9 oz (107.5 kg)               Plan: Based upon the patient's weight and renal function, the ordered enoxaparin dose of 40 mg daily has been changed/converted to 30 mg twice daily.       Thank you,  Rebecca De Guzman, 1942 [facility]  10/9/2022, 10:21 AM

## 2022-10-10 ENCOUNTER — ANESTHESIA (OUTPATIENT)
Dept: OPERATING ROOM | Age: 67
DRG: 710 | End: 2022-10-10
Payer: COMMERCIAL

## 2022-10-10 ENCOUNTER — ANESTHESIA EVENT (OUTPATIENT)
Dept: OPERATING ROOM | Age: 67
DRG: 710 | End: 2022-10-10
Payer: COMMERCIAL

## 2022-10-10 LAB
GLUCOSE BLD-MCNC: 207 MG/DL (ref 70–108)
GLUCOSE BLD-MCNC: 324 MG/DL (ref 70–108)
GLUCOSE BLD-MCNC: 416 MG/DL (ref 70–108)
GLUCOSE BLD-MCNC: 460 MG/DL (ref 70–108)

## 2022-10-10 PROCEDURE — 82948 REAGENT STRIP/BLOOD GLUCOSE: CPT

## 2022-10-10 PROCEDURE — 6360000002 HC RX W HCPCS: Performed by: INTERNAL MEDICINE

## 2022-10-10 PROCEDURE — 6370000000 HC RX 637 (ALT 250 FOR IP): Performed by: STUDENT IN AN ORGANIZED HEALTH CARE EDUCATION/TRAINING PROGRAM

## 2022-10-10 PROCEDURE — 6370000000 HC RX 637 (ALT 250 FOR IP)

## 2022-10-10 PROCEDURE — 2709999900 HC NON-CHARGEABLE SUPPLY: Performed by: SURGERY

## 2022-10-10 PROCEDURE — 2580000003 HC RX 258: Performed by: SURGERY

## 2022-10-10 PROCEDURE — 87086 URINE CULTURE/COLONY COUNT: CPT

## 2022-10-10 PROCEDURE — 3600000013 HC SURGERY LEVEL 3 ADDTL 15MIN: Performed by: SURGERY

## 2022-10-10 PROCEDURE — 3600000003 HC SURGERY LEVEL 3 BASE: Performed by: SURGERY

## 2022-10-10 PROCEDURE — 6360000002 HC RX W HCPCS: Performed by: EMERGENCY MEDICINE

## 2022-10-10 PROCEDURE — 3700000000 HC ANESTHESIA ATTENDED CARE: Performed by: SURGERY

## 2022-10-10 PROCEDURE — 6360000002 HC RX W HCPCS: Performed by: SURGERY

## 2022-10-10 PROCEDURE — 7100000000 HC PACU RECOVERY - FIRST 15 MIN: Performed by: SURGERY

## 2022-10-10 PROCEDURE — 6370000000 HC RX 637 (ALT 250 FOR IP): Performed by: SURGERY

## 2022-10-10 PROCEDURE — 87106 FUNGI IDENTIFICATION YEAST: CPT

## 2022-10-10 PROCEDURE — 51702 INSERT TEMP BLADDER CATH: CPT

## 2022-10-10 PROCEDURE — 2060000000 HC ICU INTERMEDIATE R&B

## 2022-10-10 PROCEDURE — 2580000003 HC RX 258: Performed by: INTERNAL MEDICINE

## 2022-10-10 PROCEDURE — 2580000003 HC RX 258

## 2022-10-10 PROCEDURE — 2500000003 HC RX 250 WO HCPCS

## 2022-10-10 PROCEDURE — 0Y6D0Z3 DETACHMENT AT LEFT UPPER LEG, LOW, OPEN APPROACH: ICD-10-PCS | Performed by: SURGERY

## 2022-10-10 PROCEDURE — 7100000001 HC PACU RECOVERY - ADDTL 15 MIN: Performed by: SURGERY

## 2022-10-10 PROCEDURE — 6370000000 HC RX 637 (ALT 250 FOR IP): Performed by: INTERNAL MEDICINE

## 2022-10-10 PROCEDURE — 99233 SBSQ HOSP IP/OBS HIGH 50: CPT | Performed by: INTERNAL MEDICINE

## 2022-10-10 PROCEDURE — 27590 AMPUTATE LEG AT THIGH: CPT | Performed by: SURGERY

## 2022-10-10 PROCEDURE — 88307 TISSUE EXAM BY PATHOLOGIST: CPT

## 2022-10-10 PROCEDURE — 6360000002 HC RX W HCPCS

## 2022-10-10 PROCEDURE — 6370000000 HC RX 637 (ALT 250 FOR IP): Performed by: ANESTHESIOLOGY

## 2022-10-10 PROCEDURE — 51798 US URINE CAPACITY MEASURE: CPT

## 2022-10-10 PROCEDURE — 6360000002 HC RX W HCPCS: Performed by: STUDENT IN AN ORGANIZED HEALTH CARE EDUCATION/TRAINING PROGRAM

## 2022-10-10 PROCEDURE — 3700000001 HC ADD 15 MINUTES (ANESTHESIA): Performed by: SURGERY

## 2022-10-10 RX ORDER — IPRATROPIUM BROMIDE AND ALBUTEROL SULFATE 2.5; .5 MG/3ML; MG/3ML
1 SOLUTION RESPIRATORY (INHALATION)
Status: COMPLETED | OUTPATIENT
Start: 2022-10-10 | End: 2022-10-10

## 2022-10-10 RX ORDER — MIDAZOLAM HYDROCHLORIDE 1 MG/ML
INJECTION INTRAMUSCULAR; INTRAVENOUS PRN
Status: DISCONTINUED | OUTPATIENT
Start: 2022-10-10 | End: 2022-10-10 | Stop reason: SDUPTHER

## 2022-10-10 RX ORDER — INSULIN LISPRO 100 [IU]/ML
0-4 INJECTION, SOLUTION INTRAVENOUS; SUBCUTANEOUS NIGHTLY
Status: DISCONTINUED | OUTPATIENT
Start: 2022-10-10 | End: 2022-10-13 | Stop reason: HOSPADM

## 2022-10-10 RX ORDER — IPRATROPIUM BROMIDE AND ALBUTEROL SULFATE 2.5; .5 MG/3ML; MG/3ML
SOLUTION RESPIRATORY (INHALATION)
Status: COMPLETED
Start: 2022-10-10 | End: 2022-10-10

## 2022-10-10 RX ORDER — SODIUM CHLORIDE 9 MG/ML
INJECTION, SOLUTION INTRAVENOUS PRN
Status: DISCONTINUED | OUTPATIENT
Start: 2022-10-10 | End: 2022-10-10 | Stop reason: HOSPADM

## 2022-10-10 RX ORDER — INSULIN LISPRO 100 [IU]/ML
6 INJECTION, SOLUTION INTRAVENOUS; SUBCUTANEOUS ONCE
Status: COMPLETED | OUTPATIENT
Start: 2022-10-10 | End: 2022-10-10

## 2022-10-10 RX ORDER — SUCCINYLCHOLINE CHLORIDE 20 MG/ML
INJECTION INTRAMUSCULAR; INTRAVENOUS PRN
Status: DISCONTINUED | OUTPATIENT
Start: 2022-10-10 | End: 2022-10-10 | Stop reason: SDUPTHER

## 2022-10-10 RX ORDER — OXYCODONE HYDROCHLORIDE AND ACETAMINOPHEN 5; 325 MG/1; MG/1
1 TABLET ORAL EVERY 4 HOURS PRN
Status: DISCONTINUED | OUTPATIENT
Start: 2022-10-10 | End: 2022-10-10

## 2022-10-10 RX ORDER — ALBUTEROL SULFATE 90 UG/1
AEROSOL, METERED RESPIRATORY (INHALATION) PRN
Status: DISCONTINUED | OUTPATIENT
Start: 2022-10-10 | End: 2022-10-10 | Stop reason: SDUPTHER

## 2022-10-10 RX ORDER — ONDANSETRON 2 MG/ML
INJECTION INTRAMUSCULAR; INTRAVENOUS PRN
Status: DISCONTINUED | OUTPATIENT
Start: 2022-10-10 | End: 2022-10-10 | Stop reason: SDUPTHER

## 2022-10-10 RX ORDER — GLYCOPYRROLATE 1 MG/5 ML
SYRINGE (ML) INTRAVENOUS PRN
Status: DISCONTINUED | OUTPATIENT
Start: 2022-10-10 | End: 2022-10-10 | Stop reason: SDUPTHER

## 2022-10-10 RX ORDER — MEPERIDINE HYDROCHLORIDE 25 MG/ML
12.5 INJECTION INTRAMUSCULAR; INTRAVENOUS; SUBCUTANEOUS EVERY 5 MIN PRN
Status: DISCONTINUED | OUTPATIENT
Start: 2022-10-10 | End: 2022-10-10 | Stop reason: HOSPADM

## 2022-10-10 RX ORDER — FENTANYL CITRATE 50 UG/ML
INJECTION, SOLUTION INTRAMUSCULAR; INTRAVENOUS PRN
Status: DISCONTINUED | OUTPATIENT
Start: 2022-10-10 | End: 2022-10-10 | Stop reason: SDUPTHER

## 2022-10-10 RX ORDER — DIPHENHYDRAMINE HYDROCHLORIDE 50 MG/ML
12.5 INJECTION INTRAMUSCULAR; INTRAVENOUS
Status: DISCONTINUED | OUTPATIENT
Start: 2022-10-10 | End: 2022-10-10 | Stop reason: HOSPADM

## 2022-10-10 RX ORDER — ENOXAPARIN SODIUM 100 MG/ML
30 INJECTION SUBCUTANEOUS 2 TIMES DAILY
Status: DISCONTINUED | OUTPATIENT
Start: 2022-10-11 | End: 2022-10-12

## 2022-10-10 RX ORDER — SODIUM CHLORIDE 9 MG/ML
INJECTION, SOLUTION INTRAVENOUS CONTINUOUS PRN
Status: DISCONTINUED | OUTPATIENT
Start: 2022-10-10 | End: 2022-10-10 | Stop reason: SDUPTHER

## 2022-10-10 RX ORDER — INSULIN LISPRO 100 [IU]/ML
0-16 INJECTION, SOLUTION INTRAVENOUS; SUBCUTANEOUS
Status: DISCONTINUED | OUTPATIENT
Start: 2022-10-11 | End: 2022-10-13 | Stop reason: HOSPADM

## 2022-10-10 RX ORDER — LIDOCAINE HYDROCHLORIDE 20 MG/ML
INJECTION, SOLUTION INFILTRATION; PERINEURAL PRN
Status: DISCONTINUED | OUTPATIENT
Start: 2022-10-10 | End: 2022-10-10 | Stop reason: SDUPTHER

## 2022-10-10 RX ORDER — SODIUM CHLORIDE 0.9 % (FLUSH) 0.9 %
5-40 SYRINGE (ML) INJECTION PRN
Status: DISCONTINUED | OUTPATIENT
Start: 2022-10-10 | End: 2022-10-10 | Stop reason: HOSPADM

## 2022-10-10 RX ORDER — PIPERACILLIN SODIUM, TAZOBACTAM SODIUM 3; .375 G/15ML; G/15ML
INJECTION, POWDER, LYOPHILIZED, FOR SOLUTION INTRAVENOUS PRN
Status: DISCONTINUED | OUTPATIENT
Start: 2022-10-10 | End: 2022-10-10 | Stop reason: SDUPTHER

## 2022-10-10 RX ORDER — FENTANYL CITRATE 50 UG/ML
50 INJECTION, SOLUTION INTRAMUSCULAR; INTRAVENOUS EVERY 5 MIN PRN
Status: DISCONTINUED | OUTPATIENT
Start: 2022-10-10 | End: 2022-10-10 | Stop reason: HOSPADM

## 2022-10-10 RX ORDER — ENOXAPARIN SODIUM 100 MG/ML
30 INJECTION SUBCUTANEOUS 2 TIMES DAILY
Status: DISCONTINUED | OUTPATIENT
Start: 2022-10-10 | End: 2022-10-10

## 2022-10-10 RX ORDER — LIDOCAINE HYDROCHLORIDE 10 MG/ML
INJECTION, SOLUTION INFILTRATION; PERINEURAL PRN
Status: DISCONTINUED | OUTPATIENT
Start: 2022-10-10 | End: 2022-10-10 | Stop reason: SDUPTHER

## 2022-10-10 RX ORDER — FUROSEMIDE 20 MG/1
20 TABLET ORAL DAILY
Status: DISCONTINUED | OUTPATIENT
Start: 2022-10-11 | End: 2022-10-13 | Stop reason: HOSPADM

## 2022-10-10 RX ORDER — ONDANSETRON 2 MG/ML
4 INJECTION INTRAMUSCULAR; INTRAVENOUS
Status: DISCONTINUED | OUTPATIENT
Start: 2022-10-10 | End: 2022-10-10 | Stop reason: HOSPADM

## 2022-10-10 RX ORDER — ROCURONIUM BROMIDE 10 MG/ML
INJECTION, SOLUTION INTRAVENOUS PRN
Status: DISCONTINUED | OUTPATIENT
Start: 2022-10-10 | End: 2022-10-10 | Stop reason: SDUPTHER

## 2022-10-10 RX ORDER — PROPOFOL 10 MG/ML
INJECTION, EMULSION INTRAVENOUS PRN
Status: DISCONTINUED | OUTPATIENT
Start: 2022-10-10 | End: 2022-10-10 | Stop reason: SDUPTHER

## 2022-10-10 RX ORDER — INSULIN GLARGINE 100 [IU]/ML
16 INJECTION, SOLUTION SUBCUTANEOUS NIGHTLY
Status: DISCONTINUED | OUTPATIENT
Start: 2022-10-10 | End: 2022-10-13 | Stop reason: HOSPADM

## 2022-10-10 RX ORDER — DEXAMETHASONE SODIUM PHOSPHATE 4 MG/ML
INJECTION, SOLUTION INTRA-ARTICULAR; INTRALESIONAL; INTRAMUSCULAR; INTRAVENOUS; SOFT TISSUE PRN
Status: DISCONTINUED | OUTPATIENT
Start: 2022-10-10 | End: 2022-10-10 | Stop reason: SDUPTHER

## 2022-10-10 RX ORDER — SODIUM CHLORIDE 0.9 % (FLUSH) 0.9 %
5-40 SYRINGE (ML) INJECTION EVERY 12 HOURS SCHEDULED
Status: DISCONTINUED | OUTPATIENT
Start: 2022-10-10 | End: 2022-10-10 | Stop reason: HOSPADM

## 2022-10-10 RX ADMIN — FENTANYL CITRATE 50 MCG: 50 INJECTION, SOLUTION INTRAMUSCULAR; INTRAVENOUS at 09:09

## 2022-10-10 RX ADMIN — GABAPENTIN 100 MG: 100 CAPSULE ORAL at 11:13

## 2022-10-10 RX ADMIN — HYDROMORPHONE HYDROCHLORIDE 1 MG: 1 INJECTION, SOLUTION INTRAMUSCULAR; INTRAVENOUS; SUBCUTANEOUS at 12:16

## 2022-10-10 RX ADMIN — Medication 160 MG: at 07:37

## 2022-10-10 RX ADMIN — FENTANYL CITRATE 100 MCG: 50 INJECTION, SOLUTION INTRAMUSCULAR; INTRAVENOUS at 07:37

## 2022-10-10 RX ADMIN — PIPERACILLIN SODIUM,TAZOBACTAM SODIUM 3.38 G: 3; .375 INJECTION, POWDER, FOR SOLUTION INTRAVENOUS at 07:53

## 2022-10-10 RX ADMIN — SUGAMMADEX 200 MG: 100 INJECTION, SOLUTION INTRAVENOUS at 08:44

## 2022-10-10 RX ADMIN — ONDANSETRON 4 MG: 2 INJECTION INTRAMUSCULAR; INTRAVENOUS at 07:06

## 2022-10-10 RX ADMIN — TAMSULOSIN HYDROCHLORIDE 0.4 MG: 0.4 CAPSULE ORAL at 11:13

## 2022-10-10 RX ADMIN — POTASSIUM CHLORIDE 20 MEQ: 1500 TABLET, EXTENDED RELEASE ORAL at 14:36

## 2022-10-10 RX ADMIN — ATORVASTATIN CALCIUM 40 MG: 40 TABLET, FILM COATED ORAL at 11:15

## 2022-10-10 RX ADMIN — AMIODARONE HYDROCHLORIDE 200 MG: 200 TABLET ORAL at 21:03

## 2022-10-10 RX ADMIN — BENZOCAINE AND LEVOMENTHOL: 200; 5 SPRAY TOPICAL at 14:32

## 2022-10-10 RX ADMIN — LIDOCAINE HYDROCHLORIDE 40 MG: 10 INJECTION, SOLUTION INFILTRATION; PERINEURAL at 08:49

## 2022-10-10 RX ADMIN — PIPERACILLIN AND TAZOBACTAM 3375 MG: 3; .375 INJECTION, POWDER, FOR SOLUTION INTRAVENOUS at 22:25

## 2022-10-10 RX ADMIN — INSULIN LISPRO 8 UNITS: 100 INJECTION, SOLUTION INTRAVENOUS; SUBCUTANEOUS at 17:23

## 2022-10-10 RX ADMIN — INSULIN GLARGINE 16 UNITS: 100 INJECTION, SOLUTION SUBCUTANEOUS at 21:06

## 2022-10-10 RX ADMIN — GUAIFENESIN 600 MG: 600 TABLET, EXTENDED RELEASE ORAL at 21:03

## 2022-10-10 RX ADMIN — SODIUM CHLORIDE: 9 INJECTION, SOLUTION INTRAVENOUS at 06:41

## 2022-10-10 RX ADMIN — PHENYLEPHRINE HYDROCHLORIDE 50 MCG: 10 INJECTION INTRAVENOUS at 08:28

## 2022-10-10 RX ADMIN — HYDROMORPHONE HYDROCHLORIDE 1 MG: 1 INJECTION, SOLUTION INTRAMUSCULAR; INTRAVENOUS; SUBCUTANEOUS at 18:21

## 2022-10-10 RX ADMIN — Medication 1500 MG: at 23:58

## 2022-10-10 RX ADMIN — SODIUM CHLORIDE: 9 INJECTION, SOLUTION INTRAVENOUS at 08:33

## 2022-10-10 RX ADMIN — DEXAMETHASONE SODIUM PHOSPHATE 10 MG: 4 INJECTION, SOLUTION INTRAMUSCULAR; INTRAVENOUS at 07:48

## 2022-10-10 RX ADMIN — HYDROMORPHONE HYDROCHLORIDE 1 MG: 1 INJECTION, SOLUTION INTRAMUSCULAR; INTRAVENOUS; SUBCUTANEOUS at 10:08

## 2022-10-10 RX ADMIN — LIDOCAINE HYDROCHLORIDE 100 MG: 20 INJECTION, SOLUTION INFILTRATION; PERINEURAL at 07:37

## 2022-10-10 RX ADMIN — INSULIN HUMAN 4 UNITS: 100 INJECTION, SOLUTION PARENTERAL at 09:27

## 2022-10-10 RX ADMIN — ACETAMINOPHEN 650 MG: 325 TABLET ORAL at 15:52

## 2022-10-10 RX ADMIN — Medication 1500 MG: at 14:31

## 2022-10-10 RX ADMIN — SODIUM CHLORIDE, PRESERVATIVE FREE 10 ML: 5 INJECTION INTRAVENOUS at 11:14

## 2022-10-10 RX ADMIN — IPRATROPIUM BROMIDE AND ALBUTEROL SULFATE 1 AMPULE: 2.5; .5 SOLUTION RESPIRATORY (INHALATION) at 09:06

## 2022-10-10 RX ADMIN — ROCURONIUM BROMIDE 20 MG: 10 INJECTION INTRAVENOUS at 07:58

## 2022-10-10 RX ADMIN — OXYCODONE HYDROCHLORIDE 10 MG: 5 TABLET ORAL at 15:17

## 2022-10-10 RX ADMIN — INSULIN LISPRO 4 UNITS: 100 INJECTION, SOLUTION INTRAVENOUS; SUBCUTANEOUS at 21:05

## 2022-10-10 RX ADMIN — OXYCODONE HYDROCHLORIDE 10 MG: 5 TABLET ORAL at 19:22

## 2022-10-10 RX ADMIN — GUAIFENESIN 600 MG: 600 TABLET, EXTENDED RELEASE ORAL at 11:13

## 2022-10-10 RX ADMIN — SODIUM CHLORIDE, PRESERVATIVE FREE 10 ML: 5 INJECTION INTRAVENOUS at 21:00

## 2022-10-10 RX ADMIN — MORPHINE SULFATE 4 MG: 4 INJECTION, SOLUTION INTRAMUSCULAR; INTRAVENOUS at 04:33

## 2022-10-10 RX ADMIN — IPRATROPIUM BROMIDE AND ALBUTEROL SULFATE 1 AMPULE: .5; 3 SOLUTION RESPIRATORY (INHALATION) at 09:06

## 2022-10-10 RX ADMIN — INSULIN LISPRO 8 UNITS: 100 INJECTION, SOLUTION INTRAVENOUS; SUBCUTANEOUS at 17:40

## 2022-10-10 RX ADMIN — SODIUM HYPOCHLORITE: 1.25 SOLUTION TOPICAL at 14:31

## 2022-10-10 RX ADMIN — METOPROLOL SUCCINATE 75 MG: 50 TABLET, EXTENDED RELEASE ORAL at 21:03

## 2022-10-10 RX ADMIN — HYDROMORPHONE HYDROCHLORIDE 1 MG: 1 INJECTION, SOLUTION INTRAMUSCULAR; INTRAVENOUS; SUBCUTANEOUS at 23:11

## 2022-10-10 RX ADMIN — INSULIN LISPRO 6 UNITS: 100 INJECTION, SOLUTION INTRAVENOUS; SUBCUTANEOUS at 23:10

## 2022-10-10 RX ADMIN — OXYCODONE HYDROCHLORIDE 10 MG: 5 TABLET ORAL at 23:57

## 2022-10-10 RX ADMIN — FENTANYL CITRATE 50 MCG: 50 INJECTION, SOLUTION INTRAMUSCULAR; INTRAVENOUS at 09:21

## 2022-10-10 RX ADMIN — HYDROMORPHONE HYDROCHLORIDE 1 MG: 1 INJECTION, SOLUTION INTRAMUSCULAR; INTRAVENOUS; SUBCUTANEOUS at 20:25

## 2022-10-10 RX ADMIN — FENTANYL CITRATE 50 MCG: 50 INJECTION, SOLUTION INTRAMUSCULAR; INTRAVENOUS at 09:16

## 2022-10-10 RX ADMIN — GABAPENTIN 100 MG: 100 CAPSULE ORAL at 21:03

## 2022-10-10 RX ADMIN — METOPROLOL SUCCINATE 75 MG: 50 TABLET, EXTENDED RELEASE ORAL at 11:12

## 2022-10-10 RX ADMIN — MIDAZOLAM 2 MG: 1 INJECTION INTRAMUSCULAR; INTRAVENOUS at 07:33

## 2022-10-10 RX ADMIN — FENTANYL CITRATE 100 MCG: 50 INJECTION, SOLUTION INTRAMUSCULAR; INTRAVENOUS at 07:58

## 2022-10-10 RX ADMIN — AMIODARONE HYDROCHLORIDE 200 MG: 200 TABLET ORAL at 05:45

## 2022-10-10 RX ADMIN — HYDROMORPHONE HYDROCHLORIDE 1 MG: 1 INJECTION, SOLUTION INTRAMUSCULAR; INTRAVENOUS; SUBCUTANEOUS at 16:20

## 2022-10-10 RX ADMIN — ALBUTEROL SULFATE 8 PUFF: 90 AEROSOL, METERED RESPIRATORY (INHALATION) at 07:45

## 2022-10-10 RX ADMIN — Medication 0.1 MG: at 08:39

## 2022-10-10 RX ADMIN — PIPERACILLIN AND TAZOBACTAM 3375 MG: 3; .375 INJECTION, POWDER, FOR SOLUTION INTRAVENOUS at 17:25

## 2022-10-10 RX ADMIN — PROPOFOL 160 MG: 10 INJECTION, EMULSION INTRAVENOUS at 07:37

## 2022-10-10 RX ADMIN — GABAPENTIN 100 MG: 100 CAPSULE ORAL at 14:33

## 2022-10-10 RX ADMIN — OXYCODONE HYDROCHLORIDE 10 MG: 5 TABLET ORAL at 11:11

## 2022-10-10 RX ADMIN — HYDROMORPHONE HYDROCHLORIDE 1 MG: 1 INJECTION, SOLUTION INTRAMUSCULAR; INTRAVENOUS; SUBCUTANEOUS at 14:20

## 2022-10-10 RX ADMIN — Medication 1500 MG: at 00:16

## 2022-10-10 ASSESSMENT — PAIN DESCRIPTION - LOCATION
LOCATION: LEG

## 2022-10-10 ASSESSMENT — PAIN SCALES - GENERAL
PAINLEVEL_OUTOF10: 9
PAINLEVEL_OUTOF10: 8
PAINLEVEL_OUTOF10: 9
PAINLEVEL_OUTOF10: 9
PAINLEVEL_OUTOF10: 10
PAINLEVEL_OUTOF10: 9
PAINLEVEL_OUTOF10: 8
PAINLEVEL_OUTOF10: 10
PAINLEVEL_OUTOF10: 9
PAINLEVEL_OUTOF10: 10
PAINLEVEL_OUTOF10: 6
PAINLEVEL_OUTOF10: 9
PAINLEVEL_OUTOF10: 10
PAINLEVEL_OUTOF10: 8
PAINLEVEL_OUTOF10: 8
PAINLEVEL_OUTOF10: 10
PAINLEVEL_OUTOF10: 9
PAINLEVEL_OUTOF10: 9
PAINLEVEL_OUTOF10: 7

## 2022-10-10 ASSESSMENT — PAIN DESCRIPTION - DESCRIPTORS
DESCRIPTORS: TENDER
DESCRIPTORS: ACHING;DISCOMFORT
DESCRIPTORS: ACHING
DESCRIPTORS: CRAMPING;SHARP
DESCRIPTORS: TENDER
DESCRIPTORS: SHARP

## 2022-10-10 ASSESSMENT — PAIN DESCRIPTION - ORIENTATION
ORIENTATION: LEFT
ORIENTATION: RIGHT
ORIENTATION: LEFT

## 2022-10-10 ASSESSMENT — PAIN - FUNCTIONAL ASSESSMENT
PAIN_FUNCTIONAL_ASSESSMENT: ACTIVITIES ARE NOT PREVENTED
PAIN_FUNCTIONAL_ASSESSMENT: PREVENTS OR INTERFERES SOME ACTIVE ACTIVITIES AND ADLS
PAIN_FUNCTIONAL_ASSESSMENT: ACTIVITIES ARE NOT PREVENTED

## 2022-10-10 ASSESSMENT — COPD QUESTIONNAIRES: CAT_SEVERITY: NO INTERVAL CHANGE

## 2022-10-10 ASSESSMENT — ENCOUNTER SYMPTOMS: DYSPNEA ACTIVITY LEVEL: NO INTERVAL CHANGE

## 2022-10-10 ASSESSMENT — PAIN DESCRIPTION - PAIN TYPE
TYPE: SURGICAL PAIN

## 2022-10-10 NOTE — CONSULTS
[address] [place] [address]                                  CONSULTATION    PATIENT NAME: Daphne Beach                   :        1955  MED REC NO:   [id number]                           ROOM:  ACCOUNT NO:   [de-identified]                           ADMIT DATE: 10/03/2022  PROVIDER:     Elli Canela. Emmanuel Contreras MD    CONSULT DATE:  10/05/2022    CHIEF COMPLAINT:  Bilateral peripheral vascular disease with bilateral  chronic wounds of the lower legs. HISTORY OF PRESENT ILLNESS:  The patient is a 60-year-old white male who  has serious peripheral vascular disease, coronary artery disease and  multiple other issues who basically presented with sepsis that was felt  to be secondary to cellulitis and chronic lower extremity wounds. He  also has the same process on the right side, but aortogram is showing  blockage of the left iliac from left femoral with minimal blood flow to  the lower legs. He is also having chronic claudication of the left leg  with chronic pain. He was admitted for further evaluation. Surgery was  then consulted for possible at least a left above-knee amputation,  possible same on the right. PAST MEDICAL HISTORY:  Positive for coronary artery disease, COPD,  diabetes, hyperlipidemia, hypertension, lumbar radiculopathy, seizure  disorder. PAST SURGICAL HISTORY:  Surgeries include orthopedic procedure on the  knee and the forearm. He has had lumbar spine surgery. He has had a  remote tympanostomy tube placement, cardiac catheterizations and  angioplasties. SOCIAL HISTORY:  He is a smoker. He is a non-alcohol or drug user. FAMILY HISTORY:  Positive for COPD, diabetes, coronary artery disease,  chronic renal failure, peripheral vascular disease.     MEDICATIONS:  At home include albuterol, Cordarone, Eliquis, Lipitor,  Plavix, Farxiga, Neurontin, Glucophage, Lopressor, Nitrostat, Protonix,  Desyrel. ALLERGIES:  TO VACCINE, PNEUMOCOCCAL AND INFLUENZA. REVIEW OF SYSTEMS:  A 10-point review of systems otherwise negative. PHYSICAL EXAMINATION:  GENERAL:  The patient is a 51-year-old white male. He is resting in  bed. He appears older than his age. HEAD, EARS, EYES, NOSE AND THROAT:  Show no scleral icterus. CARDIOVASCULAR:  S1, S2.  RESPIRATORY:  Respirations are clear. ABDOMEN:  Soft. Bowel sounds are positive. EXTREMITIES:  Reveal significant necrosis of the skin of the bilateral  legs, left worse than the right. Upper extremities are normal.    ASSESSMENT AND PLAN:  Severe peripheral vascular disease with  near-complete occlusion of the arteries going to the left leg with  chronic claudication of the left leg. The patient needs at least a left  above-knee amputation of. His right side is minimally better, and he  has the same type of necrotic skin, but he is having no pain. At this  time, the patient is undergoing further workup to see if the right leg  can be salvaged. We will follow, but the patient will at least need a  left above-knee amputation, possible bilateral above-knee amputations. We will follow. JAYCOB DICKENS [facility], MD    D: 10/09/2022 16:28:42       T: 10/09/2022 16:32:13     [email or web address]  Job#: [id number]     Doc#: [id number]    CC:

## 2022-10-10 NOTE — BRIEF OP NOTE
Brief Postoperative Note      Patient: Misael Adhikari  YOB: 1955  MRN: [id number]    Date of Procedure: 10/10/2022    Pre-Op Diagnosis: Severe peripheral arterial disease ([facility] [address] [I73.9]  Pain in both lower extremities [M79.604, M79.605]    Post-Op Diagnosis: same       Procedure(s):  Left  Above Knee Amputation    Surgeon(s):  Peggy Ochoa MD    Assistant:      Anesthesia: General    Estimated Blood Loss (mL): 50 ml    Complications: none    Specimens:   ID Type Source Tests Collected by Time Destination   A : left leg Tissue Leg SURGICAL PATHOLOGY Peggy Ochoa MD 10/10/2022 4343        Implants:        Drains:   [REMOVED] External Urinary Catheter (Removed)   Securement Method Securing device (Describe) 10/04/22 0025   Suction 40 mmgHg continuous 10/04/22 0025       Findings: see op note    Electronically signed by Peggy Ochoa MD on 10/10/2022 at 9:06 AM

## 2022-10-10 NOTE — PROGRESS NOTES
While sitting on commode, he was able to urinate 100 ml. His stump dressing came off while on the bed. His lateral side of the incision started to bleed. We replaced the dressing with 4x4 gauzes and ABDs, wrapped the stump with kerlix, ACE bandages, and coban, tried to compress the stump, but was not as able to compress as tight as right after surgery. As we finished the dressing, he stated that he had to use the commode again. He was having a lot of lower abdominal pain. I performed a bladder scan and had a reading of >999 ml. Discussed with patient the need to empty his bladder, by doing a straight cath or by inserting a catheter. Olvera inserted at this time. Foreskin is very swollen at the beginning of this process as well, making it extremely difficult to visualize the tip of the penis. Urine culture sent to lab. Got 1525 ml urine out immediately. Patient verbalized relief of abdominal pain.

## 2022-10-10 NOTE — CARE COORDINATION
Collaborative Discharge Planning    Senia Briones  :  1955  MRN:  [id number]    ADMIT DATE:  10/3/2022      Discharge Planning Discharge Planning  Type of Residence: Acute Rehab  Living Arrangements: Spouse/Significant Other  Support Systems: Spouse/Significant Other  Current Services Prior To Admission: Durable Medical Equipment  Current DME Prior to Arrival: [address], [address], Wheelchair  Potential Assistance Needed: Durable Medical Equipment, Transitional Care  Potential DME Needed: Bedside Commode  Potential Assistance Purchasing Medications: No  Type of Home Care Services: PT, OT, Skilled Therapy, Aide Services  Patient expects to be discharged to[de-identified] Acute rehab  Follow Up Appointment: Best Day/Time : Monday PM  One/Two Story Residence: One story  History of falls?: Yes  White Board Notes /Social Work Whiteboard Notes  /Social Work Whiteboard: 10/10 CM; lives w SO/[facility] [place]; has nebulizer, WC; monitor therapy/rehab needs (prefers [facility], precert)    Discharge Plan Rehab  Lives w /[facility] [place]; has nebulizer, WC; prefers [facility] (nsg, therapy, wond care per NSG on AVS), [facility] (tub transfer bench, drop arm BSC, WC, Bed); therapy following; collaborated w Ileana Beyer RN    offered provider list with abbreviated version of the quality measures, geographic area, and applicable managed care information provided. Offered option of searching Medicare. gov website by using the computer in patient's room to review complete quality measures information. Questions regarding selection process answered:patient/family     Discharge Milestones and Delays: Clinical status    BLE PAD  History: PCI, COPD, Smoker, AICD, EF 35%, non-healing sacral wound     10/3 CTA Abdominal Aorta w B/L [place]  1.  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 Left dorsalis pedis and plantaris arteries are opacified. 2. No significant opacification in the distal right anterior tibial   artery, right dorsalis pedis artery, or distal right peroneal artery. This   may reflect underlying diminished flow or occlusion. This is stable. 3. Scattered atherosclerotic plaque in the aorta and mesenteric/renal   vessels without significant stenosis. 4. Asymmetric fatty atrophy of the left lower extremity musculature. Mild   subcutaneous edema in the dorsum of both feet. 5. Stable chronic hardware fracture of the left interpedicular screw at L4.       10/3 LLE (foot XR)  1. Soft tissue swelling in the left foot with several soft tissue   ulceration defects. 2. Chronic appearing erosive changes in the left 1st metatarsophalangeal   joint with medial subluxation. 3. 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 4. No other acute appearing cortical erosions radiographically to suggest   osteomyelitis. 10/3 RLE (foot) XR  1. Ulceration defect and adjacent swelling in the dorsum of the midfoot. 2. No cortical erosions to suggest acute osteomyelitis. 10/7 BLE Angiogram  Peripheral Artery Disease   Occluded L SFA and Fem/Pop bypass graft     10/9 RLE Angiogram (refused r/t unable to lie flat)    10/10 Left [place] planned    Elevated WBC; monitor    IV Vancomycin stop date 10/12      Hadley Rollins requires a drop arm bedside commode due to being confined to one level of the home and is physically incapable of utilizing regular toilet facilities. Drop arm required to facilitate transferring. Current body weight is Weight: 243 lb 13.3 oz (110.6 kg).       Hadley Rollins was evaluated today and a DME order was entered for a standard wheelchair because he requires this to successfully complete daily living tasks of ambulating. A standard manual wheelchair is necessary due to patient's impaired ambulation and mobility restrictions and would be unable to resolve these daily living tasks using a cane or walker. The patient is capable of using a standard wheelchair safely in their home and can maneuver within their home with adequate access. There is a caregiver available to provide necessary assistance. The need for this equipment was discussed with the patient and he understands, is in agreement, and has not expressed an unwillingness to use the wheelchair. Emily Parikh was evaluated today and a DME order was entered for variable height hospital bed because he requires assistance for positioning needs not possible in an ordinary bed. Patient needs variability of bed height to perform patient transfers and for ambulating. Current body Weight: 243 lb 13.3 oz (110.6 kg). The need for this equipment was discussed with the patient and he understands and is in agreement. Emily Parikh can self propel a wheelchair and needs anti-rollback device because of ramps.       SIGNED:  Leesa Jonas RN   10/10/2022, 2:04 PM

## 2022-10-10 NOTE — PROGRESS NOTES
Internal Medicine Resident Progress Note    Patient:  Koby Schmid    YOB: 1955  Unit/Bed:4-04/004-A  MRN: [id number]    Acct: [de-identified]   PCP: Azalea Dominguez MD    Date of Admission: 10/3/2022      Assessment/Plan:    L LE Necrotic Non-healing Extensive Wound:  - Leg cultures growing multiple colonies of enteric gram-negative bacilli. There is also gram-positive cocci  - Pain on rest in Left lower leg.  - WBC count trending down. - Lactic acid trended down to 1.7.  - Pt is in agreement for L AKA. Plan:   - ID consulted  - Wound care consulted  - PM&R consulted  - PT/OT consulted  - Gen surg consulted: Left above-knee amputation was done on 10/10/22  - IV vancomycin (End date:10/12) and Zosyn completed(End date:10/8)  - Surgical prophylaxis Zosyn started on 10/9 and 2 doses given prior to surgery. - Zosyn started postsurgery for 15 doses. Start date:10/10. End date: 10/15  - Continue to monitor for any acute signs/symptoms of decompensation    R LE Non-healing Extensive Wound:  - Leg cultures growing multiple colonies of enteric gram-negative bacilli. There is also gram-positive cocci  - WBC count trending down. - Pt would like to attempt revascularization therapy for R LE . Plan:   - ID consulted  - Wound care consulted  - Cardiology will follow outpatient.  - Ordered Benzocaine spray for PRN pain  - PRN Roxicodone   - Continue to monitor for any acute signs/symptoms of decompensation    Severe Peripheral artery disease:  - Chronic history of PAD, follows with Dr. Power Cameron in cardiology. - Patient has pain in lower extremities, has a 2 to 3-month history of lower extremity wounds. Unsure when it started but it is between June to September.  Resting pain in L lower leg.   - Patient has nonhealing wounds of bilateral lower extremity which previously required hospitalization.    - CTA shows occlusion of the left internal iliac artery, entire left superficial femoral artery, left popliteal artery and large portion of the left posterior tibial artery. There is no significant opacification in the distal right anterior tibial artery, right dorsalis pedis artery, or distal right peroneal artery. - Preoperative cardiac stress test shows LVEF of 35% and nuclear images not suggestive for myocardial ischemia  -Patient agreed for arteriogram: Patent B/L iliac artery, occluded L SFA and Fem/Pop bypass graft. Extensive collateral network from deep femoral artery formed. Plan:   - Gabapentin 100mg TID.   - Gen surg consulted: Left above-knee amputation done on 10/10/22  - Cardiology will follow outpatient for RLE PAD. Urinary Incontinence:  - Pt had mild hx of urinary inconcinece on admission. Worsened on 10/06.   - Has no hx of BPH, denies increased urgency in past year, increased frequency, or night time frequency. - UA was positive for 1000 glucose, most likely due to Lowry Dally home usage and diabetic progression.   - Straight catheter placed and removed 10/06  Plan:  -Continue with Flomax 0.4mg    Stage 3 Sacral ulcer:  - Patient has chronic history of sacral ulcers, for past couple of months. - Very deep and goes into fat. Plan:   - Wound care is dressing up ulcer. NIDDMII:   - Home med of Farxiga 10mg and Metformin 1000mg BID. Plan:  - Medium dose of SSI and 16 units of Lantus.   - Home med of Farxiga 10 mg resumed on 10/11  - Will keep inpatient goal of sugars 140-180. - Accuchecks x4.   - Diabetic diet  - Hypoglycemia Protocol     Sepsis 2/2 cellulitis on B/L LE (resolved)  - On POA SIRS 2/4 (, WBC 17.6) with suspected cellulitis/chronic LE wound source. - Leg cultures growing multiple colonies of enteric gram-negative bacilli. There is also gram-positive cocci  - Blood culture negative on 48 hours growth.   Plan:   - IV vancomycin (End date:10/12) and Zosyn completed(End date:10/8)    Diarrhea: (Resolved):  - Patient has possible 1 week history of diarrhea, at times he admits to having chronic history of diarrhea. No recent antibiotic usage or known food exposures that could have caused this. - GI panel positive for E. coli enteropathic  - Symptoms have resolved  Plan:   -IV LR at 75 mL/h D/C    Lactic acidosis secondary to sepsis from lower extremity cellulitis (Resolved):  - Lactic acid on admission 3.6 downtrending to 1.7 on 10/05. Plan:   - Was given IV vancomycin (End date:10/12) and Zosyn (End date:10/8)     CAD s/p PCI DANA x2 LAD performed at [facility]:  Mike Hall.   - Patient is not compliant with medication.  - EKG shows A. fib with RVR  - Stress test done on 10/05, shows LVEF of 35%. Not suggestive of myocardial ischemia. Persistent A. fib:   - Follows Dr. River Hall.   - Noted per chart review, though patient denies any arrhythmia history. - Patient maintained on amiodarone 200mg daily at home. - [id number] 5.   - HAS-BLED Score 4  - EKG on POA showed A. fib with RVR  Plan:  - Amiodarone 200 mg twice daily and Toprol-XL increased to 75 mg BID  - Eliquis resumed s/p surgery. HFrEF 40%:  - ECHO 4/11/22 demonstrated improved EF 40-45%, mod LV hypokinesis. - Given no CP, SOB, or O2 requirement, will hold on repeat ECHO at this time. - Patient unable to state his home medications, but appears he is on GDMT consisting of SGLT2, metoprolol. No noted ARNI/ACE/ARB/MRA. Plan:  - Toprol-XL increased to 75 mg BID  - Home med of Farxiga 10 mg resumed on 10/11  - Consider further optimization of medical therapy as tolerated  - Home lasix 20mg daily resumed. Chronic normocytic anemia:   - Hgb 12.6 on arrival. No noted bleeding. MCV low-normal at 80.7. Thromobocytosis:   - Plts 556 on arrival. Suspect reactive in setting of acute illness. HTN:   - Continued home medications of Imdur 30mg daily, lopressor 50mg bid  Plan:  -Resumed Imdur s/p surgery. COPD:   Per chart review.  Spirometry with bronchodilator 2014 normal. Patient on home albuterol inhaler.   - Continue albuterol inhaler  - Encourage use of incentive spirometry, Acapella and started on guaifenesin 600 mg twice daily. GERD:   - On Protonix     ? BPH:  Has urinary incontinence since admission, with some improvement with Flomax 0.4 mg.    Plan:  Follow-up with PCP for possible diagnosis and treatment    Chronic tobacco abuse: . - Nicotine patch started    Insomnia:  - Trazodone resumed. Expected discharge date:  2 days    Disposition:   [] Home  [] TCU  [x] Rehab  [] Psych  [] SNF  [] [place]  [] Other-    ===================================================================      Chief Complaint: Worsening bilateral lower extremity infection and and diarrhea for 1 week    Hospital Course:      Per chart   \"Ruben Smith is a 74 y.o. male with a PMH of PAD, CAD s/p PCI LAD, chronic sacral wounds, chronic systolic heart failure, pAF, HTN, HLD, NIDDMII, tobacco abuse who presented with bilateral lower extremity wounds. Patient reports a 2-week history of worsening bilateral lower extremity infections. He states he has developed rising erythema to the mid calf along with worsening wounds and ulcerations of bilateral extremities. Patient states wounds have been purulent and foul-smelling. He also reports profuse diarrhea upon admission for the last 1 week. He states he follows with Dr. Sun Daily for these bilateral lower extremity wounds, and has had lower extremity imaging in the past that demonstrated severe inflow disease. He has undergone previous bypass grafting of the left lower extremity that has since chronically occluded. He denies any chest pain, shortness of breath, abdominal pain. He has a history of coronary artery disease for which she went stent placement x2, unsure of which vessel. He reports chronic heart failure, on diuretics at home.   He denies any history of arrhythmia, but has charted history of atrial fibrillation for which she is on Eliquis at home. Patient is unsure of any medications that he is on at home and is unable to provide any information on which he is actually taking. Narayankhadijah present in room to assist with history. He states he has been told in the past that his left lower extremity would need to be amputated. \"    Patient was concerned about possible amputation of both lower extremities. Patient came in with diarrhea, GI panel was positive for E. coli enteropathogenic. Resolved on day 2. ID consulted. No growth in blood cultures in last 48 hours. Cultures from leg show gram-negative bacilli, group B strep, Staphylococcus     IV vancomycin (End date:10/12) and Zosyn completed (End date:10/8). Cardiology was consulted, recommend surgery consult for left lower extremity amputation. Stress test (10/5/22) shows LVEF of 35%. Not suggestive of myocardial ischemia. Peripheral angiogram was supposed to be re-attempted on 10/6. Patient did not want peripheral angiogram, due to being unable to lay flat for long periods of time. Patient had complaint of urinary incontinence, no history of BPH or increased urinary urgency or frequency. Patient was straight cathed and started on Flomax 0.4 mg on 10/6. Straight catheter was removed on 10/6. Will continue with Flomax. Peripheral arteriogram done Dr. Kamini Hood on 10/7, patent B/L iliac artery, occluded L SFA and femoral/popliteal bypass graft with extensive collateral network from deep femoral artery. Left AKA was completed on 10/10. Subjective (past 24 hours):   Patient seen and examined s/p surgery. There is no acute complaints, resting comfortably in bed. Was concerned about prosthetics and told patient about Suze Msacorro will be consulted for further assessment. ROS: reviewed complete ROS unchanged unless otherwise stated in hospital course/subjective portion.        Medications:  Reviewed    Infusion Medications    sodium chloride      dextrose       Scheduled Medications    [START ON 10/11/2022] furosemide  20 mg Oral Daily    [START ON 10/11/2022] enoxaparin  30 mg SubCUTAneous BID    insulin glargine  12 Units SubCUTAneous Nightly    piperacillin-tazobactam  3,375 mg IntraVENous 30 Min Pre-Op    piperacillin-tazobactam  3,375 mg IntraVENous Q8H    metoprolol succinate  75 mg Oral BID    potassium chloride  20 mEq Oral Daily with breakfast    metoprolol succinate  25 mg Oral Once    polyethylene glycol  17 g Oral Daily    vancomycin  1,500 mg IntraVENous Q12H    [Held by provider] isosorbide mononitrate  15 mg Oral Daily    tamsulosin  0.4 mg Oral Daily    amiodarone  200 mg Oral BID    guaiFENesin  600 mg Oral BID    sodium hypochlorite   Irrigation Daily    insulin lispro  0-8 Units SubCUTAneous TID WC    insulin lispro  0-4 Units SubCUTAneous Nightly    atorvastatin  40 mg Oral Daily    gabapentin  100 mg Oral TID    pantoprazole  40 mg Oral QAM AC    sodium chloride flush  10 mL IntraVENous 2 times per day    vancomycin (VANCOCIN) intermittent dosing (placeholder)   Other RX Placeholder     PRN Meds: oxyCODONE-acetaminophen, HYDROmorphone, oxyCODONE **OR** oxyCODONE, magnesium hydroxide, bisacodyl, potassium chloride **OR** potassium alternative oral replacement **OR** potassium chloride, benzocaine-menthol, morphine, albuterol sulfate HFA, traZODone, sodium chloride flush, sodium chloride, ondansetron **OR** ondansetron, acetaminophen **OR** acetaminophen, glucose, dextrose bolus **OR** dextrose bolus, glucagon (rDNA), dextrose        Intake/Output Summary (Last 24 hours) at 10/10/2022 1416  Last data filed at 10/10/2022 0844  Gross per 24 hour   Intake 2081.92 ml   Output 50 ml   Net 2031.92 ml         Exam:  /77   Pulse 85   Temp 98.1 °F (36.7 °C) (Oral)   Resp 20   Ht 6' 1\" (1.854 m)   Wt 243 lb 13.3 oz (110.6 kg)   SpO2 92%   BMI 32.17 kg/m²         Physical exam:  Constitutional:  General: Patient is healing well s/p surgery.   HEENT: Normocephalic and atraumatic. External nares normal.  PERRLA  Cardiovascular: RRR with S1/S2 with a murmur heard. Pulmonary: Inspiratory effort is normal with wheezing present  Abdominal: Suprapubic tenderness and small guarding. MSK: Left AKA  Skin: Capillary refill takes more than 3 seconds. Bruising lesion and rash present on right LE. Neurological: AOx4 with weakness present          Labs:   Recent Labs     10/09/22  0837   WBC 13.5*   HGB 10.3*   HCT 34.7*   *       Recent Labs     10/09/22  0425      K 3.8      CO2 23   BUN 5*   CREATININE 0.7   CALCIUM 8.0*       No results for input(s): AST, ALT, BILIDIR, BILITOT, ALKPHOS in the last 72 hours. No results for input(s): INR in the last 72 hours. No results for input(s): Corky Stallion in the last 72 hours. No results for input(s): PROCAL in the last 72 hours. Lab Results   Component Value Date/Time    NITRU NEGATIVE 10/06/2022 09:15 AM    WBCUA 0-2 10/06/2022 09:15 AM    BACTERIA NONE SEEN 10/06/2022 09:15 AM    RBCUA 3-5 10/06/2022 09:15 AM    BLOODU NEGATIVE 10/06/2022 09:15 AM    SPECGRAV 1.023 03/16/2017 09:15 PM    GLUCOSEU >= 1000 10/06/2022 09:15 AM       Radiology (48 hours):  XR TIBIA FIBULA LEFT (2 VIEWS)    Result Date: 10/3/2022  1. Generalized subcutaneous edema with questionable small ulceration defects in the mid-distal calf. 2. No cortical erosions to suggest osteomyelitis. This document has been electronically signed by: Jill Granados MD on 10/03/2022 08:35 PM    XR TIBIA FIBULA RIGHT (2 VIEWS)    Result Date: 10/3/2022  Mild subcutaneous edema of the right calf. No cortical erosions to suggest acute osteomyelitis. This document has been electronically signed by: Jill Granados MD on 10/03/2022 08:47 PM    XR ANKLE LEFT (2 VIEWS)    Result Date: 10/3/2022  No acute osseous injury or cortical erosions. Lateral ankle swelling.  This document has been electronically signed by: Jill Granados MD on 10/03/2022 08:28 PM    XR ANKLE RIGHT (2 VIEWS)    Result Date: 10/3/2022  1. No acute osseous injury or cortical erosions. 2. Subcutaneous edema. This document has been electronically signed by: Alma Hewitt MD on 10/03/2022 08:27 PM    XR FOOT LEFT (2 VIEWS)    Result Date: 10/3/2022  1. Soft tissue swelling in the left foot with several soft tissue ulceration defects. 2. Chronic appearing erosive changes in the left 1st metatarsophalangeal joint with medial subluxation. 3. Chronic appearing truncation defect in the distal tuft of the left 3rd toe and remote amputation of the left 2nd toe with intact cortical margins. 4. No other acute appearing cortical erosions radiographically to suggest osteomyelitis. This document has been electronically signed by: Alma Hewitt MD on 10/03/2022 08:50 PM    XR FOOT RIGHT (2 VIEWS)    Result Date: 10/3/2022  1. Ulceration defect and adjacent swelling in the dorsum of the midfoot. 2. No cortical erosions to suggest acute osteomyelitis. This document has been electronically signed by: Alma Hewitt MD on 10/03/2022 08:35 PM    CTA ABDOMINAL AORTA W BILAT RUNOFF W WO CONTRAST    Result Date: 10/3/2022  1. Chronic occlusions of the left internal iliac artery, entire left superficial femoral artery, left popliteal artery, and large portion of the left posterior tibial artery with reconstitution in the distal segment near the ankle. Left dorsalis pedis and plantaris arteries are opacified. 2. No significant opacification in the distal right anterior tibial artery, right dorsalis pedis artery, or distal right peroneal artery. This may reflect underlying diminished flow or occlusion. This is stable. 3. Scattered atherosclerotic plaque in the aorta and mesenteric/renal vessels without significant stenosis. 4. Asymmetric fatty atrophy of the left lower extremity musculature. Mild subcutaneous edema in the dorsum of both feet. 5. Stable chronic hardware fracture of the left interpedicular screw at L4.  This document has been electronically signed by: Alma Hewitt MD on 10/03/2022 10:46 PM All CTs at this facility use dose modulation techniques and iterative reconstructions, and/or weight-based dosing when appropriate to reduce radiation to a low as reasonably achievable. 3D Post-processing was performed on this study. DVT prophylaxis:    [x] Lovenox  [] SCDs  [] SQ Heparin  [] Encourage ambulation   [] Already on Anticoagulation       Diet: ADULT DIET; Regular; 4 carb choices (60 gm/meal)  Code Status: Full Code  PT/OT: n/a  Tele: yes  IVF:  no     Electronically signed by Dylon Salazar MD  PGY-1 Internal Medicine Resident  on 10/10/2022 at 2:16 PM    Case was discussed with Attending, Dr. Amairani Peguero.

## 2022-10-10 NOTE — PROGRESS NOTES
Patient stating that he needs to urinate and cannot use the urinal.  Patient adamant about getting up to commode. Two nurses at bedside to assist with getting up. Patient did pretty decent using one leg to get up and turn to sit down on commode. Patient urinated about 200 ml at this time.

## 2022-10-10 NOTE — PROGRESS NOTES
Patient stating that he feels the need to urinate again. Assisted him up to commode. Sat on commode for about 20 minutes and did not have any success to urinate.

## 2022-10-10 NOTE — PROGRESS NOTES
[address] [facility] Pharmacokinetic Monitoring Service - Vancomycin    Consulting Provider: Dr. Dilip Morrison   Indication: SSTI  Target Concentration: Goal trough of 10-15 mg/L and AUC/HALLEY <500 mg*hr/L  Day of Therapy: 7  Additional Antimicrobials: zosyn    Pertinent Laboratory Values: Wt Readings from Last 1 Encounters:   10/10/22 243 lb 13.3 oz (110.6 kg)     Temp Readings from Last 1 Encounters:   10/10/22 98.1 °F (36.7 °C) (Oral)     Estimated Creatinine Clearance: 137 mL/min (based on SCr of 0.7 mg/dL). Recent Labs     10/09/22  0425 10/09/22  0837   CREATININE 0.7  --    WBC  --  13.5*       Pertinent Cultures:  Culture Date Source Results   10/3/22 Leg swab mixed growth including Pseudomonas, GBS, MSSA   10/4/22 Blood x 2 NGTD   MRSA Nasal Swab: N/A. Non-respiratory infection.     Recent vancomycin administrations                     vancomycin (VANCOCIN) 1500 mg in dextrose 5% 500 mL IVPB (mg) 1,500 mg New Bag 10/10/22 0016     1,500 mg New Bag 10/09/22 1230     1,500 mg New Bag 10/08/22 2357     1,500 mg New Bag  1320     1,500 mg New Bag 10/07/22 2348                      Plan:  Continue current dose  Repeat vancomycin concentration ordered for 10/11/2022 @ 1100   Pharmacy will continue to monitor patient and adjust therapy as indicated    Thank you for the consult,  Diogenes Leo [facility]  10/10/2022 2:02 PM

## 2022-10-10 NOTE — PROGRESS NOTES
Patient arrived back to 4k04 via bed from surgery. Patient is grumpy and asking for pain medications.

## 2022-10-10 NOTE — CONSULTS
Physical Medicine & Rehabilitation   Consult Note      Admitting Physician: Benny Habermann, DO    Primary Care Provider: Kanika Brown MD     Reason for Consult:  left AKA, right severe PVD. Rehab needs    History of Present Illness:  Prieto Fields is a 74 y.o. male admitted to [facility] on 10/3/2022. Patient  has a past medical history of CAD (coronary artery disease), COPD (chronic obstructive pulmonary disease) ([facility] Diabetes mellitus ([facility] Hx of blood clots, Hyperlipidemia, Hypertension, Leg wound, left, Leg wound, right, Lumbar radiculopathy, Osteoarthritis, Seizure disorder ([address] and Spinal stenosis, lumbar. Patient presented to [facility] ER with worsening of bilateral leg pain and inability to ambulate. Patient with known PVD in BLE and 2-3 month history of multiple ulcerations/wounds of the BLE. Was recommended at 9/29 cardiology OP visit for patient to go to the ER for admission and IV ATB. Patient with noted non-compliance prior to this admission. Patient was admitted for further work up and care. Cardiology was consulted and referred for arteriogram on 10/5, patient however declined this procedure. CTA shows occlusion of the left internal iliac artery, entire left superficial femoral artery, left popliteal artery and large portion of the left posterior tibial artery. There is no significant opacification in the distal right anterior tibial artery, right dorsalis pedis artery, or distal right peroneal artery. Preoperative cardiac stress test shows LVEF of 35% and nuclear images not suggestive for myocardial ischemia. Patient agreed to Arteriogram 10/7/22 revealed: Peripheral Artery Disease, Patent bilateral iliac artery, Occluded L SFA and Fem/Pop bypass graft,Extensive collateral network from deep femoral artery. Patient underwent Left AKA on 10/10/22. Plan to continue to monitor RLE, but is at high risk for AKA as well. Patient seen today, resting in bed.  S/o at bedside. Patient did well with therapy today, better than anticipated POD #1. Patient and fiance feel comfortable transitioning home at discharge. Discussed ramping, patient states he has to clear it by the landlord first, but has friends that will built it. Discussed ADA recommendation of 1\" of height = 1' of ramping. Patient and fiance understanding. Fiance asking about obtaining DME options, discussed CM/SW will set up but therapy assists with this as well. Discussed therapy working with him again tomorrow, encouraged patient and fiance to write down any questions about going home. Current Rehabilitation Assessments:  PT:    Range of Motion:  Right Lower Extremity: Impaired - ankle DF to ~neutral, hip and knee WFL  Left Lower Extremity: WFL at hip  Strength:  Right Lower Extremity: Impaired - 4/5, within available range at ankle  Left Lower Extremity: Impaired - 3/5 hip  Balance:  Static Sitting Balance:  Stand By Assistance; pt c/o lightheadedness sitting EOB, sits EOB ~10 minutes prior to transfer  Static Standing Balance: Stand By Assistance  Dynamic Standing Balance: Stand By Assistance  Bed Mobility:  Supine to Sit: Stand By Assistance, X 1, with head of bed raised, with rail  Scooting: Stand By Assistance, X 1  Transfers:  Sit to Stand: Stand By Assistance, X 1, with increased time for completion, to/from chair with arms  Stand to Sit:Stand By Assistance, X 1, to/from chair with arms  Stand Pivot:Stand By Assistance, X 1, to/from chair with arms  **Pt declines R gripper sock and declines hands-on assistance from PT, uses arm rests of w/c to stand and pivot, able to hop 2-3 times to complete pivot  Wheelchair Mobility:  Stand By Assistance  Extremities Used: Bilateral Upper Extremities  Type of Chair:Manual  Surface: Level Tile  Distance: 250 feet  Quality: Slow Velocity and Decreased Fluidity   Exercise:  Patient was guided in 1 set(s) 10 reps of exercise to right lower extremities.   Ankle pumps, Heelslides, and Hip abduction/adduction, L hip SLR, hip abd/add. Exercises were completed for increased independence with functional mobility. OT:  await eval    Past Medical History:        Diagnosis Date    CAD (coronary artery disease)     COPD (chronic obstructive pulmonary disease) ([facility])     Diabetes mellitus ([facility] [address]     Hx of blood clots 1996    LEFT LEG    Hyperlipidemia     Hypertension     Leg wound, left     Leg wound, right     Lumbar radiculopathy     Osteoarthritis     Seizure disorder ([facility])     Spinal stenosis, lumbar        Past Surgical History:        Procedure Laterality Date    ANKLE SURGERY      ball joint    [address] [facility]  2005    1 STENT PLACED    CARDIAC CATHETERIZATION  5/9/13    [facility]    CORONARY ANGIOPLASTY WITH STENT PLACEMENT      DIAGNOSTIC CARDIAC CATH LAB PROCEDURE  11/18/2021    FOREARM SURGERY      left    FRACTURE SURGERY Right 2000    LEG    KNEE SURGERY      left    LEG SURGERY      LEG SURGERY Left 10/10/2022    Left  Above Knee Amputation performed by Calderon Moreland MD at [facility]      left middle toe    TYMPANOSTOMY TUBE PLACEMENT  1979       Allergies:     Allergies   Allergen Reactions    Influenza Vaccines Anaphylaxis    Pneumococcal Vaccines         Current Medications:   Current Facility-Administered Medications: [START ON 10/11/2022] furosemide (LASIX) tablet 20 mg, 20 mg, Oral, Daily  HYDROmorphone (DILAUDID) injection 1 mg, 1 mg, IntraVENous, Q2H PRN  [START ON 10/11/2022] enoxaparin Sodium (LOVENOX) injection 30 mg, 30 mg, SubCUTAneous, BID  insulin glargine (LANTUS) injection vial 16 Units, 16 Units, SubCUTAneous, Nightly  piperacillin-tazobactam (ZOSYN) 3,375 mg in dextrose 5 % 50 mL IVPB (mini-bag), 3,375 mg, IntraVENous, 30 Min Pre-Op  piperacillin-tazobactam (ZOSYN) 3,375 mg in dextrose 5 % 50 mL IVPB extended infusion (mini-bag), 3,375 mg, IntraVENous, Q8H  oxyCODONE (ROXICODONE) immediate release tablet 5 mg, 5 mg, Oral, Q4H PRN **OR** oxyCODONE (ROXICODONE) immediate release tablet 10 mg, 10 mg, Oral, Q4H PRN  metoprolol succinate (TOPROL XL) extended release tablet 75 mg, 75 mg, Oral, BID  potassium chloride (KLOR-CON M) extended release tablet 20 mEq, 20 mEq, Oral, Daily with breakfast  metoprolol succinate (TOPROL XL) extended release tablet 25 mg, 25 mg, Oral, Once  polyethylene glycol (GLYCOLAX) packet 17 g, 17 g, Oral, Daily  magnesium hydroxide (MILK OF MAGNESIA) 400 MG/5ML suspension 30 mL, 30 mL, Oral, Daily PRN  bisacodyl (DULCOLAX) EC tablet 5 mg, 5 mg, Oral, Daily PRN  vancomycin (VANCOCIN) 1500 mg in dextrose 5% 500 mL IVPB, 1,500 mg, IntraVENous, Q12H  potassium chloride (KLOR-CON M) extended release tablet 40 mEq, 40 mEq, Oral, PRN **OR** potassium bicarb-citric acid (EFFER-K) effervescent tablet 40 mEq, 40 mEq, Oral, PRN **OR** potassium chloride 10 mEq/100 mL IVPB (Peripheral Line), 10 mEq, IntraVENous, PRN  [Held by provider] isosorbide mononitrate (IMDUR) extended release tablet 15 mg, 15 mg, Oral, Daily  tamsulosin (FLOMAX) capsule 0.4 mg, 0.4 mg, Oral, Daily  benzocaine-menthol (DERMOPLAST) 20-0.5 % spray, , Topical, PRN  amiodarone (CORDARONE) tablet 200 mg, 200 mg, Oral, BID  guaiFENesin (MUCINEX) extended release tablet 600 mg, 600 mg, Oral, BID  sodium hypochlorite (DAKINS) 0.125 % external solution, , Irrigation, Daily  insulin lispro (HUMALOG) injection vial 0-8 Units, 0-8 Units, SubCUTAneous, TID WC  insulin lispro (HUMALOG) injection vial 0-4 Units, 0-4 Units, SubCUTAneous, Nightly  morphine injection 4 mg, 4 mg, IntraVENous, Q4H PRN  albuterol sulfate HFA (PROVENTIL;VENTOLIN;PROAIR) 108 (90 Base) MCG/ACT inhaler 2 puff, 2 puff, Inhalation, Q4H PRN  atorvastatin (LIPITOR) tablet 40 mg, 40 mg, Oral, Daily  gabapentin (NEURONTIN) capsule 100 mg, 100 mg, Oral, TID  pantoprazole (PROTONIX) tablet 40 mg, 40 mg, Oral, QAM AC  traZODone (DESYREL) tablet 50 mg, 50 mg, Oral, Nightly PRN  sodium chloride flush 0.9 % injection 10 mL, 10 mL, IntraVENous, 2 times per day  sodium chloride flush 0.9 % injection 10 mL, 10 mL, IntraVENous, PRN  0.9 % sodium chloride infusion, , IntraVENous, PRN  ondansetron (ZOFRAN-ODT) disintegrating tablet 4 mg, 4 mg, Oral, Q8H PRN **OR** ondansetron (ZOFRAN) injection 4 mg, 4 mg, IntraVENous, Q6H PRN  acetaminophen (TYLENOL) tablet 650 mg, 650 mg, Oral, Q6H PRN **OR** acetaminophen (TYLENOL) suppository 650 mg, 650 mg, Rectal, Q6H PRN  glucose chewable tablet 16 g, 4 tablet, Oral, PRN  dextrose bolus 10% 125 mL, 125 mL, IntraVENous, PRN **OR** dextrose bolus 10% 250 mL, 250 mL, IntraVENous, PRN  glucagon (rDNA) injection 1 mg, 1 mg, SubCUTAneous, PRN  dextrose 10 % infusion, , IntraVENous, Continuous PRN  vancomycin (VANCOCIN) intermittent dosing (placeholder), , Other, RX Placeholder    Social History:  Social History     Socioeconomic History    Marital status:       Spouse name: Not on file    Number of children: 2    Years of education: 9    Highest education level: Not on file   Occupational History    Occupation: [employer]   Tobacco Use    Smoking status: Some Days     Packs/day: 0.50     Years: 40.00     Pack years: 20.00     Types: Cigarettes    Smokeless tobacco: Never   Vaping Use    Vaping Use: Not on file   Substance and Sexual Activity    Alcohol use: No     Alcohol/week: 0.0 standard drinks    Drug use: No    Sexual activity: Not on file   Other Topics Concern    Not on file   Social History Narrative    Not on file     Social Determinants of Health     Financial Resource Strain: Not on file   Food Insecurity: Not on file   Transportation Needs: Not on file   Physical Activity: Not on file   Stress: Not on file   Social Connections: Not on file   Intimate Partner Violence: Not on file   Housing Stability: Not on file     Lives With: Significant other  Type of Home: [address]: One level  Home Access: Stairs to enter with rails  Entrance Stairs - Number of Steps: 2-- looking into getting a ramp  Home Equipment: Wheelchair-manual, Crutches (L[employer]strand crutches)      Bathroom Shower/Tub: Tub/Shower unit  Bathroom Toilet: Standard  Bathroom Equipment: Tub transfer bench, Grab bars in shower, 3-in-1 commode (need tub transfer bench, elevated BSC, grab bars per family)  Bathroom Accessibility: Walker accessible     Receives Help From: Family  ADL Assistance: Independent  Homemaking Assistance: Independent  Homemaking Responsibilities: Yes  Ambulation Assistance: Independent  Transfer Assistance: Independent     Active : Yes  Mode of Transportation: Family, Friends  Occupation: Retired  Type of Occupation: retired from [place], retired from [employer]  Additional Comments: Pt amb with lofstrand crutches; h/o back injury in 1994, fell off roof, pt unsure of level of injury; was able to rehab self to ambulating with lofstrand crutches; pt with h/o several R LE sx's, including clarisa in lower leg, ankle ball joint    Family History:       Problem Relation Age of Onset    Diabetes Mother     Kidney Disease Mother     COPD Father     Heart Disease Father     Cancer Father         bone    Stroke Father     Heart Disease Sister     Diabetes Brother        Review of Systems:  CONSTITUTIONAL:  positive for  fatigue  EYES:  negative  HEENT:  negative  RESPIRATORY:  + cough  CARDIOVASCULAR:  negative  GASTROINTESTINAL:  negative  GENITOURINARY:  retention, tinoco present  SKIN:  RLE wound, left AKA stump  HEMATOLOGIC/LYMPHATIC:  positive for swelling/edema  MUSCULOSKELETAL:  positive for  pain, decreased range of motion, and muscle weakness  NEUROLOGICAL:  positive for gait problems, weakness, and pain  BEHAVIOR/PSYCH:  negative  System review otherwise negative    Physical Exam:  /77   Pulse 85   Temp 98.1 °F (36.7 °C) (Oral)   Resp 20   Ht 6' 1\" (1.854 m)   Wt 243 lb 13.3 oz (110.6 kg)   SpO2 92%   BMI 32.17 kg/m²   awake  Orientation: person, place, time  Mood: within normal limits  Affect: calm  General appearance: Patient is well nourished, well developed, well groomed and in no acute distress, overweight, appearing stated age, normal affect    Memory:   normal,   Attention/Concentration: normal  Language:  normal    Cranial Nerves:  cranial nerves II-XII are grossly intact  ROM:  normal  Tone:  normal  Muscle bulk: within normal limits  Sensory:  decreased BLE  Lungs: no wheezing or SOB. +cough  Abdomen: non-distended    Skin: warm and dry, no rash or erythema except wounds to RLE with gauze dressing in place. L aka with ACE wrap in place  Peripheral vascular: Pulses: Absent or decreased lower extremity pulses; Edema: 1+      Diagnostics:  Recent Results (from the past 24 hour(s))   POCT glucose    Collection Time: 10/09/22  4:51 PM   Result Value Ref Range    POC Glucose 238 (H) 70 - 108 mg/dl   POCT glucose    Collection Time: 10/09/22  7:23 PM   Result Value Ref Range    POC Glucose 203 (H) 70 - 108 mg/dl   POCT glucose    Collection Time: 10/10/22  9:22 AM   Result Value Ref Range    POC Glucose 207 (H) 70 - 108 mg/dl       CTA 10/3/22  Impression   1. 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 Left dorsalis pedis and plantaris arteries are opacified. 2. No significant opacification in the distal right anterior tibial    artery, right dorsalis pedis artery, or distal right peroneal artery. This    may reflect underlying diminished flow or occlusion. This is stable. 3. Scattered atherosclerotic plaque in the aorta and mesenteric/renal    vessels without significant stenosis. 4. Asymmetric fatty atrophy of the left lower extremity musculature. Mild    subcutaneous edema in the dorsum of both feet.    5. Stable chronic hardware fracture of the left interpedicular screw at L4. Impression:  L LE Necrotic Non-healing Extensive Wound with critical limb ischemia  S/p left AKA 10/10/22 with Dr Lilliana Franklin  R LE Non-healing Extensive Wound, risk for AKA  Severe Peripheral artery disease  Urinary Incontinence  Stage 3 Sacral ulcer  NIDDMII, uncontrolled  Sepsis 2/2 cellulitis on B/L LE  Lactic acidosis secondary to sepsis from lower extremity cellulitis  CAD s/p PCI DANA x2 LAD performed at [facility]  Persistent A. Fib, eliquis held for AKA (removed from [facility])  Hx spinal trauma with residual BLE weakness  HFrEF 40%  Chronic normocytic anemia  Thrombocytosis  HTN  COPD   GERD  ? BPH  Chronic tobacco abuse  Insomnia    Recommendations:  Await therapy evals today  Vancomycin end date 10/12/22. Zosyn end date 10/15/22  Lovenox for DVT prophylaxis  RLE risk for AKA as well, continue to follow  Patient doing well with therapy, patient and fiance would like to transition home at discharge. Patient planning ramping if landlord will allow it. Otherwise has friends that will help him in and out of the home. Recommending home with HH   Discussed ramping spec for ADA: 1\" height + to 1' of ramping. Example 6\" step x2 = 12' ramp. Plan to follow up in 2-4 weeks regarding power chair evaluation. DME ordering deferred to SW/CM with therapy recommendations. It was my pleasure to evaluate Gallo Haywood today. Please call with questions.     Deshawn Joyce, APRN - CNP

## 2022-10-10 NOTE — ANESTHESIA PRE PROCEDURE
Department of Anesthesiology  Preprocedure Note       Name:  Clarisse Rubinstein   Age:  74 y.o.  :  1955                                          MRN:  [id number]         Date:  10/10/2022      Surgeon: Mitzi Wells):  Isis Mcgregor MD    Procedure: Procedure(s):  Bilateral Above Knee Amputation    Medications prior to admission:   Prior to Admission medications    Medication Sig Start Date End Date Taking?  Authorizing Provider   isosorbide mononitrate (IMDUR) 30 MG extended release tablet Take 30 mg by mouth daily    Historical Provider, MD   metoprolol tartrate (LOPRESSOR) 50 MG tablet Take 50 mg by mouth 2 times daily    Historical Provider, MD   apixaban (ELIQUIS) 5 MG TABS tablet Take 1 tablet by mouth 2 times daily 22   JAVAN Orta CNP   furosemide (LASIX) 20 MG tablet Take 1 tablet by mouth daily 22   JAVAN Orta CNP   amiodarone (CORDARONE) 200 MG tablet Take 1 tablet by mouth daily 3/28/22   JAVAN Curry CNP   atorvastatin (LIPITOR) 40 MG tablet Take 40 mg by mouth daily    Historical Provider, MD   metFORMIN (GLUCOPHAGE) 1000 MG tablet Take 1,000 mg by mouth 2 times daily (with meals)    Historical Provider, MD   dapagliflozin (FARXIGA) 10 MG tablet Take 1 tablet by mouth every morning 21   JAVAN Curry CNP   clopidogrel (PLAVIX) 75 MG tablet take 1 tablet by mouth once daily 16   Crystal Padron MD   albuterol sulfate HFA (PROAIR HFA) 108 (90 BASE) MCG/ACT inhaler Inhale 2 puffs into the lungs every 4 hours as needed for Wheezing 16   Crystal Padron MD   gabapentin (NEURONTIN) 100 MG capsule TAKE ONE CAPSULE BY MOUTH 3 TIMES A DAY 16   Crystal Padron MD   pantoprazole (PROTONIX) 40 MG tablet Take 1 tablet by mouth daily 16   Crystal Padron MD   traZODone (DESYREL) 50 MG tablet take 1 tablet by mouth at bedtime 16   Crystal Padron MD Oral Daily with breakfast Lila Blanco MD   20 mEq at 10/09/22 0840    metoprolol succinate (TOPROL XL) extended release tablet 25 mg  25 mg Oral Once Lila Blanco MD        polyethylene glycol [facility]) packet 17 g  17 g Oral Daily iLla Blanco MD   17 g at 10/08/22 1230    magnesium hydroxide (MILK OF MAGNESIA) 400 MG/5ML suspension 30 mL  30 mL Oral Daily PRN Lila Blanco MD        bisacodyl (DULCOLAX) EC tablet 5 mg  5 mg Oral Daily PRN Lila Blanco MD        vancomycin (VANCOCIN) 1500 mg in dextrose 5% 500 mL IVPB  1,500 mg IntraVENous Q12H Jaskaran Portillo MD   Stopped at 10/10/22 0216    potassium chloride (KLOR-CON M) extended release tablet 40 mEq  40 mEq Oral PRN Roverto Notch, DO   40 mEq at 10/07/22 0601    Or    potassium bicarb-citric acid (EFFER-K) effervescent tablet 40 mEq  40 mEq Oral PRN Roverto Notch, DO   40 mEq at 10/08/22 1328    Or    potassium chloride 10 mEq/100 mL IVPB (Peripheral Line)  10 mEq IntraVENous PRN Roverto Notch, DO        [Held by provider] isosorbide mononitrate (IMDUR) extended release tablet 15 mg  15 mg Oral Daily Lila Blanco MD   15 mg at 10/09/22 0842    tamsulosin (FLOMAX) capsule 0.4 mg  0.4 mg Oral Daily Glenna Baez MD   0.4 mg at 10/09/22 0841    benzocaine-menthol (DERMOPLAST) 20-0.5 % spray   Topical PRN Glenna Baez MD   Given at 10/07/22 0644    amiodarone (CORDARONE) tablet 200 mg  200 mg Oral BID Lila Blanco MD   200 mg at 10/10/22 0545    guaiFENesin [facility]) extended release tablet 600 mg  600 mg Oral BID Marget Quick Ivelisse, DO   600 mg at 10/09/22 2117    sodium hypochlorite (DAKINS) 0.125 % external solution   Irrigation Daily Jaskaran Portillo MD   Given at 10/09/22 0843    insulin lispro (HUMALOG) injection vial 0-8 Units  0-8 Units SubCUTAneous TID АННА Baez MD   2 Units at 10/09/22 1828    insulin lispro (HUMALOG) injection vial 0-4 Units  0-4 Units SubCUTAneous Nightly Surekha Arita Erma Dhaliwal MD   4 Units at 10/05/22 2121    morphine injection 4 mg  4 mg IntraVENous Q4H PRN Andres Hu DO   4 mg at 10/10/22 0433    albuterol sulfate HFA (PROVENTIL;VENTOLIN;PROAIR) 108 (90 Base) MCG/ACT inhaler 2 puff  2 puff Inhalation Q4H PRN Warden oBo MD        atorvastatin (LIPITOR) tablet 40 mg  40 mg Oral Daily Warden Boo MD   40 mg at 10/09/22 0841    [Held by provider] furosemide (LASIX) tablet 20 mg  20 mg Oral Daily Warden Boo MD   20 mg at 10/05/22 0841    gabapentin (NEURONTIN) capsule 100 mg  100 mg Oral TID Warden Boo MD   100 mg at 10/09/22 2117    pantoprazole (PROTONIX) tablet 40 mg  40 mg Oral QAM AC Warden Boo MD   40 mg at 10/09/22 0840    traZODone (DESYREL) tablet 50 mg  50 mg Oral Nightly PRN Warden Boo MD   50 mg at 10/07/22 2348    sodium chloride flush 0.9 % injection 10 mL  10 mL IntraVENous 2 times per day Warden Boo MD   10 mL at 10/09/22 2121    sodium chloride flush 0.9 % injection 10 mL  10 mL IntraVENous PRN Warden Boo MD   10 mL at 10/08/22 1025    0.9 % sodium chloride infusion   IntraVENous PRN Warden Boo MD        ondansetron (ZOFRAN-ODT) disintegrating tablet 4 mg  4 mg Oral Q8H PRN Warden Boo MD        Or    ondansetron [facility]) injection 4 mg  4 mg IntraVENous Q6H PRN Warden Boo MD        acetaminophen (TYLENOL) tablet 650 mg  650 mg Oral Q6H PRN Warden Boo MD   650 mg at 10/08/22 0435    Or    acetaminophen (TYLENOL) suppository 650 mg  650 mg Rectal Q6H PRN Warden Boo MD        glucose chewable tablet 16 g  4 tablet Oral PRN Warden Boo MD        dextrose bolus 10% 125 mL  125 mL IntraVENous PRN Warden Boo MD        Or    dextrose bolus 10% 250 mL  250 mL IntraVENous PRN Warden Boo MD        glucagon (rDNA) injection 1 mg  1 mg SubCUTAneous PRN Warden Boo MD        dextrose 10 % infusion   IntraVENous Continuous PRN Karen Costa Moshe Reardon MD        vancomycin Rebeca Boyd) intermittent dosing (placeholder)   Other RX Jermaine Call MD           Allergies: Allergies   Allergen Reactions    Influenza Vaccines Anaphylaxis    Pneumococcal Vaccines        Problem List:    Patient Active Problem List   Diagnosis Code    Seizure disorder ([facility] [address] G40.909    Osteoarthritis M19.90    COPD (chronic obstructive pulmonary disease) ([facility] [facility] J44.9    Spinal stenosis, lumbar M48.061    Lumbar radiculopathy M54.16    Tobacco abuse Z72.0    GERD (gastroesophageal reflux disease) K21.9    Skin ulceration ([facility] [address] L98.499    Non-healing ulcer of lower leg ([facility] [address] L97.909    Ischemic rest pain of lower extremity M79.606, I99.8    Current smoker F17.200    Dyslipidemia E78.5    Abnormal cardiovascular function study R94.30    Obesity E66.9    PVD (peripheral vascular disease) ([facility]) I73.9    CAD, multiple vessel I25.10    Chronic total occlusion of artery of the extremities ([facility]) I70.92    Coronary artery chronic total occlusion I25.82    DAVILA (dyspnea on exertion) R06.09    HTN (hypertension) I10    Discitis of cervical region M46.42    Open wound of right hand S61.401A    Noncompliance by refusing intervention or support Z91.199    Lactic acidosis E87.20    Pressure injury of left buttock, stage 3 ([facility]) M91.432    PAD (peripheral artery disease) ([facility]) I73.9    Cellulitis of right lower extremity L03.115    Pressure injury of sacral region, stage 3 ([facility]) L89.153    Paroxysmal atrial fibrillation ([facility]) I48.0    Left ventricular systolic dysfunction N92.5    Panlobular emphysema ([facility]) J43.1    Cellulitis of left lower extremity L03. 116    Type 2 diabetes mellitus with diabetic peripheral angiopathy and gangrene, with long-term current use of insulin ([facility]) E11.52, Z79.4    Critical limb ischemia of left lower extremity ([facility]) W92.628       Past Medical History:        Diagnosis Date    CAD (coronary artery disease)     [facility] obstructive [facility] disease) [facility] [address] [facility]     Diabetes mellitus ([facility] [address]     Hx of blood clots 1996    LEFT LEG    Hyperlipidemia     Hypertension     Leg wound, left     Leg wound, right     Lumbar radiculopathy     Osteoarthritis     Seizure disorder ([facility] [address] [facility]     Spinal stenosis, lumbar        Past Surgical History:        Procedure Laterality Date    ANKLE SURGERY      ball joint    BACK SURGERY  1994    CARDIAC CATHETERIZATION  2005    1 STENT PLACED    CARDIAC [facility]HE[facility]9/13    [facility]    CORONARY ANGIOPLASTY WITH STENT PLACEMENT      DIAGNOSTIC CARDIAC CATH LAB PROCEDURE  11/18/2021    FOREARM SURGERY      left    FRACTURE SURGERY Right 2000    LEG    KNEE SURGERY      left    LEG SURGERY      LUMBAR SPINE SURGERY      TOE SURGERY      left middle toe    TYMPANOSTOMY TUBE PLACEMENT  1979       Social History:    Social History     Tobacco Use    Smoking status: Some Days     Packs/day: 0.50     Years: 40.00     Pack years: 20.00     Types: Cigarettes    Smokeless tobacco: Never   Substance Use Topics    Alcohol use: No     Alcohol/week: 0.0 standard drinks                                Ready to quit: Not Answered  Counseling given: Not Answered      Vital Signs (Current):   Vitals:    10/09/22 2330 10/10/22 0346 10/10/22 0433 10/10/22 0502   BP:  112/60     Pulse:  73     Resp: 16 14 18 18   Temp:  98.7 °F (37.1 °C)     TempSrc:  Oral     SpO2:  94%     Weight:  243 lb 13.3 oz (110.6 kg)     Height:                                                  BP Readings from Last 3 Encounters:   10/10/22 112/60   09/29/22 128/60   06/10/22 132/72       NPO Status: Time of last liquid consumption: 0800                        Time of last solid consumption: 0800                        Date of last liquid consumption: 10/07/22                        Date of last solid food consumption: 10/07/22    BMI:   Wt Readings from Last 3 Encounters:   10/10/22 243 lb 13.3 oz (110.6 kg)   06/10/22 244 lb (110.7 kg)   04/12/22 247 lb (112 kg)     Body mass index is 32.17 kg/m².     CBC:   Lab Results   Component Value Date/Time    WBC 13.5 10/09/2022 08:37 AM    RBC 4.13 10/09/2022 08:37 AM    RBC 5.49 02/02/2012 11:40 AM    HGB 10.3 10/09/2022 08:37 AM    HCT 34.7 10/09/2022 08:37 AM    MCV 84.0 10/09/2022 08:37 AM    RDW 15.2 03/18/2021 12:01 PM     10/09/2022 08:37 AM       CMP:   Lab Results   Component Value Date/Time     10/09/2022 04:25 AM    K 3.8 10/09/2022 04:25 AM    K 3.2 10/04/2022 03:23 AM     10/09/2022 04:25 AM    CO2 23 10/09/2022 04:25 AM    BUN 5 10/09/2022 04:25 AM    CREATININE 0.7 10/09/2022 04:25 AM    GFRAA >60 03/18/2021 12:01 PM    LABGLOM >90 10/09/2022 04:25 AM    GLUCOSE 164 10/09/2022 04:25 AM    GLUCOSE 117 02/02/2012 11:40 AM    PROT 7.6 10/03/2022 07:10 PM    CALCIUM 8.0 10/09/2022 04:25 AM    BILITOT 0.3 10/03/2022 07:10 PM    ALKPHOS 103 10/03/2022 07:10 PM    AST 13 10/03/2022 07:10 PM    ALT 11 10/03/2022 07:10 PM       POC Tests:   Recent Labs     10/09/22  1923   POCGLU 203*       Coags:   Lab Results   Component Value Date/Time    PROTIME 12.3 11/17/2020 04:20 PM    INR 1.47 10/04/2022 11:13 AM    APTT 62.6 11/17/2020 04:20 PM       HCG (If Applicable): No results found for: PREGTESTUR, PREGSERUM, HCG, HCGQUANT     ABGs: No results found for: PHART, PO2ART, WQH2HBI, WTC7VMY, BEART, C9TYZZCP     Type & Screen (If Applicable):  Lab Results   Component Value Date    LABRH POS 05/08/2013       Drug/Infectious Status (If Applicable):  No results found for: HIV, HEPCAB    COVID-19 Screening (If Applicable): No results found for: COVID19        Anesthesia Evaluation  Patient summary reviewed no history of anesthetic complications:   Airway: Mallampati: II  TM distance: >3 FB   Neck ROM: full  Mouth opening: > = 3 FB   Dental:          Pulmonary:   (+) COPD: no interval change,  decreased breath sounds: bilateral Cardiovascular:  Exercise tolerance: poor (<4 METS),   (+) hypertension:, CAD:, dysrhythmias: atrial fibrillation, DAVILA: no interval change,         Rhythm: regular                      Neuro/Psych:      (-) seizures and CVA           GI/Hepatic/Renal:   (+) GERD: well controlled,      (-) liver disease and no renal disease       Endo/Other:    (+) DiabetesType II DM, , .                 Abdominal:             Vascular:     - DVT and PE. Other Findings:           Anesthesia Plan      general     ASA 3     (PIV. Additional access can be obtained after induction if needed. Standard ASA monitors. IV/PO opioids and other adjuncts as needed for pain control. PACU post op for recovery. Possible anesthetics complications were discussed with the patient, including but not limited to: PONV, damage to the airway and surrounding structures (teeth, lips, gums, tongue, etc.), adverse reactions to medicine, cardiac complications (MI, CHF, arrhythmias, etc.), respiratory complications (post-op ventilation, respiratory failure, etc.), neurologic complications (nerve damage, stroke, seizure), and death. The patient was given the opportunity to ask questions and all questions were answered to the patient's satisfaction. The patient is in agreement with the anesthetic plan.  )  Induction: intravenous. Anesthetic plan and risks discussed with patient and spouse. Plan discussed with CRNA.                     Columba Borrego DO   10/10/2022

## 2022-10-10 NOTE — ANESTHESIA POSTPROCEDURE EVALUATION
Department of Anesthesiology  Postprocedure Note    Patient: Misael Adhikari  MRN: [id number]  YOB: 1955  Date of evaluation: 10/10/2022      Procedure Summary     Date: 10/10/22 Room / Location: [address] [facility]    Anesthesia Start: Alicia Greene Anesthesia Stop: 2410    Procedure: Left  Above Knee Amputation (Left: Leg Upper) Diagnosis:       Severe peripheral arterial disease (HCC)      Pain in both lower extremities      (Severe peripheral arterial disease ([address] [I73.9])      (Pain in both lower extremities [M79.604, M79.605])    Surgeons: Peggy Ochoa MD Responsible Provider: Nisreen Krause DO    Anesthesia Type: general ASA Status: 3          Anesthesia Type: No value filed.     Zaynab Phase I: Zaynab Score: 8    Zaynab Phase II:        Anesthesia Post Evaluation    Patient location during evaluation: PACU  Patient participation: complete - patient participated  Level of consciousness: awake and alert  Airway patency: patent  Nausea & Vomiting: no vomiting and no nausea  Complications: no  Cardiovascular status: hemodynamically stable  Respiratory status: acceptable  Hydration status: stable

## 2022-10-10 NOTE — PROGRESS NOTES
Progress note: Infectious diseases    Patient - Cora Chilel,  Age - 74 y.o.    - 1955      Room Number - 4K-04/004-A   MRN -  [id number]   Acct # - [de-identified]  Date of Admission -  10/3/2022  6:11 PM    SUBJECTIVE:   He had left AKA today. He is asleep at the time of my evaluation  OBJECTIVE   VITALS    height is 6' 1\" (1.854 m) and weight is 243 lb 13.3 oz (110.6 kg). His oral temperature is 98.1 °F (36.7 °C). His blood pressure is 139/77 and his pulse is 85. His respiration is 18 and oxygen saturation is 92%. Wt Readings from Last 3 Encounters:   10/10/22 243 lb 13.3 oz (110.6 kg)   06/10/22 244 lb (110.7 kg)   22 247 lb (112 kg)       I/O (24 Hours)    Intake/Output Summary (Last 24 hours) at 10/10/2022 1201  Last data filed at 10/10/2022 0844  Gross per 24 hour   Intake 2081.92 ml   Output 325 ml   Net 1756.92 ml         General Appearance  asleep. HEENT - normocephalic, atraumatic, pale conjunctiva,  anicteric sclera  Neck - Supple, no mass  Lungs -  Bilateral   air entry, no rhonchi, no wheeze  Cardiovascular - Heart sounds are normal.     Abdomen - soft, not distended, nontender,   Neurologic -asleep  Skin - No bruising or bleeding  Extremities - dressed left AKA stump.        MEDICATIONS:      [Held by provider] enoxaparin  30 mg SubCUTAneous BID    insulin glargine  12 Units SubCUTAneous Nightly    piperacillin-tazobactam  3,375 mg IntraVENous 30 Min Pre-Op    piperacillin-tazobactam  3,375 mg IntraVENous Q8H    insulin glargine  4 Units SubCUTAneous Once    metoprolol succinate  75 mg Oral BID    potassium chloride  20 mEq Oral Daily with breakfast    metoprolol succinate  25 mg Oral Once    polyethylene glycol  17 g Oral Daily    vancomycin  1,500 mg IntraVENous Q12H    [Held by provider] isosorbide mononitrate  15 mg Oral Daily    tamsulosin  0.4 mg Oral Daily    amiodarone  200 mg Oral BID    guaiFENesin  600 mg Oral BID    sodium hypochlorite   Irrigation Daily    insulin lispro  0-8 Units SubCUTAneous TID WC    insulin lispro  0-4 Units SubCUTAneous Nightly    atorvastatin  40 mg Oral Daily    [Held by provider] furosemide  20 mg Oral Daily    gabapentin  100 mg Oral TID    pantoprazole  40 mg Oral QAM AC    sodium chloride flush  10 mL IntraVENous 2 times per day    vancomycin (VANCOCIN) intermittent dosing (placeholder)   Other RX Placeholder      sodium chloride      dextrose       HYDROmorphone, oxyCODONE **OR** oxyCODONE, magnesium hydroxide, bisacodyl, potassium chloride **OR** potassium alternative oral replacement **OR** potassium chloride, benzocaine-menthol, morphine, albuterol sulfate HFA, traZODone, sodium chloride flush, sodium chloride, ondansetron **OR** ondansetron, acetaminophen **OR** acetaminophen, glucose, dextrose bolus **OR** dextrose bolus, glucagon (rDNA), dextrose      LABS:     CBC:   Recent Labs     10/09/22  0837   WBC 13.5*   HGB 10.3*   *       BMP:    Recent Labs     10/09/22  0425      K 3.8      CO2 23   BUN 5*   CREATININE 0.7   GLUCOSE 164*       Calcium:  Recent Labs     10/09/22  0425   CALCIUM 8.0*       Ionized Calcium:No results for input(s): IONCA in the last 72 hours. Magnesium:  Recent Labs     10/09/22  0425   MG 2.0       Phosphorus:No results for input(s): PHOS in the last 72 hours. BNP:No results for input(s): BNP in the last 72 hours. Glucose:  Recent Labs     10/09/22  1651 10/09/22  1923 10/10/22  0922   POCGLU 238* 203* 207*        Amylase and Lipase:  No results for input(s): LACTA, AMYLASE in the last 72 hours. Lactic Acid:   No results for input(s): LACTA in the last 72 hours. CULTURES:   UA: No results for input(s): SPECGRAV, PHUR, COLORU, CLARITYU, MUCUS, PROTEINU, BLOODU, RBCUA, WBCUA, BACTERIA, NITRU, GLUCOSEU, BILIRUBINUR, UROBILINOGEN, KETUA, LABCAST, LABCASTTY, AMORPHOS in the last 72 hours.     Invalid input(s): CRYSTALS    Micro:   Lab Results   Component Value Date/Time    [facility]  10/03/2022 07:29 PM     No growth 24 hours. No growth 48 hours. No growth at 5 days          Problem list of patient:     Patient Active Problem List   Diagnosis Code    Seizure disorder ([facility] [address] G40.909    Osteoarthritis M19.90    COPD (chronic obstructive pulmonary disease) ([facility] J44.9    Spinal stenosis, lumbar M48.061    Lumbar radiculopathy M54.16    Tobacco abuse Z72.0    GERD (gastroesophageal reflux disease) K21.9    Skin ulceration ([facility]) L98.499    Non-healing ulcer of lower leg ([facility]) L97.909    Ischemic rest pain of lower extremity M79.606, I99.8    Current smoker F17.200    Dyslipidemia E78.5    Abnormal cardiovascular function study R94.30    Obesity E66.9    PVD (peripheral vascular disease) ([facility]) I73.9    CAD, multiple vessel I25.10    Chronic total occlusion of artery of the extremities ([facility]) I70.92    Coronary artery chronic total occlusion I25.82    DAVILA (dyspnea on exertion) R06.09    HTN (hypertension) I10    Discitis of cervical region M46.42    Open wound of right hand S61.401A    Noncompliance by refusing intervention or support Z91.199    Lactic acidosis E87.20    Pressure injury of left buttock, stage 3 ([facility]) K46.554    PAD (peripheral artery disease) ([facility]) I73.9    Cellulitis of right lower extremity L03.115    Pressure injury of sacral region, stage 3 ([facility]) L89.153    Paroxysmal atrial fibrillation ([facility]) I48.0    Left ventricular systolic dysfunction E41.6    Panlobular emphysema ([facility]) J43.1    Cellulitis of left lower extremity L03.116    Type 2 diabetes mellitus with diabetic peripheral angiopathy and gangrene, with long-term current use of insulin ([facility]) E11.52, Z79.4    Critical limb ischemia of left lower extremity ([facility]) T06.892         ASSESSMENT/PLAN   Severe PVD:  with ischemic wound: he had left AKA  Non healing leg wound  CAD  Poorly controlled diabetes  Continue current treatment.          Dennis Sanders Estefanía Matson MD, MD, [address] 10/10/2022 12:01 PM

## 2022-10-11 LAB
ANION GAP SERPL CALCULATED.3IONS-SCNC: 8 MEQ/L (ref 8–16)
BUN BLDV-MCNC: 6 MG/DL (ref 7–22)
CALCIUM SERPL-MCNC: 8.3 MG/DL (ref 8.5–10.5)
CHLORIDE BLD-SCNC: 99 MEQ/L (ref 98–111)
CO2: 27 MEQ/L (ref 23–33)
CREAT SERPL-MCNC: 0.8 MG/DL (ref 0.4–1.2)
ERYTHROCYTE [DISTWIDTH] IN BLOOD BY AUTOMATED COUNT: 18.1 % (ref 11.5–14.5)
ERYTHROCYTE [DISTWIDTH] IN BLOOD BY AUTOMATED COUNT: 55.1 FL (ref 35–45)
GFR SERPL CREATININE-BSD FRML MDRD: > 90 ML/MIN/1.73M2
GLUCOSE BLD-MCNC: 165 MG/DL (ref 70–108)
GLUCOSE BLD-MCNC: 199 MG/DL (ref 70–108)
GLUCOSE BLD-MCNC: 215 MG/DL (ref 70–108)
GLUCOSE BLD-MCNC: 242 MG/DL (ref 70–108)
GLUCOSE BLD-MCNC: 320 MG/DL (ref 70–108)
HCT VFR BLD CALC: 32.7 % (ref 42–52)
HEMOGLOBIN: 9.6 GM/DL (ref 14–18)
MCH RBC QN AUTO: 24.9 PG (ref 26–33)
MCHC RBC AUTO-ENTMCNC: 29.4 GM/DL (ref 32.2–35.5)
MCV RBC AUTO: 84.9 FL (ref 80–94)
PLATELET # BLD: 474 THOU/MM3 (ref 130–400)
PMV BLD AUTO: 9.4 FL (ref 9.4–12.4)
POTASSIUM REFLEX MAGNESIUM: 4.6 MEQ/L (ref 3.5–5.2)
RBC # BLD: 3.85 MILL/MM3 (ref 4.7–6.1)
SODIUM BLD-SCNC: 134 MEQ/L (ref 135–145)
VANCOMYCIN RANDOM: 26.2 UG/ML (ref 0.1–39.9)
WBC # BLD: 15 THOU/MM3 (ref 4.8–10.8)

## 2022-10-11 PROCEDURE — 80202 ASSAY OF VANCOMYCIN: CPT

## 2022-10-11 PROCEDURE — 6360000002 HC RX W HCPCS: Performed by: SURGERY

## 2022-10-11 PROCEDURE — 2580000003 HC RX 258: Performed by: SURGERY

## 2022-10-11 PROCEDURE — 97530 THERAPEUTIC ACTIVITIES: CPT

## 2022-10-11 PROCEDURE — 99024 POSTOP FOLLOW-UP VISIT: CPT | Performed by: SURGERY

## 2022-10-11 PROCEDURE — 6370000000 HC RX 637 (ALT 250 FOR IP): Performed by: SURGERY

## 2022-10-11 PROCEDURE — 1200000000 HC SEMI PRIVATE

## 2022-10-11 PROCEDURE — 99233 SBSQ HOSP IP/OBS HIGH 50: CPT | Performed by: INTERNAL MEDICINE

## 2022-10-11 PROCEDURE — 85027 COMPLETE CBC AUTOMATED: CPT

## 2022-10-11 PROCEDURE — 94640 AIRWAY INHALATION TREATMENT: CPT

## 2022-10-11 PROCEDURE — 6370000000 HC RX 637 (ALT 250 FOR IP): Performed by: NURSE PRACTITIONER

## 2022-10-11 PROCEDURE — 99253 IP/OBS CNSLTJ NEW/EST LOW 45: CPT | Performed by: NURSE PRACTITIONER

## 2022-10-11 PROCEDURE — 97162 PT EVAL MOD COMPLEX 30 MIN: CPT

## 2022-10-11 PROCEDURE — 80048 BASIC METABOLIC PNL TOTAL CA: CPT

## 2022-10-11 PROCEDURE — 36415 COLL VENOUS BLD VENIPUNCTURE: CPT

## 2022-10-11 PROCEDURE — 6370000000 HC RX 637 (ALT 250 FOR IP)

## 2022-10-11 PROCEDURE — 97110 THERAPEUTIC EXERCISES: CPT

## 2022-10-11 PROCEDURE — 97167 OT EVAL HIGH COMPLEX 60 MIN: CPT

## 2022-10-11 PROCEDURE — 94760 N-INVAS EAR/PLS OXIMETRY 1: CPT

## 2022-10-11 PROCEDURE — 82948 REAGENT STRIP/BLOOD GLUCOSE: CPT

## 2022-10-11 RX ORDER — OXYCODONE HYDROCHLORIDE 5 MG/1
10 TABLET ORAL EVERY 6 HOURS PRN
Status: DISCONTINUED | OUTPATIENT
Start: 2022-10-11 | End: 2022-10-13 | Stop reason: HOSPADM

## 2022-10-11 RX ORDER — CLOPIDOGREL BISULFATE 75 MG/1
75 TABLET ORAL DAILY
Status: DISCONTINUED | OUTPATIENT
Start: 2022-10-11 | End: 2022-10-11

## 2022-10-11 RX ORDER — ALBUTEROL SULFATE 90 UG/1
2 AEROSOL, METERED RESPIRATORY (INHALATION) 4 TIMES DAILY
Status: DISCONTINUED | OUTPATIENT
Start: 2022-10-11 | End: 2022-10-12

## 2022-10-11 RX ORDER — CLOPIDOGREL BISULFATE 75 MG/1
75 TABLET ORAL DAILY
Status: DISCONTINUED | OUTPATIENT
Start: 2022-10-12 | End: 2022-10-13 | Stop reason: HOSPADM

## 2022-10-11 RX ORDER — POLYETHYLENE GLYCOL 3350 17 G/17G
17 POWDER, FOR SOLUTION ORAL DAILY PRN
Status: DISCONTINUED | OUTPATIENT
Start: 2022-10-12 | End: 2022-10-13 | Stop reason: HOSPADM

## 2022-10-11 RX ORDER — OXYCODONE HYDROCHLORIDE 5 MG/1
5 TABLET ORAL EVERY 6 HOURS PRN
Status: DISCONTINUED | OUTPATIENT
Start: 2022-10-11 | End: 2022-10-13 | Stop reason: HOSPADM

## 2022-10-11 RX ORDER — ASPIRIN 81 MG/1
81 TABLET, CHEWABLE ORAL DAILY
Status: DISCONTINUED | OUTPATIENT
Start: 2022-10-11 | End: 2022-10-13 | Stop reason: HOSPADM

## 2022-10-11 RX ADMIN — OXYCODONE HYDROCHLORIDE 10 MG: 5 TABLET ORAL at 10:37

## 2022-10-11 RX ADMIN — INSULIN LISPRO 4 UNITS: 100 INJECTION, SOLUTION INTRAVENOUS; SUBCUTANEOUS at 12:16

## 2022-10-11 RX ADMIN — MORPHINE SULFATE 4 MG: 4 INJECTION, SOLUTION INTRAMUSCULAR; INTRAVENOUS at 22:33

## 2022-10-11 RX ADMIN — HYDROMORPHONE HYDROCHLORIDE 1 MG: 1 INJECTION, SOLUTION INTRAMUSCULAR; INTRAVENOUS; SUBCUTANEOUS at 12:05

## 2022-10-11 RX ADMIN — SODIUM HYPOCHLORITE: 1.25 SOLUTION TOPICAL at 09:19

## 2022-10-11 RX ADMIN — PIPERACILLIN AND TAZOBACTAM 3375 MG: 3; .375 INJECTION, POWDER, FOR SOLUTION INTRAVENOUS at 15:17

## 2022-10-11 RX ADMIN — ASPIRIN 81 MG 81 MG: 81 TABLET ORAL at 20:41

## 2022-10-11 RX ADMIN — AMIODARONE HYDROCHLORIDE 200 MG: 200 TABLET ORAL at 20:41

## 2022-10-11 RX ADMIN — OXYCODONE HYDROCHLORIDE 10 MG: 5 TABLET ORAL at 05:22

## 2022-10-11 RX ADMIN — INSULIN LISPRO 12 UNITS: 100 INJECTION, SOLUTION INTRAVENOUS; SUBCUTANEOUS at 17:13

## 2022-10-11 RX ADMIN — PANTOPRAZOLE SODIUM 40 MG: 40 TABLET, DELAYED RELEASE ORAL at 09:17

## 2022-10-11 RX ADMIN — METOPROLOL SUCCINATE 75 MG: 50 TABLET, EXTENDED RELEASE ORAL at 09:18

## 2022-10-11 RX ADMIN — Medication 1250 MG: at 12:11

## 2022-10-11 RX ADMIN — HYDROMORPHONE HYDROCHLORIDE 1 MG: 1 INJECTION, SOLUTION INTRAMUSCULAR; INTRAVENOUS; SUBCUTANEOUS at 07:27

## 2022-10-11 RX ADMIN — ALBUTEROL SULFATE 2 PUFF: 90 AEROSOL, METERED RESPIRATORY (INHALATION) at 20:17

## 2022-10-11 RX ADMIN — ISOSORBIDE MONONITRATE 15 MG: 30 TABLET, EXTENDED RELEASE ORAL at 09:17

## 2022-10-11 RX ADMIN — PIPERACILLIN AND TAZOBACTAM 3375 MG: 3; .375 INJECTION, POWDER, FOR SOLUTION INTRAVENOUS at 05:35

## 2022-10-11 RX ADMIN — ENOXAPARIN SODIUM 30 MG: 100 INJECTION SUBCUTANEOUS at 20:42

## 2022-10-11 RX ADMIN — TAMSULOSIN HYDROCHLORIDE 0.4 MG: 0.4 CAPSULE ORAL at 09:18

## 2022-10-11 RX ADMIN — MAGNESIUM HYDROXIDE 30 ML: 400 SUSPENSION ORAL at 17:13

## 2022-10-11 RX ADMIN — OXYCODONE HYDROCHLORIDE 10 MG: 5 TABLET ORAL at 15:19

## 2022-10-11 RX ADMIN — SODIUM CHLORIDE, PRESERVATIVE FREE 10 ML: 5 INJECTION INTRAVENOUS at 20:42

## 2022-10-11 RX ADMIN — ALBUTEROL SULFATE 2 PUFF: 90 AEROSOL, METERED RESPIRATORY (INHALATION) at 11:35

## 2022-10-11 RX ADMIN — GUAIFENESIN 600 MG: 600 TABLET, EXTENDED RELEASE ORAL at 20:41

## 2022-10-11 RX ADMIN — HYDROMORPHONE HYDROCHLORIDE 1 MG: 1 INJECTION, SOLUTION INTRAMUSCULAR; INTRAVENOUS; SUBCUTANEOUS at 04:43

## 2022-10-11 RX ADMIN — INSULIN GLARGINE 16 UNITS: 100 INJECTION, SOLUTION SUBCUTANEOUS at 20:45

## 2022-10-11 RX ADMIN — HYDROMORPHONE HYDROCHLORIDE 1 MG: 1 INJECTION, SOLUTION INTRAMUSCULAR; INTRAVENOUS; SUBCUTANEOUS at 01:22

## 2022-10-11 RX ADMIN — POTASSIUM CHLORIDE 20 MEQ: 1500 TABLET, EXTENDED RELEASE ORAL at 09:18

## 2022-10-11 RX ADMIN — GUAIFENESIN 600 MG: 600 TABLET, EXTENDED RELEASE ORAL at 09:18

## 2022-10-11 RX ADMIN — GABAPENTIN 100 MG: 100 CAPSULE ORAL at 20:41

## 2022-10-11 RX ADMIN — SODIUM CHLORIDE, PRESERVATIVE FREE 10 ML: 5 INJECTION INTRAVENOUS at 09:19

## 2022-10-11 RX ADMIN — GABAPENTIN 100 MG: 100 CAPSULE ORAL at 09:18

## 2022-10-11 RX ADMIN — EMPAGLIFLOZIN 10 MG: 10 TABLET, FILM COATED ORAL at 12:05

## 2022-10-11 RX ADMIN — POLYETHYLENE GLYCOL (3350) 17 G: 17 POWDER, FOR SOLUTION ORAL at 09:18

## 2022-10-11 RX ADMIN — GABAPENTIN 100 MG: 100 CAPSULE ORAL at 15:12

## 2022-10-11 RX ADMIN — ACETAMINOPHEN 650 MG: 325 TABLET ORAL at 19:47

## 2022-10-11 RX ADMIN — METOPROLOL SUCCINATE 75 MG: 50 TABLET, EXTENDED RELEASE ORAL at 20:42

## 2022-10-11 RX ADMIN — MORPHINE SULFATE 4 MG: 4 INJECTION, SOLUTION INTRAMUSCULAR; INTRAVENOUS at 17:59

## 2022-10-11 RX ADMIN — ATORVASTATIN CALCIUM 40 MG: 40 TABLET, FILM COATED ORAL at 09:18

## 2022-10-11 RX ADMIN — FUROSEMIDE 20 MG: 20 TABLET ORAL at 09:18

## 2022-10-11 RX ADMIN — PIPERACILLIN AND TAZOBACTAM 3375 MG: 3; .375 INJECTION, POWDER, FOR SOLUTION INTRAVENOUS at 21:52

## 2022-10-11 RX ADMIN — AMIODARONE HYDROCHLORIDE 200 MG: 200 TABLET ORAL at 09:17

## 2022-10-11 RX ADMIN — ALBUTEROL SULFATE 2 PUFF: 90 AEROSOL, METERED RESPIRATORY (INHALATION) at 15:15

## 2022-10-11 ASSESSMENT — PAIN DESCRIPTION - ORIENTATION
ORIENTATION: LEFT
ORIENTATION: LEFT;RIGHT
ORIENTATION: LEFT

## 2022-10-11 ASSESSMENT — PAIN - FUNCTIONAL ASSESSMENT

## 2022-10-11 ASSESSMENT — PAIN DESCRIPTION - DESCRIPTORS
DESCRIPTORS: ACHING;DISCOMFORT
DESCRIPTORS: ACHING
DESCRIPTORS: DULL
DESCRIPTORS: DISCOMFORT
DESCRIPTORS: ACHING
DESCRIPTORS: ACHING;DISCOMFORT

## 2022-10-11 ASSESSMENT — PAIN SCALES - GENERAL
PAINLEVEL_OUTOF10: 9
PAINLEVEL_OUTOF10: 9
PAINLEVEL_OUTOF10: 7
PAINLEVEL_OUTOF10: 6
PAINLEVEL_OUTOF10: 9
PAINLEVEL_OUTOF10: 8
PAINLEVEL_OUTOF10: 9
PAINLEVEL_OUTOF10: 9
PAINLEVEL_OUTOF10: 7
PAINLEVEL_OUTOF10: 7

## 2022-10-11 ASSESSMENT — PAIN DESCRIPTION - LOCATION
LOCATION: LEG

## 2022-10-11 NOTE — OP NOTE
[address] [place] [address]                                OPERATIVE REPORT    PATIENT NAME: Kendrick Rodriguez                   :        1955  MED REC NO:   [id number]                           ROOM:       0004  ACCOUNT NO:   [de-identified]                           ADMIT DATE: 10/03/2022  PROVIDER:     Steven Lizarraga. Noah Redding MD    DATE OF PROCEDURE:  10/10/2022    PREOPERATIVE DIAGNOSES:  Peripheral vascular disease, ischemic left leg. POSTOPERATIVE DIAGNOSES:  Peripheral vascular disease, ischemic left  leg. OPERATION:  Left above-knee amputation. SURGEON:  Steven Lizarraga. MD Alvaro    ANESTHESIA:  General.    COMPLICATIONS:  None. BLOOD LOSS:  50 mL. INDICATION FOR PROCEDURE:  The patient is a 79-year-old male with  significant peripheral vascular disease with an occluded iliac and  vessels are going to the left leg. He is having chronic ischemic pain  along with nonhealing wounds in the lower leg. He is here for left  above-knee amputation. FINDINGS:  The patient's muscle actually looked quite healthy. The  bleeding was fairly minimal as expected. Standard left above-knee  amputation was performed. DESCRIPTION OF THE PROCEDURE:  The patient was brought to operating  suite, placed supine on the operating room table. After adequate  inhalational anesthesia was administered, the patient's left leg was  prepped and draped in the usual sterile fashion. The skin marker was  taken and skin markings were made for a [place] anterior and posterior  flap. Incision was then taken down through the anterior skin marking  going down through the quadriceps muscles, and they were divided down to  the femur. We completed then the skin incision in the posterior flap  going again down through the subcutaneous tissue.   We divided the  muscles lateral to the femur both medial and lateral and then again made  the skin incision complete posteriorly, took a Gigli saw, divided the  femur and then used a bone hook, elevated the femur and then used an  amputation knife to complete the amputation. It should be noted we did  have a tourniquet on, and we put it up to 250 pressure to begin the  operation. Clamps were placed on the major vessels which were minimally  bleeding and a tie was placed. The sciatic nerve was identified. Hemostasis was obtained with electrocautery. There was minimal  bleeding, but the tissue looked healthy. We dropped the tourniquet down  sequentially to 200, then 150, then 100, then off. Again hemostasis was  obtained with electrocautery. We irrigated the muscle with Irrisept. Then closure was begun. We brought the posterior flap up to the  anterior flap covering the femur with 0 Vicryl sutures. Staples were  used to close the skin. The patient tolerated the procedure well. JAYCOB DICKENS [facility], MD    D: 10/10/2022 10:50:26       T: 10/10/2022 10:53:58     [email or web address]  Job#: [id number]     Doc#: [id number]    CC:

## 2022-10-11 NOTE — FLOWSHEET NOTE
10/11/22 1739   Safe Environment   Safety Measures Other (comment)  (VN safety round completed)   Audio call placed to pt room , pt declined video at this time , denied any needs or concerns , no camera activation ,

## 2022-10-11 NOTE — CARE COORDINATION
DISCHARGE/PLANNING EVALUATION  10/11/22, 2:55 PM EDT    Reason for Referral: Needs home health  Mental Status:  Alert and oriented   Decision Making: Makes own decisions, has family support  Family/Social/Home Environment: Patient resides at home with spouse. His home is one floor with 4 steps inside. He stated that they plan to build a ramp but also have a way to get into the home if the ramp is not complete. He stated that his spouse plans to go home and move things around at home so he can get the wheelchair into the room. Current Services including food security, transportation and housekeeping:  Self or spouse  Current Equipment: Is getting a walker, wheelchair and shower chair. Payment Source: [employer]  Concerns or Barriers to Discharge:  NA  Post-acute provider list with abbreviated version of the quality measures, geographic area, and applicable managed care information provided. Offered option of searching Medicare. gov website by using the computer in patient's room to review complete quality measures information. Questions regarding selection process answered: Declined, wants [facility] as he has had them in the past.       Teach Back Method used with [facility] regarding care plan and options for discharge. Patient and spouse verbalize understanding of the plan of care and contribute to goal setting. Patient goals, treatment preferences and discharge plan: Patient prefers [facility]. Called and made referral to Collette Rebolledo.      Electronically signed by FELIX Anthony on 10/11/2022 at 2:55 PM

## 2022-10-11 NOTE — PROGRESS NOTES
[facility] Pharmacokinetic Monitoring Service - Vancomycin    Consulting Provider: Dr. John Sol   Indication: SSTI  Target Concentration: Goal trough of 10-15 mg/L and AUC/HALLEY <500 mg*hr/L  Day of Therapy: 8  Additional Antimicrobials: Zosyn    Pertinent Laboratory Values: Wt Readings from Last 1 Encounters:   10/10/22 243 lb 13.3 oz (110.6 kg)     Temp Readings from Last 1 Encounters:   10/11/22 98 °F (36.7 °C) (Oral)     Estimated Creatinine Clearance: 120 mL/min (based on SCr of 0.8 mg/dL). Recent Labs     10/09/22  0425 10/09/22  0837 10/11/22  0346   CREATININE 0.7  --  0.8   WBC  --  13.5* 15.0*       Pertinent Cultures:  Culture Date Source Results   10/3/22 Leg swab mixed growth including Pseudomonas, GBS, MSSA   10/4/22 Blood x 2 NGTD   MRSA Nasal Swab: N/A. Non-respiratory infection.     Recent vancomycin administrations                     vancomycin (VANCOCIN) 1500 mg in dextrose 5% 500 mL IVPB (mg) 1,500 mg New Bag 10/10/22 2358     1,500 mg New Bag  1431     1,500 mg New Bag  0016     1,500 mg New Bag 10/09/22 1230     1,500 mg New Bag 10/08/22 2357     1,500 mg New Bag  1320                    Assessment:  Date/Time Current Dose Concentration Timing of Concentration (h) AUC   10/11/22 @0346 1500 mg Q12h 26.2 3 hr 48 min 538   Note: Serum concentrations collected for AUC dosing may appear elevated if collected in close proximity to the dose administered, this is not necessarily an indication of toxicity    Plan:  Current dosing regimen is supra-therapeutic  \"Decrease dose to 1250 mg IV every 12 hours  Plan for level 10/12 @ 0600   Pharmacy will continue to monitor patient and adjust therapy as indicated    Thank you for the consult,  Jama Chauhan [facility]  10/11/2022 7:48 AM

## 2022-10-11 NOTE — PROGRESS NOTES
[facility] 62  INPATIENT OCCUPATIONAL THERAPY  STR ICU STEPDOWN TELEMETRY 4K  EVALUATION    Time:   Time In: 8310  Time Out: 1024  Timed Code Treatment Minutes: 23 Minutes  Minutes: 31          Date: 10/11/2022  Patient Name: Liz Simmonds,   Gender: male      MRN: [id number]  : 1955  (74 y.o.)  Referring Practitioner: Boneta Holstein, DO  Diagnosis: PAD  Additional Pertinent Hx: Liz Simmonds is a 74 y.o. male with a PMH of PAD, CAD s/p PCI LAD, chronic sacral wounds, chronic systolic heart failure, pAF, HTN, HLD, NIDDMII, tobacco abuse who presented with bilateral lower extremity wounds. Patient reports a 2-week history of worsening bilateral lower extremity infections. He states he has developed rising erythema to the mid calf along with worsening wounds and ulcerations of bilateral extremities. Patient states wounds have been purulent and foul-smelling. He also reports profuse diarrhea upon admission for the last 1 week. He states he follows with Dr. Crystal Pires for these bilateral lower extremity wounds, and has had lower extremity imaging in the past that demonstrated severe inflow disease. He has undergone previous bypass grafting of the left lower extremity that has since chronically occluded. He denies any chest pain, shortness of breath, abdominal pain. He has a history of coronary artery disease for which she went stent placement x2, unsure of which vessel. Pt is s/p L AKA 10/10. Restrictions/Precautions:  Restrictions/Precautions: Weight Bearing, Fall Risk  Left Lower Extremity Weight Bearing: Non Weight Bearing    Subjective  Chart Reviewed: Yes, Orders, Progress Notes, History and Physical, Imaging, Operative Notes  Patient assessed for rehabilitation services?: Yes    Subjective: RN okayed OT session. Upon arrival patient was sitting up in w/c im hallway. Pt was agreeable to OT session.     Pain: 8/10: L residual limb     Vitals: Vitals not assessed per clinical judgement, see nursing flowsheet    Social/Functional History:  Lives With: Significant other  Type of Home: House  Home Layout: One level  Home Access: Stairs to enter with rails  Entrance Stairs - Number of Steps: 2-- looking into getting a ramp  Home Equipment: Wheelchair-manual, Crutches ([employer]strand crutches)   Bathroom Shower/Tub: Tub/Shower unit  Bathroom Toilet: Standard  Bathroom Equipment: Tub transfer bench, Grab bars in shower, 3-in-1 commode (need tub transfer bench, elevated BSC, grab bars per family)  Bathroom Accessibility: Handy Show accessible    [employer] Help From: Family  ADL Assistance: [address]: Independent  Homemaking Responsibilities: Yes  Ambulation Assistance: Independent  Transfer Assistance: Independent    Active : Yes  Mode of Transportation: Family, Friends  Occupation: Retired  Type of Occupation: retired from [address], retired from [employer]  Additional Comments: Pt amb with lofstrand crutches; h/o back injury in 1994, fell off roof, pt unsure of level of injury; was able to rehab self to ambulating with lofstrand crutches; pt with h/o several R LE sx's, including clarisa in lower leg, ankle ball joint    VISION:WFL    HEARING:  WFL    COGNITION: Slow Processing, Decreased Insight, Decreased Problem Solving, and Decreased Safety Awareness    RANGE OF MOTION:  Bilateral Upper Extremity:  WFL    STRENGTH:  Bilateral Upper Extremity:  Impaired - deconditioned     ADL:   Lower Extremity Dressing: Dependent. Pt refusing to don grippy socks to R LE despite education on safety and fall risk . BALANCE:  Sitting Balance:  Stand By Assistance. Seated EOB. Standing Balance: contact guard. BED MOBILITY:  Sit to Supine: Stand By Assistance    Scooting: Stand By Assistance      TRANSFERS:  Sit to Stand:  Contact Guard Assistance. Stand to Sit: Contact Guard Assistance. Stand Pivot: Minimal Assistance, X 1, with increased time for completion, cues for hand placement.  From w/c to EOB. Increased time to complete     FUNCTIONAL MOBILITY:  Assistive Device: Wheelchair  Assist Level:  Stand By Assistance. Distance:  ~200 feet   Slow pace, avoided all obstacles. Activity Tolerance:  Patient tolerance of  treatment: good. Assessment: This 74year old male presents s/p L AKA. Pt demonstrates weakness, decreased balance, decrease safety awareness, decreased endurance. Pt requires skilled OT intervention to increase indep and safety with all self cares, transfers, mobility, and IADLs to return to PLOF. Without skilled OT intervention patient is at increased risk for falls, caregiver burden, and hospital readmission after discharge. Pt would benefit from OT at discharge. Performance deficits / Impairments: Decreased functional mobility , Decreased ADL status, Decreased strength, Decreased endurance, Decreased safe awareness, Decreased balance, Decreased high-level IADLs  Prognosis: Good  REQUIRES OT FOLLOW-UP: Yes    Treatment Initiated: Treatment and education initiated within context of evaluation. Evaluation time included review of current medical information, gathering information related to past medical, social and functional history, completion of standardized testing, formal and informal observation of tasks, assessment of data and development of plan of care and goals. Treatment time included skilled education and facilitation of tasks to increase safety and independence with ADL's for improved functional independence and quality of life.     Discharge Recommendations:  Continue to assess pending progress, Home with Home health OT    Patient Education:     Patient Education  Education Given To: Patient  Education Provided: Role of Therapy, Plan of Care, Precautions, ADL Adaptive Strategies, Transfer Training  Education Method: Demonstration  Barriers to Learning: Cognition  Education Outcome: Continued education needed    Equipment Recommendations:  Equipment Needed: Yes  Other: Transfer tub bench, drop arm BSC, grab bars,    Plan:  Times Per Week: 5x  Times Per Day: Once a day  Current Treatment Recommendations: Strengthening, Balance training, Functional mobility training, Endurance training, Self-Care / ADL, Equipment evaluation, education, & procurement, Safety education & training, Patient/Caregiver education & training, Home management training, Wheelchair mobility training. See long-term goal time frame for expected duration of plan of care. If no long-term goals established, a short length of stay is anticipated. Goals:  Patient goals : Go Home with SO  Short Term Goals  Time Frame for Short Term Goals: Until Discharge  Short Term Goal 1: Pt will complete BUE Strengthening exercises with min vcs for technique to increase indep and endurance with all transfers and self cares. Short Term Goal 2: Pt will complete stand pivots to/from various surfaces with S and 0 vcs for safety to increase indep and endurance within home environment. Short Term Goal 3: Pt will complete LB dressing with min A and min vcs for safety to increase indep and endurance in home environment. Additional Goals?: No         Following session, patient left in safe position with all fall risk precautions in place.

## 2022-10-11 NOTE — PROGRESS NOTES
[facility]  Post Operative Progress Note  Dr Delia Kinney    Pt Name: Crow Virgen Record Number: [id number]  Date of Birth 1955   Today's Date: 10/11/2022    Hospital day # 8   POD # 1    Chief complaint: no complaints today     Patient was stable overnight. Chart reviewed. Updated by nursing staff. Denies chest discomfort or dyspnea. No N/V; (-) belching, flatus and BM. Tolerating ADULT DIET; Regular; 4 carb choices (60 gm/meal) diet. Pain controlled with analgesia. Up with therapy. Dressing intact. In wheel chair with therapy     Past, Family, Social History unchanged from admission. Diet:  ADULT DIET;  Regular; 4 carb choices (60 gm/meal)    Medications:  Scheduled Meds:   vancomycin  1,250 mg IntraVENous Q12H    albuterol sulfate HFA  2 puff Inhalation 4x daily    furosemide  20 mg Oral Daily    enoxaparin  30 mg SubCUTAneous BID    insulin glargine  16 Units SubCUTAneous Nightly    empagliflozin  10 mg Oral Daily    insulin lispro  0-16 Units SubCUTAneous TID WC    insulin lispro  0-4 Units SubCUTAneous Nightly    piperacillin-tazobactam  3,375 mg IntraVENous 30 Min Pre-Op    piperacillin-tazobactam  3,375 mg IntraVENous Q8H    metoprolol succinate  75 mg Oral BID    potassium chloride  20 mEq Oral Daily with breakfast    metoprolol succinate  25 mg Oral Once    polyethylene glycol  17 g Oral Daily    isosorbide mononitrate  15 mg Oral Daily    tamsulosin  0.4 mg Oral Daily    amiodarone  200 mg Oral BID    guaiFENesin  600 mg Oral BID    sodium hypochlorite   Irrigation Daily    atorvastatin  40 mg Oral Daily    gabapentin  100 mg Oral TID    pantoprazole  40 mg Oral QAM AC    sodium chloride flush  10 mL IntraVENous 2 times per day    vancomycin (VANCOCIN) intermittent dosing (placeholder)   Other RX Placeholder     Continuous Infusions:   sodium chloride      dextrose       PRN Meds:HYDROmorphone, oxyCODONE **OR** oxyCODONE, magnesium hydroxide, bisacodyl, potassium chloride **OR** potassium alternative oral replacement **OR** potassium chloride, benzocaine-menthol, morphine, traZODone, sodium chloride flush, sodium chloride, ondansetron **OR** ondansetron, acetaminophen **OR** acetaminophen, glucose, dextrose bolus **OR** dextrose bolus, glucagon (rDNA), dextrose    Objective:    CBC:   Recent Labs     10/09/22  0837 10/11/22  0346   WBC 13.5* 15.0*   HGB 10.3* 9.6*   * 474*     BMP:    Recent Labs     10/09/22  0425 10/11/22  0346    134*   K 3.8 4.6    99   CO2 23 27   BUN 5* 6*   CREATININE 0.7 0.8   GLUCOSE 164* 242*     Calcium:  Recent Labs     10/11/22  0346   CALCIUM 8.3*     Ionized Calcium:No results for input(s): IONCA in the last 72 hours. Magnesium:  Recent Labs     10/09/22  0425   MG 2.0     Phosphorus:No results for input(s): PHOS in the last 72 hours. BNP:No results for input(s): BNP in the last 72 hours. Glucose:  Recent Labs     10/10/22  2058 10/10/22  2126 10/11/22  0719   POCGLU 460* 324* 199*     HgbA1C: No results for input(s): LABA1C in the last 72 hours. INR: No results for input(s): INR in the last 72 hours. Hepatic: No results for input(s): ALKPHOS, ALT, AST, PROT, BILITOT, BILIDIR, LABALBU in the last 72 hours. Amylase and Lipase:No results for input(s): LACTA, AMYLASE in the last 72 hours. Lactic Acid: No results for input(s): LACTA in the last 72 hours. Troponin: No results for input(s): CKTOTAL, CKMB, TROPONINT in the last 72 hours. BNP: No results for input(s): BNP in the last 72 hours. Lipids: No results for input(s): CHOL, TRIG, HDL, LDL, LDLCALC in the last 72 hours.   ABGs:   Lab Results   Component Value Date/Time    PH 7.45 01/20/2013 05:10 PM    PCO2 30 01/20/2013 05:10 PM    PO2 112 01/20/2013 05:10 PM    HCO3 21 01/20/2013 05:10 PM    O2SAT 99 01/20/2013 05:10 PM       Radiology reports as per the Radiologist  Radiology: XR TIBIA FIBULA LEFT (2 VIEWS)    Result Date: 10/3/2022  2 view left tibia-fibula Comparison: None Findings: No acute fracture or dislocation. Left knee and ankle joints are unremarkable. Remote surgical clips in the proximal posterior medial calf. No cortical erosions. Generalized subcutaneous edema with questionable small ulceration defects in the mid to lower calf. 1. Generalized subcutaneous edema with questionable small ulceration defects in the mid-distal calf. 2. No cortical erosions to suggest osteomyelitis. This document has been electronically signed by: Vilma Ambrocio MD on 10/03/2022 08:35 PM    XR TIBIA FIBULA RIGHT (2 VIEWS)    Result Date: 10/3/2022  2 view right tibia-fibula Comparison: None Findings: No acute fracture or dislocation. Old healed traumatic deformities in the distal tibia and fibula. Intact intramedullary clarisa with fixation screws in the tibia. Mild degenerative changes in the right knee and right ankle joints with chondrocalcinosis in the right knee. Generalized subcutaneous edema in the right calf. Mild subcutaneous edema of the right calf. No cortical erosions to suggest acute osteomyelitis. This document has been electronically signed by: Vilma Ambrocio MD on 10/03/2022 08:47 PM    XR ANKLE LEFT (2 VIEWS)    Result Date: 10/3/2022  2 view left ankle Comparison: None Findings: No acute fracture or dislocation. Left ankle mortise is intact. No cortical erosions. No significant ankle effusion. Lateral ankle swelling. No radiopaque foreign body. No acute osseous injury or cortical erosions. Lateral ankle swelling. This document has been electronically signed by: Vilma Ambrocio MD on 10/03/2022 08:28 PM    XR ANKLE RIGHT (2 VIEWS)    Result Date: 10/3/2022  2 view right ankle Comparison: DX - ANKLE RT MINIMUM 3 VIEWS - 09/17/2012 02:42 PM EDT Findings: No acute fracture or dislocation. Posttraumatic healed deformities in the distal tibia and fibula. Intact intramedullary clarisa and fixation screws in the tibia and across the distal tibiofibular syndesmosis. Generalized subcutaneous edema. No cortical erosions. Minimal inferior calcaneal spurring. 1. No acute osseous injury or cortical erosions. 2. Subcutaneous edema. This document has been electronically signed by: Alma Hewitt MD on 10/03/2022 08:27 PM    XR FOOT LEFT (2 VIEWS)    Result Date: 10/3/2022  2 view left foot Comparison: CR - FOOT LT MINIMUM 3 VIEWS - 03/17/2017 10:53 AM EDT Findings: No acute fracture or dislocation. Prior amputation of the left 2nd toe down to the proximal phalanx. Prior truncation deformity at the distal tuft of the left 3rd toe. There are chronic appearing erosive changes in the left 1st metatarsophalangeal joint with medial subluxation. No other discrete cortical erosions. Generalized swelling in the dorsum of the left foot with soft tissue ulceration defects. No radiopaque foreign body. 1. Soft tissue swelling in the left foot with several soft tissue ulceration defects. 2. Chronic appearing erosive changes in the left 1st metatarsophalangeal joint with medial subluxation. 3. Chronic appearing truncation defect in the distal tuft of the left 3rd toe and remote amputation of the left 2nd toe with intact cortical margins. 4. No other acute appearing cortical erosions radiographically to suggest osteomyelitis. This document has been electronically signed by: Alma Hewitt MD on 10/03/2022 08:50 PM    XR FOOT RIGHT (2 VIEWS)    Result Date: 10/3/2022  2 view right foot Comparison: DX - FOOT RT MINIMUM 3 VIEWS - 09/17/2012 02:42 PM EDT Findings: No acute fracture or dislocation. No cortical erosions. Soft tissue swelling with ulceration defect in the dorsum of the midfoot. Partially seen intact fixation hardware in the distal tibia. Scattered degenerative changes in the great toe and dorsal intertarsal joints. 1. Ulceration defect and adjacent swelling in the dorsum of the midfoot. 2. No cortical erosions to suggest acute osteomyelitis.  This document has been electronically signed by: Fili Velasco MD on 10/03/2022 08:35 PM    CTA ABDOMINAL AORTA W BILAT RUNOFF W WO CONTRAST    Result Date: 10/3/2022  CT angiography abdomen and pelvis with bilateral lower extremity runoff using IV contrast. 3-D post processing. Comparison:06/27/2022 Findings: Multifocal plaque in the abdominal aorta without aneurysm or dissection. Scattered mild calcified plaque in the celiac artery, SMA and UMER origin, and left renal artery origin without significant stenosis. Right renal artery is patent. Scattered plaque in the iliac arteries. Chronic occlusion of the left internal iliac artery with mild reconstitution of a few distal branches. Calcified plaque in the bilateral common femoral arteries. Remote surgical changes near the left common femoral artery bifurcation. Chronic occlusion of the entire left superficial femoral artery, left popliteal artery, and left posterior tibial artery. There is reconstitution of the distal left posterior tibial artery in the distal calf near the ankle. Left profunda femoris artery, left anterior tibial artery, and left peroneal arteries are opacified. Left dorsalis pedis and plantar arteries are opacified. Scattered plaque in the right superficial femoral artery and right popliteal artery. Right profunda femoris artery is opacified. Spotty opacification of the right anterior tibial artery without significant opacification in the distal segment. Right dorsalis pedis artery is not opacified. Right peroneal artery is opacified until the distal segment near the ankle. Right posterior tibial artery is opacified to the ankle. Right plantaris artery is opacified. Liver, gallbladder, spleen, pancreas, and adrenal glands are unremarkable. Both kidneys enhance symmetrically without hydronephrosis. No bowel obstruction, pneumatosis, or pneumoperitoneum. Appendix is not seen. No ascites or enlarged abdominal or pelvic lymph nodes. Urinary bladder and prostate are unremarkable.  No pelvic free fluid. Old L3 compression fracture with bilateral posterior L2-4 fusion. Old fracture involving the left interpedicular screw at L4. Intact fixation hardware in the right tibia. Asymmetric fatty atrophy of the left gluteal and left thigh muscles. Fatty atrophy of the bilateral calf muscles greater on the left. Mild edema in the dorsum of both feet. No soft tissue gas or fluid collections to suggest abscess. Chronic erosive changes in the left 1st metatarsophalangeal joint. Partially seen amputation of left second toe. Degenerative changes in the bilateral hindfoot. No acute fracture or dislocation. No cortical erosions. 1. Chronic occlusions of the left internal iliac artery, entire left superficial femoral artery, left popliteal artery, and large portion of the left posterior tibial artery with reconstitution in the distal segment near the ankle. Left dorsalis pedis and plantaris arteries are opacified. 2. No significant opacification in the distal right anterior tibial artery, right dorsalis pedis artery, or distal right peroneal artery. This may reflect underlying diminished flow or occlusion. This is stable. 3. Scattered atherosclerotic plaque in the aorta and mesenteric/renal vessels without significant stenosis. 4. Asymmetric fatty atrophy of the left lower extremity musculature. Mild subcutaneous edema in the dorsum of both feet. 5. Stable chronic hardware fracture of the left interpedicular screw at L4. This document has been electronically signed by: Cyndi Pan MD on 10/03/2022 10:46 PM All CTs at this facility use dose modulation techniques and iterative reconstructions, and/or weight-based dosing when appropriate to reduce radiation to a low as reasonably achievable. 3D Post-processing was performed on this study.        Physical Exam:  Vitals: /70   Pulse 75   Temp 98 °F (36.7 °C) (Oral)   Resp 18   Ht 6' 1\" (1.854 m)   Wt 243 lb 13.3 oz (110.6 kg)   SpO2 92%   BMI 32.17 kg/m²   24 hour intake/output:  Intake/Output Summary (Last 24 hours) at 10/11/2022 1031  Last data filed at 10/11/2022 0558  Gross per 24 hour   Intake 1187.69 ml   Output 5075 ml   Net -3887.31 ml     Last 3 weights: Wt Readings from Last 3 Encounters:   10/10/22 243 lb 13.3 oz (110.6 kg)   06/10/22 244 lb (110.7 kg)   04/12/22 247 lb (112 kg)       General appearance - oriented to person, place, and time, overweight, and no acute distress   HEENT: Normocephalic and Atraumatic  Chest - no respiratory distress  Neurological - Alert and oriented and Normal speech  Integumentary - Skin color, texture, turgor normal. No Rashes or lesions  Musculoskeletal - Left AKA      DVT prophylaxis: [x] Lovenox                                 [] SCDs                                 [] SQ Heparin                                 [] Encourage ambulation           [] Already on Anticoagulation                 ASSESSMENT:  S/p left AKA  POD# 1  Postop pain - minimal, no phantom pain   CAD  Sacral ulcer   DB, HTN, COPD  Hyperkalemia   Leukocytosis        has a past medical history of CAD (coronary artery disease), COPD (chronic obstructive pulmonary disease) ([address] Diabetes mellitus ([address] Hx of blood clots, Hyperlipidemia, Hypertension, Leg wound, left, Leg wound, right, Lumbar radiculopathy, Osteoarthritis, Seizure disorder ([address] and Spinal stenosis, lumbar. PLAN:  Continue post op dressing   Monitor labs, replace lytes per protocol  Stool softeners   Diet carb control   Iv hydration  DVT and GI Prophylaxis  Activity - ambulate, OOB to chair, C&DB,  IS  Analgesia and antiemetics as needed  Antibiotic Therapy   Plan home with [facility] per patient request       Electronically signed by JAVAN Chow CNP on 10/11/2022 at 10:31 AM  Patient seen and examined independently by me. Above discussed and I agree with CNP. Labs, cultures, and radiographs where available were reviewed. See orders for the updated patient care plan.     Amercia Guadarrama Mannie Rivera MD MD, patient stable postop day 1 dressing is dry discussed the OR with patient hemoglobin slightly increased from preop at 9.3 remove dressing tomorrow  10/11/2022   8:38 PM

## 2022-10-11 NOTE — PROGRESS NOTES
Progress note: Infectious diseases    Patient - Cora Chilel,  Age - 74 y.o.    - 1955      Room Number - 4K-04/004-A   MRN -  [id number]   Acct # - [de-identified]  Date of Admission -  10/3/2022  6:11 PM    SUBJECTIVE:   She has no new issues. He is feeling better. OBJECTIVE   VITALS    height is 6' 1\" (1.854 m) and weight is 243 lb 13.3 oz (110.6 kg). His oral temperature is 98.2 °F (36.8 °C). His blood pressure is 121/67 and his pulse is 73. His respiration is 18 and oxygen saturation is 90%. Wt Readings from Last 3 Encounters:   10/10/22 243 lb 13.3 oz (110.6 kg)   06/10/22 244 lb (110.7 kg)   22 247 lb (112 kg)       I/O (24 Hours)    Intake/Output Summary (Last 24 hours) at 10/11/2022 1118  Last data filed at 10/11/2022 0558  Gross per 24 hour   Intake 1187.69 ml   Output 5075 ml   Net -3887.31 ml         General: awake and oriented  HEENT - normocephalic, atraumatic, pale conjunctiva,  anicteric sclera  Neck - Supple, no mass  Lungs -  Bilateral   air entry, no rhonchi, no wheeze. Cardiovascular - Heart sounds are normal.     Abdomen - soft, not distended, nontender,   Neurologic -awake   Skin - No bruising or bleeding  Extremities - dressed left AKA stump.        MEDICATIONS:      vancomycin  1,250 mg IntraVENous Q12H    albuterol sulfate HFA  2 puff Inhalation 4x daily    furosemide  20 mg Oral Daily    enoxaparin  30 mg SubCUTAneous BID    insulin glargine  16 Units SubCUTAneous Nightly    empagliflozin  10 mg Oral Daily    insulin lispro  0-16 Units SubCUTAneous TID WC    insulin lispro  0-4 Units SubCUTAneous Nightly    piperacillin-tazobactam  3,375 mg IntraVENous 30 Min Pre-Op    piperacillin-tazobactam  3,375 mg IntraVENous Q8H    metoprolol succinate  75 mg Oral BID    potassium chloride  20 mEq Oral Daily with breakfast    metoprolol succinate  25 mg Oral Once    polyethylene glycol  17 g Oral Daily    isosorbide mononitrate  15 mg Oral Daily    tamsulosin  0.4 mg Oral Daily    amiodarone  200 mg Oral BID    guaiFENesin  600 mg Oral BID    sodium hypochlorite   Irrigation Daily    atorvastatin  40 mg Oral Daily    gabapentin  100 mg Oral TID    pantoprazole  40 mg Oral QAM AC    sodium chloride flush  10 mL IntraVENous 2 times per day    vancomycin (VANCOCIN) intermittent dosing (placeholder)   Other RX Placeholder      sodium chloride      dextrose       HYDROmorphone, oxyCODONE **OR** oxyCODONE, magnesium hydroxide, bisacodyl, potassium chloride **OR** potassium alternative oral replacement **OR** potassium chloride, benzocaine-menthol, morphine, traZODone, sodium chloride flush, sodium chloride, ondansetron **OR** ondansetron, acetaminophen **OR** acetaminophen, glucose, dextrose bolus **OR** dextrose bolus, glucagon (rDNA), dextrose      LABS:     CBC:   Recent Labs     10/09/22  0837 10/11/22  0346   WBC 13.5* 15.0*   HGB 10.3* 9.6*   * 474*       BMP:    Recent Labs     10/09/22  0425 10/11/22  0346    134*   K 3.8 4.6    99   CO2 23 27   BUN 5* 6*   CREATININE 0.7 0.8   GLUCOSE 164* 242*       Calcium:  Recent Labs     10/11/22  0346   CALCIUM 8.3*       Ionized Calcium:No results for input(s): IONCA in the last 72 hours. Magnesium:  Recent Labs     10/09/22  0425   MG 2.0       Phosphorus:No results for input(s): PHOS in the last 72 hours. BNP:No results for input(s): BNP in the last 72 hours.   Glucose:  Recent Labs     10/10/22  2058 10/10/22  2126 10/11/22  0719   POCGLU 460* 324* 199*            Problem list of patient:     Patient Active Problem List   Diagnosis Code    Seizure disorder ([facility] G40.909    Osteoarthritis M19.90    COPD (chronic obstructive pulmonary disease) ([facility] J44.9    Spinal stenosis, lumbar M48.061    Lumbar radiculopathy M54.16    Tobacco abuse Z72.0    GERD (gastroesophageal reflux disease) K21.9    Skin ulceration ([facility] L98.727    Non-healing ulcer of lower leg ([facility]) L97.909    Ischemic rest pain of lower extremity M79.606, I99.8    Current smoker F17.200    Dyslipidemia E78.5    Abnormal cardiovascular function study R94.30    Obesity E66.9    PVD (peripheral vascular disease) ([facility]) I73.9    CAD, multiple vessel I25.10    Chronic total occlusion of artery of the extremities ([facility]) I70.92    Coronary artery chronic total occlusion I25.82    DAVILA (dyspnea on exertion) R06.09    HTN (hypertension) I10    Discitis of cervical region M46.42    Open wound of right hand S61.401A    Noncompliance by refusing intervention or support Z91.199    Lactic acidosis E87.20    Pressure injury of left buttock, stage 3 ([facility]) W49.497    PAD (peripheral artery disease) ([facility]) I73.9    Cellulitis of right lower extremity L03.115    Pressure injury of sacral region, stage 3 ([facility]) L89.153    Paroxysmal atrial fibrillation ([facility]) I48.0    Left ventricular systolic dysfunction U60.7    Panlobular emphysema ([facility]) J43.1    Cellulitis of left lower extremity L03.116    Type 2 diabetes mellitus with diabetic peripheral angiopathy and gangrene, with long-term current use of insulin ([facility]) E11.52, Z79.4    Critical limb ischemia of left lower extremity ([facility]) V54.579         ASSESSMENT/PLAN   Severe PVD:  with ischemic wound: he had left AKA  Non healing right leg wound: continue dakins solution  CAD  Poorly controlled diabetes  Continue current treatment.          Thania Rendon MD, MD, FACP 10/11/2022 11:18 AM

## 2022-10-11 NOTE — PROGRESS NOTES
Patient glucose 460, upon given 16 units of Lantus and 4 units of Humalog per sliding scale, patient stat glucose recheck was 324 at 2125.

## 2022-10-11 NOTE — PLAN OF CARE
Problem: Discharge Planning  Goal: Discharge to home or other facility with appropriate resources  Outcome: Progressing  Flowsheets (Taken 10/10/2022 2054)  Discharge to home or other facility with appropriate resources:   Identify barriers to discharge with patient and caregiver   Arrange for needed discharge resources and transportation as appropriate   Identify discharge learning needs (meds, wound care, etc)   Arrange for interpreters to assist at discharge as needed   Refer to discharge planning if patient needs post-hospital services based on physician order or complex needs related to functional status, cognitive ability or social support system     Problem: Pain  Goal: Verbalizes/displays adequate comfort level or baseline comfort level  Outcome: Progressing  Flowsheets (Taken 10/11/2022 0746)  Verbalizes/displays adequate comfort level or baseline comfort level:   Encourage patient to monitor pain and request assistance   Assess pain using appropriate pain scale   Administer analgesics based on type and severity of pain and evaluate response   Implement non-pharmacological measures as appropriate and evaluate response   Consider cultural and social influences on pain and pain management   Notify Licensed Independent Practitioner if interventions unsuccessful or patient reports new pain     Problem: ABCDS Injury Assessment  Goal: Absence of physical injury  Outcome: Progressing  Flowsheets (Taken 10/6/2022 0422 by Sunil Hernandez RN)  Absence of Physical Injury: Implement safety measures based on patient assessment     Problem: Skin/Tissue Integrity  Goal: Absence of new skin breakdown  Description: 1. Monitor for areas of redness and/or skin breakdown  2. Assess vascular access sites hourly  3. Every 4-6 hours minimum:  Change oxygen saturation probe site  4.   Every 4-6 hours:  If on nasal continuous positive airway pressure, respiratory therapy assess nares and determine need for appliance change or resting period. Outcome: Progressing  Note: No new skin breakdown this shift. Patient is assisted with turning every two hours and as needed.        Problem: Chronic Conditions and Co-morbidities  Goal: Patient's chronic conditions and co-morbidity symptoms are monitored and maintained or improved  Outcome: Progressing  Flowsheets (Taken 10/10/2022 2054)  Care Plan - Patient's Chronic Conditions and Co-Morbidity Symptoms are Monitored and Maintained or Improved:   Monitor and assess patient's chronic conditions and comorbid symptoms for stability, deterioration, or improvement   Collaborate with multidisciplinary team to address chronic and comorbid conditions and prevent exacerbation or deterioration   Update acute care plan with appropriate goals if chronic or comorbid symptoms are exacerbated and prevent overall improvement and discharge     Problem: Nutrition Deficit:  Goal: Optimize nutritional status  Outcome: Progressing  Flowsheets (Taken 10/11/2022 9310)  Nutrient intake appropriate for improving, restoring, or maintaining nutritional needs:   Assess nutritional status and recommend course of action   Recommend appropriate diets, oral nutritional supplements, and vitamin/mineral supplements   Monitor oral intake, labs, and treatment plans   Order, calculate, and assess calorie counts as needed     Problem: Respiratory - Adult  Goal: Achieves optimal ventilation and oxygenation  Outcome: Progressing  Flowsheets (Taken 10/10/2022 2054)  Achieves optimal ventilation and oxygenation:   Assess for changes in respiratory status   Assess for changes in mentation and behavior   Position to facilitate oxygenation and minimize respiratory effort   Oxygen supplementation based on oxygen saturation or arterial blood gases   Encourage broncho-pulmonary hygiene including cough, deep breathe, incentive spirometry   Assess the need for suctioning and aspirate as needed   Assess and instruct to report shortness of breath or any respiratory difficulty   Respiratory therapy support as indicated   Care plan reviewed with patient. Patient verbalize understanding of the plan of care and contribute to goal setting.

## 2022-10-11 NOTE — PROGRESS NOTES
[address] [address]  INPATIENT PHYSICAL THERAPY  EVALUATION  STR ICU STEPDOWN TELEMETRY 4K - 4K-04/004-A    Time In: 08  Time Out: 5775  Timed Code Treatment Minutes: 36 Minutes  Minutes: 62          Date: 10/11/2022  Patient Name: Gallo Tay,  Gender:  male        MRN: [id number]  : 1955  (74 y.o.)      Referring Practitioner: Kenia Ross DO  Diagnosis: PAD  Additional Pertinent Hx: Gallo Tay is a 74 y.o. male with a PMH of PAD, CAD s/p PCI LAD, chronic sacral wounds, chronic systolic heart failure, pAF, HTN, HLD, NIDDMII, tobacco abuse who presented with bilateral lower extremity wounds. Patient reports a 2-week history of worsening bilateral lower extremity infections. He states he has developed rising erythema to the mid calf along with worsening wounds and ulcerations of bilateral extremities. Patient states wounds have been purulent and foul-smelling. He also reports profuse diarrhea upon admission for the last 1 week. He states he follows with Dr. Lorena Watkins for these bilateral lower extremity wounds, and has had lower extremity imaging in the past that demonstrated severe inflow disease. He has undergone previous bypass grafting of the left lower extremity that has since chronically occluded. He denies any chest pain, shortness of breath, abdominal pain. He has a history of coronary artery disease for which she went stent placement x2, unsure of which vessel. Pt is s/p L AKA 10/10. Restrictions/Precautions:  Restrictions/Precautions: Weight Bearing, Fall Risk  Left Lower Extremity Weight Bearing: Non Weight Bearing    Subjective:  Chart Reviewed: Yes  Patient assessed for rehabilitation services?: Yes  Family / Caregiver Present: Yes  Subjective: RN approved session, pt is supine in bed with fiance present. Pt is agreeable to PT. Pt states he wants to go home, declines any hands-on assistance from PT.   Extensive time spent on answering questions and education.     General:  Overall Orientation Status: Within Functional Limits  Vision: Within Functional Limits  Hearing: Within functional limits       Pain: 9/10    Vitals: Vitals not assessed per clinical judgement, see nursing flowsheet    Social/Functional History:    Lives With: Significant other  Type of Home: House  Home Layout: One level  Home Access: Stairs to enter with rails  Entrance Stairs - Number of Steps: 2-- looking into getting a ramp  Home Equipment: Wheelchair-manual, Crutches (Lofstrand crutches)     Bathroom Shower/Tub: Tub/Shower unit  Bathroom Toilet: Standard  Bathroom Equipment: Tub transfer bench, Grab bars in shower, 3-in-1 commode (need tub transfer bench, elevated BSC, grab bars per family)  Bathroom Accessibility: Mervat Badillo accessible    [address] Help From: Family  ADL Assistance: [address]: Independent  Homemaking Responsibilities: Yes  Ambulation Assistance: Independent  Transfer Assistance: Independent    Active : Yes  Mode of Transportation: Family, Friends  Occupation: Retired  Type of Occupation: retired from [place], retired from [employer]  Additional Comments: Pt amb with lofstrand crutches; h/o back injury in 1994, fell off roof, pt unsure of level of injury; was able to rehab self to ambulating with lofstrand crutches; pt with h/o several R LE sx's, including clarisa in lower leg, ankle ball joint    OBJECTIVE:  Range of Motion:  Right Lower Extremity: Impaired - ankle DF to ~neutral, hip and knee WFL  Left Lower Extremity: WFL at hip    Strength:  Right Lower Extremity: Impaired - 4/5, within available range at ankle  Left Lower Extremity: Impaired - 3/5 hip    Balance:  Static Sitting Balance:  Stand By Assistance; pt c/o lightheadedness sitting EOB, sits EOB ~10 minutes prior to transfer  Static Standing Balance: Stand By Assistance  Dynamic Standing Balance: Stand By Assistance    Bed Mobility:  Supine to Sit: Stand By Assistance, X 1, with head of bed raised, with rail  Scooting: Stand By Assistance, X 1    Transfers:  Sit to Stand: Stand By Assistance, X 1, with increased time for completion, to/from chair with arms  Stand to Sit:Stand By Assistance, X 1, to/from chair with arms  Stand Pivot:Stand By Assistance, X 1, to/from chair with arms  **Pt declines R gripper sock and declines hands-on assistance from PT, uses arm rests of w/c to stand and pivot, able to hop 2-3 times to complete pivot    Wheelchair Mobility:  Stand By Assistance  Extremities Used: Bilateral Upper Extremities  Type of Chair:Manual  Surface: Level Tile  Distance: 250 feet  Quality: Slow Velocity and Decreased Fluidity     Exercise:  Patient was guided in 1 set(s) 10 reps of exercise to right lower extremities. Ankle pumps, Heelslides, and Hip abduction/adduction, L hip SLR, hip abd/add. Exercises were completed for increased independence with functional mobility. Functional Outcome Measures: Completed  -PAC Inpatient Mobility without Stair Climbing Raw Score : 13  -PAC Inpatient without Stair Climbing T-Scale Score : 38.96    ASSESSMENT:  Activity Tolerance:  Patient tolerance of  treatment: good. Treatment Initiated: Treatment and education initiated within context of evaluation. Evaluation time included review of current medical information, gathering information related to past medical, social and functional history, completion of standardized testing, formal and informal observation of tasks, assessment of data and development of plan of care and goals. Treatment time included skilled education and facilitation of tasks to increase safety and independence with functional mobility for improved independence and quality of life. \    Assessment:   Body Structures, Functions, Activity Limitations Requiring Skilled Therapeutic Intervention: Decreased functional mobility , Decreased endurance, Increased pain, Decreased balance, Decreased strength, Decreased safe awareness  Assessment: Pt tolerates session well, limited by new L AKA, c/o phantom sensation, decreased short term memory, reports h/o fall from roof several years ago. PT to continue to progress strength and functional mobility. Therapy Prognosis: Excellent    Requires PT Follow-Up: Yes    Discharge Recommendations:  Discharge Recommendations: Home with Home health PT    Patient Education:      . Patient Education  Education Given To: Patient, Family  Education Provided: Role of Therapy, Plan of Care, Family Education, Precautions, Equipment, Transfer Training  Education Method: Verbal  Barriers to Learning: Cognition  Education Outcome: Verbalized understanding, Continued education needed       Equipment Recommendations: Other: Pt requesting tub bench- will defer to OT evaluation, pt requesting power w/c- education on process, is done on outpatient basis    Plan:  Current Treatment Recommendations: Strengthening, Functional mobility training, Transfer training, Endurance training, Wheelchair mobility training, Equipment evaluation, education, & procurement, Safety education & training, Home exercise program, Patient/Caregiver education & training, Therapeutic activities, Positioning  General Plan:  (5x O)    Goals:  Patient Goals : to go home  Short Term Goals  Time Frame for Short Term Goals: by discharge  Short Term Goal 1: Pt to transfer supine <--> sit mod I to enable pt to get in/out of bed. Short Term Goal 2: Pt to transfer stand pivot mod I for increased functional mobility. Short Term Goal 3: Pt to self propel in manual w/c >250 feet mod I for community re-entry. Short Term Goal 4: Pt to perform car transfer SBA to enable pt to get in/out of the car. Long Term Goals  Time Frame for Long Term Goals : NA due to short length of stay. Following session, patient left in safe position with all fall risk precautions in place.

## 2022-10-11 NOTE — PROGRESS NOTES
[facility]ORD[facility] CONSULT    Referral Type: internal    Patient Name: Emily Parikh      MRN: [id number]    : 1955  (68 y.o.)  Gender: male   Race:White (non-)     Payor Source: Payor: Bevin Ahumada / Plan: Jasiel Christie / Product Type: *No Product type* /   Secondary Payor Source:      Isolation Status: No active isolations    Lives With: Significant other  Type of Home: House  Home Layout: One level  Home Access: Stairs to enter with rails  Entrance Stairs - Number of Steps: 2-- looking into getting a ramp  Receives Help From: Family  Occupation: Retired  Type of Occupation: retired from [place], retired from [employer]  Additional Comments: Pt amb with lofstrand crutches; h/o back injury in , fell off roof, pt unsure of level of injury; was able to rehab self to ambulating with lofstrand crutches; pt with h/o several R LE sx's, including clarisa in lower leg, ankle ball joint    What is treatment plan? Disciplines Required upon Admission to Inpatient Rehabilitation: Occupational Therapy  Post operative: Yes  Fall: No  Dialysis: No  Diet: ADULT DIET;  Regular; 4 carb choices (60 gm/meal)  Discussed patient with  and PM&R provider: Await medical work up completion

## 2022-10-11 NOTE — PLAN OF CARE
Problem: Respiratory - Adult  Goal: Achieves optimal ventilation and oxygenation  10/11/2022 1223 by Cassie Nixon, RCGREGORIO  Outcome: Progressing   Breath sounds clear/diminished  Room air 92%

## 2022-10-12 ENCOUNTER — TELEPHONE (OUTPATIENT)
Dept: UROLOGY | Age: 67
End: 2022-10-12

## 2022-10-12 LAB
ANION GAP SERPL CALCULATED.3IONS-SCNC: 12 MEQ/L (ref 8–16)
BUN BLDV-MCNC: 7 MG/DL (ref 7–22)
CALCIUM SERPL-MCNC: 8.1 MG/DL (ref 8.5–10.5)
CHLORIDE BLD-SCNC: 100 MEQ/L (ref 98–111)
CO2: 27 MEQ/L (ref 23–33)
CREAT SERPL-MCNC: 0.7 MG/DL (ref 0.4–1.2)
ERYTHROCYTE [DISTWIDTH] IN BLOOD BY AUTOMATED COUNT: 18.4 % (ref 11.5–14.5)
ERYTHROCYTE [DISTWIDTH] IN BLOOD BY AUTOMATED COUNT: 55.1 FL (ref 35–45)
GFR SERPL CREATININE-BSD FRML MDRD: > 90 ML/MIN/1.73M2
GLUCOSE BLD-MCNC: 159 MG/DL (ref 70–108)
GLUCOSE BLD-MCNC: 216 MG/DL (ref 70–108)
GLUCOSE BLD-MCNC: 224 MG/DL (ref 70–108)
GLUCOSE BLD-MCNC: 231 MG/DL (ref 70–108)
GLUCOSE BLD-MCNC: 253 MG/DL (ref 70–108)
HCT VFR BLD CALC: 32.8 % (ref 42–52)
HEMOGLOBIN: 9.9 GM/DL (ref 14–18)
MCH RBC QN AUTO: 25.3 PG (ref 26–33)
MCHC RBC AUTO-ENTMCNC: 30.2 GM/DL (ref 32.2–35.5)
MCV RBC AUTO: 83.7 FL (ref 80–94)
PLATELET # BLD: 487 THOU/MM3 (ref 130–400)
PMV BLD AUTO: 9.7 FL (ref 9.4–12.4)
POTASSIUM REFLEX MAGNESIUM: 4.9 MEQ/L (ref 3.5–5.2)
RBC # BLD: 3.92 MILL/MM3 (ref 4.7–6.1)
SODIUM BLD-SCNC: 139 MEQ/L (ref 135–145)
WBC # BLD: 11.5 THOU/MM3 (ref 4.8–10.8)

## 2022-10-12 PROCEDURE — 2580000003 HC RX 258: Performed by: INTERNAL MEDICINE

## 2022-10-12 PROCEDURE — 51702 INSERT TEMP BLADDER CATH: CPT

## 2022-10-12 PROCEDURE — 36415 COLL VENOUS BLD VENIPUNCTURE: CPT

## 2022-10-12 PROCEDURE — 6370000000 HC RX 637 (ALT 250 FOR IP)

## 2022-10-12 PROCEDURE — 6370000000 HC RX 637 (ALT 250 FOR IP): Performed by: SURGERY

## 2022-10-12 PROCEDURE — 2580000003 HC RX 258: Performed by: SURGERY

## 2022-10-12 PROCEDURE — 80048 BASIC METABOLIC PNL TOTAL CA: CPT

## 2022-10-12 PROCEDURE — 1200000000 HC SEMI PRIVATE

## 2022-10-12 PROCEDURE — 94640 AIRWAY INHALATION TREATMENT: CPT

## 2022-10-12 PROCEDURE — 99024 POSTOP FOLLOW-UP VISIT: CPT | Performed by: SURGERY

## 2022-10-12 PROCEDURE — 6360000002 HC RX W HCPCS: Performed by: SURGERY

## 2022-10-12 PROCEDURE — 85027 COMPLETE CBC AUTOMATED: CPT

## 2022-10-12 PROCEDURE — 6360000002 HC RX W HCPCS: Performed by: INTERNAL MEDICINE

## 2022-10-12 PROCEDURE — 6370000000 HC RX 637 (ALT 250 FOR IP): Performed by: INTERNAL MEDICINE

## 2022-10-12 PROCEDURE — 82948 REAGENT STRIP/BLOOD GLUCOSE: CPT

## 2022-10-12 PROCEDURE — 6370000000 HC RX 637 (ALT 250 FOR IP): Performed by: NURSE PRACTITIONER

## 2022-10-12 PROCEDURE — 99024 POSTOP FOLLOW-UP VISIT: CPT | Performed by: NURSE PRACTITIONER

## 2022-10-12 PROCEDURE — 99233 SBSQ HOSP IP/OBS HIGH 50: CPT | Performed by: INTERNAL MEDICINE

## 2022-10-12 PROCEDURE — APPSS30 APP SPLIT SHARED TIME 16-30 MINUTES: Performed by: NURSE PRACTITIONER

## 2022-10-12 PROCEDURE — 99253 IP/OBS CNSLTJ NEW/EST LOW 45: CPT | Performed by: UROLOGY

## 2022-10-12 RX ORDER — ALBUTEROL SULFATE 90 UG/1
2 AEROSOL, METERED RESPIRATORY (INHALATION) 2 TIMES DAILY
Status: DISCONTINUED | OUTPATIENT
Start: 2022-10-12 | End: 2022-10-13 | Stop reason: HOSPADM

## 2022-10-12 RX ORDER — ALBUTEROL SULFATE 90 UG/1
2 AEROSOL, METERED RESPIRATORY (INHALATION) EVERY 4 HOURS PRN
Status: DISCONTINUED | OUTPATIENT
Start: 2022-10-12 | End: 2022-10-13 | Stop reason: HOSPADM

## 2022-10-12 RX ORDER — NICOTINE 21 MG/24HR
1 PATCH, TRANSDERMAL 24 HOURS TRANSDERMAL DAILY
Status: DISCONTINUED | OUTPATIENT
Start: 2022-10-12 | End: 2022-10-13 | Stop reason: HOSPADM

## 2022-10-12 RX ORDER — TAMSULOSIN HYDROCHLORIDE 0.4 MG/1
0.4 CAPSULE ORAL DAILY
Qty: 30 CAPSULE | Refills: 3 | Status: SHIPPED | OUTPATIENT
Start: 2022-10-13

## 2022-10-12 RX ADMIN — ACETAMINOPHEN 650 MG: 325 TABLET ORAL at 18:28

## 2022-10-12 RX ADMIN — OXYCODONE HYDROCHLORIDE 5 MG: 5 TABLET ORAL at 15:28

## 2022-10-12 RX ADMIN — INSULIN LISPRO 4 UNITS: 100 INJECTION, SOLUTION INTRAVENOUS; SUBCUTANEOUS at 11:58

## 2022-10-12 RX ADMIN — POTASSIUM CHLORIDE 20 MEQ: 1500 TABLET, EXTENDED RELEASE ORAL at 08:48

## 2022-10-12 RX ADMIN — SODIUM HYPOCHLORITE: 1.25 SOLUTION TOPICAL at 10:07

## 2022-10-12 RX ADMIN — OXYCODONE HYDROCHLORIDE 10 MG: 5 TABLET ORAL at 03:38

## 2022-10-12 RX ADMIN — OXYCODONE HYDROCHLORIDE 5 MG: 5 TABLET ORAL at 23:26

## 2022-10-12 RX ADMIN — ATORVASTATIN CALCIUM 40 MG: 40 TABLET, FILM COATED ORAL at 08:48

## 2022-10-12 RX ADMIN — GABAPENTIN 100 MG: 100 CAPSULE ORAL at 13:33

## 2022-10-12 RX ADMIN — Medication 1250 MG: at 11:56

## 2022-10-12 RX ADMIN — ALBUTEROL SULFATE 2 PUFF: 90 AEROSOL, METERED RESPIRATORY (INHALATION) at 07:26

## 2022-10-12 RX ADMIN — GABAPENTIN 100 MG: 100 CAPSULE ORAL at 08:48

## 2022-10-12 RX ADMIN — ENOXAPARIN SODIUM 30 MG: 100 INJECTION SUBCUTANEOUS at 08:48

## 2022-10-12 RX ADMIN — GUAIFENESIN 600 MG: 600 TABLET, EXTENDED RELEASE ORAL at 20:13

## 2022-10-12 RX ADMIN — CLOPIDOGREL BISULFATE 75 MG: 75 TABLET ORAL at 08:48

## 2022-10-12 RX ADMIN — SODIUM CHLORIDE, PRESERVATIVE FREE 10 ML: 5 INJECTION INTRAVENOUS at 20:13

## 2022-10-12 RX ADMIN — PANTOPRAZOLE SODIUM 40 MG: 40 TABLET, DELAYED RELEASE ORAL at 05:57

## 2022-10-12 RX ADMIN — APIXABAN 5 MG: 5 TABLET, FILM COATED ORAL at 20:13

## 2022-10-12 RX ADMIN — GUAIFENESIN 600 MG: 600 TABLET, EXTENDED RELEASE ORAL at 08:48

## 2022-10-12 RX ADMIN — AMIODARONE HYDROCHLORIDE 200 MG: 200 TABLET ORAL at 20:13

## 2022-10-12 RX ADMIN — FUROSEMIDE 20 MG: 20 TABLET ORAL at 08:48

## 2022-10-12 RX ADMIN — METOPROLOL SUCCINATE 75 MG: 50 TABLET, EXTENDED RELEASE ORAL at 20:13

## 2022-10-12 RX ADMIN — SODIUM CHLORIDE 25 ML: 9 INJECTION, SOLUTION INTRAVENOUS at 23:47

## 2022-10-12 RX ADMIN — TAMSULOSIN HYDROCHLORIDE 0.4 MG: 0.4 CAPSULE ORAL at 08:47

## 2022-10-12 RX ADMIN — MAGNESIUM HYDROXIDE 30 ML: 400 SUSPENSION ORAL at 08:53

## 2022-10-12 RX ADMIN — MORPHINE SULFATE 4 MG: 4 INJECTION, SOLUTION INTRAMUSCULAR; INTRAVENOUS at 05:57

## 2022-10-12 RX ADMIN — METOPROLOL SUCCINATE 75 MG: 50 TABLET, EXTENDED RELEASE ORAL at 08:47

## 2022-10-12 RX ADMIN — ACETAMINOPHEN 650 MG: 325 TABLET ORAL at 23:26

## 2022-10-12 RX ADMIN — PIPERACILLIN AND TAZOBACTAM 3375 MG: 3; .375 INJECTION, POWDER, FOR SOLUTION INTRAVENOUS at 14:30

## 2022-10-12 RX ADMIN — INSULIN LISPRO 8 UNITS: 100 INJECTION, SOLUTION INTRAVENOUS; SUBCUTANEOUS at 15:31

## 2022-10-12 RX ADMIN — PIPERACILLIN AND TAZOBACTAM 3375 MG: 3; .375 INJECTION, POWDER, FOR SOLUTION INTRAVENOUS at 23:49

## 2022-10-12 RX ADMIN — AMIODARONE HYDROCHLORIDE 200 MG: 200 TABLET ORAL at 08:48

## 2022-10-12 RX ADMIN — ASPIRIN 81 MG 81 MG: 81 TABLET ORAL at 08:48

## 2022-10-12 RX ADMIN — SODIUM CHLORIDE 800 ML: 9 INJECTION, SOLUTION INTRAVENOUS at 01:43

## 2022-10-12 RX ADMIN — ISOSORBIDE MONONITRATE 15 MG: 30 TABLET, EXTENDED RELEASE ORAL at 08:48

## 2022-10-12 RX ADMIN — GABAPENTIN 100 MG: 100 CAPSULE ORAL at 20:13

## 2022-10-12 RX ADMIN — INSULIN GLARGINE 16 UNITS: 100 INJECTION, SOLUTION SUBCUTANEOUS at 23:27

## 2022-10-12 RX ADMIN — Medication 1250 MG: at 01:44

## 2022-10-12 RX ADMIN — ALBUTEROL SULFATE 2 PUFF: 90 AEROSOL, METERED RESPIRATORY (INHALATION) at 15:46

## 2022-10-12 RX ADMIN — INSULIN LISPRO 4 UNITS: 100 INJECTION, SOLUTION INTRAVENOUS; SUBCUTANEOUS at 08:54

## 2022-10-12 RX ADMIN — SODIUM CHLORIDE 700 ML: 9 INJECTION, SOLUTION INTRAVENOUS at 06:02

## 2022-10-12 RX ADMIN — EMPAGLIFLOZIN 10 MG: 10 TABLET, FILM COATED ORAL at 08:48

## 2022-10-12 RX ADMIN — PIPERACILLIN AND TAZOBACTAM 3375 MG: 3; .375 INJECTION, POWDER, FOR SOLUTION INTRAVENOUS at 06:02

## 2022-10-12 RX ADMIN — OXYCODONE HYDROCHLORIDE 10 MG: 5 TABLET ORAL at 09:23

## 2022-10-12 ASSESSMENT — PAIN - FUNCTIONAL ASSESSMENT
PAIN_FUNCTIONAL_ASSESSMENT: PREVENTS OR INTERFERES SOME ACTIVE ACTIVITIES AND ADLS
PAIN_FUNCTIONAL_ASSESSMENT: PREVENTS OR INTERFERES SOME ACTIVE ACTIVITIES AND ADLS
PAIN_FUNCTIONAL_ASSESSMENT: ACTIVITIES ARE NOT PREVENTED

## 2022-10-12 ASSESSMENT — PAIN DESCRIPTION - ORIENTATION
ORIENTATION: LEFT

## 2022-10-12 ASSESSMENT — PAIN SCALES - GENERAL
PAINLEVEL_OUTOF10: 10
PAINLEVEL_OUTOF10: 0
PAINLEVEL_OUTOF10: 8
PAINLEVEL_OUTOF10: 7
PAINLEVEL_OUTOF10: 3

## 2022-10-12 ASSESSMENT — PAIN DESCRIPTION - LOCATION
LOCATION: LEG

## 2022-10-12 ASSESSMENT — PAIN DESCRIPTION - FREQUENCY
FREQUENCY: CONTINUOUS
FREQUENCY: CONTINUOUS

## 2022-10-12 ASSESSMENT — PAIN DESCRIPTION - DESCRIPTORS
DESCRIPTORS: DULL
DESCRIPTORS: ACHING

## 2022-10-12 ASSESSMENT — PAIN DESCRIPTION - PAIN TYPE
TYPE: SURGICAL PAIN
TYPE: SURGICAL PAIN

## 2022-10-12 ASSESSMENT — PAIN DESCRIPTION - ONSET
ONSET: ON-GOING
ONSET: ON-GOING

## 2022-10-12 NOTE — TELEPHONE ENCOUNTER
Consult for urinary retention  D/c with [facility]  Needs MA visit in 1-2 wks for VT  Flomax started 10/6/22

## 2022-10-12 NOTE — PLAN OF CARE
Problem: Discharge Planning  Goal: Discharge to home or other facility with appropriate resources  Outcome: Progressing  Flowsheets (Taken 10/10/2022 2054 by Grace Bernard, ROBYN)  Discharge to home or other facility with appropriate resources:   Identify barriers to discharge with patient and caregiver   Arrange for needed discharge resources and transportation as appropriate   Identify discharge learning needs (meds, wound care, etc)   Arrange for interpreters to assist at discharge as needed   Refer to discharge planning if patient needs post-hospital services based on physician order or complex needs related to functional status, cognitive ability or social support system     Problem: Pain  Goal: Verbalizes/displays adequate comfort level or baseline comfort level  Outcome: Progressing  Flowsheets (Taken 10/11/2022 0746 by Grace Bernard RN)  Verbalizes/displays adequate comfort level or baseline comfort level:   Encourage patient to monitor pain and request assistance   Assess pain using appropriate pain scale   Administer analgesics based on type and severity of pain and evaluate response   Implement non-pharmacological measures as appropriate and evaluate response   Consider cultural and social influences on pain and pain management   Notify Licensed Independent Practitioner if interventions unsuccessful or patient reports new pain     Problem: ABCDS Injury Assessment  Goal: Absence of physical injury  Outcome: Progressing  Flowsheets (Taken 10/6/2022 0422 by Marc Prieto RN)  Absence of Physical Injury: Implement safety measures based on patient assessment     Problem: Skin/Tissue Integrity  Goal: Absence of new skin breakdown  Description: 1. Monitor for areas of redness and/or skin breakdown  2. Assess vascular access sites hourly  3. Every 4-6 hours minimum:  Change oxygen saturation probe site  4.   Every 4-6 hours:  If on nasal continuous positive airway pressure, respiratory therapy assess nares and determine need for appliance change or resting period.   Outcome: Progressing     Problem: Chronic Conditions and Co-morbidities  Goal: Patient's chronic conditions and co-morbidity symptoms are monitored and maintained or improved  Outcome: Progressing  Flowsheets (Taken 10/10/2022 2054 by Saurav Shaffer, RN)  Care Plan - Patient's Chronic Conditions and Co-Morbidity Symptoms are Monitored and Maintained or Improved:   Monitor and assess patient's chronic conditions and comorbid symptoms for stability, deterioration, or improvement   Collaborate with multidisciplinary team to address chronic and comorbid conditions and prevent exacerbation or deterioration   Update acute care plan with appropriate goals if chronic or comorbid symptoms are exacerbated and prevent overall improvement and discharge     Problem: Nutrition Deficit:  Goal: Optimize nutritional status  10/12/2022 1423 by Jay Abraham RN  Outcome: Progressing  Flowsheets (Taken 10/12/2022 1414 by Xochilt Valenzuela RD, WILFRIDO)  Nutrient intake appropriate for improving, restoring, or maintaining nutritional needs: Assess nutritional status and recommend course of action  10/12/2022 1414 by Xochilt Valenzuela RD, WILFRIDO  Flowsheets (Taken 10/12/2022 1414)  Nutrient intake appropriate for improving, restoring, or maintaining nutritional needs: Assess nutritional status and recommend course of action     Problem: Respiratory - Adult  Goal: Achieves optimal ventilation and oxygenation  Outcome: Progressing  Flowsheets (Taken 10/10/2022 2054 by Saurav Shaffer RN)  Achieves optimal ventilation and oxygenation:   Assess for changes in respiratory status   Assess for changes in mentation and behavior   Position to facilitate oxygenation and minimize respiratory effort   Oxygen supplementation based on oxygen saturation or arterial blood gases   Encourage broncho-pulmonary hygiene including cough, deep breathe, incentive spirometry Assess the need for suctioning and aspirate as needed   Assess and instruct to report shortness of breath or any respiratory difficulty   Respiratory therapy support as indicated  Goal: Clear lung sounds  Description: Clear lung sounds  10/12/2022 0739 by Jeevan Marcano RCP  Outcome: Progressing     Care plan reviewed with patient, verbal understanding expressed

## 2022-10-12 NOTE — PROGRESS NOTES
Comprehensive Nutrition Assessment    Type and Reason for Visit:  Reassess    Nutrition Recommendations/Plan:   Recommend MVI to assist healing process  Diet education provided on carbohydrate control diet to assist with blood glucose control and wound healing      Malnutrition Assessment:  Malnutrition Status: At risk for malnutrition (Comment) (10/12/22 1411)    Context:  Chronic Illness     Findings of the 6 clinical characteristics of malnutrition:  Energy Intake:  Mild decrease in energy intake (Comment)  Weight Loss:  Mild weight loss (specify amount and time period) (7% loss over 4 months per EMR)     Body Fat Loss:  No significant body fat loss     Muscle Mass Loss:  No significant muscle mass loss    Fluid Accumulation:  Unable to assess     Strength:  Not Performed    Nutrition Assessment:     Pt. nutritionally compromised AEB wounds. At risk for further nutrition compromise r/t increased nutrient needs for wound healing, admit with Peripheral Artery Disease, Bilateral Lower Extremity Cellulitis, Diarrhea  resolved, s/p left AKA on 10/10/22 & underlying medical condition (CAD, COPD, NIDDM II, HLD, noncompliant with meds & diet, HTN, GERD,Seizure Disorder).        Nutrition Related Findings:    Pt. Report/Treatments/Miscellaneous: Patient seen with significant other and reports appetite very good now, appetite had been decreased for the past 3-4 months PTA, report of leg and abdomen pain on admit, reports plan for discharge home within the next day, reports typically eats one large meal per day, intake of regular pop, reports has cut back on sweets, significant other voice interest in review of diet guidelines   GI Status: BM 10/8  Pertinent Labs: A1C (10/3) 9.2%; today glucose 159, 231  Pertinent Meds: Jardiance, lasix, lantus, humalog, zosyn, vancomycin     Wound Type:  (left AKA on 10/10/22, right LE non healing wound, stage 3 sacral ulcer)       Current Nutrition Intake & Therapies:    Average Meal Intake: %  Average Supplements Intake: NPO  ADULT DIET; Regular; 4 carb choices (60 gm/meal)    Anthropometric Measures:  Height: 6' 1\" (185.4 cm)  Ideal Body Weight (IBW): 184 lbs (84 kg)    Admission Body Weight: 226 lb 6.6 oz (102.7 kg) ((10/3) no edema)  Current Body Weight: 243 lb 13.3 oz (110.6 kg) (10/10 no edema; pre-op),     Current BMI (kg/m2): 32.2  Usual Body Weight:  (per EMR: (6/20) 244#, (4/12) 247#, (12/7/21) 248#)                       BMI Categories: Obese Class 1 (BMI 30.0-34. 9)    Estimated Daily Nutrient Needs:  Energy Requirements Based On: Kcal/kg  Weight Used for Energy Requirements: Current (110kgm on 10/10)  Energy (kcal/day): 1980 kcals (18)  Weight Used for Protein Requirements: Ideal (84kgm IBW)  Protein (g/day): 118-168 grams (1.4-2 grams protein/kgm IBW) for wound healing       Nutrition Diagnosis:   Increased nutrient needs related to increase demand for energy/nutrients as evidenced by wounds    Nutrition Interventions:   Food and/or Nutrient Delivery: Continue Current Diet  Nutrition Education/Counseling: Education initiated (educated patient and significant other on carbohydrate controlled diet guidelines to aid in blood glucose control and help promote healing, denies having further questions, information sheets provided)  Coordination of Nutrition Care: Continue to monitor while inpatient, Interdisciplinary Rounds       Goals:     Goals: PO intake 75% or greater, by next RD assessment       Nutrition Monitoring and Evaluation:   Behavioral-Environmental Outcomes: Other (Comment) (hx diet & medication noncompliance)  Food/Nutrient Intake Outcomes: Food and Nutrient Intake  Physical Signs/Symptoms Outcomes: Biochemical Data, Fluid Status or Edema, Meal Time Behavior, Nutrition Focused Physical Findings, Skin, Weight, GI Status    Discharge Planning:     Too soon to determine     Xochilt Valenzuela RD, LD  Contact: [phone]

## 2022-10-12 NOTE — PROGRESS NOTES
Patient transferred to [facility] room 25 from . Dhiraj and belongings all transferred with patient.

## 2022-10-12 NOTE — PROGRESS NOTES
[facility]  Post Operative Progress Note  Dr Andrea Smyth    Pt Name: Aly Piper Record Number: [id number]  Date of Birth 1955   Today's Date: 10/12/2022    Hospital day # 9   POD # 2    Chief complaint: no complaints today     Patient was stable overnight. Chart reviewed. Updated by nursing staff. Denies chest discomfort or dyspnea. No N/V; (-) belching, flatus and BM. Tolerating ADULT DIET; Regular; 4 carb choices (60 gm/meal) diet. Pain controlled with analgesia. Up with therapy. Dressing removed, no active drainage      Past, Family, Social History unchanged from admission. Diet:  ADULT DIET;  Regular; 4 carb choices (60 gm/meal)    Medications:  Scheduled Meds:   albuterol sulfate HFA  2 puff Inhalation BID    nicotine  1 patch TransDERmal Daily    vancomycin  1,250 mg IntraVENous Q12H    magnesium hydroxide  30 mL Oral Daily    aspirin  81 mg Oral Daily    clopidogrel  75 mg Oral Daily    furosemide  20 mg Oral Daily    enoxaparin  30 mg SubCUTAneous BID    insulin glargine  16 Units SubCUTAneous Nightly    empagliflozin  10 mg Oral Daily    insulin lispro  0-16 Units SubCUTAneous TID WC    insulin lispro  0-4 Units SubCUTAneous Nightly    piperacillin-tazobactam  3,375 mg IntraVENous 30 Min Pre-Op    piperacillin-tazobactam  3,375 mg IntraVENous Q8H    metoprolol succinate  75 mg Oral BID    potassium chloride  20 mEq Oral Daily with breakfast    metoprolol succinate  25 mg Oral Once    isosorbide mononitrate  15 mg Oral Daily    tamsulosin  0.4 mg Oral Daily    amiodarone  200 mg Oral BID    guaiFENesin  600 mg Oral BID    sodium hypochlorite   Irrigation Daily    atorvastatin  40 mg Oral Daily    gabapentin  100 mg Oral TID    pantoprazole  40 mg Oral QAM AC    sodium chloride flush  10 mL IntraVENous 2 times per day    vancomycin (VANCOCIN) intermittent dosing (placeholder)   Other RX Placeholder     Continuous Infusions:   sodium chloride 700 mL (10/12/22 0602) dextrose       PRN Meds:albuterol sulfate HFA, polyethylene glycol, oxyCODONE **OR** oxyCODONE, bisacodyl, potassium chloride **OR** potassium alternative oral replacement **OR** potassium chloride, benzocaine-menthol, morphine, traZODone, sodium chloride flush, sodium chloride, ondansetron **OR** ondansetron, acetaminophen **OR** acetaminophen, glucose, dextrose bolus **OR** dextrose bolus, glucagon (rDNA), dextrose    Objective:    CBC:   Recent Labs     10/11/22  0346 10/12/22  0656   WBC 15.0* 11.5*   HGB 9.6* 9.9*   * 487*       BMP:    Recent Labs     10/11/22  0346 10/12/22  0656   * 139   K 4.6 4.9   CL 99 100   CO2 27 27   BUN 6* 7   CREATININE 0.8 0.7   GLUCOSE 242* 159*       Calcium:  Recent Labs     10/12/22  0656   CALCIUM 8.1*       Ionized Calcium:No results for input(s): IONCA in the last 72 hours. Magnesium:  No results for input(s): MG in the last 72 hours. Phosphorus:No results for input(s): PHOS in the last 72 hours. BNP:No results for input(s): BNP in the last 72 hours. Glucose:  Recent Labs     10/11/22  1946 10/12/22  0825 10/12/22  1059   POCGLU 165* 224* 231*       HgbA1C: No results for input(s): LABA1C in the last 72 hours. INR: No results for input(s): INR in the last 72 hours. Hepatic: No results for input(s): ALKPHOS, ALT, AST, PROT, BILITOT, BILIDIR, LABALBU in the last 72 hours. Amylase and Lipase:No results for input(s): LACTA, AMYLASE in the last 72 hours. Lactic Acid: No results for input(s): LACTA in the last 72 hours. Troponin: No results for input(s): CKTOTAL, CKMB, TROPONINT in the last 72 hours. BNP: No results for input(s): BNP in the last 72 hours. Lipids: No results for input(s): CHOL, TRIG, HDL, LDL, LDLCALC in the last 72 hours.   ABGs:   Lab Results   Component Value Date/Time    PH 7.45 01/20/2013 05:10 PM    PCO2 30 01/20/2013 05:10 PM    PO2 112 01/20/2013 05:10 PM    HCO3 21 01/20/2013 05:10 PM    O2SAT 99 01/20/2013 05:10 PM       Radiology reports as per the Radiologist  Radiology: XR TIBIA FIBULA LEFT (2 VIEWS)    Result Date: 10/3/2022  2 view left tibia-fibula Comparison: None Findings: No acute fracture or dislocation. Left knee and ankle joints are unremarkable. Remote surgical clips in the proximal posterior medial calf. No cortical erosions. Generalized subcutaneous edema with questionable small ulceration defects in the mid to lower calf. 1. Generalized subcutaneous edema with questionable small ulceration defects in the mid-distal calf. 2. No cortical erosions to suggest osteomyelitis. This document has been electronically signed by: Francy Loaiza MD on 10/03/2022 08:35 PM    XR TIBIA FIBULA RIGHT (2 VIEWS)    Result Date: 10/3/2022  2 view right tibia-fibula Comparison: None Findings: No acute fracture or dislocation. Old healed traumatic deformities in the distal tibia and fibula. Intact intramedullary clarisa with fixation screws in the tibia. Mild degenerative changes in the right knee and right ankle joints with chondrocalcinosis in the right knee. Generalized subcutaneous edema in the right calf. Mild subcutaneous edema of the right calf. No cortical erosions to suggest acute osteomyelitis. This document has been electronically signed by: Francy Loaiza MD on 10/03/2022 08:47 PM    XR ANKLE LEFT (2 VIEWS)    Result Date: 10/3/2022  2 view left ankle Comparison: None Findings: No acute fracture or dislocation. Left ankle mortise is intact. No cortical erosions. No significant ankle effusion. Lateral ankle swelling. No radiopaque foreign body. No acute osseous injury or cortical erosions. Lateral ankle swelling. This document has been electronically signed by: Francy Loaiza MD on 10/03/2022 08:28 PM    XR ANKLE RIGHT (2 VIEWS)    Result Date: 10/3/2022  2 view right ankle Comparison: DX - ANKLE RT MINIMUM 3 VIEWS - 09/17/2012 02:42 PM EDT Findings: No acute fracture or dislocation.  Posttraumatic healed deformities in the distal tibia and fibula. Intact intramedullary clarisa and fixation screws in the tibia and across the distal tibiofibular syndesmosis. Generalized subcutaneous edema. No cortical erosions. Minimal inferior calcaneal spurring. 1. No acute osseous injury or cortical erosions. 2. Subcutaneous edema. This document has been electronically signed by: Stoney Kayser, MD on 10/03/2022 08:27 PM    XR FOOT LEFT (2 VIEWS)    Result Date: 10/3/2022  2 view left foot Comparison: CR - FOOT LT MINIMUM 3 VIEWS - 03/17/2017 10:53 AM EDT Findings: No acute fracture or dislocation. Prior amputation of the left 2nd toe down to the proximal phalanx. Prior truncation deformity at the distal tuft of the left 3rd toe. There are chronic appearing erosive changes in the left 1st metatarsophalangeal joint with medial subluxation. No other discrete cortical erosions. Generalized swelling in the dorsum of the left foot with soft tissue ulceration defects. No radiopaque foreign body. 1. Soft tissue swelling in the left foot with several soft tissue ulceration defects. 2. Chronic appearing erosive changes in the left 1st metatarsophalangeal joint with medial subluxation. 3. Chronic appearing truncation defect in the distal tuft of the left 3rd toe and remote amputation of the left 2nd toe with intact cortical margins. 4. No other acute appearing cortical erosions radiographically to suggest osteomyelitis. This document has been electronically signed by: Stoney Kayser, MD on 10/03/2022 08:50 PM    XR FOOT RIGHT (2 VIEWS)    Result Date: 10/3/2022  2 view right foot Comparison: DX - FOOT RT MINIMUM 3 VIEWS - 09/17/2012 02:42 PM EDT Findings: No acute fracture or dislocation. No cortical erosions. Soft tissue swelling with ulceration defect in the dorsum of the midfoot. Partially seen intact fixation hardware in the distal tibia. Scattered degenerative changes in the great toe and dorsal intertarsal joints.      1. Ulceration defect and adjacent swelling in the dorsum of the midfoot. 2. No cortical erosions to suggest acute osteomyelitis. This document has been electronically signed by: Patito Gomes MD on 10/03/2022 08:35 PM    CTA ABDOMINAL AORTA W BILAT RUNOFF W WO CONTRAST    Result Date: 10/3/2022  CT angiography abdomen and pelvis with bilateral lower extremity runoff using IV contrast. 3-D post processing. Comparison:06/27/2022 Findings: Multifocal plaque in the abdominal aorta without aneurysm or dissection. Scattered mild calcified plaque in the celiac artery, SMA and UMER origin, and left renal artery origin without significant stenosis. Right renal artery is patent. Scattered plaque in the iliac arteries. Chronic occlusion of the left internal iliac artery with mild reconstitution of a few distal branches. Calcified plaque in the bilateral common femoral arteries. Remote surgical changes near the left common femoral artery bifurcation. Chronic occlusion of the entire left superficial femoral artery, left popliteal artery, and left posterior tibial artery. There is reconstitution of the distal left posterior tibial artery in the distal calf near the ankle. Left profunda femoris artery, left anterior tibial artery, and left peroneal arteries are opacified. Left dorsalis pedis and plantar arteries are opacified. Scattered plaque in the right superficial femoral artery and right popliteal artery. Right profunda femoris artery is opacified. Spotty opacification of the right anterior tibial artery without significant opacification in the distal segment. Right dorsalis pedis artery is not opacified. Right peroneal artery is opacified until the distal segment near the ankle. Right posterior tibial artery is opacified to the ankle. Right plantaris artery is opacified. Liver, gallbladder, spleen, pancreas, and adrenal glands are unremarkable. Both kidneys enhance symmetrically without hydronephrosis. No bowel obstruction, pneumatosis, or pneumoperitoneum.  Appendix is not seen. No ascites or enlarged abdominal or pelvic lymph nodes. Urinary bladder and prostate are unremarkable. No pelvic free fluid. Old L3 compression fracture with bilateral posterior L2-4 fusion. Old fracture involving the left interpedicular screw at L4. Intact fixation hardware in the right tibia. Asymmetric fatty atrophy of the left gluteal and left thigh muscles. Fatty atrophy of the bilateral calf muscles greater on the left. Mild edema in the dorsum of both feet. No soft tissue gas or fluid collections to suggest abscess. Chronic erosive changes in the left 1st metatarsophalangeal joint. Partially seen amputation of left second toe. Degenerative changes in the bilateral hindfoot. No acute fracture or dislocation. No cortical erosions. 1. Chronic occlusions of the left internal iliac artery, entire left superficial femoral artery, left popliteal artery, and large portion of the left posterior tibial artery with reconstitution in the distal segment near the ankle. Left dorsalis pedis and plantaris arteries are opacified. 2. No significant opacification in the distal right anterior tibial artery, right dorsalis pedis artery, or distal right peroneal artery. This may reflect underlying diminished flow or occlusion. This is stable. 3. Scattered atherosclerotic plaque in the aorta and mesenteric/renal vessels without significant stenosis. 4. Asymmetric fatty atrophy of the left lower extremity musculature. Mild subcutaneous edema in the dorsum of both feet. 5. Stable chronic hardware fracture of the left interpedicular screw at L4. This document has been electronically signed by: Daxa Ramirez MD on 10/03/2022 10:46 PM All CTs at this facility use dose modulation techniques and iterative reconstructions, and/or weight-based dosing when appropriate to reduce radiation to a low as reasonably achievable. 3D Post-processing was performed on this study.        Physical Exam:  Vitals: BP (!) 141/73   Pulse 80   Temp 97.7 °F (36.5 °C) (Oral)   Resp 16   Ht 6' 1\" (1.854 m)   Wt 243 lb 13.3 oz (110.6 kg)   SpO2 94%   BMI 32.17 kg/m²   24 hour intake/output:  Intake/Output Summary (Last 24 hours) at 10/12/2022 1436  Last data filed at 10/12/2022 1431  Gross per 24 hour   Intake 1250 ml   Output 7005 ml   Net -5755 ml       Last 3 weights: Wt Readings from Last 3 Encounters:   10/10/22 243 lb 13.3 oz (110.6 kg)   06/10/22 244 lb (110.7 kg)   04/12/22 247 lb (112 kg)       General appearance - oriented to person, place, and time, overweight, and no acute distress   HEENT: Normocephalic and Atraumatic  Chest - no respiratory distress  Neurological - Alert and oriented and Normal speech  Integumentary - Skin color, texture, turgor normal. No Rashes or lesions  Musculoskeletal - protect  Left AKA      DVT prophylaxis: [x] Lovenox                                 [] SCDs                                 [] SQ Heparin                                 [] Encourage ambulation           [] Already on Anticoagulation                 ASSESSMENT:  S/p left AKA  POD# 2  Postop pain - minimal, no phantom pain   CAD  Sacral ulcer   DB, HTN, COPD  Hyperkalemia   Leukocytosis improving        has a past medical history of CAD (coronary artery disease), COPD (chronic obstructive pulmonary disease) ([address] Diabetes mellitus ([address] Hx of blood clots, Hyperlipidemia, Hypertension, Leg wound, left, Leg wound, right, Lumbar radiculopathy, Osteoarthritis, Seizure disorder ([address] and Spinal stenosis, lumbar. PLAN:  Routine incisional care, may leave open to air   Monitor labs, replace lytes per protocol  Stool softeners   Diet carb control   Iv hydration  Anticoagulation - okay resume   GI Prophylaxis  Activity - ambulate, OOB to chair, C&DB,  IS  Analgesia and antiemetics as needed  Antibiotic Therapy   Plan home with christiano Marie to discharge from a surgical standpoint.       Electronically signed by JAVAN Clemons CNP on 10/12/2022 at 2:36 PM  Patient seen and examined independently by me. Above discussed and I agree with CNP. Labs, cultures, and radiographs where available were reviewed. See orders for the updated patient care plan.     Tigre Valdovinos MD MD, stump looks great  having minimal pain pre ischemic pain gone  10/12/2022   5:00 PM

## 2022-10-12 NOTE — PLAN OF CARE
Problem: Respiratory - Adult  Goal: Clear lung sounds  Description: Clear lung sounds  Outcome: Progressing   Continue tx's to improve breath sounds, increase aeration and decrease WOB. Pt mutually agrees with goals.

## 2022-10-12 NOTE — PROGRESS NOTES
[facility]  PHYSICAL THERAPY MISSED TREATMENT NOTE  [facility]    Date: 10/12/2022  Patient Name: Hugo Cooley        MRN: [id number]   : 1955  (74 y.o.)  Gender: male   Referring Practitioner: Karen Warner DO  Diagnosis: PAD         REASON FOR MISSED TREATMENT:  Patient refused treatment

## 2022-10-12 NOTE — CONSULTS
[facility]  [address] [facility] [address]  Dept: [phone]  Loc: [phone]  Visit Date: 10/3/2022    Urology Consult Note    Reason for Consult:  Urinary retention, started on flomax and new tinoco placed  Requesting Physician:  Dr Sadaf Quintero    History Obtained From:  patient, electronic medical record    Chief Complaint: Worsening bilateral lower extremity infection and and diarrhea for 1 week    HISTORY OF PRESENT ILLNESS:                The patient is a 74 y.o. male with significant past medical history of PMH of PAD, CAD s/p PCI LAD, chronic sacral wounds, chronic systolic heart failure, pAF, HTN, HLD, NIDDMII, tobacco abuse who presented with bilateral lower extremity wounds.   Urology consulted for urinary retention  Bladder scan >999ml, tinoco in place      Past Medical History:        Diagnosis Date    CAD (coronary artery disease)     COPD (chronic obstructive pulmonary disease) (HCC)     Diabetes mellitus [address] [facility]     Hx of blood clots 1996    LEFT LEG    Hyperlipidemia     Hypertension     Leg wound, left     Leg wound, right     Lumbar radiculopathy     Osteoarthritis     Seizure disorder (HCC)     Spinal stenosis, lumbar      Past Surgical History:        Procedure Laterality Date    ANKLE SURGERY      ball joint    [address] [facility]  2005    1 STENT PLACED    CARDIAC CATHETERIZATION  5/9/13    [facility]    CORONARY ANGIOPLASTY WITH STENT PLACEMENT      DIAGNOSTIC CARDIAC CATH LAB PROCEDURE  11/18/2021    FOREARM SURGERY      left    FRACTURE SURGERY Right 2000    LEG    KNEE SURGERY      left    LEG SURGERY      LEG SURGERY Left 10/10/2022    Left  Above Knee Amputation performed by Berta Kwan MD at [facility]      left middle toe    TYMPANOSTOMY TUBE PLACEMENT  1979     Allergies:  Influenza vaccines and Pneumococcal vaccines  Social History:  Social History     Socioeconomic History    Marital status:      Spouse name: Not on file    Number of children: 2    Years of education: 9    Highest education level: Not on file   Occupational History    Occupation: [employer]   Tobacco Use    Smoking status: Some Days     Packs/day: 0.50     Years: 40.00     Pack years: 20.00     Types: Cigarettes    Smokeless tobacco: Never   Vaping Use    Vaping Use: Not on file   Substance and Sexual Activity    Alcohol use: No     Alcohol/week: 0.0 standard drinks    Drug use: No    Sexual activity: Not on file   Other Topics Concern    Not on file   Social History Narrative    Not on file     Social Determinants of Health     Financial Resource Strain: Not on file   Food Insecurity: Not on file   Transportation Needs: Not on file   Physical Activity: Not on file   Stress: Not on file   Social Connections: Not on file   Intimate Partner Violence: Not on file   Housing Stability: Not on file     Family History:       Problem Relation Age of Onset    Diabetes Mother     Kidney Disease Mother     COPD Father     Heart Disease Father     Cancer Father         bone    Stroke Father     Heart Disease Sister     Diabetes Brother        ROS:  Constitutional: Negative for chills, fatigue, fever, or weight loss. Eyes: Denies reported visual changes. ENT: Denies headache, difficulty swallowing, earache, and nosebleeds. Cardiovascular: Negative for chest pain, palpitations, tachycardia or edema. Respiratory: Denies cough or SOB. GI:The patient denies abdominal or flank pain, anorexia, nausea or vomiting. : see HPI. Musculoskeletal: Patient denies low back pain or painful or reduced range of ROM. Neurological: The patient denies any symptoms of neurological impairment or TIA. Psychiatric: Denies anxiety or depression. Skin: Denies rash or lesions. All remaining ROS negative.     PHYSICAL EXAM:  VITALS:  BP (!) 154/93   Pulse 87   Temp 98 °F (36.7 °C) (Oral)   Resp 16   Ht 6' 1\" (1.854 m)   Wt 243 lb 13.3 oz (110.6 kg)   SpO2 92%   BMI 32.17 kg/m² . Nursing note and vitals reviewed. Constitutional: Alert and oriented times x3, no acute distress, and cooperative to examination with appropriate mood and affect. HEENT:   Head:         Normocephalic and atraumatic. Mouth/Throat:          Mucous membranes are normal.   Eyes:         EOM are normal. No scleral icterus. Nose:    The external appearance of the nose is normal  Ears: The ears appear normal to external inspection. Cardiovascular:       Normal rate, regular rhythm. Pulmonary/Chest:  Normal respiratory rate and rhthym. No use of accessory muscles. Lungs clear bilaterally. Abdominal:          Soft. No tenderness. Active bowel sounds             Genitalia:    Normal circumcised penis. Foreskin replaced  Urethral meatus is normal in size and location  Scrotal contents normal to inspection and palpation   Normal testes palpated bilaterally  Olvera draining yellow urine  Musculoskeletal:    Normal range of motion. He exhibits no edema or tenderness of lower extremities. Extremities:    Left AKA, right foot bandaged  Neurological:    Alert and oriented.      DATA:  CBC:   Lab Results   Component Value Date/Time    WBC 11.5 10/12/2022 06:56 AM    RBC 3.92 10/12/2022 06:56 AM    RBC 5.49 02/02/2012 11:40 AM    HGB 9.9 10/12/2022 06:56 AM    HCT 32.8 10/12/2022 06:56 AM    MCV 83.7 10/12/2022 06:56 AM    MCH 25.3 10/12/2022 06:56 AM    MCHC 30.2 10/12/2022 06:56 AM    RDW 15.2 03/18/2021 12:01 PM     10/12/2022 06:56 AM    MPV 9.7 10/12/2022 06:56 AM     BMP:    Lab Results   Component Value Date/Time     10/12/2022 06:56 AM    K 4.9 10/12/2022 06:56 AM     10/12/2022 06:56 AM    CO2 27 10/12/2022 06:56 AM    BUN 7 10/12/2022 06:56 AM    CREATININE 0.7 10/12/2022 06:56 AM    CALCIUM 8.1 10/12/2022 06:56 AM    GFRAA >60 03/18/2021 12:01 PM    LABGLOM >90 10/12/2022 06:56 AM    GLUCOSE 159 10/12/2022 06:56 AM    GLUCOSE 117 02/02/2012 11:40 AM     BUN/Creatinine:    Lab Results   Component Value Date/Time    BUN 7 10/12/2022 06:56 AM    CREATININE 0.7 10/12/2022 06:56 AM     Magnesium:    Lab Results   Component Value Date/Time    MG 2.0 10/09/2022 04:25 AM     Phosphorus:  No results found for: PHOS  PT/INR:    Lab Results   Component Value Date/Time    PROTIME 12.3 11/17/2020 04:20 PM    INR 1.47 10/04/2022 11:13 AM     U/A:    Lab Results   Component Value Date/Time    COLORU YELLOW 10/06/2022 09:15 AM    PHUR 6.5 10/06/2022 09:15 AM    LABCAST NONE SEEN 03/16/2017 09:15 PM    LABCAST NONE SEEN 03/16/2017 09:15 PM    WBCUA 0-2 10/06/2022 09:15 AM    RBCUA 3-5 10/06/2022 09:15 AM    YEAST NONE SEEN 10/06/2022 09:15 AM    BACTERIA NONE SEEN 10/06/2022 09:15 AM    SPECGRAV 1.023 03/16/2017 09:15 PM    LEUKOCYTESUR NEGATIVE 10/06/2022 09:15 AM    UROBILINOGEN 0.2 10/06/2022 09:15 AM    BILIRUBINUR NEGATIVE 10/06/2022 09:15 AM    BLOODU NEGATIVE 10/06/2022 09:15 AM    GLUCOSEU >= 1000 10/06/2022 09:15 AM       IMPRESSION/Plan:    Discharge with tinoco  Void trial in 1-2 wks  Continue Flomax - refill sent to pharm    Urinary retention - reports unable to empty bladder since surgery of left leg.   ?NGB hx of DM, nonhealing wounds leading to left AKA  BPH - incomplete bladder emptying, cont Flomax     Urology signing off, please call with questions/concerns  Thank you for including us in the care of JAVAN Montelongo - CNP, APRN  10/12/22 9:38 AM  Urology

## 2022-10-12 NOTE — PROGRESS NOTES
Internal Medicine Resident Progress Note    Patient:  Cora Finley    YOB: 1955  Unit/Bed:7K-25/025-A  MRN: [id number]    Acct: [de-identified]   PCP: Nicol Whitehead MD    Date of Admission: 10/3/2022      Assessment/Plan:    L LE Necrotic Non-healing Extensive Wound:  - Leg cultures growing multiple colonies of enteric gram-negative bacilli. There is also gram-positive cocci  - Pain on rest in Left lower leg.  - WBC count trending down. - Lactic acid trended down to 1.7.  - Pt is in agreement for L AKA. - Left above-knee amputation (10/10/22)  Plan:   - ID consulted: Continue ABX treatment   - PM&R consulted: Patient would like home health on discharge and have physical therapy outpatient  - PT/OT consulted: Refusing, recommend home with Tobi Flores  - Gen surg consulted: Continue postop dressing and ambulate. Continue with antibiotic therapy  - Zosyn end date: 10/15 vancomycin end date:10/14   - Continue to monitor for any acute signs/symptoms of decompensation    R LE Non-healing Extensive Wound:  - Leg cultures growing multiple colonies of enteric gram-negative bacilli. There is also gram-positive cocci  - Pt would like to attempt revascularization therapy for R LE as outpatient  -Patient is doing well, no acute complaints. Plan:   - ID consulted: Continue ABX treatment   - Cardiology will follow outpatient. - Continue to monitor for any acute signs/symptoms of decompensation    Reactive leukocytosis:(improving)  - WBC elevated from 11.2 --> --> 15.0   -Patient received Decadron 10 mg on 10/10  - Urine cultures: Growing >100,000 CFU of yeast, 2/2 abx usage. No need to treat further. Plan:  - Will monitor CBC daily    Severe Peripheral artery disease:  - Chronic history of PAD, follows with Dr. Geneva Lambert in cardiology. - Patient has pain in lower extremities, has a 2 to 3-month history of lower extremity wounds. Unsure when it started but it is between June to September.  Resting pain in L lower leg.   - Patient has nonhealing wounds of bilateral lower extremity which previously required hospitalization.    - CTA shows occlusion of the left internal iliac artery, entire left superficial femoral artery, left popliteal artery and large portion of the left posterior tibial artery. There is no significant opacification in the distal right anterior tibial artery, right dorsalis pedis artery, or distal right peroneal artery. - Preoperative cardiac stress test shows LVEF of 35% and nuclear images not suggestive for myocardial ischemia  -  Patient agreed for arteriogram: Patent B/L iliac artery, occluded L SFA and Fem/Pop bypass graft. Extensive collateral network from deep femoral artery formed. - Left above-knee amputation done on 10/10/22  Plan:   - Gabapentin 100mg TID.   - Cardiology will follow outpatient for RLE PAD. Urinary Retention s/p tinoco placement:  - Pt had mild hx of urinary inconcinece on admission. Worsened on 10/06.   - Has no hx of BPH, denies increased urgency in past year, increased frequency, or night time frequency. - UA was positive for 1000 glucose, most likely due to [place] home usage and diabetic progression.   - Patient is currently on catheter. Placed 10/10. Urine cultures negative 24 hours later  Plan:  -Urology consulted: Discharge with Tinoco, void trial in 1 to 2 weeks and will be started on Flomax as home medication    Stage 3 Sacral ulcer:  - Patient has chronic history of sacral ulcers, for past couple of months. - Very deep and goes into fat. Plan:   - Wound care consulted: Dressing up ulcer. NIDDMII:   - Home med of Farxiga 10mg and Metformin 1000mg BID. Plan:  - Medium dose of SSI and 16 units of Lantus.   - Home med of Jardiance 10 mg  - Will keep inpatient goal of sugars 140-180.    - Accuchecks x4.   - Diabetic diet  - Hypoglycemia Protocol     Sepsis 2/2 cellulitis on B/L LE (resolved)  - On POA SIRS 2/4 (, WBC 17.6) with suspected cellulitis/chronic LE wound source. - Leg cultures growing multiple colonies of enteric gram-negative bacilli. There is also gram-positive cocci  - Blood culture negative on 48 hours growth. Diarrhea: (Resolved):  - Patient has possible 1 week history of diarrhea, at times he admits to having chronic history of diarrhea. No recent antibiotic usage or known food exposures that could have caused this. - GI panel positive for E. coli enteropathic  - Symptoms have resolved      Lactic acidosis secondary to sepsis from lower extremity cellulitis (Resolved):  - Lactic acid on admission 3.6 downtrending to 1.7 on 10/05. CAD s/p PCI DANA x2 LAD performed at [facility]:  - Follows Dr. Geeta Weaver.   - Patient is not compliant with medication.  - EKG shows A. fib with RVR  - Stress test done on 10/05, shows LVEF of 35%. Not suggestive of myocardial ischemia. Persistent A. fib:   - Follows Dr. Geeta Weaver.   - Noted per chart review, though patient denies any arrhythmia history. - Patient maintained on amiodarone 200mg daily at home. - [id number] 5.   - HAS-BLED Score 4  - EKG on POA showed A. fib with RVR  Plan:  - Amiodarone 200 mg twice daily and Toprol-XL increased to 75 mg BID  - Eliquis and ASA resumed s/p surgery on POD#1. Plavix resumed on 10/12 (POD#2). HFrEF 40%:  - ECHO 4/11/22 demonstrated improved EF 40-45%, mod LV hypokinesis. - Given no CP, SOB, or O2 requirement, will hold on repeat ECHO at this time. - Patient unable to state his home medications, but appears he is on GDMT consisting of SGLT2, metoprolol. No noted ARNI/ACE/ARB/MRA. Plan:  - Toprol-XL 75 mg BID Jardiance 10 mg, Llasix 20mg daily . Chronic normocytic anemia:   - Hgb 12.6 on arrival. No noted bleeding. MCV low-normal at 80.7. Thromobocytosis:   - Plts 556 on arrival. Suspect reactive in setting of acute illness. HTN:   - Imdur 30mg daily, lopressor 75mg bid    COPD:   Per chart review.  Spirometry with bronchodilator 2014 normal. Patient on home albuterol inhaler.   -Added albuterol inhaler 4 times daily instead of P. R.N  - Encourage use of incentive spirometry, Acapella and started on guaifenesin 600 mg twice daily. GERD:   - On Protonix     ? BPH:  Has urinary incontinence since admission, with some improvement with Flomax 0.4 mg.    Plan:  -Urology consulted: Discharged with May, void trial in 1 to 2 weeks and will be started on Flomax as home medication    Chronic tobacco abuse: . - Nicotine patch started    Insomnia:  - Trazodone resumed. Expected discharge date:  tomorrow    Disposition:   [] Home  [] TCU  [x] Rehab  [] Psych  [] SNF  [] [facility]  [] Other-    ===================================================================      Chief Complaint: Worsening bilateral lower extremity infection and and diarrhea for 1 week    Hospital Course:      Per chart   \"Ruben Gaston is a 74 y.o. male with a PMH of PAD, CAD s/p PCI LAD, chronic sacral wounds, chronic systolic heart failure, pAF, HTN, HLD, NIDDMII, tobacco abuse who presented with bilateral lower extremity wounds. Patient reports a 2-week history of worsening bilateral lower extremity infections. He states he has developed rising erythema to the mid calf along with worsening wounds and ulcerations of bilateral extremities. Patient states wounds have been purulent and foul-smelling. He also reports profuse diarrhea upon admission for the last 1 week. He states he follows with Dr. Celsa Monae for these bilateral lower extremity wounds, and has had lower extremity imaging in the past that demonstrated severe inflow disease. He has undergone previous bypass grafting of the left lower extremity that has since chronically occluded. He denies any chest pain, shortness of breath, abdominal pain. He has a history of coronary artery disease for which she went stent placement x2, unsure of which vessel.   He reports chronic heart failure, on diuretics at home. He denies any history of arrhythmia, but has charted history of atrial fibrillation for which she is on Eliquis at home. Patient is unsure of any medications that he is on at home and is unable to provide any information on which he is actually taking. Cristian present in room to assist with history. He states he has been told in the past that his left lower extremity would need to be amputated. \"    Patient was concerned about possible amputation of both lower extremities. Patient came in with diarrhea, GI panel was positive for E. coli enteropathogenic. Resolved on day 2. ID consulted. No growth in blood cultures in last 48 hours. Cultures from leg show gram-negative bacilli, group B strep, Staphylococcus     IV vancomycin (End date:10/12) and Zosyn completed (End date:10/8). Cardiology was consulted, recommend surgery consult for left lower extremity amputation. Stress test (10/5/22) shows LVEF of 35%. Not suggestive of myocardial ischemia. Peripheral angiogram was supposed to be re-attempted on 10/6. Patient did not want peripheral angiogram, due to being unable to lay flat for long periods of time. Patient had complaint of urinary incontinence, no history of BPH or increased urinary urgency or frequency. Patient was straight cathed and started on Flomax 0.4 mg on 10/6. Straight catheter was removed on 10/6. Will continue with Flomax. Peripheral arteriogram done Dr. Crystal Pires on 10/7, patent B/L iliac artery, occluded L SFA and femoral/popliteal bypass graft with extensive collateral network from deep femoral artery. Left AKA completed on 10/10. Patient has been steadily improving s/p surgery. Able to get out of bed and onto wheelchair. Would like home health on discharge and no inpatient rehab. Urology consulted, will discharge patient on Olvera with voiding trials 1 to 2 weeks later and on Flomax 0.4 mg.        Subjective (past 24 hours):   Patient seen and examined. There is no acute complaints, resting comfortably in bed. States he wants to go home as soon as possible. No complaints of CP or SOB    ROS: reviewed complete ROS unchanged unless otherwise stated in hospital course/subjective portion.        Medications:  Reviewed    Infusion Medications    sodium chloride 700 mL (10/12/22 0602)    dextrose       Scheduled Medications    albuterol sulfate HFA  2 puff Inhalation BID    nicotine  1 patch TransDERmal Daily    vancomycin  1,250 mg IntraVENous Q12H    magnesium hydroxide  30 mL Oral Daily    aspirin  81 mg Oral Daily    clopidogrel  75 mg Oral Daily    furosemide  20 mg Oral Daily    enoxaparin  30 mg SubCUTAneous BID    insulin glargine  16 Units SubCUTAneous Nightly    empagliflozin  10 mg Oral Daily    insulin lispro  0-16 Units SubCUTAneous TID WC    insulin lispro  0-4 Units SubCUTAneous Nightly    piperacillin-tazobactam  3,375 mg IntraVENous 30 Min Pre-Op    piperacillin-tazobactam  3,375 mg IntraVENous Q8H    metoprolol succinate  75 mg Oral BID    potassium chloride  20 mEq Oral Daily with breakfast    metoprolol succinate  25 mg Oral Once    isosorbide mononitrate  15 mg Oral Daily    tamsulosin  0.4 mg Oral Daily    amiodarone  200 mg Oral BID    guaiFENesin  600 mg Oral BID    sodium hypochlorite   Irrigation Daily    atorvastatin  40 mg Oral Daily    gabapentin  100 mg Oral TID    pantoprazole  40 mg Oral QAM AC    sodium chloride flush  10 mL IntraVENous 2 times per day    vancomycin (VANCOCIN) intermittent dosing (placeholder)   Other RX Placeholder     PRN Meds: albuterol sulfate HFA, polyethylene glycol, oxyCODONE **OR** oxyCODONE, bisacodyl, potassium chloride **OR** potassium alternative oral replacement **OR** potassium chloride, benzocaine-menthol, morphine, traZODone, sodium chloride flush, sodium chloride, ondansetron **OR** ondansetron, acetaminophen **OR** acetaminophen, glucose, dextrose bolus **OR** dextrose bolus, glucagon (rDNA), dextrose        Intake/Output Summary (Last 24 hours) at 10/12/2022 1409  Last data filed at 10/12/2022 1200  Gross per 24 hour   Intake 650 ml   Output 6505 ml   Net -5855 ml         Exam:  BP (!) 141/73   Pulse 80   Temp 97.7 °F (36.5 °C) (Oral)   Resp 16   Ht 6' 1\" (1.854 m)   Wt 243 lb 13.3 oz (110.6 kg)   SpO2 94%   BMI 32.17 kg/m²         Physical exam:  Constitutional:  General: Patient is healing well s/p surgery. HEENT: Normocephalic and atraumatic. External nares normal.  PERRLA  Cardiovascular: RRR with S1/S2 with a murmur heard. Pulmonary: Inspiratory effort is normal with wheezing present  Abdominal: Suprapubic tenderness and small guarding. MSK: Left AKA  Skin: Capillary refill takes more than 3 seconds. Bruising lesion and rash present on right LE. Neurological: AOx4 with weakness present          Labs:   Recent Labs     10/11/22  0346 10/12/22  0656   WBC 15.0* 11.5*   HGB 9.6* 9.9*   HCT 32.7* 32.8*   * 487*       Recent Labs     10/11/22  0346 10/12/22  0656   * 139   K 4.6 4.9   CL 99 100   CO2 27 27   BUN 6* 7   CREATININE 0.8 0.7   CALCIUM 8.3* 8.1*       No results for input(s): AST, ALT, BILIDIR, BILITOT, ALKPHOS in the last 72 hours. No results for input(s): INR in the last 72 hours. No results for input(s): Iram Coombes in the last 72 hours. No results for input(s): PROCAL in the last 72 hours. Lab Results   Component Value Date/Time    NITRU NEGATIVE 10/06/2022 09:15 AM    WBCUA 0-2 10/06/2022 09:15 AM    BACTERIA NONE SEEN 10/06/2022 09:15 AM    RBCUA 3-5 10/06/2022 09:15 AM    BLOODU NEGATIVE 10/06/2022 09:15 AM    SPECGRAV 1.023 03/16/2017 09:15 PM    GLUCOSEU >= 1000 10/06/2022 09:15 AM       Radiology (48 hours):  XR TIBIA FIBULA LEFT (2 VIEWS)    Result Date: 10/3/2022  1. Generalized subcutaneous edema with questionable small ulceration defects in the mid-distal calf. 2. No cortical erosions to suggest osteomyelitis.  This document has been electronically signed by: Kriss Maldonado MD on 10/03/2022 08:35 PM    XR TIBIA FIBULA RIGHT (2 VIEWS)    Result Date: 10/3/2022  Mild subcutaneous edema of the right calf. No cortical erosions to suggest acute osteomyelitis. This document has been electronically signed by: Kriss Maldonado MD on 10/03/2022 08:47 PM    XR ANKLE LEFT (2 VIEWS)    Result Date: 10/3/2022  No acute osseous injury or cortical erosions. Lateral ankle swelling. This document has been electronically signed by: Kriss Maldonado MD on 10/03/2022 08:28 PM    XR ANKLE RIGHT (2 VIEWS)    Result Date: 10/3/2022  1. No acute osseous injury or cortical erosions. 2. Subcutaneous edema. This document has been electronically signed by: Kriss Maldonado MD on 10/03/2022 08:27 PM    XR FOOT LEFT (2 VIEWS)    Result Date: 10/3/2022  1. Soft tissue swelling in the left foot with several soft tissue ulceration defects. 2. Chronic appearing erosive changes in the left 1st metatarsophalangeal joint with medial subluxation. 3. Chronic appearing truncation defect in the distal tuft of the left 3rd toe and remote amputation of the left 2nd toe with intact cortical margins. 4. No other acute appearing cortical erosions radiographically to suggest osteomyelitis. This document has been electronically signed by: Kriss Maldonado MD on 10/03/2022 08:50 PM    XR FOOT RIGHT (2 VIEWS)    Result Date: 10/3/2022  1. Ulceration defect and adjacent swelling in the dorsum of the midfoot. 2. No cortical erosions to suggest acute osteomyelitis. This document has been electronically signed by: Kriss Maldonado MD on 10/03/2022 08:35 PM    CTA ABDOMINAL AORTA W BILAT RUNOFF W WO CONTRAST    Result Date: 10/3/2022  1. Chronic occlusions of the left internal iliac artery, entire left superficial femoral artery, left popliteal artery, and large portion of the left posterior tibial artery with reconstitution in the distal segment near the ankle. Left dorsalis pedis and plantaris arteries are opacified. 2. No significant opacification in the distal right anterior tibial artery, right dorsalis pedis artery, or distal right peroneal artery. This may reflect underlying diminished flow or occlusion. This is stable. 3. Scattered atherosclerotic plaque in the aorta and mesenteric/renal vessels without significant stenosis. 4. Asymmetric fatty atrophy of the left lower extremity musculature. Mild subcutaneous edema in the dorsum of both feet. 5. Stable chronic hardware fracture of the left interpedicular screw at L4. This document has been electronically signed by: Alan Simon MD on 10/03/2022 10:46 PM All CTs at this facility use dose modulation techniques and iterative reconstructions, and/or weight-based dosing when appropriate to reduce radiation to a low as reasonably achievable. 3D Post-processing was performed on this study. DVT prophylaxis:    [] Lovenox  [] SCDs  [] SQ Heparin  [] Encourage ambulation   [x] Already on Anticoagulation       Diet: ADULT DIET; Regular; 4 carb choices (60 gm/meal)  Code Status: Full Code  PT/OT: n/a  Tele: yes  IVF:  no     Electronically signed by Davey Holter MD  PGY-1 Internal Medicine Resident  on 10/12/2022 at 2:09 PM    Case was discussed with Attending, Dr. Burke Riley.

## 2022-10-12 NOTE — CARE COORDINATION
Client transferred to [address] [place] [address]; hand-off given to Irina Glover, [address] [place] [address]  Electronically signed by Larene Lefort, RN on 10/12/2022 at 7:33 AM

## 2022-10-12 NOTE — PLAN OF CARE
Problem: Respiratory - Adult  Goal: Clear lung sounds  Description: Clear lung sounds  10/12/2022 1559 by Neelam Thompson RCP  Outcome: Progressing    Continue tx to improve breath sounds, increase aeration and decrease WOB. Pt mutually agrees with goals.

## 2022-10-12 NOTE — CARE COORDINATION
10/12/22, 3:05 PM EDT    DISCHARGE ON GOING EVALUATION    Preston Soriano day: 9  Location: [facility]25/025-A Reason for admit: [facility] [I73.9]  Cellulitis of right lower extremity [L03.115]  Cellulitis of left lower extremity [L03.116]   Procedure:10/10:  Left  Above Knee Amputation  Barriers to Discharge: Urology consulted-continue flomax and tinoco on discharge-void trial OP, PT/OT following, Gen surg-routine incisional care-leave open to air  PCP: Connie Randall MD  Readmission Risk Score: 14.6%  Patient Goals/Plan/Treatment Preferences: Plan to return home with wife. Has necessary DME through [facility]. Plan to discharge home with [facility] as well. SW following. Potential discharge today:  10/12/22, 3:15 PM EDT    Patient goals/plan/ treatment preferences discussed by  and . Patient goals/plan/ treatment preferences reviewed with patient/ family. Patient/ family verbalize understanding of discharge plan and are in agreement with goal/plan/treatment preferences. Understanding was demonstrated using the teach back method. AVS provided by RN at time of discharge, which includes all necessary medical information pertaining to the patients current course of illness, treatment, post-discharge goals of care, and treatment preferences.      Services At/After Discharge: Home Health

## 2022-10-12 NOTE — PROGRESS NOTES
[facility]  OCCUPATIONAL THERAPY MISSED TREATMENT NOTE  [facility] ORTHOPEDICS 7K  7K-25/025-A      Date: 10/12/2022  Patient Name: Tatiana Wooten        CSN: [id number]   : 1955  (74 y.o.)  Gender: male   Referring Practitioner: Robyn Anna DO  Diagnosis: PAD         REASON FOR MISSED TREATMENT: OT treatment attempted Pt. Eating unable to participate at this time will re-attempt as time allows.

## 2022-10-12 NOTE — PROGRESS NOTES
Progress note: Infectious diseases    Patient - Zoe Erazo,  Age - 74 y.o.    - 1955      Room Number - 7K-25/025-A   MRN -  [id number]   Acct # - [de-identified]  Date of Admission -  10/3/2022  6:11 PM    SUBJECTIVE:   She has no new issues. Plan for home tomorrow  OBJECTIVE   VITALS    height is 6' 1\" (1.854 m) and weight is 243 lb 13.3 oz (110.6 kg). His oral temperature is 97.7 °F (36.5 °C). His blood pressure is 141/73 (abnormal) and his pulse is 80. His respiration is 16 and oxygen saturation is 94%. Wt Readings from Last 3 Encounters:   10/10/22 243 lb 13.3 oz (110.6 kg)   06/10/22 244 lb (110.7 kg)   22 247 lb (112 kg)       I/O (24 Hours)    Intake/Output Summary (Last 24 hours) at 10/12/2022 1404  Last data filed at 10/12/2022 1200  Gross per 24 hour   Intake 650 ml   Output 6505 ml   Net -5855 ml         General: awake and oriented  HEENT - normocephalic, atraumatic, pale conjunctiva,  anicteric sclera  Neck - Supple, no mass  Lungs -  Bilateral   air entry, no rhonchi, no wheeze. Cardiovascular - Heart sounds are normal.     Abdomen - soft, not distended, nontender,   Neurologic -awake   Skin - No bruising or bleeding  Extremities - dressed left AKA stump.        MEDICATIONS:      albuterol sulfate HFA  2 puff Inhalation BID    nicotine  1 patch TransDERmal Daily    vancomycin  1,250 mg IntraVENous Q12H    magnesium hydroxide  30 mL Oral Daily    aspirin  81 mg Oral Daily    clopidogrel  75 mg Oral Daily    furosemide  20 mg Oral Daily    enoxaparin  30 mg SubCUTAneous BID    insulin glargine  16 Units SubCUTAneous Nightly    empagliflozin  10 mg Oral Daily    insulin lispro  0-16 Units SubCUTAneous TID     insulin lispro  0-4 Units SubCUTAneous Nightly    piperacillin-tazobactam  3,375 mg IntraVENous 30 Min Pre-Op    piperacillin-tazobactam  3,375 mg IntraVENous Q8H    metoprolol succinate 75 mg Oral BID    potassium chloride  20 mEq Oral Daily with breakfast    metoprolol succinate  25 mg Oral Once    isosorbide mononitrate  15 mg Oral Daily    tamsulosin  0.4 mg Oral Daily    amiodarone  200 mg Oral BID    guaiFENesin  600 mg Oral BID    sodium hypochlorite   Irrigation Daily    atorvastatin  40 mg Oral Daily    gabapentin  100 mg Oral TID    pantoprazole  40 mg Oral QAM AC    sodium chloride flush  10 mL IntraVENous 2 times per day    vancomycin (VANCOCIN) intermittent dosing (placeholder)   Other RX Placeholder      sodium chloride 700 mL (10/12/22 0602)    dextrose       albuterol sulfate HFA, polyethylene glycol, oxyCODONE **OR** oxyCODONE, bisacodyl, potassium chloride **OR** potassium alternative oral replacement **OR** potassium chloride, benzocaine-menthol, morphine, traZODone, sodium chloride flush, sodium chloride, ondansetron **OR** ondansetron, acetaminophen **OR** acetaminophen, glucose, dextrose bolus **OR** dextrose bolus, glucagon (rDNA), dextrose      LABS:     CBC:   Recent Labs     10/11/22  0346 10/12/22  0656   WBC 15.0* 11.5*   HGB 9.6* 9.9*   * 487*       BMP:    Recent Labs     10/11/22  0346 10/12/22  0656   * 139   K 4.6 4.9   CL 99 100   CO2 27 27   BUN 6* 7   CREATININE 0.8 0.7   GLUCOSE 242* 159*       Calcium:  Recent Labs     10/12/22  0656   CALCIUM 8.1*        Glucose:  Recent Labs     10/11/22  1946 10/12/22  0825 10/12/22  1059   POCGLU 165* 224* 231*            Problem list of patient:     Patient Active Problem List   Diagnosis Code    Seizure disorder ([facility] G40.909    Osteoarthritis M19.90    COPD (chronic obstructive pulmonary disease) ([facility] J44.9    Spinal stenosis, lumbar M48.061    Lumbar radiculopathy M54.16    Tobacco abuse Z72.0    GERD (gastroesophageal reflux disease) K21.9    Skin ulceration ([facility] L98.499    Non-healing ulcer of lower leg ([facility] L97.909    Ischemic rest pain of lower extremity M79.606, I99.8    Current smoker F17.200 Dyslipidemia E78.5    Abnormal cardiovascular function study R94.30    Obesity E66.9    PVD (peripheral vascular disease) ([facility]) I73.9    CAD, multiple vessel I25.10    Chronic total occlusion of artery of the extremities ([facility]) I70.92    Coronary artery chronic total occlusion I25.82    DAVILA (dyspnea on exertion) R06.09    HTN (hypertension) I10    Discitis of cervical region M46.42    Open wound of right hand S61.401A    Noncompliance by refusing intervention or support Z91.199    Lactic acidosis E87.20    Pressure injury of left buttock, stage 3 ([facility]) Q41.307    PAD (peripheral artery disease) ([facility]) I73.9    Cellulitis of right lower extremity L03.115    Pressure injury of sacral region, stage 3 ([facility]) L89.153    Paroxysmal atrial fibrillation ([facility]) I48.0    Left ventricular systolic dysfunction K14.9    Panlobular emphysema ([facility]) J43.1    Cellulitis of left lower extremity L03.116    Type 2 diabetes mellitus with diabetic peripheral angiopathy and gangrene, with long-term current use of insulin ([facility]) E11.52, Z79.4    Critical limb ischemia of left lower extremity ([facility]) H15.098         ASSESSMENT/PLAN   Severe PVD:  with ischemic wound: he had left AKA  Non healing right leg wound: continue dakins solution  CAD  Poorly controlled diabetes  Plan for discharge at am         Steven Hui MD, MD, [place]  10/12/2022 2:04 PM

## 2022-10-12 NOTE — RT PROTOCOL NOTE
RT Inhaler-Nebulizer Bronchodilator Protocol Note    There is a bronchodilator order in the chart from a provider indicating to follow the RT Bronchodilator Protocol and there is an Initiate RT Inhaler-Nebulizer Bronchodilator Protocol order as well (see protocol at bottom of note). CXR Findings:  No results found. The findings from the last RT Protocol Assessment were as follows:   History Pulmonary Disease: Chronic pulmonary disease  Respiratory Pattern: Regular pattern and RR 12-20 bpm  Breath Sounds: Slightly diminished and/or crackles  Cough: Strong, spontaneous, non-productive  Indication for Bronchodilator Therapy: Decreased or absent breath sounds, On home bronchodilators  Bronchodilator Assessment Score: 4    Aerosolized bronchodilator medication orders have been revised according to the RT Inhaler-Nebulizer Bronchodilator Protocol below. Respiratory Therapist to perform RT Therapy Protocol Assessment initially then follow the protocol. Repeat RT Therapy Protocol Assessment PRN for score 0-3 or on second treatment, BID, and PRN for scores above 3. No Indications - adjust the frequency to every 6 hours PRN wheezing or bronchospasm, if no treatments needed after 48 hours then discontinue using Per Protocol order mode. If indication present, adjust the RT bronchodilator orders based on the Bronchodilator Assessment Score as indicated below. Use Inhaler orders unless patient has one or more of the following: on home nebulizer, not able to hold breath for 10 seconds, is not alert and oriented, cannot activate and use MDI correctly, or respiratory rate 25 breaths per minute or more, then use the equivalent nebulizer order(s) with same Frequency and PRN reasons based on the score. If a patient is on this medication at home then do not decrease Frequency below that used at home.     0-3 - enter or revise RT bronchodilator order(s) to equivalent RT Bronchodilator order with Frequency of every 4 hours PRN for wheezing or increased work of breathing using Per Protocol order mode. 4-6 - enter or revise RT Bronchodilator order(s) to two equivalent RT bronchodilator orders with one order with BID Frequency and one order with Frequency of every 4 hours PRN wheezing or increased work of breathing using Per Protocol order mode. 7-10 - enter or revise RT Bronchodilator order(s) to two equivalent RT bronchodilator orders with one order with TID Frequency and one order with Frequency of every 4 hours PRN wheezing or increased work of breathing using Per Protocol order mode. 11-13 - enter or revise RT Bronchodilator order(s) to one equivalent RT bronchodilator order with QID Frequency and an Albuterol order with Frequency of every 4 hours PRN wheezing or increased work of breathing using Per Protocol order mode. Greater than 13 - enter or revise RT Bronchodilator order(s) to one equivalent RT bronchodilator order with every 4 hours Frequency and an Albuterol order with Frequency of every 2 hours PRN wheezing or increased work of breathing using Per Protocol order mode. RT to enter RT Home Evaluation for COPD & MDI Assessment order using Per Protocol order mode.     Electronically signed by Fernie Pandey RCP on 10/12/2022 at 7:35 AM

## 2022-10-13 VITALS
SYSTOLIC BLOOD PRESSURE: 132 MMHG | HEART RATE: 68 BPM | TEMPERATURE: 97.5 F | BODY MASS INDEX: 32.32 KG/M2 | OXYGEN SATURATION: 94 % | HEIGHT: 73 IN | RESPIRATION RATE: 17 BRPM | DIASTOLIC BLOOD PRESSURE: 61 MMHG | WEIGHT: 243.83 LBS

## 2022-10-13 LAB
ANION GAP SERPL CALCULATED.3IONS-SCNC: 10 MEQ/L (ref 8–16)
BUN BLDV-MCNC: 9 MG/DL (ref 7–22)
CALCIUM SERPL-MCNC: 8.7 MG/DL (ref 8.5–10.5)
CHLORIDE BLD-SCNC: 100 MEQ/L (ref 98–111)
CO2: 27 MEQ/L (ref 23–33)
CREAT SERPL-MCNC: 0.9 MG/DL (ref 0.4–1.2)
ERYTHROCYTE [DISTWIDTH] IN BLOOD BY AUTOMATED COUNT: 18 % (ref 11.5–14.5)
ERYTHROCYTE [DISTWIDTH] IN BLOOD BY AUTOMATED COUNT: 53.4 FL (ref 35–45)
GFR SERPL CREATININE-BSD FRML MDRD: 85 ML/MIN/1.73M2
GLUCOSE BLD-MCNC: 150 MG/DL (ref 70–108)
GLUCOSE BLD-MCNC: 188 MG/DL (ref 70–108)
GLUCOSE BLD-MCNC: 192 MG/DL (ref 70–108)
GLUCOSE BLD-MCNC: 201 MG/DL (ref 70–108)
HCT VFR BLD CALC: 34.3 % (ref 42–52)
HEMOGLOBIN: 10.3 GM/DL (ref 14–18)
MCH RBC QN AUTO: 24.9 PG (ref 26–33)
MCHC RBC AUTO-ENTMCNC: 30 GM/DL (ref 32.2–35.5)
MCV RBC AUTO: 82.9 FL (ref 80–94)
ORGANISM: ABNORMAL
PLATELET # BLD: 530 THOU/MM3 (ref 130–400)
PMV BLD AUTO: 9.5 FL (ref 9.4–12.4)
POTASSIUM REFLEX MAGNESIUM: 4.3 MEQ/L (ref 3.5–5.2)
RBC # BLD: 4.14 MILL/MM3 (ref 4.7–6.1)
SODIUM BLD-SCNC: 137 MEQ/L (ref 135–145)
URINE CULTURE, ROUTINE: ABNORMAL
URINE CULTURE, ROUTINE: ABNORMAL
WBC # BLD: 10.7 THOU/MM3 (ref 4.8–10.8)

## 2022-10-13 PROCEDURE — 6370000000 HC RX 637 (ALT 250 FOR IP): Performed by: SURGERY

## 2022-10-13 PROCEDURE — 6360000002 HC RX W HCPCS: Performed by: SURGERY

## 2022-10-13 PROCEDURE — 2580000003 HC RX 258: Performed by: SURGERY

## 2022-10-13 PROCEDURE — 2580000003 HC RX 258: Performed by: INTERNAL MEDICINE

## 2022-10-13 PROCEDURE — 99239 HOSP IP/OBS DSCHRG MGMT >30: CPT | Performed by: INTERNAL MEDICINE

## 2022-10-13 PROCEDURE — 85027 COMPLETE CBC AUTOMATED: CPT

## 2022-10-13 PROCEDURE — APPSS30 APP SPLIT SHARED TIME 16-30 MINUTES: Performed by: NURSE PRACTITIONER

## 2022-10-13 PROCEDURE — 6370000000 HC RX 637 (ALT 250 FOR IP): Performed by: INTERNAL MEDICINE

## 2022-10-13 PROCEDURE — 97530 THERAPEUTIC ACTIVITIES: CPT

## 2022-10-13 PROCEDURE — 36415 COLL VENOUS BLD VENIPUNCTURE: CPT

## 2022-10-13 PROCEDURE — 99024 POSTOP FOLLOW-UP VISIT: CPT | Performed by: NURSE PRACTITIONER

## 2022-10-13 PROCEDURE — 6360000002 HC RX W HCPCS: Performed by: INTERNAL MEDICINE

## 2022-10-13 PROCEDURE — 6370000000 HC RX 637 (ALT 250 FOR IP)

## 2022-10-13 PROCEDURE — 51798 US URINE CAPACITY MEASURE: CPT

## 2022-10-13 PROCEDURE — 80048 BASIC METABOLIC PNL TOTAL CA: CPT

## 2022-10-13 PROCEDURE — 82948 REAGENT STRIP/BLOOD GLUCOSE: CPT

## 2022-10-13 RX ORDER — GLUCOSAMINE HCL/CHONDROITIN SU 500-400 MG
CAPSULE ORAL
Qty: 90 STRIP | Refills: 0 | Status: SHIPPED | OUTPATIENT
Start: 2022-10-13

## 2022-10-13 RX ORDER — TRAZODONE HYDROCHLORIDE 50 MG/1
50 TABLET ORAL NIGHTLY PRN
Qty: 30 TABLET | Refills: 0 | Status: CANCELLED | OUTPATIENT
Start: 2022-10-13

## 2022-10-13 RX ORDER — PEN NEEDLE, DIABETIC 30 GX5/16"
1 NEEDLE, DISPOSABLE MISCELLANEOUS DAILY
Qty: 100 EACH | Refills: 3 | Status: SHIPPED | OUTPATIENT
Start: 2022-10-13

## 2022-10-13 RX ORDER — OXYCODONE HYDROCHLORIDE 5 MG/1
5 TABLET ORAL EVERY 6 HOURS PRN
Qty: 20 TABLET | Refills: 0 | Status: SHIPPED | OUTPATIENT
Start: 2022-10-13 | End: 2022-10-18

## 2022-10-13 RX ORDER — METOPROLOL SUCCINATE 50 MG/1
75 TABLET, EXTENDED RELEASE ORAL 2 TIMES DAILY
Qty: 90 TABLET | Refills: 0 | Status: SHIPPED | OUTPATIENT
Start: 2022-10-13 | End: 2022-11-12

## 2022-10-13 RX ORDER — INSULIN GLARGINE 100 [IU]/ML
20 INJECTION, SOLUTION SUBCUTANEOUS NIGHTLY
Qty: 2 ADJUSTABLE DOSE PRE-FILLED PEN SYRINGE | Refills: 0 | Status: SHIPPED | OUTPATIENT
Start: 2022-10-13 | End: 2022-11-12

## 2022-10-13 RX ORDER — NICOTINE 21 MG/24HR
1 PATCH, TRANSDERMAL 24 HOURS TRANSDERMAL EVERY 24 HOURS
Qty: 30 PATCH | Refills: 3 | Status: SHIPPED | OUTPATIENT
Start: 2022-10-13 | End: 2023-10-13

## 2022-10-13 RX ORDER — LANCETS 30 GAUGE
1 EACH MISCELLANEOUS 3 TIMES DAILY
Qty: 200 EACH | Refills: 0 | Status: SHIPPED | OUTPATIENT
Start: 2022-10-13

## 2022-10-13 RX ORDER — ASPIRIN 81 MG/1
81 TABLET, CHEWABLE ORAL DAILY
Qty: 30 TABLET | Refills: 3 | Status: SHIPPED | OUTPATIENT
Start: 2022-10-14 | End: 2022-10-13 | Stop reason: HOSPADM

## 2022-10-13 RX ORDER — AMIODARONE HYDROCHLORIDE 200 MG/1
200 TABLET ORAL 2 TIMES DAILY
Qty: 60 TABLET | Refills: 0 | Status: SHIPPED | OUTPATIENT
Start: 2022-10-13

## 2022-10-13 RX ORDER — CLOPIDOGREL BISULFATE 75 MG/1
75 TABLET ORAL DAILY
Qty: 30 TABLET | Refills: 3 | Status: CANCELLED | OUTPATIENT
Start: 2022-10-14

## 2022-10-13 RX ORDER — GABAPENTIN 100 MG/1
100 CAPSULE ORAL 3 TIMES DAILY
Qty: 90 CAPSULE | Refills: 0 | Status: CANCELLED | OUTPATIENT
Start: 2022-10-13 | End: 2022-11-12

## 2022-10-13 RX ORDER — ISOSORBIDE MONONITRATE 30 MG/1
15 TABLET, EXTENDED RELEASE ORAL DAILY
Qty: 30 TABLET | Refills: 3 | Status: SHIPPED | OUTPATIENT
Start: 2022-10-14

## 2022-10-13 RX ORDER — ALBUTEROL SULFATE 90 UG/1
2 AEROSOL, METERED RESPIRATORY (INHALATION) EVERY 4 HOURS PRN
Qty: 18 G | Refills: 3 | Status: CANCELLED | OUTPATIENT
Start: 2022-10-13

## 2022-10-13 RX ORDER — SODIUM HYPOCHLORITE 1.25 MG/ML
SOLUTION TOPICAL DAILY
Qty: 1 EACH | Refills: 0 | Status: SHIPPED | OUTPATIENT
Start: 2022-10-14 | End: 2022-11-13

## 2022-10-13 RX ORDER — ALBUTEROL SULFATE 90 UG/1
2 AEROSOL, METERED RESPIRATORY (INHALATION) 2 TIMES DAILY
Qty: 18 G | Refills: 3 | Status: CANCELLED | OUTPATIENT
Start: 2022-10-13

## 2022-10-13 RX ADMIN — SODIUM HYPOCHLORITE: 1.25 SOLUTION TOPICAL at 08:00

## 2022-10-13 RX ADMIN — GABAPENTIN 100 MG: 100 CAPSULE ORAL at 15:11

## 2022-10-13 RX ADMIN — ISOSORBIDE MONONITRATE 15 MG: 30 TABLET, EXTENDED RELEASE ORAL at 08:47

## 2022-10-13 RX ADMIN — POTASSIUM CHLORIDE 20 MEQ: 1500 TABLET, EXTENDED RELEASE ORAL at 08:48

## 2022-10-13 RX ADMIN — METOPROLOL SUCCINATE 75 MG: 50 TABLET, EXTENDED RELEASE ORAL at 08:51

## 2022-10-13 RX ADMIN — FUROSEMIDE 20 MG: 20 TABLET ORAL at 08:48

## 2022-10-13 RX ADMIN — GUAIFENESIN 600 MG: 600 TABLET, EXTENDED RELEASE ORAL at 08:48

## 2022-10-13 RX ADMIN — GABAPENTIN 100 MG: 100 CAPSULE ORAL at 08:48

## 2022-10-13 RX ADMIN — INSULIN LISPRO 1 UNITS: 100 INJECTION, SOLUTION INTRAVENOUS; SUBCUTANEOUS at 12:07

## 2022-10-13 RX ADMIN — CLOPIDOGREL BISULFATE 75 MG: 75 TABLET ORAL at 08:48

## 2022-10-13 RX ADMIN — PANTOPRAZOLE SODIUM 40 MG: 40 TABLET, DELAYED RELEASE ORAL at 06:57

## 2022-10-13 RX ADMIN — Medication 1250 MG: at 12:47

## 2022-10-13 RX ADMIN — OXYCODONE HYDROCHLORIDE 10 MG: 5 TABLET ORAL at 15:10

## 2022-10-13 RX ADMIN — APIXABAN 5 MG: 5 TABLET, FILM COATED ORAL at 08:48

## 2022-10-13 RX ADMIN — OXYCODONE HYDROCHLORIDE 10 MG: 5 TABLET ORAL at 09:11

## 2022-10-13 RX ADMIN — SODIUM CHLORIDE, PRESERVATIVE FREE 10 ML: 5 INJECTION INTRAVENOUS at 08:53

## 2022-10-13 RX ADMIN — ACETAMINOPHEN 650 MG: 325 TABLET ORAL at 11:24

## 2022-10-13 RX ADMIN — TAMSULOSIN HYDROCHLORIDE 0.4 MG: 0.4 CAPSULE ORAL at 08:47

## 2022-10-13 RX ADMIN — ATORVASTATIN CALCIUM 40 MG: 40 TABLET, FILM COATED ORAL at 08:48

## 2022-10-13 RX ADMIN — AMIODARONE HYDROCHLORIDE 200 MG: 200 TABLET ORAL at 08:48

## 2022-10-13 RX ADMIN — ASPIRIN 81 MG 81 MG: 81 TABLET ORAL at 08:48

## 2022-10-13 RX ADMIN — PIPERACILLIN AND TAZOBACTAM 3375 MG: 3; .375 INJECTION, POWDER, FOR SOLUTION INTRAVENOUS at 08:26

## 2022-10-13 RX ADMIN — Medication 1250 MG: at 03:45

## 2022-10-13 RX ADMIN — EMPAGLIFLOZIN 10 MG: 10 TABLET, FILM COATED ORAL at 08:48

## 2022-10-13 ASSESSMENT — PAIN DESCRIPTION - LOCATION
LOCATION: LEG
LOCATION: LEG

## 2022-10-13 ASSESSMENT — PAIN DESCRIPTION - PAIN TYPE: TYPE: DEEP SOMATIC PAIN

## 2022-10-13 ASSESSMENT — PAIN SCALES - GENERAL
PAINLEVEL_OUTOF10: 7
PAINLEVEL_OUTOF10: 8
PAINLEVEL_OUTOF10: 8
PAINLEVEL_OUTOF10: 9

## 2022-10-13 ASSESSMENT — PAIN DESCRIPTION - ORIENTATION
ORIENTATION: LEFT
ORIENTATION: LEFT

## 2022-10-13 ASSESSMENT — PAIN DESCRIPTION - ONSET: ONSET: ON-GOING

## 2022-10-13 ASSESSMENT — PAIN DESCRIPTION - DESCRIPTORS
DESCRIPTORS: DULL
DESCRIPTORS: DULL

## 2022-10-13 ASSESSMENT — PAIN - FUNCTIONAL ASSESSMENT: PAIN_FUNCTIONAL_ASSESSMENT: ACTIVITIES ARE NOT PREVENTED

## 2022-10-13 ASSESSMENT — PAIN DESCRIPTION - FREQUENCY: FREQUENCY: INTERMITTENT

## 2022-10-13 NOTE — TELEPHONE ENCOUNTER
[facility] called over to make the f/u visit. Stated that the patient was not going home with a tinoco wanted to do the VT before he left [facility], spoke to someone in our office and they wanted him to have a f/u anyway.  Patient appt sched with Dr Shari Crawford on 10/25 at 11:45am.

## 2022-10-13 NOTE — PLAN OF CARE
Problem: Discharge Planning  Goal: Discharge to home or other facility with appropriate resources  Outcome: Progressing  Flowsheets (Taken 10/12/2022 1946)  Discharge to home or other facility with appropriate resources:   Identify barriers to discharge with patient and caregiver   Arrange for needed discharge resources and transportation as appropriate   Identify discharge learning needs (meds, wound care, etc)   Refer to discharge planning if patient needs post-hospital services based on physician order or complex needs related to functional status, cognitive ability or social support system     Problem: Pain  Goal: Verbalizes/displays adequate comfort level or baseline comfort level  Outcome: Progressing   Patient able to verbalize understanding of PRN pain medication available. Problem: ABCDS Injury Assessment  Goal: Absence of physical injury  Outcome: Progressing   Call light within reach. Bed alarm on. Bedin lowest position. Significant other at bedside. Problem: Skin/Tissue Integrity  Goal: Absence of new skin breakdown  Description: 1. Monitor for areas of redness and/or skin breakdown    Problem: Chronic Conditions and Co-morbidities  Goal: Patient's chronic conditions and co-morbidity symptoms are monitored and maintained or improved  Outcome: Progressing  Flowsheets (Taken 10/12/2022 1946)  Care Plan - Patient's Chronic Conditions and Co-Morbidity Symptoms are Monitored and Maintained or Improved: Monitor and assess patient's chronic conditions and comorbid symptoms for stability, deterioration, or improvement     Problem: Respiratory - Adult  Goal: Achieves optimal ventilation and oxygenation  Outcome: Progressing  Flowsheets (Taken 10/12/2022 1946)  Achieves optimal ventilation and oxygenation:   Assess for changes in respiratory status   Assess for changes in mentation and behavior     Problem: Safety - Adult  Goal: Free from fall injury  Outcome: Progressing   Call light within reach.  Bed alarm on. Bed in lowest position. Significant other at bedside. Care plan reviewed with patient and significant other. Patient and significant other verbalize understanding of the plan of care and contribute to goal setting.      Outcome: Progressing

## 2022-10-13 NOTE — PROGRESS NOTES
[facility]  Occupational Therapy  Daily Note  Time:   Time In: 1401  Time Out: 1411  Timed Code Treatment Minutes: 10 Minutes  Minutes: 10          Date: 10/13/2022  Patient Name: Marquita Jerome,   Gender: male      Room: [facility]25/025-A  MRN: [id number]  : 1955  (74 y.o.)  Referring Practitioner: Belgica Malin DO  Diagnosis: PAD  Additional Pertinent Hx: Marquita Jerome is a 74 y.o. male with a PMH of PAD, CAD s/p PCI LAD, chronic sacral wounds, chronic systolic heart failure, pAF, HTN, HLD, NIDDMII, tobacco abuse who presented with bilateral lower extremity wounds. Patient reports a 2-week history of worsening bilateral lower extremity infections. He states he has developed rising erythema to the mid calf along with worsening wounds and ulcerations of bilateral extremities. Patient states wounds have been purulent and foul-smelling. He also reports profuse diarrhea upon admission for the last 1 week. He states he follows with Dr. Nighat Lizarraga for these bilateral lower extremity wounds, and has had lower extremity imaging in the past that demonstrated severe inflow disease. He has undergone previous bypass grafting of the left lower extremity that has since chronically occluded. He denies any chest pain, shortness of breath, abdominal pain. He has a history of coronary artery disease for which she went stent placement x2, unsure of which vessel. Pt is s/p L AKA 10/10. Restrictions/Precautions:  Restrictions/Precautions: Weight Bearing, Fall Risk  Left Lower Extremity Weight Bearing: Non Weight Bearing     SUBJECTIVE: RN okayed OT treatment. Pt. In bed upon arrival and reports he is returning home this date. PAIN:no pain voiced    Vitals: Vitals not assessed per clinical judgement, see nursing flowsheet    COGNITION: Decreased Insight, Decreased Problem Solving, and Decreased Safety Awareness    ADL:   No ADL's completed this session. .      BED MOBILITY:  Not Tested    TRANSFERS:  Pt. declined    ADDITIONAL ACTIVITIES:  Pt. Denies need for OT at this time Pt. Reports he is returning home this date with significant other and home health services. Pt. Issued moderate resistive theraband and demo provided of exercises. Pt. Reports he will complete the BUE HEP upon return home declines further activity at this time. Pt. And significant other reports all DME needs are already ordered and will be at the house upon discharge. ASSESSMENT:     Activity Tolerance:  Patient tolerance of  treatment: fair. Discharge Recommendations: Home with assist as needed and Home with Home Health OT  Equipment Recommendations: Equipment Needed: Yes  Other: Transfer tub bench, drop arm BSC, grab bars,  Plan: Times Per Week: 5x  Times Per Day: Once a day  Current Treatment Recommendations: Strengthening, Balance training, Functional mobility training, Endurance training, Self-Care / ADL, Equipment evaluation, education, & procurement, Safety education & training, Patient/Caregiver education & training, Home management training, Wheelchair mobility training    Patient Education  Patient Education: ADL's and Home Exercise Program    Goals  Short Term Goals  Time Frame for Short Term Goals: Until Discharge  Short Term Goal 1: Pt will complete BUE Strengthening exercises with min vcs for technique to increase indep and endurance with all transfers and self cares. Short Term Goal 2: Pt will complete stand pivots to/from various surfaces with S and 0 vcs for safety to increase indep and endurance within home environment. Short Term Goal 3: Pt will complete LB dressing with min A and min vcs for safety to increase indep and endurance in home environment. Additional Goals?: No    Following session, patient left in safe position with all fall risk precautions in place.

## 2022-10-13 NOTE — PROGRESS NOTES
Notified Neeta Lowery NP that patient is adamant that he is not going home with tinoco catheter and wants it to be removed. Per Neeta Lowery NP, [id number] [address] for void trial, Make sure patient voids before discharge   Need post void bladder scan     If patient not able to void, tinoco needs put back in. Patient will follow up with urology in 1-2 weeks either way. 10:50am Tinoco catheter removed, pt tolerated well.  Await pt to void

## 2022-10-13 NOTE — CARE COORDINATION
10/13/22, 1:59 PM EDT    DISCHARGE PLANNING EVALUATION    Patient is now requesting an ambulette transport home at discharge.  will call [facility] and see if there are any transport times available for the day still. Ambulette transport set for 5:30 PM today. Faxed transport forms to [facility].       10/13/22, 2:58 PM EDT    Patient goals/plan/ treatment preferences discussed by  and . Patient goals/plan/ treatment preferences reviewed with patient/ family. Patient/ family verbalize understanding of discharge plan and are in agreement with goal/plan/treatment preferences. Understanding was demonstrated using the teach back method. AVS provided by RN at time of discharge, which includes all necessary medical information pertaining to the patients current course of illness, treatment, post-discharge goals of care, and treatment preferences. Services At/After Discharge: Home Health, In [facility], Nursing service, OT, and PT- [address] will be discharged today to home with his wife and [facility] for RN, PT and OT services. Ambulette transport is set for 5:30PM today, RN aware. [facility] aware of discharge today.

## 2022-10-13 NOTE — PROGRESS NOTES
[facility] Wound Ostomy Continence Nurse  Progress Note       Hadley Rollins  AGE: 74 y.o. GENDER: male  : 1955  UNIT: 7K-25/025-A  TODAY'S DATE:  10/13/2022  ADMISSION DATE: 10/3/2022  6:11 PM  Subjective:     Reason for [address] Evaluation and Assessment: sacral wound      Hadley Rollins is a 74 y.o. male referred by:   [] Physician/PA/APRN  [x] Nursing  [] Other:     Wound Identification:  Wound Type: pressure  Contributing Factors: chronic pressure, decreased mobility, and shear force    Objective:     Arnulfo Risk Score: Arnulfo Scale Score: 17    Assessment:     Encounter: Present to patient room. Patient in bed upon arrival. Assessment and photo to follow. Patient turns self onto right side. Old dressing removed. Patient noted to have chronic stage 3 pressure injury to left ischium. Patient also has MASD to gluteal cleft. Cleansed wound with normal saline and gauze. Pat dry with clean gauze. Left gluteal wound lightly filled with normal saline moistened gauze, covered with 1/2 ABD pad, secured with tape. Triad applied to gluteal cleft. Verbal wound care instructions provided to patient and significant other. Significant other reports changing dressing every other day with Dakins solution. Advised to change daily due to amount of slough. Patient voiced concern regarding obtaining additional Dakins solution as he is no longer permitted to be treated at [facility] outpatient wound clinic. Advised patient to see if PCP is willing to follow wound, as he no longer has a designated wound provider. Patient also in need of support cushion for wheelchair, as he does not have one. Waffle cushion will go home with him in interim. Plan for patient to be discharged today. Additional wound care supplies to go with him. Patient also has unstageable wound to left elbow. Triad and bordered foam applied, to be changed every other day. Bed in low, call light in reach. Will continue to follow and assess wound.  Call with concerns and for wound evolution. 10/13/22     Wound type: Stage 3 left ischium, POA with gluteal cleft MASD  Wound size: 5.8cm x 4cm x 0.3cm  Undermining or Tunneling: undermining from 4-7 of 1.3  Wound assessment/color: Yellow, pink  Drainage amount: Large  Drainage description: Tan, seropurulent  Odor: Mild  Margins: attached, unattached  Aura wound: MASD  Exposed structure: None    Response to treatment:  Well tolerated by patient., With complaints of pain. Plan:     Treatment Recommendations:   Left ischium- Clean wound with normal saline or wound cleanser and gauze. Pat dry with clean gauze. Lightly fill normal saline moistened gauze to wound bed, cover with 1/2 ABD pad, secure with tape. Change daily. Apply Triad to gluteal cleft twice daily and PRN. Left elbow- Clean wound with normal saline or wound cleanser and gauze. Pat dry with clean gauze. Apply Triad to wound, cover with bordered foam. Change every other/ every 3 days.      Specialty Bed Required :   [x] Low Air Loss   [x] Pressure Redistribution  [] Fluid Immersion- Dolphin  [] Bariatric  [] RotoProne   [] Other:     Discharge Plan:  Placement for patient upon discharge: from home  Patient appropriate for Outpatient [address]: No- unable to follow

## 2022-10-13 NOTE — DISCHARGE INSTRUCTIONS
Wound Care Instructions:   Non healing right leg wound: continue dakins solution--Apply dakins soaked dressing, to be changed daily and as needed. Left ischium- Clean wound with normal saline or wound cleanser and gauze. Pat dry with clean gauze. Lightly fill normal saline moistened gauze to wound bed, cover with 1/2 ABD pad, secure with tape. Change daily. (Recommend using Dakins solution, if available)    Apply Triad to gluteal cleft twice daily and PRN. Left elbow- Clean wound with normal saline or wound cleanser and gauze. Pat dry with clean gauze. Apply Triad to wound, cover with bordered foam. Change every other/ every 3 days. Follow up with PCP to monitor left ischial wound and for additional Dakins solution. Take 20 units Lantus nightly. Check BG regularly (2-3x a day), record it and bring to your PCP at next visit.

## 2022-10-13 NOTE — DISCHARGE SUMMARY
Internal Medicine Resident Discharge Summary      Patient Identification:   Ramona Herrera   : 1955  MRN: [id number]   Account: [de-identified]      Patient's PCP: Jose Ramon Farnsworth MD    Admit Date: 10/3/2022     Discharge Date:   10/13/2022    Admitting Physician: Stanislaw Snowden DO     Discharge Physician: Cayetano Raza MD       Hospital Course:     Per chart   \"Ruben Peraza is a 74 y.o. male with a PMH of PAD, CAD s/p PCI LAD, chronic sacral wounds, chronic systolic heart failure, pAF, HTN, HLD, NIDDMII, tobacco abuse who presented with bilateral lower extremity wounds. Patient reports a 2-week history of worsening bilateral lower extremity infections. He states he has developed rising erythema to the mid calf along with worsening wounds and ulcerations of bilateral extremities. Patient states wounds have been purulent and foul-smelling. He also reports profuse diarrhea upon admission for the last 1 week. He states he follows with Dr. Chanel Key for these bilateral lower extremity wounds, and has had lower extremity imaging in the past that demonstrated severe inflow disease. He has undergone previous bypass grafting of the left lower extremity that has since chronically occluded. He denies any chest pain, shortness of breath, abdominal pain. He has a history of coronary artery disease for which she went stent placement x2, unsure of which vessel. He reports chronic heart failure, on diuretics at home. He denies any history of arrhythmia, but has charted history of atrial fibrillation for which she is on Eliquis at home. Patient is unsure of any medications that he is on at home and is unable to provide any information on which he is actually taking. Cristian present in room to assist with history. He states he has been told in the past that his left lower extremity would need to be amputated. \"     Patient was concerned about possible amputation of both lower extremities.   Patient came in with diarrhea, GI panel was positive for E. coli enteropathogenic. Resolved on day 2. ID consulted. No growth in blood cultures in last 48 hours. Cultures from leg show gram-negative bacilli, group B strep, Staphylococcus     IV vancomycin (End date:10/12) and Zosyn completed (End date:10/8). Cardiology was consulted, recommend surgery consult for left lower extremity amputation. Stress test (10/5/22) shows LVEF of 35%. Not suggestive of myocardial ischemia. Peripheral angiogram was supposed to be re-attempted on 10/6. Patient did not want peripheral angiogram, due to being unable to lay flat for long periods of time. Patient had complaint of urinary incontinence, no history of BPH or increased urinary urgency or frequency. Patient was straight cathed and started on Flomax 0.4 mg on 10/6. Straight catheter was removed on 10/6. Will continue with Flomax. Peripheral arteriogram done Dr. Mary Esparza on 10/7, patent B/L iliac artery, occluded L SFA and femoral/popliteal bypass graft with extensive collateral network from deep femoral artery. Left AKA completed on 10/10. Patient has been steadily improving s/p surgery. Able to get out of bed and onto wheelchair. Would like home health on discharge and no inpatient rehab. Urology consulted, will discharge patient on Olvera with voiding trials 1 to 2 weeks later and on Flomax 0.4 mg. Patient needs to follow-up with surgeon. Given instructions on how to clean wounds:  Left ischium- Clean wound with normal saline or wound cleanser and gauze. Pat dry with clean gauze. Lightly fill normal saline moistened gauze to wound bed, cover with 1/2 ABD pad, secure with tape. Change daily. Apply Triad to gluteal cleft twice daily and PRN. Okay to go home without antibiotics. Left elbow- Clean wound with normal saline or wound cleanser and gauze. Pat dry with clean gauze.  Apply Triad to wound, cover with bordered foam. Change every other/ every 3 days.      Discharge Diagnoses:    L LE Necrotic Non-healing Extensive Wound:  - Leg cultures growing multiple colonies of enteric gram-negative bacilli. There is also gram-positive cocci  - Pain on rest in Left lower leg.  - WBC count trending down. - Lactic acid trended down to 1.7.  - Pt is in agreement for L AKA. - Left above-knee amputation (10/10/22)  Plan:   Go to surgery clinic appointment and follow-up with Dr. Dario Castillo Non-healing Extensive Wound:  - Leg cultures growing multiple colonies of enteric gram-negative bacilli. There is also gram-positive cocci  - Pt would like to attempt revascularization therapy for R LE as outpatient  -Patient is doing well, no acute complaints. Plan:   -Follow-up with cardiology regarding RLE PAD. Severe Peripheral artery disease:  - Chronic history of PAD, follows with Dr. Lorena Watkins in cardiology. - Patient has pain in lower extremities, has a 2 to 3-month history of lower extremity wounds. Unsure when it started but it is between June to September. Resting pain in L lower leg.   - Patient has nonhealing wounds of bilateral lower extremity which previously required hospitalization.    - CTA shows occlusion of the left internal iliac artery, entire left superficial femoral artery, left popliteal artery and large portion of the left posterior tibial artery. There is no significant opacification in the distal right anterior tibial artery, right dorsalis pedis artery, or distal right peroneal artery. - Preoperative cardiac stress test shows LVEF of 35% and nuclear images not suggestive for myocardial ischemia  -  Patient agreed for arteriogram: Patent B/L iliac artery, occluded L SFA and Fem/Pop bypass graft. Extensive collateral network from deep femoral artery formed. - Left above-knee amputation done on 10/10/22  Plan:   - Continue taking gabapentin 100mg TID. -Follow-up with cardiology regarding RLE PAD.      Urinary Retention s/p tinoco placement:  - Pt had mild hx of urinary inconcinece on admission. Worsened on 10/06.   - Has no hx of BPH, denies increased urgency in past year, increased frequency, or night time frequency. - UA was positive for 1000 glucose, most likely due to [place] home usage and diabetic progression.   - Patient is currently on catheter. Placed 10/10. Olvera taken out on 10/13. Plan:  -Urology: Follow-up with urology appointment, if unable to urinate patient should call clinic. Started on Flomax as home medication     Stage 3 Sacral ulcer:  - Patient has chronic history of sacral ulcers, for past couple of months. - Very deep and goes into fat. Plan: Follow wound care instructions provided above. NIDDMII:   - Home med of Farxiga 10mg and Metformin 1000mg BID. Plan:  -Continue taking home medication of Farxiga 10 mg and metformin 1000 mg twice daily    CAD s/p PCI DANA x2 L[facility]:  [facility]ins[facility].   - Patient is not compliant with medication.  - EKG shows A. fib with RVR  - Stress test done on 10/05, shows LVEF of 35%. Not suggestive of myocardial ischemia. Persistent A. fib:   - Follows Dr. Claudine Bey.   - Noted per chart review, though patient denies any arrhythmia history. - Patient maintained on amiodarone 200mg daily at home. - [id number] 5.   - HAS-BLED Score 4  - EKG on POA showed A. fib with RVR  Plan:  Continue taking amiodarone 200 mg twice daily and Toprol-XL increased to 75 mg BID, Eliquis 5 mg BID and ASA. HFrEF 40%:  - ECHO 4/11/22 demonstrated improved EF 40-45%, mod LV hypokinesis. - Patient unable to state his home medications, but appears he is on GDMT consisting of SGLT2, metoprolol. No noted ARNI/ACE/ARB/MRA. Plan: Continue taking Toprol-XL 75 mg BID Jardiance 10 mg, Llasix 20mg daily . HTN:   Plan: Continue taking mdur 30mg daily, lopressor 75mg bid     COPD:   Per chart review.  Spirometry with bronchodilator 2014 normal. Patient on home albuterol inhaler. Plan: Continue home inhalers     GERD:   Plan: Continue Protonix     ? BPH:  Has urinary incontinence since admission, with some improvement with Flomax 0.4 mg.    Plan:  -Urology consulted: Follow-up with urology appointment, if unable to urinate patient should call clinic. Started on Flomax as home medication     Chronic tobacco abuse: . Plan: Discussed quitting smoking, patient stated that he has not smoked during hospitalization and it would be easier to quit going home. If patient needs help should contact PCP for further recommendations. Insomnia:  Plan: Continue home med of trazodone      Hospitalized diagnoses:  Chronic normocytic anemia  Thromobocytosis  Lactic acidosis secondary to sepsis from lower extremity cellulitis   Diarrhea  Sepsis 2/2 cellulitis on B/L LE (resolved)  Reactive leukocytosis    The patient was seen and examined on day of discharge and this discharge summary is in conjunction with any daily progress note from day of discharge. The patient is discharged in stable condition. Exam:     Vitals:  Vitals:    10/12/22 1946 10/13/22 0530 10/13/22 0815 10/13/22 1200   BP: (!) 121/55 133/75 (!) 147/67 132/61   Pulse: 80 65 73 68   Resp: 16 15 18 17   Temp: 98.4 °F (36.9 °C) 97.4 °F (36.3 °C) 97.9 °F (36.6 °C) 97.5 °F (36.4 °C)   TempSrc: Oral Oral Oral Oral   SpO2: 95% 94% 97% 94%   Weight:       Height:         Weight: Weight: 243 lb 13.3 oz (110.6 kg)     24 hour intake/output:  Intake/Output Summary (Last 24 hours) at 10/13/2022 1550  Last data filed at 10/13/2022 1359  Gross per 24 hour   Intake 600 ml   Output 6400 ml   Net -5800 ml          Physical exam:  Constitutional:  General: Patient is healing well s/p surgery. HEENT: Normocephalic and atraumatic. External nares normal.  PERRLA  Cardiovascular: RRR with S1/S2 with a murmur heard. Pulmonary: Inspiratory effort is normal with wheezing present  Abdominal: Suprapubic tenderness and small guarding.   MSK: Left AKA and right sided LE chronic infection/cellulitis. Skin: Capillary refill takes more than 3 seconds. Bruising lesion and rash present on right LE. Neurological: AOx4 with weakness present      Labs: For convenience and continuity at follow-up the following most recent labs are provided:    CBC:    Lab Results   Component Value Date/Time    WBC 10.7 10/13/2022 05:07 AM    HGB 10.3 10/13/2022 05:07 AM    HCT 34.3 10/13/2022 05:07 AM     10/13/2022 05:07 AM       Renal:    Lab Results   Component Value Date/Time     10/13/2022 05:07 AM    K 4.3 10/13/2022 05:07 AM     10/13/2022 05:07 AM    CO2 27 10/13/2022 05:07 AM    BUN 9 10/13/2022 05:07 AM    CREATININE 0.9 10/13/2022 05:07 AM    CALCIUM 8.7 10/13/2022 05:07 AM         Significant Diagnostic Studies    Radiology:   IR ANGIOGRAM EXTREMITY BILATERAL   Final Result      CTA ABDOMINAL AORTA W BILAT RUNOFF W WO CONTRAST   Final Result   1. 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 Left dorsalis pedis and plantaris arteries are opacified. 2. No significant opacification in the distal right anterior tibial    artery, right dorsalis pedis artery, or distal right peroneal artery. This    may reflect underlying diminished flow or occlusion. This is stable. 3. Scattered atherosclerotic plaque in the aorta and mesenteric/renal    vessels without significant stenosis. 4. Asymmetric fatty atrophy of the left lower extremity musculature. Mild    subcutaneous edema in the dorsum of both feet. 5. Stable chronic hardware fracture of the left interpedicular screw at L4.       This document has been electronically signed by: Trinity Petty MD on    10/03/2022 10:46 PM      All CTs at this facility use dose modulation techniques and iterative    reconstructions, and/or weight-based dosing   when appropriate to reduce radiation to a low as reasonably achievable. 3D Post-processing was performed on this study. XR TIBIA FIBULA LEFT (2 VIEWS)   Final Result   1. Generalized subcutaneous edema with questionable small ulceration    defects in the mid-distal calf. 2. No cortical erosions to suggest osteomyelitis. This document has been electronically signed by: Parris Luna MD on    10/03/2022 08:35 PM      XR TIBIA FIBULA RIGHT (2 VIEWS)   Final Result   Mild subcutaneous edema of the right calf. No cortical erosions to suggest    acute osteomyelitis. This document has been electronically signed by: Parris Luna MD on    10/03/2022 08:47 PM      XR ANKLE RIGHT (2 VIEWS)   Final Result   1. No acute osseous injury or cortical erosions. 2. Subcutaneous edema. This document has been electronically signed by: Parris Luna MD on    10/03/2022 08:27 PM      XR ANKLE LEFT (2 VIEWS)   Final Result   No acute osseous injury or cortical erosions. Lateral ankle swelling. This document has been electronically signed by: Parris Luna MD on    10/03/2022 08:28 PM      XR FOOT LEFT (2 VIEWS)   Final Result   1. Soft tissue swelling in the left foot with several soft tissue    ulceration defects. 2. Chronic appearing erosive changes in the left 1st metatarsophalangeal    joint with medial subluxation. 3. Chronic appearing truncation defect in the distal tuft of the left 3rd    toe and remote amputation of the left 2nd toe with intact cortical    margins. 4. No other acute appearing cortical erosions radiographically to suggest    osteomyelitis. This document has been electronically signed by: Parris Luna MD on    10/03/2022 08:50 PM      XR FOOT RIGHT (2 VIEWS)   Final Result   1. Ulceration defect and adjacent swelling in the dorsum of the midfoot. 2. No cortical erosions to suggest acute osteomyelitis.       This document has been electronically signed by: Parmjit Peters MD on    10/03/2022 08:35 PM      NM MYOCARDIAL SPECT REST EXERCISE OR RX    (Results Pending)          Consults:     IP CONSULT TO WOUND CARE PROVIDER  PHARMACY TO DOSE VANCOMYCIN  IP CONSULT TO SOCIAL WORK  IP CONSULT TO CASE MANAGEMENT  IP CONSULT TO INFECTIOUS DISEASES  IP CONSULT TO CARDIOLOGY  IP CONSULT TO GENERAL SURGERY  IP CONSULT TO SPIRITUAL SERVICES  IP CONSULT TO SOCIAL WORK  PHARMACY TO DOSE VANCOMYCIN  IP CONSULT TO PHYSICAL MEDICINE REHAB  IP CONSULT TO REHAB/TCU ADMISSION COORDINATOR  IP CONSULT TO SOCIAL WORK  IP CONSULT TO HOME CARE NEEDS  IP CONSULT TO UROLOGY    Disposition: Home  Condition at Discharge: Stable    Code Status:  Full Code    Patient Instructions:    Discharge lab work: Activity: activity as tolerated  Diet: ADULT DIET; Regular; 4 carb choices (60 gm/meal)      Follow-up visits:   CM STR [facility]/[place] [address]  [phone]        [address] [facility]  [phone]    Go on 10/20/2022  appointment time 11:30am    Natan Desai MD  [place] [address]  [phone]    Follow up on 11/3/2022  11:30         Discharge Medications:        Medication List        START taking these medications      benzocaine-menthol 20-0.5 % Aero spray  Commonly known as: DERMOPLAST  Apply topically as needed for Pain     blood glucose monitor kit and supplies  Dispense sufficient amount for indicated testing frequency plus additional to accommodate PRN testing needs. Dispense all needed supplies to include: monitor, strips, lancing device, lancets, control solutions, alcohol swabs. blood glucose test strips  Test 3 times a day & as needed for symptoms of irregular blood glucose. Dispense sufficient amount for indicated testing frequency plus additional to accommodate PRN testing needs.      Lancets Misc  1 each by Does not apply route 3 times daily Lantus SoloStar 100 UNIT/ML injection pen  Generic drug: insulin glargine  Inject 20 Units into the skin nightly     metoprolol succinate 50 MG extended release tablet  Commonly known as: TOPROL XL  Take 1.5 tablets by mouth in the morning and at bedtime     oxyCODONE 5 MG immediate release tablet  Commonly known as: ROXICODONE  Take 1 tablet by mouth every 6 hours as needed for Pain for up to 5 days.      Pen Needles 3/16\" 31G X 5 MM Misc  1 each by Does not apply route daily     sodium hypochlorite 0.125 % Soln external solution  Commonly known as: DAKINS  Apply topically daily  Start taking on: October 14, 2022     tamsulosin 0.4 MG capsule  Commonly known as: FLOMAX  Take 1 capsule by mouth daily            CHANGE how you take these medications      amiodarone 200 MG tablet  Commonly known as: CORDARONE  Take 1 tablet by mouth 2 times daily  What changed: when to take this     isosorbide mononitrate 30 MG extended release tablet  Commonly known as: IMDUR  Take 0.5 tablets by mouth daily  Start taking on: October 14, 2022  What changed: how much to take            CONTINUE taking these medications      acetaminophen 325 MG tablet  Commonly known as: TYLENOL     albuterol sulfate  (90 Base) MCG/ACT inhaler  Commonly known as: ProAir HFA  Inhale 2 puffs into the lungs every 4 hours as needed for Wheezing     apixaban 5 MG Tabs tablet  Commonly known as: ELIQUIS  Take 1 tablet by mouth 2 times daily     atorvastatin 40 MG tablet  Commonly known as: LIPITOR     clopidogrel 75 MG tablet  Commonly known as: PLAVIX  take 1 tablet by mouth once daily     dapagliflozin 10 MG tablet  Commonly known as: Farxiga  Take 1 tablet by mouth every morning     furosemide 20 MG tablet  Commonly known as: LASIX  Take 1 tablet by mouth daily     gabapentin 100 MG capsule  Commonly known as: NEURONTIN  TAKE ONE CAPSULE BY MOUTH 3 TIMES A DAY     Lumbar Back Brace/Support Pad Misc  1 each by Does not apply route daily as needed. metFORMIN 1000 MG tablet  Commonly known as: GLUCOPHAGE     naratriptan 2.5 MG tablet  Commonly known as: AMERGE  Take 1 tablet by mouth once as needed for Migraine for up to 1 dose. Nebulizer Compressor Kit  by Does not apply route. nitroGLYCERIN 0.4 MG SL tablet  Commonly known as: Nitrostat  Place 1 tablet under the tongue every 5 minutes as needed for Chest pain     pantoprazole 40 MG tablet  Commonly known as: Protonix  Take 1 tablet by mouth daily     Tens Unit Misc  by Does not apply route. Use as directed. traZODone 50 MG tablet  Commonly known as: DESYREL  take 1 tablet by mouth at bedtime            STOP taking these medications      metoprolol tartrate 50 MG tablet  Commonly known as: LOPRESSOR               Where to Get Your Medications        These medications were sent to [address] [place] [address] , [address], [address] [facility]      Phone: [phone]   amiodarone 200 MG tablet  benzocaine-menthol 20-0.5 % Aero spray  blood glucose monitor kit and supplies  blood glucose test strips  isosorbide mononitrate 30 MG extended release tablet  Lancets Misc  Lantus SoloStar 100 UNIT/ML injection pen  metoprolol succinate 50 MG extended release tablet  Pen Needles 3/16\" 31G X 5 MM Misc  sodium hypochlorite 0.125 % Soln external solution  tamsulosin 0.4 MG capsule       You can get these medications from any pharmacy    Bring a paper prescription for each of these medications  oxyCODONE 5 MG immediate release tablet                Time Spent on discharge is 35 minutes in the examination, evaluation, counseling and review of medications and discharge plan. Thank you Bethany Roque MD for the opportunity to be involved in this patient's care.       Signed:    Electronically signed by Glenna Baez MD on 10/13/22 at 3:50 PM EDT     Case was discussed with Attending, Dr. Matilde Frazier

## 2022-10-13 NOTE — CARE COORDINATION
Marquita Jerome requires a drop arm bedside commode due to being confined to one level of the home and is physically incapable of utilizing regular toilet facilities. Drop arm required to facilitate transferring. Current body weight is Weight: 243 lb 13.3 oz (110.6 kg). Marquita Jerome was evaluated today and a DME order was entered for a standard wheelchair because he requires this to successfully complete daily living tasks of ambulating. A standard manual wheelchair is necessary due to patient's impaired ambulation and mobility restrictions and would be unable to resolve these daily living tasks using a cane or walker. The patient is capable of using a standard wheelchair safely in their home and can maneuver within their home with adequate access. There is a caregiver available to provide necessary assistance. The need for this equipment was discussed with the patient and he understands, is in agreement, and has not expressed an unwillingness to use the wheelchair. Elevated leg rests needed to prevent swelling. Marquita Jerome can self propel a wheelchair and needs anti-rollback device because of ramps. Marquita Jerome was evaluated today and a DME order was entered for a semi-electric hospital bed because he requires assistance for positioning needs not possible in an ordinary bed, complexity of body positioning needs. Patient requires frequent and immediate positioning changes for relief of pain and care needs related to medical diagnoses. Patient needs variability of bed height requirements to perform patient transfers and for personal cares and ambulating. Current body Weight: 243 lb 13.3 oz (110.6 kg). The need for this equipment was discussed with the patient and he understands and is in agreement. Dry pressure mattress to help prevent skin breakdown.

## 2022-10-13 NOTE — PROGRESS NOTES
Progress note: Infectious diseases    Patient - Sayda Buitrago,  Age - 74 y.o.    - 1955      Room Number - 7K-25/025-A   MRN -  [id number]   Acct # - [de-identified]  Date of Admission -  10/3/2022  6:11 PM    SUBJECTIVE:   No new issues. Plan for discharge home  OBJECTIVE   VITALS    height is 6' 1\" (1.854 m) and weight is 243 lb 13.3 oz (110.6 kg). His oral temperature is 97.9 °F (36.6 °C). His blood pressure is 147/67 (abnormal) and his pulse is 73. His respiration is 18 and oxygen saturation is 97%. Wt Readings from Last 3 Encounters:   10/10/22 243 lb 13.3 oz (110.6 kg)   06/10/22 244 lb (110.7 kg)   22 247 lb (112 kg)       I/O (24 Hours)    Intake/Output Summary (Last 24 hours) at 10/13/2022 1109  Last data filed at 10/13/2022 0953  Gross per 24 hour   Intake 600 ml   Output 8050 ml   Net -7450 ml         General: awake and oriented  HEENT - normocephalic, atraumatic, pale conjunctiva,  anicteric sclera  Neck - Supple, no mass  Lungs -  Bilateral   air entry, no rhonchi, no wheeze. Cardiovascular - Heart sounds are normal.     Abdomen - soft, not distended, nontender,   Neurologic -awake   Skin - No bruising or bleeding  Extremities - dressed left AKA stump.        MEDICATIONS:      albuterol sulfate HFA  2 puff Inhalation BID    nicotine  1 patch TransDERmal Daily    apixaban  5 mg Oral BID    vancomycin  1,250 mg IntraVENous Q12H    magnesium hydroxide  30 mL Oral Daily    aspirin  81 mg Oral Daily    clopidogrel  75 mg Oral Daily    furosemide  20 mg Oral Daily    insulin glargine  16 Units SubCUTAneous Nightly    empagliflozin  10 mg Oral Daily    insulin lispro  0-16 Units SubCUTAneous TID     insulin lispro  0-4 Units SubCUTAneous Nightly    piperacillin-tazobactam  3,375 mg IntraVENous Q8H    metoprolol succinate  75 mg Oral BID    potassium chloride  20 mEq Oral Daily with breakfast metoprolol succinate  25 mg Oral Once    isosorbide mononitrate  15 mg Oral Daily    tamsulosin  0.4 mg Oral Daily    amiodarone  200 mg Oral BID    guaiFENesin  600 mg Oral BID    sodium hypochlorite   Irrigation Daily    atorvastatin  40 mg Oral Daily    gabapentin  100 mg Oral TID    pantoprazole  40 mg Oral QAM AC    sodium chloride flush  10 mL IntraVENous 2 times per day    vancomycin (VANCOCIN) intermittent dosing (placeholder)   Other RX Placeholder      sodium chloride 25 mL (10/12/22 2347)    dextrose       albuterol sulfate HFA, polyethylene glycol, oxyCODONE **OR** oxyCODONE, bisacodyl, potassium chloride **OR** potassium alternative oral replacement **OR** potassium chloride, benzocaine-menthol, morphine, traZODone, sodium chloride flush, sodium chloride, ondansetron **OR** ondansetron, acetaminophen **OR** acetaminophen, glucose, dextrose bolus **OR** dextrose bolus, glucagon (rDNA), dextrose      LABS:     CBC:   Recent Labs     10/11/22  0346 10/12/22  0656 10/13/22  0507   WBC 15.0* 11.5* 10.7   HGB 9.6* 9.9* 10.3*   * 487* 530*       BMP:    Recent Labs     10/11/22  0346 10/12/22  0656 10/13/22  0507   * 139 137   K 4.6 4.9 4.3   CL 99 100 100   CO2 27 27 27   BUN 6* 7 9   CREATININE 0.8 0.7 0.9   GLUCOSE 242* 159* 192*       Calcium:  Recent Labs     10/13/22  0507   CALCIUM 8.7        Glucose:  Recent Labs     10/12/22  2012 10/13/22  0640 10/13/22  1049   POCGLU 216* 188* 201*            Problem list of patient:     Patient Active Problem List   Diagnosis Code    Seizure disorder ([facility] G40.909    Osteoarthritis M19.90    COPD (chronic obstructive pulmonary disease) ([facility] J44.9    Spinal stenosis, lumbar M48.061    Lumbar radiculopathy M54.16    Tobacco abuse Z72.0    GERD (gastroesophageal reflux disease) K21.9    Skin ulceration ([facility] L98.499    Non-healing ulcer of lower leg ([facility] L97.909    Ischemic rest pain of lower extremity M79.606, I99.8    Current smoker F17.200 Dyslipidemia E78.5    Abnormal cardiovascular function study R94.30    Obesity E66.9    PVD (peripheral vascular disease) ([facility]) I73.9    CAD, multiple vessel I25.10    Chronic total occlusion of artery of the extremities ([facility]) I70.92    Coronary artery chronic total occlusion I25.82    DAVILA (dyspnea on exertion) R06.09    HTN (hypertension) I10    Discitis of cervical region M46.42    Open wound of right hand S61.401A    Noncompliance by refusing intervention or support Z91.199    Lactic acidosis E87.20    Pressure injury of left buttock, stage 3 ([facility]) J09.795    PAD (peripheral artery disease) ([facility]) I73.9    Cellulitis of right lower extremity L03.115    Pressure injury of sacral region, stage 3 ([facility]) L89.153    Paroxysmal atrial fibrillation ([facility]) I48.0    Left ventricular systolic dysfunction K72.6    Panlobular emphysema ([facility]) J43.1    Cellulitis of left lower extremity L03.116    Type 2 diabetes mellitus with diabetic peripheral angiopathy and gangrene, with long-term current use of insulin ([facility]) E11.52, Z79.4    Critical limb ischemia of left lower extremity ([facility]) S02.961         ASSESSMENT/PLAN   Severe PVD:  with ischemic wound: he had left AKA  Non healing right leg wound: continue dakins solution  CAD  Poorly controlled diabetes  Stop antibiotic today  Advised to daily clean the right lower leg with dakins soaked dressing.          Morenita Pennington MD, MD, FACP 10/13/2022 11:09 AM

## 2022-10-13 NOTE — PROGRESS NOTES
[facility]  Post Operative Progress Note  Dr Emil Stahl    Pt Name: Linda Freitas Record Number: [id number]  Date of Birth 1955   Today's Date: 10/13/2022    Hospital day # 10   POD # 3    Chief complaint: no complaints today     Patient was stable overnight. Chart reviewed. Updated by nursing staff. Denies chest discomfort or dyspnea. No N/V; (-) belching, flatus and BM. Tolerating ADULT DIET; Regular; 4 carb choices (60 gm/meal) diet. Pain controlled with analgesia. Up with therapy. Incision is approximated, small amount of drainage     Past, Family, Social History unchanged from admission. Diet:  ADULT DIET;  Regular; 4 carb choices (60 gm/meal)    Medications:  Scheduled Meds:   albuterol sulfate HFA  2 puff Inhalation BID    nicotine  1 patch TransDERmal Daily    apixaban  5 mg Oral BID    vancomycin  1,250 mg IntraVENous Q12H    magnesium hydroxide  30 mL Oral Daily    aspirin  81 mg Oral Daily    clopidogrel  75 mg Oral Daily    furosemide  20 mg Oral Daily    insulin glargine  16 Units SubCUTAneous Nightly    empagliflozin  10 mg Oral Daily    insulin lispro  0-16 Units SubCUTAneous TID WC    insulin lispro  0-4 Units SubCUTAneous Nightly    piperacillin-tazobactam  3,375 mg IntraVENous Q8H    metoprolol succinate  75 mg Oral BID    potassium chloride  20 mEq Oral Daily with breakfast    metoprolol succinate  25 mg Oral Once    isosorbide mononitrate  15 mg Oral Daily    tamsulosin  0.4 mg Oral Daily    amiodarone  200 mg Oral BID    guaiFENesin  600 mg Oral BID    sodium hypochlorite   Irrigation Daily    atorvastatin  40 mg Oral Daily    gabapentin  100 mg Oral TID    pantoprazole  40 mg Oral QAM AC    sodium chloride flush  10 mL IntraVENous 2 times per day    vancomycin (VANCOCIN) intermittent dosing (placeholder)   Other RX Placeholder     Continuous Infusions:   sodium chloride 25 mL (10/12/22 0542)    dextrose       PRN Meds:albuterol sulfate HFA, polyethylene glycol, oxyCODONE **OR** oxyCODONE, bisacodyl, potassium chloride **OR** potassium alternative oral replacement **OR** potassium chloride, benzocaine-menthol, morphine, traZODone, sodium chloride flush, sodium chloride, ondansetron **OR** ondansetron, acetaminophen **OR** acetaminophen, glucose, dextrose bolus **OR** dextrose bolus, glucagon (rDNA), dextrose    Objective:    CBC:   Recent Labs     10/11/22  0346 10/12/22  0656 10/13/22  0507   WBC 15.0* 11.5* 10.7   HGB 9.6* 9.9* 10.3*   * 487* 530*       BMP:    Recent Labs     10/11/22  0346 10/12/22  0656 10/13/22  0507   * 139 137   K 4.6 4.9 4.3   CL 99 100 100   CO2 27 27 27   BUN 6* 7 9   CREATININE 0.8 0.7 0.9   GLUCOSE 242* 159* 192*       Calcium:  Recent Labs     10/13/22  0507   CALCIUM 8.7       Ionized Calcium:No results for input(s): IONCA in the last 72 hours. Magnesium:  No results for input(s): MG in the last 72 hours. Phosphorus:No results for input(s): PHOS in the last 72 hours. BNP:No results for input(s): BNP in the last 72 hours. Glucose:  Recent Labs     10/12/22  2012 10/13/22  0640 10/13/22  1049   POCGLU 216* 188* 201*       HgbA1C: No results for input(s): LABA1C in the last 72 hours. INR: No results for input(s): INR in the last 72 hours. Hepatic: No results for input(s): ALKPHOS, ALT, AST, PROT, BILITOT, BILIDIR, LABALBU in the last 72 hours. Amylase and Lipase:No results for input(s): LACTA, AMYLASE in the last 72 hours. Lactic Acid: No results for input(s): LACTA in the last 72 hours. Troponin: No results for input(s): CKTOTAL, CKMB, TROPONINT in the last 72 hours. BNP: No results for input(s): BNP in the last 72 hours. Lipids: No results for input(s): CHOL, TRIG, HDL, LDL, LDLCALC in the last 72 hours.   ABGs:   Lab Results   Component Value Date/Time    PH 7.45 01/20/2013 05:10 PM    PCO2 30 01/20/2013 05:10 PM    PO2 112 01/20/2013 05:10 PM    HCO3 21 01/20/2013 05:10 PM    O2SAT 99 01/20/2013 05:10 PM Radiology reports as per the Radiologist  Radiology: XR TIBIA FIBULA LEFT (2 VIEWS)    Result Date: 10/3/2022  2 view left tibia-fibula Comparison: None Findings: No acute fracture or dislocation. Left knee and ankle joints are unremarkable. Remote surgical clips in the proximal posterior medial calf. No cortical erosions. Generalized subcutaneous edema with questionable small ulceration defects in the mid to lower calf. 1. Generalized subcutaneous edema with questionable small ulceration defects in the mid-distal calf. 2. No cortical erosions to suggest osteomyelitis. This document has been electronically signed by: Kriss Maldonado MD on 10/03/2022 08:35 PM    XR TIBIA FIBULA RIGHT (2 VIEWS)    Result Date: 10/3/2022  2 view right tibia-fibula Comparison: None Findings: No acute fracture or dislocation. Old healed traumatic deformities in the distal tibia and fibula. Intact intramedullary clarisa with fixation screws in the tibia. Mild degenerative changes in the right knee and right ankle joints with chondrocalcinosis in the right knee. Generalized subcutaneous edema in the right calf. Mild subcutaneous edema of the right calf. No cortical erosions to suggest acute osteomyelitis. This document has been electronically signed by: Kriss Maldonado MD on 10/03/2022 08:47 PM    XR ANKLE LEFT (2 VIEWS)    Result Date: 10/3/2022  2 view left ankle Comparison: None Findings: No acute fracture or dislocation. Left ankle mortise is intact. No cortical erosions. No significant ankle effusion. Lateral ankle swelling. No radiopaque foreign body. No acute osseous injury or cortical erosions. Lateral ankle swelling. This document has been electronically signed by: Kriss Maldonado MD on 10/03/2022 08:28 PM    XR ANKLE RIGHT (2 VIEWS)    Result Date: 10/3/2022  2 view right ankle Comparison: DX - ANKLE RT MINIMUM 3 VIEWS - 09/17/2012 02:42 PM EDT Findings: No acute fracture or dislocation.  Posttraumatic healed deformities in the distal tibia and fibula. Intact intramedullary clarisa and fixation screws in the tibia and across the distal tibiofibular syndesmosis. Generalized subcutaneous edema. No cortical erosions. Minimal inferior calcaneal spurring. 1. No acute osseous injury or cortical erosions. 2. Subcutaneous edema. This document has been electronically signed by: Francy Loaiza MD on 10/03/2022 08:27 PM    XR FOOT LEFT (2 VIEWS)    Result Date: 10/3/2022  2 view left foot Comparison: CR - FOOT LT MINIMUM 3 VIEWS - 03/17/2017 10:53 AM EDT Findings: No acute fracture or dislocation. Prior amputation of the left 2nd toe down to the proximal phalanx. Prior truncation deformity at the distal tuft of the left 3rd toe. There are chronic appearing erosive changes in the left 1st metatarsophalangeal joint with medial subluxation. No other discrete cortical erosions. Generalized swelling in the dorsum of the left foot with soft tissue ulceration defects. No radiopaque foreign body. 1. Soft tissue swelling in the left foot with several soft tissue ulceration defects. 2. Chronic appearing erosive changes in the left 1st metatarsophalangeal joint with medial subluxation. 3. Chronic appearing truncation defect in the distal tuft of the left 3rd toe and remote amputation of the left 2nd toe with intact cortical margins. 4. No other acute appearing cortical erosions radiographically to suggest osteomyelitis. This document has been electronically signed by: Francy Loaiza MD on 10/03/2022 08:50 PM    XR FOOT RIGHT (2 VIEWS)    Result Date: 10/3/2022  2 view right foot Comparison: DX - FOOT RT MINIMUM 3 VIEWS - 09/17/2012 02:42 PM EDT Findings: No acute fracture or dislocation. No cortical erosions. Soft tissue swelling with ulceration defect in the dorsum of the midfoot. Partially seen intact fixation hardware in the distal tibia. Scattered degenerative changes in the great toe and dorsal intertarsal joints.      1. Ulceration defect and adjacent swelling in the dorsum of the midfoot. 2. No cortical erosions to suggest acute osteomyelitis. This document has been electronically signed by: Tiffanie Palmer MD on 10/03/2022 08:35 PM    CTA ABDOMINAL AORTA W BILAT RUNOFF W WO CONTRAST    Result Date: 10/3/2022  CT angiography abdomen and pelvis with bilateral lower extremity runoff using IV contrast. 3-D post processing. Comparison:06/27/2022 Findings: Multifocal plaque in the abdominal aorta without aneurysm or dissection. Scattered mild calcified plaque in the celiac artery, SMA and UMER origin, and left renal artery origin without significant stenosis. Right renal artery is patent. Scattered plaque in the iliac arteries. Chronic occlusion of the left internal iliac artery with mild reconstitution of a few distal branches. Calcified plaque in the bilateral common femoral arteries. Remote surgical changes near the left common femoral artery bifurcation. Chronic occlusion of the entire left superficial femoral artery, left popliteal artery, and left posterior tibial artery. There is reconstitution of the distal left posterior tibial artery in the distal calf near the ankle. Left profunda femoris artery, left anterior tibial artery, and left peroneal arteries are opacified. Left dorsalis pedis and plantar arteries are opacified. Scattered plaque in the right superficial femoral artery and right popliteal artery. Right profunda femoris artery is opacified. Spotty opacification of the right anterior tibial artery without significant opacification in the distal segment. Right dorsalis pedis artery is not opacified. Right peroneal artery is opacified until the distal segment near the ankle. Right posterior tibial artery is opacified to the ankle. Right plantaris artery is opacified. Liver, gallbladder, spleen, pancreas, and adrenal glands are unremarkable. Both kidneys enhance symmetrically without hydronephrosis. No bowel obstruction, pneumatosis, or pneumoperitoneum. Appendix is not seen. No ascites or enlarged abdominal or pelvic lymph nodes. Urinary bladder and prostate are unremarkable. No pelvic free fluid. Old L3 compression fracture with bilateral posterior L2-4 fusion. Old fracture involving the left interpedicular screw at L4. Intact fixation hardware in the right tibia. Asymmetric fatty atrophy of the left gluteal and left thigh muscles. Fatty atrophy of the bilateral calf muscles greater on the left. Mild edema in the dorsum of both feet. No soft tissue gas or fluid collections to suggest abscess. Chronic erosive changes in the left 1st metatarsophalangeal joint. Partially seen amputation of left second toe. Degenerative changes in the bilateral hindfoot. No acute fracture or dislocation. No cortical erosions. 1. Chronic occlusions of the left internal iliac artery, entire left superficial femoral artery, left popliteal artery, and large portion of the left posterior tibial artery with reconstitution in the distal segment near the ankle. Left dorsalis pedis and plantaris arteries are opacified. 2. No significant opacification in the distal right anterior tibial artery, right dorsalis pedis artery, or distal right peroneal artery. This may reflect underlying diminished flow or occlusion. This is stable. 3. Scattered atherosclerotic plaque in the aorta and mesenteric/renal vessels without significant stenosis. 4. Asymmetric fatty atrophy of the left lower extremity musculature. Mild subcutaneous edema in the dorsum of both feet. 5. Stable chronic hardware fracture of the left interpedicular screw at L4. This document has been electronically signed by: Hector Salguero MD on 10/03/2022 10:46 PM All CTs at this facility use dose modulation techniques and iterative reconstructions, and/or weight-based dosing when appropriate to reduce radiation to a low as reasonably achievable. 3D Post-processing was performed on this study.        Physical Exam:  Vitals: /61   Pulse 68   Temp 97.5 °F (36.4 °C) (Oral)   Resp 17   Ht 6' 1\" (1.854 m)   Wt 243 lb 13.3 oz (110.6 kg)   SpO2 94%   BMI 32.17 kg/m²   24 hour intake/output:  Intake/Output Summary (Last 24 hours) at 10/13/2022 1426  Last data filed at 10/13/2022 1359  Gross per 24 hour   Intake 960 ml   Output 6900 ml   Net -5940 ml       Last 3 weights: Wt Readings from Last 3 Encounters:   10/10/22 243 lb 13.3 oz (110.6 kg)   06/10/22 244 lb (110.7 kg)   04/12/22 247 lb (112 kg)       General appearance - oriented to person, place, and time, overweight, and no acute distress   HEENT: Normocephalic and Atraumatic  Chest - no respiratory distress  Neurological - Alert and oriented and Normal speech  Integumentary - Skin color, texture, turgor normal. No Rashes or lesions  Musculoskeletal - protect  Left AKA  Surgical Incision:  drainage drom stump, dark blood, no signs of infection       DVT prophylaxis: [x] Lovenox                                 [] SCDs                                 [] SQ Heparin                                 [] Encourage ambulation           [] Already on Anticoagulation                 ASSESSMENT:  S/p left AKA  POD# 3  Postop pain - minimal, no phantom pain   CAD  Sacral ulcer   DB, HTN, COPD  Leukocytosis resolved        has a past medical history of CAD (coronary artery disease), COPD (chronic obstructive pulmonary disease) ([facility] Diabetes mellitus ([facility] Hx of blood clots, Hyperlipidemia, Hypertension, Leg wound, left, Leg wound, right, Lumbar radiculopathy, Osteoarthritis, Seizure disorder ([facility] and Spinal stenosis, lumbar. PLAN:  Routine incisional care, may leave open to air   Monitor labs, replace lytes per protocol  Stool softeners   Diet carb control   Iv hydration  Anticoagulation - okay resume   GI Prophylaxis  Activity - ambulate, OOB to chair, C&DB,  IS  Analgesia and antiemetics as needed  Antibiotic Therapy   Plan home with [facility], okay to discharge from a surgical standpoint.  F/u as scheduled       Electronically signed by JAVAN España CNP on 10/13/2022 at 2:26 PM

## 2022-10-13 NOTE — PROGRESS NOTES
Patient discharged to home. Patient left with all belongings, AVS, medications from OP pharmacy. Patient sent home with [facility] soap for infection prevention. All questions answered at this time. Patient has follow up appointments scheduled with PCP and  other doctors.

## 2022-10-13 NOTE — CARE COORDINATION
[facility] DME updated about all DME orders this morning. Updated with planned time for discharge home this afternoon.

## 2022-10-24 ENCOUNTER — HOSPITAL ENCOUNTER (EMERGENCY)
Age: 67
Discharge: HOME | End: 2022-10-24
Payer: COMMERCIAL

## 2022-10-24 VITALS
OXYGEN SATURATION: 96 % | RESPIRATION RATE: 18 BRPM | HEART RATE: 90 BPM | TEMPERATURE: 98 F | HEIGHT: 73 IN | SYSTOLIC BLOOD PRESSURE: 124 MMHG | DIASTOLIC BLOOD PRESSURE: 62 MMHG | WEIGHT: 208 LBS | BODY MASS INDEX: 27.57 KG/M2

## 2022-10-24 DIAGNOSIS — M96.842 POSTOPERATIVE SEROMA OF MUSCULOSKELETAL STRUCTURE AFTER MUSCULOSKELETAL PROCEDURE: Primary | ICD-10-CM

## 2022-10-24 PROCEDURE — 6370000000 HC RX 637 (ALT 250 FOR IP): Performed by: PHYSICIAN ASSISTANT

## 2022-10-24 PROCEDURE — 99283 EMERGENCY DEPT VISIT LOW MDM: CPT

## 2022-10-24 RX ORDER — CEPHALEXIN 500 MG/1
500 CAPSULE ORAL 4 TIMES DAILY
Qty: 40 CAPSULE | Refills: 0 | Status: SHIPPED | OUTPATIENT
Start: 2022-10-24 | End: 2022-11-03

## 2022-10-24 RX ORDER — CEPHALEXIN 250 MG/1
500 CAPSULE ORAL ONCE
Status: COMPLETED | OUTPATIENT
Start: 2022-10-24 | End: 2022-10-24

## 2022-10-24 RX ADMIN — CEPHALEXIN 500 MG: 250 CAPSULE ORAL at 20:38

## 2022-10-24 ASSESSMENT — PAIN - FUNCTIONAL ASSESSMENT
PAIN_FUNCTIONAL_ASSESSMENT: NONE - DENIES PAIN
PAIN_FUNCTIONAL_ASSESSMENT: NONE - DENIES PAIN

## 2022-10-24 ASSESSMENT — ENCOUNTER SYMPTOMS
NAUSEA: 0
VOMITING: 0
ABDOMINAL PAIN: 0
PHOTOPHOBIA: 0
SHORTNESS OF BREATH: 0
RHINORRHEA: 0
COUGH: 0

## 2022-10-24 NOTE — ED TRIAGE NOTES
Pt presents to ED via EMS with chief complaint of his amputation site bleeding. Pt had right leg amputation done 2-3 weeks ago and states tonight he got up to use the commode and noticed his incision was bleeding. Upon arrival, pt's incision slightly oozing.

## 2022-10-25 NOTE — ED PROVIDER NOTES
[facility] EMERGENCY DEPT      CHIEF COMPLAINT       Chief Complaint   Patient presents with    Post-op Problem       Nurses Notes reviewed and I agree except as noted in the HPI. HISTORY OF PRESENT ILLNESS    Marquita Jerome is a 74 y.o. male who presents for postop bleeding. Patient is 3 weeks postop from [facility] by Dr. Manav Miles. Patient reports some intermittent bleeding from his incision the past few days. He will wake up and notice a small puddle of bloody fluid. For the past hour he has more continuous leakage of bloody fluid from the center of his wound prompting him to come to the ER. Patient endorses decreased sleep due to the leg \"jumping\" but denies leg pain, fever, chills, nausea, vomiting, change in appetite, or other complaints. REVIEW OF SYSTEMS     Review of Systems   Constitutional:  Negative for appetite change, chills, diaphoresis and fever. HENT:  Negative for rhinorrhea. Eyes:  Negative for photophobia. Respiratory:  Negative for cough and shortness of breath. Cardiovascular:  Negative for chest pain. Gastrointestinal:  Negative for abdominal pain, nausea and vomiting. Genitourinary:  Negative for decreased urine volume. Musculoskeletal:  Positive for gait problem. Negative for arthralgias and myalgias. Skin:  Positive for wound (Surgical). Negative for rash. Neurological:  Negative for weakness, numbness and headaches. Psychiatric/Behavioral:  Positive for sleep disturbance. Negative for confusion. PAST MEDICAL HISTORY    has a past medical history of CAD (coronary artery disease), COPD (chronic obstructive pulmonary disease) ([address] Diabetes mellitus ([address] Hx of blood clots, Hyperlipidemia, Hypertension, Leg wound, left, Leg wound, right, Lumbar radiculopathy, Osteoarthritis, Seizure disorder ([address] and Spinal stenosis, lumbar. SURGICAL HISTORY      has a past surgical history that includes Coronary angioplasty with stent; knee surgery;  Forearm surgery; Toe Surgery; Leg Surgery; Ankle surgery; Lumbar spine surgery; Tympanostomy tube placement (1979); fracture surgery (Right, 2000); back surgery (1994); Cardiac catheterization (2005); Cardiac catheterization (5/9/13); Diagnostic Cardiac Cath Lab Procedure (11/18/2021); and Leg Surgery (Left, 10/10/2022). CURRENT MEDICATIONS       Previous Medications    ACETAMINOPHEN (TYLENOL) 325 MG TABLET    Take 650 mg by mouth every 6 hours as needed. ALBUTEROL SULFATE HFA (PROAIR HFA) 108 (90 BASE) MCG/ACT INHALER    Inhale 2 puffs into the lungs every 4 hours as needed for Wheezing    AMIODARONE (CORDARONE) 200 MG TABLET    Take 1 tablet by mouth 2 times daily    APIXABAN (ELIQUIS) 5 MG TABS TABLET    Take 1 tablet by mouth 2 times daily    ATORVASTATIN (LIPITOR) 40 MG TABLET    Take 40 mg by mouth daily    BENZOCAINE-MENTHOL (DERMOPLAST) 20-0.5 % AERO SPRAY    Apply topically as needed for Pain    BLOOD GLUCOSE MONITOR KIT AND SUPPLIES    Dispense sufficient amount for indicated testing frequency plus additional to accommodate PRN testing needs. Dispense all needed supplies to include: monitor, strips, lancing device, lancets, control solutions, alcohol swabs. BLOOD GLUCOSE MONITOR STRIPS    Test 3 times a day & as needed for symptoms of irregular blood glucose. Dispense sufficient amount for indicated testing frequency plus additional to accommodate PRN testing needs. CLOPIDOGREL (PLAVIX) 75 MG TABLET    take 1 tablet by mouth once daily    DAPAGLIFLOZIN (FARXIGA) 10 MG TABLET    Take 1 tablet by mouth every morning    ELASTIC BANDAGES & SUPPORTS (LUMBAR BACK BRACE/SUPPORT PAD) MISC    1 each by Does not apply route daily as needed.     FUROSEMIDE (LASIX) 20 MG TABLET    Take 1 tablet by mouth daily    GABAPENTIN (NEURONTIN) 100 MG CAPSULE    TAKE ONE CAPSULE BY MOUTH 3 TIMES A DAY    INSULIN GLARGINE (LANTUS SOLOSTAR) 100 UNIT/ML INJECTION PEN    Inject 20 Units into the skin nightly    INSULIN PEN NEEDLE (PEN NEEDLES 3/16\") 31G X 5 MM MISC    1 each by Does not apply route daily    ISOSORBIDE MONONITRATE (IMDUR) 30 MG EXTENDED RELEASE TABLET    Take 0.5 tablets by mouth daily    LANCETS MISC    1 each by Does not apply route 3 times daily    METFORMIN (GLUCOPHAGE) 1000 MG TABLET    Take 1,000 mg by mouth 2 times daily (with meals)    METOPROLOL SUCCINATE (TOPROL XL) 50 MG EXTENDED RELEASE TABLET    Take 1.5 tablets by mouth in the morning and at bedtime    NARATRIPTAN (AMERGE) 2.5 MG TABLET    Take 1 tablet by mouth once as needed for Migraine for up to 1 dose. NICOTINE (NICODERM CQ) 21 MG/24HR    Place 1 patch onto the skin every 24 hours    NITROGLYCERIN (NITROSTAT) 0.4 MG SL TABLET    Place 1 tablet under the tongue every 5 minutes as needed for Chest pain    PANTOPRAZOLE (PROTONIX) 40 MG TABLET    Take 1 tablet by mouth daily    RESPIRATORY THERAPY SUPPLIES (NEBULIZER COMPRESSOR) KIT    by Does not apply route. SODIUM HYPOCHLORITE (DAKINS) 0.125 % SOLN EXTERNAL SOLUTION    Apply topically daily    TAMSULOSIN (FLOMAX) 0.4 MG CAPSULE    Take 1 capsule by mouth daily    TENS UNIT MISC    by Does not apply route. Use as directed. TRAZODONE (DESYREL) 50 MG TABLET    take 1 tablet by mouth at bedtime       ALLERGIES     is allergic to influenza vaccines and pneumococcal vaccines. FAMILY HISTORY     He indicated that his mother is . He indicated that his father is . He indicated that his sister is alive. He indicated that his brother is . family history includes COPD in his father; Cancer in his father; Diabetes in his brother and mother; Heart Disease in his father and sister; Kidney Disease in his mother; Stroke in his father. SOCIAL HISTORY    reports that he has been smoking cigarettes. He has a 20.00 pack-year smoking history. He has never used smokeless tobacco. He reports that he does not drink alcohol and does not use drugs.     PHYSICAL EXAM     INITIAL VITALS: height is 6' 1\" (1.854 m) and weight is 208 lb (94.3 kg). His oral temperature is 98 °F (36.7 °C). His blood pressure is 124/62 and his pulse is 90. His respiration is 18 and oxygen saturation is 96%. Physical Exam  Constitutional:       Appearance: Normal appearance. He is well-developed. He is not ill-appearing. HENT:      Head: Normocephalic and atraumatic. Right Ear: Hearing normal.      Left Ear: Hearing normal.      Nose: Nose normal. No rhinorrhea. Mouth/Throat:      Lips: Pink. Eyes:      General: Lids are normal.      Extraocular Movements: Extraocular movements intact. Conjunctiva/sclera: Conjunctivae normal.   Neck:      Trachea: Trachea normal.   Cardiovascular:      Heart sounds: No murmur heard. Pulmonary:      Effort: Pulmonary effort is normal.      Breath sounds: Normal air entry. Abdominal:      General: There is no distension. Musculoskeletal:      Cervical back: Normal range of motion and neck supple. Left upper leg: Tenderness (mild to distal stump) present. No bony tenderness. Legs:       Comments: Well perfused; movement normal as observed. Skin:     General: Skin is warm and dry. Findings: No rash. Neurological:      General: No focal deficit present. Mental Status: He is alert. Mental status is at baseline. Motor: Motor function is intact.       Gait: Gait normal.   Psychiatric:         Mood and Affect: Mood normal.         Speech: Speech normal.         Behavior: Behavior normal.                 DIFFERENTIAL DIAGNOSIS:   Including but not limited to: Seroma, coagulopathy, incisional infection, dehiscence    DIAGNOSTIC RESULTS     EKG: All [facility]'s [facility] who either signs or Co-signs this chart in the absence of a cardiologist.  None    RADIOLOGY: non-plain film images(s) such as CT,Ultrasound and MRI are read by the radiologist.  Plain radiographic images are visualized and preliminarily interpreted by the emergency physician unless otherwise stated below. No orders to display       LABS:   Labs Reviewed - No data to display    EMERGENCY DEPARTMENT COURSE:   Vitals:    Vitals:    10/24/22 1942 10/24/22 2030   BP: (!) 152/74 124/62   Pulse: 92 90   Resp: 18 18   Temp: 98 °F (36.7 °C)    TempSrc: Oral    SpO2: 96% 96%   Weight: 208 lb (94.3 kg)    Height: 6' 1\" (1.854 m)            MDM:  The patient was seen by me in the emergency department for intermittent bleeding from his postop wound. There was no bleeding in the ER. Staples were noted intact and there was some localized erythema to the wound more so on the lateral aspect. Mild pain to palpate the incision mainly the last back. Vital signs reviewed noted to be stable. Old records were reviewed. I consulted Dr. Andrea Nicholson to see if his surgical PA would like to come down and look at the wound. Dr. Andrea Nicholson advised placing an Ace wrap on the wound may help control further bleeding and patient could see Dr. Caroline Reid or Nasra Mahoney in the office tomorrow. Gentle wound care and sterile nonstick dressing applied as well as Ace wrap. Patient given Keflex for possible localized infection. Plan of care discussed with the patient and his significant other and they are amenable. Anticipatory guidance given. I have given the patient strict written and verbal instructions about care at home, follow-up, and signs and symptoms of worsening of condition and they did verbalize understanding. CRITICAL CARE:   None    CONSULTS:  2011: Dr. Andrea Nicholson (or general surgery)    PROCEDURES:  None    FINAL IMPRESSION      1. Postoperative bleeding/seroma of musculoskeletal structure after musculoskeletal procedure          DISPOSITION/PLAN     1.  Postoperative bleeding/seroma of musculoskeletal structure after musculoskeletal procedure        PATIENT REFERRED TO:  Elis Foster MD  [place] [address]  [address] [facility]  [phone]    Schedule an appointment as soon as possible for a visit   Tomorrow    DISCHARGE MEDICATIONS:  New Prescriptions    CEPHALEXIN (KEFLEX) 500 MG CAPSULE    Take 1 capsule by mouth 4 times daily for 10 days       (Please note that portions of this note were completed with a voice recognition program.  Efforts were made to edit the dictations but occasionally words are mis-transcribed.)    Brandon Ramsey PA-C 10/24/22 8:52 PM    CAROLA Aaron PA-C  10/24/22 2052

## 2022-10-25 NOTE — ED NOTES
Wound cleansed and stump wrapped with vaseline gauze and ace bandage. Pressure applied to stump with dressing at this time. Pt tolerated procedure well. Pt is A&Ox4, resps easy and unlabored. IV shows no s/s of infection or infiltration. Medicated pt per MAR.       Chica Gu RN  10/24/22 [id number]

## 2022-10-31 NOTE — PROCEDURES
[address] [id number]                            CARDIAC CATHETERIZATION    PATIENT NAME: Daphne Beach                   :        1955  MED REC NO:   [id number]                           ROOM:       0025  ACCOUNT NO:   [de-identified]                           ADMIT DATE: 10/03/2022  PROVIDER:     Grace Mclean MD    DATE OF PROCEDURE:  10/07/2022    PERIPHERAL ANGIOGRAM REPORT    PROCEDURES PERFORMED:  Abdominal angiogram, bilateral lower extremity  selective angiogram.    INDICATION FOR PROCEDURE:  PAD and scheduled for left amputation. DESCRIPTION OF PROCEDURE:  After written informed consent was obtained,  the patient was brought to the special procedure laboratory in a  fasting, nonsedated state. Preprocedure timeout was performed. 2%  lidocaine was used to anesthetize the subcutaneous tissue overlying the  right femoral artery. Using a modified Seldinger technique, a 5-Frisian  arterial sheath was placed in the right common femoral artery. Once the  sheath was in place, a VCF catheter along with Glidewire was used to get  into the distal abdominal aorta at the level of the renal bifurcation. Once there, we injected diluted contrast and DSA images were obtained. The distal abdominal aorta was patent with mild-to-moderate luminal  irregularities. Bilateral renal arteries were patent with mild luminal  irregularities. Bilateral iliac arteries were patent with mild luminal irregularities. Bilateral common femoral arteries were patent with mild luminal  irregularities. On the left side, there seems to be totally occluded  fem-pop bypass graft. There is extensive scar tissue whereas the deep  femoral artery is patent with mild luminal irregularities and there are  extensive collaterals from the deep femoral artery all the way through  the entire length of the thigh and below the knee.   Below the knee,  there are no significant popliteal artery or any other tibial vessels,  there are only collaterals that were noted. Based on these findings, we think that the patient's left AKA stump will  heal appropriately due to extensive collaterals from the deep femoral  artery on the left side. There is nonsignificant vessel to be  revascularized whether through the foot or in the antegrade fashion. All catheters and wires were removed and hemostasis of the right groin  was achieved with a manual hold. ESTIMATED BLOOD LOSS:  During this procedure was less than 20 mL. SUMMARY:  1. Totally occluded fem-pop bypass graft on the left side. 2.  There is a good deep femoral artery with extensive collaterals  throughout the entire length of the thigh. RECOMMENDATIONS:  1. Proceed with left lower extremity amputation. 2.  Based on this deep femoral artery and collateralization, we think  that the wound stump will heal appropriately. 3.  Continue aggressive risk factor modification. All procedure details and management plans were discussed in detail with  the patient and he was in agreement with the plan.         Arina Alvarado MD    D: 10/31/2022 8:03:00       T: 10/31/2022 10:27:08     [email or web address]  Job#: [id number]     Doc#: [id number]    CC:

## 2022-11-03 ENCOUNTER — OFFICE VISIT (OUTPATIENT)
Dept: SURGERY | Age: 67
End: 2022-11-03

## 2022-11-03 VITALS
WEIGHT: 208 LBS | RESPIRATION RATE: 18 BRPM | HEART RATE: 80 BPM | OXYGEN SATURATION: 96 % | HEIGHT: 73 IN | SYSTOLIC BLOOD PRESSURE: 130 MMHG | DIASTOLIC BLOOD PRESSURE: 68 MMHG | BODY MASS INDEX: 27.57 KG/M2 | TEMPERATURE: 96.8 F

## 2022-11-03 DIAGNOSIS — Z09 POSTOP CHECK: Primary | ICD-10-CM

## 2022-11-03 NOTE — PROGRESS NOTES
[address] [facility]  [address] [facility] [address]  Dept: [phone]  Dept Fax: [phone]  Loc: [phone]    Visit Date: 11/3/2022    Gallo Tay is a 74 y.o. male who presentstoday for:  Chief Complaint   Patient presents with    Post-Op Check     s/p Left above-knee amputation 10/10/22       HPI:     HPI as above here for wound check patient status post left above-knee amputation.   Overall patient states he feels much better than preop pain is minimal    Past Medical History:   Diagnosis Date    CAD (coronary artery disease)     COPD (chronic obstructive pulmonary disease) ([facility] [address]     Diabetes mellitus ([facility] [address]     Hx of blood clots 1996    LEFT LEG    Hyperlipidemia     Hypertension     Leg wound, left     Leg wound, right     Lumbar radiculopathy     Osteoarthritis     Seizure disorder (HCC)     Spinal stenosis, lumbar       Past Surgical History:   Procedure Laterality Date    ANKLE SURGERY      ball joint    [address] [facility]  2005    [address] [facility]  5/9/13    [facility]    CORONARY ANGIOPLASTY WITH STENT PLACEMENT      DIAGNOSTIC CARDIAC CATH LAB PROCEDURE  11/18/2021    FOREARM SURGERY      left    FRACTURE SURGERY Right 2000    LEG    KNEE SURGERY      left    LEG SURGERY      LEG SURGERY Left 10/10/2022    Left  Above Knee Amputation performed by Kimber Cadet MD at [facility]      left middle toe    TYMPANOSTOMY TUBE PLACEMENT  1979        Family History   Problem Relation Age of Onset    Diabetes Mother     Kidney Disease Mother     COPD Father     Heart Disease Father     Cancer Father         bone    Stroke Father     Heart Disease Sister     Diabetes Brother         Social History     Tobacco Use    Smoking status: Some Days     Packs/day: 0.50     Years: 40.00     Pack years: 20.00     Types: Cigarettes    Smokeless tobacco: Never   Substance Use Topics    Alcohol use: No     Alcohol/week: 0.0 standard drinks          Current Outpatient Medications   Medication Sig Dispense Refill    cephALEXin (KEFLEX) 500 MG capsule Take 1 capsule by mouth 4 times daily for 10 days 40 capsule 0    isosorbide mononitrate (IMDUR) 30 MG extended release tablet Take 0.5 tablets by mouth daily 30 tablet 3    amiodarone (CORDARONE) 200 MG tablet Take 1 tablet by mouth 2 times daily 60 tablet 0    metoprolol succinate (TOPROL XL) 50 MG extended release tablet Take 1.5 tablets by mouth in the morning and at bedtime 90 tablet 0    benzocaine-menthol (DERMOPLAST) 20-0.5 % AERO spray Apply topically as needed for Pain 3 each 0    sodium hypochlorite (DAKINS) 0.125 % SOLN external solution Apply topically daily 1 each 0    insulin glargine (LANTUS SOLOSTAR) 100 UNIT/ML injection pen Inject 20 Units into the skin nightly 2 Adjustable Dose Pre-filled Pen Syringe 0    blood glucose monitor kit and supplies Dispense sufficient amount for indicated testing frequency plus additional to accommodate PRN testing needs. Dispense all needed supplies to include: monitor, strips, lancing device, lancets, control solutions, alcohol swabs. 1 kit 0    Insulin Pen Needle (PEN NEEDLES 3/16\") 31G X 5 MM MISC 1 each by Does not apply route daily 100 each 3    blood glucose monitor strips Test 3 times a day & as needed for symptoms of irregular blood glucose. Dispense sufficient amount for indicated testing frequency plus additional to accommodate PRN testing needs.  90 strip 0    Lancets MISC 1 each by Does not apply route 3 times daily 200 each 0    nicotine (NICODERM CQ) 21 MG/24HR Place 1 patch onto the skin every 24 hours 30 patch 3    tamsulosin (FLOMAX) 0.4 MG capsule Take 1 capsule by mouth daily 30 capsule 3    apixaban (ELIQUIS) 5 MG TABS tablet Take 1 tablet by mouth 2 times daily 180 tablet 3    furosemide (LASIX) 20 MG tablet Take 1 tablet by mouth daily 60 tablet 3 atorvastatin (LIPITOR) 40 MG tablet Take 40 mg by mouth daily      metFORMIN (GLUCOPHAGE) 1000 MG tablet Take 1,000 mg by mouth 2 times daily (with meals)      dapagliflozin (FARXIGA) 10 MG tablet Take 1 tablet by mouth every morning 90 tablet 1    clopidogrel (PLAVIX) 75 MG tablet take 1 tablet by mouth once daily 30 tablet 5    albuterol sulfate HFA (PROAIR HFA) 108 (90 BASE) MCG/ACT inhaler Inhale 2 puffs into the lungs every 4 hours as needed for Wheezing 1 Inhaler 5    gabapentin (NEURONTIN) 100 MG capsule TAKE ONE CAPSULE BY MOUTH 3 TIMES A DAY 90 capsule 5    pantoprazole (PROTONIX) 40 MG tablet Take 1 tablet by mouth daily 30 tablet 5    traZODone (DESYREL) 50 MG tablet take 1 tablet by mouth at bedtime 30 tablet 3    nitroGLYCERIN (NITROSTAT) 0.4 MG SL tablet Place 1 tablet under the tongue every 5 minutes as needed for Chest pain 25 tablet 3    Elastic Bandages & Supports (LUMBAR BACK BRACE/SUPPORT PAD) MISC 1 each by Does not apply route daily as needed. 1 each 0    Tens Unit MISC by Does not apply route. Use as directed. 1 each 0    Respiratory Therapy Supplies (NEBULIZER COMPRESSOR) KIT by Does not apply route. 1 kit 0    acetaminophen (TYLENOL) 325 MG tablet Take 650 mg by mouth every 6 hours as needed. naratriptan (AMERGE) 2.5 MG tablet Take 1 tablet by mouth once as needed for Migraine for up to 1 dose. 9 tablet 2     No current facility-administered medications for this visit.      Allergies   Allergen Reactions    Influenza Vaccines Anaphylaxis    Pneumococcal Vaccines        Subjective:      Review of Systems    Objective:   /68 (Site: Right Upper Arm, Position: Sitting, Cuff Size: Medium Adult)   Pulse 80   Temp 96.8 °F (36 °C) (Temporal)   Resp 18   Ht 6' 1\" (1.854 m)   Wt 208 lb (94.3 kg)   SpO2 96%   BMI 27.44 kg/m²     Physical Exam stump looks good every other day pull removed       Results for orders placed or performed during the hospital encounter of 10/03/22   Culture, Blood 1    Specimen: Blood   Result Value Ref Range    Blood Culture, Routine       No growth 24 hours. No growth 48 hours. No growth at 5 days   Culture, Blood 2    Specimen: Blood   Result Value Ref Range    Blood Culture, Routine       No growth 24 hours. No growth 48 hours. No growth at 5 days   Culture, Anaerobic and Aerobic    Specimen: Leg   Result Value Ref Range    Aerobic Culture (A)      Culture also yielded heavy growth of gram positive bacilli most consistent with Corynebacterium species, and multiple colony types of enteric gram negative bacilli including swarming Proteus species. Broad spectrum empiric antibiotic therapy is indicated for this mixed infection. Anaerobic Culture (A)      Culture yielded heavy mixed anaerobic growth, including multiple colony types of gram negative bacilli, and gram positive cocci. If a true mixed aerobic and anaerobic infection is suspected, then broad spectrum empiric antibiotic therapy is indicated and should include coverage for anaerobic organisms. Gram Stain Result       Few segmented neutrophils observed. Rare epithelial cells observed. Many gram positive cocci occurring singly and in pairs. Many gram negative bacilli. Many gram positive bacilli. Organism Pseudomonas aeruginosa (A)     Aerobic Culture heavy growth     Organism Streptococcus agalactiae - (Group B) (A)     Aerobic Culture       moderate growth Group B streptococci are susceptible to ampicillin, penicillin and cefazolin, but may be erythromycin and/or clindamycin resistant. Organism Staphylococcus aureus (A)     Aerobic Culture       heavy growth In the treatment of gram positive infections, GENTAMICIN should be CONSIDERED a SYNERGYSTIC agent ONLY. Ciprofloxacin and Levofloxacin, regardless of in vitro sensitivity, should not be used for staphylococcal infections other than uncomplicated lower UTIs.  Moxifloxacin, regardless of in vitro sensitivity, should not be used for staphylococcal infections.        Organism mixed anaerobic growth (A)        Susceptibility    Staphylococcus aureus - BACTERIAL SUSCEPTIBILITY PANEL BY HALLEY     gentamicin <=0.5 Sensitive mcg/mL     trimethoprim-sulfamethoxazole <=10 Sensitive mcg/mL     oxacillin 0.5 Sensitive mcg/mL     clindamycin <=0.25 Sensitive mcg/mL     tetracycline <=1 Sensitive mcg/mL    Pseudomonas aeruginosa - BACTERIAL SUSCEPTIBILITY PANEL BY HALLEY     cefepime <=1 Sensitive mcg/mL     gentamicin <=1 Sensitive mcg/mL     tobramycin <=1 Sensitive mcg/mL     piperacillin-tazobactam <=4 Sensitive mcg/mL     ciprofloxacin 0.5 Sensitive mcg/mL   Gastrointestinal Panel, Molecular    Specimen: Stool   Result Value Ref Range    Campylobacter PCR Not Detected Not Detected    Clostridium difficile, PCR Not Detected Not Detected    Plesiomonas Shigelloides PCR Not Detected Not Detected    Salmonella PCR Not Detected Not Detected    Vibrio PCR Not Detected Not Detected    Vibrio Cholerae PCR Not Detected Not Detected    Yersinia Enterocolitica PCR Not Detected Not Detected    E Coli Enteroaggregative PCR Not Detected Not Detected    E Coli Enteropathogenic PCR Detected (A) Not Detected    E Coli Enterotoxigenic PCR Not Detected Not Detected    E Coli Shiga Like Toxin PCR Not Detected Not Detected    E Coli O157 PCR NA Not Detected    E Coli Shigella/Enteroinvasive PCR Not Detected Not Detected    Cryptosporidium PCR Not Detected Not Detected    Cyclospora Cayetanensis PCR Not Detected Not Detected    E HISTOLYTICA GI FILM ARRAY Not Detected Not Detected    Giardia Lamblia PCR Not Detected Not Detected    Adenovirus F 40 39 PCR Not Detected Not Detected    Astrovirus PCR Not Detected Not Detected    Norovirus GI GII PCR Not Detected Not Detected    Rotavirus A PCR Not Detected Not Detected    Sapovirus PCR Not Detected Not Detected   Culture, Urine    Specimen: Urine   Result Value Ref Range    Urine Culture, Routine (A)      Current antibiotic therapy ineffective in vitro for isolate.     Organism Candida glabrata (A)     Urine Culture, Routine [place] count: >100,000 CFU/mL    CBC with Auto Differential   Result Value Ref Range    WBC 17.6 (H) 4.8 - 10.8 thou/mm3    RBC 4.97 4.70 - 6.10 mill/mm3    Hemoglobin 12.6 (L) 14.0 - 18.0 gm/dl    Hematocrit 40.1 (L) 42.0 - 52.0 %    MCV 80.7 80.0 - 94.0 fL    MCH 25.4 (L) 26.0 - 33.0 pg    MCHC 31.4 (L) 32.2 - 35.5 gm/dl    RDW-CV 18.4 (H) 11.5 - 14.5 %    RDW-SD 50.8 (H) 35.0 - 45.0 fL    Platelets 110 (H) 465 - 400 thou/mm3    MPV 9.3 (L) 9.4 - 12.4 fL    Seg Neutrophils 82.9 %    Lymphocytes 8.9 %    Monocytes 6.4 %    Eosinophils 0.2 %    Basophils 0.3 %    Immature Granulocytes 1.3 %    Segs Absolute 14.6 (H) 1.8 - 7.7 thou/mm3    Lymphocytes Absolute 1.6 1.0 - 4.8 thou/mm3    Monocytes Absolute 1.1 0.4 - 1.3 thou/mm3    Eosinophils Absolute 0.0 0.0 - 0.4 thou/mm3    Basophils Absolute 0.1 0.0 - 0.1 thou/mm3    Immature Grans (Abs) 0.23 (H) 0.00 - 0.07 thou/mm3    nRBC 0 /100 wbc   Basic Metabolic Panel w/ Reflex to MG   Result Value Ref Range    Sodium 140 135 - 145 meq/L    Potassium reflex Magnesium 3.3 (L) 3.5 - 5.2 meq/L    Chloride 99 98 - 111 meq/L    CO2 24 23 - 33 meq/L    Glucose 173 (H) 70 - 108 mg/dL    BUN 6 (L) 7 - 22 mg/dL    Creatinine 0.9 0.4 - 1.2 mg/dL    Calcium 9.1 8.5 - 10.5 mg/dL   Hepatic Function Panel   Result Value Ref Range    Albumin 2.5 (L) 3.5 - 5.1 g/dL    Total Bilirubin 0.3 0.3 - 1.2 mg/dL    Bilirubin, Direct <0.2 0.0 - 0.3 mg/dL    Alkaline Phosphatase 103 38 - 126 U/L    AST 13 5 - 40 U/L    ALT 11 11 - 66 U/L    Total Protein 7.6 6.1 - 8.0 g/dL   Sedimentation Rate   Result Value Ref Range    Sed Rate 90 (H) 0 - 10 mm/hr   C-Reactive Protein   Result Value Ref Range    CRP 26.66 (H) 0.00 - 1.00 mg/dl   Lactic Acid   Result Value Ref Range    Lactic Acid 3.6 (H) 0.5 - 2.0 mmol/L   Magnesium   Result Value Ref Range    Magnesium 1.6 1.6 - 2.4 mg/dL   Anion Gap   Result Value Ref Value Ref Range    Magnesium 1.7 1.6 - 2.4 mg/dL   Vancomycin Level, Random   Result Value Ref Range    Vancomycin Rm 7.8 0.1 - 39.9 ug/mL   Basic Metabolic Panel   Result Value Ref Range    Sodium 139 135 - 145 meq/L    Potassium 3.7 3.5 - 5.2 meq/L    Chloride 102 98 - 111 meq/L    CO2 23 23 - 33 meq/L    Glucose 171 (H) 70 - 108 mg/dL    BUN 7 7 - 22 mg/dL    Creatinine 0.7 0.4 - 1.2 mg/dL    Calcium 8.1 (L) 8.5 - 10.5 mg/dL   CBC   Result Value Ref Range    WBC 13.9 (H) 4.8 - 10.8 thou/mm3    RBC 4.25 (L) 4.70 - 6.10 mill/mm3    Hemoglobin 10.7 (L) 14.0 - 18.0 gm/dl    Hematocrit 35.2 (L) 42.0 - 52.0 %    MCV 82.8 80.0 - 94.0 fL    MCH 25.2 (L) 26.0 - 33.0 pg    MCHC 30.4 (L) 32.2 - 35.5 gm/dl    RDW-CV 17.9 (H) 11.5 - 14.5 %    RDW-SD 52.6 (H) 35.0 - 45.0 fL    Platelets 554 (H) 739 - 400 thou/mm3    MPV 9.5 9.4 - 12.4 fL   Anion Gap   Result Value Ref Range    Anion Gap 14.0 8.0 - 16.0 meq/L   Glomerular Filtration Rate, Estimated   Result Value Ref Range    Est, Glom Filt Rate >90 SB/SMG/9.56V0   Basic Metabolic Panel   Result Value Ref Range    Sodium 137 135 - 145 meq/L    Potassium 3.3 (L) 3.5 - 5.2 meq/L    Chloride 100 98 - 111 meq/L    CO2 22 (L) 23 - 33 meq/L    Glucose 163 (H) 70 - 108 mg/dL    BUN 5 (L) 7 - 22 mg/dL    Creatinine 0.8 0.4 - 1.2 mg/dL    Calcium 8.3 (L) 8.5 - 10.5 mg/dL   CBC   Result Value Ref Range    WBC 12.9 (H) 4.8 - 10.8 thou/mm3    RBC 4.29 (L) 4.70 - 6.10 mill/mm3    Hemoglobin 11.0 (L) 14.0 - 18.0 gm/dl    Hematocrit 35.4 (L) 42.0 - 52.0 %    MCV 82.5 80.0 - 94.0 fL    MCH 25.6 (L) 26.0 - 33.0 pg    MCHC 31.1 (L) 32.2 - 35.5 gm/dl    RDW-CV 17.9 (H) 11.5 - 14.5 %    RDW-SD 53.4 (H) 35.0 - 45.0 fL    Platelets 550 (H) 248 - 400 thou/mm3    MPV 9.6 9.4 - 12.4 fL   Anion Gap   Result Value Ref Range    Anion Gap 15.0 8.0 - 16.0 meq/L   Glomerular Filtration Rate, Estimated   Result Value Ref Range    Est, Glom Filt Rate >90 ml/min/1.73m2   Urine with Reflexed Micro   Result Value Ref Range    Glucose, Ur >= 1000 (A) NEGATIVE mg/dl    Bilirubin Urine NEGATIVE NEGATIVE    Ketones, Urine TRACE (A) NEGATIVE    Specific Gravity, Urine 1.025 1.002 - 1.030    Blood, Urine NEGATIVE NEGATIVE    pH, UA 6.5 5.0 - 9.0    Protein, UA TRACE (A) NEGATIVE    Urobilinogen, Urine 0.2 0.0 - 1.0 eu/dl    Nitrite, Urine NEGATIVE NEGATIVE    Leukocyte Esterase, Urine NEGATIVE NEGATIVE    Color, UA YELLOW STRAW-YELLOW    Character, Urine CLEAR CLEAR-SL CLOUD    RBC, UA 3-5 0-2/hpf /hpf    WBC, UA 0-2 0-4/hpf /hpf    Epithelial Cells, UA 0-2 3-5/hpf /hpf    Bacteria, UA NONE SEEN FEW/NONE SEEN /hpf    Casts UA NONE SEEN NONE SEEN /lpf    Crystals, UA NONE SEEN NONE SEEN    Renal Epithelial, UA NONE SEEN NONE SEEN    Yeast, UA NONE SEEN NONE SEEN    CASTS 2 NONE SEEN NONE SEEN /lpf    MISCELLANEOUS 2 NONE SEEN    Vancomycin Level, Random   Result Value Ref Range    Vancomycin Rm 21.5 0.1 - 39.9 ug/mL   Basic Metabolic Panel   Result Value Ref Range    Sodium 136 135 - 145 meq/L    Potassium 3.5 3.5 - 5.2 meq/L    Chloride 102 98 - 111 meq/L    CO2 22 (L) 23 - 33 meq/L    Glucose 163 (H) 70 - 108 mg/dL    BUN 6 (L) 7 - 22 mg/dL    Creatinine 0.7 0.4 - 1.2 mg/dL    Calcium 8.0 (L) 8.5 - 10.5 mg/dL   CBC   Result Value Ref Range    WBC 11.2 (H) 4.8 - 10.8 thou/mm3    RBC 4.25 (L) 4.70 - 6.10 mill/mm3    Hemoglobin 10.6 (L) 14.0 - 18.0 gm/dl    Hematocrit 35.3 (L) 42.0 - 52.0 %    MCV 83.1 80.0 - 94.0 fL    MCH 24.9 (L) 26.0 - 33.0 pg    MCHC 30.0 (L) 32.2 - 35.5 gm/dl    RDW-CV 17.9 (H) 11.5 - 14.5 %    RDW-SD 52.7 (H) 35.0 - 45.0 fL    Platelets 812 (H) 029 - 400 thou/mm3    MPV 9.6 9.4 - 12.4 fL   Anion Gap   Result Value Ref Range    Anion Gap 12.0 8.0 - 16.0 meq/L   Glomerular Filtration Rate, Estimated   Result Value Ref Range    Est, Glom Filt Rate >90 TK/RFM/1.40K4   Basic Metabolic Panel   Result Value Ref Range    Sodium 136 135 - 145 meq/L    Potassium 3.8 3.5 - 5.2 meq/L    Chloride 102 98 - 111 meq/L    CO2 23 23 - 33 meq/L    Glucose 164 (H) 70 - 108 mg/dL    BUN 5 (L) 7 - 22 mg/dL    Creatinine 0.7 0.4 - 1.2 mg/dL    Calcium 8.0 (L) 8.5 - 10.5 mg/dL   Magnesium   Result Value Ref Range    Magnesium 2.0 1.6 - 2.4 mg/dL   Anion Gap   Result Value Ref Range    Anion Gap 11.0 8.0 - 16.0 meq/L   Glomerular Filtration Rate, Estimated   Result Value Ref Range    Est, Glom Filt Rate >90 ml/min/1.73m2   CBC   Result Value Ref Range    WBC 13.5 (H) 4.8 - 10.8 thou/mm3    RBC 4.13 (L) 4.70 - 6.10 mill/mm3    Hemoglobin 10.3 (L) 14.0 - 18.0 gm/dl    Hematocrit 34.7 (L) 42.0 - 52.0 %    MCV 84.0 80.0 - 94.0 fL    MCH 24.9 (L) 26.0 - 33.0 pg    MCHC 29.7 (L) 32.2 - 35.5 gm/dl    RDW-CV 17.9 (H) 11.5 - 14.5 %    RDW-SD 54.0 (H) 35.0 - 45.0 fL    Platelets 380 (H) 345 - 400 thou/mm3    MPV 9.8 9.4 - 12.4 fL   Vancomycin Level, Random   Result Value Ref Range    Vancomycin Rm 26.2 0.1 - 39.9 ug/mL   CBC   Result Value Ref Range    WBC 15.0 (H) 4.8 - 10.8 thou/mm3    RBC 3.85 (L) 4.70 - 6.10 mill/mm3    Hemoglobin 9.6 (L) 14.0 - 18.0 gm/dl    Hematocrit 32.7 (L) 42.0 - 52.0 %    MCV 84.9 80.0 - 94.0 fL    MCH 24.9 (L) 26.0 - 33.0 pg    MCHC 29.4 (L) 32.2 - 35.5 gm/dl    RDW-CV 18.1 (H) 11.5 - 14.5 %    RDW-SD 55.1 (H) 35.0 - 45.0 fL    Platelets 890 (H) 004 - 400 thou/mm3    MPV 9.4 9.4 - 12.4 fL   Basic Metabolic Panel w/ Reflex to MG   Result Value Ref Range    Sodium 134 (L) 135 - 145 meq/L    Potassium reflex Magnesium 4.6 3.5 - 5.2 meq/L    Chloride 99 98 - 111 meq/L    CO2 27 23 - 33 meq/L    Glucose 242 (H) 70 - 108 mg/dL    BUN 6 (L) 7 - 22 mg/dL    Creatinine 0.8 0.4 - 1.2 mg/dL    Calcium 8.3 (L) 8.5 - 10.5 mg/dL   Anion Gap   Result Value Ref Range    Anion Gap 8.0 8.0 - 16.0 meq/L   Glomerular Filtration Rate, Estimated   Result Value Ref Range    Est, Glom Filt Rate >90 ml/min/1.73m2   CBC   Result Value Ref Range    WBC 11.5 (H) 4.8 - 10.8 thou/mm3    RBC 3.92 (L) 4.70 - 6.10 mill/mm3    Hemoglobin 9.9 (L) 14.0 - 18.0 gm/dl    Hematocrit 32.8 (L) 42.0 - 52.0 %    MCV 83.7 80.0 - 94.0 fL    MCH 25.3 (L) 26.0 - 33.0 pg    MCHC 30.2 (L) 32.2 - 35.5 gm/dl    RDW-CV 18.4 (H) 11.5 - 14.5 %    RDW-SD 55.1 (H) 35.0 - 45.0 fL    Platelets 278 (H) 862 - 400 thou/mm3    MPV 9.7 9.4 - 12.4 fL   Basic Metabolic Panel w/ Reflex to MG   Result Value Ref Range    Sodium 139 135 - 145 meq/L    Potassium reflex Magnesium 4.9 3.5 - 5.2 meq/L    Chloride 100 98 - 111 meq/L    CO2 27 23 - 33 meq/L    Glucose 159 (H) 70 - 108 mg/dL    BUN 7 7 - 22 mg/dL    Creatinine 0.7 0.4 - 1.2 mg/dL    Calcium 8.1 (L) 8.5 - 10.5 mg/dL   Anion Gap   Result Value Ref Range    Anion Gap 12.0 8.0 - 16.0 meq/L   Glomerular Filtration Rate, Estimated   Result Value Ref Range    Est, Glom Filt Rate >90 ml/min/1.73m2   CBC   Result Value Ref Range    WBC 10.7 4.8 - 10.8 thou/mm3    RBC 4.14 (L) 4.70 - 6.10 mill/mm3    Hemoglobin 10.3 (L) 14.0 - 18.0 gm/dl    Hematocrit 34.3 (L) 42.0 - 52.0 %    MCV 82.9 80.0 - 94.0 fL    MCH 24.9 (L) 26.0 - 33.0 pg    MCHC 30.0 (L) 32.2 - 35.5 gm/dl    RDW-CV 18.0 (H) 11.5 - 14.5 %    RDW-SD 53.4 (H) 35.0 - 45.0 fL    Platelets 748 (H) 084 - 400 thou/mm3    MPV 9.5 9.4 - 12.4 fL   Basic Metabolic Panel w/ Reflex to MG   Result Value Ref Range    Sodium 137 135 - 145 meq/L    Potassium reflex Magnesium 4.3 3.5 - 5.2 meq/L    Chloride 100 98 - 111 meq/L    CO2 27 23 - 33 meq/L    Glucose 192 (H) 70 - 108 mg/dL    BUN 9 7 - 22 mg/dL    Creatinine 0.9 0.4 - 1.2 mg/dL    Calcium 8.7 8.5 - 10.5 mg/dL   Anion Gap   Result Value Ref Range    Anion Gap 10.0 8.0 - 16.0 meq/L   Glomerular Filtration Rate, Estimated   Result Value Ref Range    Est, Glom Filt Rate 85 >60 ml/min/1.73m2   POCT glucose   Result Value Ref Range    Glucose 202 mg/dL    QC OK?  yes    POCT Glucose   Result Value Ref Range    POC Glucose 202 (H) 70 - 108 mg/dl   POCT glucose   Result Value Ref Range    POC Glucose 330 (H) 70 - 108 mg/dl   POCT glucose   Result Value Ref Range POC Glucose 212 (H) 70 - 108 mg/dl   POCT glucose   Result Value Ref Range    POC Glucose 243 (H) 70 - 108 mg/dl   POCT glucose   Result Value Ref Range    POC Glucose 342 (H) 70 - 108 mg/dl   POCT glucose   Result Value Ref Range    POC Glucose 261 (H) 70 - 108 mg/dl   POCT glucose   Result Value Ref Range    POC Glucose 203 (H) 70 - 108 mg/dl   POCT glucose   Result Value Ref Range    POC Glucose 210 (H) 70 - 108 mg/dl   POCT glucose   Result Value Ref Range    POC Glucose 246 (H) 70 - 108 mg/dl   POCT glucose   Result Value Ref Range    POC Glucose 372 (H) 70 - 108 mg/dl   POCT glucose   Result Value Ref Range    POC Glucose 343 (H) 70 - 108 mg/dl   POCT glucose   Result Value Ref Range    POC Glucose 336 (H) 70 - 108 mg/dl   POCT glucose   Result Value Ref Range    POC Glucose 323 (H) 70 - 108 mg/dl   POCT glucose   Result Value Ref Range    POC Glucose 150 (H) 70 - 108 mg/dl   POCT glucose   Result Value Ref Range    POC Glucose 233 (H) 70 - 108 mg/dl   POCT glucose   Result Value Ref Range    POC Glucose 221 (H) 70 - 108 mg/dl   POCT glucose   Result Value Ref Range    POC Glucose 206 (H) 70 - 108 mg/dl   POCT glucose   Result Value Ref Range    POC Glucose 201 (H) 70 - 108 mg/dl   POCT glucose   Result Value Ref Range    POC Glucose 225 (H) 70 - 108 mg/dl   POCT glucose   Result Value Ref Range    POC Glucose 253 (H) 70 - 108 mg/dl   POCT glucose   Result Value Ref Range    POC Glucose 177 (H) 70 - 108 mg/dl   POCT glucose   Result Value Ref Range    POC Glucose 256 (H) 70 - 108 mg/dl   POCT glucose   Result Value Ref Range    POC Glucose 198 (H) 70 - 108 mg/dl   POCT glucose   Result Value Ref Range    POC Glucose 217 (H) 70 - 108 mg/dl   POCT glucose   Result Value Ref Range    POC Glucose 238 (H) 70 - 108 mg/dl   POCT glucose   Result Value Ref Range    POC Glucose 203 (H) 70 - 108 mg/dl   POCT glucose   Result Value Ref Range    POC Glucose 207 (H) 70 - 108 mg/dl   POCT glucose   Result Value Ref Range POC Glucose 416 (H) 70 - 108 mg/dl   POCT glucose   Result Value Ref Range    POC Glucose 460 (H) 70 - 108 mg/dl   POCT glucose   Result Value Ref Range    POC Glucose 324 (H) 70 - 108 mg/dl   POCT glucose   Result Value Ref Range    POC Glucose 199 (H) 70 - 108 mg/dl   POCT glucose   Result Value Ref Range    POC Glucose 215 (H) 70 - 108 mg/dl   POCT glucose   Result Value Ref Range    POC Glucose 320 (H) 70 - 108 mg/dl   POCT glucose   Result Value Ref Range    POC Glucose 165 (H) 70 - 108 mg/dl   POCT glucose   Result Value Ref Range    POC Glucose 224 (H) 70 - 108 mg/dl   POCT glucose   Result Value Ref Range    POC Glucose 231 (H) 70 - 108 mg/dl   POCT glucose   Result Value Ref Range    POC Glucose 253 (H) 70 - 108 mg/dl   POCT glucose   Result Value Ref Range    POC Glucose 216 (H) 70 - 108 mg/dl   POCT glucose   Result Value Ref Range    POC Glucose 188 (H) 70 - 108 mg/dl   POCT glucose   Result Value Ref Range    POC Glucose 201 (H) 70 - 108 mg/dl   POCT glucose   Result Value Ref Range    POC Glucose 150 (H) 70 - 108 mg/dl   EKG 12 Lead   Result Value Ref Range    Ventricular Rate 114 BPM    QRS Duration 88 ms    Q-T Interval 292 ms    QTc Calculation (Bazett) 402 ms    R Axis 71 degrees    T Axis 130 degrees       Assessment:     Status post left above-knee amputation is healing well    Plan:     Return to office in 2 weeks      Electronicallysigned by Nasima Jean MD on 11/3/2022 at 11:22 AM

## 2022-11-10 ENCOUNTER — OFFICE VISIT (OUTPATIENT)
Dept: SURGERY | Age: 67
End: 2022-11-10

## 2022-11-10 VITALS
OXYGEN SATURATION: 97 % | BODY MASS INDEX: 27.44 KG/M2 | TEMPERATURE: 97.2 F | DIASTOLIC BLOOD PRESSURE: 62 MMHG | HEART RATE: 88 BPM | RESPIRATION RATE: 18 BRPM | HEIGHT: 73 IN | SYSTOLIC BLOOD PRESSURE: 120 MMHG

## 2022-11-10 DIAGNOSIS — Z09 POSTOP CHECK: Primary | ICD-10-CM

## 2022-11-10 PROCEDURE — 1123F ACP DISCUSS/DSCN MKR DOCD: CPT | Performed by: SURGERY

## 2022-11-10 PROCEDURE — G8417 CALC BMI ABV UP PARAM F/U: HCPCS | Performed by: SURGERY

## 2022-11-10 PROCEDURE — 1111F DSCHRG MED/CURRENT MED MERGE: CPT | Performed by: SURGERY

## 2022-11-10 PROCEDURE — 3074F SYST BP LT 130 MM HG: CPT | Performed by: SURGERY

## 2022-11-10 PROCEDURE — 99024 POSTOP FOLLOW-UP VISIT: CPT | Performed by: SURGERY

## 2022-11-10 PROCEDURE — 3078F DIAST BP <80 MM HG: CPT | Performed by: SURGERY

## 2022-11-10 PROCEDURE — G8484 FLU IMMUNIZE NO ADMIN: HCPCS | Performed by: SURGERY

## 2022-11-10 PROCEDURE — 4004F PT TOBACCO SCREEN RCVD TLK: CPT | Performed by: SURGERY

## 2022-11-10 PROCEDURE — G8427 DOCREV CUR MEDS BY ELIG CLIN: HCPCS | Performed by: SURGERY

## 2022-11-10 PROCEDURE — 3017F COLORECTAL CA SCREEN DOC REV: CPT | Performed by: SURGERY

## 2022-11-10 NOTE — PROGRESS NOTES
[address] [address]  Dept: [phone]  Dept Fax: [phone]  Loc: [phone]    Visit Date: 11/10/2022    Anya Keys is a 74 y.o. male who presentstoday for:  Chief Complaint   Patient presents with    Post-Op Check     s/p Left above-knee amputation 10/10/22. Last office visit 11/3/22           HPI:       As above status post left above-knee amputation 1 month ago patient still having a little bit of drainage in the central aspect of the wound but here for wound check still has staple patient having no pain and overall feels much better now versus preop.   Patient does smoke still encouraged patient to quit smoking as it will affect wound healing he also is diabetic    Past Medical History:   Diagnosis Date    CAD (coronary artery disease)     COPD (chronic obstructive pulmonary disease) (HCC)     Diabetes mellitus (HCC)     Hx of blood clots 1996    LEFT LEG    Hyperlipidemia     Hypertension     Leg wound, left     Leg wound, right     Lumbar radiculopathy     Osteoarthritis     Seizure disorder (HCC)     Spinal stenosis, lumbar       Past Surgical History:   Procedure Laterality Date    ANKLE SURGERY      ball joint    [address] [facility]  2005    1 STENT PLACED    CARDIAC CATHETERIZATION  5/9/13    [facility]    CORONARY ANGIOPLASTY WITH STENT PLACEMENT      DIAGNOSTIC CARDIAC CATH LAB PROCEDURE  11/18/2021    FOREARM SURGERY      left    FRACTURE SURGERY Right 2000    LEG    KNEE SURGERY      left    LEG SURGERY      LEG SURGERY Left 10/10/2022    Left  Above Knee Amputation performed by Cindy Cruz MD at [facility], [place], operated by [facility]      left middle toe    TYMPANOSTOMY TUBE PLACEMENT  1979        Family History   Problem Relation Age of Onset    Diabetes Mother     Kidney Disease Mother     COPD Father     Heart Disease Father     Cancer Father bone    Stroke Father     Heart Disease Sister     Diabetes Brother         Social History     Tobacco Use    Smoking status: Some Days     Packs/day: 0.50     Years: 40.00     Pack years: 20.00     Types: Cigarettes    Smokeless tobacco: Never   Substance Use Topics    Alcohol use: No     Alcohol/week: 0.0 standard drinks          Current Outpatient Medications   Medication Sig Dispense Refill    isosorbide mononitrate (IMDUR) 30 MG extended release tablet Take 0.5 tablets by mouth daily 30 tablet 3    amiodarone (CORDARONE) 200 MG tablet Take 1 tablet by mouth 2 times daily 60 tablet 0    metoprolol succinate (TOPROL XL) 50 MG extended release tablet Take 1.5 tablets by mouth in the morning and at bedtime 90 tablet 0    benzocaine-menthol (DERMOPLAST) 20-0.5 % AERO spray Apply topically as needed for Pain 3 each 0    sodium hypochlorite (DAKINS) 0.125 % SOLN external solution Apply topically daily 1 each 0    insulin glargine (LANTUS SOLOSTAR) 100 UNIT/ML injection pen Inject 20 Units into the skin nightly 2 Adjustable Dose Pre-filled Pen Syringe 0    blood glucose monitor kit and supplies Dispense sufficient amount for indicated testing frequency plus additional to accommodate PRN testing needs. Dispense all needed supplies to include: monitor, strips, lancing device, lancets, control solutions, alcohol swabs. 1 kit 0    Insulin Pen Needle (PEN NEEDLES 3/16\") 31G X 5 MM MISC 1 each by Does not apply route daily 100 each 3    blood glucose monitor strips Test 3 times a day & as needed for symptoms of irregular blood glucose. Dispense sufficient amount for indicated testing frequency plus additional to accommodate PRN testing needs.  90 strip 0    Lancets MISC 1 each by Does not apply route 3 times daily 200 each 0    nicotine (NICODERM CQ) 21 MG/24HR Place 1 patch onto the skin every 24 hours 30 patch 3    tamsulosin (FLOMAX) 0.4 MG capsule Take 1 capsule by mouth daily 30 capsule 3    apixaban (ELIQUIS) 5 MG TABS tablet Take 1 tablet by mouth 2 times daily 180 tablet 3    furosemide (LASIX) 20 MG tablet Take 1 tablet by mouth daily 60 tablet 3    atorvastatin (LIPITOR) 40 MG tablet Take 40 mg by mouth daily      metFORMIN (GLUCOPHAGE) 1000 MG tablet Take 1,000 mg by mouth 2 times daily (with meals)      dapagliflozin (FARXIGA) 10 MG tablet Take 1 tablet by mouth every morning 90 tablet 1    clopidogrel (PLAVIX) 75 MG tablet take 1 tablet by mouth once daily 30 tablet 5    albuterol sulfate HFA (PROAIR HFA) 108 (90 BASE) MCG/ACT inhaler Inhale 2 puffs into the lungs every 4 hours as needed for Wheezing 1 Inhaler 5    gabapentin (NEURONTIN) 100 MG capsule TAKE ONE CAPSULE BY MOUTH 3 TIMES A DAY 90 capsule 5    pantoprazole (PROTONIX) 40 MG tablet Take 1 tablet by mouth daily 30 tablet 5    traZODone (DESYREL) 50 MG tablet take 1 tablet by mouth at bedtime 30 tablet 3    nitroGLYCERIN (NITROSTAT) 0.4 MG SL tablet Place 1 tablet under the tongue every 5 minutes as needed for Chest pain 25 tablet 3    Elastic Bandages & Supports (LUMBAR BACK BRACE/SUPPORT PAD) MISC 1 each by Does not apply route daily as needed. 1 each 0    Tens Unit MISC by Does not apply route. Use as directed. 1 each 0    Respiratory Therapy Supplies (NEBULIZER COMPRESSOR) KIT by Does not apply route. 1 kit 0    acetaminophen (TYLENOL) 325 MG tablet Take 650 mg by mouth every 6 hours as needed. naratriptan (AMERGE) 2.5 MG tablet Take 1 tablet by mouth once as needed for Migraine for up to 1 dose. 9 tablet 2     No current facility-administered medications for this visit.      Allergies   Allergen Reactions    Influenza Vaccines Anaphylaxis    Pneumococcal Vaccines        Subjective:      Review of Systems    Objective:   /62 (Site: Left Upper Arm, Position: Sitting, Cuff Size: Medium Adult)   Pulse 88   Temp 97.2 °F (36.2 °C) (Temporal)   Resp 18   Ht 6' 1\" (1.854 m)   SpO2 97%   BMI 27.44 kg/m²     Physical Exam wound stump looks good center aspect has a little bit of opening silver nitrate applied rest of staples removed           Assessment:     Overall doing well again did encourage cessation of smoking    Plan:     Return to office in 2 weeks      Electronicallysigned by Edgar Christie MD on 11/10/2022 at 2:08 PM

## 2022-11-25 ENCOUNTER — TELEPHONE (OUTPATIENT)
Dept: CARDIOLOGY CLINIC | Age: 67
End: 2022-11-25

## 2022-12-13 ENCOUNTER — OFFICE VISIT (OUTPATIENT)
Dept: SURGERY | Age: 67
End: 2022-12-13

## 2022-12-13 VITALS
OXYGEN SATURATION: 97 % | DIASTOLIC BLOOD PRESSURE: 74 MMHG | SYSTOLIC BLOOD PRESSURE: 110 MMHG | BODY MASS INDEX: 27.44 KG/M2 | TEMPERATURE: 97.4 F | HEART RATE: 91 BPM | HEIGHT: 73 IN

## 2022-12-13 DIAGNOSIS — Z09 POSTOP CHECK: Primary | ICD-10-CM

## 2022-12-13 NOTE — PROGRESS NOTES
[address] [facility] [address]  Dept: [phone]  Dept Fax: [phone]  Loc: [phone]    Visit Date: 12/13/2022    Koby Schmid is a 74 y.o. male who presentstoday for:  Chief Complaint   Patient presents with    Post-Op Check     S/P Left above-knee amputation. 10/10/22, E/R 10/24/22 post-op seroma/bleeding Last seen in the office on 11/10/2022-staple removal          HPI:     HPI patient status post left AKA admitted to the emergency room as he had some drainage but that is cleared he is here for rest of staple removal    Past Medical History:   Diagnosis Date    CAD (coronary artery disease)     COPD (chronic obstructive pulmonary disease) ([address]     Diabetes mellitus ([address]     Hx of blood clots 1996    LEFT LEG    Hyperlipidemia     Hypertension     Leg wound, left     Leg wound, right     Lumbar radiculopathy     Osteoarthritis     Seizure disorder ([address]     Spinal stenosis, lumbar       Past Surgical History:   Procedure Laterality Date    ANKLE SURGERY      ball joint    [facility]  2005    1 STENT PLACED    CARDIAC CATHETERIZATION  5/9/13    [facility]    CORONARY ANGIOPLASTY WITH STENT PLACEMENT      DIAGNOSTIC CARDIAC CATH LAB PROCEDURE  11/18/2021    FOREARM SURGERY      left    FRACTURE SURGERY Right 2000    LEG    KNEE SURGERY      left    LEG SURGERY      LEG SURGERY Left 10/10/2022    Left  Above Knee Amputation performed by Berta Kwan MD at [facility], [place], operated by [facility]      left middle toe    TYMPANOSTOMY TUBE PLACEMENT  1979        Family History   Problem Relation Age of Onset    Diabetes Mother     Kidney Disease Mother     COPD Father     Heart Disease Father     Cancer Father         bone    Stroke Father     Osteoarthritis Sister     Kidney Disease Sister     Heart Disease Sister     Diabetes Brother         Social History Tobacco Use    Smoking status: Some Days     Packs/day: 0.50     Years: 40.00     Pack years: 20.00     Types: Cigarettes    Smokeless tobacco: Never   Substance Use Topics    Alcohol use: No     Alcohol/week: 0.0 standard drinks          Current Outpatient Medications   Medication Sig Dispense Refill    isosorbide mononitrate (IMDUR) 30 MG extended release tablet Take 0.5 tablets by mouth daily 30 tablet 3    amiodarone (CORDARONE) 200 MG tablet Take 1 tablet by mouth 2 times daily 60 tablet 0    metoprolol succinate (TOPROL XL) 50 MG extended release tablet Take 1.5 tablets by mouth in the morning and at bedtime 90 tablet 0    blood glucose monitor kit and supplies Dispense sufficient amount for indicated testing frequency plus additional to accommodate PRN testing needs. Dispense all needed supplies to include: monitor, strips, lancing device, lancets, control solutions, alcohol swabs. 1 kit 0    Insulin Pen Needle (PEN NEEDLES 3/16\") 31G X 5 MM MISC 1 each by Does not apply route daily 100 each 3    blood glucose monitor strips Test 3 times a day & as needed for symptoms of irregular blood glucose. Dispense sufficient amount for indicated testing frequency plus additional to accommodate PRN testing needs.  90 strip 0    Lancets MISC 1 each by Does not apply route 3 times daily 200 each 0    tamsulosin (FLOMAX) 0.4 MG capsule Take 1 capsule by mouth daily 30 capsule 3    apixaban (ELIQUIS) 5 MG TABS tablet Take 1 tablet by mouth 2 times daily 180 tablet 3    furosemide (LASIX) 20 MG tablet Take 1 tablet by mouth daily 60 tablet 3    atorvastatin (LIPITOR) 40 MG tablet Take 40 mg by mouth daily      metFORMIN (GLUCOPHAGE) 1000 MG tablet Take 1,000 mg by mouth 2 times daily (with meals)      dapagliflozin (FARXIGA) 10 MG tablet Take 1 tablet by mouth every morning 90 tablet 1    clopidogrel (PLAVIX) 75 MG tablet take 1 tablet by mouth once daily 30 tablet 5    albuterol sulfate HFA (PROAIR HFA) 108 (90 BASE) MCG/ACT inhaler Inhale 2 puffs into the lungs every 4 hours as needed for Wheezing 1 Inhaler 5    gabapentin (NEURONTIN) 100 MG capsule TAKE ONE CAPSULE BY MOUTH 3 TIMES A DAY 90 capsule 5    pantoprazole (PROTONIX) 40 MG tablet Take 1 tablet by mouth daily 30 tablet 5    traZODone (DESYREL) 50 MG tablet take 1 tablet by mouth at bedtime 30 tablet 3    nitroGLYCERIN (NITROSTAT) 0.4 MG SL tablet Place 1 tablet under the tongue every 5 minutes as needed for Chest pain 25 tablet 3    Respiratory Therapy Supplies (NEBULIZER COMPRESSOR) KIT by Does not apply route. 1 kit 0    acetaminophen (TYLENOL) 325 MG tablet Take 650 mg by mouth every 6 hours as needed. benzocaine-menthol (DERMOPLAST) 20-0.5 % AERO spray Apply topically as needed for Pain (Patient not taking: Reported on 12/13/2022) 3 each 0    insulin glargine (LANTUS SOLOSTAR) 100 UNIT/ML injection pen Inject 20 Units into the skin nightly (Patient not taking: Reported on 12/13/2022) 2 Adjustable Dose Pre-filled Pen Syringe 0    nicotine (NICODERM CQ) 21 MG/24HR Place 1 patch onto the skin every 24 hours (Patient not taking: Reported on 12/13/2022) 30 patch 3    naratriptan (AMERGE) 2.5 MG tablet Take 1 tablet by mouth once as needed for Migraine for up to 1 dose. 9 tablet 2    Elastic Bandages & Supports (LUMBAR BACK BRACE/SUPPORT PAD) MISC 1 each by Does not apply route daily as needed. 1 each 0    Tens Unit MISC by Does not apply route. Use as directed. (Patient not taking: Reported on 12/13/2022) 1 each 0     No current facility-administered medications for this visit.      Allergies   Allergen Reactions    Influenza Vaccines Anaphylaxis    Pneumococcal Vaccines        Subjective:      Review of Systems    Objective:   /74 (Site: Left Upper Arm, Position: Sitting, Cuff Size: Medium Adult)   Pulse 91   Temp 97.4 °F (36.3 °C)   Ht 6' 1\" (1.854 m)   SpO2 97%   BMI 27.44 kg/m²     Physical Exam wound is healing well staples removed       Results for orders placed or performed during the hospital encounter of 10/03/22   Culture, Blood 1    Specimen: Blood   Result Value Ref Range    Blood Culture, Routine       No growth 24 hours. No growth 48 hours. No growth at 5 days   Culture, Blood 2    Specimen: Blood   Result Value Ref Range    Blood Culture, Routine       No growth 24 hours. No growth 48 hours. No growth at 5 days   Culture, Anaerobic and Aerobic    Specimen: Leg   Result Value Ref Range    Aerobic Culture (A)      Culture also yielded heavy growth of gram positive bacilli most consistent with Corynebacterium species, and multiple colony types of enteric gram negative bacilli including swarming Proteus species. Broad spectrum empiric antibiotic therapy is indicated for this mixed infection. Anaerobic Culture (A)      Culture yielded heavy mixed anaerobic growth, including multiple colony types of gram negative bacilli, and gram positive cocci. If a true mixed aerobic and anaerobic infection is suspected, then broad spectrum empiric antibiotic therapy is indicated and should include coverage for anaerobic organisms. Gram Stain Result       Few segmented neutrophils observed. Rare epithelial cells observed. Many gram positive cocci occurring singly and in pairs. Many gram negative bacilli. Many gram positive bacilli. Organism Pseudomonas aeruginosa (A)     Aerobic Culture heavy growth     Organism Streptococcus agalactiae - (Group B) (A)     Aerobic Culture       moderate growth Group B streptococci are susceptible to ampicillin, penicillin and cefazolin, but may be erythromycin and/or clindamycin resistant. Organism Staphylococcus aureus (A)     Aerobic Culture       heavy growth In the treatment of gram positive infections, GENTAMICIN should be CONSIDERED a SYNERGYSTIC agent ONLY. Ciprofloxacin and Levofloxacin, regardless of in vitro sensitivity, should not be used for staphylococcal infections other than uncomplicated lower UTIs. Moxifloxacin, regardless of in vitro sensitivity, should not be used for staphylococcal infections.        Organism mixed anaerobic growth (A)        Susceptibility    Staphylococcus aureus - BACTERIAL SUSCEPTIBILITY PANEL BY HALLEY     gentamicin <=0.5 Sensitive mcg/mL     trimethoprim-sulfamethoxazole <=10 Sensitive mcg/mL     oxacillin 0.5 Sensitive mcg/mL     clindamycin <=0.25 Sensitive mcg/mL     tetracycline <=1 Sensitive mcg/mL    Pseudomonas aeruginosa - BACTERIAL SUSCEPTIBILITY PANEL BY HALLEY     cefepime <=1 Sensitive mcg/mL     gentamicin <=1 Sensitive mcg/mL     tobramycin <=1 Sensitive mcg/mL     piperacillin-tazobactam <=4 Sensitive mcg/mL     ciprofloxacin 0.5 Sensitive mcg/mL   Gastrointestinal Panel, Molecular    Specimen: Stool   Result Value Ref Range    Campylobacter PCR Not Detected Not Detected    Clostridium difficile, PCR Not Detected Not Detected    Plesiomonas Shigelloides PCR Not Detected Not Detected    Salmonella PCR Not Detected Not Detected    Vibrio PCR Not Detected Not Detected    Vibrio Cholerae PCR Not Detected Not Detected    Yersinia Enterocolitica PCR Not Detected Not Detected    E Coli Enteroaggregative PCR Not Detected Not Detected    E Coli Enteropathogenic PCR Detected (A) Not Detected    E Coli Enterotoxigenic PCR Not Detected Not Detected    E Coli Shiga Like Toxin PCR Not Detected Not Detected    E Coli O157 PCR NA Not Detected    E Coli Shigella/Enteroinvasive PCR Not Detected Not Detected    Cryptosporidium PCR Not Detected Not Detected    Cyclospora Cayetanensis PCR Not Detected Not Detected    E HISTOLYTICA GI FILM ARRAY Not Detected Not Detected    Giardia Lamblia PCR Not Detected Not Detected    Adenovirus F 40 39 PCR Not Detected Not Detected    Astrovirus PCR Not Detected Not Detected    Norovirus GI GII PCR Not Detected Not Detected    Rotavirus A PCR Not Detected Not Detected    Sapovirus PCR Not Detected Not Detected   Culture, Urine    Specimen: Urine   Result Value Ref Range    Urine Culture, Routine (A)      Current antibiotic therapy ineffective in vitro for isolate.     Organism Candida glabrata (A)     Urine Culture, Routine [place] count: >100,000 CFU/mL    CBC with Auto Differential   Result Value Ref Range    WBC 17.6 (H) 4.8 - 10.8 thou/mm3    RBC 4.97 4.70 - 6.10 mill/mm3    Hemoglobin 12.6 (L) 14.0 - 18.0 gm/dl    Hematocrit 40.1 (L) 42.0 - 52.0 %    MCV 80.7 80.0 - 94.0 fL    MCH 25.4 (L) 26.0 - 33.0 pg    MCHC 31.4 (L) 32.2 - 35.5 gm/dl    RDW-CV 18.4 (H) 11.5 - 14.5 %    RDW-SD 50.8 (H) 35.0 - 45.0 fL    Platelets 960 (H) 855 - 400 thou/mm3    MPV 9.3 (L) 9.4 - 12.4 fL    Seg Neutrophils 82.9 %    Lymphocytes 8.9 %    Monocytes 6.4 %    Eosinophils 0.2 %    Basophils 0.3 %    Immature Granulocytes 1.3 %    Segs Absolute 14.6 (H) 1.8 - 7.7 thou/mm3    Lymphocytes Absolute 1.6 1.0 - 4.8 thou/mm3    Monocytes Absolute 1.1 0.4 - 1.3 thou/mm3    Eosinophils Absolute 0.0 0.0 - 0.4 thou/mm3    Basophils Absolute 0.1 0.0 - 0.1 thou/mm3    Immature Grans (Abs) 0.23 (H) 0.00 - 0.07 thou/mm3    nRBC 0 /100 wbc   Basic Metabolic Panel w/ Reflex to MG   Result Value Ref Range    Sodium 140 135 - 145 meq/L    Potassium reflex Magnesium 3.3 (L) 3.5 - 5.2 meq/L    Chloride 99 98 - 111 meq/L    CO2 24 23 - 33 meq/L    Glucose 173 (H) 70 - 108 mg/dL    BUN 6 (L) 7 - 22 mg/dL    Creatinine 0.9 0.4 - 1.2 mg/dL    Calcium 9.1 8.5 - 10.5 mg/dL   Hepatic Function Panel   Result Value Ref Range    Albumin 2.5 (L) 3.5 - 5.1 g/dL    Total Bilirubin 0.3 0.3 - 1.2 mg/dL    Bilirubin, Direct <0.2 0.0 - 0.3 mg/dL    Alkaline Phosphatase 103 38 - 126 U/L    AST 13 5 - 40 U/L    ALT 11 11 - 66 U/L    Total Protein 7.6 6.1 - 8.0 g/dL   Sedimentation Rate   Result Value Ref Range    Sed Rate 90 (H) 0 - 10 mm/hr   C-Reactive Protein   Result Value Ref Range    CRP 26.66 (H) 0.00 - 1.00 mg/dl   Lactic Acid   Result Value Ref Range    Lactic Acid 3.6 (H) 0.5 - 2.0 mmol/L   Magnesium   Result Value Ref Range    Magnesium 1.6 1.6 - 2.4 mg/dL   Anion Gap   Result Value Ref Range    Anion Gap 17.0 (H) 8.0 - 16.0 meq/L   Glomerular Filtration Rate, Estimated   Result Value Ref Range    EstMerle Filt Rate 85 (A) IQ/EIJ/8.58X7   Basic Metabolic Panel w/ Reflex to MG   Result Value Ref Range    Sodium 139 135 - 145 meq/L    Potassium reflex Magnesium 3.2 (L) 3.5 - 5.2 meq/L    Chloride 100 98 - 111 meq/L    CO2 25 23 - 33 meq/L    Glucose 171 (H) 70 - 108 mg/dL    BUN 5 (L) 7 - 22 mg/dL    Creatinine 0.9 0.4 - 1.2 mg/dL    Calcium 8.3 (L) 8.5 - 10.5 mg/dL   CBC   Result Value Ref Range    WBC 18.2 (H) 4.8 - 10.8 thou/mm3    RBC 4.30 (L) 4.70 - 6.10 mill/mm3    Hemoglobin 10.8 (L) 14.0 - 18.0 gm/dl    Hematocrit 35.3 (L) 42.0 - 52.0 %    MCV 82.1 80.0 - 94.0 fL    MCH 25.1 (L) 26.0 - 33.0 pg    MCHC 30.6 (L) 32.2 - 35.5 gm/dl    RDW-CV 17.6 (H) 11.5 - 14.5 %    RDW-SD 51.0 (H) 35.0 - 45.0 fL    Platelets 233 (H) 951 - 400 thou/mm3    MPV 9.5 9.4 - 12.4 fL   Hemoglobin A1C   Result Value Ref Range    Hemoglobin A1C 9.2 (H) 4.4 - 6.4 %    AVERAGE GLUCOSE 216 (H) 70 - 126 mg/dL   Lactic Acid   Result Value Ref Range    Lactic Acid 2.1 (H) 0.5 - 2.0 mmol/L   Iron binding capacity   Result Value Ref Range    TIBC 213 171 - 450 ug/dL   IRON SATURATION   Result Value Ref Range    Iron Saturation 9 (L) 20 - 50 %   Iron   Result Value Ref Range    Iron 20 (L) 65 - 195 ug/dL   Ferritin   Result Value Ref Range    Ferritin 128 22 - 322 ng/mL   Anion Gap   Result Value Ref Range    Anion Gap 14.0 8.0 - 16.0 meq/L   Glomerular Filtration Rate, Estimated   Result Value Ref Range    Est, Glom Filt Rate 85 >60 ml/min/1.73m2   Magnesium   Result Value Ref Range    Magnesium 1.7 1.6 - 2.4 mg/dL   Protime-INR   Result Value Ref Range    INR 1.47 (H) 0.85 - 1.13   Lactic Acid   Result Value Ref Range    Lactic Acid 2.7 (H) 0.5 - 2.0 mmol/L   Lactic Acid   Result Value Ref Range    Lactic Acid 1.8 0.5 - 2.0 mmol/L   Lactic Acid Result Value Ref Range    Lactic Acid 1.7 0.5 - 2.0 mmol/L   Magnesium   Result Value Ref Range    Magnesium 1.7 1.6 - 2.4 mg/dL   Vancomycin Level, Random   Result Value Ref Range    Vancomycin Rm 7.8 0.1 - 39.9 ug/mL   Basic Metabolic Panel   Result Value Ref Range    Sodium 139 135 - 145 meq/L    Potassium 3.7 3.5 - 5.2 meq/L    Chloride 102 98 - 111 meq/L    CO2 23 23 - 33 meq/L    Glucose 171 (H) 70 - 108 mg/dL    BUN 7 7 - 22 mg/dL    Creatinine 0.7 0.4 - 1.2 mg/dL    Calcium 8.1 (L) 8.5 - 10.5 mg/dL   CBC   Result Value Ref Range    WBC 13.9 (H) 4.8 - 10.8 thou/mm3    RBC 4.25 (L) 4.70 - 6.10 mill/mm3    Hemoglobin 10.7 (L) 14.0 - 18.0 gm/dl    Hematocrit 35.2 (L) 42.0 - 52.0 %    MCV 82.8 80.0 - 94.0 fL    MCH 25.2 (L) 26.0 - 33.0 pg    MCHC 30.4 (L) 32.2 - 35.5 gm/dl    RDW-CV 17.9 (H) 11.5 - 14.5 %    RDW-SD 52.6 (H) 35.0 - 45.0 fL    Platelets 267 (H) 021 - 400 thou/mm3    MPV 9.5 9.4 - 12.4 fL   Anion Gap   Result Value Ref Range    Anion Gap 14.0 8.0 - 16.0 meq/L   Glomerular Filtration Rate, Estimated   Result Value Ref Range    Est, Glom Filt Rate >90 FA/RIG/0.58U0   Basic Metabolic Panel   Result Value Ref Range    Sodium 137 135 - 145 meq/L    Potassium 3.3 (L) 3.5 - 5.2 meq/L    Chloride 100 98 - 111 meq/L    CO2 22 (L) 23 - 33 meq/L    Glucose 163 (H) 70 - 108 mg/dL    BUN 5 (L) 7 - 22 mg/dL    Creatinine 0.8 0.4 - 1.2 mg/dL    Calcium 8.3 (L) 8.5 - 10.5 mg/dL   CBC   Result Value Ref Range    WBC 12.9 (H) 4.8 - 10.8 thou/mm3    RBC 4.29 (L) 4.70 - 6.10 mill/mm3    Hemoglobin 11.0 (L) 14.0 - 18.0 gm/dl    Hematocrit 35.4 (L) 42.0 - 52.0 %    MCV 82.5 80.0 - 94.0 fL    MCH 25.6 (L) 26.0 - 33.0 pg    MCHC 31.1 (L) 32.2 - 35.5 gm/dl    RDW-CV 17.9 (H) 11.5 - 14.5 %    RDW-SD 53.4 (H) 35.0 - 45.0 fL    Platelets 247 (H) 229 - 400 thou/mm3    MPV 9.6 9.4 - 12.4 fL   Anion Gap   Result Value Ref Range    Anion Gap 15.0 8.0 - 16.0 meq/L   Glomerular Filtration Rate, Estimated   Result Value Ref Range Est, Glom Filt Rate >90 ml/min/1.73m2   Urine with Reflexed Micro   Result Value Ref Range    Glucose, Ur >= 1000 (A) NEGATIVE mg/dl    Bilirubin Urine NEGATIVE NEGATIVE    Ketones, Urine TRACE (A) NEGATIVE    Specific Gravity, Urine 1.025 1.002 - 1.030    Blood, Urine NEGATIVE NEGATIVE    pH, UA 6.5 5.0 - 9.0    Protein, UA TRACE (A) NEGATIVE    Urobilinogen, Urine 0.2 0.0 - 1.0 eu/dl    Nitrite, Urine NEGATIVE NEGATIVE    Leukocyte Esterase, Urine NEGATIVE NEGATIVE    Color, UA YELLOW STRAW-YELLOW    Character, Urine CLEAR CLEAR-SL CLOUD    RBC, UA 3-5 0-2/hpf /hpf    WBC, UA 0-2 0-4/hpf /hpf    Epithelial Cells, UA 0-2 3-5/hpf /hpf    Bacteria, UA NONE SEEN FEW/NONE SEEN /hpf    Casts UA NONE SEEN NONE SEEN /lpf    Crystals, UA NONE SEEN NONE SEEN    Renal Epithelial, UA NONE SEEN NONE SEEN    Yeast, UA NONE SEEN NONE SEEN    CASTS 2 NONE SEEN NONE SEEN /lpf    MISCELLANEOUS 2 NONE SEEN    Vancomycin Level, Random   Result Value Ref Range    Vancomycin Rm 21.5 0.1 - 39.9 ug/mL   Basic Metabolic Panel   Result Value Ref Range    Sodium 136 135 - 145 meq/L    Potassium 3.5 3.5 - 5.2 meq/L    Chloride 102 98 - 111 meq/L    CO2 22 (L) 23 - 33 meq/L    Glucose 163 (H) 70 - 108 mg/dL    BUN 6 (L) 7 - 22 mg/dL    Creatinine 0.7 0.4 - 1.2 mg/dL    Calcium 8.0 (L) 8.5 - 10.5 mg/dL   CBC   Result Value Ref Range    WBC 11.2 (H) 4.8 - 10.8 thou/mm3    RBC 4.25 (L) 4.70 - 6.10 mill/mm3    Hemoglobin 10.6 (L) 14.0 - 18.0 gm/dl    Hematocrit 35.3 (L) 42.0 - 52.0 %    MCV 83.1 80.0 - 94.0 fL    MCH 24.9 (L) 26.0 - 33.0 pg    MCHC 30.0 (L) 32.2 - 35.5 gm/dl    RDW-CV 17.9 (H) 11.5 - 14.5 %    RDW-SD 52.7 (H) 35.0 - 45.0 fL    Platelets 242 (H) 303 - 400 thou/mm3    MPV 9.6 9.4 - 12.4 fL   Anion Gap   Result Value Ref Range    Anion Gap 12.0 8.0 - 16.0 meq/L   Glomerular Filtration Rate, Estimated   Result Value Ref Range    Est, Glom Filt Rate >90 QW/NJN/6.39Z2   Basic Metabolic Panel   Result Value Ref Range    Sodium 136 135 - 145 meq/L    Potassium 3.8 3.5 - 5.2 meq/L    Chloride 102 98 - 111 meq/L    CO2 23 23 - 33 meq/L    Glucose 164 (H) 70 - 108 mg/dL    BUN 5 (L) 7 - 22 mg/dL    Creatinine 0.7 0.4 - 1.2 mg/dL    Calcium 8.0 (L) 8.5 - 10.5 mg/dL   Magnesium   Result Value Ref Range    Magnesium 2.0 1.6 - 2.4 mg/dL   Anion Gap   Result Value Ref Range    Anion Gap 11.0 8.0 - 16.0 meq/L   Glomerular Filtration Rate, Estimated   Result Value Ref Range    Est, Glom Filt Rate >90 ml/min/1.73m2   CBC   Result Value Ref Range    WBC 13.5 (H) 4.8 - 10.8 thou/mm3    RBC 4.13 (L) 4.70 - 6.10 mill/mm3    Hemoglobin 10.3 (L) 14.0 - 18.0 gm/dl    Hematocrit 34.7 (L) 42.0 - 52.0 %    MCV 84.0 80.0 - 94.0 fL    MCH 24.9 (L) 26.0 - 33.0 pg    MCHC 29.7 (L) 32.2 - 35.5 gm/dl    RDW-CV 17.9 (H) 11.5 - 14.5 %    RDW-SD 54.0 (H) 35.0 - 45.0 fL    Platelets 101 (H) 099 - 400 thou/mm3    MPV 9.8 9.4 - 12.4 fL   Vancomycin Level, Random   Result Value Ref Range    Vancomycin Rm 26.2 0.1 - 39.9 ug/mL   CBC   Result Value Ref Range    WBC 15.0 (H) 4.8 - 10.8 thou/mm3    RBC 3.85 (L) 4.70 - 6.10 mill/mm3    Hemoglobin 9.6 (L) 14.0 - 18.0 gm/dl    Hematocrit 32.7 (L) 42.0 - 52.0 %    MCV 84.9 80.0 - 94.0 fL    MCH 24.9 (L) 26.0 - 33.0 pg    MCHC 29.4 (L) 32.2 - 35.5 gm/dl    RDW-CV 18.1 (H) 11.5 - 14.5 %    RDW-SD 55.1 (H) 35.0 - 45.0 fL    Platelets 240 (H) 718 - 400 thou/mm3    MPV 9.4 9.4 - 12.4 fL   Basic Metabolic Panel w/ Reflex to MG   Result Value Ref Range    Sodium 134 (L) 135 - 145 meq/L    Potassium reflex Magnesium 4.6 3.5 - 5.2 meq/L    Chloride 99 98 - 111 meq/L    CO2 27 23 - 33 meq/L    Glucose 242 (H) 70 - 108 mg/dL    BUN 6 (L) 7 - 22 mg/dL    Creatinine 0.8 0.4 - 1.2 mg/dL    Calcium 8.3 (L) 8.5 - 10.5 mg/dL   Anion Gap   Result Value Ref Range    Anion Gap 8.0 8.0 - 16.0 meq/L   Glomerular Filtration Rate, Estimated   Result Value Ref Range    Est, Glom Filt Rate >90 ml/min/1.73m2   CBC   Result Value Ref Range    WBC 11.5 (H) 4.8 - 10.8 thou/mm3    RBC 3.92 (L) 4.70 - 6.10 mill/mm3    Hemoglobin 9.9 (L) 14.0 - 18.0 gm/dl    Hematocrit 32.8 (L) 42.0 - 52.0 %    MCV 83.7 80.0 - 94.0 fL    MCH 25.3 (L) 26.0 - 33.0 pg    MCHC 30.2 (L) 32.2 - 35.5 gm/dl    RDW-CV 18.4 (H) 11.5 - 14.5 %    RDW-SD 55.1 (H) 35.0 - 45.0 fL    Platelets 327 (H) 572 - 400 thou/mm3    MPV 9.7 9.4 - 12.4 fL   Basic Metabolic Panel w/ Reflex to MG   Result Value Ref Range    Sodium 139 135 - 145 meq/L    Potassium reflex Magnesium 4.9 3.5 - 5.2 meq/L    Chloride 100 98 - 111 meq/L    CO2 27 23 - 33 meq/L    Glucose 159 (H) 70 - 108 mg/dL    BUN 7 7 - 22 mg/dL    Creatinine 0.7 0.4 - 1.2 mg/dL    Calcium 8.1 (L) 8.5 - 10.5 mg/dL   Anion Gap   Result Value Ref Range    Anion Gap 12.0 8.0 - 16.0 meq/L   Glomerular Filtration Rate, Estimated   Result Value Ref Range    Est, Glom Filt Rate >90 ml/min/1.73m2   CBC   Result Value Ref Range    WBC 10.7 4.8 - 10.8 thou/mm3    RBC 4.14 (L) 4.70 - 6.10 mill/mm3    Hemoglobin 10.3 (L) 14.0 - 18.0 gm/dl    Hematocrit 34.3 (L) 42.0 - 52.0 %    MCV 82.9 80.0 - 94.0 fL    MCH 24.9 (L) 26.0 - 33.0 pg    MCHC 30.0 (L) 32.2 - 35.5 gm/dl    RDW-CV 18.0 (H) 11.5 - 14.5 %    RDW-SD 53.4 (H) 35.0 - 45.0 fL    Platelets 464 (H) 504 - 400 thou/mm3    MPV 9.5 9.4 - 12.4 fL   Basic Metabolic Panel w/ Reflex to MG   Result Value Ref Range    Sodium 137 135 - 145 meq/L    Potassium reflex Magnesium 4.3 3.5 - 5.2 meq/L    Chloride 100 98 - 111 meq/L    CO2 27 23 - 33 meq/L    Glucose 192 (H) 70 - 108 mg/dL    BUN 9 7 - 22 mg/dL    Creatinine 0.9 0.4 - 1.2 mg/dL    Calcium 8.7 8.5 - 10.5 mg/dL   Anion Gap   Result Value Ref Range    Anion Gap 10.0 8.0 - 16.0 meq/L   Glomerular Filtration Rate, Estimated   Result Value Ref Range    Est, Glom Filt Rate 85 >60 ml/min/1.73m2   POCT glucose   Result Value Ref Range    Glucose 202 mg/dL    QC OK?  yes    POCT Glucose   Result Value Ref Range    POC Glucose 202 (H) 70 - 108 mg/dl   POCT glucose   Result Value Ref Range    POC Glucose 330 (H) 70 - 108 mg/dl   POCT glucose   Result Value Ref Range    POC Glucose 212 (H) 70 - 108 mg/dl   POCT glucose   Result Value Ref Range    POC Glucose 243 (H) 70 - 108 mg/dl   POCT glucose   Result Value Ref Range    POC Glucose 342 (H) 70 - 108 mg/dl   POCT glucose   Result Value Ref Range    POC Glucose 261 (H) 70 - 108 mg/dl   POCT glucose   Result Value Ref Range    POC Glucose 203 (H) 70 - 108 mg/dl   POCT glucose   Result Value Ref Range    POC Glucose 210 (H) 70 - 108 mg/dl   POCT glucose   Result Value Ref Range    POC Glucose 246 (H) 70 - 108 mg/dl   POCT glucose   Result Value Ref Range    POC Glucose 372 (H) 70 - 108 mg/dl   POCT glucose   Result Value Ref Range    POC Glucose 343 (H) 70 - 108 mg/dl   POCT glucose   Result Value Ref Range    POC Glucose 336 (H) 70 - 108 mg/dl   POCT glucose   Result Value Ref Range    POC Glucose 323 (H) 70 - 108 mg/dl   POCT glucose   Result Value Ref Range    POC Glucose 150 (H) 70 - 108 mg/dl   POCT glucose   Result Value Ref Range    POC Glucose 233 (H) 70 - 108 mg/dl   POCT glucose   Result Value Ref Range    POC Glucose 221 (H) 70 - 108 mg/dl   POCT glucose   Result Value Ref Range    POC Glucose 206 (H) 70 - 108 mg/dl   POCT glucose   Result Value Ref Range    POC Glucose 201 (H) 70 - 108 mg/dl   POCT glucose   Result Value Ref Range    POC Glucose 225 (H) 70 - 108 mg/dl   POCT glucose   Result Value Ref Range    POC Glucose 253 (H) 70 - 108 mg/dl   POCT glucose   Result Value Ref Range    POC Glucose 177 (H) 70 - 108 mg/dl   POCT glucose   Result Value Ref Range    POC Glucose 256 (H) 70 - 108 mg/dl   POCT glucose   Result Value Ref Range    POC Glucose 198 (H) 70 - 108 mg/dl   POCT glucose   Result Value Ref Range    POC Glucose 217 (H) 70 - 108 mg/dl   POCT glucose   Result Value Ref Range    POC Glucose 238 (H) 70 - 108 mg/dl   POCT glucose   Result Value Ref Range    POC Glucose 203 (H) 70 - 108 mg/dl   POCT glucose   Result Value Ref Range    POC Glucose 207 (H) 70 - 108 mg/dl   POCT glucose   Result Value Ref Range    POC Glucose 416 (H) 70 - 108 mg/dl   POCT glucose   Result Value Ref Range    POC Glucose 460 (H) 70 - 108 mg/dl   POCT glucose   Result Value Ref Range    POC Glucose 324 (H) 70 - 108 mg/dl   POCT glucose   Result Value Ref Range    POC Glucose 199 (H) 70 - 108 mg/dl   POCT glucose   Result Value Ref Range    POC Glucose 215 (H) 70 - 108 mg/dl   POCT glucose   Result Value Ref Range    POC Glucose 320 (H) 70 - 108 mg/dl   POCT glucose   Result Value Ref Range    POC Glucose 165 (H) 70 - 108 mg/dl   POCT glucose   Result Value Ref Range    POC Glucose 224 (H) 70 - 108 mg/dl   POCT glucose   Result Value Ref Range    POC Glucose 231 (H) 70 - 108 mg/dl   POCT glucose   Result Value Ref Range    POC Glucose 253 (H) 70 - 108 mg/dl   POCT glucose   Result Value Ref Range    POC Glucose 216 (H) 70 - 108 mg/dl   POCT glucose   Result Value Ref Range    POC Glucose 188 (H) 70 - 108 mg/dl   POCT glucose   Result Value Ref Range    POC Glucose 201 (H) 70 - 108 mg/dl   POCT glucose   Result Value Ref Range    POC Glucose 150 (H) 70 - 108 mg/dl   EKG 12 Lead   Result Value Ref Range    Ventricular Rate 114 BPM    QRS Duration 88 ms    Q-T Interval 292 ms    QTc Calculation (Bazett) 402 ms    R Axis 71 degrees    T Axis 130 degrees       Assessment:     Stump is healing well rest of staples are now removed will refer to appliance for placement of a sure wrap stump shaper    Plan:     Turn to office in 1 month for recheck      Electronicallysigned by Sierra Huddleston MD on 12/13/2022 at 1:25 PM

## 2023-01-20 RX ORDER — FUROSEMIDE 20 MG/1
TABLET ORAL
Qty: 30 TABLET | Refills: 0 | Status: SHIPPED | OUTPATIENT
Start: 2023-01-20

## 2023-02-09 ENCOUNTER — HOSPITAL ENCOUNTER (EMERGENCY)
Age: 68
Discharge: HOME | End: 2023-02-09
Attending: EMERGENCY MEDICINE
Payer: COMMERCIAL

## 2023-02-09 ENCOUNTER — APPOINTMENT (OUTPATIENT)
Dept: GENERAL RADIOLOGY | Age: 68
End: 2023-02-09
Payer: COMMERCIAL

## 2023-02-09 VITALS
DIASTOLIC BLOOD PRESSURE: 78 MMHG | SYSTOLIC BLOOD PRESSURE: 146 MMHG | BODY MASS INDEX: 29.16 KG/M2 | HEIGHT: 73 IN | RESPIRATION RATE: 20 BRPM | HEART RATE: 88 BPM | OXYGEN SATURATION: 96 % | WEIGHT: 220 LBS | TEMPERATURE: 98.2 F

## 2023-02-09 DIAGNOSIS — J44.1 COPD EXACERBATION (HCC): Primary | ICD-10-CM

## 2023-02-09 DIAGNOSIS — U07.1 COVID-19: ICD-10-CM

## 2023-02-09 LAB
ALBUMIN SERPL BCG-MCNC: 4.1 G/DL (ref 3.5–5.1)
ALP SERPL-CCNC: 111 U/L (ref 38–126)
ALT SERPL W/O P-5'-P-CCNC: 7 U/L (ref 11–66)
ANION GAP SERPL CALC-SCNC: 17 MEQ/L (ref 8–16)
AST SERPL-CCNC: 12 U/L (ref 5–40)
BASOPHILS ABSOLUTE: 0 THOU/MM3 (ref 0–0.1)
BASOPHILS NFR BLD AUTO: 0.3 %
BILIRUB SERPL-MCNC: 0.3 MG/DL (ref 0.3–1.2)
BUN SERPL-MCNC: 7 MG/DL (ref 7–22)
CALCIUM SERPL-MCNC: 9.4 MG/DL (ref 8.5–10.5)
CHLORIDE SERPL-SCNC: 99 MEQ/L (ref 98–111)
CO2 SERPL-SCNC: 24 MEQ/L (ref 23–33)
CREAT SERPL-MCNC: 0.9 MG/DL (ref 0.4–1.2)
DEPRECATED RDW RBC AUTO: 42.7 FL (ref 35–45)
EOSINOPHIL NFR BLD AUTO: 0.4 %
EOSINOPHILS ABSOLUTE: 0.1 THOU/MM3 (ref 0–0.4)
ERYTHROCYTE [DISTWIDTH] IN BLOOD BY AUTOMATED COUNT: 16.3 % (ref 11.5–14.5)
FLUAV RNA RESP QL NAA+PROBE: NOT DETECTED
FLUBV RNA RESP QL NAA+PROBE: NOT DETECTED
GFR SERPL CREATININE-BSD FRML MDRD: > 60 ML/MIN/1.73M2
GLUCOSE SERPL-MCNC: 127 MG/DL (ref 70–108)
HCT VFR BLD AUTO: 42.2 % (ref 42–52)
HGB BLD-MCNC: 13.2 GM/DL (ref 14–18)
IMM GRANULOCYTES # BLD AUTO: 0.05 THOU/MM3 (ref 0–0.07)
IMM GRANULOCYTES NFR BLD AUTO: 0.4 %
LYMPHOCYTES ABSOLUTE: 2.8 THOU/MM3 (ref 1–4.8)
LYMPHOCYTES NFR BLD AUTO: 20.2 %
MAGNESIUM SERPL-MCNC: 1.3 MG/DL (ref 1.6–2.4)
MCH RBC QN AUTO: 23.3 PG (ref 26–33)
MCHC RBC AUTO-ENTMCNC: 31.3 GM/DL (ref 32.2–35.5)
MCV RBC AUTO: 74.6 FL (ref 80–94)
MONOCYTES ABSOLUTE: 0.7 THOU/MM3 (ref 0.4–1.3)
MONOCYTES NFR BLD AUTO: 5.2 %
NEUTROPHILS NFR BLD AUTO: 73.5 %
NRBC BLD AUTO-RTO: 0 /100 WBC
NT-PROBNP SERPL IA-MCNC: 2958 PG/ML (ref 0–124)
OSMOLALITY SERPL CALC.SUM OF ELEC: 279 MOSMOL/KG (ref 275–300)
PLATELET # BLD AUTO: 363 THOU/MM3 (ref 130–400)
PMV BLD AUTO: 10.2 FL (ref 9.4–12.4)
POTASSIUM SERPL-SCNC: 3.4 MEQ/L (ref 3.5–5.2)
PROT SERPL-MCNC: 8.9 G/DL (ref 6.1–8)
RBC # BLD AUTO: 5.66 MILL/MM3 (ref 4.7–6.1)
SARS-COV-2 RNA RESP QL NAA+PROBE: DETECTED
SEGMENTED NEUTROPHILS ABSOLUTE COUNT: 10.2 THOU/MM3 (ref 1.8–7.7)
SODIUM SERPL-SCNC: 140 MEQ/L (ref 135–145)
TROPONIN T: < 0.01 NG/ML
WBC # BLD AUTO: 13.9 THOU/MM3 (ref 4.8–10.8)

## 2023-02-09 PROCEDURE — 87636 SARSCOV2 & INF A&B AMP PRB: CPT

## 2023-02-09 PROCEDURE — 6360000002 HC RX W HCPCS: Performed by: STUDENT IN AN ORGANIZED HEALTH CARE EDUCATION/TRAINING PROGRAM

## 2023-02-09 PROCEDURE — 84484 ASSAY OF TROPONIN QUANT: CPT

## 2023-02-09 PROCEDURE — 85025 COMPLETE CBC W/AUTO DIFF WBC: CPT

## 2023-02-09 PROCEDURE — 94640 AIRWAY INHALATION TREATMENT: CPT

## 2023-02-09 PROCEDURE — 93005 ELECTROCARDIOGRAM TRACING: CPT | Performed by: EMERGENCY MEDICINE

## 2023-02-09 PROCEDURE — 94761 N-INVAS EAR/PLS OXIMETRY MLT: CPT

## 2023-02-09 PROCEDURE — 83735 ASSAY OF MAGNESIUM: CPT

## 2023-02-09 PROCEDURE — 71045 X-RAY EXAM CHEST 1 VIEW: CPT

## 2023-02-09 PROCEDURE — 83880 ASSAY OF NATRIURETIC PEPTIDE: CPT

## 2023-02-09 PROCEDURE — 36415 COLL VENOUS BLD VENIPUNCTURE: CPT

## 2023-02-09 PROCEDURE — 99285 EMERGENCY DEPT VISIT HI MDM: CPT

## 2023-02-09 PROCEDURE — 80053 COMPREHEN METABOLIC PANEL: CPT

## 2023-02-09 RX ORDER — NICOTINE 21 MG/24HR
1 PATCH, TRANSDERMAL 24 HOURS TRANSDERMAL DAILY
Status: DISCONTINUED | OUTPATIENT
Start: 2023-02-09 | End: 2023-02-09 | Stop reason: HOSPADM

## 2023-02-09 RX ADMIN — ALBUTEROL SULFATE 2.5 MG: 2.5 SOLUTION RESPIRATORY (INHALATION) at 20:58

## 2023-02-09 RX ADMIN — IPRATROPIUM BROMIDE 0.5 MG: 0.5 SOLUTION RESPIRATORY (INHALATION) at 20:58

## 2023-02-09 ASSESSMENT — PAIN - FUNCTIONAL ASSESSMENT
PAIN_FUNCTIONAL_ASSESSMENT: NONE - DENIES PAIN
PAIN_FUNCTIONAL_ASSESSMENT: NONE - DENIES PAIN

## 2023-02-10 LAB
EKG ATRIAL RATE: 86 BPM
EKG P AXIS: 90 DEGREES
EKG P-R INTERVAL: 190 MS
EKG Q-T INTERVAL: 400 MS
EKG QRS DURATION: 78 MS
EKG QTC CALCULATION (BAZETT): 478 MS
EKG R AXIS: 67 DEGREES
EKG T AXIS: 82 DEGREES
EKG VENTRICULAR RATE: 86 BPM

## 2023-02-10 PROCEDURE — 93010 ELECTROCARDIOGRAM REPORT: CPT | Performed by: INTERNAL MEDICINE

## 2023-02-10 NOTE — ED NOTES
Pt resting in bed, respiratory at bedside for breathing treatments. Pt does not want Nicotine patch at this time. No other needs or concerns expressed.  Lonnie Colon  02/09/23 [id number]

## 2023-02-10 NOTE — ED TRIAGE NOTES
Pt presents to the ER with c/o SOB that started this morning. Pt states he used his inhalers at home which \"helped some. \" Pt denies any pain. Pt has hx of COPD and asthma. Upon arrival, pt is 95% on RA, no respiratory distress noted.  Pt is alert and oriented, VSS

## 2023-02-10 NOTE — ED PROVIDER NOTES
[facility],[address] DEPT  EMERGENCY DEPARTMENT ENCOUNTER          Pt Name: Roberto Peterson  MRN: [id number]  [place] 1955  Date of evaluation: 2/9/2023  Resident Physician: Lacey Aguila MD  Attending Physician: Bry Alaniz DO        CHIEF COMPLAINT       Chief Complaint   Patient presents with    Shortness of Breath         HISTORY OF PRESENT ILLNESS    HPI  Roberto Peterson is a 74 y.o. male with past medical history of COPD who presents to the emergency department from home, brought in by EMS for evaluation of shortness of breath. Patient reports a few hours ago he felt like he was having a COPD exacerbation. States that he could not catch his breath. Reports that he uses inhaler with moderate improvement but before EMS arrived. Denies fevers, productive cough, chest pain, leg swelling. Patient is on Eliquis  The patient has no other acute complaints at this time.       PAST MEDICAL AND SURGICAL HISTORY     Past Medical History:   Diagnosis Date    CAD (coronary artery disease)     COPD (chronic obstructive pulmonary disease) ([address]     Diabetes mellitus ([address]     Hx of blood clots 1996    LEFT LEG    Hyperlipidemia     Hypertension     Leg wound, left     Leg wound, right     Lumbar radiculopathy     Osteoarthritis     Seizure disorder ([address]     Spinal stenosis, lumbar      Past Surgical History:   Procedure Laterality Date    ANKLE SURGERY      ball joint    BACK SURGERY  1994    CARDIAC CATHETERIZATION  2005    1 STENT PLACED    CARDIAC CATHETERIZATION  5/9/13    [facility]    CORONARY ANGIOPLASTY WITH STENT PLACEMENT      DIAGNOSTIC CARDIAC CATH LAB PROCEDURE  11/18/2021    FOREARM SURGERY      left    FRACTURE SURGERY Right 2000    LEG    KNEE SURGERY      left    LEG SURGERY      LEG SURGERY Left 10/10/2022    Left  Above Knee Amputation performed by Won Kothari MD at [facility]      left middle toe    TYMPANOSTOMY [facility] MEDICATIONS     Current Facility-Administered Medications:     nicotine (NICODERM CQ) 21 MG/24HR 1 patch, 1 patch, TransDERmal, Daily, Junior Angelic MD    albuterol (PROVENTIL) nebulizer solution 2.5 mg, 2.5 mg, Nebulization, Once, Junior Angelic MD    ipratropium (ATROVENT) 0.02 % nebulizer solution 0.5 mg, 0.5 mg, Nebulization, Once, Junior Angelic MD    mometasone-formoterol (DULERA) 100-5 MCG/ACT inhaler 2 puff, 2 puff, Inhalation, BID, Junior Angelic MD    Current Outpatient Medications:     furosemide (LASIX) 20 MG tablet, take 1 tablet by mouth once daily, Disp: 30 tablet, Rfl: 0    isosorbide mononitrate (IMDUR) 30 MG extended release tablet, Take 0.5 tablets by mouth daily, Disp: 30 tablet, Rfl: 3    amiodarone (CORDARONE) 200 MG tablet, Take 1 tablet by mouth 2 times daily, Disp: 60 tablet, Rfl: 0    metoprolol succinate (TOPROL XL) 50 MG extended release tablet, Take 1.5 tablets by mouth in the morning and at bedtime, Disp: 90 tablet, Rfl: 0    benzocaine-menthol (DERMOPLAST) 20-0.5 % AERO spray, Apply topically as needed for Pain (Patient not taking: Reported on 12/13/2022), Disp: 3 each, Rfl: 0    insulin glargine (LANTUS SOLOSTAR) 100 UNIT/ML injection pen, Inject 20 Units into the skin nightly (Patient not taking: Reported on 12/13/2022), Disp: 2 Adjustable Dose Pre-filled Pen Syringe, Rfl: 0    blood glucose monitor kit and supplies, Dispense sufficient amount for indicated testing frequency plus additional to accommodate PRN testing needs. Dispense all needed supplies to include: monitor, strips, lancing device, lancets, control solutions, alcohol swabs., Disp: 1 kit, Rfl: 0    Insulin Pen Needle (PEN NEEDLES 3/16\") 31G X 5 MM MISC, 1 each by Does not apply route daily, Disp: 100 each, Rfl: 3    blood glucose monitor strips, Test 3 times a day & as needed for symptoms of irregular blood glucose.  Dispense sufficient amount for indicated testing frequency plus additional to accommodate PRN testing needs. , Disp: 90 strip, Rfl: 0    Lancets MISC, 1 each by Does not apply route 3 times daily, Disp: 200 each, Rfl: 0    nicotine (NICODERM CQ) 21 MG/24HR, Place 1 patch onto the skin every 24 hours (Patient not taking: Reported on 12/13/2022), Disp: 30 patch, Rfl: 3    tamsulosin (FLOMAX) 0.4 MG capsule, Take 1 capsule by mouth daily, Disp: 30 capsule, Rfl: 3    apixaban (ELIQUIS) 5 MG TABS tablet, Take 1 tablet by mouth 2 times daily, Disp: 180 tablet, Rfl: 3    atorvastatin (LIPITOR) 40 MG tablet, Take 40 mg by mouth daily, Disp: , Rfl:     metFORMIN (GLUCOPHAGE) 1000 MG tablet, Take 1,000 mg by mouth 2 times daily (with meals), Disp: , Rfl:     dapagliflozin (FARXIGA) 10 MG tablet, Take 1 tablet by mouth every morning, Disp: 90 tablet, Rfl: 1    clopidogrel (PLAVIX) 75 MG tablet, take 1 tablet by mouth once daily, Disp: 30 tablet, Rfl: 5    albuterol sulfate HFA (PROAIR HFA) 108 (90 BASE) MCG/ACT inhaler, Inhale 2 puffs into the lungs every 4 hours as needed for Wheezing, Disp: 1 Inhaler, Rfl: 5    gabapentin (NEURONTIN) 100 MG capsule, TAKE ONE CAPSULE BY MOUTH 3 TIMES A DAY, Disp: 90 capsule, Rfl: 5    pantoprazole (PROTONIX) 40 MG tablet, Take 1 tablet by mouth daily, Disp: 30 tablet, Rfl: 5    traZODone (DESYREL) 50 MG tablet, take 1 tablet by mouth at bedtime, Disp: 30 tablet, Rfl: 3    nitroGLYCERIN (NITROSTAT) 0.4 MG SL tablet, Place 1 tablet under the tongue every 5 minutes as needed for Chest pain, Disp: 25 tablet, Rfl: 3    naratriptan (AMERGE) 2.5 MG tablet, Take 1 tablet by mouth once as needed for Migraine for up to 1 dose., Disp: 9 tablet, Rfl: 2    Elastic Bandages & Supports (LUMBAR BACK BRACE/SUPPORT PAD) MISC, 1 each by Does not apply route daily as needed. , Disp: 1 each, Rfl: 0    Tens Unit MISC, by Does not apply route. Use as directed.  (Patient not taking: Reported on 12/13/2022), Disp: 1 each, Rfl: 0    Respiratory Therapy Supplies (NEBULIZER COMPRESSOR) KIT, by Does not apply route., Disp: 1 kit, Rfl: 0    acetaminophen (TYLENOL) 325 MG tablet, Take 650 mg by mouth every 6 hours as needed. , Disp: , Rfl:     Previous Medications    ACETAMINOPHEN (TYLENOL) 325 MG TABLET    Take 650 mg by mouth every 6 hours as needed. ALBUTEROL SULFATE HFA (PROAIR HFA) 108 (90 BASE) MCG/ACT INHALER    Inhale 2 puffs into the lungs every 4 hours as needed for Wheezing    AMIODARONE (CORDARONE) 200 MG TABLET    Take 1 tablet by mouth 2 times daily    APIXABAN (ELIQUIS) 5 MG TABS TABLET    Take 1 tablet by mouth 2 times daily    ATORVASTATIN (LIPITOR) 40 MG TABLET    Take 40 mg by mouth daily    BENZOCAINE-MENTHOL (DERMOPLAST) 20-0.5 % AERO SPRAY    Apply topically as needed for Pain    BLOOD GLUCOSE MONITOR KIT AND SUPPLIES    Dispense sufficient amount for indicated testing frequency plus additional to accommodate PRN testing needs. Dispense all needed supplies to include: monitor, strips, lancing device, lancets, control solutions, alcohol swabs. BLOOD GLUCOSE MONITOR STRIPS    Test 3 times a day & as needed for symptoms of irregular blood glucose. Dispense sufficient amount for indicated testing frequency plus additional to accommodate PRN testing needs. CLOPIDOGREL (PLAVIX) 75 MG TABLET    take 1 tablet by mouth once daily    DAPAGLIFLOZIN (FARXIGA) 10 MG TABLET    Take 1 tablet by mouth every morning    ELASTIC BANDAGES & SUPPORTS (LUMBAR BACK BRACE/SUPPORT PAD) MISC    1 each by Does not apply route daily as needed.     FUROSEMIDE (LASIX) 20 MG TABLET    take 1 tablet by mouth once daily    GABAPENTIN (NEURONTIN) 100 MG CAPSULE    TAKE ONE CAPSULE BY MOUTH 3 TIMES A DAY    INSULIN GLARGINE (LANTUS SOLOSTAR) 100 UNIT/ML INJECTION PEN    Inject 20 Units into the skin nightly    INSULIN PEN NEEDLE (PEN NEEDLES 3/16\") 31G X 5 MM MISC    1 each by Does not apply route daily    ISOSORBIDE MONONITRATE (IMDUR) 30 MG EXTENDED RELEASE TABLET    Take 0.5 tablets by mouth daily    LANCETS MISC    1 each by Does not apply route 3 times daily    METFORMIN (GLUCOPHAGE) 1000 MG TABLET    Take 1,000 mg by mouth 2 times daily (with meals)    METOPROLOL SUCCINATE (TOPROL XL) 50 MG EXTENDED RELEASE TABLET    Take 1.5 tablets by mouth in the morning and at bedtime    NARATRIPTAN (AMERGE) 2.5 MG TABLET    Take 1 tablet by mouth once as needed for Migraine for up to 1 dose. NICOTINE (NICODERM CQ) 21 MG/24HR    Place 1 patch onto the skin every 24 hours    NITROGLYCERIN (NITROSTAT) 0.4 MG SL TABLET    Place 1 tablet under the tongue every 5 minutes as needed for Chest pain    PANTOPRAZOLE (PROTONIX) 40 MG TABLET    Take 1 tablet by mouth daily    RESPIRATORY THERAPY SUPPLIES (NEBULIZER COMPRESSOR) KIT    by Does not apply route. TAMSULOSIN (FLOMAX) 0.4 MG CAPSULE    Take 1 capsule by mouth daily    TENS UNIT MISC    by Does not apply route. Use as directed.     TRAZODONE (DESYREL) 50 MG TABLET    take 1 tablet by mouth at bedtime         SOCIAL HISTORY     Social History     Social History Narrative    Not on file     Social History     Tobacco Use    Smoking status: Some Days     Packs/day: 0.50     Years: 40.00     Pack years: 20.00     Types: Cigarettes    Smokeless tobacco: Never   Vaping Use    Vaping Use: Never used   Substance Use Topics    Alcohol use: No     Alcohol/week: 0.0 standard drinks    Drug use: No         ALLERGIES     Allergies   Allergen Reactions    Influenza Vaccines Anaphylaxis    Pneumococcal Vaccines          FAMILY HISTORY     Family History   Problem Relation Age of Onset    Diabetes Mother     Kidney Disease Mother     COPD Father     Heart Disease Father     Cancer Father         bone    Stroke Father     Osteoarthritis Sister     Kidney Disease Sister     Heart Disease Sister     Diabetes Brother          PHYSICAL EXAM     ED Triage Vitals [02/09/23 1948]   BP Temp Temp Source Heart Rate Resp SpO2 Height Weight   (!) 159/83 98.2 °F (36.8 °C) Oral 87 18 95 % 6' 1\" (1.854 m) 220 lb (99.8 kg)     Initial vital signs and nursing assessment reviewed and normal. Body mass index is 29.03 kg/m². Additional Vital Signs:  Vitals:    02/09/23 1948   BP: (!) 159/83   Pulse: 87   Resp: 18   Temp: 98.2 °F (36.8 °C)   SpO2: 95%       Physical Exam  Vitals and nursing note reviewed. Constitutional:       General: He is not in acute distress. Appearance: Normal appearance. He is normal weight. He is not toxic-appearing. HENT:      Head: Normocephalic and atraumatic. Right Ear: Tympanic membrane normal.      Left Ear: Tympanic membrane normal.      Nose: Nose normal.      Mouth/Throat:      Mouth: Mucous membranes are moist.      Pharynx: Oropharynx is clear. Eyes:      General: No scleral icterus. Extraocular Movements: Extraocular movements intact. Conjunctiva/sclera: Conjunctivae normal.      Pupils: Pupils are equal, round, and reactive to light. Cardiovascular:      Rate and Rhythm: Normal rate and regular rhythm. Pulses: Normal pulses. Heart sounds: Normal heart sounds. No murmur heard. No friction rub. No gallop. Pulmonary:      Effort: Pulmonary effort is normal.      Breath sounds: Normal breath sounds. No wheezing or rales. Abdominal:      Palpations: Abdomen is soft. Tenderness: There is no abdominal tenderness. There is no guarding or rebound. Musculoskeletal:         General: Normal range of motion. Cervical back: Normal range of motion and neck supple. Right lower leg: No edema. Comments: LLE BKA; incision CDI   Skin:     General: Skin is warm and dry. Capillary Refill: Capillary refill takes less than 2 seconds. Neurological:      General: No focal deficit present. Mental Status: He is alert and oriented to person, place, and time. Cranial Nerves: No cranial nerve deficit. Sensory: No sensory deficit. Motor: No weakness.       Coordination: Coordination normal.         ED RESULTS   Laboratory results (none if blank):  Labs Reviewed   COVID-19 & INFLUENZA COMBO - Abnormal; Notable for the following components:       Result Value    SARS-CoV-2 RNA, RT PCR DETECTED (*)     All other components within normal limits   CBC WITH AUTO DIFFERENTIAL - Abnormal; Notable for the following components:    WBC 13.9 (*)     Hemoglobin 13.2 (*)     MCV 74.6 (*)     MCH 23.3 (*)     MCHC 31.3 (*)     RDW-CV 16.3 (*)     Segs Absolute 10.2 (*)     All other components within normal limits   COMPREHENSIVE METABOLIC PANEL W/ REFLEX TO MG FOR LOW K - Abnormal; Notable for the following components:    Glucose 127 (*)     Potassium reflex Magnesium 3.4 (*)     Total Protein 8.9 (*)     ALT 7 (*)     All other components within normal limits   BRAIN NATRIURETIC PEPTIDE - Abnormal; Notable for the following components:    Pro-BNP 2958.0 (*)     All other components within normal limits   ANION GAP - Abnormal; Notable for the following components:    Anion Gap 17.0 (*)     All other components within normal limits   TROPONIN   GLOMERULAR FILTRATION RATE, ESTIMATED   OSMOLALITY   MAGNESIUM     All laboratory results are individually reviewed and interpreted by me. See ED course below for results interpretation if applicable. (Any cultures that may have been sent were not resulted at the time of this patient ED visit)      Radiologic studies results available at the moment of this note (None if blank):  XR CHEST PORTABLE   Final Result   1. No acute cardiopulmonary finding. **This report has been created using voice recognition software. It may contain minor errors which are inherent in voice recognition technology. **      Final report electronically signed by Dr Chen Overton on 2/9/2023 8:17 PM        See ED course below for my interpretation if applicable.   All radiology images independently reviewed by me in addition to interpretation provided by the radiologist.      EKG interpretation (none if blank):  normal sinus rhythm, nonspecific ST and T waves changes, occasional PVC noted, unifocal  All EKG results are individually reviewed and interpreted by me. All EKGs are also interpreted by our Cardiology department, final interpretation may not be available as of the writing of this note. INITIAL ASSESSMENT   Comorbid conditions pertinent to this ED encounter:  COPD    Differential Diagnosis includes but is not limited to:  COPD exacerbation, asthma, pneumothorax, anaphylaxis, anxiety, PE , pericardial effusion, CHF, ACS/MI, atelectasis, lower airway obstruction, aspiration    Although some of these diagnoses are unlikely, they were consider in my medical decision making. Decision Rules/Clinical Scores utilized:  Not Applicable. Code Status:  Not addressed during this ED visit    Social determinants of health impacting treatment or disposition:  Not Applicable. PREVIOUS RECORDS  AND EXTERNAL INFORMATION REVIEWED   History obtained from: the patient. Pertinent previous and/or external records reviewed: Noncontributory. Case discussed with specialties other than Emergency Medicine: Not Applicable      ED COURSE   ED Medications administered this visit (None if left blank):   Medications   nicotine (NICODERM CQ) 21 MG/24HR 1 patch (has no administration in time range)   albuterol (PROVENTIL) nebulizer solution 2.5 mg (has no administration in time range)   ipratropium (ATROVENT) 0.02 % nebulizer solution 0.5 mg (has no administration in time range)   mometasone-formoterol (DULERA) 100-5 MCG/ACT inhaler 2 puff (has no administration in time range)         ED Course as of 02/09/23 2038   Thu Feb 09, 2023 2023 WBC(!): 13.9 [TM]   2024 XR CHEST PORTABLE     IMPRESSION:  1.  No acute cardiopulmonary finding. [TM]   2025 Patient reports that he cannot receive steroids as they make him violent [TM]   2034 Pro-BNP(!): 2958.0 [TM]   2038 SARS-CoV-2 RNA, RT PCR(!!): DETECTED [TM]      ED Course User Index  [TM] Hossein Dugan MD         CRITICAL CARE:  None    PROCEDURES: (None if blank)  Procedures:       MEDICATION CHANGES     New Prescriptions    No medications on file         FINAL DISPOSITION and MEDICAL DECISION MAKING     This patient presents with dyspnea, most likely secondary to COPD exacerbation brought on by COVID. \ Presentation not consistent with acute cardiac etiologies to include ACS, CHF, pericardial effusion / tamponade . Presentation not consistent with acute respiratory etiologies to include acute PE (patient on [facility]), pneumothorax, allergic etiologies, or infectious etiologies such as PNA. Presentation also not consistent with non-cardiopulmonary causes to include toxidromes, metabolic etiologies such as acidemia or electrolyte derangements, sepsis, neurologic causes (i.e. demyelinating diseases). MDM  Number of Diagnoses or Management Options  COPD exacerbation ([facility] [address]): established, worsening     Amount and/or Complexity of Data Reviewed  Clinical lab tests: ordered and reviewed  Tests in the radiology section of CPT®: ordered and reviewed  Tests in the medicine section of CPT®: ordered and reviewed  Discussion of test results with the performing providers: no  Decide to obtain previous medical records or to obtain history from someone other than the patient: yes  Obtain history from someone other than the patient: no  Review and summarize past medical records: yes  Discuss the patient with other providers: no  Independent visualization of images, tracings, or specimens: yes    Patient Progress  Patient progress: stable       Shared Decision-Making was performed, disposition discussed with the patient/family and questions answered.       Outpatient follow up (If applicable):  Anel Berry MD  [address] [place] [address]  [phone]    Schedule an appointment as soon as possible for a visit   If symptoms worsen    Fernando Nix MD  [address] [place] [id number]  [phone]    Schedule an appointment as soon as possible for a visit   for follow up of sleep problems  The results of pertinent diagnostic studies and exam findings were discussed with the patient/surrogate. The patients provisional diagnosis and plan of care were discussed with the patient and present family who expressed understanding. Medications were reviewed and indications and risks of medications were discussed with the patient/family present. Strict return precautions and follow up instructions were discussed with the patient prior to discharge, with which the patient agrees. FINAL DIAGNOSES:  Final diagnoses:   COPD exacerbation ([facility] [address]   COVID-19       Condition: condition: stable  Dispo: Discharge to home  DISPOSITION Decision To Discharge 02/09/2023 08:37:39 PM      This transcription was electronically signed. It was dictated by use of voice recognition software and electronically transcribed. The transcription may contain errors not detected in proofreading.        Dwayne Em MD  Resident  02/09/23 [id number]

## 2023-02-20 ENCOUNTER — TELEPHONE (OUTPATIENT)
Dept: CARDIOLOGY CLINIC | Age: 68
End: 2023-02-20

## 2023-02-20 ENCOUNTER — OFFICE VISIT (OUTPATIENT)
Dept: CARDIOLOGY CLINIC | Age: 68
End: 2023-02-20
Payer: COMMERCIAL

## 2023-02-20 VITALS — DIASTOLIC BLOOD PRESSURE: 80 MMHG | SYSTOLIC BLOOD PRESSURE: 144 MMHG | HEART RATE: 82 BPM | OXYGEN SATURATION: 94 %

## 2023-02-20 DIAGNOSIS — I48.91 ATRIAL FIBRILLATION, UNSPECIFIED TYPE (HCC): ICD-10-CM

## 2023-02-20 DIAGNOSIS — I25.5 ISCHEMIC CARDIOMYOPATHY: Primary | ICD-10-CM

## 2023-02-20 DIAGNOSIS — I73.9 PAD (PERIPHERAL ARTERY DISEASE) (HCC): ICD-10-CM

## 2023-02-20 DIAGNOSIS — Z91.89 AT RISK FOR FLUID VOLUME OVERLOAD: ICD-10-CM

## 2023-02-20 DIAGNOSIS — I50.22 CHRONIC SYSTOLIC CONGESTIVE HEART FAILURE, NYHA CLASS 2 (HCC): ICD-10-CM

## 2023-02-20 PROCEDURE — G8484 FLU IMMUNIZE NO ADMIN: HCPCS | Performed by: NURSE PRACTITIONER

## 2023-02-20 PROCEDURE — 4004F PT TOBACCO SCREEN RCVD TLK: CPT | Performed by: NURSE PRACTITIONER

## 2023-02-20 PROCEDURE — 3077F SYST BP >= 140 MM HG: CPT | Performed by: NURSE PRACTITIONER

## 2023-02-20 PROCEDURE — 99214 OFFICE O/P EST MOD 30 MIN: CPT | Performed by: NURSE PRACTITIONER

## 2023-02-20 PROCEDURE — 3079F DIAST BP 80-89 MM HG: CPT | Performed by: NURSE PRACTITIONER

## 2023-02-20 PROCEDURE — 1123F ACP DISCUSS/DSCN MKR DOCD: CPT | Performed by: NURSE PRACTITIONER

## 2023-02-20 PROCEDURE — G8417 CALC BMI ABV UP PARAM F/U: HCPCS | Performed by: NURSE PRACTITIONER

## 2023-02-20 PROCEDURE — 3017F COLORECTAL CA SCREEN DOC REV: CPT | Performed by: NURSE PRACTITIONER

## 2023-02-20 PROCEDURE — G8427 DOCREV CUR MEDS BY ELIG CLIN: HCPCS | Performed by: NURSE PRACTITIONER

## 2023-02-20 RX ORDER — MAGNESIUM OXIDE 400 MG/1
400 TABLET ORAL DAILY
Qty: 90 TABLET | Refills: 3 | Status: SHIPPED | OUTPATIENT
Start: 2023-02-20

## 2023-02-20 RX ORDER — POTASSIUM CHLORIDE 20 MEQ/1
20 TABLET, EXTENDED RELEASE ORAL DAILY
Qty: 90 TABLET | Refills: 3 | Status: SHIPPED | OUTPATIENT
Start: 2023-02-20

## 2023-02-20 RX ORDER — FUROSEMIDE 20 MG/1
TABLET ORAL
Qty: 30 TABLET | Refills: 3 | Status: SHIPPED | OUTPATIENT
Start: 2023-02-20

## 2023-02-20 RX ORDER — ISOSORBIDE MONONITRATE 30 MG/1
30 TABLET, EXTENDED RELEASE ORAL DAILY
Qty: 90 TABLET | Refills: 3
Start: 2023-02-20

## 2023-02-20 RX ORDER — SPIRONOLACTONE 25 MG/1
25 TABLET ORAL DAILY
Qty: 90 TABLET | Refills: 3 | Status: SHIPPED | OUTPATIENT
Start: 2023-02-20

## 2023-02-20 RX ORDER — METOPROLOL SUCCINATE 50 MG/1
50 TABLET, EXTENDED RELEASE ORAL 2 TIMES DAILY
Qty: 60 TABLET | Refills: 0
Start: 2023-02-20 | End: 2023-03-22

## 2023-02-20 ASSESSMENT — ENCOUNTER SYMPTOMS
SHORTNESS OF BREATH: 1
COUGH: 0
NAUSEA: 0
VOMITING: 0
ABDOMINAL DISTENTION: 0

## 2023-02-20 NOTE — PATIENT INSTRUCTIONS
You may receive a survey regarding the care you received during your visit. Your input is valuable to us. We encourage you to complete and return your survey. We hope you will choose us in the future for your healthcare needs. Continue:  Continue current medications  Daily weights and record  Fluid restriction of 2 Liters per day  Limit sodium in diet to around 7666-9767 mg/day  Monitor BP  Activity as tolerated     Call the [facility] for any of the following symptoms: [phone]  Weight gain of 2-3 pounds in 1 day or 5 pounds in 1 week  Increased shortness of breath  Shortness of breath while laying down  Cough  Chest pain  Swelling in feet, ankles or legs  Tenderness or bloating in the abdomen  Fatigue   Decreased appetite or nausea   Confusion          Start taking:  Magnesium 400mg daily w/ meals  Potassium 20meq daily w/ meals    Blood work in two weeks    Continue diet/fluid adherence  Continue daily wts.   F/U w/ Cardiology  F/U in clinic in 6 months

## 2023-02-20 NOTE — TELEPHONE ENCOUNTER
I forgot to add Aldactone to his medications. I called and left message that I was calling it in. I cancelled potassium lab draw and added BMP. Please mail order.

## 2023-02-20 NOTE — PROGRESS NOTES
Heart Failure Clinic       Visit Date: 2/20/2023  Cardiologist:  Dr. Tricia Neves  Primary Care Physician: Dr. Payal Enriqeuz MD    Julio Shanks is a 74 y.o. male who presents today for:  Chief Complaint   Patient presents with    Congestive Heart Failure       HPI:   Julio Shanks is a 74 y.o. male who presents to the office for a follow up patient visit in the heart failure clinic. New patient on 12/7/21  Accompanied by girlfriend Maria Elena Miller    TYPE HF: HFrEF (40-45%, 4/2022)  (35%, 11/2021)  Cause: ischemic improved form 35 to 40-45%  Device: none  HX: Obstructive CAD s/p stent 2013 , HTN, HLD, COPD, afib on eliquis    Dry Wt:  (248 on 12/7/22) (247 on 4/12/22) no longer able to weight d/t Lt AKA    Concerns today: here today for his 6 month f/u. Pt doing well. Since last visit he has had a left AKA. He is primarily in a wheel chair (looking to get a motor chair and an artificial leg). He denies leg swelling, bloating, or SOB. Visit on 4/12/22: patient has not been taking his lasix, eliquis, aldactone, lisinopril. Has not gained weight but notes bloating, DAVILA, and fatigue. Improved EF on ECHO      Hospitalization:      Date of Admission: 11/18/2021    New onset atrial fibrillation with RVR-see management started on Lopressor as well as apixaban by cardiology  Patient with A. fib RVR overnight 11/19/2021. Continue with amiodarone 200 mg twice daily and metoprolol. Patient has been in and out of atrial fibrillation overnight. Heart rate is pretty well controlled. Patient without chest pain or palpitations. Patient scheduled for left heart catheterization today for ischemic evaluation. Patient's been cleared by ID for infection standpoint. Antibiotics to be discontinued. Case was discussed extensively with cardiology. Patient their assistance. Patient would likely benefit from outpatient sleep study evaluating for possible sleep apnea.   Peripheral arterial disease with nonhealing arterial ulceration:  Patient has previously undergone arteriogram with attempted recannulization 11/2020. ABIs obtained in the ED showed mild stenosis of the proximal popliteal region on the right with occluded arteries on the left which appear chronic in nature. Continue ASA, Plavix, and statin  Podiatry following for management and wound care. Stage III perineum pressure ulcer: Antibiotics have been discontinued by ID. Suspect colonization rather than active infection. ID following. Appreciate their assistance.   Acute exacerbation of CHF, acute systolic  Compensated at discharge-had cardiac cath done on 11/22-only medical management per cardiologist-CHF clinic          Activity: ADLS performed, wheelchair mostly  Diet: educated today    Patient has:  Chest Pain: no  SOB: yes  Orthopnea/PND: yes  PETER: no  Edema: no  Fatigue: yes  Abdominal bloating: yes  Cough: no  Appetite: good  Home weight: no  Home blood pressure: no    Past Medical History:   Diagnosis Date    CAD (coronary artery disease)     COPD (chronic obstructive pulmonary disease) ([address]     Diabetes mellitus ([address]     Hx of blood clots 1996    LEFT LEG    Hyperlipidemia     Hypertension     Leg wound, left     Leg wound, right     Lumbar radiculopathy     Osteoarthritis     Seizure disorder ([address]     Spinal stenosis, lumbar      Past Surgical History:   Procedure Laterality Date    ANKLE SURGERY      ball joint    [address] [facility]  2005    1 STENT PLACED    CARDIAC CATHETERIZATION  5/9/13    [facility]    CORONARY ANGIOPLASTY WITH STENT PLACEMENT      DIAGNOSTIC CARDIAC CATH LAB PROCEDURE  11/18/2021    FOREARM SURGERY      left    FRACTURE SURGERY Right 2000    LEG    KNEE SURGERY      left    LEG SURGERY      LEG SURGERY Left 10/10/2022    Left  Above Knee Amputation performed by Marlys Sauer MD at [address]      left middle toe    TYMPANOSTOMY TUBE PLACEMENT  1979     Family History Problem Relation Age of Onset    Diabetes Mother     Kidney Disease Mother     COPD Father     Heart Disease Father     Cancer Father         bone    Stroke Father     Osteoarthritis Sister     Kidney Disease Sister     Heart Disease Sister     Diabetes Brother      Social History     Tobacco Use    Smoking status: Some Days     Packs/day: 0.50     Years: 40.00     Pack years: 20.00     Types: Cigarettes    Smokeless tobacco: Never   Substance Use Topics    Alcohol use: No     Alcohol/week: 0.0 standard drinks     Current Outpatient Medications   Medication Sig Dispense Refill    furosemide (LASIX) 20 MG tablet take 1 tablet by mouth once daily 30 tablet 3    isosorbide mononitrate (IMDUR) 30 MG extended release tablet Take 1 tablet by mouth daily 90 tablet 3    metoprolol succinate (TOPROL XL) 50 MG extended release tablet Take 1 tablet by mouth in the morning and at bedtime 60 tablet 0    potassium chloride (KLOR-CON M) 20 MEQ extended release tablet Take 1 tablet by mouth daily 90 tablet 3    magnesium oxide (MAG-OX) 400 MG tablet Take 1 tablet by mouth daily 90 tablet 3    apixaban (ELIQUIS) 5 MG TABS tablet Take 1 tablet by mouth 2 times daily 180 tablet 3    atorvastatin (LIPITOR) 40 MG tablet Take 40 mg by mouth daily      metFORMIN (GLUCOPHAGE) 1000 MG tablet Take 1,000 mg by mouth 2 times daily (with meals)      dapagliflozin (FARXIGA) 10 MG tablet Take 1 tablet by mouth every morning 90 tablet 1    clopidogrel (PLAVIX) 75 MG tablet take 1 tablet by mouth once daily 30 tablet 5    albuterol sulfate HFA (PROAIR HFA) 108 (90 BASE) MCG/ACT inhaler Inhale 2 puffs into the lungs every 4 hours as needed for Wheezing 1 Inhaler 5    gabapentin (NEURONTIN) 100 MG capsule TAKE ONE CAPSULE BY MOUTH 3 TIMES A DAY 90 capsule 5    pantoprazole (PROTONIX) 40 MG tablet Take 1 tablet by mouth daily 30 tablet 5    traZODone (DESYREL) 50 MG tablet take 1 tablet by mouth at bedtime 30 tablet 3    nitroGLYCERIN (NITROSTAT) 0.4 MG SL tablet Place 1 tablet under the tongue every 5 minutes as needed for Chest pain 25 tablet 3    acetaminophen (TYLENOL) 325 MG tablet Take 650 mg by mouth every 6 hours as needed. amiodarone (CORDARONE) 200 MG tablet Take 1 tablet by mouth 2 times daily (Patient not taking: Reported on 2/20/2023) 60 tablet 0    blood glucose monitor kit and supplies Dispense sufficient amount for indicated testing frequency plus additional to accommodate PRN testing needs. Dispense all needed supplies to include: monitor, strips, lancing device, lancets, control solutions, alcohol swabs. 1 kit 0    Insulin Pen Needle (PEN NEEDLES 3/16\") 31G X 5 MM MISC 1 each by Does not apply route daily 100 each 3    blood glucose monitor strips Test 3 times a day & as needed for symptoms of irregular blood glucose. Dispense sufficient amount for indicated testing frequency plus additional to accommodate PRN testing needs. 90 strip 0    Lancets MISC 1 each by Does not apply route 3 times daily 200 each 0    naratriptan (AMERGE) 2.5 MG tablet Take 1 tablet by mouth once as needed for Migraine for up to 1 dose. 9 tablet 2    Elastic Bandages & Supports (LUMBAR BACK BRACE/SUPPORT PAD) MISC 1 each by Does not apply route daily as needed. 1 each 0    Tens Unit MISC by Does not apply route. Use as directed. (Patient not taking: Reported on 12/13/2022) 1 each 0    Respiratory Therapy Supplies (NEBULIZER COMPRESSOR) KIT by Does not apply route. 1 kit 0     No current facility-administered medications for this visit. Allergies   Allergen Reactions    Influenza Vaccines Anaphylaxis    Pneumococcal Vaccines        SUBJECTIVE:   Review of Systems   Constitutional:  Positive for fatigue. Negative for activity change, appetite change and diaphoresis. Respiratory:  Positive for shortness of breath. Negative for cough. Cardiovascular:  Negative for chest pain, palpitations and leg swelling.    Gastrointestinal:  Negative for abdominal distention, nausea and vomiting. Neurological:  Negative for weakness, light-headedness and headaches. Hematological:  Negative for adenopathy. Psychiatric/Behavioral:  Negative for sleep disturbance. OBJECTIVE:   Today's Vitals:  BP (!) 144/80   Pulse 82   SpO2 94%     Physical Exam  Vitals reviewed. Constitutional:       General: He is not in acute distress. Appearance: Normal appearance. He is well-developed. He is not diaphoretic. HENT:      Head: Normocephalic and atraumatic. Eyes:      Conjunctiva/sclera: Conjunctivae normal.   Cardiovascular:      Rate and Rhythm: Normal rate and regular rhythm. Heart sounds: Normal heart sounds. No murmur heard. Pulmonary:      Effort: Pulmonary effort is normal. No respiratory distress. Breath sounds: Normal breath sounds. No wheezing or rales. Abdominal:      General: Bowel sounds are normal. There is no distension. Palpations: Abdomen is soft. Tenderness: There is no abdominal tenderness. Musculoskeletal:         General: Normal range of motion. Cervical back: Normal range of motion and neck supple. Right lower leg: No edema. Skin:     General: Skin is warm and dry. Capillary Refill: Capillary refill takes less than 2 seconds. Neurological:      Mental Status: He is alert and oriented to person, place, and time. Coordination: Coordination normal.   Psychiatric:         Behavior: Behavior normal.       Wt Readings from Last 3 Encounters:   02/09/23 220 lb (99.8 kg)   11/03/22 208 lb (94.3 kg)   10/24/22 208 lb (94.3 kg)     BP Readings from Last 3 Encounters:   02/20/23 (!) 144/80   02/09/23 (!) 146/78   12/13/22 110/74     Pulse Readings from Last 3 Encounters:   02/20/23 82   02/09/23 88   12/13/22 91     There is no height or weight on file to calculate BMI. ECHO:     Summary   Technically difficult examination.    Limited Echo   Left ventricular size is normal and systolic function is moderately   reduced. Ejection fraction was estimated at 40-45%. LV wall thickness is   within normal limits. There was moderate global hypokinesis of the left ventricle. The right ventricular size was normal with normal systolic function and   wall thickness. Compared to the echo from 11/2021, the EF has mildly improved. Signature      ----------------------------------------------------------------   Electronically signed by Erma Nation MD (Interpreting   physician) on 04/12/2022 at 12:22 PM      Summary   Technically difficult examination. Ejection fraction is visually estimated at 35%. There was moderate global hypokinesis of the left ventricle. The aortic valve leaflets were not well visualized. Aortic valve appears tricuspid. Aortic valve leaflets are somewhat thickened. Signature      ----------------------------------------------------------------   Electronically signed by Ranjeet Anders MD (Interpreting   physician) on 11/19/2021 at 04:19 PM    CATH/STRESS:       HEMODYNAMIC RESULTS:  Left ventricular end-diastolic pressure 21 mmHg. No significant pressure gradient across the aortic valve upon pullback. IMPRESSION:  1. 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 2.  Chronic total occlusion of the mid left circumflex artery. 3.  Distal RCA and distal left circumflex artery receives collateral  flow. 4.  50% intermediate stenosis of mid segment of left anterior descending  artery. Hemodynamically insignificant by FFR measurement, FFR 0.91.  5.  Moderate ischemic cardiomyopathy, ejection fraction 35% to 40%     RECOMMENDATIONS:  Aggressive risk factor modification and optimal  medical management for coronary artery disease. Optimize antianginal  medications. Guideline-directed medical therapy for congestive heart  failure with reduced ejection fraction.   The patient also was diagnosed  with atrial fibrillation during this hospitalization, will need  anticoagulation and good control of AFib. At this time, continue  aggressive medical therapy for coronary artery disease. Should the  patient develop symptoms resistant to medical therapy, may consider  referral for coronary artery bypass graft surgery in the future. Outpatient followup with Cardiology.            Donna Hughes MD     D: 11/22/2021 13:12:40       T: 11/22/2021 13:17:44     [email or web address]  Job#: [id number]     Doc#: [id number]         Results reviewed:  BNP:   Lab Results   Component Value Date    BNP 28.1 01/20/2013     CBC:   Lab Results   Component Value Date/Time    WBC 13.9 02/09/2023 07:50 PM    RBC 5.66 02/09/2023 07:50 PM    RBC 5.49 02/02/2012 11:40 AM    HGB 13.2 02/09/2023 07:50 PM    HCT 42.2 02/09/2023 07:50 PM     02/09/2023 07:50 PM     CMP:    Lab Results   Component Value Date/Time     02/09/2023 07:50 PM    K 3.4 02/09/2023 07:50 PM    CL 99 02/09/2023 07:50 PM    CO2 24 02/09/2023 07:50 PM    BUN 7 02/09/2023 07:50 PM    CREATININE 0.9 02/09/2023 07:50 PM    GFRAA >60 03/18/2021 12:01 PM    LABGLOM >60 02/09/2023 07:50 PM    GLUCOSE 127 02/09/2023 07:50 PM    GLUCOSE 117 02/02/2012 11:40 AM    CALCIUM 9.4 02/09/2023 07:50 PM     Hepatic Function Panel:    Lab Results   Component Value Date/Time    ALKPHOS 111 02/09/2023 07:50 PM    ALT 7 02/09/2023 07:50 PM    AST 12 02/09/2023 07:50 PM    PROT 8.9 02/09/2023 07:50 PM    BILITOT 0.3 02/09/2023 07:50 PM    BILIDIR <0.2 10/03/2022 07:10 PM    LABALBU 4.1 02/09/2023 07:50 PM    LABALBU 4.7 02/02/2012 11:40 AM     Magnesium:    Lab Results   Component Value Date/Time    MG 1.3 02/09/2023 07:50 PM     PT/INR:    Lab Results   Component Value Date/Time    PROTIME 12.3 11/17/2020 04:20 PM    INR 1.47 10/04/2022 11:13 AM     Lipids:    Lab Results   Component Value Date/Time    TRIG 116 11/20/2021 12:30 PM    HDL 25 11/20/2021 12:30 PM    LDLCALC 48 11/20/2021 12:30 PM       ASSESSMENT AND PLAN:   The patient's condition/symptoms are  stable       Diagnosis Orders   1. Ischemic cardiomyopathy        2. Chronic systolic congestive heart failure, NYHA class 2 (HCC)  Potassium    Magnesium      3. At risk for fluid volume overload        4. PAD (peripheral artery disease) ([facility]        5. Atrial fibrillation, unspecified type ([facility]          Continue:  GDMT:   ACE/ARB/ARNI - not taking   BB - Toprol 50 BID   Diuretic -   AA - not taking  SGLT2 -  farxiga  Vasodilator - nitro, Imdur 30 daily  Other - Plavix, Eliquis      HFrEF 40-45% 4/2022 (35% 2021)  Ischemic Cardiomyopathy improved. Afib on Eliquis (has not been taking his amio d/t vomiting, to discuss further w/ Dr. Sridhar Holm)  PAD: to f/u w/ Dr. Sridhar Holm on monitoring of right lower leg    Not compliant w/ meds, seems to be lower functioning. Stable, compensated today on exam. Dillan Pencil well on his Lasix    ECHO 2022:   Lab reviewed - K 3.4 Cr. .9, mag 1.3, Hgb 13.2  Repeat blood work in two weeks as we are starting the lasix back up. BP/HR stable: did not take his morning medications yet    Start taking:  Magnesium 400mg daily w/ meals  Potassium 20meq daily w/ meals    Adding aldactone for GDMT    Blood work in two weeks    Continue diet/fluid adherence  Continue daily wts. F/U w/ Cardiology  F/U in clinic in 6 months      Tolerating above noted HF meds, no ill side effects noted. Will continue to monitor kidney function and electrolytes. Will optimize as tolerated. Pt is NOT compliant w/ medications. Total visit time of 25 minutes has been spent with patient on education of symptoms, management, medication, and plan of care; as well as review of chart: labs, ECHO, radiology reports, etc.   I personally spent more then 50% of the appt time face to face with the patient.   Daily weights  Fluid restriction of 2 Liters per day  Limit sodium in diet to around 1718-3618 mg/day  Monitor BP  Activity as tolerated           Patient was instructed to call the Heart Failure Clinic for any changes in symptoms as noted in AVS.      Return in about 6 months (around 8/20/2023). or sooner if needed     Patient given educational materials - see patient instructions. We discussed the importance of weighing oneself and recording daily. We also discussed the importance of a low sodium diet, higher sodium foods to avoid and better low sodium food options. Patient verbalizes understanding of plan of care using teach back method, and is agreeable to the treatment plan.        Electronically signed by JAVAN Johnson CNP on 2/20/2023 at 11:48 AM

## 2023-03-16 ENCOUNTER — HOSPITAL ENCOUNTER (OUTPATIENT)
Age: 68
Discharge: HOME | End: 2023-03-16
Payer: COMMERCIAL

## 2023-03-16 DIAGNOSIS — I25.5 ISCHEMIC CARDIOMYOPATHY: ICD-10-CM

## 2023-03-16 DIAGNOSIS — I50.22 CHRONIC SYSTOLIC CONGESTIVE HEART FAILURE, NYHA CLASS 2 (HCC): ICD-10-CM

## 2023-03-16 LAB
ANION GAP SERPL CALC-SCNC: 14 MEQ/L (ref 8–16)
BUN SERPL-MCNC: 11 MG/DL (ref 7–22)
CALCIUM SERPL-MCNC: 9.7 MG/DL (ref 8.5–10.5)
CHLORIDE SERPL-SCNC: 102 MEQ/L (ref 98–111)
CO2 SERPL-SCNC: 25 MEQ/L (ref 23–33)
CREAT SERPL-MCNC: 0.9 MG/DL (ref 0.4–1.2)
GFR SERPL CREATININE-BSD FRML MDRD: > 60 ML/MIN/1.73M2
GLUCOSE SERPL-MCNC: 155 MG/DL (ref 70–108)
POTASSIUM SERPL-SCNC: 4.9 MEQ/L (ref 3.5–5.2)
SODIUM SERPL-SCNC: 141 MEQ/L (ref 135–145)

## 2023-03-16 PROCEDURE — 36415 COLL VENOUS BLD VENIPUNCTURE: CPT

## 2023-03-16 PROCEDURE — 80048 BASIC METABOLIC PNL TOTAL CA: CPT

## 2023-03-17 DIAGNOSIS — I50.22 CHRONIC SYSTOLIC CONGESTIVE HEART FAILURE, NYHA CLASS 2 (HCC): ICD-10-CM

## 2023-03-17 LAB — MAGNESIUM SERPL-MCNC: 2.3 MG/DL (ref 1.6–2.4)

## 2023-04-17 RX ORDER — APIXABAN 5 MG/1
TABLET, FILM COATED ORAL
Qty: 180 TABLET | Refills: 1 | Status: SHIPPED | OUTPATIENT
Start: 2023-04-17

## 2023-06-05 ENCOUNTER — OFFICE VISIT (OUTPATIENT)
Dept: CARDIOLOGY CLINIC | Age: 68
End: 2023-06-05
Payer: COMMERCIAL

## 2023-06-05 VITALS
SYSTOLIC BLOOD PRESSURE: 102 MMHG | HEART RATE: 76 BPM | DIASTOLIC BLOOD PRESSURE: 54 MMHG | BODY MASS INDEX: 29.03 KG/M2 | HEIGHT: 73 IN

## 2023-06-05 DIAGNOSIS — I73.9 PAD (PERIPHERAL ARTERY DISEASE) (HCC): Primary | ICD-10-CM

## 2023-06-05 DIAGNOSIS — I25.10 CAD, MULTIPLE VESSEL: ICD-10-CM

## 2023-06-05 PROCEDURE — 1123F ACP DISCUSS/DSCN MKR DOCD: CPT | Performed by: INTERNAL MEDICINE

## 2023-06-05 PROCEDURE — 99214 OFFICE O/P EST MOD 30 MIN: CPT | Performed by: INTERNAL MEDICINE

## 2023-06-05 PROCEDURE — G8417 CALC BMI ABV UP PARAM F/U: HCPCS | Performed by: INTERNAL MEDICINE

## 2023-06-05 PROCEDURE — 4004F PT TOBACCO SCREEN RCVD TLK: CPT | Performed by: INTERNAL MEDICINE

## 2023-06-05 PROCEDURE — 3078F DIAST BP <80 MM HG: CPT | Performed by: INTERNAL MEDICINE

## 2023-06-05 PROCEDURE — 3017F COLORECTAL CA SCREEN DOC REV: CPT | Performed by: INTERNAL MEDICINE

## 2023-06-05 PROCEDURE — 3074F SYST BP LT 130 MM HG: CPT | Performed by: INTERNAL MEDICINE

## 2023-06-05 PROCEDURE — G8427 DOCREV CUR MEDS BY ELIG CLIN: HCPCS | Performed by: INTERNAL MEDICINE

## 2023-06-05 RX ORDER — METOPROLOL TARTRATE 50 MG/1
50 TABLET, FILM COATED ORAL 2 TIMES DAILY
Qty: 60 TABLET | Refills: 5 | Status: SHIPPED | OUTPATIENT
Start: 2023-06-05

## 2023-06-05 RX ORDER — CLOPIDOGREL BISULFATE 75 MG/1
75 TABLET ORAL DAILY
Qty: 30 TABLET | Refills: 5 | Status: SHIPPED | OUTPATIENT
Start: 2023-06-05

## 2023-06-05 RX ORDER — ISOSORBIDE MONONITRATE 30 MG/1
30 TABLET, EXTENDED RELEASE ORAL DAILY
Qty: 90 TABLET | Refills: 3 | Status: SHIPPED | OUTPATIENT
Start: 2023-06-05

## 2023-06-05 RX ORDER — METOPROLOL TARTRATE 50 MG/1
50 TABLET, FILM COATED ORAL 2 TIMES DAILY
COMMUNITY
Start: 2023-05-08 | End: 2023-06-05 | Stop reason: SDUPTHER

## 2023-06-05 RX ORDER — ATORVASTATIN CALCIUM 40 MG/1
40 TABLET, FILM COATED ORAL DAILY
Qty: 30 TABLET | Refills: 5 | Status: SHIPPED | OUTPATIENT
Start: 2023-06-05

## 2023-06-05 NOTE — PROGRESS NOTES
Pt here for 6 mo check up       Pt continues with pain under right chest , pain in right hand and numbness , not able to hold things, sob , dizziness at times ,
Rfl: 0    Past Medical History  Mehreen Fuentes  has a past medical history of CAD (coronary artery disease), COPD (chronic obstructive pulmonary disease) ([facility] Diabetes mellitus ([address] Hx of blood clots, Hyperlipidemia, Hypertension, Leg wound, left, Leg wound, right, Lumbar radiculopathy, Osteoarthritis, Seizure disorder ([address] and Spinal stenosis, lumbar. Social History  Mehreen Fuentes  reports that he has been smoking cigarettes. He has a 20.00 pack-year smoking history. He has never used smokeless tobacco. He reports that he does not drink alcohol and does not use drugs. Family History  Ruben family history includes COPD in his father; Cancer in his father; Diabetes in his brother and mother; Heart Disease in his father and sister; Kidney Disease in his mother and sister; Osteoarthritis in his sister; Stroke in his father. Past Surgical History   Past Surgical History:   Procedure Laterality Date    ANKLE SURGERY      ball joint    BACK SURGERY  1994    CARDIAC CATHETERIZATION  2005    1 STENT PLACED    CARDIAC CATHETERIZATION  5/9/13    [address] [place] [address]    CORONARY ANGIOPLASTY WITH STENT PLACEMENT      DIAGNOSTIC CARDIAC CATH LAB PROCEDURE  11/18/2021    FOREARM SURGERY      left    FRACTURE SURGERY Right 2000    LEG    KNEE SURGERY      left    LEG SURGERY      LEG SURGERY Left 10/10/2022    Left  Above Knee Amputation performed by Sima Capone MD at [facility]      left middle toe    TYMPANOSTOMY TUBE PLACEMENT  1979       Subjective:     REVIEW OF SYSTEMS  Constitutional: denies sweats, chills and fever  HENT: denies  congestion, sinus pressure, sneezing and sore throat. Eyes: denies  pain, discharge, redness and itching. Respiratory: denies apnea, cough  Gastrointestinal: denies blood in stool, constipation, diarrhea   Endocrine: denies cold intolerance, heat intolerance, polydipsia. Genitourinary: denies dysuria, enuresis, flank pain and hematuria.    Musculoskeletal: denies

## 2023-07-24 RX ORDER — FUROSEMIDE 20 MG/1
TABLET ORAL
Qty: 30 TABLET | Refills: 5 | Status: SHIPPED | OUTPATIENT
Start: 2023-07-24

## 2023-08-22 ENCOUNTER — TELEPHONE (OUTPATIENT)
Dept: CARDIOLOGY CLINIC | Age: 68
End: 2023-08-22

## 2024-01-01 ENCOUNTER — APPOINTMENT (OUTPATIENT)
Dept: GENERAL RADIOLOGY | Age: 69
End: 2024-01-01
Payer: COMMERCIAL

## 2024-01-01 ENCOUNTER — APPOINTMENT (OUTPATIENT)
Dept: MRI IMAGING | Age: 69
End: 2024-01-01
Payer: COMMERCIAL

## 2024-01-01 ENCOUNTER — APPOINTMENT (OUTPATIENT)
Dept: CT IMAGING | Age: 69
End: 2024-01-01
Payer: COMMERCIAL

## 2024-01-01 ENCOUNTER — HOSPITAL ENCOUNTER (INPATIENT)
Age: 69
LOS: 3 days | End: 2024-05-24
Attending: EMERGENCY MEDICINE
Payer: COMMERCIAL

## 2024-01-01 ENCOUNTER — APPOINTMENT (OUTPATIENT)
Dept: INTERVENTIONAL RADIOLOGY/VASCULAR | Age: 69
End: 2024-01-01
Payer: COMMERCIAL

## 2024-01-01 ENCOUNTER — APPOINTMENT (OUTPATIENT)
Dept: ULTRASOUND IMAGING | Age: 69
End: 2024-01-01
Payer: COMMERCIAL

## 2024-01-01 VITALS
RESPIRATION RATE: 27 BRPM | SYSTOLIC BLOOD PRESSURE: 80 MMHG | DIASTOLIC BLOOD PRESSURE: 43 MMHG | OXYGEN SATURATION: 72 % | BODY MASS INDEX: 32.32 KG/M2 | HEIGHT: 73 IN | HEART RATE: 118 BPM | WEIGHT: 243.83 LBS | TEMPERATURE: 98 F

## 2024-01-01 DIAGNOSIS — M79.89 RIGHT LEG SWELLING: ICD-10-CM

## 2024-01-01 DIAGNOSIS — I50.23 ACUTE ON CHRONIC HFREF (HEART FAILURE WITH REDUCED EJECTION FRACTION) (HCC): ICD-10-CM

## 2024-01-01 DIAGNOSIS — J96.01 ACUTE RESPIRATORY FAILURE WITH HYPOXIA (HCC): ICD-10-CM

## 2024-01-01 DIAGNOSIS — A41.9 SEPTICEMIA (HCC): ICD-10-CM

## 2024-01-01 DIAGNOSIS — I95.9 HYPOTENSION, UNSPECIFIED HYPOTENSION TYPE: ICD-10-CM

## 2024-01-01 DIAGNOSIS — L89.324 PRESSURE INJURY OF LEFT ISCHIUM, STAGE 4 (HCC): ICD-10-CM

## 2024-01-01 DIAGNOSIS — I48.91 ATRIAL FIBRILLATION WITH RVR (HCC): ICD-10-CM

## 2024-01-01 DIAGNOSIS — I96 GANGRENE OF RIGHT FOOT (HCC): Primary | ICD-10-CM

## 2024-01-01 LAB
ACINETOBACTER CALCOACETICUS-BAUMANNII BY PCR: NOT DETECTED
ALBUMIN SERPL BCG-MCNC: 3 G/DL (ref 3.5–5.1)
ALBUMIN SERPL BCG-MCNC: 3.1 G/DL (ref 3.5–5.1)
ALBUMIN SERPL BCG-MCNC: 3.5 G/DL (ref 3.5–5.1)
ALP SERPL-CCNC: 183 U/L (ref 38–126)
ALP SERPL-CCNC: 187 U/L (ref 38–126)
ALP SERPL-CCNC: 223 U/L (ref 38–126)
ALT SERPL W/O P-5'-P-CCNC: 143 U/L (ref 11–66)
ALT SERPL W/O P-5'-P-CCNC: 458 U/L (ref 11–66)
ALT SERPL W/O P-5'-P-CCNC: 466 U/L (ref 11–66)
ANION GAP SERPL CALC-SCNC: 12 MEQ/L (ref 8–16)
ANION GAP SERPL CALC-SCNC: 14 MEQ/L (ref 8–16)
ANION GAP SERPL CALC-SCNC: 14 MEQ/L (ref 8–16)
ANION GAP SERPL CALC-SCNC: 15 MEQ/L (ref 8–16)
ANION GAP SERPL CALC-SCNC: 15 MEQ/L (ref 8–16)
ANION GAP SERPL CALC-SCNC: 17 MEQ/L (ref 8–16)
ANION GAP SERPL CALC-SCNC: 17 MEQ/L (ref 8–16)
ANION GAP SERPL CALC-SCNC: 18 MEQ/L (ref 8–16)
ANION GAP SERPL CALC-SCNC: 18 MEQ/L (ref 8–16)
ANION GAP SERPL CALC-SCNC: 20 MEQ/L (ref 8–16)
ANISOCYTOSIS BLD QL SMEAR: PRESENT
APTT PPP: 30.8 SECONDS (ref 22–38)
APTT PPP: 31.4 SECONDS (ref 22–38)
APTT PPP: 34.2 SECONDS (ref 22–38)
APTT PPP: 44.9 SECONDS (ref 22–38)
APTT PPP: 70.7 SECONDS (ref 22–38)
APTT PPP: 86.7 SECONDS (ref 22–38)
APTT PPP: 87.3 SECONDS (ref 22–38)
ARTERIAL PATENCY WRIST A: POSITIVE
AST SERPL-CCNC: 1065 U/L (ref 5–40)
AST SERPL-CCNC: 174 U/L (ref 5–40)
AST SERPL-CCNC: 692 U/L (ref 5–40)
BACTERIA BLD AEROBE CULT: NORMAL
BACTERIA UR CULT: ABNORMAL
BACTERIA URNS QL MICRO: ABNORMAL /HPF
BASE EXCESS BLDA CALC-SCNC: -3.4 MMOL/L (ref -2.5–2.5)
BASE EXCESS BLDA CALC-SCNC: -4.6 MMOL/L (ref -2.5–2.5)
BASE EXCESS BLDA CALC-SCNC: -8 MMOL/L (ref -2–3)
BASOPHILS ABSOLUTE: 0 THOU/MM3 (ref 0–0.1)
BASOPHILS NFR BLD AUTO: 0.2 %
BDY SITE: ABNORMAL
BILIRUB CONJ SERPL-MCNC: 0.8 MG/DL (ref 0–0.3)
BILIRUB CONJ SERPL-MCNC: 0.9 MG/DL (ref 0–0.3)
BILIRUB CONJ SERPL-MCNC: 1 MG/DL (ref 0–0.3)
BILIRUB SERPL-MCNC: 1.2 MG/DL (ref 0.3–1.2)
BILIRUB SERPL-MCNC: 1.2 MG/DL (ref 0.3–1.2)
BILIRUB SERPL-MCNC: 1.4 MG/DL (ref 0.3–1.2)
BILIRUB UR QL STRIP.AUTO: NEGATIVE
BLACTX-M ISLT/SPM QL: NORMAL
BLAIMP ISLT/SPM QL: NORMAL
BLAKPC ISLT/SPM QL: NORMAL
BLAOXA-48-LIKE ISLT/SPM QL: NORMAL
BLAVIM ISLT/SPM QL: NORMAL
BUN SERPL-MCNC: 30 MG/DL (ref 7–22)
BUN SERPL-MCNC: 31 MG/DL (ref 7–22)
BUN SERPL-MCNC: 40 MG/DL (ref 7–22)
BUN SERPL-MCNC: 46 MG/DL (ref 7–22)
BUN SERPL-MCNC: 49 MG/DL (ref 7–22)
BUN SERPL-MCNC: 50 MG/DL (ref 7–22)
BUN SERPL-MCNC: 50 MG/DL (ref 7–22)
BUN SERPL-MCNC: 53 MG/DL (ref 7–22)
BUN SERPL-MCNC: 54 MG/DL (ref 7–22)
BUN SERPL-MCNC: 58 MG/DL (ref 7–22)
BUN SERPL-MCNC: 61 MG/DL (ref 7–22)
BUN SERPL-MCNC: 62 MG/DL (ref 7–22)
C PNEUM DNA LOWER RESP QL NAA+NON-PROBE: NOT DETECTED
CA-I BLD ISE-SCNC: 0.97 MMOL/L (ref 1.12–1.32)
CA-I BLD ISE-SCNC: 0.97 MMOL/L (ref 1.12–1.32)
CA-I BLD ISE-SCNC: 1.02 MMOL/L (ref 1.12–1.32)
CA-I BLD ISE-SCNC: 1.03 MMOL/L (ref 1.12–1.32)
CA-I BLD ISE-SCNC: 1.03 MMOL/L (ref 1.12–1.32)
CA-I BLD ISE-SCNC: 1.04 MMOL/L (ref 1.12–1.32)
CA-I BLD ISE-SCNC: 1.04 MMOL/L (ref 1.12–1.32)
CA-I BLD ISE-SCNC: 1.05 MMOL/L (ref 1.12–1.32)
CA-I BLD ISE-SCNC: 1.05 MMOL/L (ref 1.12–1.32)
CA-I BLD ISE-SCNC: 1.13 MMOL/L (ref 1.12–1.32)
CA-I BLD ISE-SCNC: 1.16 MMOL/L (ref 1.12–1.32)
CALCIUM SERPL-MCNC: 7.5 MG/DL (ref 8.5–10.5)
CALCIUM SERPL-MCNC: 7.6 MG/DL (ref 8.5–10.5)
CALCIUM SERPL-MCNC: 7.6 MG/DL (ref 8.5–10.5)
CALCIUM SERPL-MCNC: 7.7 MG/DL (ref 8.5–10.5)
CALCIUM SERPL-MCNC: 8.1 MG/DL (ref 8.5–10.5)
CALCIUM SERPL-MCNC: 8.3 MG/DL (ref 8.5–10.5)
CALCIUM SERPL-MCNC: 8.3 MG/DL (ref 8.5–10.5)
CASTS #/AREA URNS LPF: ABNORMAL /LPF
CASTS 2: ABNORMAL /LPF
CHARACTER UR: ABNORMAL
CHLORIDE SERPL-SCNC: 100 MEQ/L (ref 98–111)
CHLORIDE SERPL-SCNC: 100 MEQ/L (ref 98–111)
CHLORIDE SERPL-SCNC: 101 MEQ/L (ref 98–111)
CHLORIDE SERPL-SCNC: 101 MEQ/L (ref 98–111)
CHLORIDE SERPL-SCNC: 102 MEQ/L (ref 98–111)
CHLORIDE SERPL-SCNC: 103 MEQ/L (ref 98–111)
CHLORIDE SERPL-SCNC: 104 MEQ/L (ref 98–111)
CHLORIDE SERPL-SCNC: 104 MEQ/L (ref 98–111)
CHLORIDE SERPL-SCNC: 99 MEQ/L (ref 98–111)
CK SERPL-CCNC: 30 U/L (ref 55–170)
CO2 SERPL-SCNC: 15 MEQ/L (ref 23–33)
CO2 SERPL-SCNC: 19 MEQ/L (ref 23–33)
CO2 SERPL-SCNC: 19 MEQ/L (ref 23–33)
CO2 SERPL-SCNC: 20 MEQ/L (ref 23–33)
CO2 SERPL-SCNC: 21 MEQ/L (ref 23–33)
CO2 SERPL-SCNC: 22 MEQ/L (ref 23–33)
COLLECTED BY:: ABNORMAL
COLOR: ABNORMAL
CREAT SERPL-MCNC: 1 MG/DL (ref 0.4–1.2)
CREAT SERPL-MCNC: 1.1 MG/DL (ref 0.4–1.2)
CREAT SERPL-MCNC: 1.3 MG/DL (ref 0.4–1.2)
CREAT SERPL-MCNC: 1.5 MG/DL (ref 0.4–1.2)
CREAT SERPL-MCNC: 1.6 MG/DL (ref 0.4–1.2)
CREAT SERPL-MCNC: 1.7 MG/DL (ref 0.4–1.2)
CREAT SERPL-MCNC: 1.8 MG/DL (ref 0.4–1.2)
CREAT SERPL-MCNC: 1.8 MG/DL (ref 0.4–1.2)
CREAT SERPL-MCNC: 1.9 MG/DL (ref 0.4–1.2)
CREAT SERPL-MCNC: 1.9 MG/DL (ref 0.4–1.2)
CREAT SERPL-MCNC: 2.2 MG/DL (ref 0.4–1.2)
CREAT SERPL-MCNC: 2.3 MG/DL (ref 0.4–1.2)
CREAT UR-MCNC: 68.9 MG/DL
CRP SERPL-MCNC: 12.61 MG/DL (ref 0–1)
CRYSTALS URNS MICRO: ABNORMAL
DEPRECATED RDW RBC AUTO: 49.3 FL (ref 35–45)
DEPRECATED RDW RBC AUTO: 50.1 FL (ref 35–45)
DEPRECATED RDW RBC AUTO: 51.7 FL (ref 35–45)
DEPRECATED RDW RBC AUTO: 53.2 FL (ref 35–45)
DEVICE: ABNORMAL
ECHO BSA: 2.33 M2
EKG ATRIAL RATE: 136 BPM
EKG P-R INTERVAL: 88 MS
EKG Q-T INTERVAL: 268 MS
EKG Q-T INTERVAL: 310 MS
EKG Q-T INTERVAL: 310 MS
EKG Q-T INTERVAL: 354 MS
EKG QRS DURATION: 100 MS
EKG QRS DURATION: 104 MS
EKG QRS DURATION: 106 MS
EKG QRS DURATION: 114 MS
EKG QTC CALCULATION (BAZETT): 395 MS
EKG QTC CALCULATION (BAZETT): 442 MS
EKG QTC CALCULATION (BAZETT): 466 MS
EKG QTC CALCULATION (BAZETT): 474 MS
EKG R AXIS: 58 DEGREES
EKG R AXIS: 70 DEGREES
EKG R AXIS: 77 DEGREES
EKG R AXIS: 79 DEGREES
EKG T AXIS: -106 DEGREES
EKG T AXIS: -122 DEGREES
EKG T AXIS: -53 DEGREES
EKG T AXIS: -75 DEGREES
EKG VENTRICULAR RATE: 131 BPM
EKG VENTRICULAR RATE: 136 BPM
EKG VENTRICULAR RATE: 141 BPM
EKG VENTRICULAR RATE: 94 BPM
ENTEROBACTER CLOACAE COMPLEX BY PCR: NOT DETECTED
EOSINOPHIL NFR BLD AUTO: 0.1 %
EOSINOPHIL SMEAR, URINE: NORMAL
EOSINOPHILS ABSOLUTE: 0 THOU/MM3 (ref 0–0.4)
EPITHELIAL CELLS, UA: ABNORMAL /HPF
ERYTHROCYTE [DISTWIDTH] IN BLOOD BY AUTOMATED COUNT: 22.5 % (ref 11.5–14.5)
ERYTHROCYTE [DISTWIDTH] IN BLOOD BY AUTOMATED COUNT: 22.7 % (ref 11.5–14.5)
ERYTHROCYTE [DISTWIDTH] IN BLOOD BY AUTOMATED COUNT: 23 % (ref 11.5–14.5)
ERYTHROCYTE [DISTWIDTH] IN BLOOD BY AUTOMATED COUNT: 23.5 % (ref 11.5–14.5)
ERYTHROCYTE [SEDIMENTATION RATE] IN BLOOD BY WESTERGREN METHOD: 14 MM/HR (ref 0–10)
ESCHERICHIA COLI BY PCR: NOT DETECTED
FERRITIN SERPL IA-MCNC: 47 NG/ML (ref 22–322)
FIBRINOGEN PPP-MCNC: 313 MG/100ML (ref 155–475)
FIO2 ON VENT O2 ANALYZER: 4 %
FLUAV RNA LOWER RESP QL NAA+NON-PROBE: NOT DETECTED
FLUBV RNA LOWER RESP QL NAA+NON-PROBE: NOT DETECTED
FOLATE SERPL-MCNC: 11.7 NG/ML (ref 4.8–24.2)
FSP PPP LA-ACNC: ABNORMAL MCG/ML
GFR SERPL CREATININE-BSD FRML MDRD: 30 ML/MIN/1.73M2
GFR SERPL CREATININE-BSD FRML MDRD: 32 ML/MIN/1.73M2
GFR SERPL CREATININE-BSD FRML MDRD: 38 ML/MIN/1.73M2
GFR SERPL CREATININE-BSD FRML MDRD: 38 ML/MIN/1.73M2
GFR SERPL CREATININE-BSD FRML MDRD: 41 ML/MIN/1.73M2
GFR SERPL CREATININE-BSD FRML MDRD: 41 ML/MIN/1.73M2
GFR SERPL CREATININE-BSD FRML MDRD: 44 ML/MIN/1.73M2
GFR SERPL CREATININE-BSD FRML MDRD: 47 ML/MIN/1.73M2
GFR SERPL CREATININE-BSD FRML MDRD: 51 ML/MIN/1.73M2
GFR SERPL CREATININE-BSD FRML MDRD: 60 ML/MIN/1.73M2
GFR SERPL CREATININE-BSD FRML MDRD: 74 ML/MIN/1.73M2
GFR SERPL CREATININE-BSD FRML MDRD: 82 ML/MIN/1.73M2
GLUCOSE BLD STRIP.AUTO-MCNC: 179 MG/DL (ref 70–108)
GLUCOSE BLD STRIP.AUTO-MCNC: 210 MG/DL (ref 70–108)
GLUCOSE BLD STRIP.AUTO-MCNC: 230 MG/DL (ref 70–108)
GLUCOSE BLD STRIP.AUTO-MCNC: 243 MG/DL (ref 70–108)
GLUCOSE BLD STRIP.AUTO-MCNC: 249 MG/DL (ref 70–108)
GLUCOSE BLD STRIP.AUTO-MCNC: 251 MG/DL (ref 70–108)
GLUCOSE BLD STRIP.AUTO-MCNC: 254 MG/DL (ref 70–108)
GLUCOSE BLD STRIP.AUTO-MCNC: 261 MG/DL (ref 70–108)
GLUCOSE BLD STRIP.AUTO-MCNC: 332 MG/DL (ref 70–108)
GLUCOSE BLD-MCNC: 288 MG/DL (ref 70–108)
GLUCOSE BLD-MCNC: 303 MG/DL (ref 70–108)
GLUCOSE SERPL-MCNC: 157 MG/DL (ref 70–108)
GLUCOSE SERPL-MCNC: 175 MG/DL (ref 70–108)
GLUCOSE SERPL-MCNC: 235 MG/DL (ref 70–108)
GLUCOSE SERPL-MCNC: 236 MG/DL (ref 70–108)
GLUCOSE SERPL-MCNC: 239 MG/DL (ref 70–108)
GLUCOSE SERPL-MCNC: 242 MG/DL (ref 70–108)
GLUCOSE SERPL-MCNC: 243 MG/DL (ref 70–108)
GLUCOSE SERPL-MCNC: 247 MG/DL (ref 70–108)
GLUCOSE SERPL-MCNC: 266 MG/DL (ref 70–108)
GLUCOSE SERPL-MCNC: 281 MG/DL (ref 70–108)
GLUCOSE SERPL-MCNC: 298 MG/DL (ref 70–108)
GLUCOSE SERPL-MCNC: 306 MG/DL (ref 70–108)
GLUCOSE UR QL STRIP.AUTO: >= 1000 MG/DL
HADV DNA LOWER RESP QL NAA+NON-PROBE: NOT DETECTED
HAEMOPHILUS INFLUENZAE BY PCR: NOT DETECTED
HAV IGM SER QL: NEGATIVE
HBV CORE IGM SERPL QL IA: NEGATIVE
HBV SURFACE AG SERPL QL IA: NEGATIVE
HCO3 BLDA-SCNC: 20 MMOL/L (ref 23–28)
HCO3 BLDA-SCNC: 21 MMOL/L (ref 23–28)
HCO3 BLDA-SCNC: 23 MMOL/L (ref 23–28)
HCOV RNA LOWER RESP QL NAA+NON-PROBE: NOT DETECTED
HCT VFR BLD AUTO: 28.9 % (ref 42–52)
HCT VFR BLD AUTO: 33.4 % (ref 42–52)
HCT VFR BLD AUTO: 33.8 % (ref 42–52)
HCT VFR BLD AUTO: 35.3 % (ref 42–52)
HCV IGG SERPL QL IA: NEGATIVE
HEPARIN UNFRACTIONATED: < 0.04 U/ML (ref 0.3–0.7)
HGB BLD-MCNC: 7.3 GM/DL (ref 14–18)
HGB BLD-MCNC: 8.3 GM/DL (ref 14–18)
HGB BLD-MCNC: 8.8 GM/DL (ref 14–18)
HGB BLD-MCNC: 9 GM/DL (ref 14–18)
HGB RETIC QN AUTO: 16.8 PG (ref 28.2–35.7)
HGB UR QL STRIP.AUTO: NEGATIVE
HMPV RNA LOWER RESP QL NAA+NON-PROBE: NOT DETECTED
HPIV RNA LOWER RESP QL NAA+NON-PROBE: NOT DETECTED
HYPOCHROMIA BLD QL SMEAR: PRESENT
IMM GRANULOCYTES # BLD AUTO: 0.12 THOU/MM3 (ref 0–0.07)
IMM GRANULOCYTES NFR BLD AUTO: 0.7 %
IMM RETICS NFR: 28.5 % (ref 2.3–13.4)
INR PPP: 2.19 (ref 0.85–1.13)
INR PPP: 2.52 (ref 0.85–1.13)
INR PPP: 2.59 (ref 0.85–1.13)
IRON SATN MFR SERPL: 3 % (ref 20–50)
IRON SERPL-MCNC: 11 UG/DL (ref 65–195)
KETONES UR QL STRIP.AUTO: ABNORMAL
KLEBSIELLA AEROGENES BY PCR: NOT DETECTED
KLEBSIELLA OXYTOCA BY PCR: NOT DETECTED
KLEBSIELLA PNEUMONIAE GROUP BY PCR: NOT DETECTED
L PNEUMO DNA LOWER RESP QL NAA+NON-PROBE: NOT DETECTED
LACTATE SERPL-SCNC: 1.8 MMOL/L (ref 0.5–2)
LACTATE SERPL-SCNC: 2.1 MMOL/L (ref 0.5–2)
LACTATE SERPL-SCNC: 2.6 MMOL/L (ref 0.5–2)
LACTATE SERPL-SCNC: 3.1 MMOL/L (ref 0.5–2)
LACTATE SERPL-SCNC: 4.1 MMOL/L (ref 0.5–2)
LACTATE SERPL-SCNC: 4.5 MMOL/L (ref 0.5–2)
LACTATE SERPL-SCNC: 5.1 MMOL/L (ref 0.5–2)
LACTATE SERPL-SCNC: 6.1 MMOL/L (ref 0.5–2)
LACTIC ACID, SEPSIS: 3.7 MMOL/L (ref 0.5–1.9)
LACTIC ACID, SEPSIS: 4 MMOL/L (ref 0.5–1.9)
LACTIC ACID, SEPSIS: 4.5 MMOL/L (ref 0.5–1.9)
LACTIC ACID, SEPSIS: 5.1 MMOL/L (ref 0.5–1.9)
LIPASE SERPL-CCNC: 28.7 U/L (ref 5.6–51.3)
LYMPHOCYTES ABSOLUTE: 1.9 THOU/MM3 (ref 1–4.8)
LYMPHOCYTES NFR BLD AUTO: 10.8 %
M PNEUMO DNA LOWER RESP QL NAA+NON-PROBE: NOT DETECTED
MAGNESIUM SERPL-MCNC: 1.8 MG/DL (ref 1.6–2.4)
MAGNESIUM SERPL-MCNC: 1.8 MG/DL (ref 1.6–2.4)
MAGNESIUM SERPL-MCNC: 1.9 MG/DL (ref 1.6–2.4)
MAGNESIUM SERPL-MCNC: 2 MG/DL (ref 1.6–2.4)
MAGNESIUM SERPL-MCNC: 2.1 MG/DL (ref 1.6–2.4)
MCH RBC QN AUTO: 16.9 PG (ref 26–33)
MCH RBC QN AUTO: 17 PG (ref 26–33)
MCH RBC QN AUTO: 17.1 PG (ref 26–33)
MCH RBC QN AUTO: 17.3 PG (ref 26–33)
MCHC RBC AUTO-ENTMCNC: 24.9 GM/DL (ref 32.2–35.5)
MCHC RBC AUTO-ENTMCNC: 25.3 GM/DL (ref 32.2–35.5)
MCHC RBC AUTO-ENTMCNC: 25.5 GM/DL (ref 32.2–35.5)
MCHC RBC AUTO-ENTMCNC: 26 GM/DL (ref 32.2–35.5)
MCV RBC AUTO: 66.3 FL (ref 80–94)
MCV RBC AUTO: 66.7 FL (ref 80–94)
MCV RBC AUTO: 66.9 FL (ref 80–94)
MCV RBC AUTO: 69 FL (ref 80–94)
MICROCYTES BLD QL SMEAR: PRESENT
MISCELLANEOUS 2: ABNORMAL
MONOCYTES ABSOLUTE: 1.5 THOU/MM3 (ref 0.4–1.3)
MONOCYTES NFR BLD AUTO: 9 %
MORAXELLA CATARRHALIS BY PCR: NOT DETECTED
MRSA DNA SPEC QL NAA+PROBE: POSITIVE
NEUTROPHILS ABSOLUTE: 13.6 THOU/MM3 (ref 1.8–7.7)
NEUTROPHILS NFR BLD AUTO: 79.2 %
NITRITE UR QL STRIP: NEGATIVE
NRBC BLD AUTO-RTO: 1 /100 WBC
NT-PROBNP SERPL IA-MCNC: ABNORMAL PG/ML (ref 0–124)
ORGANISM: ABNORMAL
OSMOLALITY SERPL CALC.SUM OF ELEC: 284.6 MOSMOL/KG (ref 275–300)
PCO2 BLDA: 40 MMHG (ref 35–45)
PCO2 BLDA: 43 MMHG (ref 35–45)
PCO2 TEMP ADJ BLDMV: 48 MMHG (ref 41–51)
PH BLDA: 7.33 [PH] (ref 7.35–7.45)
PH BLDA: 7.33 [PH] (ref 7.35–7.45)
PH BLDMV: 7.22 [PH] (ref 7.31–7.41)
PH UR STRIP.AUTO: 5 [PH] (ref 5–9)
PHOSPHATE SERPL-MCNC: 4.9 MG/DL (ref 2.4–4.7)
PHOSPHATE SERPL-MCNC: 5.7 MG/DL (ref 2.4–4.7)
PLATELET # BLD AUTO: 346 THOU/MM3 (ref 130–400)
PLATELET # BLD AUTO: 458 THOU/MM3 (ref 130–400)
PLATELET # BLD AUTO: 462 THOU/MM3 (ref 130–400)
PLATELET # BLD AUTO: 472 THOU/MM3 (ref 130–400)
PMV BLD AUTO: 10.3 FL (ref 9.4–12.4)
PMV BLD AUTO: 10.4 FL (ref 9.4–12.4)
PMV BLD AUTO: 10.4 FL (ref 9.4–12.4)
PMV BLD AUTO: 9.9 FL (ref 9.4–12.4)
PO2 BLDA: 63 MMHG (ref 71–104)
PO2 BLDA: 66 MMHG (ref 71–104)
PO2 BLDMV: 46 MMHG (ref 25–40)
POTASSIUM SERPL-SCNC: 4.3 MEQ/L (ref 3.5–5.2)
POTASSIUM SERPL-SCNC: 4.6 MEQ/L (ref 3.5–5.2)
POTASSIUM SERPL-SCNC: 4.7 MEQ/L (ref 3.5–5.2)
POTASSIUM SERPL-SCNC: 4.9 MEQ/L (ref 3.5–5.2)
POTASSIUM SERPL-SCNC: 5 MEQ/L (ref 3.5–5.2)
POTASSIUM SERPL-SCNC: 5 MEQ/L (ref 3.5–5.2)
POTASSIUM SERPL-SCNC: 5.2 MEQ/L (ref 3.5–5.2)
POTASSIUM SERPL-SCNC: 5.3 MEQ/L (ref 3.5–5.2)
POTASSIUM SERPL-SCNC: 5.6 MEQ/L (ref 3.5–5.2)
POTASSIUM SERPL-SCNC: 5.7 MEQ/L (ref 3.5–5.2)
PROT SERPL-MCNC: 7.3 G/DL (ref 6.1–8)
PROT SERPL-MCNC: 7.3 G/DL (ref 6.1–8)
PROT SERPL-MCNC: 7.5 G/DL (ref 6.1–8)
PROT UR STRIP.AUTO-MCNC: 30 MG/DL
PROTEUS SPECIES BY PCR: NOT DETECTED
PSEUDOMONAS AERUGINOSA BY PCR: NOT DETECTED
RBC # BLD AUTO: 4.33 MILL/MM3 (ref 4.7–6.1)
RBC # BLD AUTO: 4.84 MILL/MM3 (ref 4.7–6.1)
RBC # BLD AUTO: 5.1 MILL/MM3 (ref 4.7–6.1)
RBC # BLD AUTO: 5.28 MILL/MM3 (ref 4.7–6.1)
RBC URINE: ABNORMAL /HPF
REASON FOR REJECTION: NORMAL
REJECTED TEST: NORMAL
RENAL EPI CELLS #/AREA URNS HPF: ABNORMAL /[HPF]
RESISTANT GENE MECA/C & MREJ BY PCR: NORMAL
RESISTANT GENE NDM BY PCR: NORMAL
RETICS # AUTO: 123 THOU/MM3 (ref 20–115)
RETICS/RBC NFR AUTO: 2.5 % (ref 0.5–2)
RSV RNA LOWER RESP QL NAA+NON-PROBE: NOT DETECTED
RV+EV RNA LOWER RESP QL NAA+NON-PROBE: NOT DETECTED
SAO2 % BLDA: 90 %
SAO2 % BLDA: 91 %
SAO2 % BLDMV: 73 %
SCAN OF BLOOD SMEAR: NORMAL
SERRATIA MARCESCENS BY PCR: NOT DETECTED
SODIUM SERPL-SCNC: 135 MEQ/L (ref 135–145)
SODIUM SERPL-SCNC: 135 MEQ/L (ref 135–145)
SODIUM SERPL-SCNC: 136 MEQ/L (ref 135–145)
SODIUM SERPL-SCNC: 136 MEQ/L (ref 135–145)
SODIUM SERPL-SCNC: 137 MEQ/L (ref 135–145)
SODIUM SERPL-SCNC: 138 MEQ/L (ref 135–145)
SODIUM SERPL-SCNC: 139 MEQ/L (ref 135–145)
SODIUM UR-SCNC: 32 MEQ/L
SOURCE: NORMAL
SP GR UR REFRACT.AUTO: 1.03 (ref 1–1.03)
SPECIMEN ACCEPTABILITY: NORMAL
STAPH AUREUS BY PCR: NOT DETECTED
STREP AGALACTIAE BY PCR: NOT DETECTED
STREP PNEUMONIAE BY PCR: NOT DETECTED
STREP PYOGENES BY PCR: NOT DETECTED
TIBC SERPL-MCNC: 345 UG/DL (ref 171–450)
TROPONIN, HIGH SENSITIVITY: 29 NG/L (ref 0–12)
TROPONIN, HIGH SENSITIVITY: 30 NG/L (ref 0–12)
TROPONIN, HIGH SENSITIVITY: 31 NG/L (ref 0–12)
UROBILINOGEN, URINE: 1 EU/DL (ref 0–1)
UUN 24H UR-MCNC: 280 MG/DL
VANCOMYCIN SERPL-MCNC: 28.7 UG/ML (ref 0.1–39.9)
VIT B12 SERPL-MCNC: 904 PG/ML (ref 211–911)
WBC # BLD AUTO: 17.2 THOU/MM3 (ref 4.8–10.8)
WBC # BLD AUTO: 22.7 THOU/MM3 (ref 4.8–10.8)
WBC # BLD AUTO: 25.6 THOU/MM3 (ref 4.8–10.8)
WBC # BLD AUTO: 27.5 THOU/MM3 (ref 4.8–10.8)
WBC #/AREA URNS HPF: ABNORMAL /HPF
WBC #/AREA URNS HPF: ABNORMAL /[HPF]
YEAST LIKE FUNGI URNS QL MICRO: ABNORMAL

## 2024-01-01 PROCEDURE — 85025 COMPLETE CBC W/AUTO DIFF WBC: CPT

## 2024-01-01 PROCEDURE — 82803 BLOOD GASES ANY COMBINATION: CPT

## 2024-01-01 PROCEDURE — 36415 COLL VENOUS BLD VENIPUNCTURE: CPT

## 2024-01-01 PROCEDURE — 2500000003 HC RX 250 WO HCPCS

## 2024-01-01 PROCEDURE — 37799 UNLISTED PX VASCULAR SURGERY: CPT

## 2024-01-01 PROCEDURE — 93005 ELECTROCARDIOGRAM TRACING: CPT

## 2024-01-01 PROCEDURE — 6370000000 HC RX 637 (ALT 250 FOR IP)

## 2024-01-01 PROCEDURE — 82728 ASSAY OF FERRITIN: CPT

## 2024-01-01 PROCEDURE — 71045 X-RAY EXAM CHEST 1 VIEW: CPT

## 2024-01-01 PROCEDURE — 94640 AIRWAY INHALATION TREATMENT: CPT

## 2024-01-01 PROCEDURE — 0BH17EZ INSERTION OF ENDOTRACHEAL AIRWAY INTO TRACHEA, VIA NATURAL OR ARTIFICIAL OPENING: ICD-10-PCS | Performed by: HOSPITALIST

## 2024-01-01 PROCEDURE — 87070 CULTURE OTHR SPECIMN AEROBIC: CPT

## 2024-01-01 PROCEDURE — 82948 REAGENT STRIP/BLOOD GLUCOSE: CPT

## 2024-01-01 PROCEDURE — 87086 URINE CULTURE/COLONY COUNT: CPT

## 2024-01-01 PROCEDURE — 6370000000 HC RX 637 (ALT 250 FOR IP): Performed by: FAMILY MEDICINE

## 2024-01-01 PROCEDURE — 87486 CHLMYD PNEUM DNA AMP PROBE: CPT

## 2024-01-01 PROCEDURE — 82330 ASSAY OF CALCIUM: CPT

## 2024-01-01 PROCEDURE — 72158 MRI LUMBAR SPINE W/O & W/DYE: CPT

## 2024-01-01 PROCEDURE — 80074 ACUTE HEPATITIS PANEL: CPT

## 2024-01-01 PROCEDURE — 36620 INSERTION CATHETER ARTERY: CPT

## 2024-01-01 PROCEDURE — 2580000003 HC RX 258

## 2024-01-01 PROCEDURE — 6360000002 HC RX W HCPCS: Performed by: PHARMACIST

## 2024-01-01 PROCEDURE — 6360000002 HC RX W HCPCS

## 2024-01-01 PROCEDURE — 87106 FUNGI IDENTIFICATION YEAST: CPT

## 2024-01-01 PROCEDURE — 94002 VENT MGMT INPAT INIT DAY: CPT

## 2024-01-01 PROCEDURE — 85730 THROMBOPLASTIN TIME PARTIAL: CPT

## 2024-01-01 PROCEDURE — P9047 ALBUMIN (HUMAN), 25%, 50ML: HCPCS

## 2024-01-01 PROCEDURE — 5A1935Z RESPIRATORY VENTILATION, LESS THAN 24 CONSECUTIVE HOURS: ICD-10-PCS | Performed by: HOSPITALIST

## 2024-01-01 PROCEDURE — 87581 M.PNEUMON DNA AMP PROBE: CPT

## 2024-01-01 PROCEDURE — 99232 SBSQ HOSP IP/OBS MODERATE 35: CPT | Performed by: PHYSICIAN ASSISTANT

## 2024-01-01 PROCEDURE — 2140000000 HC CCU INTERMEDIATE R&B

## 2024-01-01 PROCEDURE — 87798 DETECT AGENT NOS DNA AMP: CPT

## 2024-01-01 PROCEDURE — 87077 CULTURE AEROBIC IDENTIFY: CPT

## 2024-01-01 PROCEDURE — 74178 CT ABD&PLV WO CNTR FLWD CNTR: CPT

## 2024-01-01 PROCEDURE — 96365 THER/PROPH/DIAG IV INF INIT: CPT

## 2024-01-01 PROCEDURE — 94761 N-INVAS EAR/PLS OXIMETRY MLT: CPT

## 2024-01-01 PROCEDURE — 2580000003 HC RX 258: Performed by: PHARMACIST

## 2024-01-01 PROCEDURE — 83605 ASSAY OF LACTIC ACID: CPT

## 2024-01-01 PROCEDURE — 93010 ELECTROCARDIOGRAM REPORT: CPT | Performed by: INTERNAL MEDICINE

## 2024-01-01 PROCEDURE — 85027 COMPLETE CBC AUTOMATED: CPT

## 2024-01-01 PROCEDURE — 87147 CULTURE TYPE IMMUNOLOGIC: CPT

## 2024-01-01 PROCEDURE — 82248 BILIRUBIN DIRECT: CPT

## 2024-01-01 PROCEDURE — 6360000002 HC RX W HCPCS: Performed by: FAMILY MEDICINE

## 2024-01-01 PROCEDURE — 2100000000 HC CCU R&B

## 2024-01-01 PROCEDURE — 2700000000 HC OXYGEN THERAPY PER DAY

## 2024-01-01 PROCEDURE — 83540 ASSAY OF IRON: CPT

## 2024-01-01 PROCEDURE — 99223 1ST HOSP IP/OBS HIGH 75: CPT | Performed by: FAMILY MEDICINE

## 2024-01-01 PROCEDURE — 99291 CRITICAL CARE FIRST HOUR: CPT | Performed by: INTERNAL MEDICINE

## 2024-01-01 PROCEDURE — 36600 WITHDRAWAL OF ARTERIAL BLOOD: CPT

## 2024-01-01 PROCEDURE — 80202 ASSAY OF VANCOMYCIN: CPT

## 2024-01-01 PROCEDURE — 81001 URINALYSIS AUTO W/SCOPE: CPT

## 2024-01-01 PROCEDURE — 83880 ASSAY OF NATRIURETIC PEPTIDE: CPT

## 2024-01-01 PROCEDURE — 85610 PROTHROMBIN TIME: CPT

## 2024-01-01 PROCEDURE — 87541 LEGION PNEUMO DNA AMP PROB: CPT

## 2024-01-01 PROCEDURE — 83550 IRON BINDING TEST: CPT

## 2024-01-01 PROCEDURE — 93971 EXTREMITY STUDY: CPT

## 2024-01-01 PROCEDURE — 6360000004 HC RX CONTRAST MEDICATION

## 2024-01-01 PROCEDURE — 99223 1ST HOSP IP/OBS HIGH 75: CPT | Performed by: INTERNAL MEDICINE

## 2024-01-01 PROCEDURE — 84100 ASSAY OF PHOSPHORUS: CPT

## 2024-01-01 PROCEDURE — 86140 C-REACTIVE PROTEIN: CPT

## 2024-01-01 PROCEDURE — 85651 RBC SED RATE NONAUTOMATED: CPT

## 2024-01-01 PROCEDURE — 87205 SMEAR GRAM STAIN: CPT

## 2024-01-01 PROCEDURE — 87631 RESP VIRUS 3-5 TARGETS: CPT

## 2024-01-01 PROCEDURE — 83735 ASSAY OF MAGNESIUM: CPT

## 2024-01-01 PROCEDURE — 85362 FIBRIN DEGRADATION PRODUCTS: CPT

## 2024-01-01 PROCEDURE — 83690 ASSAY OF LIPASE: CPT

## 2024-01-01 PROCEDURE — 76770 US EXAM ABDO BACK WALL COMP: CPT

## 2024-01-01 PROCEDURE — 6360000002 HC RX W HCPCS: Performed by: EMERGENCY MEDICINE

## 2024-01-01 PROCEDURE — 82607 VITAMIN B-12: CPT

## 2024-01-01 PROCEDURE — 2580000003 HC RX 258: Performed by: EMERGENCY MEDICINE

## 2024-01-01 PROCEDURE — 84484 ASSAY OF TROPONIN QUANT: CPT

## 2024-01-01 PROCEDURE — 80053 COMPREHEN METABOLIC PANEL: CPT

## 2024-01-01 PROCEDURE — 82746 ASSAY OF FOLIC ACID SERUM: CPT

## 2024-01-01 PROCEDURE — 3E033XZ INTRODUCTION OF VASOPRESSOR INTO PERIPHERAL VEIN, PERCUTANEOUS APPROACH: ICD-10-PCS

## 2024-01-01 PROCEDURE — 02HV33Z INSERTION OF INFUSION DEVICE INTO SUPERIOR VENA CAVA, PERCUTANEOUS APPROACH: ICD-10-PCS | Performed by: SPECIALIST

## 2024-01-01 PROCEDURE — 03HY32Z INSERTION OF MONITORING DEVICE INTO UPPER ARTERY, PERCUTANEOUS APPROACH: ICD-10-PCS | Performed by: STUDENT IN AN ORGANIZED HEALTH CARE EDUCATION/TRAINING PROGRAM

## 2024-01-01 PROCEDURE — 80048 BASIC METABOLIC PNL TOTAL CA: CPT

## 2024-01-01 PROCEDURE — 82570 ASSAY OF URINE CREATININE: CPT

## 2024-01-01 PROCEDURE — 51798 US URINE CAPACITY MEASURE: CPT

## 2024-01-01 PROCEDURE — 84300 ASSAY OF URINE SODIUM: CPT

## 2024-01-01 PROCEDURE — 84540 ASSAY OF URINE/UREA-N: CPT

## 2024-01-01 PROCEDURE — 99285 EMERGENCY DEPT VISIT HI MDM: CPT

## 2024-01-01 PROCEDURE — 89190 NASAL SMEAR FOR EOSINOPHILS: CPT

## 2024-01-01 PROCEDURE — 93005 ELECTROCARDIOGRAM TRACING: CPT | Performed by: INTERNAL MEDICINE

## 2024-01-01 PROCEDURE — 94003 VENT MGMT INPAT SUBQ DAY: CPT

## 2024-01-01 PROCEDURE — 51702 INSERT TEMP BLADDER CATH: CPT

## 2024-01-01 PROCEDURE — 87075 CULTR BACTERIA EXCEPT BLOOD: CPT

## 2024-01-01 PROCEDURE — 87040 BLOOD CULTURE FOR BACTERIA: CPT

## 2024-01-01 PROCEDURE — 85384 FIBRINOGEN ACTIVITY: CPT

## 2024-01-01 PROCEDURE — A9579 GAD-BASE MR CONTRAST NOS,1ML: HCPCS

## 2024-01-01 PROCEDURE — 82947 ASSAY GLUCOSE BLOOD QUANT: CPT

## 2024-01-01 PROCEDURE — 87641 MR-STAPH DNA AMP PROBE: CPT

## 2024-01-01 PROCEDURE — 31500 INSERT EMERGENCY AIRWAY: CPT

## 2024-01-01 PROCEDURE — 85046 RETICYTE/HGB CONCENTRATE: CPT

## 2024-01-01 PROCEDURE — 82550 ASSAY OF CK (CPK): CPT

## 2024-01-01 PROCEDURE — 87186 SC STD MICRODIL/AGAR DIL: CPT

## 2024-01-01 PROCEDURE — 6370000000 HC RX 637 (ALT 250 FOR IP): Performed by: INTERNAL MEDICINE

## 2024-01-01 PROCEDURE — 2500000003 HC RX 250 WO HCPCS: Performed by: FAMILY MEDICINE

## 2024-01-01 RX ORDER — FLUCONAZOLE 2 MG/ML
200 INJECTION, SOLUTION INTRAVENOUS EVERY 24 HOURS
Status: DISCONTINUED | OUTPATIENT
Start: 2024-01-01 | End: 2024-01-01

## 2024-01-01 RX ORDER — PANTOPRAZOLE SODIUM 40 MG/1
40 TABLET, DELAYED RELEASE ORAL DAILY
Status: DISCONTINUED | OUTPATIENT
Start: 2024-01-01 | End: 2024-01-01 | Stop reason: SDUPTHER

## 2024-01-01 RX ORDER — CLINDAMYCIN PHOSPHATE 900 MG/50ML
900 INJECTION, SOLUTION INTRAVENOUS EVERY 8 HOURS
Status: DISCONTINUED | OUTPATIENT
Start: 2024-01-01 | End: 2024-01-01

## 2024-01-01 RX ORDER — NICOTINE 21 MG/24HR
1 PATCH, TRANSDERMAL 24 HOURS TRANSDERMAL DAILY
Status: DISCONTINUED | OUTPATIENT
Start: 2024-01-01 | End: 2024-01-01

## 2024-01-01 RX ORDER — LORAZEPAM 2 MG/ML
1 INJECTION INTRAMUSCULAR EVERY 6 HOURS PRN
Status: DISCONTINUED | OUTPATIENT
Start: 2024-01-01 | End: 2024-01-01

## 2024-01-01 RX ORDER — PROPOFOL 10 MG/ML
5-50 INJECTION, EMULSION INTRAVENOUS CONTINUOUS
Status: DISCONTINUED | OUTPATIENT
Start: 2024-01-01 | End: 2024-05-25 | Stop reason: HOSPADM

## 2024-01-01 RX ORDER — TRAZODONE HYDROCHLORIDE 100 MG/1
100 TABLET ORAL
COMMUNITY
Start: 2024-01-01

## 2024-01-01 RX ORDER — ALBUTEROL SULFATE 90 UG/1
2 AEROSOL, METERED RESPIRATORY (INHALATION)
Status: DISCONTINUED | OUTPATIENT
Start: 2024-01-01 | End: 2024-01-01

## 2024-01-01 RX ORDER — SPIRONOLACTONE 25 MG/1
25 TABLET ORAL DAILY
COMMUNITY
Start: 2023-01-01

## 2024-01-01 RX ORDER — FENTANYL CITRATE 50 UG/ML
100 INJECTION, SOLUTION INTRAMUSCULAR; INTRAVENOUS ONCE
Status: COMPLETED | OUTPATIENT
Start: 2024-01-01 | End: 2024-01-01

## 2024-01-01 RX ORDER — ACETAMINOPHEN 650 MG/1
650 SUPPOSITORY RECTAL EVERY 6 HOURS PRN
Status: DISCONTINUED | OUTPATIENT
Start: 2024-01-01 | End: 2024-01-01

## 2024-01-01 RX ORDER — ALBUTEROL SULFATE 90 UG/1
2 AEROSOL, METERED RESPIRATORY (INHALATION) EVERY 4 HOURS PRN
Status: DISCONTINUED | OUTPATIENT
Start: 2024-01-01 | End: 2024-01-01

## 2024-01-01 RX ORDER — GLYCOPYRROLATE 0.2 MG/ML
0.2 INJECTION INTRAMUSCULAR; INTRAVENOUS EVERY 4 HOURS PRN
Status: DISCONTINUED | OUTPATIENT
Start: 2024-01-01 | End: 2024-01-01

## 2024-01-01 RX ORDER — ACETAMINOPHEN 650 MG
TABLET, EXTENDED RELEASE ORAL DAILY
Status: DISCONTINUED | OUTPATIENT
Start: 2024-01-01 | End: 2024-01-01

## 2024-01-01 RX ORDER — GABAPENTIN 100 MG/1
100 CAPSULE ORAL 2 TIMES DAILY
Status: DISCONTINUED | OUTPATIENT
Start: 2024-01-01 | End: 2024-01-01

## 2024-01-01 RX ORDER — LANSOPRAZOLE 30 MG/1
30 TABLET, ORALLY DISINTEGRATING, DELAYED RELEASE ORAL
Status: DISCONTINUED | OUTPATIENT
Start: 2024-01-01 | End: 2024-01-01

## 2024-01-01 RX ORDER — MIDAZOLAM HYDROCHLORIDE 1 MG/ML
4 INJECTION INTRAMUSCULAR; INTRAVENOUS ONCE
Status: COMPLETED | OUTPATIENT
Start: 2024-01-01 | End: 2024-01-01

## 2024-01-01 RX ORDER — METOPROLOL TARTRATE 1 MG/ML
5 INJECTION, SOLUTION INTRAVENOUS EVERY 6 HOURS PRN
Status: DISCONTINUED | OUTPATIENT
Start: 2024-01-01 | End: 2024-01-01

## 2024-01-01 RX ORDER — CLOPIDOGREL BISULFATE 75 MG/1
75 TABLET ORAL DAILY
COMMUNITY
Start: 2024-01-01

## 2024-01-01 RX ORDER — SODIUM CHLORIDE 9 MG/ML
INJECTION, SOLUTION INTRAVENOUS PRN
Status: DISCONTINUED | OUTPATIENT
Start: 2024-01-01 | End: 2024-01-01

## 2024-01-01 RX ORDER — DEXAMETHASONE SODIUM PHOSPHATE 4 MG/ML
10 INJECTION, SOLUTION INTRA-ARTICULAR; INTRALESIONAL; INTRAMUSCULAR; INTRAVENOUS; SOFT TISSUE ONCE
Status: COMPLETED | OUTPATIENT
Start: 2024-01-01 | End: 2024-01-01

## 2024-01-01 RX ORDER — CALCIUM GLUCONATE 20 MG/ML
2000 INJECTION, SOLUTION INTRAVENOUS ONCE
Status: DISCONTINUED | OUTPATIENT
Start: 2024-01-01 | End: 2024-01-01

## 2024-01-01 RX ORDER — INSULIN LISPRO 100 [IU]/ML
0-4 INJECTION, SOLUTION INTRAVENOUS; SUBCUTANEOUS NIGHTLY
Status: DISCONTINUED | OUTPATIENT
Start: 2024-01-01 | End: 2024-01-01 | Stop reason: SDUPTHER

## 2024-01-01 RX ORDER — METOPROLOL TARTRATE 50 MG/1
50 TABLET, FILM COATED ORAL 2 TIMES DAILY
Status: DISCONTINUED | OUTPATIENT
Start: 2024-01-01 | End: 2024-01-01

## 2024-01-01 RX ORDER — INSULIN LISPRO 100 [IU]/ML
0-16 INJECTION, SOLUTION INTRAVENOUS; SUBCUTANEOUS EVERY 6 HOURS
Status: DISCONTINUED | OUTPATIENT
Start: 2024-01-01 | End: 2024-01-01

## 2024-01-01 RX ORDER — SODIUM HYPOCHLORITE 1.25 MG/ML
SOLUTION TOPICAL 2 TIMES DAILY
Status: DISCONTINUED | OUTPATIENT
Start: 2024-01-01 | End: 2024-01-01

## 2024-01-01 RX ORDER — SODIUM CHLORIDE 0.9 % (FLUSH) 0.9 %
5-40 SYRINGE (ML) INJECTION PRN
Status: DISCONTINUED | OUTPATIENT
Start: 2024-01-01 | End: 2024-01-01

## 2024-01-01 RX ORDER — METOPROLOL TARTRATE 1 MG/ML
10 INJECTION, SOLUTION INTRAVENOUS EVERY 6 HOURS PRN
Status: DISCONTINUED | OUTPATIENT
Start: 2024-01-01 | End: 2024-01-01 | Stop reason: SDUPTHER

## 2024-01-01 RX ORDER — BUMETANIDE 0.25 MG/ML
0.5 INJECTION INTRAMUSCULAR; INTRAVENOUS ONCE
Status: DISCONTINUED | OUTPATIENT
Start: 2024-01-01 | End: 2024-01-01

## 2024-01-01 RX ORDER — DEXAMETHASONE SODIUM PHOSPHATE 4 MG/ML
10 INJECTION, SOLUTION INTRA-ARTICULAR; INTRALESIONAL; INTRAMUSCULAR; INTRAVENOUS; SOFT TISSUE EVERY 6 HOURS
Status: DISCONTINUED | OUTPATIENT
Start: 2024-01-01 | End: 2024-01-01

## 2024-01-01 RX ORDER — DEXTROSE MONOHYDRATE 100 MG/ML
INJECTION, SOLUTION INTRAVENOUS CONTINUOUS PRN
Status: DISCONTINUED | OUTPATIENT
Start: 2024-01-01 | End: 2024-01-01

## 2024-01-01 RX ORDER — MILRINONE LACTATE 0.2 MG/ML
.0625-.75 INJECTION, SOLUTION INTRAVENOUS CONTINUOUS
Status: DISCONTINUED | OUTPATIENT
Start: 2024-01-01 | End: 2024-01-01

## 2024-01-01 RX ORDER — ALBUMIN (HUMAN) 12.5 G/50ML
25 SOLUTION INTRAVENOUS EVERY 6 HOURS
Status: DISCONTINUED | OUTPATIENT
Start: 2024-01-01 | End: 2024-01-01

## 2024-01-01 RX ORDER — MIDODRINE HYDROCHLORIDE 10 MG/1
10 TABLET ORAL ONCE
Status: COMPLETED | OUTPATIENT
Start: 2024-01-01 | End: 2024-01-01

## 2024-01-01 RX ORDER — HEPARIN SODIUM 1000 [USP'U]/ML
2000 INJECTION, SOLUTION INTRAVENOUS; SUBCUTANEOUS PRN
Status: DISCONTINUED | OUTPATIENT
Start: 2024-01-01 | End: 2024-01-01

## 2024-01-01 RX ORDER — LIDOCAINE HYDROCHLORIDE 20 MG/ML
JELLY TOPICAL PRN
Status: DISCONTINUED | OUTPATIENT
Start: 2024-01-01 | End: 2024-01-01

## 2024-01-01 RX ORDER — GLYCOPYRROLATE 0.2 MG/ML
0.2 INJECTION INTRAMUSCULAR; INTRAVENOUS EVERY 4 HOURS PRN
Status: DISCONTINUED | OUTPATIENT
Start: 2024-01-01 | End: 2024-05-25 | Stop reason: HOSPADM

## 2024-01-01 RX ORDER — CLINDAMYCIN PHOSPHATE 900 MG/50ML
900 INJECTION, SOLUTION INTRAVENOUS ONCE
Status: DISCONTINUED | OUTPATIENT
Start: 2024-01-01 | End: 2024-01-01 | Stop reason: SDUPTHER

## 2024-01-01 RX ORDER — ISOSORBIDE MONONITRATE 30 MG/1
30 TABLET, EXTENDED RELEASE ORAL DAILY
Status: DISCONTINUED | OUTPATIENT
Start: 2024-01-01 | End: 2024-01-01

## 2024-01-01 RX ORDER — SODIUM CHLORIDE 0.9 % (FLUSH) 0.9 %
5-40 SYRINGE (ML) INJECTION EVERY 12 HOURS SCHEDULED
Status: DISCONTINUED | OUTPATIENT
Start: 2024-01-01 | End: 2024-01-01

## 2024-01-01 RX ORDER — MORPHINE SULFATE 20 MG/ML
10 SOLUTION ORAL
Status: DISCONTINUED | OUTPATIENT
Start: 2024-01-01 | End: 2024-01-01

## 2024-01-01 RX ORDER — ACETAMINOPHEN 325 MG/1
650 TABLET ORAL EVERY 6 HOURS PRN
Status: DISCONTINUED | OUTPATIENT
Start: 2024-01-01 | End: 2024-01-01

## 2024-01-01 RX ORDER — CALCIUM GLUCONATE 20 MG/ML
2000 INJECTION, SOLUTION INTRAVENOUS PRN
Status: DISCONTINUED | OUTPATIENT
Start: 2024-01-01 | End: 2024-01-01

## 2024-01-01 RX ORDER — ATORVASTATIN CALCIUM 40 MG/1
40 TABLET, FILM COATED ORAL DAILY
Status: DISCONTINUED | OUTPATIENT
Start: 2024-01-01 | End: 2024-01-01

## 2024-01-01 RX ORDER — ONDANSETRON 2 MG/ML
4 INJECTION INTRAMUSCULAR; INTRAVENOUS EVERY 6 HOURS PRN
Status: DISCONTINUED | OUTPATIENT
Start: 2024-01-01 | End: 2024-01-01

## 2024-01-01 RX ORDER — DEXAMETHASONE SODIUM PHOSPHATE 4 MG/ML
4 INJECTION, SOLUTION INTRA-ARTICULAR; INTRALESIONAL; INTRAMUSCULAR; INTRAVENOUS; SOFT TISSUE DAILY
Status: DISCONTINUED | OUTPATIENT
Start: 2024-01-01 | End: 2024-01-01

## 2024-01-01 RX ORDER — MIDODRINE HYDROCHLORIDE 10 MG/1
10 TABLET ORAL
Status: DISCONTINUED | OUTPATIENT
Start: 2024-01-01 | End: 2024-01-01

## 2024-01-01 RX ORDER — LANOLIN ALCOHOL/MO/W.PET/CERES
400 CREAM (GRAM) TOPICAL DAILY
COMMUNITY
Start: 2023-01-01

## 2024-01-01 RX ORDER — HEPARIN SODIUM 1000 [USP'U]/ML
4000 INJECTION, SOLUTION INTRAVENOUS; SUBCUTANEOUS PRN
Status: DISCONTINUED | OUTPATIENT
Start: 2024-01-01 | End: 2024-01-01

## 2024-01-01 RX ORDER — APIXABAN 5 MG/1
5 TABLET, FILM COATED ORAL 2 TIMES DAILY
COMMUNITY
Start: 2024-01-01

## 2024-01-01 RX ORDER — 0.9 % SODIUM CHLORIDE 0.9 %
30 INTRAVENOUS SOLUTION INTRAVENOUS ONCE
Status: COMPLETED | OUTPATIENT
Start: 2024-01-01 | End: 2024-01-01

## 2024-01-01 RX ORDER — NOREPINEPHRINE BITARTRATE 0.06 MG/ML
1-100 INJECTION, SOLUTION INTRAVENOUS CONTINUOUS
Status: DISCONTINUED | OUTPATIENT
Start: 2024-01-01 | End: 2024-01-01

## 2024-01-01 RX ORDER — GLUCAGON 1 MG/ML
1 KIT INJECTION PRN
Status: DISCONTINUED | OUTPATIENT
Start: 2024-01-01 | End: 2024-01-01

## 2024-01-01 RX ORDER — INSULIN LISPRO 100 [IU]/ML
0-16 INJECTION, SOLUTION INTRAVENOUS; SUBCUTANEOUS
Status: DISCONTINUED | OUTPATIENT
Start: 2024-01-01 | End: 2024-01-01

## 2024-01-01 RX ORDER — TRAZODONE HYDROCHLORIDE 50 MG/1
50 TABLET ORAL NIGHTLY
Status: DISCONTINUED | OUTPATIENT
Start: 2024-01-01 | End: 2024-01-01

## 2024-01-01 RX ORDER — FUROSEMIDE 20 MG/1
20 TABLET ORAL DAILY
COMMUNITY

## 2024-01-01 RX ORDER — HEPARIN SODIUM 1000 [USP'U]/ML
4000 INJECTION, SOLUTION INTRAVENOUS; SUBCUTANEOUS ONCE
Status: COMPLETED | OUTPATIENT
Start: 2024-01-01 | End: 2024-01-01

## 2024-01-01 RX ORDER — POTASSIUM CHLORIDE 20 MEQ/1
20 TABLET, EXTENDED RELEASE ORAL DAILY
COMMUNITY
Start: 2023-01-01

## 2024-01-01 RX ORDER — DIGOXIN 0.25 MG/ML
250 INJECTION INTRAMUSCULAR; INTRAVENOUS ONCE
Status: COMPLETED | OUTPATIENT
Start: 2024-01-01 | End: 2024-01-01

## 2024-01-01 RX ORDER — NITROGLYCERIN 0.4 MG/1
0.4 TABLET SUBLINGUAL EVERY 5 MIN PRN
Status: DISCONTINUED | OUTPATIENT
Start: 2024-01-01 | End: 2024-01-01

## 2024-01-01 RX ORDER — HEPARIN SODIUM 10000 [USP'U]/100ML
5-30 INJECTION, SOLUTION INTRAVENOUS CONTINUOUS
Status: DISCONTINUED | OUTPATIENT
Start: 2024-01-01 | End: 2024-01-01

## 2024-01-01 RX ORDER — LORAZEPAM 2 MG/ML
0.5 INJECTION INTRAMUSCULAR
Status: DISCONTINUED | OUTPATIENT
Start: 2024-01-01 | End: 2024-05-25 | Stop reason: HOSPADM

## 2024-01-01 RX ORDER — ETOMIDATE 2 MG/ML
20 INJECTION INTRAVENOUS ONCE
Status: DISCONTINUED | OUTPATIENT
Start: 2024-01-01 | End: 2024-01-01

## 2024-01-01 RX ADMIN — ALBUTEROL SULFATE 2 PUFF: 90 AEROSOL, METERED RESPIRATORY (INHALATION) at 10:57

## 2024-01-01 RX ADMIN — METOPROLOL TARTRATE 50 MG: 50 TABLET, FILM COATED ORAL at 08:18

## 2024-01-01 RX ADMIN — HEPARIN SODIUM AND DEXTROSE 16 UNITS/KG/HR: 10000; 5 INJECTION INTRAVENOUS at 17:31

## 2024-01-01 RX ADMIN — BUMETANIDE 0.5 MG/HR: 0.25 INJECTION INTRAMUSCULAR; INTRAVENOUS at 22:53

## 2024-01-01 RX ADMIN — Medication 33 MCG/MIN: at 11:47

## 2024-01-01 RX ADMIN — TRAZODONE HYDROCHLORIDE 50 MG: 50 TABLET ORAL at 20:19

## 2024-01-01 RX ADMIN — VANCOMYCIN HYDROCHLORIDE 1000 MG: 1 INJECTION, POWDER, LYOPHILIZED, FOR SOLUTION INTRAVENOUS at 12:59

## 2024-01-01 RX ADMIN — VANCOMYCIN HYDROCHLORIDE 1000 MG: 1 INJECTION, POWDER, LYOPHILIZED, FOR SOLUTION INTRAVENOUS at 08:19

## 2024-01-01 RX ADMIN — MIDODRINE HYDROCHLORIDE 10 MG: 10 TABLET ORAL at 18:28

## 2024-01-01 RX ADMIN — METOPROLOL TARTRATE 5 MG: 5 INJECTION INTRAVENOUS at 01:43

## 2024-01-01 RX ADMIN — ALBUMIN (HUMAN) 25 G: 0.25 INJECTION, SOLUTION INTRAVENOUS at 11:48

## 2024-01-01 RX ADMIN — ALBUTEROL SULFATE 2 PUFF: 90 AEROSOL, METERED RESPIRATORY (INHALATION) at 07:39

## 2024-01-01 RX ADMIN — CLINDAMYCIN PHOSPHATE 900 MG: 900 INJECTION, SOLUTION INTRAVENOUS at 23:58

## 2024-01-01 RX ADMIN — SODIUM CHLORIDE, PRESERVATIVE FREE 10 ML: 5 INJECTION INTRAVENOUS at 22:53

## 2024-01-01 RX ADMIN — MORPHINE SULFATE 1 MG/HR: 10 INJECTION, SOLUTION INTRAMUSCULAR; INTRAVENOUS at 21:24

## 2024-01-01 RX ADMIN — INSULIN HUMAN 10 UNITS: 100 INJECTION, SOLUTION PARENTERAL at 09:44

## 2024-01-01 RX ADMIN — VANCOMYCIN HYDROCHLORIDE 1000 MG: 1 INJECTION, POWDER, LYOPHILIZED, FOR SOLUTION INTRAVENOUS at 03:36

## 2024-01-01 RX ADMIN — SODIUM ZIRCONIUM CYCLOSILICATE 10 G: 10 POWDER, FOR SUSPENSION ORAL at 09:26

## 2024-01-01 RX ADMIN — DEXAMETHASONE SODIUM PHOSPHATE 4 MG: 4 INJECTION, SOLUTION INTRA-ARTICULAR; INTRALESIONAL; INTRAMUSCULAR; INTRAVENOUS; SOFT TISSUE at 08:18

## 2024-01-01 RX ADMIN — SODIUM CHLORIDE: 9 INJECTION, SOLUTION INTRAVENOUS at 23:57

## 2024-01-01 RX ADMIN — PIPERACILLIN AND TAZOBACTAM 4500 MG: 4; .5 INJECTION, POWDER, LYOPHILIZED, FOR SOLUTION INTRAVENOUS at 17:18

## 2024-01-01 RX ADMIN — AMIODARONE HYDROCHLORIDE 0.5 MG/MIN: 1.8 INJECTION, SOLUTION INTRAVENOUS at 21:14

## 2024-01-01 RX ADMIN — INSULIN LISPRO 8 UNITS: 100 INJECTION, SOLUTION INTRAVENOUS; SUBCUTANEOUS at 18:04

## 2024-01-01 RX ADMIN — TRAZODONE HYDROCHLORIDE 50 MG: 50 TABLET ORAL at 22:51

## 2024-01-01 RX ADMIN — HEPARIN SODIUM AND DEXTROSE 16 UNITS/KG/HR: 10000; 5 INJECTION INTRAVENOUS at 01:02

## 2024-01-01 RX ADMIN — Medication 18 MCG/MIN: at 00:58

## 2024-01-01 RX ADMIN — GABAPENTIN 100 MG: 100 CAPSULE ORAL at 20:19

## 2024-01-01 RX ADMIN — CALCIUM GLUCONATE 2000 MG: 20 INJECTION, SOLUTION INTRAVENOUS at 18:44

## 2024-01-01 RX ADMIN — SODIUM ZIRCONIUM CYCLOSILICATE 10 G: 10 POWDER, FOR SUSPENSION ORAL at 06:50

## 2024-01-01 RX ADMIN — SODIUM CHLORIDE, PRESERVATIVE FREE 10 ML: 5 INJECTION INTRAVENOUS at 08:18

## 2024-01-01 RX ADMIN — DIGOXIN 250 MCG: 0.25 INJECTION INTRAMUSCULAR; INTRAVENOUS at 11:13

## 2024-01-01 RX ADMIN — SODIUM CHLORIDE, PRESERVATIVE FREE 10 ML: 5 INJECTION INTRAVENOUS at 09:23

## 2024-01-01 RX ADMIN — PROPOFOL 25 MCG/KG/MIN: 10 INJECTION, EMULSION INTRAVENOUS at 09:51

## 2024-01-01 RX ADMIN — MILRINONE LACTATE IN DEXTROSE 0.12 MCG/KG/MIN: 200 INJECTION, SOLUTION INTRAVENOUS at 22:52

## 2024-01-01 RX ADMIN — HEPARIN SODIUM 4000 UNITS: 1000 INJECTION INTRAVENOUS; SUBCUTANEOUS at 06:23

## 2024-01-01 RX ADMIN — AMIODARONE HYDROCHLORIDE 0.5 MG/MIN: 1.8 INJECTION, SOLUTION INTRAVENOUS at 22:28

## 2024-01-01 RX ADMIN — DAKIN'S SOLUTION 0.125% (QUARTER STRENGTH): 0.12 SOLUTION at 09:58

## 2024-01-01 RX ADMIN — SODIUM CHLORIDE, PRESERVATIVE FREE 10 ML: 5 INJECTION INTRAVENOUS at 19:18

## 2024-01-01 RX ADMIN — HYDROCORTISONE SODIUM SUCCINATE 100 MG: 100 INJECTION, POWDER, FOR SOLUTION INTRAMUSCULAR; INTRAVENOUS at 13:59

## 2024-01-01 RX ADMIN — FENTANYL CITRATE 50 MCG: 50 INJECTION INTRAMUSCULAR; INTRAVENOUS at 01:54

## 2024-01-01 RX ADMIN — SODIUM ZIRCONIUM CYCLOSILICATE 10 G: 10 POWDER, FOR SUSPENSION ORAL at 18:37

## 2024-01-01 RX ADMIN — DEXAMETHASONE SODIUM PHOSPHATE 10 MG: 4 INJECTION, SOLUTION INTRAMUSCULAR; INTRAVENOUS at 03:10

## 2024-01-01 RX ADMIN — ATORVASTATIN CALCIUM 40 MG: 40 TABLET, FILM COATED ORAL at 09:21

## 2024-01-01 RX ADMIN — INSULIN LISPRO 8 UNITS: 100 INJECTION, SOLUTION INTRAVENOUS; SUBCUTANEOUS at 09:09

## 2024-01-01 RX ADMIN — PROPOFOL 15 MCG/KG/MIN: 10 INJECTION, EMULSION INTRAVENOUS at 18:04

## 2024-01-01 RX ADMIN — SODIUM ZIRCONIUM CYCLOSILICATE 10 G: 10 POWDER, FOR SUSPENSION ORAL at 13:59

## 2024-01-01 RX ADMIN — PIPERACILLIN AND TAZOBACTAM 4500 MG: 4; .5 INJECTION, POWDER, LYOPHILIZED, FOR SOLUTION INTRAVENOUS at 04:17

## 2024-01-01 RX ADMIN — AMIODARONE HYDROCHLORIDE 1 MG/MIN: 1.8 INJECTION, SOLUTION INTRAVENOUS at 16:03

## 2024-01-01 RX ADMIN — AMIODARONE HYDROCHLORIDE 150 MG: 1.5 INJECTION, SOLUTION INTRAVENOUS at 15:52

## 2024-01-01 RX ADMIN — VASOPRESSIN 0.03 UNITS/MIN: 20 INJECTION INTRAVENOUS at 18:39

## 2024-01-01 RX ADMIN — PIPERACILLIN AND TAZOBACTAM 4500 MG: 4; .5 INJECTION, POWDER, LYOPHILIZED, FOR SOLUTION INTRAVENOUS at 01:00

## 2024-01-01 RX ADMIN — GABAPENTIN 100 MG: 100 CAPSULE ORAL at 08:18

## 2024-01-01 RX ADMIN — FLUCONAZOLE IN SODIUM CHLORIDE 200 MG: 2 INJECTION, SOLUTION INTRAVENOUS at 06:47

## 2024-01-01 RX ADMIN — VASOPRESSIN 0.03 UNITS/MIN: 20 INJECTION INTRAVENOUS at 08:12

## 2024-01-01 RX ADMIN — HEPARIN SODIUM 4000 UNITS: 1000 INJECTION INTRAVENOUS; SUBCUTANEOUS at 01:37

## 2024-01-01 RX ADMIN — ALBUMIN (HUMAN) 25 G: 0.25 INJECTION, SOLUTION INTRAVENOUS at 17:25

## 2024-01-01 RX ADMIN — IOPAMIDOL 80 ML: 755 INJECTION, SOLUTION INTRAVENOUS at 02:44

## 2024-01-01 RX ADMIN — TIOTROPIUM BROMIDE AND OLODATEROL 2 PUFF: 3.124; 2.736 SPRAY, METERED RESPIRATORY (INHALATION) at 07:39

## 2024-01-01 RX ADMIN — HYDROCORTISONE SODIUM SUCCINATE 100 MG: 100 INJECTION, POWDER, FOR SOLUTION INTRAMUSCULAR; INTRAVENOUS at 06:46

## 2024-01-01 RX ADMIN — CLINDAMYCIN PHOSPHATE 900 MG: 900 INJECTION, SOLUTION INTRAVENOUS at 22:33

## 2024-01-01 RX ADMIN — AMIODARONE HYDROCHLORIDE 0.5 MG/MIN: 1.8 INJECTION, SOLUTION INTRAVENOUS at 09:26

## 2024-01-01 RX ADMIN — ATORVASTATIN CALCIUM 40 MG: 40 TABLET, FILM COATED ORAL at 08:18

## 2024-01-01 RX ADMIN — INSULIN LISPRO 8 UNITS: 100 INJECTION, SOLUTION INTRAVENOUS; SUBCUTANEOUS at 09:00

## 2024-01-01 RX ADMIN — PIPERACILLIN AND TAZOBACTAM 3375 MG: 3; .375 INJECTION, POWDER, LYOPHILIZED, FOR SOLUTION INTRAVENOUS at 21:11

## 2024-01-01 RX ADMIN — GABAPENTIN 100 MG: 100 CAPSULE ORAL at 22:51

## 2024-01-01 RX ADMIN — GADOTERIDOL 20 ML: 279.3 INJECTION, SOLUTION INTRAVENOUS at 02:34

## 2024-01-01 RX ADMIN — ALBUTEROL SULFATE 2 PUFF: 90 AEROSOL, METERED RESPIRATORY (INHALATION) at 19:43

## 2024-01-01 RX ADMIN — HEPARIN SODIUM 2000 UNITS: 1000 INJECTION INTRAVENOUS; SUBCUTANEOUS at 14:23

## 2024-01-01 RX ADMIN — METOPROLOL TARTRATE 50 MG: 50 TABLET, FILM COATED ORAL at 22:51

## 2024-01-01 RX ADMIN — BUMETANIDE 0.5 MG/HR: 0.25 INJECTION INTRAMUSCULAR; INTRAVENOUS at 16:16

## 2024-01-01 RX ADMIN — SODIUM CHLORIDE 2397 ML: 9 INJECTION, SOLUTION INTRAVENOUS at 21:01

## 2024-01-01 RX ADMIN — CALCIUM GLUCONATE 2000 MG: 20 INJECTION, SOLUTION INTRAVENOUS at 07:20

## 2024-01-01 RX ADMIN — INSULIN LISPRO 12 UNITS: 100 INJECTION, SOLUTION INTRAVENOUS; SUBCUTANEOUS at 12:49

## 2024-01-01 RX ADMIN — PIPERACILLIN AND TAZOBACTAM 4500 MG: 4; .5 INJECTION, POWDER, LYOPHILIZED, FOR SOLUTION INTRAVENOUS at 03:57

## 2024-01-01 RX ADMIN — AMIODARONE HYDROCHLORIDE 0.5 MG/MIN: 1.8 INJECTION, SOLUTION INTRAVENOUS at 09:49

## 2024-01-01 RX ADMIN — ALBUMIN (HUMAN) 25 G: 0.25 INJECTION, SOLUTION INTRAVENOUS at 17:29

## 2024-01-01 RX ADMIN — Medication 24 MCG/MIN: at 13:07

## 2024-01-01 RX ADMIN — Medication 8 MCG/MIN: at 01:58

## 2024-01-01 RX ADMIN — MILRINONE LACTATE IN DEXTROSE 0.12 MCG/KG/MIN: 200 INJECTION, SOLUTION INTRAVENOUS at 18:01

## 2024-01-01 RX ADMIN — PROPOFOL 20 MCG/KG/MIN: 10 INJECTION, EMULSION INTRAVENOUS at 21:49

## 2024-01-01 RX ADMIN — HYDROCORTISONE SODIUM SUCCINATE 100 MG: 100 INJECTION, POWDER, FOR SOLUTION INTRAMUSCULAR; INTRAVENOUS at 04:24

## 2024-01-01 RX ADMIN — INSULIN LISPRO 8 UNITS: 100 INJECTION, SOLUTION INTRAVENOUS; SUBCUTANEOUS at 12:05

## 2024-01-01 RX ADMIN — BUMETANIDE 0.5 MG/HR: 0.25 INJECTION INTRAMUSCULAR; INTRAVENOUS at 18:02

## 2024-01-01 RX ADMIN — HYDROCORTISONE SODIUM SUCCINATE 100 MG: 100 INJECTION, POWDER, FOR SOLUTION INTRAMUSCULAR; INTRAVENOUS at 11:49

## 2024-01-01 RX ADMIN — ALBUTEROL SULFATE 2 PUFF: 90 AEROSOL, METERED RESPIRATORY (INHALATION) at 08:40

## 2024-01-01 RX ADMIN — VANCOMYCIN HYDROCHLORIDE 2500 MG: 1 INJECTION, POWDER, LYOPHILIZED, FOR SOLUTION INTRAVENOUS at 00:03

## 2024-01-01 RX ADMIN — HEPARIN SODIUM AND DEXTROSE 16 UNITS/KG/HR: 10000; 5 INJECTION INTRAVENOUS at 20:35

## 2024-01-01 RX ADMIN — ALBUTEROL SULFATE 2 PUFF: 90 AEROSOL, METERED RESPIRATORY (INHALATION) at 21:00

## 2024-01-01 RX ADMIN — ALBUMIN (HUMAN) 25 G: 0.25 INJECTION, SOLUTION INTRAVENOUS at 00:00

## 2024-01-01 RX ADMIN — Medication: at 09:58

## 2024-01-01 RX ADMIN — TIOTROPIUM BROMIDE AND OLODATEROL 2 PUFF: 3.124; 2.736 SPRAY, METERED RESPIRATORY (INHALATION) at 08:54

## 2024-01-01 RX ADMIN — PROPOFOL 30 MCG/KG/MIN: 10 INJECTION, EMULSION INTRAVENOUS at 03:14

## 2024-01-01 RX ADMIN — PROPOFOL 15 MCG/KG/MIN: 10 INJECTION, EMULSION INTRAVENOUS at 01:54

## 2024-01-01 RX ADMIN — HEPARIN SODIUM AND DEXTROSE 9 UNITS/KG/HR: 10000; 5 INJECTION INTRAVENOUS at 01:38

## 2024-01-01 RX ADMIN — ALBUTEROL SULFATE 2 PUFF: 90 AEROSOL, METERED RESPIRATORY (INHALATION) at 12:42

## 2024-01-01 RX ADMIN — HEPARIN SODIUM AND DEXTROSE 16 UNITS/KG/HR: 10000; 5 INJECTION INTRAVENOUS at 09:47

## 2024-01-01 RX ADMIN — PIPERACILLIN AND TAZOBACTAM 4500 MG: 4; .5 INJECTION, POWDER, LYOPHILIZED, FOR SOLUTION INTRAVENOUS at 09:09

## 2024-01-01 RX ADMIN — SODIUM CHLORIDE, PRESERVATIVE FREE 10 ML: 5 INJECTION INTRAVENOUS at 09:59

## 2024-01-01 RX ADMIN — GABAPENTIN 100 MG: 100 CAPSULE ORAL at 09:21

## 2024-01-01 RX ADMIN — LORAZEPAM 0.5 MG: 2 INJECTION INTRAMUSCULAR; INTRAVENOUS at 21:34

## 2024-01-01 RX ADMIN — CLINDAMYCIN PHOSPHATE 900 MG: 900 INJECTION, SOLUTION INTRAVENOUS at 06:05

## 2024-01-01 RX ADMIN — DAKIN'S SOLUTION 0.125% (QUARTER STRENGTH): 0.12 SOLUTION at 20:19

## 2024-01-01 RX ADMIN — PIPERACILLIN AND TAZOBACTAM 4500 MG: 4; .5 INJECTION, POWDER, LYOPHILIZED, FOR SOLUTION INTRAVENOUS at 17:22

## 2024-01-01 RX ADMIN — PIPERACILLIN AND TAZOBACTAM 4500 MG: 4; .5 INJECTION, POWDER, LYOPHILIZED, FOR SOLUTION INTRAVENOUS at 09:28

## 2024-01-01 RX ADMIN — METOPROLOL TARTRATE 10 MG: 5 INJECTION INTRAVENOUS at 05:57

## 2024-01-01 RX ADMIN — MIDAZOLAM 4 MG: 1 INJECTION INTRAMUSCULAR; INTRAVENOUS at 01:53

## 2024-01-01 RX ADMIN — MIDODRINE HYDROCHLORIDE 10 MG: 10 TABLET ORAL at 12:53

## 2024-01-01 RX ADMIN — HYDROCORTISONE SODIUM SUCCINATE 100 MG: 100 INJECTION, POWDER, FOR SOLUTION INTRAMUSCULAR; INTRAVENOUS at 20:19

## 2024-01-01 RX ADMIN — Medication: at 09:33

## 2024-01-01 RX ADMIN — ALBUMIN (HUMAN) 25 G: 0.25 INJECTION, SOLUTION INTRAVENOUS at 05:00

## 2024-01-01 RX ADMIN — INSULIN LISPRO 4 UNITS: 100 INJECTION, SOLUTION INTRAVENOUS; SUBCUTANEOUS at 17:25

## 2024-01-01 RX ADMIN — LANSOPRAZOLE 30 MG: 30 TABLET, ORALLY DISINTEGRATING, DELAYED RELEASE ORAL at 05:01

## 2024-01-01 RX ADMIN — METOPROLOL TARTRATE 50 MG: 50 TABLET, FILM COATED ORAL at 01:41

## 2024-01-01 RX ADMIN — PROPOFOL 15 MCG/KG/MIN: 10 INJECTION, EMULSION INTRAVENOUS at 11:58

## 2024-01-01 RX ADMIN — CLINDAMYCIN PHOSPHATE 900 MG: 900 INJECTION, SOLUTION INTRAVENOUS at 14:20

## 2024-01-01 RX ADMIN — FLUCONAZOLE IN SODIUM CHLORIDE 200 MG: 2 INJECTION, SOLUTION INTRAVENOUS at 08:16

## 2024-01-01 RX ADMIN — PIPERACILLIN AND TAZOBACTAM 4500 MG: 4; .5 INJECTION, POWDER, LYOPHILIZED, FOR SOLUTION INTRAVENOUS at 09:54

## 2024-01-01 RX ADMIN — ISOSORBIDE MONONITRATE 30 MG: 30 TABLET, EXTENDED RELEASE ORAL at 08:18

## 2024-01-01 RX ADMIN — LIDOCAINE HYDROCHLORIDE: 20 JELLY TOPICAL at 03:27

## 2024-01-01 ASSESSMENT — PAIN DESCRIPTION - ONSET: ONSET: SUDDEN

## 2024-01-01 ASSESSMENT — PAIN SCALES - GENERAL
PAINLEVEL_OUTOF10: 0
PAINLEVEL_OUTOF10: 6
PAINLEVEL_OUTOF10: 0
PAINLEVEL_OUTOF10: 0
PAINLEVEL_OUTOF10: 6
PAINLEVEL_OUTOF10: 0
PAINLEVEL_OUTOF10: 0

## 2024-01-01 ASSESSMENT — PAIN DESCRIPTION - PAIN TYPE: TYPE: ACUTE PAIN

## 2024-01-01 ASSESSMENT — PULMONARY FUNCTION TESTS
PIF_VALUE: 16
PIF_VALUE: 19
PIF_VALUE: 20
PIF_VALUE: 22
PIF_VALUE: 18
PIF_VALUE: 20
PIF_VALUE: 17
PIF_VALUE: 18
PIF_VALUE: 18
PIF_VALUE: 19

## 2024-01-01 ASSESSMENT — PAIN DESCRIPTION - FREQUENCY: FREQUENCY: INTERMITTENT

## 2024-01-01 ASSESSMENT — PAIN DESCRIPTION - DESCRIPTORS: DESCRIPTORS: SHARP

## 2024-01-01 ASSESSMENT — PAIN DESCRIPTION - LOCATION
LOCATION: CHEST
LOCATION: FOOT

## 2024-01-01 ASSESSMENT — PAIN DESCRIPTION - ORIENTATION: ORIENTATION: LEFT

## 2024-01-01 ASSESSMENT — PAIN - FUNCTIONAL ASSESSMENT: PAIN_FUNCTIONAL_ASSESSMENT: 0-10

## 2024-01-29 RX ORDER — FUROSEMIDE 20 MG/1
TABLET ORAL
Qty: 90 TABLET | Refills: 0 | Status: SHIPPED | OUTPATIENT
Start: 2024-01-29

## 2024-04-14 ENCOUNTER — APPOINTMENT (OUTPATIENT)
Dept: GENERAL RADIOLOGY | Age: 69
DRG: 182 | End: 2024-04-14
Payer: COMMERCIAL

## 2024-04-14 ENCOUNTER — APPOINTMENT (OUTPATIENT)
Dept: CT IMAGING | Age: 69
DRG: 182 | End: 2024-04-14
Payer: COMMERCIAL

## 2024-04-14 ENCOUNTER — HOSPITAL ENCOUNTER (INPATIENT)
Age: 69
LOS: 3 days | Discharge: HOME | DRG: 182 | End: 2024-04-17
Attending: EMERGENCY MEDICINE | Admitting: STUDENT IN AN ORGANIZED HEALTH CARE EDUCATION/TRAINING PROGRAM
Payer: COMMERCIAL

## 2024-04-14 DIAGNOSIS — J43.1 PANLOBULAR EMPHYSEMA (HCC): ICD-10-CM

## 2024-04-14 DIAGNOSIS — I96 DRY GANGRENE (HCC): ICD-10-CM

## 2024-04-14 DIAGNOSIS — I70.222 CRITICAL LIMB ISCHEMIA OF LEFT LOWER EXTREMITY (HCC): ICD-10-CM

## 2024-04-14 DIAGNOSIS — L03.116 CELLULITIS OF LEFT LOWER EXTREMITY: ICD-10-CM

## 2024-04-14 DIAGNOSIS — I96 GANGRENE (HCC): ICD-10-CM

## 2024-04-14 DIAGNOSIS — I73.9 PAD (PERIPHERAL ARTERY DISEASE) (HCC): Primary | ICD-10-CM

## 2024-04-14 DIAGNOSIS — L89.153 PRESSURE INJURY OF SACRAL REGION, STAGE 3 (HCC): ICD-10-CM

## 2024-04-14 DIAGNOSIS — L97.519 DIABETIC ULCER OF RIGHT FOOT ASSOCIATED WITH TYPE 2 DIABETES MELLITUS, UNSPECIFIED PART OF FOOT, UNSPECIFIED ULCER STAGE (HCC): ICD-10-CM

## 2024-04-14 DIAGNOSIS — I73.9 PVD (PERIPHERAL VASCULAR DISEASE) (HCC): ICD-10-CM

## 2024-04-14 DIAGNOSIS — L89.323 PRESSURE INJURY OF LEFT BUTTOCK, STAGE 3 (HCC): ICD-10-CM

## 2024-04-14 DIAGNOSIS — E11.52 TYPE 2 DIABETES MELLITUS WITH DIABETIC PERIPHERAL ANGIOPATHY AND GANGRENE, WITH LONG-TERM CURRENT USE OF INSULIN (HCC): ICD-10-CM

## 2024-04-14 DIAGNOSIS — I51.9 LEFT VENTRICULAR SYSTOLIC DYSFUNCTION: ICD-10-CM

## 2024-04-14 DIAGNOSIS — E11.621 DIABETIC ULCER OF RIGHT FOOT ASSOCIATED WITH TYPE 2 DIABETES MELLITUS, UNSPECIFIED PART OF FOOT, UNSPECIFIED ULCER STAGE (HCC): ICD-10-CM

## 2024-04-14 DIAGNOSIS — L97.919 NONHEALING ULCER OF RIGHT LOWER LEG, UNSPECIFIED ULCER STAGE (HCC): ICD-10-CM

## 2024-04-14 DIAGNOSIS — I70.201 TIBIAL ARTERY OCCLUSION, RIGHT (HCC): ICD-10-CM

## 2024-04-14 DIAGNOSIS — Z79.4 TYPE 2 DIABETES MELLITUS WITH DIABETIC PERIPHERAL ANGIOPATHY AND GANGRENE, WITH LONG-TERM CURRENT USE OF INSULIN (HCC): ICD-10-CM

## 2024-04-14 LAB
ALBUMIN SERPL BCG-MCNC: 3.2 G/DL (ref 3.5–5.1)
ALP SERPL-CCNC: 145 U/L (ref 38–126)
ALT SERPL W/O P-5'-P-CCNC: 12 U/L (ref 11–66)
ANION GAP SERPL CALC-SCNC: 12 MEQ/L (ref 8–16)
APTT PPP: 32.8 SECONDS (ref 22–38)
AST SERPL-CCNC: 15 U/L (ref 5–40)
BASOPHILS ABSOLUTE: 0.1 THOU/MM3 (ref 0–0.1)
BASOPHILS NFR BLD AUTO: 0.7 %
BILIRUB CONJ SERPL-MCNC: < 0.2 MG/DL (ref 0–0.3)
BILIRUB SERPL-MCNC: 0.3 MG/DL (ref 0.3–1.2)
BUN SERPL-MCNC: 10 MG/DL (ref 7–22)
CALCIUM SERPL-MCNC: 8.2 MG/DL (ref 8.5–10.5)
CHLORIDE SERPL-SCNC: 104 MEQ/L (ref 98–111)
CHOLEST SERPL-MCNC: 64 MG/DL (ref 100–199)
CO2 SERPL-SCNC: 23 MEQ/L (ref 23–33)
CREAT SERPL-MCNC: 0.6 MG/DL (ref 0.4–1.2)
DEPRECATED RDW RBC AUTO: 50.5 FL (ref 35–45)
ELLIPTOCYTES: ABNORMAL
EOSINOPHIL NFR BLD AUTO: 1.6 %
EOSINOPHILS ABSOLUTE: 0.2 THOU/MM3 (ref 0–0.4)
ERYTHROCYTE [DISTWIDTH] IN BLOOD BY AUTOMATED COUNT: 21.3 % (ref 11.5–14.5)
GFR SERPL CREATININE-BSD FRML MDRD: > 90 ML/MIN/1.73M2
GLUCOSE SERPL-MCNC: 104 MG/DL (ref 70–108)
HCT VFR BLD AUTO: 35.5 % (ref 42–52)
HDLC SERPL-MCNC: 20 MG/DL
HEPARIN UNFRACTIONATED: < 0.04 U/ML (ref 0.3–0.7)
HGB BLD-MCNC: 9.2 GM/DL (ref 14–18)
HYPOCHROMIA BLD QL SMEAR: PRESENT
IMM GRANULOCYTES # BLD AUTO: 0.07 THOU/MM3 (ref 0–0.07)
IMM GRANULOCYTES NFR BLD AUTO: 0.5 %
INR PPP: 1.33 (ref 0.85–1.13)
LACTATE SERPL-SCNC: 2.7 MMOL/L (ref 0.5–2)
LACTATE SERPL-SCNC: 3 MMOL/L (ref 0.5–2)
LDLC SERPL CALC-MCNC: 33 MG/DL
LYMPHOCYTES ABSOLUTE: 2.2 THOU/MM3 (ref 1–4.8)
LYMPHOCYTES NFR BLD AUTO: 17.1 %
MCH RBC QN AUTO: 18 PG (ref 26–33)
MCHC RBC AUTO-ENTMCNC: 25.9 GM/DL (ref 32.2–35.5)
MCV RBC AUTO: 69.6 FL (ref 80–94)
MICROCYTES BLD QL SMEAR: PRESENT
MONOCYTES ABSOLUTE: 0.9 THOU/MM3 (ref 0.4–1.3)
MONOCYTES NFR BLD AUTO: 6.8 %
NEUTROPHILS NFR BLD AUTO: 73.3 %
NRBC BLD AUTO-RTO: 0 /100 WBC
OSMOLALITY SERPL CALC.SUM OF ELEC: 276.9 MOSMOL/KG (ref 275–300)
PLATELET # BLD AUTO: 428 THOU/MM3 (ref 130–400)
PMV BLD AUTO: 10 FL (ref 9.4–12.4)
POTASSIUM SERPL-SCNC: 4 MEQ/L (ref 3.5–5.2)
PROT SERPL-MCNC: 7.4 G/DL (ref 6.1–8)
RBC # BLD AUTO: 5.1 MILL/MM3 (ref 4.7–6.1)
SCAN OF BLOOD SMEAR: NORMAL
SEGMENTED NEUTROPHILS ABSOLUTE COUNT: 9.5 THOU/MM3 (ref 1.8–7.7)
SODIUM SERPL-SCNC: 139 MEQ/L (ref 135–145)
TRIGL SERPL-MCNC: 55 MG/DL (ref 0–199)
WBC # BLD AUTO: 12.9 THOU/MM3 (ref 4.8–10.8)

## 2024-04-14 PROCEDURE — 2580000003 HC RX 258

## 2024-04-14 PROCEDURE — 99222 1ST HOSP IP/OBS MODERATE 55: CPT | Performed by: STUDENT IN AN ORGANIZED HEALTH CARE EDUCATION/TRAINING PROGRAM

## 2024-04-14 PROCEDURE — 85520 HEPARIN ASSAY: CPT

## 2024-04-14 PROCEDURE — 93005 ELECTROCARDIOGRAM TRACING: CPT | Performed by: EMERGENCY MEDICINE

## 2024-04-14 PROCEDURE — 6360000002 HC RX W HCPCS

## 2024-04-14 PROCEDURE — 82248 BILIRUBIN DIRECT: CPT

## 2024-04-14 PROCEDURE — 6370000000 HC RX 637 (ALT 250 FOR IP)

## 2024-04-14 PROCEDURE — 93010 ELECTROCARDIOGRAM REPORT: CPT | Performed by: NUCLEAR MEDICINE

## 2024-04-14 PROCEDURE — 6360000002 HC RX W HCPCS: Performed by: STUDENT IN AN ORGANIZED HEALTH CARE EDUCATION/TRAINING PROGRAM

## 2024-04-14 PROCEDURE — 80053 COMPREHEN METABOLIC PANEL: CPT

## 2024-04-14 PROCEDURE — 85610 PROTHROMBIN TIME: CPT

## 2024-04-14 PROCEDURE — 6370000000 HC RX 637 (ALT 250 FOR IP): Performed by: STUDENT IN AN ORGANIZED HEALTH CARE EDUCATION/TRAINING PROGRAM

## 2024-04-14 PROCEDURE — 73630 X-RAY EXAM OF FOOT: CPT

## 2024-04-14 PROCEDURE — 96375 TX/PRO/DX INJ NEW DRUG ADDON: CPT

## 2024-04-14 PROCEDURE — 6360000004 HC RX CONTRAST MEDICATION: Performed by: STUDENT IN AN ORGANIZED HEALTH CARE EDUCATION/TRAINING PROGRAM

## 2024-04-14 PROCEDURE — 94640 AIRWAY INHALATION TREATMENT: CPT

## 2024-04-14 PROCEDURE — 85730 THROMBOPLASTIN TIME PARTIAL: CPT

## 2024-04-14 PROCEDURE — 2060000000 HC ICU INTERMEDIATE R&B

## 2024-04-14 PROCEDURE — 36415 COLL VENOUS BLD VENIPUNCTURE: CPT

## 2024-04-14 PROCEDURE — 2580000003 HC RX 258: Performed by: STUDENT IN AN ORGANIZED HEALTH CARE EDUCATION/TRAINING PROGRAM

## 2024-04-14 PROCEDURE — 83036 HEMOGLOBIN GLYCOSYLATED A1C: CPT

## 2024-04-14 PROCEDURE — 96365 THER/PROPH/DIAG IV INF INIT: CPT

## 2024-04-14 PROCEDURE — 71045 X-RAY EXAM CHEST 1 VIEW: CPT

## 2024-04-14 PROCEDURE — 83605 ASSAY OF LACTIC ACID: CPT

## 2024-04-14 PROCEDURE — 85025 COMPLETE CBC W/AUTO DIFF WBC: CPT

## 2024-04-14 PROCEDURE — 80061 LIPID PANEL: CPT

## 2024-04-14 PROCEDURE — 87040 BLOOD CULTURE FOR BACTERIA: CPT

## 2024-04-14 PROCEDURE — 73706 CT ANGIO LWR EXTR W/O&W/DYE: CPT

## 2024-04-14 PROCEDURE — 99285 EMERGENCY DEPT VISIT HI MDM: CPT

## 2024-04-14 RX ORDER — HEPARIN SODIUM 1000 [USP'U]/ML
80 INJECTION, SOLUTION INTRAVENOUS; SUBCUTANEOUS ONCE
Status: COMPLETED | OUTPATIENT
Start: 2024-04-14 | End: 2024-04-14

## 2024-04-14 RX ORDER — POTASSIUM CHLORIDE 7.45 MG/ML
10 INJECTION INTRAVENOUS PRN
Status: DISCONTINUED | OUTPATIENT
Start: 2024-04-14 | End: 2024-04-17 | Stop reason: HOSPADM

## 2024-04-14 RX ORDER — SODIUM CHLORIDE 9 MG/ML
INJECTION, SOLUTION INTRAVENOUS PRN
Status: DISCONTINUED | OUTPATIENT
Start: 2024-04-14 | End: 2024-04-17 | Stop reason: HOSPADM

## 2024-04-14 RX ORDER — SODIUM CHLORIDE 0.9 % (FLUSH) 0.9 %
5-40 SYRINGE (ML) INJECTION EVERY 12 HOURS SCHEDULED
Status: DISCONTINUED | OUTPATIENT
Start: 2024-04-14 | End: 2024-04-17 | Stop reason: HOSPADM

## 2024-04-14 RX ORDER — ISOSORBIDE MONONITRATE 30 MG/1
30 TABLET, EXTENDED RELEASE ORAL DAILY
Status: DISCONTINUED | OUTPATIENT
Start: 2024-04-15 | End: 2024-04-17 | Stop reason: HOSPADM

## 2024-04-14 RX ORDER — SODIUM CHLORIDE, SODIUM LACTATE, POTASSIUM CHLORIDE, CALCIUM CHLORIDE 600; 310; 30; 20 MG/100ML; MG/100ML; MG/100ML; MG/100ML
INJECTION, SOLUTION INTRAVENOUS CONTINUOUS
Status: ACTIVE | OUTPATIENT
Start: 2024-04-14 | End: 2024-04-15

## 2024-04-14 RX ORDER — NITROGLYCERIN 0.4 MG/1
0.4 TABLET SUBLINGUAL EVERY 5 MIN PRN
Status: DISCONTINUED | OUTPATIENT
Start: 2024-04-14 | End: 2024-04-17 | Stop reason: HOSPADM

## 2024-04-14 RX ORDER — CLOPIDOGREL BISULFATE 75 MG/1
75 TABLET ORAL DAILY
Status: DISCONTINUED | OUTPATIENT
Start: 2024-04-15 | End: 2024-04-17 | Stop reason: HOSPADM

## 2024-04-14 RX ORDER — INSULIN LISPRO 100 [IU]/ML
0-4 INJECTION, SOLUTION INTRAVENOUS; SUBCUTANEOUS
Status: DISCONTINUED | OUTPATIENT
Start: 2024-04-15 | End: 2024-04-17 | Stop reason: HOSPADM

## 2024-04-14 RX ORDER — PANTOPRAZOLE SODIUM 40 MG/1
40 TABLET, DELAYED RELEASE ORAL DAILY
Status: DISCONTINUED | OUTPATIENT
Start: 2024-04-15 | End: 2024-04-17 | Stop reason: HOSPADM

## 2024-04-14 RX ORDER — ALBUTEROL SULFATE 90 UG/1
2 AEROSOL, METERED RESPIRATORY (INHALATION) EVERY 4 HOURS PRN
Status: DISCONTINUED | OUTPATIENT
Start: 2024-04-14 | End: 2024-04-17 | Stop reason: HOSPADM

## 2024-04-14 RX ORDER — INSULIN LISPRO 100 [IU]/ML
0-4 INJECTION, SOLUTION INTRAVENOUS; SUBCUTANEOUS NIGHTLY
Status: DISCONTINUED | OUTPATIENT
Start: 2024-04-15 | End: 2024-04-17 | Stop reason: HOSPADM

## 2024-04-14 RX ORDER — NICOTINE 21 MG/24HR
1 PATCH, TRANSDERMAL 24 HOURS TRANSDERMAL DAILY
Status: DISCONTINUED | OUTPATIENT
Start: 2024-04-14 | End: 2024-04-17 | Stop reason: HOSPADM

## 2024-04-14 RX ORDER — POLYETHYLENE GLYCOL 3350 17 G/17G
17 POWDER, FOR SOLUTION ORAL DAILY PRN
Status: DISCONTINUED | OUTPATIENT
Start: 2024-04-14 | End: 2024-04-17 | Stop reason: HOSPADM

## 2024-04-14 RX ORDER — HEPARIN SODIUM 10000 [USP'U]/100ML
5-30 INJECTION, SOLUTION INTRAVENOUS CONTINUOUS
Status: DISCONTINUED | OUTPATIENT
Start: 2024-04-14 | End: 2024-04-17

## 2024-04-14 RX ORDER — 0.9 % SODIUM CHLORIDE 0.9 %
1000 INTRAVENOUS SOLUTION INTRAVENOUS ONCE
Status: COMPLETED | OUTPATIENT
Start: 2024-04-14 | End: 2024-04-14

## 2024-04-14 RX ORDER — METOPROLOL TARTRATE 50 MG/1
50 TABLET, FILM COATED ORAL 2 TIMES DAILY
Status: DISCONTINUED | OUTPATIENT
Start: 2024-04-15 | End: 2024-04-17 | Stop reason: HOSPADM

## 2024-04-14 RX ORDER — GABAPENTIN 100 MG/1
100 CAPSULE ORAL 3 TIMES DAILY
Status: DISCONTINUED | OUTPATIENT
Start: 2024-04-15 | End: 2024-04-17 | Stop reason: HOSPADM

## 2024-04-14 RX ORDER — SODIUM CHLORIDE 0.9 % (FLUSH) 0.9 %
10 SYRINGE (ML) INJECTION PRN
Status: DISCONTINUED | OUTPATIENT
Start: 2024-04-14 | End: 2024-04-17 | Stop reason: HOSPADM

## 2024-04-14 RX ORDER — ONDANSETRON 2 MG/ML
4 INJECTION INTRAMUSCULAR; INTRAVENOUS EVERY 6 HOURS PRN
Status: DISCONTINUED | OUTPATIENT
Start: 2024-04-14 | End: 2024-04-17 | Stop reason: HOSPADM

## 2024-04-14 RX ORDER — ONDANSETRON 4 MG/1
4 TABLET, ORALLY DISINTEGRATING ORAL EVERY 8 HOURS PRN
Status: DISCONTINUED | OUTPATIENT
Start: 2024-04-14 | End: 2024-04-17 | Stop reason: HOSPADM

## 2024-04-14 RX ORDER — LORAZEPAM 2 MG/ML
0.5 INJECTION INTRAMUSCULAR EVERY 6 HOURS PRN
Status: DISCONTINUED | OUTPATIENT
Start: 2024-04-14 | End: 2024-04-17 | Stop reason: HOSPADM

## 2024-04-14 RX ORDER — TRAZODONE HYDROCHLORIDE 50 MG/1
50 TABLET ORAL NIGHTLY
Status: DISCONTINUED | OUTPATIENT
Start: 2024-04-15 | End: 2024-04-17 | Stop reason: HOSPADM

## 2024-04-14 RX ORDER — DEXTROSE MONOHYDRATE 100 MG/ML
INJECTION, SOLUTION INTRAVENOUS CONTINUOUS PRN
Status: DISCONTINUED | OUTPATIENT
Start: 2024-04-14 | End: 2024-04-17 | Stop reason: HOSPADM

## 2024-04-14 RX ORDER — LIDOCAINE 4 G/G
1 PATCH TOPICAL DAILY
Status: DISCONTINUED | OUTPATIENT
Start: 2024-04-15 | End: 2024-04-17 | Stop reason: HOSPADM

## 2024-04-14 RX ORDER — MAGNESIUM SULFATE IN WATER 40 MG/ML
2000 INJECTION, SOLUTION INTRAVENOUS PRN
Status: DISCONTINUED | OUTPATIENT
Start: 2024-04-14 | End: 2024-04-17 | Stop reason: HOSPADM

## 2024-04-14 RX ORDER — IPRATROPIUM BROMIDE AND ALBUTEROL SULFATE 2.5; .5 MG/3ML; MG/3ML
1 SOLUTION RESPIRATORY (INHALATION)
Status: DISCONTINUED | OUTPATIENT
Start: 2024-04-14 | End: 2024-04-17

## 2024-04-14 RX ORDER — HEPARIN SODIUM 1000 [USP'U]/ML
80 INJECTION, SOLUTION INTRAVENOUS; SUBCUTANEOUS PRN
Status: DISCONTINUED | OUTPATIENT
Start: 2024-04-14 | End: 2024-04-17

## 2024-04-14 RX ORDER — ATORVASTATIN CALCIUM 40 MG/1
40 TABLET, FILM COATED ORAL DAILY
Status: DISCONTINUED | OUTPATIENT
Start: 2024-04-15 | End: 2024-04-17 | Stop reason: HOSPADM

## 2024-04-14 RX ORDER — POTASSIUM CHLORIDE 20 MEQ/1
40 TABLET, EXTENDED RELEASE ORAL PRN
Status: DISCONTINUED | OUTPATIENT
Start: 2024-04-14 | End: 2024-04-17 | Stop reason: HOSPADM

## 2024-04-14 RX ORDER — HEPARIN SODIUM 1000 [USP'U]/ML
40 INJECTION, SOLUTION INTRAVENOUS; SUBCUTANEOUS PRN
Status: DISCONTINUED | OUTPATIENT
Start: 2024-04-14 | End: 2024-04-17

## 2024-04-14 RX ORDER — ACETAMINOPHEN 325 MG/1
650 TABLET ORAL EVERY 6 HOURS PRN
Status: DISCONTINUED | OUTPATIENT
Start: 2024-04-14 | End: 2024-04-17 | Stop reason: HOSPADM

## 2024-04-14 RX ORDER — GLUCAGON 1 MG/ML
1 KIT INJECTION PRN
Status: DISCONTINUED | OUTPATIENT
Start: 2024-04-14 | End: 2024-04-17 | Stop reason: HOSPADM

## 2024-04-14 RX ORDER — ACETAMINOPHEN 650 MG/1
650 SUPPOSITORY RECTAL EVERY 6 HOURS PRN
Status: DISCONTINUED | OUTPATIENT
Start: 2024-04-14 | End: 2024-04-17 | Stop reason: HOSPADM

## 2024-04-14 RX ADMIN — VANCOMYCIN HYDROCHLORIDE 2250 MG: 1 INJECTION, POWDER, LYOPHILIZED, FOR SOLUTION INTRAVENOUS at 19:32

## 2024-04-14 RX ADMIN — LORAZEPAM 0.5 MG: 2 INJECTION INTRAMUSCULAR; INTRAVENOUS at 18:50

## 2024-04-14 RX ADMIN — HEPARIN SODIUM 7470 UNITS: 1000 INJECTION INTRAVENOUS; SUBCUTANEOUS at 18:47

## 2024-04-14 RX ADMIN — SODIUM CHLORIDE 1000 ML: 9 INJECTION, SOLUTION INTRAVENOUS at 19:32

## 2024-04-14 RX ADMIN — AZITHROMYCIN DIHYDRATE 500 MG: 500 INJECTION, POWDER, LYOPHILIZED, FOR SOLUTION INTRAVENOUS at 23:20

## 2024-04-14 RX ADMIN — IPRATROPIUM BROMIDE AND ALBUTEROL SULFATE 1 DOSE: .5; 3 SOLUTION RESPIRATORY (INHALATION) at 23:43

## 2024-04-14 RX ADMIN — CEFEPIME 2000 MG: 2 INJECTION, POWDER, FOR SOLUTION INTRAVENOUS at 18:36

## 2024-04-14 RX ADMIN — HEPARIN SODIUM 18 UNITS/KG/HR: 10000 INJECTION, SOLUTION INTRAVENOUS at 18:47

## 2024-04-14 RX ADMIN — IOPAMIDOL 125 ML: 755 INJECTION, SOLUTION INTRAVENOUS at 19:46

## 2024-04-14 NOTE — ED NOTES
Pt to er. Pt c/o wound on rt great toe. States has wounds on rt ankle and rt foot also. Pt states toe is black and wasn't like that earlier. Pt states has not been following up with anyone for these wounds. States hasn't wanted to follow up with anyone. Pt has wound noted to outer rt ankle, and top of rt foot. Pt rt great toe appears to be black and have dried blood on it.

## 2024-04-14 NOTE — ED PROVIDER NOTES
Started the patient on heparin [TM]      ED Course User Index  [TM] Nura Faustin MD     Vitals reviewed:  ED Triage Vitals [04/14/24 1700]   BP Temp Temp Source Pulse Respirations SpO2 Height Weight - Scale   139/85 98 °F (36.7 °C) Oral 94 18 96 % -- 93.4 kg (206 lb)                 Summary of Patient Presentation (see ED course if left blank):      This is a 69-year-old male with uncontrolled diabetes as well as medication noncompliance longstanding history of PAD status post prior stents presented today with a necrotic and cold right toe.  Bedside ultrasound was unable to Doppler any pulses in the DP that we did have a strong PT pulse that was palpable and observed on ultrasound.  The case was discussed with interventional radiology with plan for arteriogram tomorrow morning.  He was started on heparin antibiotics to cover for skin and soft tissue infection.      ED Medications administered this visit:  (None if blank)  Medications   heparin (porcine) injection 7,470 Units (has no administration in time range)   heparin (porcine) injection 3,740 Units (has no administration in time range)   heparin 25,000 units in dextrose 5% 250 mL (premix) infusion (18 Units/kg/hr × 93.4 kg IntraVENous New Bag 4/14/24 1847)   vancomycin (VANCOCIN) 2,250 mg in sodium chloride 0.9 % 500 mL IVPB (2,250 mg IntraVENous New Bag 4/14/24 1932)   nicotine (NICODERM CQ) 21 MG/24HR 1 patch (1 patch TransDERmal Patch Applied 4/14/24 1836)   sodium chloride 0.9 % bolus 1,000 mL (1,000 mLs IntraVENous New Bag 4/14/24 1932)   LORazepam (ATIVAN) injection 0.5 mg (0.5 mg IntraVENous Given 4/14/24 1850)   iopamidol (ISOVUE-370) 76 % injection 125 mL (125 mLs IntraVENous Given 4/14/24 1946)   heparin (porcine) injection 7,470 Units (7,470 Units IntraVENous Given 4/14/24 1847)   ceFEPIme (MAXIPIME) 2,000 mg in sodium chloride 0.9 % 100 mL IVPB (mini-bag) (0 mg IntraVENous Stopped 4/14/24 1931)       PROCEDURES: (None if blank)  Procedures:

## 2024-04-14 NOTE — ED NOTES
Pt back from CT and states did not get it done. Pt states can't lay there and needs something to help him relax.

## 2024-04-14 NOTE — ED NOTES
RN in room. Pt stating \"I want to leave. I want out of here. I have never been here by myself and she wont stay with me.\" Significant other at bedside states \"I just want to go home and eat and I will come back in the morning to stay with him.\" Pt states \"no I am not staying here without you.\" Significant other states \"I dont have money for food so I need to go home.\" Offered significant other box lunch. States just wants to go home. Dr. Faustin notified and states would go talk with pt.

## 2024-04-15 ENCOUNTER — APPOINTMENT (OUTPATIENT)
Age: 69
DRG: 182 | End: 2024-04-15
Payer: COMMERCIAL

## 2024-04-15 ENCOUNTER — APPOINTMENT (OUTPATIENT)
Dept: INTERVENTIONAL RADIOLOGY/VASCULAR | Age: 69
DRG: 182 | End: 2024-04-15
Payer: COMMERCIAL

## 2024-04-15 PROBLEM — I96 DRY GANGRENE (HCC): Status: ACTIVE | Noted: 2024-04-15

## 2024-04-15 LAB
ANION GAP SERPL CALC-SCNC: 15 MEQ/L (ref 8–16)
AUTO DIFF PNL BLD: ABNORMAL
BASOPHILS ABSOLUTE: 0.1 THOU/MM3 (ref 0–0.1)
BASOPHILS NFR BLD AUTO: 0.8 %
BUN SERPL-MCNC: 9 MG/DL (ref 7–22)
CALCIUM SERPL-MCNC: 8 MG/DL (ref 8.5–10.5)
CHLORIDE SERPL-SCNC: 108 MEQ/L (ref 98–111)
CO2 SERPL-SCNC: 21 MEQ/L (ref 23–33)
CREAT SERPL-MCNC: 0.7 MG/DL (ref 0.4–1.2)
DEPRECATED MEAN GLUCOSE BLD GHB EST-ACNC: 144 MG/DL (ref 70–126)
DEPRECATED RDW RBC AUTO: 50.3 FL (ref 35–45)
ECHO AV CUSP MM: 2 CM
ECHO AV PEAK GRADIENT: 7 MMHG
ECHO AV PEAK VELOCITY: 1.3 M/S
ECHO AV VELOCITY RATIO: 0.69
ECHO IVC PROX: 2.6 CM
ECHO LA AREA 2C: 19.8 CM2
ECHO LA AREA 4C: 21.3 CM2
ECHO LA DIAMETER: 4 CM
ECHO LA MAJOR AXIS: 5.8 CM
ECHO LA MINOR AXIS: 5.5 CM
ECHO LA VOL BP: 60 ML (ref 18–58)
ECHO LA VOL MOD A2C: 57 ML (ref 18–58)
ECHO LA VOL MOD A4C: 61 ML (ref 18–58)
ECHO LV E' LATERAL VELOCITY: 13 CM/S
ECHO LV E' SEPTAL VELOCITY: 9 CM/S
ECHO LV EDV A2C: 157 ML
ECHO LV EDV A4C: 148 ML
ECHO LV EJECTION FRACTION A2C: 27 %
ECHO LV EJECTION FRACTION A4C: 28 %
ECHO LV EJECTION FRACTION BIPLANE: 28 % (ref 55–100)
ECHO LV ESV A2C: 114 ML
ECHO LV ESV A4C: 105 ML
ECHO LV FRACTIONAL SHORTENING: 7 % (ref 28–44)
ECHO LV INTERNAL DIMENSION DIASTOLIC: 5.7 CM (ref 4.2–5.9)
ECHO LV INTERNAL DIMENSION SYSTOLIC: 5.3 CM
ECHO LV ISOVOLUMETRIC RELAXATION TIME (IVRT): 67 MS
ECHO LV IVSD: 0.9 CM (ref 0.6–1)
ECHO LV MASS 2D: 211.7 G (ref 88–224)
ECHO LV POSTERIOR WALL DIASTOLIC: 1 CM (ref 0.6–1)
ECHO LV RELATIVE WALL THICKNESS RATIO: 0.35
ECHO LVOT PEAK GRADIENT: 3 MMHG
ECHO LVOT PEAK VELOCITY: 0.9 M/S
ECHO MV E DECELERATION TIME (DT): 172 MS
ECHO MV E VELOCITY: 1.16 M/S
ECHO MV E/E' LATERAL: 8.92
ECHO MV E/E' RATIO (AVERAGED): 10.91
ECHO MV REGURGITANT PEAK GRADIENT: 81 MMHG
ECHO MV REGURGITANT PEAK VELOCITY: 4.5 M/S
ECHO PV MAX VELOCITY: 0.8 M/S
ECHO PV PEAK GRADIENT: 2 MMHG
ECHO RV INTERNAL DIMENSION: 3.3 CM
ECHO RV TAPSE: 0.9 CM (ref 1.7–?)
ECHO TV REGURGITANT MAX VELOCITY: 3.07 M/S
ECHO TV REGURGITANT PEAK GRADIENT: 38 MMHG
ELLIPTOCYTES: ABNORMAL
EOSINOPHIL NFR BLD AUTO: 1.1 %
EOSINOPHILS ABSOLUTE: 0.1 THOU/MM3 (ref 0–0.4)
ERYTHROCYTE [DISTWIDTH] IN BLOOD BY AUTOMATED COUNT: 21.5 % (ref 11.5–14.5)
GFR SERPL CREATININE-BSD FRML MDRD: > 90 ML/MIN/1.73M2
GLUCOSE BLD STRIP.AUTO-MCNC: 125 MG/DL (ref 70–108)
GLUCOSE BLD STRIP.AUTO-MCNC: 152 MG/DL (ref 70–108)
GLUCOSE SERPL-MCNC: 99 MG/DL (ref 70–108)
HBA1C MFR BLD HPLC: 6.8 % (ref 4.4–6.4)
HCT VFR BLD AUTO: 32.4 % (ref 42–52)
HEPARIN UNFRACTIONATED: 0.05 U/ML (ref 0.3–0.7)
HEPARIN UNFRACTIONATED: 0.3 U/ML (ref 0.3–0.7)
HEPARIN UNFRACTIONATED: 0.31 U/ML (ref 0.3–0.7)
HEPARIN UNFRACTIONATED: < 0.04 U/ML (ref 0.3–0.7)
HGB BLD-MCNC: 8.6 GM/DL (ref 14–18)
HYPOCHROMIA BLD QL SMEAR: PRESENT
IMM GRANULOCYTES # BLD AUTO: 0.06 THOU/MM3 (ref 0–0.07)
IMM GRANULOCYTES NFR BLD AUTO: 0.5 %
LACTATE SERPL-SCNC: 1.4 MMOL/L (ref 0.5–2)
LACTATE SERPL-SCNC: 2 MMOL/L (ref 0.5–2)
LACTATE SERPL-SCNC: 3.9 MMOL/L (ref 0.5–2)
LYMPHOCYTES ABSOLUTE: 2.2 THOU/MM3 (ref 1–4.8)
LYMPHOCYTES NFR BLD AUTO: 17.4 %
MCH RBC QN AUTO: 18.4 PG (ref 26–33)
MCHC RBC AUTO-ENTMCNC: 26.5 GM/DL (ref 32.2–35.5)
MCV RBC AUTO: 69.2 FL (ref 80–94)
MICROCYTES BLD QL SMEAR: PRESENT
MONOCYTES ABSOLUTE: 1.1 THOU/MM3 (ref 0.4–1.3)
MONOCYTES NFR BLD AUTO: 8.9 %
NEUTROPHILS NFR BLD AUTO: 71.3 %
NRBC BLD AUTO-RTO: 0 /100 WBC
OSMOLALITY SERPL CALC.SUM OF ELEC: 285.6 MOSMOL/KG (ref 275–300)
PATHOLOGIST REVIEW: ABNORMAL
PLATELET # BLD AUTO: 363 THOU/MM3 (ref 130–400)
PLATELET BLD QL SMEAR: ADEQUATE
PMV BLD AUTO: 10.3 FL (ref 9.4–12.4)
POLYCHROMASIA BLD QL SMEAR: ABNORMAL
POTASSIUM SERPL-SCNC: 3.6 MEQ/L (ref 3.5–5.2)
RBC # BLD AUTO: 4.68 MILL/MM3 (ref 4.7–6.1)
SCAN OF BLOOD SMEAR: NORMAL
SEGMENTED NEUTROPHILS ABSOLUTE COUNT: 8.8 THOU/MM3 (ref 1.8–7.7)
SODIUM SERPL-SCNC: 144 MEQ/L (ref 135–145)
WBC # BLD AUTO: 12.4 THOU/MM3 (ref 4.8–10.8)

## 2024-04-15 PROCEDURE — 75625 CONTRAST EXAM ABDOMINL AORTA: CPT

## 2024-04-15 PROCEDURE — 2500000003 HC RX 250 WO HCPCS: Performed by: RADIOLOGY

## 2024-04-15 PROCEDURE — 75710 ARTERY X-RAYS ARM/LEG: CPT

## 2024-04-15 PROCEDURE — 87205 SMEAR GRAM STAIN: CPT

## 2024-04-15 PROCEDURE — 94640 AIRWAY INHALATION TREATMENT: CPT

## 2024-04-15 PROCEDURE — B4101ZZ FLUOROSCOPY OF ABDOMINAL AORTA USING LOW OSMOLAR CONTRAST: ICD-10-PCS | Performed by: RADIOLOGY

## 2024-04-15 PROCEDURE — 87070 CULTURE OTHR SPECIMN AEROBIC: CPT

## 2024-04-15 PROCEDURE — 37224 HC PLASTY UNI FEMPOP: CPT

## 2024-04-15 PROCEDURE — 6370000000 HC RX 637 (ALT 250 FOR IP)

## 2024-04-15 PROCEDURE — 2580000003 HC RX 258

## 2024-04-15 PROCEDURE — 87147 CULTURE TYPE IMMUNOLOGIC: CPT

## 2024-04-15 PROCEDURE — 6370000000 HC RX 637 (ALT 250 FOR IP): Performed by: STUDENT IN AN ORGANIZED HEALTH CARE EDUCATION/TRAINING PROGRAM

## 2024-04-15 PROCEDURE — 6360000004 HC RX CONTRAST MEDICATION: Performed by: RADIOLOGY

## 2024-04-15 PROCEDURE — B41C1ZZ FLUOROSCOPY OF PELVIC ARTERIES USING LOW OSMOLAR CONTRAST: ICD-10-PCS | Performed by: RADIOLOGY

## 2024-04-15 PROCEDURE — 2580000003 HC RX 258: Performed by: HOSPITALIST

## 2024-04-15 PROCEDURE — 047R3ZZ DILATION OF RIGHT POSTERIOR TIBIAL ARTERY, PERCUTANEOUS APPROACH: ICD-10-PCS | Performed by: RADIOLOGY

## 2024-04-15 PROCEDURE — 85025 COMPLETE CBC W/AUTO DIFF WBC: CPT

## 2024-04-15 PROCEDURE — 93306 TTE W/DOPPLER COMPLETE: CPT | Performed by: INTERNAL MEDICINE

## 2024-04-15 PROCEDURE — 93306 TTE W/DOPPLER COMPLETE: CPT

## 2024-04-15 PROCEDURE — 6360000002 HC RX W HCPCS

## 2024-04-15 PROCEDURE — 80048 BASIC METABOLIC PNL TOTAL CA: CPT

## 2024-04-15 PROCEDURE — 6360000002 HC RX W HCPCS: Performed by: HOSPITALIST

## 2024-04-15 PROCEDURE — 2060000000 HC ICU INTERMEDIATE R&B

## 2024-04-15 PROCEDURE — 83605 ASSAY OF LACTIC ACID: CPT

## 2024-04-15 PROCEDURE — 85520 HEPARIN ASSAY: CPT

## 2024-04-15 PROCEDURE — 6360000002 HC RX W HCPCS: Performed by: RADIOLOGY

## 2024-04-15 PROCEDURE — 75774 ARTERY X-RAY EACH VESSEL: CPT

## 2024-04-15 PROCEDURE — 36415 COLL VENOUS BLD VENIPUNCTURE: CPT

## 2024-04-15 PROCEDURE — 87075 CULTR BACTERIA EXCEPT BLOOD: CPT

## 2024-04-15 PROCEDURE — 87077 CULTURE AEROBIC IDENTIFY: CPT

## 2024-04-15 PROCEDURE — 99232 SBSQ HOSP IP/OBS MODERATE 35: CPT | Performed by: HOSPITALIST

## 2024-04-15 PROCEDURE — 047K3ZZ DILATION OF RIGHT FEMORAL ARTERY, PERCUTANEOUS APPROACH: ICD-10-PCS | Performed by: RADIOLOGY

## 2024-04-15 PROCEDURE — 87186 SC STD MICRODIL/AGAR DIL: CPT

## 2024-04-15 PROCEDURE — 82948 REAGENT STRIP/BLOOD GLUCOSE: CPT

## 2024-04-15 PROCEDURE — 6360000002 HC RX W HCPCS: Performed by: STUDENT IN AN ORGANIZED HEALTH CARE EDUCATION/TRAINING PROGRAM

## 2024-04-15 PROCEDURE — B41F1ZZ FLUOROSCOPY OF RIGHT LOWER EXTREMITY ARTERIES USING LOW OSMOLAR CONTRAST: ICD-10-PCS | Performed by: RADIOLOGY

## 2024-04-15 RX ORDER — MIDAZOLAM HYDROCHLORIDE 1 MG/ML
INJECTION INTRAMUSCULAR; INTRAVENOUS PRN
Status: COMPLETED | OUTPATIENT
Start: 2024-04-15 | End: 2024-04-15

## 2024-04-15 RX ORDER — OXYCODONE HYDROCHLORIDE AND ACETAMINOPHEN 5; 325 MG/1; MG/1
2 TABLET ORAL EVERY 6 HOURS PRN
Status: DISCONTINUED | OUTPATIENT
Start: 2024-04-15 | End: 2024-04-17 | Stop reason: HOSPADM

## 2024-04-15 RX ORDER — FENTANYL CITRATE 50 UG/ML
INJECTION, SOLUTION INTRAMUSCULAR; INTRAVENOUS PRN
Status: COMPLETED | OUTPATIENT
Start: 2024-04-15 | End: 2024-04-15

## 2024-04-15 RX ORDER — LANOLIN ALCOHOL/MO/W.PET/CERES
3 CREAM (GRAM) TOPICAL NIGHTLY PRN
Status: DISCONTINUED | OUTPATIENT
Start: 2024-04-15 | End: 2024-04-17 | Stop reason: HOSPADM

## 2024-04-15 RX ORDER — LIDOCAINE HYDROCHLORIDE 20 MG/ML
INJECTION, SOLUTION INFILTRATION; PERINEURAL PRN
Status: COMPLETED | OUTPATIENT
Start: 2024-04-15 | End: 2024-04-15

## 2024-04-15 RX ORDER — CLOPIDOGREL BISULFATE 75 MG/1
300 TABLET ORAL ONCE
Status: COMPLETED | OUTPATIENT
Start: 2024-04-16 | End: 2024-04-16

## 2024-04-15 RX ORDER — HEPARIN SODIUM 1000 [USP'U]/ML
INJECTION, SOLUTION INTRAVENOUS; SUBCUTANEOUS PRN
Status: COMPLETED | OUTPATIENT
Start: 2024-04-15 | End: 2024-04-15

## 2024-04-15 RX ADMIN — METOPROLOL TARTRATE 50 MG: 50 TABLET, FILM COATED ORAL at 20:36

## 2024-04-15 RX ADMIN — ONDANSETRON 4 MG: 4 TABLET, ORALLY DISINTEGRATING ORAL at 20:19

## 2024-04-15 RX ADMIN — SODIUM CHLORIDE, PRESERVATIVE FREE 10 ML: 5 INJECTION INTRAVENOUS at 01:12

## 2024-04-15 RX ADMIN — Medication 1250 MG: at 18:30

## 2024-04-15 RX ADMIN — SODIUM CHLORIDE, POTASSIUM CHLORIDE, SODIUM LACTATE AND CALCIUM CHLORIDE: 600; 310; 30; 20 INJECTION, SOLUTION INTRAVENOUS at 02:22

## 2024-04-15 RX ADMIN — IOPAMIDOL 125 ML: 612 INJECTION, SOLUTION INTRAVENOUS at 17:40

## 2024-04-15 RX ADMIN — METOPROLOL TARTRATE 50 MG: 50 TABLET, FILM COATED ORAL at 15:30

## 2024-04-15 RX ADMIN — IPRATROPIUM BROMIDE AND ALBUTEROL SULFATE 1 DOSE: .5; 3 SOLUTION RESPIRATORY (INHALATION) at 12:18

## 2024-04-15 RX ADMIN — LIDOCAINE HYDROCHLORIDE 3 ML: 20 INJECTION, SOLUTION INFILTRATION; PERINEURAL at 17:40

## 2024-04-15 RX ADMIN — ACETAMINOPHEN 650 MG: 325 TABLET ORAL at 23:20

## 2024-04-15 RX ADMIN — PIPERACILLIN AND TAZOBACTAM 3375 MG: 3; .375 INJECTION, POWDER, LYOPHILIZED, FOR SOLUTION INTRAVENOUS at 21:44

## 2024-04-15 RX ADMIN — Medication 3 MG: at 23:30

## 2024-04-15 RX ADMIN — TRAZODONE HYDROCHLORIDE 50 MG: 50 TABLET ORAL at 23:20

## 2024-04-15 RX ADMIN — IPRATROPIUM BROMIDE AND ALBUTEROL SULFATE 1 DOSE: .5; 3 SOLUTION RESPIRATORY (INHALATION) at 07:36

## 2024-04-15 RX ADMIN — GABAPENTIN 100 MG: 100 CAPSULE ORAL at 23:20

## 2024-04-15 RX ADMIN — MIDAZOLAM 1 MG: 1 INJECTION INTRAMUSCULAR; INTRAVENOUS at 16:17

## 2024-04-15 RX ADMIN — AZITHROMYCIN DIHYDRATE 500 MG: 500 INJECTION, POWDER, LYOPHILIZED, FOR SOLUTION INTRAVENOUS at 22:19

## 2024-04-15 RX ADMIN — TRAZODONE HYDROCHLORIDE 50 MG: 50 TABLET ORAL at 02:14

## 2024-04-15 RX ADMIN — PIPERACILLIN AND TAZOBACTAM 4500 MG: 4; .5 INJECTION, POWDER, LYOPHILIZED, FOR SOLUTION INTRAVENOUS at 15:47

## 2024-04-15 RX ADMIN — ACETAMINOPHEN 650 MG: 325 TABLET ORAL at 02:14

## 2024-04-15 RX ADMIN — HEPARIN SODIUM 2000 UNITS: 1000 INJECTION INTRAVENOUS; SUBCUTANEOUS at 17:02

## 2024-04-15 RX ADMIN — PANTOPRAZOLE SODIUM 40 MG: 40 TABLET, DELAYED RELEASE ORAL at 15:30

## 2024-04-15 RX ADMIN — ACETAMINOPHEN 650 MG: 325 TABLET ORAL at 15:30

## 2024-04-15 RX ADMIN — FENTANYL CITRATE 50 MCG: 50 INJECTION, SOLUTION INTRAMUSCULAR; INTRAVENOUS at 16:17

## 2024-04-15 RX ADMIN — Medication 3 MG: at 04:16

## 2024-04-15 RX ADMIN — LORAZEPAM 0.5 MG: 2 INJECTION INTRAMUSCULAR; INTRAVENOUS at 15:29

## 2024-04-15 ASSESSMENT — PAIN SCALES - GENERAL
PAINLEVEL_OUTOF10: 1
PAINLEVEL_OUTOF10: 8
PAINLEVEL_OUTOF10: 0
PAINLEVEL_OUTOF10: 2

## 2024-04-15 ASSESSMENT — PAIN DESCRIPTION - ONSET: ONSET: ON-GOING

## 2024-04-15 ASSESSMENT — PAIN DESCRIPTION - PAIN TYPE: TYPE: CHRONIC PAIN

## 2024-04-15 ASSESSMENT — PAIN DESCRIPTION - LOCATION
LOCATION: GENERALIZED
LOCATION: BACK

## 2024-04-15 ASSESSMENT — PAIN SCALES - WONG BAKER
WONGBAKER_NUMERICALRESPONSE: HURTS A LITTLE BIT
WONGBAKER_NUMERICALRESPONSE: HURTS A LITTLE BIT

## 2024-04-15 ASSESSMENT — PAIN - FUNCTIONAL ASSESSMENT
PAIN_FUNCTIONAL_ASSESSMENT: PREVENTS OR INTERFERES WITH MANY ACTIVE NOT PASSIVE ACTIVITIES
PAIN_FUNCTIONAL_ASSESSMENT: ACTIVITIES ARE NOT PREVENTED

## 2024-04-15 ASSESSMENT — PAIN DESCRIPTION - DESCRIPTORS: DESCRIPTORS: ACHING

## 2024-04-15 ASSESSMENT — PAIN DESCRIPTION - FREQUENCY: FREQUENCY: INTERMITTENT

## 2024-04-15 NOTE — ED NOTES
Pt medicated per MAR. Respirations easy and unlabored with no acute distress noted. No questions or concerns voiced at this time. Telemetry in place. Call light in reach.

## 2024-04-15 NOTE — ED NOTES
Pt resting on cot with eyes closed. Respirations are easy and unlabored with no acute distress noted. Telemetry in place. Call light within reach.

## 2024-04-15 NOTE — ED NOTES
Pt medicated per MAR. Respirations unlabored with no acute distress noted. Telemetry in place. Call light in reach.

## 2024-04-15 NOTE — H&P
Formulation and discussion of sedation / procedure plans, risks, benefits, side effects and alternatives with patient and/or responsible adult completed.    History and Physical reviewed and unchanged.    Electronically signed by Ty Pisano MD on 4/15/24 at 3:58 PM EDT   
record)  [x]Formulation and discussion of sedation/procedure plan, risks, and expectations with patient and/or responsible adult completed.  [x]Patient examined immediately prior to the procedure. (Refer to nursing sedation/analgesia documentation record)    Ty Pisano MD, MD  Electronically signed 4/15/2024 at 3:58 PM    
interpretation: Pending  EKG:  I have reviewed the EKG with the following interpretation: Normal sinus rhythm with sinus arrhythmia    CTA LOWER EXTREMITY RIGHT W WO CONTRAST   Final Result   1. There is significant flow limiting stenosis within the proximal right SFA as well as the femoropopliteal region as detailed above.      2. There is occlusion of the distal right anterior tibial artery with reconstitution of the distal right anterior tibial artery by collateral vessels. The reconstituted distal right intervertebral artery is diminutive in caliber.      3. There is a small amount of free fluid within the pelvis.       4. There are enlarged lymph nodes along the bilateral pelvic sidewalls of indeterminate etiology. Cannot exclude metastatic disease. Further evaluation could be obtained with dedicated CT of the abdomen and pelvis with contrast.         **This report has been created using voice recognition software.  It may contain minor errors which are inherent in voice recognition technology.**      Final report electronically signed by Dr. Fermín Boyce on 4/14/2024 8:29 PM      XR FOOT RIGHT (MIN 3 VIEWS)   Final Result   1. No acute fracture or malalignment. No definite osseous erosions are seen.            **This report has been created using voice recognition software.  It may contain minor errors which are inherent in voice recognition technology.**      Final report electronically signed by Dr. Fermín Boyce on 4/14/2024 7:10 PM      IR ABDOMINAL AORTOBIFEMORAL CATHETER SERIALOGRAM    (Results Pending)   IR INTERVENTIONAL RADIOLOGY PROCEDURE REQUEST    (Results Pending)        DVT prophylaxis: Already on anticoagulation (IV Heparin drip)    Code Status: Full Code      PT/OT Eval Status: Not consulted    Disposition:Home        Thank you Fred Evans MD for the opportunity to be involved in this patient's care.    Electronically signed by Rowdy Soliman DO on 4/14/2024 at 10:15 PM

## 2024-04-15 NOTE — CONSULTS
Podiatric Surgery Consult    Patient Ruben [facility]  MEDICAL RECORD NUMBER:  [id number]  AGE: 69 y.o.   GENDER: male  : 1955  EPISODE DATE:  2024    Reason for Consult:  Right Great Toe Gangrene    Requesting Physician:  Ashley Claire MD    CHIEF COMPLAINT:  Gangrene (HCC)    HISTORY OF PRESENT ILLNESS:                The patient is a 69 y.o. male with significant past medical history of CAD, COPD, diabetes mellitus, hyperlipidemia, hypertension, osteoarthritis who is being seen at bedside on behalf of Dr. Mckeon. Patient states that he does not recall specifically any acute changes to his right foot.  He states that he was intoxicated a few days prior which when he woke up and did notice that the foot had started to have discoloration changes.  Patient states that his fiancée has been applying bandages to the wound present on the lateral aspect of the right leg.  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  Patient has a long history of smoking since age of 11 as he reports.  Patient denies any current nausea, fever, vomiting, chills, shortness of breath or chest pain.  No other pedal concerns at time.      Past Medical History:    Past Medical History:   Diagnosis Date    CAD (coronary artery disease)     COPD (chronic obstructive pulmonary disease) (HCC)     Diabetes mellitus (HCC)     Hx of blood clots     LEFT LEG    Hyperlipidemia     Hypertension     Leg wound, left     Leg wound, right     Lumbar radiculopathy     Osteoarthritis     Seizure disorder (HCC)     Spinal stenosis, lumbar         Past Surgical History:    Past Surgical History:   Procedure Laterality Date    ANKLE SURGERY      ball joint    BACK SURGERY      CARDIAC CATHETERIZATION      1 STENT PLACED    CARDIAC CATHETERIZATION  13    [facility]    CORONARY ANGIOPLASTY WITH STENT PLACEMENT      DIAGNOSTIC CARDIAC CATH LAB PROCEDURE  2021    [facility]

## 2024-04-15 NOTE — ED NOTES
Patient resting in bed. Respirations easy and unlabored. No distress noted. Call light within reach.   146.5 146.5

## 2024-04-15 NOTE — ED NOTES
ED to inpatient nurses report      Chief Complaint:  Chief Complaint   Patient presents with    Wound Check     rt     Present to ED from: home    MOA:     LOC: alert and orientated to name, place, date  Mobility: Requires assistance * 1  Oxygen Baseline: RA    Current needs required: RA     Code Status:   Full Code    What abnormal results were found and what did you give/do to treat them? Necrotic right great toe  Any procedures or intervention occur? None    Mental Status:  Level of Consciousness: Alert (0)    Psych Assessment:        Vitals:  Patient Vitals for the past 24 hrs:   BP Temp Temp src Pulse Resp SpO2 Weight   04/15/24 1218 -- -- -- (!) 102 -- 98 % --   04/15/24 1154 (!) 151/85 -- -- (!) 108 17 100 % --   04/15/24 0736 -- -- -- (!) 102 -- 100 % --   04/15/24 0708 (!) 157/95 -- -- (!) 101 18 94 % --   04/15/24 0556 135/81 -- -- 87 16 94 % --   04/15/24 0417 (!) 138/98 -- -- (!) 103 18 95 % --   04/15/24 0216 129/85 -- -- 97 20 98 % --   04/14/24 2321 139/80 -- -- 99 18 95 % --   04/14/24 2214 (!) 141/79 -- -- 84 16 96 % --   04/14/24 2108 (!) 151/83 -- -- 85 16 95 % --   04/14/24 2006 (!) 145/86 -- -- 89 16 95 % --   04/14/24 1859 124/70 -- -- 88 18 96 % --   04/14/24 1700 139/85 98 °F (36.7 °C) Oral 94 18 96 % 93.4 kg (206 lb)        LDAs:   Peripheral IV 04/15/24 Right Antecubital (Active)       Ambulatory Status:  No data recorded    Diagnosis:  DISPOSITION Admitted 04/14/2024 09:16:57 PM   Final diagnoses:   PAD (peripheral artery disease) (HCC)   Diabetic ulcer of right foot associated with type 2 diabetes mellitus, unspecified part of foot, unspecified ulcer stage (HCC)   Tibial artery occlusion, right (HCC)   Panlobular emphysema (HCC)   Left ventricular systolic dysfunction        Consults:  PHARMACY TO DOSE VANCOMYCIN  IP CONSULT TO PODIATRY     Pain Score:  Pain Assessment  Pain Assessment: Urias-Baker FACES  Urias-Baker Pain Rating: Hurts a little bit    C-SSRS:   Risk of Suicide: No

## 2024-04-15 NOTE — ED NOTES
Pt resting on cot with easy and unlabored respirations. No acute distress noted. Telemetry in place. Call light within reach.

## 2024-04-15 NOTE — ED NOTES
Pt medicated per MAR. Respirations easy and unlabored with no acute distress noted. Pt voices no questions or concerns at this time. Telemetry in place. Call light in reach.

## 2024-04-15 NOTE — OP NOTE
Department of Radiology  Post Procedure Progress Note      Pre-Procedure Diagnosis:  right leg pain     Procedure Performed:  right leg angiogram, angioplasty right SFA and right posterior tibial arteries     Anesthesia: local / versed and fentanyl    Findings: successful    Immediate Complications:  None    Estimated Blood Loss: minimal    SEE DICTATED PROCEDURE NOTE FOR COMPLETE DETAILS.    Ty Pisano MD   4/15/2024 5:39 PM

## 2024-04-16 LAB
ANION GAP SERPL CALC-SCNC: 12 MEQ/L (ref 8–16)
BASOPHILS ABSOLUTE: 0.1 THOU/MM3 (ref 0–0.1)
BASOPHILS NFR BLD AUTO: 0.7 %
BUN SERPL-MCNC: 10 MG/DL (ref 7–22)
CALCIUM SERPL-MCNC: 7.9 MG/DL (ref 8.5–10.5)
CHLORIDE SERPL-SCNC: 103 MEQ/L (ref 98–111)
CO2 SERPL-SCNC: 23 MEQ/L (ref 23–33)
CREAT SERPL-MCNC: 0.8 MG/DL (ref 0.4–1.2)
DEPRECATED RDW RBC AUTO: 49.3 FL (ref 35–45)
EOSINOPHIL NFR BLD AUTO: 0.3 %
EOSINOPHILS ABSOLUTE: 0 THOU/MM3 (ref 0–0.4)
ERYTHROCYTE [DISTWIDTH] IN BLOOD BY AUTOMATED COUNT: 21.1 % (ref 11.5–14.5)
GFR SERPL CREATININE-BSD FRML MDRD: > 90 ML/MIN/1.73M2
GLUCOSE BLD STRIP.AUTO-MCNC: 113 MG/DL (ref 70–108)
GLUCOSE BLD STRIP.AUTO-MCNC: 174 MG/DL (ref 70–108)
GLUCOSE BLD STRIP.AUTO-MCNC: 193 MG/DL (ref 70–108)
GLUCOSE BLD STRIP.AUTO-MCNC: 197 MG/DL (ref 70–108)
GLUCOSE SERPL-MCNC: 122 MG/DL (ref 70–108)
HCT VFR BLD AUTO: 32.6 % (ref 42–52)
HEPARIN UNFRACTIONATED: 0.05 U/ML (ref 0.3–0.7)
HEPARIN UNFRACTIONATED: 0.21 U/ML (ref 0.3–0.7)
HGB BLD-MCNC: 8.6 GM/DL (ref 14–18)
HYPOCHROMIA BLD QL SMEAR: PRESENT
IMM GRANULOCYTES # BLD AUTO: 0.06 THOU/MM3 (ref 0–0.07)
IMM GRANULOCYTES NFR BLD AUTO: 0.5 %
LYMPHOCYTES ABSOLUTE: 1.5 THOU/MM3 (ref 1–4.8)
LYMPHOCYTES NFR BLD AUTO: 13 %
MCH RBC QN AUTO: 18.1 PG (ref 26–33)
MCHC RBC AUTO-ENTMCNC: 26.4 GM/DL (ref 32.2–35.5)
MCV RBC AUTO: 68.8 FL (ref 80–94)
MICROCYTES BLD QL SMEAR: PRESENT
MONOCYTES ABSOLUTE: 1.2 THOU/MM3 (ref 0.4–1.3)
MONOCYTES NFR BLD AUTO: 10.1 %
NEUTROPHILS NFR BLD AUTO: 75.4 %
NRBC BLD AUTO-RTO: 0 /100 WBC
OSMOLALITY SERPL CALC.SUM OF ELEC: 276 MOSMOL/KG (ref 275–300)
PLATELET # BLD AUTO: 392 THOU/MM3 (ref 130–400)
PMV BLD AUTO: 10.3 FL (ref 9.4–12.4)
POTASSIUM SERPL-SCNC: 4.2 MEQ/L (ref 3.5–5.2)
RBC # BLD AUTO: 4.74 MILL/MM3 (ref 4.7–6.1)
SEGMENTED NEUTROPHILS ABSOLUTE COUNT: 8.8 THOU/MM3 (ref 1.8–7.7)
SODIUM SERPL-SCNC: 138 MEQ/L (ref 135–145)
VANCOMYCIN SERPL-MCNC: 13.9 UG/ML (ref 0.1–39.9)
WBC # BLD AUTO: 11.7 THOU/MM3 (ref 4.8–10.8)

## 2024-04-16 PROCEDURE — 85520 HEPARIN ASSAY: CPT

## 2024-04-16 PROCEDURE — 2060000000 HC ICU INTERMEDIATE R&B

## 2024-04-16 PROCEDURE — 85025 COMPLETE CBC W/AUTO DIFF WBC: CPT

## 2024-04-16 PROCEDURE — 94640 AIRWAY INHALATION TREATMENT: CPT

## 2024-04-16 PROCEDURE — 6370000000 HC RX 637 (ALT 250 FOR IP)

## 2024-04-16 PROCEDURE — 36415 COLL VENOUS BLD VENIPUNCTURE: CPT

## 2024-04-16 PROCEDURE — 80202 ASSAY OF VANCOMYCIN: CPT

## 2024-04-16 PROCEDURE — 82948 REAGENT STRIP/BLOOD GLUCOSE: CPT

## 2024-04-16 PROCEDURE — 94761 N-INVAS EAR/PLS OXIMETRY MLT: CPT

## 2024-04-16 PROCEDURE — 6360000002 HC RX W HCPCS: Performed by: HOSPITALIST

## 2024-04-16 PROCEDURE — 2580000003 HC RX 258

## 2024-04-16 PROCEDURE — 6370000000 HC RX 637 (ALT 250 FOR IP): Performed by: RADIOLOGY

## 2024-04-16 PROCEDURE — 80048 BASIC METABOLIC PNL TOTAL CA: CPT

## 2024-04-16 PROCEDURE — 6360000002 HC RX W HCPCS

## 2024-04-16 PROCEDURE — 99232 SBSQ HOSP IP/OBS MODERATE 35: CPT | Performed by: HOSPITALIST

## 2024-04-16 PROCEDURE — 6360000002 HC RX W HCPCS: Performed by: STUDENT IN AN ORGANIZED HEALTH CARE EDUCATION/TRAINING PROGRAM

## 2024-04-16 PROCEDURE — 2580000003 HC RX 258: Performed by: HOSPITALIST

## 2024-04-16 PROCEDURE — 6370000000 HC RX 637 (ALT 250 FOR IP): Performed by: STUDENT IN AN ORGANIZED HEALTH CARE EDUCATION/TRAINING PROGRAM

## 2024-04-16 RX ORDER — ALBUTEROL SULFATE 2.5 MG/3ML
2.5 SOLUTION RESPIRATORY (INHALATION)
Status: DISCONTINUED | OUTPATIENT
Start: 2024-04-16 | End: 2024-04-17

## 2024-04-16 RX ADMIN — PIPERACILLIN AND TAZOBACTAM 3375 MG: 3; .375 INJECTION, POWDER, LYOPHILIZED, FOR SOLUTION INTRAVENOUS at 05:56

## 2024-04-16 RX ADMIN — TRAZODONE HYDROCHLORIDE 50 MG: 50 TABLET ORAL at 23:02

## 2024-04-16 RX ADMIN — IPRATROPIUM BROMIDE AND ALBUTEROL SULFATE 1 DOSE: .5; 3 SOLUTION RESPIRATORY (INHALATION) at 18:01

## 2024-04-16 RX ADMIN — AZITHROMYCIN DIHYDRATE 500 MG: 500 INJECTION, POWDER, LYOPHILIZED, FOR SOLUTION INTRAVENOUS at 21:24

## 2024-04-16 RX ADMIN — METOPROLOL TARTRATE 50 MG: 50 TABLET, FILM COATED ORAL at 08:24

## 2024-04-16 RX ADMIN — PIPERACILLIN AND TAZOBACTAM 3375 MG: 3; .375 INJECTION, POWDER, LYOPHILIZED, FOR SOLUTION INTRAVENOUS at 14:37

## 2024-04-16 RX ADMIN — Medication 1250 MG: at 05:57

## 2024-04-16 RX ADMIN — ACETAMINOPHEN 650 MG: 325 TABLET ORAL at 23:02

## 2024-04-16 RX ADMIN — SODIUM CHLORIDE, PRESERVATIVE FREE 10 ML: 5 INJECTION INTRAVENOUS at 08:26

## 2024-04-16 RX ADMIN — ATORVASTATIN CALCIUM 40 MG: 40 TABLET, FILM COATED ORAL at 08:24

## 2024-04-16 RX ADMIN — HEPARIN SODIUM 3740 UNITS: 1000 INJECTION INTRAVENOUS; SUBCUTANEOUS at 18:53

## 2024-04-16 RX ADMIN — ISOSORBIDE MONONITRATE 30 MG: 30 TABLET, EXTENDED RELEASE ORAL at 08:24

## 2024-04-16 RX ADMIN — GABAPENTIN 100 MG: 100 CAPSULE ORAL at 08:24

## 2024-04-16 RX ADMIN — Medication 1250 MG: at 17:58

## 2024-04-16 RX ADMIN — PIPERACILLIN AND TAZOBACTAM 3375 MG: 3; .375 INJECTION, POWDER, LYOPHILIZED, FOR SOLUTION INTRAVENOUS at 21:32

## 2024-04-16 RX ADMIN — IPRATROPIUM BROMIDE AND ALBUTEROL SULFATE 1 DOSE: .5; 3 SOLUTION RESPIRATORY (INHALATION) at 13:12

## 2024-04-16 RX ADMIN — Medication 3 MG: at 23:02

## 2024-04-16 RX ADMIN — METOPROLOL TARTRATE 50 MG: 50 TABLET, FILM COATED ORAL at 21:18

## 2024-04-16 RX ADMIN — GABAPENTIN 100 MG: 100 CAPSULE ORAL at 21:18

## 2024-04-16 RX ADMIN — GABAPENTIN 100 MG: 100 CAPSULE ORAL at 14:40

## 2024-04-16 RX ADMIN — CLOPIDOGREL BISULFATE 300 MG: 75 TABLET ORAL at 08:24

## 2024-04-16 RX ADMIN — IPRATROPIUM BROMIDE AND ALBUTEROL SULFATE 1 DOSE: .5; 3 SOLUTION RESPIRATORY (INHALATION) at 20:24

## 2024-04-16 RX ADMIN — EMPAGLIFLOZIN 10 MG: 10 TABLET, FILM COATED ORAL at 08:24

## 2024-04-16 RX ADMIN — HEPARIN SODIUM 18 UNITS/KG/HR: 10000 INJECTION, SOLUTION INTRAVENOUS at 11:00

## 2024-04-16 RX ADMIN — IPRATROPIUM BROMIDE AND ALBUTEROL SULFATE 1 DOSE: .5; 3 SOLUTION RESPIRATORY (INHALATION) at 09:03

## 2024-04-16 RX ADMIN — PANTOPRAZOLE SODIUM 40 MG: 40 TABLET, DELAYED RELEASE ORAL at 08:24

## 2024-04-16 ASSESSMENT — PAIN SCALES - GENERAL
PAINLEVEL_OUTOF10: 0
PAINLEVEL_OUTOF10: 3
PAINLEVEL_OUTOF10: 3

## 2024-04-16 ASSESSMENT — PAIN DESCRIPTION - ONSET: ONSET: SUDDEN

## 2024-04-16 ASSESSMENT — PAIN DESCRIPTION - DESCRIPTORS
DESCRIPTORS: ACHING;SORE
DESCRIPTORS: ACHING;SORE

## 2024-04-16 ASSESSMENT — PAIN DESCRIPTION - ORIENTATION
ORIENTATION: RIGHT
ORIENTATION: RIGHT

## 2024-04-16 ASSESSMENT — PAIN DESCRIPTION - PAIN TYPE: TYPE: SURGICAL PAIN

## 2024-04-16 ASSESSMENT — PAIN DESCRIPTION - LOCATION
LOCATION: FOOT
LOCATION: FOOT

## 2024-04-16 ASSESSMENT — PAIN DESCRIPTION - FREQUENCY: FREQUENCY: INTERMITTENT

## 2024-04-16 ASSESSMENT — PAIN - FUNCTIONAL ASSESSMENT
PAIN_FUNCTIONAL_ASSESSMENT: ACTIVITIES ARE NOT PREVENTED
PAIN_FUNCTIONAL_ASSESSMENT: ACTIVITIES ARE NOT PREVENTED

## 2024-04-16 NOTE — RT PROTOCOL NOTE
RT Inhaler-Nebulizer Bronchodilator Protocol Note    There is a bronchodilator order in the chart from a provider indicating to follow the RT Bronchodilator Protocol and there is an “Initiate RT Inhaler-Nebulizer Bronchodilator Protocol” order as well (see protocol at bottom of note).    CXR Findings:  XR CHEST PORTABLE    Result Date: 4/14/2024  1. Cardiomegaly and mild pulmonary edema. This document has been electronically signed by: Rowdy Gotti MD on 04/14/2024 11:03 PM      The findings from the last RT Protocol Assessment were as follows:   History Pulmonary Disease: Chronic pulmonary disease  Respiratory Pattern: Mild dyspnea at rest, irregular pattern, or RR 21-25 bpm  Breath Sounds: Slightly diminished and/or crackles  Cough: Strong, spontaneous, non-productive  Indication for Bronchodilator Therapy: Decreased or absent breath sounds, On home bronchodilators  Bronchodilator Assessment Score: 8    Aerosolized bronchodilator medication orders have been revised according to the RT Inhaler-Nebulizer Bronchodilator Protocol below.    Respiratory Therapist to perform RT Therapy Protocol Assessment initially then follow the protocol.  Repeat RT Therapy Protocol Assessment PRN for score 0-3 or on second treatment, BID, and PRN for scores above 3.    No Indications - adjust the frequency to every 6 hours PRN wheezing or bronchospasm, if no treatments needed after 48 hours then discontinue using Per Protocol order mode.     If indication present, adjust the RT bronchodilator orders based on the Bronchodilator Assessment Score as indicated below.  Use Inhaler orders unless patient has one or more of the following: on home nebulizer, not able to hold breath for 10 seconds, is not alert and oriented, cannot activate and use MDI correctly, or respiratory rate 25 breaths per minute or more, then use the equivalent nebulizer order(s) with same Frequency and PRN reasons based on the score.  If a patient is on this 
medication at home then do not decrease Frequency below that used at home.    0-3 - enter or revise RT bronchodilator order(s) to equivalent RT Bronchodilator order with Frequency of every 4 hours PRN for wheezing or increased work of breathing using Per Protocol order mode.        4-6 - enter or revise RT Bronchodilator order(s) to two equivalent RT bronchodilator orders with one order with BID Frequency and one order with Frequency of every 4 hours PRN wheezing or increased work of breathing using Per Protocol order mode.        7-10 - enter or revise RT Bronchodilator order(s) to two equivalent RT bronchodilator orders with one order with TID Frequency and one order with Frequency of every 4 hours PRN wheezing or increased work of breathing using Per Protocol order mode.       11-13 - enter or revise RT Bronchodilator order(s) to one equivalent RT bronchodilator order with QID Frequency and an Albuterol order with Frequency of every 4 hours PRN wheezing or increased work of breathing using Per Protocol order mode.      Greater than 13 - enter or revise RT Bronchodilator order(s) to one equivalent RT bronchodilator order with every 4 hours Frequency and an Albuterol order with Frequency of every 2 hours PRN wheezing or increased work of breathing using Per Protocol order mode.     RT to enter RT Home Evaluation for COPD & MDI Assessment order using Per Protocol order mode.    Electronically signed by Rm Jain RCP on 4/14/2024 at 11:44 PM

## 2024-04-16 NOTE — CARE COORDINATION
Case Management Assessment Initial Evaluation    Date/Time of Evaluation: 2024 7:54 AM  Assessment Completed by: Gaye Santos RN    If patient is discharged prior to next notation, then this note serves as note for discharge by case management.    Patient Name: Ruben Zendejas                   YOB: 1955  Diagnosis: Gangrene (HCC) [I96]  Panlobular emphysema (HCC) [J43.1]  PVD (peripheral vascular disease) (HCC) [I73.9]  PAD (peripheral artery disease) (HCC) [I73.9]  Tibial artery occlusion, right ([facility]) [I70.201]  Left ventricular systolic dysfunction [I51.9]  Type 2 diabetes mellitus with diabetic peripheral angiopathy and gangrene, with long-term current use of insulin (HCC) [E11.52, Z79.4]  Diabetic ulcer of right foot associated with type 2 diabetes mellitus, unspecified part of foot, unspecified ulcer stage ([facility]) [E11.621, L97.519]  Nonhealing ulcer of right lower leg, unspecified ulcer stage ([facility]) [L97.919]                   Date / Time: 2024  4:53 PM  Location: [address] [place] [address]     Patient Admission Status: Inpatient   Readmission Risk Low 0-14, Mod 15-19), High > 20: Readmission Risk Score: 16.7    Current PCP: Fred Evans MD    Additional Case Management Notes: From ED, foot culture (+) staphylococcus. Diabetes management, Podiatry consult, pain control, IV Zithromax, Plavix, Nebs, Zofran, Protonix, IV Zosyn, IV Vancomycin.    Procedures:   4/15  right leg angiogram, angioplasty right SFA and right posterior tibial arteries        Imagin/14 XR Right Foot 1. No acute fracture or malalignment. No definite osseous erosions are seen.      CTA Lower Extremity Right W WO Contrast 1. There is significant flow limiting stenosis within the proximal right SFA as well as the femoropopliteal region as detailed above.     2. There is occlusion of the distal right anterior tibial artery with reconstitution of the distal right anterior tibial artery by collateral

## 2024-04-17 VITALS
SYSTOLIC BLOOD PRESSURE: 121 MMHG | RESPIRATION RATE: 18 BRPM | BODY MASS INDEX: 35.28 KG/M2 | HEART RATE: 79 BPM | WEIGHT: 232.81 LBS | HEIGHT: 68 IN | DIASTOLIC BLOOD PRESSURE: 78 MMHG | TEMPERATURE: 97.9 F | OXYGEN SATURATION: 98 %

## 2024-04-17 LAB
ANION GAP SERPL CALC-SCNC: 11 MEQ/L (ref 8–16)
BASOPHILS ABSOLUTE: 0.1 THOU/MM3 (ref 0–0.1)
BASOPHILS NFR BLD AUTO: 0.8 %
BUN SERPL-MCNC: 11 MG/DL (ref 7–22)
CALCIUM SERPL-MCNC: 7.5 MG/DL (ref 8.5–10.5)
CHLORIDE SERPL-SCNC: 106 MEQ/L (ref 98–111)
CO2 SERPL-SCNC: 24 MEQ/L (ref 23–33)
CREAT SERPL-MCNC: 1 MG/DL (ref 0.4–1.2)
DEPRECATED RDW RBC AUTO: 50.4 FL (ref 35–45)
EKG ATRIAL RATE: 93 BPM
EKG P AXIS: 84 DEGREES
EKG P-R INTERVAL: 172 MS
EKG Q-T INTERVAL: 362 MS
EKG QRS DURATION: 76 MS
EKG QTC CALCULATION (BAZETT): 450 MS
EKG R AXIS: 70 DEGREES
EKG T AXIS: 99 DEGREES
EKG VENTRICULAR RATE: 93 BPM
EOSINOPHIL NFR BLD AUTO: 1.9 %
EOSINOPHILS ABSOLUTE: 0.2 THOU/MM3 (ref 0–0.4)
ERYTHROCYTE [DISTWIDTH] IN BLOOD BY AUTOMATED COUNT: 21.1 % (ref 11.5–14.5)
GFR SERPL CREATININE-BSD FRML MDRD: 82 ML/MIN/1.73M2
GLUCOSE BLD STRIP.AUTO-MCNC: 126 MG/DL (ref 70–108)
GLUCOSE BLD STRIP.AUTO-MCNC: 150 MG/DL (ref 70–108)
GLUCOSE SERPL-MCNC: 123 MG/DL (ref 70–108)
HCT VFR BLD AUTO: 31.5 % (ref 42–52)
HEPARIN UNFRACTIONATED: 0.39 U/ML (ref 0.3–0.7)
HEPARIN UNFRACTIONATED: 0.41 U/ML (ref 0.3–0.7)
HGB BLD-MCNC: 8.1 GM/DL (ref 14–18)
HYPOCHROMIA BLD QL SMEAR: PRESENT
IMM GRANULOCYTES # BLD AUTO: 0.07 THOU/MM3 (ref 0–0.07)
IMM GRANULOCYTES NFR BLD AUTO: 0.6 %
LYMPHOCYTES ABSOLUTE: 2.7 THOU/MM3 (ref 1–4.8)
LYMPHOCYTES NFR BLD AUTO: 23.4 %
MCH RBC QN AUTO: 17.9 PG (ref 26–33)
MCHC RBC AUTO-ENTMCNC: 25.7 GM/DL (ref 32.2–35.5)
MCV RBC AUTO: 69.5 FL (ref 80–94)
MICROCYTES BLD QL SMEAR: PRESENT
MONOCYTES ABSOLUTE: 1.3 THOU/MM3 (ref 0.4–1.3)
MONOCYTES NFR BLD AUTO: 11.1 %
NEUTROPHILS NFR BLD AUTO: 62.2 %
NRBC BLD AUTO-RTO: 0 /100 WBC
PLATELET # BLD AUTO: 398 THOU/MM3 (ref 130–400)
PMV BLD AUTO: 10 FL (ref 9.4–12.4)
POTASSIUM SERPL-SCNC: 3.9 MEQ/L (ref 3.5–5.2)
RBC # BLD AUTO: 4.53 MILL/MM3 (ref 4.7–6.1)
SEGMENTED NEUTROPHILS ABSOLUTE COUNT: 7.3 THOU/MM3 (ref 1.8–7.7)
SODIUM SERPL-SCNC: 141 MEQ/L (ref 135–145)
WBC # BLD AUTO: 11.7 THOU/MM3 (ref 4.8–10.8)

## 2024-04-17 PROCEDURE — 6370000000 HC RX 637 (ALT 250 FOR IP): Performed by: STUDENT IN AN ORGANIZED HEALTH CARE EDUCATION/TRAINING PROGRAM

## 2024-04-17 PROCEDURE — 6370000000 HC RX 637 (ALT 250 FOR IP): Performed by: HOSPITALIST

## 2024-04-17 PROCEDURE — 82948 REAGENT STRIP/BLOOD GLUCOSE: CPT

## 2024-04-17 PROCEDURE — 2580000003 HC RX 258: Performed by: HOSPITALIST

## 2024-04-17 PROCEDURE — 85520 HEPARIN ASSAY: CPT

## 2024-04-17 PROCEDURE — 36415 COLL VENOUS BLD VENIPUNCTURE: CPT

## 2024-04-17 PROCEDURE — 80048 BASIC METABOLIC PNL TOTAL CA: CPT

## 2024-04-17 PROCEDURE — 6370000000 HC RX 637 (ALT 250 FOR IP)

## 2024-04-17 PROCEDURE — 94640 AIRWAY INHALATION TREATMENT: CPT

## 2024-04-17 PROCEDURE — 6360000002 HC RX W HCPCS: Performed by: STUDENT IN AN ORGANIZED HEALTH CARE EDUCATION/TRAINING PROGRAM

## 2024-04-17 PROCEDURE — 85025 COMPLETE CBC W/AUTO DIFF WBC: CPT

## 2024-04-17 PROCEDURE — 6360000002 HC RX W HCPCS

## 2024-04-17 PROCEDURE — 2580000003 HC RX 258

## 2024-04-17 PROCEDURE — 99239 HOSP IP/OBS DSCHRG MGMT >30: CPT | Performed by: HOSPITALIST

## 2024-04-17 PROCEDURE — 6360000002 HC RX W HCPCS: Performed by: HOSPITALIST

## 2024-04-17 RX ORDER — OXYCODONE HYDROCHLORIDE AND ACETAMINOPHEN 5; 325 MG/1; MG/1
1 TABLET ORAL EVERY 6 HOURS PRN
Qty: 15 TABLET | Refills: 0 | Status: SHIPPED | OUTPATIENT
Start: 2024-04-17 | End: 2024-04-22

## 2024-04-17 RX ORDER — DOXYCYCLINE HYCLATE 100 MG
100 TABLET ORAL 2 TIMES DAILY
Qty: 10 TABLET | Refills: 0 | Status: SHIPPED | OUTPATIENT
Start: 2024-04-17 | End: 2024-04-24

## 2024-04-17 RX ORDER — ACETAMINOPHEN 650 MG
TABLET, EXTENDED RELEASE ORAL DAILY
Qty: 30 ML | Refills: 1 | Status: SHIPPED | OUTPATIENT
Start: 2024-04-17

## 2024-04-17 RX ORDER — ALBUTEROL SULFATE 2.5 MG/3ML
2.5 SOLUTION RESPIRATORY (INHALATION) EVERY 4 HOURS PRN
Status: DISCONTINUED | OUTPATIENT
Start: 2024-04-17 | End: 2024-04-17 | Stop reason: HOSPADM

## 2024-04-17 RX ORDER — ALBUTEROL SULFATE 90 UG/1
2 AEROSOL, METERED RESPIRATORY (INHALATION)
Status: DISCONTINUED | OUTPATIENT
Start: 2024-04-17 | End: 2024-04-17 | Stop reason: HOSPADM

## 2024-04-17 RX ADMIN — ISOSORBIDE MONONITRATE 30 MG: 30 TABLET, EXTENDED RELEASE ORAL at 08:17

## 2024-04-17 RX ADMIN — Medication 1250 MG: at 05:55

## 2024-04-17 RX ADMIN — HEPARIN SODIUM 20 UNITS/KG/HR: 10000 INJECTION, SOLUTION INTRAVENOUS at 02:30

## 2024-04-17 RX ADMIN — PIPERACILLIN AND TAZOBACTAM 3375 MG: 3; .375 INJECTION, POWDER, LYOPHILIZED, FOR SOLUTION INTRAVENOUS at 05:53

## 2024-04-17 RX ADMIN — Medication 2 PUFF: at 14:24

## 2024-04-17 RX ADMIN — EMPAGLIFLOZIN 10 MG: 10 TABLET, FILM COATED ORAL at 08:17

## 2024-04-17 RX ADMIN — PANTOPRAZOLE SODIUM 40 MG: 40 TABLET, DELAYED RELEASE ORAL at 08:17

## 2024-04-17 RX ADMIN — METOPROLOL TARTRATE 50 MG: 50 TABLET, FILM COATED ORAL at 08:17

## 2024-04-17 RX ADMIN — APIXABAN 5 MG: 5 TABLET, FILM COATED ORAL at 10:59

## 2024-04-17 RX ADMIN — ATORVASTATIN CALCIUM 40 MG: 40 TABLET, FILM COATED ORAL at 08:17

## 2024-04-17 RX ADMIN — Medication 2 PUFF: at 08:02

## 2024-04-17 RX ADMIN — CLOPIDOGREL BISULFATE 75 MG: 75 TABLET ORAL at 08:17

## 2024-04-17 RX ADMIN — GABAPENTIN 100 MG: 100 CAPSULE ORAL at 08:17

## 2024-04-17 ASSESSMENT — PAIN SCALES - GENERAL: PAINLEVEL_OUTOF10: 0

## 2024-04-17 ASSESSMENT — PAIN SCALES - WONG BAKER: WONGBAKER_NUMERICALRESPONSE: NO HURT

## 2024-04-17 NOTE — DISCHARGE INSTRUCTIONS
Left buttock wound: Cleanse wound with normal saline or wound cleanser and gauze. Pat dry with clean gauze. With cotton tip applicator (q tip), pack aquacel ag ribbon into wound bed. Cover with bordered foam dressing. Change dressing every other day and as needed. Stay off wound area as much as possible. Use cushion in chair.     Right lateral leg wound, right great toe wound, right dorsal foot wound: Cleanse wound with normal saline or wound cleanser and gauze. Pat dry with clean gauze. Apply betadine moist gauze to wounds. Cover with ABD pads. Secure with kerlix. Change dressings daily and as needed.

## 2024-04-17 NOTE — PLAN OF CARE
Problem: Respiratory - Adult  Goal: Lung sounds clear or within normal limits for patient  Outcome: Progressing   Breath sounds clear and diminished, continuing duoneb per protocol.  Patient mutually agreed on goals.    
  Problem: Safety - Adult  Goal: Free from fall injury  4/16/2024 0006 by Allie Hinojosa RN  Flowsheets (Taken 4/16/2024 0006)  Free From Fall Injury: Instruct family/caregiver on patient safety  4/16/2024 0005 by Allie Hinojosa RN  Outcome: Progressing     Problem: Discharge Planning  Goal: Discharge to home or other facility with appropriate resources  4/16/2024 0006 by Allie Hinojosa RN  Flowsheets (Taken 4/16/2024 0006)  Discharge to home or other facility with appropriate resources:   Identify barriers to discharge with patient and caregiver   Arrange for needed discharge resources and transportation as appropriate  4/16/2024 0005 by Allie Hinojosa RN  Outcome: Progressing     Problem: Pain  Goal: Verbalizes/displays adequate comfort level or baseline comfort level  4/16/2024 0006 by Allie Hinojosa RN  Flowsheets (Taken 4/16/2024 0006)  Verbalizes/displays adequate comfort level or baseline comfort level:   Encourage patient to monitor pain and request assistance   Assess pain using appropriate pain scale  4/16/2024 0005 by Allie Hinojosa RN  Outcome: Progressing     Problem: Skin/Tissue Integrity  Goal: Absence of new skin breakdown  Description: 1.  Monitor for areas of redness and/or skin breakdown  2.  Assess vascular access sites hourly  3.  Every 4-6 hours minimum:  Change oxygen saturation probe site  4.  Every 4-6 hours:  If on nasal continuous positive airway pressure, respiratory therapy assess nares and determine need for appliance change or resting period.  4/16/2024 0006 by Allie Hinojosa RN  Note: No new evidence of skin breakdown.   4/16/2024 0005 by Allie Hinojosa RN  Outcome: Progressing     Problem: ABCDS Injury Assessment  Goal: Absence of physical injury  4/16/2024 0006 by Allie Hinojosa RN  Flowsheets (Taken 4/16/2024 0006)  Absence of Physical Injury: Implement safety measures based on patient assessment  4/16/2024 0005 by Allie Hinojosa RN  Outcome: Progressing   
  Problem: Safety - Adult  Goal: Free from fall injury  4/16/2024 1345 by Leighann Zamora RN  Outcome: Progressing  4/16/2024 0006 by Allie Hinojosa RN  Flowsheets (Taken 4/16/2024 0006)  Free From Fall Injury: Instruct family/caregiver on patient safety  4/16/2024 0005 by Allie Hinojosa RN  Outcome: Progressing     Problem: Discharge Planning  Goal: Discharge to home or other facility with appropriate resources  4/16/2024 1345 by Leighann Zamora RN  Outcome: Progressing  4/16/2024 0006 by Allie Hinojosa RN  Flowsheets (Taken 4/16/2024 0006)  Discharge to home or other facility with appropriate resources:   Identify barriers to discharge with patient and caregiver   Arrange for needed discharge resources and transportation as appropriate  4/16/2024 0005 by Allie Hinojosa RN  Outcome: Progressing     Problem: Pain  Goal: Verbalizes/displays adequate comfort level or baseline comfort level  4/16/2024 1345 by Leighann Zamora RN  Outcome: Progressing  4/16/2024 0006 by Allie Hinojosa RN  Flowsheets (Taken 4/16/2024 0006)  Verbalizes/displays adequate comfort level or baseline comfort level:   Encourage patient to monitor pain and request assistance   Assess pain using appropriate pain scale  4/16/2024 0005 by Allie Hinojosa RN  Outcome: Progressing     Problem: Skin/Tissue Integrity  Goal: Absence of new skin breakdown  Description: 1.  Monitor for areas of redness and/or skin breakdown  2.  Assess vascular access sites hourly  3.  Every 4-6 hours minimum:  Change oxygen saturation probe site  4.  Every 4-6 hours:  If on nasal continuous positive airway pressure, respiratory therapy assess nares and determine need for appliance change or resting period.  4/16/2024 1345 by Leighann Zamora RN  Outcome: Progressing  No signs of new skin breakdown with each assessment. Skin remains warm, dry, intact. Mucous membranes pink & moist. Patient is able to turn self.    4/16/2024 0006 by Allie Hinojosa RN  Note: No new evidence 
Care plan reviewed with patient and family.  Patient and family verbalize understanding of the plan of care and contribute to goal setting.   Problem: Safety - Adult  Goal: Free from fall injury  Outcome: Progressing     Problem: Discharge Planning  Goal: Discharge to home or other facility with appropriate resources  Outcome: Progressing     Problem: Pain  Goal: Verbalizes/displays adequate comfort level or baseline comfort level  Outcome: Progressing     Problem: Skin/Tissue Integrity  Goal: Absence of new skin breakdown  Description: 1.  Monitor for areas of redness and/or skin breakdown  2.  Assess vascular access sites hourly  3.  Every 4-6 hours minimum:  Change oxygen saturation probe site  4.  Every 4-6 hours:  If on nasal continuous positive airway pressure, respiratory therapy assess nares and determine need for appliance change or resting period.  Outcome: Progressing     Problem: ABCDS Injury Assessment  Goal: Absence of physical injury  Outcome: Progressing     Problem: Cardiovascular - Adult  Goal: Maintains optimal cardiac output and hemodynamic stability  Outcome: Progressing     Problem: Skin/Tissue Integrity - Adult  Goal: Skin integrity remains intact  Outcome: Progressing     Problem: Musculoskeletal - Adult  Goal: Return mobility to safest level of function  Outcome: Progressing     Problem: Infection - Adult  Goal: Absence of infection at discharge  Outcome: Progressing  Goal: Absence of infection during hospitalization  Outcome: Progressing     Problem: Metabolic/Fluid and Electrolytes - Adult  Goal: Glucose maintained within prescribed range  Outcome: Progressing     Problem: Chronic Conditions and Co-morbidities  Goal: Patient's chronic conditions and co-morbidity symptoms are monitored and maintained or improved  Outcome: Progressing     
hourly  Outcome: Progressing     Problem: Musculoskeletal - Adult  Goal: Return mobility to safest level of function  Return Mobility to Safest Level of Function:   Assess patient stability and activity tolerance for standing, transferring and ambulating with or without assistive devices   Assist with transfers and ambulation using safe patient handling equipment as needed  Outcome: Progressing     Problem: Infection - Adult  Goal: Absence of infection at discharge  Absence of infection at discharge:   Monitor lab/diagnostic results   Assess and monitor for signs and symptoms of infection  Outcome: Progressing  Goal: Absence of infection during hospitalization  Absence of infection during hospitalization:   Assess and monitor for signs and symptoms of infection   Monitor lab/diagnostic results  Outcome: Progressing     Problem: Metabolic/Fluid and Electrolytes - Adult  Goal: Glucose maintained within prescribed range  Glucose maintained within prescribed range:   Monitor blood glucose as ordered   Assess for signs and symptoms of hyperglycemia and hypoglycemia  Outcome: Progressing   Care plan reviewed with patient and spouse.  Patient and spouse verbalize understanding of the plan of care and contribute to goal setting.

## 2024-04-17 NOTE — PROGRESS NOTES
Hospitalist Progress Note    Patient:  Ruben FRANZ Bush      Unit/Bed:18/018A    YOB: 1955    MRN: [id number]       Acct: [id number]     PCP: Fred Evans MD    Date of Admission: 4/14/2024    Assessment/Plan:    Gangrene right great toe: Patient here with gangrenous toe, follows up with podiatry as outpatient.  CTA done shows extensive PAD.  Patient is currently n.p.o. likely will need angio for intervention prior to amputation if warranted.  Discussed with interventional radiology plan for intervention today.  Severe PAD: CTA reviewed.  Patient is currently n.p.o.  Continue with heparin drip.  IR consulted for angiogram.  COPD: Continue with  Paroxysmal atrial fibrillation: Continue with metoprolol for rate control, patient placed on heparin drip and Eliquis placed on hold for possible intervention.  GERD: PPI  History of left AKA  Multiple chronic wounds  History of DVT: Continue anticoagulation      Subjective (past 24 hours):   Patient seen and examined at bedside, patient states he is frustrated he has been in the ER overnight.  Patient is n.p.o. awaiting IR for possible angio.      Medications:  Reviewed    Infusion Medications    heparin (PORCINE) Infusion 18 Units/kg/hr (04/14/24 1847)    lactated ringers IV soln 75 mL/hr at 04/15/24 0222    sodium chloride      dextrose       Scheduled Medications    nicotine  1 patch TransDERmal Daily    ipratropium 0.5 mg-albuterol 2.5 mg  1 Dose Inhalation Q4H WA RT    atorvastatin  40 mg Oral Daily    [Held by provider] clopidogrel  75 mg Oral Daily    lidocaine  1 patch TransDERmal Daily    gabapentin  100 mg Oral TID    empagliflozin  10 mg Oral Daily    isosorbide mononitrate  30 mg Oral Daily    metoprolol tartrate  50 mg Oral BID    pantoprazole  40 mg Oral Daily    traZODone  50 mg Oral Nightly    sodium chloride flush  5-40 mL IntraVENous 2 times per day    azithromycin  500 mg IntraVENous Q24H    insulin lispro  0-4 Units 
    [facility] Nurse  Progress Note       Ruben Zendejas  AGE: 69 y.o.   GENDER: male  : 1955  UNIT: 4A-15/015-A  TODAY'S DATE:  2024  ADMISSION DATE: 2024  4:53 PM    Subjective   Reason for [facility] Evaluation and Assessment: left buttock wound      Ruben Zendejas is a 69 y.o. male referred by:   [] Physician/PA/JAVAN  [x] Nursing  [] Other:     Wound Identification:  Wound Type:pressure  Wound Location: left buttock  Modifying factors:diabetes, poor glucose control, chronic pressure, and decreased mobility    Objective     Arnulfo Risk Score: Arnulfo Scale Score: 15      Assessment     Encounter:     11:53: Present to pt room for left buttock wound. Pt states that he has had buttock wound for a few years and family doctor is following wound. States his SO dresses wound at home and that they get dressings from local pharmacy. Pt had seen JAVAN Forbes CNP in the past. Did ask pt if he would be open to seeing wound clinic again to follow wound, pt agrees. Pt states he is unable to lay down for wound to be assessed. Pt stands with walker, has left AKA. Pt noted to have large open ulcer to right lateral leg, gangrenous right great toe and dorsal foot wound with no dressing and drainage seeping all over floor. Asked pt if podiatry was following wounds and had ordered dressings. States that they were in pt's room this morning and per patient they were told to leave it open to air. Informed pt that wound is heavily draining and needs to have dressings. Covered wounds with ABD pads currently and applied non slid sock for assessment of buttock wound. Wound photo and assessment below. Cleansed wound with normal saline and gauze. Pat dry with clean gauze. Applied opticel ag rope into wound bed. Covered with bordered foam dressing. Instructed pt and SO to change every other day. Offload as much as possible. Informed primary nurse of no dressings or direction for right leg wounds 
1553 Pt in specials radiology for right leg angiogram with possible intervention. Discussed procedure with pt and pt verbalizes understanding. Pt has left AKA. Right leg with quarter sized ulcer with tannish bed on dorsal aspect of foot above second toe. Three inch round ulcerated area with tannish bed on lateral aspect of foot. Shin reddened. Foot reddened and dusky with poor capillary refill. Great to entirely black.  1558 Dr Pisano to speak to pt.  1600 Moved to table and attached to monitor.  1625 Pt stating \"I can't breath!\" Despite oxygen being 98% on room air. Pt placed on oxygen at 2LPM per NC for pt's anxiety.   1630 Procedure started with Dr Pisano.   1642 5 fr sheath inserted in left femoral artery.  1644 Pt's girlfriend Kimmie updated on pt's condition.  1651 Using 3 x 20 [place] 35 balloon for procedure.  1710 Pt c/o pain in back. Pt given medications.  1711 Angioplasty of right SFA with 6 x 200 H[place]ham 35 balloon.  1720 Angioplasty of right Popliteal artery with 3 x 40 H[place]ham 35 balloon.  1735 Angiogram of left groin.  1736 Procedure complete. Prepping for sheath removal.  1740 Sheath in left femoral artery pulled and manual pressure applied per EMELY Leo RT. No bleeding or hematoma noted.  1750 O2 off.  1755 Manual pressure to left groin complete. Site without redness, swelling or hematoma. 4 x 4 with op-site dressing applied.  1800 Pt positioned on bed for comfort. Fem-stop applied to groin with 39 mm Hg. No bleeding noted.  1802 Pt transferred to  per bed with RN. Report to RN upon arrival to floor. Left groin without redness, swelling or hematoma.    
Coming onto report this RN asked previous nurse about cath site check sheet. RN was unaware. After looking in chart and room, sheet was not found, and cath checks were not started. Cath site checks started after at 2000. See flowsheets for details. Day shift nurse reported that heparin drip was to not be resumed per provider and patient was going to be started on plavix. Checked MAR and notes and did not see any note to resume heparin drip after patient returned from IR.    
Discharge teaching and instructions for diagnosis/procedure of gangrene completed with patient using teachback method. AVS reviewed. Patient voiced understanding regarding prescriptions, follow up appointments, and care of self at home. Discharged in a wheelchair to  home with support per family.   
Pt admitted to  4A 15 via via cart/stretcher from ED.  Complaints: Right leg pain. Vital signs obtained. Assessment and data collection initiated. Two nurse skin assessment performed by Uma CARLSON and Lacey CARLSON. Oriented to room. Policies and procedures for  explained. All questions answered with no further questions at this time. Fall prevention and safety brochure discussed with patient.  Bed alarm on. Call light in reach.Oriented to room.   Uma Knight RN, RN 4/15/2024 6:15 PM     Explained patients right to have family, representative or physician notified of their admission.  Patient has Declined for physician to be notified.  Patient has Declined for family/representative to be notified.    
Pt was with his friend as he was dealing with gangrene. He said that he will be going home today. He was encouraged and blessed.    04/17/24 1629   Encounter Summary   Encounter Overview/Reason  Initial Encounter   Service Provided For: Patient and family together   Referral/Consult From: [facility]   Support System Significant other   Last Encounter  04/17/24   Complexity of Encounter Low   Spiritual/Emotional needs   Type Spiritual Support   Assessment/Intervention/Outcome   Assessment Hopeful   Intervention Empowerment       
Wound ostomy consulted for \"    R foot wound, gangrenous toe\"   Podiatry on case. Recommend podiatry follow wounds. Call wound ostomy as needed.  
[facility]   Pharmacy Pharmacokinetic Monitoring Service - Vancomycin     Ruben Zendejas is a 69 y.o. male starting on vancomycin therapy for SSTI. Pharmacy consulted by Dr. Ramirez for monitoring and adjustment.    Target Concentration: Goal AUC/HALLEY 400-600 mg*hr/L    Additional Antimicrobials: Zosyn    Pertinent Laboratory Values:   Wt Readings from Last 1 Encounters:   04/14/24 93.4 kg (206 lb)     Temp Readings from Last 1 Encounters:   04/14/24 98 °F (36.7 °C) (Oral)     Estimated Creatinine Clearance: 116 mL/min (based on SCr of 0.7 mg/dL).  Recent Labs     04/14/24  1659 04/15/24  0631   CREATININE 0.6 0.7   BUN 10 9   WBC 12.9* 12.4*     Pertinent Cultures:  Date Source Results   4/14/24 Blood x 2 sent   4/14/24 Foot   Many GPC singly and in pairs   MRSA Nasal Swab: N/A. Non-respiratory infection.    Plan:  Dosing recommendations based on Bayesian software  Start vancomycin 1250 mg Q12H  Anticipated AUC of 456 and trough concentration of 12.1 at steady state  Renal labs as indicated   Vancomycin concentration ordered for 04/16/24 @ 1200   Pharmacy will continue to monitor patient and adjust therapy as indicated    Thank you for the consult,  Deborah Marroquin, PharmD  PGY1 Resident     
[facility]   Pharmacy Pharmacokinetic Monitoring Service - Vancomycin    Indication: SSTI  Target Concentration: Goal AUC/HALLEY 400-600 mg*hr/L  Day of Therapy: 2  Additional Antimicrobials: Azithromycin, Zosyn    Pertinent Laboratory Values:   Wt Readings from Last 1 Encounters:   04/16/24 102.9 kg (226 lb 13.7 oz)     Temp Readings from Last 1 Encounters:   04/16/24 97.8 °F (36.6 °C) (Oral)     Estimated Creatinine Clearance: 104 mL/min (based on SCr of 0.8 mg/dL).  Recent Labs     04/15/24  0631 04/16/24  0358   CREATININE 0.7 0.8   BUN 9 10   WBC 12.4* 11.7*     Pertinent Cultures:  Date Source Results   4/14/24 Blood x 2 NGTD   4/15/24 Foot  Coag + staph, GPC   MRSA Nasal Swab: N/A. Non-respiratory infection.    Recent vancomycin administrations                     vancomycin (VANCOCIN) 1250 mg in sodium chloride 0.9% 250 mL IVPB (mg) 1,250 mg New Bag 04/16/24 0557     1,250 mg New Bag 04/15/24 1830    vancomycin (VANCOCIN) 2,250 mg in sodium chloride 0.9 % 500 mL IVPB (mg) 2,250 mg New Bag 04/14/24 1932                    Assessment:  Date/Time Current Dose Concentration Timing of Concentration (h) AUC C trough,ss   4/16/24 1250 mg IVPB Q12H 13.9 mcg/mL ~6 hours after last dose 445 11.8 mcg/mL   Note: Serum concentrations collected for AUC dosing may appear elevated if collected in close proximity to the dose administered, this is not necessarily an indication of toxicity    Plan:  Current dosing regimen is therapeutic  Continue current dose of Vancomycin 1250 mg IVPB Q12H  Insight[employer] projects AUC of 445 and trough of 11.8 mcg/mL with current regimen  Repeat vancomycin concentration to be ordered in a few days  Pharmacy will continue to monitor patient and adjust therapy as indicated    Thank you for the consult,    Paco Redmond, PharmD, BCPS  4/16/2024  2:27 PM      
[facility]   Pharmacy Pharmacokinetic Monitoring Service - Vancomycin     Ruben Zendejas is a 69 y.o. male starting on vancomycin therapy for SSTI x 7 days. Pharmacy consulted by Dr LORNA Soliman for monitoring and adjustment.    Target Concentration: Goal AUC/HALLEY 400-600 mg*hr/L    Additional Antimicrobials: cefepime    Pertinent Laboratory Values:   Wt Readings from Last 1 Encounters:   04/14/24 93.4 kg (206 lb)     Temp Readings from Last 1 Encounters:   04/14/24 98 °F (36.7 °C) (Oral)     Estimated Creatinine Clearance: 135 mL/min (based on SCr of 0.6 mg/dL).  Recent Labs     04/14/24  1659   CREATININE 0.6   BUN 10   WBC 12.9*     Pertinent Cultures:  Date Source Results   4/14/24 Blood x 2    MRSA Nasal Swab: N/A. Non-respiratory infection.    Plan:  Dosing recommendations based on Bayesian software  Start vancomycin 2250mg x 1 then 1500mg q12h  Anticipated AUC of 473 and trough concentration of 11.6 at steady state  Renal labs as indicated   Vancomycin concentration ordered for 4/15/24 @ 0600   Pharmacy will continue to monitor patient and adjust therapy as indicated    Thank you for the consult,  Parisa Elkins RPh, BRENDAN, CRYSTALP  4/14/2024     10:16 PM          
      No results for input(s): \"CKTOTAL\", \"TROPONINI\" in the last 72 hours.  No results for input(s): \"PROCAL\" in the last 72 hours.    Microbiology:      Urinalysis:      Lab Results   Component Value Date/Time    NITRU Negative 02/22/2023 11:56 PM    WBCUA 0-5 02/22/2023 11:56 PM    BACTERIA NONE SEEN 10/06/2022 09:15 AM    RBCUA 0-2 02/22/2023 11:56 PM    BLOODU NEGATIVE 10/06/2022 09:15 AM    SPECGRAV <=1.005 02/22/2023 11:56 PM    GLUCOSEU 1000 mg/dL 02/22/2023 11:56 PM       Radiology:  XR CHEST PORTABLE    Result Date: 4/14/2024  1 view chest x-ray Comparison: CR/SR - XR CHEST PORTABLE - 02/09/2023 08:05 PM EST Findings: Moderate cardiomegaly unchanged. Mixed interstitial and alveolar mild pulmonary edema is also unchanged. No pleural effusion or pneumothorax. Right basilar atelectasis. No acute airspace opacity.     1. Cardiomegaly and mild pulmonary edema. This document has been electronically signed by: Rowdy Gotti MD on 04/14/2024 11:03 PM    CTA LOWER EXTREMITY RIGHT W WO CONTRAST    Result Date: 4/14/2024  PROCEDURE: CTA LOWER EXTREMITY RIGHT W WO CONTRAST CLINICAL INFORMATION: absent DP pulse R. necrotic foot COMPARISON: 10/3/2022 TECHNIQUE: 1.5 mm axial images were obtained through the chest after the administration of IV contrast.  A non-contrast localizer was obtained.  3D reconstructions were performed on the scanner to include oblique coronal MIP images through the pelvis, and right lower extremity. All CT scans at this facility use dose modulation, iterative reconstruction, and/or weight-based dosing when appropriate to reduce radiation dose to as low as reasonably achievable. FINDINGS: The visualized right external iliac artery appears patent. The right common femoral artery appears patent. There is stenosis of 50% within the proximal right SFA on axial image 101 series 601. There is also stenosis of 60% within the femoropopliteal region on axial image 138 series 601. There is occlusion of the 
Reason for exam:     Answer:   Infection    Wound ostomy eval and treat     Standing Status:   Standing     Number of Occurrences:   1     Order Specific Question:   Reason for Consult?     Answer:   R foot wound, gangrenous toe    ADMIT TO INPATIENT     Standing Status:   Standing     Number of Occurrences:   1     Order Specific Question:   Discharge Plan:     Answer:   Home with Office Follow-up     Order Specific Question:   Telemetry/Cardiac Monitoring Required?     Answer:   No    Vascular duplex lower extremity arteries bilateral     Standing Status:   Future     Standing Expiration Date:   4/15/2025       Assessment and Plan  Principle  1.  Dry gangrene right great toe  2.  Ulcer right leg    - Patient initially examined and evaluated  - WBC 11.7  - A1c 6.8%  - INR 1.33; Afebrile  - Culture: Staph and Strep sp  - Patient on IV vancomycin and Zosyn per primary  - Betadine paint to RLE wounds  - X-rays ordered and reviewed; see above  - Patient may bear weight as tolerated to RLE  - Patient is s/p successful vascular procedure  - Currently planning to allow RLE wounds to demarcate, will continue with daily dressings. Future amputation discussed with patient, this can be performed in an outpatient setting if needed.  - All of patient's questions and concerns addressed at today's encounter.  - Podiatry will continue to follow patient    DISPO: Await demarcation of tissues, continue betadine daily      Daryl Finn DPM -PGY3  Foot and Ankle Surgical Resident  4/16/2024 1:25 PM

## 2024-04-17 NOTE — DISCHARGE SUMMARY
Discharge Summary    Patient:  Ruben Zendejas  YOB: 1955    MRN: [id number]   Acct: [id number]    Primary Care Physician: Fred Evans MD    Admit date:  4/14/2024    Discharge date:   4/17/2024       Discharge Diagnoses:   Gangrene (HCC)  Principal Problem:    Gangrene (HCC)  Active Problems:    Dry gangrene (HCC)  Resolved Problems:    * No resolved hospital problems. *        Admitted for: (HPI)  Ruben Zendejas is a 69 y.o. male who [facility] ED [facility]/14/2024 with a black toe. Patient is a current smoker (0.5 PPD with 58 years) with a PMH significant for CAD s/p 2 stents 2013, PAD with previous stent (exact time unspecified) HFrEF, type 2 DM, A.Fib, HLD, HTN, multiple chronic leg wounds.     The patient reports that his toe became black over the past few days. He doesn't know exactly when, but his toe changed colors multiple times over the past few days until it became black. The toe is painless, although he has neuropathy from his diabetes. He reports mild bleeding but no pus coming from the toe. His symptoms include chronic SOB from COPD, cough productive of clear sputum that started yesterday, chronic wheeze. He denies any fever, chills, nausea, vomiting, stomach pain, or any other symptoms. He has multiple chronic wounds including a large one on his R leg that is not worse than baseline.     His history is notable for a stent in his legs and 2 in his heart several years ago. He reports compliance with his medications daily. He reports current smoking 0.5 PPD for the past 58 years. He denies any drinking or illicit drug use.     Patient is a FULL code.     ED Course:  Patient Vitals on Arrival T 98F RR 18 HR 94 /85 SpO2 96% on RA. Labwork showed unremarkable BMP, LFTs showed albumin 3.2, , otherwise unremarkable. CBC showed WBC 12.9, Hgb 9.2, Hct 35.5, platelet 428. CXR pending.     CTA Lower Extremity Right W WO Contrast showed:  1. There is

## 2024-04-18 LAB
BACTERIA SPEC AEROBE CULT: ABNORMAL
BACTERIA SPEC ANAEROBE CULT: ABNORMAL
GRAM STN SPEC: ABNORMAL
ORGANISM: ABNORMAL
ORGANISM: ABNORMAL

## 2024-04-19 LAB
BACTERIA BLD AEROBE CULT: NORMAL
BACTERIA BLD AEROBE CULT: NORMAL

## 2024-04-24 ENCOUNTER — HOSPITAL ENCOUNTER (OUTPATIENT)
Dept: INTERVENTIONAL RADIOLOGY/VASCULAR | Age: 69
Discharge: HOME | End: 2024-04-24
Payer: COMMERCIAL

## 2024-04-24 DIAGNOSIS — I73.9 PVD (PERIPHERAL VASCULAR DISEASE) (HCC): ICD-10-CM

## 2024-04-24 PROCEDURE — 99211 OFF/OP EST MAY X REQ PHY/QHP: CPT

## 2024-04-29 ENCOUNTER — HOSPITAL ENCOUNTER (OUTPATIENT)
Dept: WOUND CARE | Age: 69
Discharge: HOME | End: 2024-04-29
Attending: NURSE PRACTITIONER
Payer: COMMERCIAL

## 2024-04-29 VITALS
TEMPERATURE: 98.2 F | RESPIRATION RATE: 16 BRPM | HEART RATE: 86 BPM | WEIGHT: 225 LBS | HEIGHT: 73 IN | SYSTOLIC BLOOD PRESSURE: 140 MMHG | BODY MASS INDEX: 29.82 KG/M2 | DIASTOLIC BLOOD PRESSURE: 74 MMHG | OXYGEN SATURATION: 95 %

## 2024-04-29 DIAGNOSIS — L89.324 PRESSURE INJURY OF LEFT ISCHIUM, STAGE 4 (HCC): Primary | ICD-10-CM

## 2024-04-29 PROBLEM — S61.401A OPEN WOUND OF RIGHT HAND: Status: RESOLVED | Noted: 2017-03-16 | Resolved: 2024-04-29

## 2024-04-29 PROBLEM — L03.116 CELLULITIS OF LEFT LOWER EXTREMITY: Status: RESOLVED | Noted: 2022-10-06 | Resolved: 2024-04-29

## 2024-04-29 PROBLEM — L89.153 PRESSURE INJURY OF SACRAL REGION, STAGE 3 (HCC): Status: RESOLVED | Noted: 2022-10-06 | Resolved: 2024-04-29

## 2024-04-29 PROCEDURE — 99215 OFFICE O/P EST HI 40 MIN: CPT | Performed by: NURSE PRACTITIONER

## 2024-04-29 PROCEDURE — 99213 OFFICE O/P EST LOW 20 MIN: CPT

## 2024-04-29 RX ORDER — CLOBETASOL PROPIONATE 0.5 MG/G
OINTMENT TOPICAL ONCE
Status: CANCELLED | OUTPATIENT
Start: 2024-04-29 | End: 2024-04-29

## 2024-04-29 RX ORDER — SODIUM CHLOR/HYPOCHLOROUS ACID 0.033 %
SOLUTION, IRRIGATION IRRIGATION ONCE
OUTPATIENT
Start: 2024-04-29 | End: 2024-04-29

## 2024-04-29 RX ORDER — GENTAMICIN SULFATE 1 MG/G
OINTMENT TOPICAL ONCE
Status: CANCELLED | OUTPATIENT
Start: 2024-04-29 | End: 2024-04-29

## 2024-04-29 RX ORDER — GINSENG 100 MG
CAPSULE ORAL ONCE
Status: CANCELLED | OUTPATIENT
Start: 2024-04-29 | End: 2024-04-29

## 2024-04-29 RX ORDER — LIDOCAINE HYDROCHLORIDE 20 MG/ML
JELLY TOPICAL ONCE
OUTPATIENT
Start: 2024-04-29 | End: 2024-04-29

## 2024-04-29 RX ORDER — LIDOCAINE HYDROCHLORIDE 20 MG/ML
JELLY TOPICAL ONCE
Status: CANCELLED | OUTPATIENT
Start: 2024-04-29 | End: 2024-04-29

## 2024-04-29 RX ORDER — SODIUM CHLOR/HYPOCHLOROUS ACID 0.033 %
SOLUTION, IRRIGATION IRRIGATION ONCE
Status: CANCELLED | OUTPATIENT
Start: 2024-04-29 | End: 2024-04-29

## 2024-04-29 RX ORDER — GENTAMICIN SULFATE 1 MG/G
OINTMENT TOPICAL ONCE
OUTPATIENT
Start: 2024-04-29 | End: 2024-04-29

## 2024-04-29 RX ORDER — LIDOCAINE 50 MG/G
OINTMENT TOPICAL ONCE
Status: CANCELLED | OUTPATIENT
Start: 2024-04-29 | End: 2024-04-29

## 2024-04-29 RX ORDER — LIDOCAINE HYDROCHLORIDE 40 MG/ML
SOLUTION TOPICAL ONCE
OUTPATIENT
Start: 2024-04-29 | End: 2024-04-29

## 2024-04-29 RX ORDER — CLOBETASOL PROPIONATE 0.5 MG/G
OINTMENT TOPICAL ONCE
OUTPATIENT
Start: 2024-04-29 | End: 2024-04-29

## 2024-04-29 RX ORDER — TRIAMCINOLONE ACETONIDE 1 MG/G
OINTMENT TOPICAL ONCE
OUTPATIENT
Start: 2024-04-29 | End: 2024-04-29

## 2024-04-29 RX ORDER — BACITRACIN ZINC AND POLYMYXIN B SULFATE 500; 1000 [USP'U]/G; [USP'U]/G
OINTMENT TOPICAL ONCE
Status: CANCELLED | OUTPATIENT
Start: 2024-04-29 | End: 2024-04-29

## 2024-04-29 RX ORDER — BETAMETHASONE DIPROPIONATE 0.5 MG/G
CREAM TOPICAL ONCE
OUTPATIENT
Start: 2024-04-29 | End: 2024-04-29

## 2024-04-29 RX ORDER — IBUPROFEN 200 MG
TABLET ORAL ONCE
Status: CANCELLED | OUTPATIENT
Start: 2024-04-29 | End: 2024-04-29

## 2024-04-29 RX ORDER — LIDOCAINE HYDROCHLORIDE 40 MG/ML
SOLUTION TOPICAL ONCE
Status: CANCELLED | OUTPATIENT
Start: 2024-04-29 | End: 2024-04-29

## 2024-04-29 RX ORDER — BACITRACIN ZINC AND POLYMYXIN B SULFATE 500; 1000 [USP'U]/G; [USP'U]/G
OINTMENT TOPICAL ONCE
OUTPATIENT
Start: 2024-04-29 | End: 2024-04-29

## 2024-04-29 RX ORDER — IBUPROFEN 200 MG
TABLET ORAL ONCE
OUTPATIENT
Start: 2024-04-29 | End: 2024-04-29

## 2024-04-29 RX ORDER — BETAMETHASONE DIPROPIONATE 0.5 MG/G
CREAM TOPICAL ONCE
Status: CANCELLED | OUTPATIENT
Start: 2024-04-29 | End: 2024-04-29

## 2024-04-29 RX ORDER — GINSENG 100 MG
CAPSULE ORAL ONCE
OUTPATIENT
Start: 2024-04-29 | End: 2024-04-29

## 2024-04-29 RX ORDER — LIDOCAINE 50 MG/G
OINTMENT TOPICAL ONCE
OUTPATIENT
Start: 2024-04-29 | End: 2024-04-29

## 2024-04-29 RX ORDER — LIDOCAINE 40 MG/G
CREAM TOPICAL ONCE
Status: CANCELLED | OUTPATIENT
Start: 2024-04-29 | End: 2024-04-29

## 2024-04-29 RX ORDER — TRIAMCINOLONE ACETONIDE 1 MG/G
OINTMENT TOPICAL ONCE
Status: CANCELLED | OUTPATIENT
Start: 2024-04-29 | End: 2024-04-29

## 2024-04-29 RX ORDER — LIDOCAINE 40 MG/G
CREAM TOPICAL ONCE
OUTPATIENT
Start: 2024-04-29 | End: 2024-04-29

## 2024-04-29 NOTE — PATIENT INSTRUCTIONS
Visit Discharge/Physician Orders:  - Keep pressure off of wound. Turn and reposition frequently, at least every 1-2 hours.   - Recommend trying to cut back/quit smoking to help with wound healing.  - Make sure you are getting plenty of protein in your diet to help with wound healing.     Wound Location: Left buttock    Dressing orders:     1) Gather wound care supplies and arrange on clean table.     2) Wash your hands with soap and water or use alcohol based hand  for 20 seconds (sing \"Happy Birthday\" twice).    3) Cleanse wounds with normal saline or wound cleanser and gauze. Pat dry with clean gauze.    4) Left buttock- Loosely fill wound with alginate rope, leaving a tail for easy removal. Cover with silicone bordered foam dressing. Change 3 times weekly.     Keep all dressings clean & dry.    Follow up visit: 4 Weeks - Wednesday May 29th at 2:00 pm    Supplies:  - Mechanical Debridement was completed today by using a saline and gauze.    Duration of dressings: 30 days    We have sent your supply order to the following company:  [employer] - Phone # [phone]   If you don't receive the items you were expecting or don't know what the items are that you received, call the company where the order was sent.   If you are unable to obtain wound supplies, continue to use the supplies you have available until you are able to reach us. It is most important to keep the wound covered at all times.  It is YOUR responsibility to make sure that supplies are re-ordered before you run out. Re-order telephone numbers are included in each package.     Keep next scheduled appointment. Please give 24 hour notice if unable to keep appointment. [phone]    If you experience any of the following, please call the Wound Care Service during business hours: Monday through Friday 8:00 am - 4:30 pm  ([phone]).   *Increase in pain   *Temperature over 101   *Increase in drainage from your wound or a foul odor   *Uncontrolled

## 2024-04-29 NOTE — PLAN OF CARE
Problem: Wound:  Goal: Will show signs of wound healing; wound closure and no evidence of infection  Description: Will show signs of wound healing; wound closure and no evidence of infection  Outcome: Progressing   Patient presents to wound clinic for evaluation and treatment of left ischium wound. Discharge instructions/dressing orders per AVS. Follow up scheduled in 4 weeks.    Care plan reviewed with patient and fiance.  Patient and fiance verbalize understanding of the plan of care and contribute to goal setting.

## 2024-04-29 NOTE — PROGRESS NOTES
Wound Care Supplies      Supply Company:     [email or web address] [facility] [address] [facility] [address] [place] [address]  p: [phone] f: [phone]     Ordering Center:   [facility]  [address] [place] [address]  [phone]  WOUND CARE Dept: [phone]   FAX NUMBER [phone]    Patient Information:      Ruben FRANZ JR. [place] [address]  [place] [address]   [phone]   : 1955  AGE: 69 y.o.     GENDER: male   EPISODE DATE: 2024    Insurance:      PRIMARY INSURANCE:  Plan: [place]  Coverage: [employer]  Effective Date: 2013  Group Number: [unfilled]  Subscriber Number: [id number] - (Medicaid Managed)    Payer/Plan Subscr  Sex Relation Sub. Ins. ID Effective Group Num   1. [place] - * [place] 1955 Male Self [id number] [address] [facility] [address]       Patient Wound Information:      Problem List Items Addressed This Visit    None      WOUNDS REQUIRING DRESSING SUPPLIES:     Wound 21 Pretibial Right; Mid; Lower x2 (Active)   Number of days: 891       Wound 21 Foot Anterior; Right; Lateral (Active)   Number of days: 891       Wound 21 Toe (Comment  which one) Anterior; Right 2nd toe (Active)   Number of days: 891       Wound 10/04/22 Sacrum Medial Sacral decubitus ulcer (Active)   Number of days: 573       Wound 10/12/22 Buttocks Left stage 3 pressure injury on left buttock (Active)   Number of days: 565       Wound 24 Ischium Left (Active)   Wound Image   24 1422   Dressing Status Intact;New dressing applied 24 1422   Wound Cleansed Cleansed with saline 24 142   Dressing/Treatment Other (comment);Packing;Foam;Silicone border 24 1422   Offloading for Diabetic Foot Ulcers Offloading not required 24 1422   Dressing Change Due 24 0725   Wound Length (cm) 2 cm 24 1422   Wound Width (cm) 2.1 cm 
responsibility to make sure that supplies are re-ordered before you run out. Re-order telephone numbers are included in each package.     Keep next scheduled appointment. Please give 24 hour notice if unable to keep appointment. [phone]    If you experience any of the following, please call the Wound Care Service during business hours: Monday through Friday 8:00 am - 4:30 pm  ([phone]).   *Increase in pain   *Temperature over 101   *Increase in drainage from your wound or a foul odor   *Uncontrolled swelling   *Need for compression bandage changes due to slippage, breakthrough drainage    If you need medical attention outside of business hours, please contact your Primary Care Doctor or go to the nearest emergency room.                Electronically signed by JAVAN Forbes CNP on 4/29/2024 at 3:58 PM

## 2024-05-09 ENCOUNTER — APPOINTMENT (OUTPATIENT)
Dept: GENERAL RADIOLOGY | Age: 69
End: 2024-05-09
Payer: COMMERCIAL

## 2024-05-09 ENCOUNTER — HOSPITAL ENCOUNTER (EMERGENCY)
Age: 69
Discharge: ANOTHER HOSPITAL | End: 2024-05-10
Attending: INTERNAL MEDICINE
Payer: COMMERCIAL

## 2024-05-09 ENCOUNTER — APPOINTMENT (OUTPATIENT)
Dept: ULTRASOUND IMAGING | Age: 69
End: 2024-05-09
Payer: COMMERCIAL

## 2024-05-09 DIAGNOSIS — R32 URINARY INCONTINENCE, UNSPECIFIED TYPE: Primary | ICD-10-CM

## 2024-05-09 DIAGNOSIS — R15.9 INCONTINENCE OF FECES, UNSPECIFIED FECAL INCONTINENCE TYPE: ICD-10-CM

## 2024-05-09 LAB
ALBUMIN SERPL BCG-MCNC: 3.5 G/DL (ref 3.5–5.1)
ALP SERPL-CCNC: 187 U/L (ref 38–126)
ALT SERPL W/O P-5'-P-CCNC: 101 U/L (ref 11–66)
ANION GAP SERPL CALC-SCNC: 13 MEQ/L (ref 8–16)
ANISOCYTOSIS BLD QL SMEAR: PRESENT
AST SERPL-CCNC: 154 U/L (ref 5–40)
BACTERIA: ABNORMAL
BASO STIPL BLD QL SMEAR: ABNORMAL
BASOPHILS ABSOLUTE: 0.1 THOU/MM3 (ref 0–0.1)
BASOPHILS NFR BLD AUTO: 0.6 %
BILIRUB SERPL-MCNC: 0.7 MG/DL (ref 0.3–1.2)
BILIRUB UR QL STRIP: NEGATIVE
BUN SERPL-MCNC: 20 MG/DL (ref 7–22)
CALCIUM SERPL-MCNC: 8.8 MG/DL (ref 8.5–10.5)
CASTS #/AREA URNS LPF: ABNORMAL /LPF
CASTS #/AREA URNS LPF: ABNORMAL /LPF
CHARACTER UR: CLEAR
CHARCOAL URNS QL MICRO: ABNORMAL
CHLORIDE SERPL-SCNC: 108 MEQ/L (ref 98–111)
CO2 SERPL-SCNC: 23 MEQ/L (ref 23–33)
COLOR UR: ABNORMAL
CREAT SERPL-MCNC: 0.9 MG/DL (ref 0.4–1.2)
CRYSTALS URNS QL MICRO: ABNORMAL
DEPRECATED RDW RBC AUTO: 48.3 FL (ref 35–45)
ELLIPTOCYTES: ABNORMAL
EOSINOPHIL NFR BLD AUTO: 0.4 %
EOSINOPHILS ABSOLUTE: 0.1 THOU/MM3 (ref 0–0.4)
EPITHELIAL CELLS, UA: ABNORMAL /HPF
ERYTHROCYTE [DISTWIDTH] IN BLOOD BY AUTOMATED COUNT: 22.4 % (ref 11.5–14.5)
GFR SERPL CREATININE-BSD FRML MDRD: > 90 ML/MIN/1.73M2
GLUCOSE SERPL-MCNC: 105 MG/DL (ref 70–108)
GLUCOSE UR QL STRIP.AUTO: >= 1000 MG/DL
HCT VFR BLD AUTO: 35.5 % (ref 42–52)
HGB BLD-MCNC: 9.3 GM/DL (ref 14–18)
HGB UR QL STRIP.AUTO: NEGATIVE
HYPOCHROMIA BLD QL SMEAR: PRESENT
IMM GRANULOCYTES # BLD AUTO: 0.06 THOU/MM3 (ref 0–0.07)
IMM GRANULOCYTES NFR BLD AUTO: 0.4 %
KETONES UR QL STRIP.AUTO: NEGATIVE
LEUKOCYTE ESTERASE UR QL STRIP.AUTO: NEGATIVE
LYMPHOCYTES ABSOLUTE: 2.3 THOU/MM3 (ref 1–4.8)
LYMPHOCYTES NFR BLD AUTO: 16.3 %
MCH RBC QN AUTO: 17.2 PG (ref 26–33)
MCHC RBC AUTO-ENTMCNC: 26.2 GM/DL (ref 32.2–35.5)
MCV RBC AUTO: 65.6 FL (ref 80–94)
MICROCYTES BLD QL SMEAR: PRESENT
MONOCYTES ABSOLUTE: 1.4 THOU/MM3 (ref 0.4–1.3)
MONOCYTES NFR BLD AUTO: 9.6 %
NEUTROPHILS ABSOLUTE: 10.3 THOU/MM3 (ref 1.8–7.7)
NEUTROPHILS NFR BLD AUTO: 72.7 %
NITRITE UR QL STRIP.AUTO: NEGATIVE
NRBC BLD AUTO-RTO: 1 /100 WBC
OSMOLALITY SERPL CALC.SUM OF ELEC: 289.8 MOSMOL/KG (ref 275–300)
PH UR STRIP.AUTO: 5.5 [PH] (ref 5–9)
PLATELET # BLD AUTO: 309 THOU/MM3 (ref 130–400)
PMV BLD AUTO: 10.5 FL (ref 9.4–12.4)
POIKILOCYTES: ABNORMAL
POLYCHROMASIA BLD QL SMEAR: ABNORMAL
POTASSIUM SERPL-SCNC: 4.8 MEQ/L (ref 3.5–5.2)
PROT SERPL-MCNC: 7.7 G/DL (ref 6.1–8)
PROT UR STRIP.AUTO-MCNC: ABNORMAL MG/DL
RBC # BLD AUTO: 5.41 MILL/MM3 (ref 4.7–6.1)
RBC #/AREA URNS HPF: ABNORMAL /HPF
RENAL EPI CELLS #/AREA URNS HPF: ABNORMAL /[HPF]
SCAN OF BLOOD SMEAR: NORMAL
SODIUM SERPL-SCNC: 144 MEQ/L (ref 135–145)
SP GR UR REFRACT.AUTO: > 1.03 (ref 1–1.03)
UROBILINOGEN UR QL STRIP.AUTO: 1 EU/DL (ref 0–1)
WBC # BLD AUTO: 14.2 THOU/MM3 (ref 4.8–10.8)
WBC #/AREA URNS HPF: ABNORMAL /HPF
YEAST LIKE FUNGI URNS QL MICRO: ABNORMAL

## 2024-05-09 PROCEDURE — 94640 AIRWAY INHALATION TREATMENT: CPT

## 2024-05-09 PROCEDURE — 81001 URINALYSIS AUTO W/SCOPE: CPT

## 2024-05-09 PROCEDURE — 76870 US EXAM SCROTUM: CPT

## 2024-05-09 PROCEDURE — 85025 COMPLETE CBC W/AUTO DIFF WBC: CPT

## 2024-05-09 PROCEDURE — 36415 COLL VENOUS BLD VENIPUNCTURE: CPT

## 2024-05-09 PROCEDURE — 80053 COMPREHEN METABOLIC PANEL: CPT

## 2024-05-09 PROCEDURE — 81003 URINALYSIS AUTO W/O SCOPE: CPT

## 2024-05-09 PROCEDURE — 96372 THER/PROPH/DIAG INJ SC/IM: CPT

## 2024-05-09 PROCEDURE — 71046 X-RAY EXAM CHEST 2 VIEWS: CPT

## 2024-05-09 PROCEDURE — 6370000000 HC RX 637 (ALT 250 FOR IP)

## 2024-05-09 PROCEDURE — 99285 EMERGENCY DEPT VISIT HI MDM: CPT

## 2024-05-09 PROCEDURE — 93005 ELECTROCARDIOGRAM TRACING: CPT | Performed by: INTERNAL MEDICINE

## 2024-05-09 RX ORDER — LORAZEPAM 2 MG/ML
1 INJECTION INTRAMUSCULAR ONCE
Status: COMPLETED | OUTPATIENT
Start: 2024-05-09 | End: 2024-05-10

## 2024-05-09 RX ORDER — IPRATROPIUM BROMIDE AND ALBUTEROL SULFATE 2.5; .5 MG/3ML; MG/3ML
1 SOLUTION RESPIRATORY (INHALATION) ONCE
Status: COMPLETED | OUTPATIENT
Start: 2024-05-09 | End: 2024-05-09

## 2024-05-09 RX ORDER — NICOTINE 21 MG/24HR
1 PATCH, TRANSDERMAL 24 HOURS TRANSDERMAL DAILY
Status: DISCONTINUED | OUTPATIENT
Start: 2024-05-09 | End: 2024-05-10 | Stop reason: HOSPADM

## 2024-05-09 RX ADMIN — IPRATROPIUM BROMIDE AND ALBUTEROL SULFATE 1 DOSE: .5; 3 SOLUTION RESPIRATORY (INHALATION) at 21:56

## 2024-05-09 ASSESSMENT — ENCOUNTER SYMPTOMS
NAUSEA: 0
WHEEZING: 0
VOMITING: 0
ABDOMINAL PAIN: 0
CHEST TIGHTNESS: 0
COUGH: 1
DIARRHEA: 0
CONSTIPATION: 0

## 2024-05-09 ASSESSMENT — PAIN - FUNCTIONAL ASSESSMENT: PAIN_FUNCTIONAL_ASSESSMENT: NONE - DENIES PAIN

## 2024-05-10 ENCOUNTER — APPOINTMENT (OUTPATIENT)
Dept: MRI IMAGING | Age: 69
End: 2024-05-10
Payer: COMMERCIAL

## 2024-05-10 VITALS
RESPIRATION RATE: 16 BRPM | HEART RATE: 98 BPM | DIASTOLIC BLOOD PRESSURE: 65 MMHG | OXYGEN SATURATION: 94 % | SYSTOLIC BLOOD PRESSURE: 117 MMHG | WEIGHT: 225 LBS | BODY MASS INDEX: 29.82 KG/M2 | HEIGHT: 73 IN | TEMPERATURE: 97.7 F

## 2024-05-10 LAB
EKG ATRIAL RATE: 85 BPM
EKG P AXIS: 67 DEGREES
EKG P-R INTERVAL: 178 MS
EKG Q-T INTERVAL: 392 MS
EKG QRS DURATION: 82 MS
EKG QTC CALCULATION (BAZETT): 466 MS
EKG R AXIS: 62 DEGREES
EKG T AXIS: 60 DEGREES
EKG VENTRICULAR RATE: 85 BPM

## 2024-05-10 PROCEDURE — 72148 MRI LUMBAR SPINE W/O DYE: CPT

## 2024-05-10 PROCEDURE — 93010 ELECTROCARDIOGRAM REPORT: CPT | Performed by: INTERNAL MEDICINE

## 2024-05-10 PROCEDURE — 6360000002 HC RX W HCPCS

## 2024-05-10 RX ADMIN — LORAZEPAM 1 MG: 2 INJECTION INTRAMUSCULAR; INTRAVENOUS at 00:15

## 2024-05-10 ASSESSMENT — ENCOUNTER SYMPTOMS: SHORTNESS OF BREATH: 1

## 2024-05-10 NOTE — PROGRESS NOTES
Mr Cristiana had an mri lumbar w/wo contrast ordered and refused T1 axial images and post contrast images due to pain. Pt had been medicated prior to mri scan. Images were sent through for a readin.

## 2024-05-10 NOTE — ED NOTES
MRI screening complete. Pt placed on 2L NC d/t O2 sats dropping while sleeping. Wife remains at bedside. Call light within reach.

## 2024-05-10 NOTE — ED PROVIDER NOTES
Patient is brought in by EMS for evaluation of abdominal distention and urine incontinence.  These issues started after right aortofemoral angiography with runoff and angioplasty on 4/16/2024.    Patient's exam and ED workup reveal subacute/chronic appearing of L2 compression fracture with retropulsion and severe spinal canal narrowing.  Cauda equina syndrome is highly suspected.    Discussed with spine neuro on-call recommending [facility] transfer.    Patient is transferred to [facility] in stable situations.     Alex Jett MD  05/10/24 1043

## 2024-05-10 NOTE — ED NOTES
RT called for duoneb. Pt resting in bed with wife at bedside watching TV. Call light within reach.

## 2024-05-10 NOTE — ED PROVIDER NOTES
[facility] EMERGENCY DEPT  EMERGENCY DEPARTMENT ENCOUNTER      Pt Name: Ruben Zendejas  MRN: [id number]  Birthdate 1955  Date of evaluation: 5/9/2024  Physician: Chris Weston MD  Supervising Attending Physician: Kiara att. providers found     CHIEF COMPLAINT       Chief Complaint   Patient presents with    Abdominal Pain       HISTORY OF PRESENT ILLNESS    HPI  Ruben Zendejas is a 69 y.o. male who presents to the emergency department from home, brought in by EMS with gurjit for evaluation of incontinence and abdominal bloating.  Of note patient had arteriogram completed in April 2024, and fiancé states that health changes started occurring after that. patient was discharged was to have follow up with home health nurses but was not able to set that up. Patient lives at home with fiance and grandson but family does not feel that they can take care of patient with such decline. Since last week patient has been unable to care for self, especially regarding voiding and BM. Patient cannot take care of self since there is a wound on his right hand.   Patient endorses abdominal discomfort, incontinence.   Patient denies any fevers, chills, chest pain, nausea, vomiting.       The patient has no other acute complaints at this time.      REVIEW OF SYSTEMS   Review of Systems   Constitutional:  Negative for chills, diaphoresis and fever.   HENT:  Negative for congestion.    Respiratory:  Positive for cough and shortness of breath. Negative for chest tightness and wheezing.    Cardiovascular:  Negative for chest pain and leg swelling.   Gastrointestinal:  Negative for abdominal pain, constipation, diarrhea, nausea and vomiting.   Musculoskeletal:  Negative for arthralgias and myalgias.   Skin:  Negative for rash.   Neurological:  Negative for syncope, light-headedness and headaches.   Psychiatric/Behavioral: Negative.         PAST MEDICAL AND SURGICAL HISTORY     Past Medical History:   Diagnosis

## 2024-05-10 NOTE — ED TRIAGE NOTES
Pt presents to ED via EMS with chief complaint of abdominal distention and incontinence. Pt states he had a procedure about 3-4 weeks ago in which they \"cleaned out his arteries in his leg.\" Pt states since then, he has had issues with incontinence and feels like his abdomen is swelling. Pt has necrotic great toe on right foot with diabetic wounds and left leg amputation.

## 2024-05-10 NOTE — ED NOTES
Patient resting in bed with eyes closed. Respirations easy and unlabored. Wife at bedside. Call light within reach.

## 2024-05-21 PROBLEM — A41.9 SEPSIS (HCC): Status: ACTIVE | Noted: 2024-01-01

## 2024-05-21 NOTE — ED PROVIDER NOTES
[facility]  EMERGENCY DEPARTMENT ENCOUNTER        PATIENT NAME: Ruben Zendejas  MRN: [id number]  : 1955  DAVILA: 2024  PROVIDER: Alex Jett MD    Patient was seen and evaluated at 7:17 PM EDT. Nurses Notes are reviewed and I agree except as noted in the HPI.  Chief Complaint   Patient presents with    Foot Injury    Fatigue     HISTORY OF PRESENT ILLNESS     Ruben Zendejas is a 69 y.o. 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    Of note patient signed out AMA on 24 despite extensive education on risks of leaving AMA at [facility]. Verbalized understanding and left AMA.     Seen at [facility] ED for above chronic conditions with difficulty defecate and urinate, MRI lumbar spine revealed L2 compression fracture with retropulsion and severe spinal canal stenosis and clinical suspicion of cauda equina syndrome on 2024 transferred to ICU and signed AMA 4 days later at [facility].     Patient refused to right toe amputation.    This HPI was provided by patient.     PAST MEDICAL HISTORY    has a past medical history of CAD (coronary artery disease), COPD (chronic obstructive pulmonary disease) ([facility]), Diabetes mellitus (HCC), Hx of blood clots, Hyperlipidemia, Hypertension, Leg wound, left, Leg wound, right, Lumbar radiculopathy, Osteoarthritis, Seizure disorder (HCC), and Spinal stenosis, lumbar.    SURGICAL HISTORY      has a past surgical history that includes Coronary angioplasty with stent; knee surgery; Forearm surgery; Toe  2101)   sodium chloride flush 0.9 % injection 5-40 mL (has no administration in time range)   sodium chloride flush 0.9 % injection 5-40 mL (has no administration in time range)   0.9 % sodium chloride infusion (has no administration in time range)   acetaminophen (TYLENOL) tablet 650 mg (has no administration in time range)     Or   acetaminophen (TYLENOL) suppository 650 mg (has no administration in time range)   piperacillin-tazobactam (ZOSYN) 4,500 mg in sodium chloride 0.9 % 100 mL IVPB (mini-bag) (has no administration in time range)     And   piperacillin-tazobactam (ZOSYN) 4,500 mg in sodium chloride 0.9 % 100 mL IVPB (mini-bag) (has no administration in time range)   clindamycin (CLEOCIN) 900 mg in dextrose 5 % 50 mL IVPB (has no administration in time range)     CONSULTANTS:  Hospital medicine    PROCEDURES:   None    CRITICAL CARE:   CRITICAL CARE: There was a high probability of clinically significant/life threatening deterioration in this patient's condition of sepsis which required my urgent intervention.  I personally spent at least 30 minutes of time attending to this patient's critical care needs separate from teaching or billable procedures. This time includes bedside evaluation and management, review of labs and imaging, review of the chart for written updates and recommendations, documentation, and communication with other services on the case.  This patient requires complex, high-level decision-making to prevent deterioration or morbid sequelae of ongoing disease.      Vitals:    05/21/24 2315 05/21/24 2318 05/22/24 0130 05/22/24 0315   BP: 118/86  133/87 115/79   Pulse: (!) 139  (!) 142 (!) 141   Resp: 24 23   Temp: 97.5 °F (36.4 °C)   97.6 °F (36.4 °C)   TempSrc: Oral   Oral   SpO2: 96%   92%   Weight:  105.1 kg (231 lb 11.3 oz)     Height:  1.854 m (6' 1\")         NUMBER AND COMPLEXITY OF PROBLEMS        Problem List This Visit: Right foot gangrene, right foot ischemic ulcers with  infection, atrial fibrillation with RVR, ischial decub ulcer, CHF, sepsis, lack of social support at home    Pertinent Comorbid Conditions:  See HPI, PMH and PSH    DATA REVIEWED(none if left blank)    Code Status:  Reviewed with patient and/or family as full code    External Documentation Reviewed: Relevant record in care everywhere is reviewed (If there is any).    Previous relevant patient encounter documents & history available on EMR was reviewed: Yes (If there is any)    See Formal Diagnostic Results above for the lab and radiology tests and orders.    Shared Decision-Making: ED workups, treatment plan and dispositions are discussed with the patient/family, questions answered     FINAL IMPRESSION AND DISPOSITION     1. Gangrene of right foot (HCC)    2. Septicemia (HCC)    3. Pressure injury of left ischium, stage 4 (HCC)    4. Atrial fibrillation with RVR (HCC)        DISPOSITION Admitted 05/21/2024 09:27:44 PM      OUTPATIENT FOLLOW UP THE PATIENT:  No follow-up provider specified.    DISCHARGE MEDICATIONS:(None if blank)  Current Discharge Medication List          MD Johnie Hendrix, MD Alex  05/22/24 0337

## 2024-05-21 NOTE — ED TRIAGE NOTES
Pt presents to ED via EMS for complaints of fatigue, incontinence, and foot issues. Pt was just admitted to [facility] on May 11th. Pt right 1st toe appears black. Pt wife notes that he now does not know when he has to urinate and states, \"I try to clean him up but I just can't do it anymore. He needs to go to a nursing home.\" Pt o2 87% on room air, 2L NC applied pt o2 94%. Pt states he has been more weak today. EKG complete.

## 2024-05-22 PROBLEM — B37.49 CANDIDURIA: Status: ACTIVE | Noted: 2024-01-01

## 2024-05-22 PROBLEM — R32 URINARY INCONTINENCE: Status: ACTIVE | Noted: 2024-01-01

## 2024-05-22 PROBLEM — I50.23 ACUTE ON CHRONIC HFREF (HEART FAILURE WITH REDUCED EJECTION FRACTION) (HCC): Status: ACTIVE | Noted: 2024-01-01

## 2024-05-22 PROBLEM — R79.89 ELEVATED LFTS: Status: ACTIVE | Noted: 2024-01-01

## 2024-05-22 PROBLEM — J96.01 ACUTE RESPIRATORY FAILURE WITH HYPOXIA (HCC): Status: ACTIVE | Noted: 2024-01-01

## 2024-05-22 PROBLEM — Z89.612 HISTORY OF ABOVE-KNEE AMPUTATION OF LEFT LOWER EXTREMITY (HCC): Status: ACTIVE | Noted: 2024-01-01

## 2024-05-22 PROBLEM — R79.1 ELEVATED INR: Status: ACTIVE | Noted: 2024-01-01

## 2024-05-22 PROBLEM — I95.9 HYPOTENSION: Status: ACTIVE | Noted: 2024-01-01

## 2024-05-22 PROBLEM — I48.91 ATRIAL FIBRILLATION WITH RVR (HCC): Status: ACTIVE | Noted: 2024-01-01

## 2024-05-22 PROBLEM — J18.9 PNEUMONIA DUE TO INFECTIOUS ORGANISM: Status: ACTIVE | Noted: 2024-01-01

## 2024-05-22 PROBLEM — D72.829 LEUKOCYTOSIS: Status: ACTIVE | Noted: 2024-01-01

## 2024-05-22 PROBLEM — R53.81 PHYSICAL DECONDITIONING: Status: ACTIVE | Noted: 2024-01-01

## 2024-05-22 PROBLEM — E11.9 TYPE 2 DIABETES MELLITUS TREATED WITHOUT INSULIN (HCC): Status: ACTIVE | Noted: 2024-01-01

## 2024-05-22 PROBLEM — J90 BILATERAL PLEURAL EFFUSION: Status: ACTIVE | Noted: 2024-01-01

## 2024-05-22 NOTE — ED NOTES
Pt in bed, eyes open, respirations even and unlabored. Vital signs reassessed, pt provided assistance with urinal. No needs voiced.

## 2024-05-22 NOTE — ED NOTES
Patient transported to [facility]  by cart in stable condition.   Patient monitored on cardiac telemetry.   Patient on 2 LPM O2 via nasal canula.   IV infusing and line is patent.

## 2024-05-22 NOTE — PROCEDURES
PROCEDURE NOTE  Date: 5/22/2024   Name: Ruben FRANZ JR. [place]  YOB: 1955    Procedures  12 lead EKG completed. Results handed to Loreto CARLSON.

## 2024-05-22 NOTE — CARE COORDINATION
Case Management Assessment Initial Evaluation    Date/Time of Evaluation: 2024 8:37 AM  Assessment Completed by: Phuong Garcia RN    If patient is discharged prior to next notation, then this note serves as note for discharge by case management.    Patient Name: Ruben Zendejas                   YOB: 1955  Diagnosis: Sepsis (HCC) [A41.9]                   Date / Time: 2024  6:44 PM  Location: [address] [place] [address]     Patient Admission Status: Inpatient   Readmission Risk Low 0-14, Mod 15-19), High > 20: Readmission Risk Score: 23.3    Current PCP: Fred Evans MD    Additional Case Management Notes: Presented with difficulty to defecate and urinate. Recently transferred to [facility] for suspected cauda equina syndrome, signed out AMA . Hospitalist following. ID, ortho, surgery consult. IV cleocin. IV decadron. IV decadron. IV zosyn. IV vanc. Pain control.     Procedures: none    Imagin/21 CT abdomen/pelvis:   1. Multifocal pneumonia right middle lobe, left upper lobe and   predominantly right lower lobe.  2. Moderate bilateral pleural effusions. Small ascites. Anasarca.   MRI spine:   1. Unchanged severe spinal canal stenosis at L2-L3 due to severe L3 compression deformity with associated osseous retropulsion and retrolisthesis of L3.  2. Unchanged moderate-to-severe bilateral neural foraminal narrowing at L4-L5 with flattening of the exiting L4 nerve roots.   CXR:   1. Cardiomegaly with findings of moderate interstitial and alveolar edema with associated pleural effusions.     Patient Goals/Plan/Treatment Preferences: Spoke with [place] and abiodun. He resides at home with her and has dme. They feel SNF stay will likely be required and prefer [facility]. SW consulted.          [phone]   Service Assessment   Patient Orientation Alert and Oriented   Cognition Alert   History Provided By Significant Other   Primary Caregiver Other (Comment)  (s/o)

## 2024-05-22 NOTE — RT PROTOCOL NOTE
RT Inhaler-Nebulizer Bronchodilator Protocol Note    There is a bronchodilator order in the chart from a provider indicating to follow the RT Bronchodilator Protocol and there is an “Initiate RT Inhaler-Nebulizer Bronchodilator Protocol” order as well (see protocol at bottom of note).    CXR Findings:  XR CHEST PORTABLE    Result Date: 5/21/2024  1. Cardiomegaly with findings of moderate interstitial and alveolar edema with associated pleural effusions. This document has been electronically signed by: Rowdy Gotti MD on 05/21/2024 10:39 PM      The findings from the last RT Protocol Assessment were as follows:   History Pulmonary Disease: Chronic pulmonary disease  Respiratory Pattern: Regular pattern and RR 12-20 bpm  Breath Sounds: Inspiratory and expiratory or bilateral wheezing and/or rhonchi  Cough: Strong, spontaneous, non-productive  Indication for Bronchodilator Therapy: Decreased or absent breath sounds, On home bronchodilators (prn at home)  Bronchodilator Assessment Score: 8    Aerosolized bronchodilator medication orders have been revised according to the RT Inhaler-Nebulizer Bronchodilator Protocol below.    Respiratory Therapist to perform RT Therapy Protocol Assessment initially then follow the protocol.  Repeat RT Therapy Protocol Assessment PRN for score 0-3 or on second treatment, BID, and PRN for scores above 3.    No Indications - adjust the frequency to every 6 hours PRN wheezing or bronchospasm, if no treatments needed after 48 hours then discontinue using Per Protocol order mode.     If indication present, adjust the RT bronchodilator orders based on the Bronchodilator Assessment Score as indicated below.  Use Inhaler orders unless patient has one or more of the following: on home nebulizer, not able to hold breath for 10 seconds, is not alert and oriented, cannot activate and use MDI correctly, or respiratory rate 25 breaths per minute or more, then use the equivalent nebulizer order(s) with  same Frequency and PRN reasons based on the score.  If a patient is on this medication at home then do not decrease Frequency below that used at home.    0-3 - enter or revise RT bronchodilator order(s) to equivalent RT Bronchodilator order with Frequency of every 4 hours PRN for wheezing or increased work of breathing using Per Protocol order mode.        4-6 - enter or revise RT Bronchodilator order(s) to two equivalent RT bronchodilator orders with one order with BID Frequency and one order with Frequency of every 4 hours PRN wheezing or increased work of breathing using Per Protocol order mode.        7-10 - enter or revise RT Bronchodilator order(s) to two equivalent RT bronchodilator orders with one order with TID Frequency and one order with Frequency of every 4 hours PRN wheezing or increased work of breathing using Per Protocol order mode.       11-13 - enter or revise RT Bronchodilator order(s) to one equivalent RT bronchodilator order with QID Frequency and an Albuterol order with Frequency of every 4 hours PRN wheezing or increased work of breathing using Per Protocol order mode.      Greater than 13 - enter or revise RT Bronchodilator order(s) to one equivalent RT bronchodilator order with every 4 hours Frequency and an Albuterol order with Frequency of every 2 hours PRN wheezing or increased work of breathing using Per Protocol order mode.     RT to enter RT Home Evaluation for COPD & MDI Assessment order using Per Protocol order mode.    Electronically signed by Selene Shepherd RCP on 5/22/2024 at 8:41 AM

## 2024-05-22 NOTE — CONSULTS
Podiatric Surgery Consult    Patient Ruben FRANZ JR. [place]  MEDICAL RECORD NUMBER:  [id number]  AGE: 69 y.o.   GENDER: male  : 1955  EPISODE DATE:  2024    Reason for Consult:  right leg wound     Requesting Physician:  Neelam Michaels MD    CHIEF COMPLAINT:  Sepsis (HCC)    HISTORY OF PRESENT ILLNESS:                The patient is a 69 y.o. male with significant past medical history of CAD, COPD ,diabetes mellitus, hyperlipidemia, HTN, seizure who is being seen at bedside on behalf of Dr. Ramírez. Patient is a poor historian. The patient's family member is present.  The patient's family member relates that he was recently admitted at [facility] due to not having control with bladder and GI.  The patient's family member relates that the patient was not having proper treatment and care taken to the leg and wounds while at [facility].  The patient relates that he has seen Dr. Ramírez prior in the hospital and at [facility]  The patient denies any nausea, vomiting.  Fever, chills, shortness of breath or chest pain.  No other pedal complaints.       Past Medical History:    Past Medical History:   Diagnosis Date    CAD (coronary artery disease)     COPD (chronic obstructive pulmonary disease) (HCC)     Diabetes mellitus (HCC)     Hx of blood clots     LEFT LEG    Hyperlipidemia     Hypertension     Leg wound, left     Leg wound, right     Lumbar radiculopathy     Osteoarthritis     Seizure disorder (HCC)     Spinal stenosis, lumbar         Past Surgical History:    Past Surgical History:   Procedure Laterality Date    ANKLE SURGERY      ball joint    BACK SURGERY      CARDIAC CATHETERIZATION  2005    1 STENT PLACED    CARDIAC CATHETERIZATION  13    [facility]    CORONARY ANGIOPLASTY WITH STENT PLACEMENT      DIAGNOSTIC CARDIAC CATH LAB PROCEDURE  2021    FOREARM SURGERY      left    FRACTURE SURGERY Right 2000    LEG    KNEE SURGERY      left    LEG SURGERY      LEG SURGERY Left  Regional soft tissues demonstrate no acute finding. Paraspinous muscular fatty atrophy presumably representing denervation.     1. Unchanged severe spinal canal stenosis at L2-L3 due to severe L3 compression deformity with associated osseous retropulsion and retrolisthesis of L3. 2. Unchanged moderate-to-severe bilateral neural foraminal narrowing at L4-L5 with flattening of the exiting L4 nerve roots. This document has been electronically signed by: Rowdy Gotti MD on 05/22/2024 02:54 AM    XR CHEST PORTABLE    Result Date: 5/21/2024  1 view chest x-ray Comparison: CR/SR - XR CHEST STANDARD (2 VW) - 05/09/2024 09:07 PM EDT Findings: Cardiomegaly. Cephalization of pulmonary vasculature and central pulmonary vascular engorgement. Increased interstitial markings in both lungs, with central/perihilar predominance. Bilateral fluffy alveolar infiltrates. Small bilateral pleural effusions. No findings of pneumothorax. No gross evidence of acute fracture.     1. Cardiomegaly with findings of moderate interstitial and alveolar edema with associated pleural effusions. This document has been electronically signed by: Rowdy Gotti MD on 05/21/2024 10:39 PM    ECHOCARDIOGRAM    Result Date: 5/13/2024    No prior study at this institution for comparison. Rhythm during study is atrial fibrillation with rapid ventricular response.   Left Ventricle: Chamber size is normal. Normal wall thickness. Global hypokinesis with regional wall motion abnormalities. Wall motion abnormality: Inferior wall. Inferolateral wall. The ejection fraction is 30% (+/- 5%) by visual estimate, varies with RR interval.   Right Ventricle: Chamber size is enlarged. Systolic function is moderately reduced.   Mild MR.   Estimated right ventricular systolic pressure is 44 mmHg. Estimated right atrial pressure is 15.00 mmHg.   Trivial to small pericardial effusion adjacent to the right ventricle. There is no evidence of tamponade.   Large left pleural  5/11/2024 12:16 PM    XR CHEST PORTABLE    Result Date: 5/11/2024  EXAM: XR CHEST 1 VIEW PORTABLE, 05/11/2024 01:39 AM COMPARISON: No prior studies available for comparison. CLINICAL INDICATIONS: No breath sounds mid to lower will right lung RELEVANT CLINICAL HISTORY: FINDINGS: (Compromised by rotation to the left) Implanted Devices: None Thorax: Diffuse hazy interstitial opacities. There is flattening of the diaphragm with bilateral pleural effusions. Patchy airspace opacities in the right lower lung. Mild cardiomegaly. No acute osseous findings.     IMPRESSION: 1.  Diffuse interstitial opacities compatible with pulmonary interstitial edema. 2.  Small bilateral pleural effusions. 3.  Cardiomegaly. 4.  Bibasilar volume loss. 5.  Dense right lower lobe opacities may represent additional atelectasis or pneumonia. I personally viewed and interpreted these images and I have reviewed and approved this report. Electronically Signed By: Parth Conde M.D on 5/11/2024 8:09 AM    XR FOOT RIGHT (MIN 3 VIEWS)    Result Date: 5/10/2024  EXAM: XR TIBIA AND FIBULA RIGHT 2 VIEWS, XR FOOT RIGHT 3+ VIEWS, 05/10/2024 17:33 PM COMPARISON: No prior studies available for comparison. CLINICAL INDICATIONS: concerned for osteomyelitis RELEVANT CLINICAL HISTORY: FINDINGS: RIGHT TIBIA AND FIBULA 2 images obtained Diffuse soft tissue swelling is evident. No soft tissue gas is seen. There are postoperative changes relating to prior ORIF of the tibia utilizing an antegrade intramedullary nail and interlocking screw construct. There is a retained screw head within the proximal tibial metaphysis adjacent to the IM nail. Healed distal tibial diaphyseal fracture with anteriorly angulated malunion. Malunited distal fibular diaphyseal fracture with anterior angulation. No acute fracture or focal bone lysis. RIGHT FOOT 3 nonweightbearing images obtained Diffuse soft tissue swelling is greatest at the dorsal aspect of the foot. Superimposed  glucose 15 minutes following intervention.  If blood glucose is LESS THAN 70 mg/dL, repeat treatment and recheck blood glucose in 15 minutes x 2.  If blood glucose remains LESS THAN 70 mg/dL, call ordering provider for further instruction.   If patient experienced a hypoglycemic event in last 24 hours, obtain blood glucose at 0200.     Standing Status:   Standing     Number of Occurrences:   63237    POCT Glucose     Standing Status:   Standing     Number of Occurrences:   1    POCT Glucose     Standing Status:   Standing     Number of Occurrences:   1    EKG 12 Lead     Standing Status:   Standing     Number of Occurrences:   1     Order Specific Question:   Reason for Exam?     Answer:   Emergency    EKG 12 Lead     Standing Status:   Standing     Number of Occurrences:   1     Order Specific Question:   Reason for Exam?     Answer:   Irregular heart rate    EKG 12 Lead     Standing Status:   Standing     Number of Occurrences:   1     Order Specific Question:   Reason for Exam?     Answer:   Chest pain    Wound ostomy eval and treat     Standing Status:   Standing     Number of Occurrences:   1     Order Specific Question:   Reason for Consult?     Answer:   Decubitus ulcer, multiple other wounds    ADMIT TO INPATIENT     Standing Status:   Standing     Number of Occurrences:   1     Order Specific Question:   Discharge Plan:     Answer:   Other/[place] (Specify)     Order Specific Question:   Telemetry/Cardiac Monitoring Required?     Answer:   Yes       Assessment and Plan  Principle  1.  Dry gangrene right great toe  2.  Diabetic toe wound right second toe  3.  Ulcers, right leg  4.  PVD    - Patient initially examined and evaluated  - WBC 22.7; Afebrile  - Culture ordered  - Patient given vancomycin and Zosyn antibiotics; per primary  - Applied dressing consisting of: Betadine wet-to-dry to all wounds, DSD  -Daily dressing order placed.  - X-rays ordered and reviewed; see above; care everywhere imaging via [facility]

## 2024-05-22 NOTE — PROGRESS NOTES
Pharmacy Medication History Note      List of current medications patient is taking is complete.    Source of information: [employer] fill history    Changes made to medication list:  Medications removed (include reason, ex. therapy complete or physician discontinued):  Trazodone 50mg (wrong strength)    Medications added/doses adjusted:  Eliquis has been filled 5/6/24, but wife wasn't sure if he's taking; fills have been consistent so it appears he is  Stiolto was also marked as not taking, but he has been filling regularly  Gabapentin was increased to 100mg tid on last discharge (4/17/24)    Other notes (ex. Recent course of antibiotics, Coumadin dosing):  Denies use of other OTC or herbal medications.    Allergies reviewed    Electronically signed by Nery Littlejohn RPH on 5/22/2024 at 7:51 AM

## 2024-05-22 NOTE — PROGRESS NOTES
Pt refusing HFNC at this time. Pt stated the cannula was too hot, this RT offered to turn down the temperature and pt still is refusing.

## 2024-05-22 NOTE — PROGRESS NOTES
[facility]   Pharmacy Pharmacokinetic Monitoring Service - Vancomycin     Ruben Zendejas is a 69 y.o. male starting on vancomycin therapy for sepsis of unknown etiology. Pharmacy consulted by Dr. Kee for monitoring and adjustment.    Target Concentration: Goal AUC/HALLEY 400-600 mg*hr/L    Additional Antimicrobials: zosyn, clindamycin    Pertinent Laboratory Values:   Wt Readings from Last 1 Encounters:   05/21/24 102.1 kg (225 lb)     Temp Readings from Last 1 Encounters:   05/21/24 97.7 °F (36.5 °C) (Oral)     Estimated Creatinine Clearance: 100 mL/min (based on SCr of 0.9 mg/dL).  Recent Labs     05/21/24  1900   WBC 17.2*     Pertinent Cultures:  Date Source Results   5/21/24 2/2 bld cx Pending    MRSA Nasal Swab: not ordered. Order placed by pharmacy.    Plan:  Dosing recommendations based on Bayesian software  Start vancomycin 2500mg (24.5mg/kg) load x1. Will order maintenance dose once BMP resolves to assess current renal function  Renal labs as indicated   Vancomycin concentration not ordered at this time   Pharmacy will continue to monitor patient and adjust therapy as indicated    Thank you for the consult,  Rosanne Farrar, PharmD   Pharmacy Resident  5/21/2024 9:39 PM

## 2024-05-22 NOTE — CONSULTS
[facility]'s  Consult    Patient's Name/Date of Birth: Ruben FRANZ JR. [place] / 1955 (69 y.o.)    Date: May 22, 2024     Referring Provider: Neelam Michaels MD    CHIEF COMPLAINT: Bowel Incontinence     Reason for Consult: A-fib with RVR, Acute on Chronic CHF exacerbation, surgery clearance     HPI: This is a pleasant 69 y.o. male with PMH CAD, COPD, DM, PAD, HTN, left AKA, R toe dry gangrene, spinal stenosis, cauda equina presents with bowel and bladder incontinence. It was reported that the patient was recently admitted at [facility] for suspected cauda equina. At that time the patient was also noted to have dry gangrene on his right big toe. The patient was apparently refusing amputation and did not like his care. He signed out AMA on 5/14/24. The patient presented for continuation of bowel and bladder incontinence. He was found to be in A-fib and RVR with concerns for sepsis. He was started on broad spectrum antibiotics and admitted to hospital service.   This morning the patient reports that he is having a sharp pain on the left side of his chest non radiating. He reports associated shortness of breath starting yesterday.       Echo: 5/13/24     No prior study at this institution for comparison. Rhythm during study   is atrial fibrillation with rapid ventricular response.     Left Ventricle: Chamber size is normal. Normal wall thickness. Global   hypokinesis with regional wall motion abnormalities. Wall motion   abnormality: Inferior wall. Inferolateral wall. The ejection fraction is   30% (+/- 5%) by visual estimate, varies with RR interval.     Right Ventricle: Chamber size is enlarged. Systolic function is   moderately reduced.     Mild MR.     Estimated right ventricular systolic pressure is 44 mmHg. Estimated   right atrial pressure is 15.00 mmHg.     Trivial to small pericardial effusion adjacent to the right ventricle.   There is no evidence of tamponade.     Large left pleural  And Vomiting     Family History   Problem Relation Age of Onset    Diabetes Mother     Kidney Disease Mother     COPD Father     Heart Disease Father     Cancer Father         bone    Stroke Father     Osteoarthritis Sister     Kidney Disease Sister     Heart Disease Sister     Diabetes Brother      Social History     Socioeconomic History    Marital status:      Spouse name: Not on file    Number of children: 2    Years of education: 9    Highest education level: Not on file   Occupational History    Occupation: [employer]SI   Tobacco Use    Smoking status: Every Day     Current packs/day: 0.50     Average packs/day: 0.5 packs/day for 40.0 years (20.0 ttl pk-yrs)     Types: Cigarettes     Passive exposure: Current    Smokeless tobacco: Never   Vaping Use    Vaping Use: Never used   Substance and Sexual Activity    Alcohol use: No     Alcohol/week: 0.0 standard drinks of alcohol    Drug use: No    Sexual activity: Not on file   Other Topics Concern    Not on file   Social History Narrative    Not on file     Social Determinants of Health     Financial Resource Strain: Not on file   Food Insecurity: No Food Insecurity (5/21/2024)    Hunger Vital Sign     Worried About Running Out of Food in the Last Year: Never true     Ran Out of Food in the Last Year: Never true   Transportation Needs: No Transportation Needs (5/21/2024)    PRAPARE - Transportation     Lack of Transportation (Medical): No     Lack of Transportation (Non-Medical): No   Physical Activity: Not on file   Stress: Not on file   Social Connections: Not on file   Intimate Partner Violence: Not on file   Housing Stability: High Risk (5/21/2024)    Housing Stability Vital Sign     Unable to Pay for Housing in the Last Year: No     Number of Places Lived in the Last Year: 4     Unstable Housing in the Last Year: No     ROS:   Constitutional: Denies any recent wt change.  Eyes:  Denies any blurring or double vision, no glaucoma  Ears/Nose/Mouth/Throat:  Denies

## 2024-05-22 NOTE — PROGRESS NOTES
Patient arrived per cart to 3B. Heart monitor applied and vitals taken.  Admission paperwork completed.  Explained to patient that [facility] is not responsible for any lost or stolen items.  Patient verbalized understanding. Oriented to room and use of call light and bed controls.  Patient denies pain or needs. No signs of distress noted.  Bed locked & in low position, side-rails up x2.  Call light in reach.  Reminded patient to call nurse if any needs arise.     2 person skin assessment performed by this nurse and Alexandra Tolentino RN.    Explained patients right to have family, representative or physician notified of their admission.  Patient has Declined for physician to be notified.  Patient has Declined for family/representative to be notified.

## 2024-05-22 NOTE — PROCEDURES
PROCEDURE NOTE  Date: 5/22/2024   Name: Ruben FRANZ JR. [place]  YOB: 1955    Procedures  Bladder scan completed at 0120 and results told to Loreto CARLSON. Scan showed 712 mL in the patients bladder. Last void was shortly prior to scan.

## 2024-05-22 NOTE — CONSULTS
CONSULTATION NOTE :ID       Patient - Ruben FRANZ JR. [place],  Age - 69 y.o.    - 1955      Room Number - 3B-35/035-A   N -  [id number]   [facility] # - [id number]  Date of Admission -  2024  6:44 PM  Patient's PCP: Fred Evans MD     Requesting Physician: Neelam Michaels MD    REASON FOR CONSULTATION   sepsis  CHIEF COMPLAINT   weakness    HISTORY OF PRESENT ILLNESS       This is a  69 y.o. male who was admitted to the hospital with a chief complaints of generalized weakness.  He was brought to hospital by his significant other who is his POA. He has a very complex medical hx. Has stage 4 ischial wound left AKA and PAD with gangrenous digits and cardiomyopathy. He is on treatment for sepsis. On broad spectrum antibiotics. ID service was asked to see yudy. He has caudaequina with incontinence of stool and urine. He was evaluated at [facility] and left against medical advice. He has COPD, DM and CAD. He has degenerative back diseasee with compression fracture of the lumbar wound.    PAST MEDICAL  HISTORY       Past Medical History:   Diagnosis Date    CAD (coronary artery disease)     COPD (chronic obstructive pulmonary disease) (HCC)     Diabetes mellitus (HCC)     Hx of blood clots     LEFT LEG    Hyperlipidemia     Hypertension     Leg wound, left     Leg wound, right     Lumbar radiculopathy     Osteoarthritis     Seizure disorder (HCC)     Spinal stenosis, lumbar        PAST SURGICAL HISTORY     Past Surgical History:   Procedure Laterality Date    ANKLE SURGERY      ball joint    BACK SURGERY      CARDIAC CATHETERIZATION  2005    1 STENT PLACED    CARDIAC CATHETERIZATION  13    [facility]    CORONARY ANGIOPLASTY WITH STENT PLACEMENT      DIAGNOSTIC CARDIAC CATH LAB PROCEDURE  2021    FOREARM SURGERY      left    FRACTURE SURGERY Right 2000    LEG    KNEE SURGERY      left    LEG SURGERY      LEG SURGERY Left 10/10/2022    Left  Above Knee Amputation  performed by Brooks John MD at [facility] OR    LUMBAR SPINE SURGERY      TOE SURGERY      left middle toe    TYMPANOSTOMY TUBE PLACEMENT  1979         MEDICATIONS:       Scheduled Meds:   nicotine  1 patch TransDERmal Daily    vancomycin  1,000 mg IntraVENous Q12H    fluconazole  200 mg IntraVENous Q24H    albuterol sulfate HFA  2 puff Inhalation TID RT    sodium chloride flush  5-40 mL IntraVENous 2 times per day    piperacillin-tazobactam  4,500 mg IntraVENous Q8H    clindamycin (CLEOCIN) IV  900 mg IntraVENous Q8H    vancomycin (VANCOCIN) intermittent dosing (placeholder)   Other RX Placeholder    [Held by provider] isosorbide mononitrate  30 mg Oral Daily    gabapentin  100 mg Oral BID    traZODone  50 mg Oral Nightly    atorvastatin  40 mg Oral Daily    povidone-iodine   Topical Daily    metoprolol tartrate  50 mg Oral BID    pantoprazole  40 mg Oral Daily    dexAMETHasone  4 mg IntraVENous Daily     Continuous Infusions:   sodium chloride      dextrose      heparin (PORCINE) Infusion 13 Units/kg/hr (05/22/24 0623)     PRN Meds:lidocaine, metoprolol, sodium chloride flush, sodium chloride, acetaminophen **OR** acetaminophen, glucose, dextrose bolus **OR** dextrose bolus, glucagon (rDNA), dextrose, nitroGLYCERIN, albuterol sulfate HFA, calcium gluconate, heparin (porcine), heparin (porcine)  Allergies:   ALLERGIES:    Influenza vaccines, Magnesium-containing compounds, Pneumococcal vaccines, and Spironolactone        SOCIAL HISTORY:     TOBACCO:   reports that he has been smoking cigarettes. He has a 20.0 pack-year smoking history. He has been exposed to tobacco smoke. He has never used smokeless tobacco.     ETOH:   reports no history of alcohol use.  Patient currently lives with significant  other. Use to live on his car      FAMILY HISTORY:         Problem Relation Age of Onset    Diabetes Mother     Kidney Disease Mother     COPD Father     Heart Disease Father     Cancer Father         bone    Stroke  J44.9    Spinal stenosis, lumbar M48.061    Lumbar radiculopathy M54.16    Tobacco abuse Z72.0    GERD (gastroesophageal reflux disease) K21.9    Skin ulceration ([facility]) L98.499    Non-healing ulcer of lower leg ([facility]) L97.909    Ischemic rest pain of lower extremity M79.606, I99.8    Current smoker F17.200    Dyslipidemia E78.5    Abnormal cardiovascular function study R94.30    Obesity E66.9    PVD (peripheral vascular disease) ([facility]) I73.9    CAD, multiple vessel I25.10    Chronic total occlusion of artery of the extremities ([facility]) I70.92    Coronary artery chronic total occlusion I25.82    DAVILA (dyspnea on exertion) R06.09    HTN (hypertension) I10    Discitis of cervical region M46.42    Noncompliance by refusing intervention or support Z91.199    Lactic acidosis E87.20    Pressure injury of left ischium, stage 4 ([facility]) L89.324    PAD (peripheral artery disease) ([facility]) I73.9    Cellulitis of right lower extremity L03.115    Paroxysmal atrial fibrillation ([facility]) I48.0    Left ventricular systolic dysfunction I51.9    Panlobular emphysema ([facility]) J43.1    Type 2 diabetes mellitus with diabetic peripheral angiopathy and gangrene, with long-term current use of insulin ([facility]) E11.52, Z79.4    Critical limb ischemia of left lower extremity ([facility]) I70.222    Gangrene ([facility]) I96    Dry gangrene ([facility]) I96    Sepsis ([facility]) A41.9           Impression and Recommendation:   Sepsis  Stage 4 ischial wound: it is getting soiled with stool due to incontinence  Ischemia right foot: has known PAD  Cada equina with bowel and urine incontince. He had it for some time. I do not think steroid will help at this time. Suggest to stop  I discussed with patient and significant other that if he wants every thing done, he will need right AKA, diverting colostomy.  Needs clearance from cardiology due to his extensive cardiac hx  Need to pack the ischial wound  with dakins soaked dressing  Long term prognosis is poor. I agree with palliative care.

## 2024-05-22 NOTE — PROGRESS NOTES
1610: Patients oxygenation status declining. Requiring 8L for 90%.  notifed and HHFNC ordered. Chest pain 5/10. EKG obtained. No significant change. Dr. Michaels at bedside.   Patient taken down to CT scanner and unable to lay flat. Call to Dr. Tran and he is okay to hold off on CT d/t patients condition. Patient hypotensive in the 80's. Patient back to room on 3B. Patient now stating he does not want to transfer to ICU if his significant other cannot be in the room overnight. Significant other stating that \"they have never been apart for 14 days and he will die without her by his side.\"   and Resource RN at bedside. Patient unable to make a clear decision. Patient refusing HHFNC. Placed on Nasal Cannula at 15L. /65.     Dr. Hoyt at bedside to discuss decision with patient.     Patient decided to transfer to ICU. Patient brought to 4B-05. Bedside report given to Carey CARLSON. All belongings brought to 4B-05 and significant other aware of transfer.

## 2024-05-22 NOTE — PROCEDURES
PROCEDURE NOTE  Date: 5/22/2024   Name: Ruben FRANZ JR. [place]  YOB: 1955    Procedures  EKG completed handed to Dr. Michaels

## 2024-05-22 NOTE — PROGRESS NOTES
0017- This RN contacted MRI about STAT MRI Lumbar Spine. Informed to contact Radiology to get approved.   0019- Radiology contacted, informed this RN they will call back about getting MRI approved.  0057- Radiology informed this RN to contact [facility] about getting approval.  0058- Dr. Gonzalez approved MRI to be called in for STAT MRI Lumbar Spine for concern related to cauda equina.   0105-MRI contacted about approval. This RN informed they will be in in approximately 40 mins.

## 2024-05-22 NOTE — PROCEDURES
PROCEDURE NOTE  Date: 5/22/2024   Name: Ruben FRANZ JR. [place]  YOB: 1955    Procedures  12 lead EKG completed. Results handed to Loreto CARLSON.

## 2024-05-22 NOTE — CARE COORDINATION
DISCHARGE PLANNING EVALUATION  5/22/24, 3:05 PM EDT    Reason for Referral: Patient and abiodun want him to go to [facility]  Decision Maker: He makes his own decisions with help from abiodun Burks.   Current Services: None  New Services Requested: [facility]  Family/ Social/ Home environment: From home with baiodun Burks.  She has been taking care of the patient for three years.  She told SW that he is mostly bed bound.    Payment Source:[facility]  Transportation at Discharge: ambulette  Post-acute (PAC) provider list was provided to patient. Patient was informed of their freedom to choose PAC provider. Discussed and offered to show the patient the relevant PAC Providers quality and resource use measures on Medicare Compare web site via computer based on patient's goals of care and treatment preferences. Questions regarding selection process were answered.      Teach Back Method used with Cornelius regarding care plan and discharge planning.   Patient and Kimmie verbalized understanding of the plan of care and contribute to goal setting.       Patient preferences and discharge plan: Ruben would like to go to [facility] at discharge.  Spoke with Katalina at the facility and she said that none of their buildings are in network.  Updated Ruben and Kimmie.  They have an ECF list and will come up with other options.      Electronically signed by FELIX Garibay on 5/22/2024 at 3:05 PM         3

## 2024-05-22 NOTE — PLAN OF CARE
Problem: Discharge Planning  Goal: Discharge to home or other facility with appropriate resources  5/22/2024 0958 by Rosmery Watkins, RN  Outcome: Progressing  Flowsheets (Taken 5/22/2024 0958)  Discharge to home or other facility with appropriate resources: Identify barriers to discharge with patient and caregiver  Note: From home, possible LTACH?     Problem: Pain  Goal: Verbalizes/displays adequate comfort level or baseline comfort level  5/22/2024 0958 by Rosmery Watkins, RN  Outcome: Progressing  Flowsheets (Taken 5/22/2024 0958)  Verbalizes/displays adequate comfort level or baseline comfort level: Encourage patient to monitor pain and request assistance  Note: Patient educated on pain rating scale of 0-10 and verbalized pain goal of 0. Non-pharmacological measures implemented throughout shift to help achieve mutually met pain goal such as rest, repositioning, and emotional support. Pain rating obtaining prior to pain medications being administered along with assessing patients orientation and arousal and reassessing one hour after administering. Ensuring that pain scale matches with administration scale for medication. Educating patient to continue to notify this RN for changes in their pain or condition. Ongoing assessment and vitals as charted.        Problem: Safety - Adult  Goal: Free from fall injury  5/22/2024 0958 by Rosmery Watkins, RN  Outcome: Progressing  Flowsheets (Taken 5/22/2024 0957)  Free From Fall Injury: Instruct family/caregiver on patient safety  Note: Bed locked & in low position, call light in reach, side-rails up x2, bed/chair alarm utilized, non-slip socks on when ambulating, reminded patient to use call light to call for assistance.      Problem: ABCDS Injury Assessment  Goal: Absence of physical injury  5/22/2024 0958 by Rosmery Watkins, RN  Outcome: Progressing  Flowsheets (Taken 5/22/2024 0957)  Absence of Physical Injury: Implement safety measures based on patient assessment

## 2024-05-22 NOTE — ED NOTES
ED to inpatient nurses report      Chief Complaint:  Chief Complaint   Patient presents with    Foot Injury    Fatigue     Present to ED from: home    MOA:     LOC: alert and orientated to name, place, date  Mobility: Fully dependent  Oxygen Baseline: room air    Current needs required: 2L NC     Code Status:   Full Code    What abnormal results were found and what did you give/do to treat them?   Any procedures or intervention occur?     Mental Status:  Level of Consciousness: Alert (0)    Psych Assessment:        Vitals:  Patient Vitals for the past 24 hrs:   BP Temp Temp src Pulse Resp SpO2 Height Weight   05/21/24 2229 135/86 -- -- (!) 133 28 95 % -- --   05/21/24 2112 134/87 -- -- (!) 131 (!) 32 98 % -- --   05/21/24 2009 (!) 120/95 -- -- (!) 129 25 94 % -- --   05/21/24 1852 -- 97.7 °F (36.5 °C) Oral -- -- -- -- --   05/21/24 1848 129/85 -- -- (!) 104 16 92 % 1.854 m (6' 1\") 102.1 kg (225 lb)        LDAs:   Peripheral IV 05/21/24 Left Antecubital (Active)   Site Assessment Clean, dry & intact 05/21/24 2233   Line Status Infusing 05/21/24 2233   Line Care Connections checked and tightened 05/21/24 2233   Phlebitis Assessment No symptoms 05/21/24 2233   Infiltration Assessment 0 05/21/24 2233   Dressing Status Clean, dry & intact 05/21/24 2233   Dressing Type Transparent 05/21/24 2233       Peripheral IV 05/21/24 Left;Posterior Hand (Active)   Site Assessment Clean, dry & intact 05/21/24 2233   Line Status Infusing 05/21/24 2233   Line Care Connections checked and tightened 05/21/24 2233   Phlebitis Assessment No symptoms 05/21/24 2233   Infiltration Assessment 0 05/21/24 2233   Dressing Status Clean, dry & intact 05/21/24 2233   Dressing Type Transparent 05/21/24 2233       Ambulatory Status:  No data recorded    Diagnosis:  DISPOSITION Admitted 05/21/2024 09:27:44 PM   Final diagnoses:   None        Consults:  IP CONSULT TO SOCIAL WORK  IP CONSULT TO PODIATRY  PHARMACY TO DOSE VANCOMYCIN  PHARMACY TO DOSE  VANCOMYCIN  IP CONSULT TO GENERAL SURGERY  IP CONSULT TO ORTHOPEDIC SURGERY  IP CONSULT TO PHARMACY     Pain Score:  Pain Assessment  Pain Assessment: 0-10  Pain Level: 6  Pain Location: Foot    C-SSRS:   Risk of Suicide: No Risk    Sepsis Screening:  Sepsis Risk Score: 10.07    [place] Fall Risk:       Swallow Screening        Preferred Language:   English      ALLERGIES     Influenza vaccines, Magnesium-containing compounds, Pneumococcal vaccines, and Spironolactone    SURGICAL HISTORY       Past Surgical History:   Procedure Laterality Date    ANKLE SURGERY      ball joint    BACK SURGERY  1994    CARDIAC CATHETERIZATION  2005    1 STENT PLACED    CARDIAC CATHETERIZATION  5/9/13    [facility]    CORONARY ANGIOPLASTY WITH STENT PLACEMENT      DIAGNOSTIC CARDIAC CATH LAB PROCEDURE  11/18/2021    FOREARM SURGERY      left    FRACTURE SURGERY Right 2000    LEG    KNEE SURGERY      left    LEG SURGERY      LEG SURGERY Left 10/10/2022    Left  Above Knee Amputation performed by [facility] OR    LUMBAR SPINE SURGERY      TOE SURGERY      left middle toe    TYMPANOSTOMY TUBE PLACEMENT  1979       PAST MEDICAL HISTORY       Past Medical History:   Diagnosis Date    CAD (coronary artery disease)     COPD (chronic obstructive pulmonary disease) (HCC)     Diabetes mellitus (HCC)     Hx of blood clots 1996    LEFT LEG    Hyperlipidemia     Hypertension     Leg wound, left     Leg wound, right     Lumbar radiculopathy     Osteoarthritis     Seizure disorder (HCC)     Spinal stenosis, lumbar            Electronically signed by Luciana Kaye RN on 5/21/2024 at 10:37 PM

## 2024-05-22 NOTE — PROGRESS NOTES
[facility]   Pharmacy Pharmacokinetic Monitoring Service - Vancomycin     Ruben Zendejas is a 69 y.o. male starting on vancomycin therapy for sepsis of unknown etiology. Pharmacy consulted by Dr Kee for monitoring and adjustment.    Target Concentration: Goal AUC/HALLEY 400-600 mg*hr/L    Additional Antimicrobials: Zosyn,clindamycin    Pertinent Laboratory Values:   Wt Readings from Last 1 Encounters:   05/22/24 107.7 kg (237 lb 7 oz)     Temp Readings from Last 1 Encounters:   05/22/24 97.6 °F (36.4 °C) (Oral)     Estimated Creatinine Clearance: 84 mL/min (based on SCr of 1.1 mg/dL).  Recent Labs     05/21/24  1900   CREATININE 1.1   BUN 31*   WBC 17.2*     Pertinent Cultures:  Date Source Results   5/21/24 Blood x 2 Pending   MRSA Nasal Swab:  previously ordered. Await results    Plan:  Dosing recommendations based on Bayesian software  Vancomycin 2500mg IV x 1 dose previously given in ED this morning. Will start vancomycin 1000 IV Q12H  Anticipated AUC of 489 and trough concentration of 15.3 at steady state  Renal labs as indicated   Vancomycin concentration ordered for tomorrow morning @ 0600   Pharmacy will continue to monitor patient and adjust therapy as indicated    Thank you for the consult,  Jenny Delgado, PharmD, BCPS 5/22/2024 5:26 AM

## 2024-05-22 NOTE — SIGNIFICANT EVENT
MRI lumbar spine with and without contrast showing Unchanged severe spinal canal stenosis at L2-L3 due to severe L3 compression deformity with associated osseous retropulsion and retrolisthesis of L3. Unchanged moderate-to-severe bilateral neural foraminal narrowing at L4-L5 with flattening of the exiting L4 nerve roots.    Patient has been given 10 mg Decadron IV, and will be started on 4 mg daily, orthospine has been consulted and made aware of the case and plan to see him in the morning.    Patient reports he is not taking any blood thinners including Eliquis or Plavix since leaving [facility] 5/14.    Urinalysis positive for yeast, patient started on Diflucan given severity of his acute disease.

## 2024-05-22 NOTE — CONSULTS
Inpatient Consultation    Ruben FRANZ JR. [place] (1955)  5/22/2024    Reason for Consult:  cauda equina compression  Requesting Physician: Eric Kee MD     CHIEF COMPLAINT:  Foot injury, fatigue    History Obtained From:  patient, electronic medical record    HISTORY OF PRESENT ILLNESS:                The patient is a 69 y.o. male who presents with above chief complaint.  Patient presented to the ED 5/21 by EMS for right foot gangrene, generalized weakness and tachycardia. Patient admitted for right foot gangrene with sepsis and cauda equina compression. Patient recently transferred to [facility] on 5/9 for similar issues. He apparently signed out AMA and refused right toe amputation. He returned to the ED with worsening bowel and bladder incontinence, saddle paresthesias and LE paresthesias. Repeat MRI imaging showed a stable exam. Patient drowsy this am and somewhat answering questions but falls back asleep.     Past Medical History:        Diagnosis Date    CAD (coronary artery disease)     COPD (chronic obstructive pulmonary disease) ([facility])     Diabetes mellitus ([facility])     Hx of blood clots 1996    LEFT LEG    Hyperlipidemia     Hypertension     Leg wound, left     Leg wound, right     Lumbar radiculopathy     Osteoarthritis     Seizure disorder (HCC)     Spinal stenosis, lumbar      Past Surgical History:        Procedure Laterality Date    ANKLE SURGERY      ball joint    BACK SURGERY  1994    CARDIAC CATHETERIZATION  2005    1 STENT PLACED    CARDIAC CATHETERIZATION  5/9/13    [facility]    CORONARY ANGIOPLASTY WITH STENT PLACEMENT      DIAGNOSTIC CARDIAC CATH LAB PROCEDURE  11/18/2021    FOREARM SURGERY      left    FRACTURE SURGERY Right 2000    LEG    KNEE SURGERY      left    LEG SURGERY      LEG SURGERY Left 10/10/2022    Left  Above Knee Amputation performed by Brooks John MD at [facility] OR    LUMBAR SPINE SURGERY      TOE SURGERY      left middle toe    TYMPANOSTOMY TUBE PLACEMENT  1979      Current Medications:   Current Facility-Administered Medications: nicotine (NICODERM CQ) 21 MG/24HR 1 patch, 1 patch, TransDERmal, Daily  lidocaine (XYLOCAINE) 2 % uro-jet, , Topical, PRN  vancomycin (VANCOCIN) 1,000 mg in sodium chloride 0.9 % 250 mL IVPB ([id number]), 1,000 mg, IntraVENous, Q12H  fluconazole (DIFLUCAN) in 0.9 % sodium chloride IVPB 200 mg, 200 mg, IntraVENous, Q24H  albuterol sulfate HFA (PROVENTIL;VENTOLIN;PROAIR) 108 (90 Base) MCG/ACT inhaler 2 puff, 2 puff, Inhalation, TID RT  sodium chloride flush 0.9 % injection 5-40 mL, 5-40 mL, IntraVENous, 2 times per day  sodium chloride flush 0.9 % injection 5-40 mL, 5-40 mL, IntraVENous, PRN  0.9 % sodium chloride infusion, , IntraVENous, PRN  acetaminophen (TYLENOL) tablet 650 mg, 650 mg, Oral, Q6H PRN **OR** acetaminophen (TYLENOL) suppository 650 mg, 650 mg, Rectal, Q6H PRN  [COMPLETED] piperacillin-tazobactam (ZOSYN) 4,500 mg in sodium chloride 0.9 % 100 mL IVPB (mini-bag), 4,500 mg, IntraVENous, Once **AND** piperacillin-tazobactam (ZOSYN) 4,500 mg in sodium chloride 0.9 % 100 mL IVPB (mini-bag), 4,500 mg, IntraVENous, Q8H  clindamycin (CLEOCIN) 900 mg in dextrose 5 % 50 mL IVPB, 900 mg, IntraVENous, Q8H  vancomycin (VANCOCIN) intermittent dosing (placeholder), , Other, RX Placeholder  glucose chewable tablet 16 g, 4 tablet, Oral, PRN  dextrose bolus 10% 125 mL, 125 mL, IntraVENous, PRN **OR** dextrose bolus 10% 250 mL, 250 mL, IntraVENous, PRN  glucagon injection 1 mg, 1 mg, SubCUTAneous, PRN  dextrose 10 % infusion, , IntraVENous, Continuous PRN  isosorbide mononitrate (IMDUR) extended release tablet 30 mg, 30 mg, Oral, Daily  nitroGLYCERIN (NITROSTAT) SL tablet 0.4 mg, 0.4 mg, SubLINGual, Q5 Min PRN  albuterol sulfate HFA (PROVENTIL;VENTOLIN;PROAIR) 108 (90 Base) MCG/ACT inhaler 2 puff, 2 puff, Inhalation, Q4H PRN  gabapentin (NEURONTIN) capsule 100 mg, 100 mg, Oral, BID  traZODone (DESYREL) tablet 50 mg, 50 mg, Oral, Nightly  atorvastatin  107.7 kg (237 lb 7 oz)   SpO2 93%   BMI 31.33 kg/m²   CONSTITUTIONAL:  sleeping, ill-appearing, awakens to voice but falls back asleep  HEAD: atraumatic, normocephalic  LUNGS:  No respiratory distress. CTA bilaterally per H&P  CARDIOVASCULAR: tachycardia, afib RVR  MUSCULOSKELETAL: Left AKA, necrotic wound right foot with weeping wounds on lower leg.  NEUROLOGIC: Decreased sensation right LE    DATA:    CBC:   Lab Results   Component Value Date/Time    WBC 22.7 05/22/2024 06:22 AM    RBC 5.28 05/22/2024 06:22 AM    RBC 5.49 02/02/2012 11:40 AM    HGB 9.0 05/22/2024 06:22 AM    HGB 16.9 02/02/2012 11:40 AM    HCT 35.3 05/22/2024 06:22 AM    MCV 66.9 05/22/2024 06:22 AM    MCH 17.0 05/22/2024 06:22 AM    MCHC 25.5 05/22/2024 06:22 AM    RDW 17.5 02/23/2024 06:30 PM     05/22/2024 06:22 AM    MPV 9.9 05/22/2024 06:22 AM     WBC:    Lab Results   Component Value Date/Time    WBC 22.7 05/22/2024 06:22 AM     Hemoglobin/Hematocrit:    Lab Results   Component Value Date/Time    HGB 9.0 05/22/2024 06:22 AM    HGB 16.9 02/02/2012 11:40 AM    HCT 35.3 05/22/2024 06:22 AM     CMP:    Lab Results   Component Value Date/Time     05/22/2024 04:50 AM    K 5.0 05/22/2024 04:50 AM    K 3.6 04/15/2024 06:31 AM     05/22/2024 04:50 AM    CO2 20 05/22/2024 04:50 AM    BUN 30 05/22/2024 04:50 AM    CREATININE 1.0 05/22/2024 04:50 AM    GFRAA >60 03/18/2021 12:01 PM    LABGLOM 82 05/22/2024 04:50 AM    LABGLOM 82 04/17/2024 06:50 AM    GLUCOSE 157 05/22/2024 04:50 AM    GLUCOSE 98 02/23/2024 06:30 PM    CALCIUM 8.1 05/22/2024 04:50 AM    BILITOT 1.2 05/21/2024 07:00 PM    ALKPHOS 183 05/21/2024 07:00 PM     05/21/2024 07:00 PM     05/21/2024 07:00 PM         Radiology:   MRI Lumbar Spine dated 5/10/2024  MRI Lumbar Spine dated 5/22/2024  IMPRESSION:  1. Unchanged severe spinal canal stenosis at L2-L3 due to severe L3   compression deformity with associated osseous retropulsion and   retrolisthesis of

## 2024-05-22 NOTE — PALLIATIVE CARE
Initial Evaluation        Patient:   Ruben FRANZ JR. [place]  YOB: 1955  Age:  69 y.o.  Room:  [facility]35/035-A  MRN:  [id number]   Acct: [id number]    Date of Admission:  5/21/2024  6:44 PM  Date of Service:  5/22/2024  Completed By:  Sera Beltran RN                 Reason for Palliative Care Evaluation:-               [] Code Status Discussion              [x] Goals of Care              [] Pain/Symptom Management               [] Emotional Support              [] Other:                    Current Issues:-   []  Pain  [x]  Fatigue  []  Nausea  []  Anxiety  []  Depression  [x]  Shortness of Breath  []  Constipation  []  Appetite  []  Other:              Advance Directives:-    [] [place] DNR Form  [x] Living Will  [x] Medical POA              Current Code Status:-     [x] Full Resuscitation  [] DNR-Comfort Care-Arrest  [] DNR-Comfort Care       [] Limited Resuscitation             [] No CPR            [] No shock            [] No ET intubation/reintubation            [] No resuscitative medications            [] Other limitation:               Palliative Performance Status:-      [] 100%  Full ambulation; normal activity and work; no evidence of disease; able to do own self care; normal intake; fully conscious     [] 90%   Full ambulation; normal activity and work; some evidence of disease; able to do own self care; normal intake; fully conscious    [] 80%   Full ambulation; normal activity with effort; some evidence of disease; able to do own self care; normal or reduced intake; fully conscious    [] 70%  Ambulation reduced; unable to perform normal job/work; significant  disease; able to do own self care; normal or reduced intake; fully conscious    [] 60%  Ambulation reduced; Significant disease;Can't do hobbies/housework; intake normal or reduced; occasional assist; LOC full/confusion    [] 50%  Mainly sit/lie; Extensive disease; Can't do any work; Considerable assist; intake normal or  reduced; LOC full/confusion    [x] 40%  Mainly in bed; Extensive disease; Mainly assist; intake normal or reduced; LOC full/confusion     [] 30%  Bed Bound; Extensive disease; Total care; intake reduced; LOC full/confusion    [] 20%  Bed Bound; Extensive disease; Total care; intake minimal; Drowsy/coma    [] 10%  Bed Bound; Extensive disease; Total care; Mouth care only; Drowsy/coma               Goals of care evaluation:-          Goals of care discussed with:  [] Patient independently  [x] Patient and Family - Kimmie ZALDIVAR at bedside  [] Family or Healthcare DPOA independently  [] Unable to discuss with patient, family/DPOA not present      History:-    Patient into ED due to fatigue, incontinence, foot issues. Patient was just admitted to [facility] 5/10 after being transferred from [facility] ED. Patient left [facility] AMA 5/14. 5/9 patient presented to the ED with complaints of abdominal distention and incontinence. Patient was found to have what appeared to be a L2 compression fx with retropulsion and severe spinal canal narrowing. Highly suspicious for cauda equina syndrome. Spine neuro recommended transfer to [facility].   Patient continues to have the same complaints. Spinal surgery consulted. Patient also with right foot gangrene with sepsis. Patient had recent angioplasty, right great toe is necrotic. Per Dr Enciso's note, will need AKA. Podiatry consulted. Stage IV decubitus ulcer, was filled with fecal matter upon exam on admission. Per Dr Enciso's note will need diverting colostomy. Per Dr Enciso's note, long term prognosis is poor. ECHO 4/14/2024 EF 25-30.   PMH includes CAD, PAD, hypertension, hyperlipidemia, COPD?, HF, PAF, DM. Patient is from home with his significant other.        Family/Patient Discussion:-    In room to speak with patient/family. Patient resting in bed, did not participate in conversation much. reggie Burks/ZEN, at bedside. Kimmie updating this RN on the struggles she has faced with the patient. Kimmie

## 2024-05-22 NOTE — PROGRESS NOTES
diabetes with peripheral neuropathy  Last A1c 6.8 on 4/14/2024  Diabetic diet, hypoglycemia protocol, gabapentin 100 mg twice daily  Elevated troponin  Elevated troponin POA, troponin was 30 on 5/21  Repeat troponin pending, patient is in A-fib with RVR, pulmonary vascular congestion seen on CXR completed 5/22,  It appears patient has baseline elevation in troponin, most recent troponin was 27 on 2/23, continue telemetry and monitor for ACS  Cardiology consulted  Questionable history of COPD  No formal PFT, chronic smoker  Per chart review patient had bedside spirometry 2014, which showed restrictive lung disease  Follow-up with pulmonology  CAD  History of coronary artery stenting, most recent heart cath 2021  PAD status post left AKA  Chart review patient supposed to be on Plavix, however patient left AMA from [facility] ICU, has not been on Plavix since discharge  HTN  History of hypertension, hold home blood pressure medications, blood pressure are low  Being transferred to ICU for blood pressure management and diuresis  Hyperlipidemia  On Lipitor at home, patient has elevated LFT?  Secondary to hepatic congestion  Continue to monitor, consider discontinuing Lipitor if LFTs worsen.  Macrocytic anemia  Chronic microcytic anemia, baseline appears to be around 9  H&H stable on 5/22, H&H 8.8/33.8, continue to monitor and replete if Hgb falls below 7 or if patient becomes symptomatic  Continue to monitor with daily CBC  Obesity  Body mass index is 31.33 kg/m².   Chronic debility  Limited physical activity, left AKA, chronic decubitus ulcer  Likely benefit from SNF placement upon discharge  Goals of care discussion   palliative care consulted for further evaluation, patient CODE STATUS was changed to limited with no intubation, patient is okay with chest compressions, cardioversion another lifesaving measure  Noncompliance  Left AMA from [facility] ICU on 5/14/2024      Chief Complaint: Foot injury and fatigue    Hospital Course:  from lower extremity Doppler, patient was unable to tolerate CTA as he was not able to lay flat.  Had lengthy discussion about the poor prognosis of patient's medical condition with patient and partner.  Patient refused ICU transfer initially secondary to fiancé not being able to stay with him bedside, patient was concerned about not being able to smoke while on hospital grounds.  Concerns for lack of insight both from patient and significant other.  Significant other repeatedly stated that if she was not allowed to be in the ICU that patient should not be transferred as he would not accept, however patient did not explicitly states that his reasoning states that he does not want tubes down his throat, that he is okay with chest compressions, cardioversion and other medical treatment if necessary.  Therefore CODE STATUS was changed to limited with no intubation.  Patient was willing to be transferred to the ICU, discussed plan of care with ICU attending.  Patient was transferred to ICU.      PMH, SURGICAL HX, FH, SOCIAL HX reviewed and updated as needed.    Medications:  Reviewed    Infusion Medications    amiodarone 1 mg/min (05/22/24 0973)    Followed by    amiodarone      sodium chloride      dextrose      heparin (PORCINE) Infusion 16 Units/kg/hr (05/22/24 1423)     Scheduled Medications    nicotine  1 patch TransDERmal Daily    vancomycin  1,000 mg IntraVENous Q12H    fluconazole  200 mg IntraVENous Q24H    albuterol sulfate HFA  2 puff Inhalation TID RT    bumetanide  0.5 mg IntraVENous Once    midodrine  10 mg Oral TID WC    sodium chloride flush  5-40 mL IntraVENous 2 times per day    piperacillin-tazobactam  4,500 mg IntraVENous Q8H    clindamycin (CLEOCIN) IV  900 mg IntraVENous Q8H    vancomycin (VANCOCIN) intermittent dosing (placeholder)   Other RX Placeholder    [Held by provider] isosorbide mononitrate  30 mg Oral Daily    gabapentin  100 mg Oral BID    traZODone  50 mg Oral Nightly    atorvastatin   right lower lobe.   2. Moderate bilateral pleural effusions. Small ascites. Anasarca.      This document has been electronically signed by: Surya Avendano MD on    05/22/2024 03:11 AM      All CTs at this facility use dose modulation techniques and iterative    reconstructions, and/or weight-based dosing   when appropriate to reduce radiation to a low as reasonably achievable.      MRI LUMBAR SPINE W WO CONTRAST   Final Result   1. Unchanged severe spinal canal stenosis at L2-L3 due to severe L3    compression deformity with associated osseous retropulsion and    retrolisthesis of L3.   2. Unchanged moderate-to-severe bilateral neural foraminal narrowing at    L4-L5 with flattening of the exiting L4 nerve roots.      This document has been electronically signed by: Rowdy Gotti MD on    05/22/2024 02:54 AM      XR CHEST PORTABLE   Final Result   1. Cardiomegaly with findings of moderate interstitial and alveolar edema    with associated pleural effusions.      This document has been electronically signed by: Rowdy Gotti MD on    05/21/2024 10:39 PM      MRI FOOT RIGHT W WO CONTRAST    (Results Pending)   CTA CHEST W WO CONTRAST    (Results Pending)       Diet: ADULT DIET; Regular; 3 carb choices (45 gm/meal); Low Fat/Low Chol/High Fiber/KRANTHI; 1500 ml    Microbiology: yes -pending cultures, MRSA positive  Antibiotics: yes -vancomycin, clindamycin, Zosyn and fluconazole    Steroids: yes -dexamethasone    Telemetry: [x]Yes / []No  Telemetry Review of past 24 hours:  A-fib with RVR    LDA: []CVC / []PICC / []Midline / [x]Olvera / []Drains / []Mediport / [x]PIV / []None    Labs (still needed?): [x]Yes / []No  IVF (still needed?): []Yes / [x]No    Level of care: [x]Step Down / []Med-Surg  Bed Status: [x]Inpatient / []Observation    DVT Prophylaxis: [] Lovenox / [x] Heparin / [] SCDs / [] Already on Systemic Anticoagulation / [] None     PT/OT: []Yes / [x]No    Disposition:    [] Home       [] TCU       [] Rehab       []

## 2024-05-22 NOTE — PLAN OF CARE
Problem: Discharge Planning  Goal: Discharge to home or other facility with appropriate resources  Outcome: Progressing  Flowsheets (Taken 5/22/2024 0521)  Discharge to home or other facility with appropriate resources:   Identify barriers to discharge with patient and caregiver   Identify discharge learning needs (meds, wound care, etc)   Refer to discharge planning if patient needs post-hospital services based on physician order or complex needs related to functional status, cognitive ability or social support system   Arrange for needed discharge resources and transportation as appropriate     Problem: Pain  Goal: Verbalizes/displays adequate comfort level or baseline comfort level  Outcome: Progressing  Flowsheets (Taken 5/22/2024 0521)  Verbalizes/displays adequate comfort level or baseline comfort level:   Encourage patient to monitor pain and request assistance   Administer analgesics based on type and severity of pain and evaluate response   Consider cultural and social influences on pain and pain management   Implement non-pharmacological measures as appropriate and evaluate response   Assess pain using appropriate pain scale  Note: Ongoing assessment & interventions provided throughout shift.  Reminded patient to report any pain, pressure, or shortness of breath to the nurse.  Pain medications provided per physician's orders.      Problem: Safety - Adult  Goal: Free from fall injury  Outcome: Progressing  Flowsheets (Taken 5/22/2024 0521)  Free From Fall Injury: Instruct family/caregiver on patient safety  Note: Bed locked & in low position, call light in reach, side-rails up x2, bed/chair alarm utilized, non-slip socks on when ambulating, reminded patient to use call light to call for assistance.      Problem: ABCDS Injury Assessment  Goal: Absence of physical injury  Outcome: Progressing  Flowsheets (Taken 5/22/2024 0521)  Absence of Physical Injury: Implement safety measures based on patient  assessment  Note: Bed locked & in low position, call light in reach, side-rails up x2, bed/chair alarm utilized, non-slip socks on when ambulating, reminded patient to use call light to call for assistance.      Problem: Risk for Elopement  Goal: Patient will not exit the unit/facility without proper excort  Outcome: Progressing  Flowsheets (Taken 5/21/2024 4290)  Nursing Interventions for Elopement Risk:   Assist with personal care needs such as toileting, eating, dressing, as needed to reduce the risk of wandering   Collaborate with family members/caregivers to mitigate the elopement risk     Problem: Skin/Tissue Integrity  Goal: Absence of new skin breakdown  Description: 1.  Monitor for areas of redness and/or skin breakdown  2.  Assess vascular access sites hourly  3.  Every 4-6 hours minimum:  Change oxygen saturation probe site  4.  Every 4-6 hours:  If on nasal continuous positive airway pressure, respiratory therapy assess nares and determine need for appliance change or resting period.  Outcome: Progressing  Note: Ongoing assessment & interventions provided throughout shift.  Skin assessments provided.  Encouraging/assisting patient to turn as needed.      Problem: Chronic Conditions and Co-morbidities  Goal: Patient's chronic conditions and co-morbidity symptoms are monitored and maintained or improved  Outcome: Progressing  Flowsheets (Taken 5/22/2024 0521)  Care Plan - Patient's Chronic Conditions and Co-Morbidity Symptoms are Monitored and Maintained or Improved:   Monitor and assess patient's chronic conditions and comorbid symptoms for stability, deterioration, or improvement   Collaborate with multidisciplinary team to address chronic and comorbid conditions and prevent exacerbation or deterioration   Update acute care plan with appropriate goals if chronic or comorbid symptoms are exacerbated and prevent overall improvement and discharge     Problem: Nutrition Deficit:  Goal: Optimize nutritional

## 2024-05-22 NOTE — H&P
Internal Medicine Resident History and Physical          Name: Ruben Zendejas, male, : 1955, MRN: [id number]    PCP: Fred Evans MD    Date of Admission: 2024  Date of Service: Pt seen/examined on 24  and Admitted to Inpatient with expected LOS greater than two midnights due to medical therapy.     Assessment and Plan:  Concern for cauda equina syndrome: Patient was recently hospitalized at [facility] ICU secondary to difficulty with urinating and defecating, MRI showed lumbar spine L2 compression fracture with retropulsion and severe spinal canal stenosis 2024, patient left AMA at that time.  Presenting with similar complaints, urinary and fecal incontinence, saddle paresthesia, weakness in lower extremities, urinary retention.  Stat MRI lumbar spine  Consult spinal surgery  Decadron 10 mg now, 4 mg daily  Right foot gangrene, with sepsis: Possible nidus for sepsis, recent angioplasty right extremity right great toe completely necrotic, outside images showing gangrene.  Patient was given 2 L normal saline bolus in the ED  Started on vancomycin  Started on Zosyn  Started on clindamycin  Blood cultures pending  Will hold off on IV fluids for now as patient is in acute heart failure exacerbation EF 25 to 30%, will reassess in the morning need for IV fluids  Trend lactate  Monitor patient closely, high risk for ICU level care  MRI foot  Wound care  Podiatry consulted and contacted  Stage IV decubitus ulcer, with sepsis: Nonweightbearing possible source of sepsis, was filled with fecal material on exam.  Started on vancomycin  Started on Zosyn  Consider consult general surgery possible need for debridement  Stat MRI lumbar spine  Wound care  HFrEF in acute exacerbation: BNP 10,000.  Last echo 2024 showing ejection fraction 25 to 30%.  Positive for orthopnea, dyspnea with exertion, right lower extremity swelling (AKA left lower extremity).  +2 pitting edema right lower  WO CONTRAST    (Results Pending)          Past Social History  The patient currently lives at home.   Tobacco use:   reports that he has been smoking cigarettes. He has a 20.0 pack-year smoking history. He has been exposed to tobacco smoke. He has never used smokeless tobacco.  Alcohol use:   reports no history of alcohol use.  Drug use:  reports no history of drug use.     Medical Hx      Diagnosis Date    CAD (coronary artery disease)     COPD (chronic obstructive pulmonary disease) (HCC)     Diabetes mellitus (HCC)     Hx of blood clots 1996    LEFT LEG    Hyperlipidemia     Hypertension     Leg wound, left     Leg wound, right     Lumbar radiculopathy     Osteoarthritis     Seizure disorder (HCC)     Spinal stenosis, lumbar        Surgical Hx      Procedure Laterality Date    ANKLE SURGERY      ball joint    BACK SURGERY  1994    CARDIAC CATHETERIZATION  2005    1 STENT PLACED    CARDIAC CATHETERIZATION  5/9/13    [facility]    CORONARY ANGIOPLASTY WITH STENT PLACEMENT      DIAGNOSTIC CARDIAC CATH LAB PROCEDURE  11/18/2021    FOREARM SURGERY      left    FRACTURE SURGERY Right 2000    LEG    KNEE SURGERY      left    LEG SURGERY      LEG SURGERY Left 10/10/2022    Left  Above Knee Amputation performed by Brooks John MD at [facility] OR    LUMBAR SPINE SURGERY      TOE SURGERY      left middle toe    TYMPANOSTOMY TUBE PLACEMENT  1979       Family Hx      Problem Relation Age of Onset    Diabetes Mother     Kidney Disease Mother     COPD Father     Heart Disease Father     Cancer Father         bone    Stroke Father     Osteoarthritis Sister     Kidney Disease Sister     Heart Disease Sister     Diabetes Brother              PT/OT Eval Status:  will be assessed  Diet: Diet NPO  DVT prophylaxis: Heparin drip  Code Status: Full Code  Disposition: admit to inpatient    Thank you Fred Evans MD for the opportunity to be involved in this patient's care.    Electronically signed by Eric Kee MD on  5/22/2024 at 7:15 AM.     Case discussed with Attending, Neelam Wilder MD  .

## 2024-05-22 NOTE — CONSULTS
CRITICAL CARE CONSULT NOTE      Patient:  Ruben FRANZ JR. [place]    Unit/Bed:4B-05/005-A  YOB: 1955  MRN: [id number]   PCP: Fred Evans MD  Date of Admission: 5/21/2024  Chief Complaint:-LE weakness, fecal and urinary incontinence.  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   Leukocytosis   Pt presenting with worsening symptoms of LE weakness, and R toe pain. Pt was recently at [facility] for similar complaints and left AMA, he has an ischial wound and due to his lumbar stenosis is having urinary and fecal incontinence worsened by his chronic debility requiring 24/7 in wheelchair.   On arrival WBC 17.2k, afebrile, in afib with RVR rate of 130s.  LA 4.5, rising.   BC Ng24hr.   Urine Cx pending.   - Pt fluid overloaded, cannot give further fluids, treating as below.   - On IV antibiotics: Clindamycin, fluconazole,Vancomycin and Zosyn. ID following.   - PNA panel ordered.   - Will likely need diverting colostomy for full healing of ischial wound.   - Podiatry on board: Changing dressings daily: Planning for amputation, pt at this time refusing  R BKA only wants R foot toe amputations.   - MRI R foot w/wo ordered.   - Gen surg consult ordered, unsure if physician contacted. Confirm in am.     AHRF 2/2 multifactorial CHF exacerbation + PNA + Afib w/ RVR:   Pt SOB on arrival, was started on 2L NC, which was increased to 15 L NC. Pt refused BiPAP and HFNC, he was initially Limited x1 w/o intubation. After long conversation with fiance and pt, he wants to change to full code and is ok with BiPAP, HFNC or intubation.   - Treating as below.     Acute on chronic CHF exacerbation: severe ICM   TTE 4/2024: Severely reduced LV systolic function.  EF 25 to 30%.  Moderately reduced RV systolic function.  Mild MR.  GDMT:

## 2024-05-23 NOTE — PLAN OF CARE
Problem: Discharge Planning  Goal: Discharge to home or other facility with appropriate resources  Outcome: Progressing  Flowsheets (Taken 5/22/2024 0958 by Rosmery Watkins, RN)  Discharge to home or other facility with appropriate resources: Identify barriers to discharge with patient and caregiver  Note: Pt not appropriate for discharge at this time, will continue to monitor and reassess.        Problem: Pain  Goal: Verbalizes/displays adequate comfort level or baseline comfort level  Outcome: Progressing  Flowsheets (Taken 5/22/2024 0958 by Rosmery Watkins, RN)  Verbalizes/displays adequate comfort level or baseline comfort level: Encourage patient to monitor pain and request assistance  Note: Pt has no complaints of pain at this time. Pain medication management provided. Will continue to monitor and reassess.        Problem: Safety - Adult  Goal: Free from fall injury  Outcome: Progressing  Flowsheets (Taken 5/22/2024 0957 by Rosmery Watkins, RN)  Free From Fall Injury: Instruct family/caregiver on patient safety  Note: Pt safety education reinforced.       Problem: ABCDS Injury Assessment  Goal: Absence of physical injury  Outcome: Progressing  Flowsheets (Taken 5/22/2024 0957 by Rosmery Watkins, RN)  Absence of Physical Injury: Implement safety measures based on patient assessment     Problem: Skin/Tissue Integrity  Goal: Absence of new skin breakdown  Description: 1.  Monitor for areas of redness and/or skin breakdown  2.  Assess vascular access sites hourly  3.  Every 4-6 hours minimum:  Change oxygen saturation probe site  4.  Every 4-6 hours:  If on nasal continuous positive airway pressure, respiratory therapy assess nares and determine need for appliance change or resting period.  Outcome: Progressing  Note: Skin integrity remains CDI, pt turned every 2 hours, heels elevated off bed. Will continue to complete skin assessments. Wounds present.       Problem: Chronic Conditions and Co-morbidities  Goal:

## 2024-05-23 NOTE — PROCEDURES
PROCEDURE NOTE  Date: 5/23/2024   Name: Ruben FRANZ JR. [place]  YOB: 1955    Central Line    Date/Time: 5/23/2024 3:24 AM    Performed by: Kelly Lorenzo MD  Authorized by: Kelly Lorenzo MD  Consent: Verbal consent obtained.  Risks and benefits: risks, benefits and alternatives were discussed  Consent given by: patient and spouse  Patient understanding: patient states understanding of the procedure being performed  Patient consent: the patient's understanding of the procedure matches consent given  Procedure consent: procedure consent matches procedure scheduled  Relevant documents: relevant documents present and verified  Test results: test results available and properly labeled  Site marked: the operative site was marked  Imaging studies: imaging studies available  Required items: required blood products, implants, devices, and special equipment available  Patient identity confirmed: verbally with patient and arm band  Time out: Immediately prior to procedure a \"time out\" was called to verify the correct patient, procedure, equipment, support staff and site/side marked as required.  Indications: vascular access    Anesthesia:  Local Anesthetic: lidocaine 1% with epinephrine    Sedation:  Patient sedated: yes  Sedatives: see MAR for details  Analgesia: see MAR for details    Preparation: skin prepped with 2% chlorhexidine  Skin prep agent dried: skin prep agent completely dried prior to procedure  Sterile barriers: all five maximum sterile barriers used - cap, mask, sterile gown, sterile gloves, and large sterile sheet  Hand hygiene: hand hygiene performed prior to central venous catheter insertion  Location details: right internal jugular  Patient position: flat  Catheter type: triple lumen  Catheter size: 6 Fr  Pre-procedure: landmarks identified  Ultrasound guidance: yes  Sterile ultrasound techniques: sterile gel and sterile probe covers were used  Number of attempts: 1  Successful  placement: yes  Post-procedure: line sutured and dressing applied  Assessment: blood return through all ports and placement verified by x-ray  Patient tolerance: patient tolerated the procedure well with no immediate complications  Comments: Supervised by Dr. Juárez at bedside.

## 2024-05-23 NOTE — PROCEDURES
PROCEDURE NOTE  Date: 5/23/2024   Name: Ruben [place]  YOB: 1955    Insert Arterial Line    Date/Time: 5/23/2024 4:09 AM    Performed by: Yung Juárez MD  Authorized by: Yung Juárez MD  Consent: Verbal consent obtained.  Risks and benefits: risks, benefits and alternatives were discussed  Consent given by: patient  Patient understanding: patient states understanding of the procedure being performed  Site marked: the operative site was marked  Required items: required blood products, implants, devices, and special equipment available  Patient identity confirmed: arm band  Time out: Immediately prior to procedure a \"time out\" was called to verify the correct patient, procedure, equipment, support staff and site/side marked as required.  Preparation: Patient was prepped and draped in the usual sterile fashion.  Indications: multiple ABGs, respiratory failure and hemodynamic monitoring  Location: left radial  Anesthesia: see MAR for details    Anesthesia:  Local Anesthetic: lidocaine 1% without epinephrine  Linden's test normal: yes  Needle gauge: 20  Seldinger technique: Seldinger technique used  Number of attempts: 1  Post-procedure: line sutured and dressing applied  Post-procedure CMS: normal  Patient tolerance: patient tolerated the procedure well with no immediate complications        Electronically signed by Yung Juárez MD on 5/23/24 at 4:11 AM EDT

## 2024-05-23 NOTE — PROCEDURES
PROCEDURE NOTE  Date: 5/23/2024   Name: Ruben [place]  YOB: 1955    Intubation    Date/Time: 5/23/2024 3:22 AM    Performed by: Kelly Lorenzo MD  Authorized by: Kelly Lorenzo MD  Consent: Verbal consent obtained.  Risks and benefits: risks, benefits and alternatives were discussed  Consent given by: patient and spouse  Patient understanding: patient states understanding of the procedure being performed  Patient consent: the patient's understanding of the procedure matches consent given  Procedure consent: procedure consent matches procedure scheduled  Relevant documents: relevant documents present and verified  Test results: test results available and properly labeled  Site marked: the operative site was marked  Imaging studies: imaging studies available  Required items: required blood products, implants, devices, and special equipment available  Patient identity confirmed: verbally with patient and arm band  Indications: respiratory distress, respiratory failure and hypoxemia  Intubation method: fiberoptic oral  Patient status: sedated  Preoxygenation: BVM  Pretreatment medications: fentanyl  Sedatives: fentanyl  Paralytic: none  Laryngoscope size: Weathers 3  Tube size: 7.5 mm  Tube type: cuffed  Number of attempts: 1  Cords visualized: yes  Post-procedure assessment: chest rise and ETCO2 monitor  Breath sounds: equal  Cuff inflated: yes  ETT to lip: 24 cm  Tube secured with: ETT flor and adhesive tape  Chest x-ray interpreted by radiologist and me.  Chest x-ray findings: endotracheal tube in appropriate position  Patient tolerance: patient tolerated the procedure well with no immediate complications  Comments: Supervised by Dr. Juárez at bedside.                  Lucks-DO: pt received at sign-out, seen and evaluated at bedside.  Pt sitting comfortably in NAD. Signout pending CT results. CT chest without acute pathology, vital signs non-actionable. Will DC with PMD follow up/return precautions, pt understood and agreeable with plan.

## 2024-05-23 NOTE — PROGRESS NOTES
Pharmacy Medication History Note      List of current medications patient is taking is complete.    Source of information: Outside agency fill    Changes made to medication list:  Medications removed (include reason, ex. therapy complete or physician discontinued):  Atrovent inhaler      Medications added/doses adjusted:  Clopidogrel 75mg PO daily  Trazodone 100mg PO   Spironolactone 25 mg po daily  Furosemide 20mg PO daily  Magnesium oxide 400mg PO daily  Potassium Chloride ER 20mEq po daily    Other notes (ex. Recent course of antibiotics, Coumadin dosing):  Denies use of other OTC or herbal medications.    Allergies reviewed    Electronically signed by Dallin Mcmahan RPH on 5/23/2024 at 8:38 AM

## 2024-05-23 NOTE — PROGRESS NOTES
CRITICAL CARE PROGRESS NOTE      Patient:  Ruben FRANZ JR. [place]    Unit/Bed:4B-05/005-A  YOB: 1955  MRN: [id number]   PCP: Fred Evans MD  Date of Admission: 5/21/2024  Chief Complaint:- LE Weakness, Fecal and urinary incontinence, debility    Assessment and Plan:      Shock, Undifferentiated: Patient was admitted to the ICU for pressor requirements, currently on IV Levophed and IV milrinone. Likely septic shock given his significantly increased SOFA score and multiple infections. However, cannot rule out cardiogenic shock given his initial decompensated HF presentation and severely reduced EF. His lactic acid is increasing and he is developing evidence of end-organ damage (transaminitis, BECCA).  - Overall prognosis remains POOR at this time. This has been discussed with multiple providers in the past.  - Will order VBG to obtain SvO2. This value will help us determine the nature of his shock and appropriate response with regards to fluid status.  - Continue IV Levophed gtt and IV milrinone gtt. Wean pressor support as tolerated.  - Continue IV Vancomycin and IV Zosyn. Clindamycin and Diflucan stopped by ID.  - Trend LA Q2H until < 2.0  - Cardiology and ID following, appreciate recommendations.    Sepsis: SOFA Score = 13, with multiple infection sources identified (RLE cellulitis, ischial wound, pneumonia). Lactic acid increasing, now 6.1 compared to 4.1. Has likely progressed to septic shock given his current pressor requirements and likely development of shock liver.  - See \"Shock\" entry above for additional details.  - Continue IV vancomycin and IV zosyn. Clindamycin and Diflucan stopped by ID.  - ID following, appreciate recommendations.    RLE Cellulitis with Ulcers: Patient has extensive erythema, swelling, and warmth of the RLE consistent with cellulitis. Of note, he has a significant wound on the lateral side of his R leg. Initially presented with symptoms of lower extremity  16    General:  Acute on chronically severely ill-appearing. Intubated and sedated.  HEENT:  normocephalic and atraumatic.  No scleral icterus. PERR  Neck: supple.  No Thyromegaly.  Lungs: Diminished lungs bilaterally. No obvious wheezes, rales, rhonchi noted.  Cardiac: Irregular rate and rhythm.  No JVD.  Abdomen: soft.  Nontender.  Extremities: +2 bilateral RLE edema with significant erythema and warmth. Left AKA.  Vasculature: capillary refill < 3 seconds. Palpable dorsalis pedis pulses.  Skin:  Gangrenous, ischemic right toe, currently wrapped. L lateral wound noted. Erythema and swelling noted on skin of RLE.  Psych: Intubated and sedated.  Lymph:  No supraclavicular adenopathy.  Neurologic:  UE and LE weakness and paresthesia noted. Bowel and urinary incontinence as well.     Data: (All radiographs, tracings, PFTs, and imaging are personally viewed and interpreted unless otherwise noted).   BMP - Na 137, K 5.6, Cl 104, CO2 15, BUN 46, Cr 1.5, glucose 266  CBC - WBC 27.5, Hgb 8.3, Hct 33.4, platelet 458  LFT - Albumin 3.1, , , , total bilirubin 1.2  Iron studies - Ferritin 47, Iron 11, TIBC 345, Folate 11.7, Vitamin B12 904, Iron saturation 3%  HS Troponin 30 -> 29 -> 31  Anion Gap 18, Lactic acid 6.1  Phosphorus 5.7  MRSA screen positive  UA - Glucose >= 1000, trace ketones, protein 30, small LE, WBC 15-25, Many yeast, small LE  CXR - Support devices in appropriate position. Cardiomegaly with small pleural effusions and cardiogenic pulmonary edema similar to previous exam.  Telemetry shows A.Fib with RVR    Seen with multidisciplinary ICU team YES.  Meets Continued ICU Level Care Criteria:    [x] Yes   [] No - Transfer Planned to listed location:  [] HOSPITALIST CONTACTED-      Case and plan discussed with Dr. Hahn.    Electronically signed by Rowdy Soliman DO  CRITICAL CARE SPECIALIST   Patient seen by me including key components of medical care.  Case discussed with resident physician.   On ventilator due to progression of sepsis with increased WOB.  On full ventilator support.  Patient is too ill for weaning.  Need further system control before it becomes safe to wean.  Antibiotics per ID.  Continue with pressors.  CXR consistent with heart failure with right pleural effusion and pulmonary edema.  Diuresing. Baseline EF 30%.  Italicized font, if present,  represents changes to the note made by me.  CC time 35 minutes.  Time was discontiguous. Time does not include procedure. Time does include my direct assessment of the patient and coordination of care.  Time represents more than 50% of the time involved with patient care by the CC team.  Electronically signed by Elliot Hahn MD.

## 2024-05-23 NOTE — PLAN OF CARE
Problem: Risk for Elopement  Goal: Patient will not exit the unit/facility without proper excort  Outcome: Progressing     Problem: Skin/Tissue Integrity  Goal: Absence of new skin breakdown  Description: 1.  Monitor for areas of redness and/or skin breakdown  2.  Assess vascular access sites hourly  3.  Every 4-6 hours minimum:  Change oxygen saturation probe site  4.  Every 4-6 hours:  If on nasal continuous positive airway pressure, respiratory therapy assess nares and determine need for appliance change or resting period.  5/23/2024 1038 by Joann Bennett RN  Outcome: Progressing  5/23/2024 0552 by Jackie Corbin RN  Outcome: Progressing  Note: Skin integrity remains CDI, pt turned every 2 hours, heels elevated off bed. Will continue to complete skin assessments. Wounds present.       Problem: Chronic Conditions and Co-morbidities  Goal: Patient's chronic conditions and co-morbidity symptoms are monitored and maintained or improved  5/23/2024 1038 by Joann Bennett RN  Outcome: Progressing  5/23/2024 0552 by Jackie Corbin RN  Outcome: Progressing  Flowsheets (Taken 5/22/2024 0521 by Loreto Dupont RN)  Care Plan - Patient's Chronic Conditions and Co-Morbidity Symptoms are Monitored and Maintained or Improved:   Monitor and assess patient's chronic conditions and comorbid symptoms for stability, deterioration, or improvement   Collaborate with multidisciplinary team to address chronic and comorbid conditions and prevent exacerbation or deterioration   Update acute care plan with appropriate goals if chronic or comorbid symptoms are exacerbated and prevent overall improvement and discharge     Problem: Nutrition Deficit:  Goal: Optimize nutritional status  5/23/2024 1038 by Joann Bennett RN  Outcome: Progressing  5/23/2024 0552 by Jackie Corbin RN  Outcome: Progressing  Flowsheets (Taken 5/22/2024 0521 by Loreto Dupont RN)  Nutrient intake appropriate for improving, restoring, or maintaining  nutritional needs:   Assess nutritional status and recommend course of action   Monitor oral intake, labs, and treatment plans     Problem: Safety - Medical Restraint  Goal: Remains free of injury from restraints (Restraint for Interference with Medical Device)  Description: INTERVENTIONS:  1. Determine that other, less restrictive measures have been tried or would not be effective before applying the restraint  2. Evaluate the patient's condition at the time of restraint application  3. Inform patient/family regarding the reason for restraint  4. Q2H: Monitor safety, psychosocial status, comfort, nutrition and hydration  Outcome: Progressing  Flowsheets (Taken 5/23/2024 0604 by Jackie Corbin, RN)  Remains free of injury from restraints (restraint for interference with medical device):   Determine that other, less restrictive measures have been tried or would not be effective before applying the restraint   Evaluate the patient's condition at the time of restraint application   Inform patient/family regarding the reason for restraint   Every 2 hours: Monitor safety, psychosocial status, comfort, nutrition and hydration

## 2024-05-23 NOTE — PROGRESS NOTES
Comprehensive Nutrition Assessment    Type and Reason for Visit:  Initial, Positive Nutrition Screen, Wound (new vent)    Nutrition Recommendations/Plan:   If unable to extubate and tubefeeding started would recommend Nepro at 15 ml/hr, increase 10 ml/hr every 6 hours as tolerated to goal of 35 ml/hr with current diprivan rate ~ 1659 kcals (1487 TF, 172 diprivan), 68 grams protein, 134 grams CHO, 21 grams fiber, 611 mls water in 840 mls volume per 24 hours. Additional free water flush per Provider.      Malnutrition Assessment:  Malnutrition Status:  At risk for malnutrition (Comment) (05/23/24 1203)    Context:  Acute Illness     Findings of the 6 clinical characteristics of malnutrition:  Energy Intake:   (unable to assess PTA-oral intake not recorded 5/22/24, pt intubated5/23/24-NPO at present)  Weight Loss:  No significant weight loss (per EMR, of note difficult to assess, CHF, edema)     Body Fat Loss:  No significant body fat loss     Muscle Mass Loss:  No significant muscle mass loss    Fluid Accumulation:  Moderate to Severe Generalized   Strength:  Not Performed    Nutrition Assessment:     Pt. nutritionally compromised AEB NPO d/t intubation 5/23/24.  At risk for further nutrition compromise r/t increased nutrient needs to support wound healing (multiple including gangrenous right foot, stage 4 ischial wound), admit with sepsis, acute on chronic CHF, pneumonia, funguria, hyperkalemia, BECCA, bowel and urinary incontinence, noncompliance,  and underlying medical condition (hx: lf AMA recently from [facility] ICU on 5/14/2024, 10/10/22 lf AKA, seizure disorder, HTN, OA, CAD, COPD, spinal stenosis, HLD, DM, smoking).       Nutrition Related Findings:    Pt. Report/Treatments/Miscellaneous: Extubated 5/23/24.  OGT in place.  Spoke with RN no plans to extubate today, no family at bedside.  Poor prognosis per Providers.  Palliative care consulted.  GI Status: BM 5/22/24  Pertinent Labs: 4/14/24: HgbA1c 6.8%.   5/23/24: Sodium 137, Potassium 5.6, BUN 46, Creatinine 1.5, Glucose 266, Chemstick 243, Phos 5.7  Pertinent Meds: lipitor, cleocin, diflucan, solucortef, humalog, protonix, zosyn, lokelma, vanc, amiodarone, bumex, diprivan, levophed     Wound Type: Multiple (right hand skin tear-stage III, right/left buttocks-unstageable, nectrotic right toe-stage II, tibial -unstable, left knee-stage II)       Current Nutrition Intake & Therapies:    Average Meal Intake: NPO     ADULT DIET; Regular; 3 carb choices (45 gm/meal); Low Fat/Low Chol/High Fiber/KRANTHI; Low Sodium (2 gm); 1500 ml    Anthropometric Measures:  Height: 185.4 cm (6' 1\")  Ideal Body Weight (IBW): 184 lbs (84 kg)    Admission Body Weight: 105.1 kg (231 lb 11.3 oz) (5/21/24 bedscale ansasarca, +2 LE, +3 perineal edema)  Current Body Weight: 107.7 kg (237 lb 7 oz) (5/22/24 bedscale, ansasarca, +2 LE, +3 perineal edema), 129 %   Current BMI (kg/m2): 31.3        Weight Adjustment For: Amputation  Total Adjusted Percentage (Calculated): 10.1  Adjusted Ideal Body Weight (lbs) (Calculated): 165.4 lbs  Adjusted Ideal Body Weight (kg) (Calculated): 75.18 kg  Adjusted % Ideal Body Weight (Calculated): 143.6  Adjusted BMI (kg/m2) (Calculated): 34.5  BMI Categories: Obese Class 1 (BMI 30.0-34.9)    Estimated Daily Nutrient Needs:  Energy Requirements Based On: Kcal/kg  Weight Used for Energy Requirements:  (108 kg)  Energy (kcal/day): ~ 2739-3333 kcal (11-14 kcals/kg/day)  Weight Used for Protein Requirements: Ideal (adjusted IBW for lf AKA-75 kg)  Protein (g/day): ~ 60-90 grams (0.8-1.2 gram/kg adjusted IBW/day) pending renal/wound status     Fluid (ml/day): per Provider    Nutrition Diagnosis:   Inadequate oral intake related to impaired respiratory function as evidenced by NPO or clear liquid status due to medical condition, intubation    Nutrition Interventions:   Food and/or Nutrient Delivery: Continue NPO (If unable to extubate recommend start tubefeeding)  Nutrition  Education/Counseling: No recommendation at this time  Coordination of Nutrition Care: Continue to monitor while inpatient, Interdisciplinary Rounds       Goals:     Goals: by next RD assessment (Initiate nutrition support vs diet)       Nutrition Monitoring and Evaluation:      Food/Nutrient Intake Outcomes: Diet Advancement/Tolerance (Nutrition Support Needs)  Physical Signs/Symptoms Outcomes: Biochemical Data, GI Status, Fluid Status or Edema, Hemodynamic Status, Nutrition Focused Physical Findings, Skin, Weight    Discharge Planning:    Too soon to determine     Allison C Schwab, RD, LD  Contact: [phone]

## 2024-05-23 NOTE — PROGRESS NOTES
Cardiology Progress Note      Patient:  Ruben FRANZ JR. Elmer  YOB: 1955  MRN: [id number]   Acct: [id number]  Admit Date:  5/21/2024  Primary Cardiologist: Spike Sterling MD    Note per dr durham \"HIEF COMPLAINT: Bowel Incontinence      Reason for Consult: A-fib with RVR, Acute on Chronic CHF exacerbation, surgery clearance      HPI: This is a pleasant 69 y.o. male with PMH CAD, COPD, DM, PAD, HTN, left AKA, R toe dry gangrene, spinal stenosis, cauda equina presents with bowel and bladder incontinence. It was reported that the patient was recently admitted at [facility] for suspected cauda equina. At that time the patient was also noted to have dry gangrene on his right big toe. The patient was apparently refusing amputation and did not like his care. He signed out AMA on 5/14/24. The patient presented for continuation of bowel and bladder incontinence. He was found to be in A-fib and RVR with concerns for sepsis. He was started on broad spectrum antibiotics and admitted to hospital service.   This morning the patient reports that he is having a sharp pain on the left side of his chest non radiating. He reports associated shortness of breath starting yesterday. \"    Subjective (Events in last 24 hours): pt sedated, intubated  On amio gtt, levophed at 24, bumex at 0.5, heparin, milrinone at 0.125, diprivan gtts    Net I/o +1 L    Objective:   /75   Pulse (!) 124   Temp 97.6 °F (36.4 °C) (Axillary)   Resp 18   Ht 1.854 m (6' 1\") Comment: Pt reports this was his height prior to Left AKA  Wt 107.7 kg (237 lb 7 oz)   SpO2 97%   BMI 31.33 kg/m²        TELEMETRY: afib rvr low 100s    Physical Exam:  General Appearance: sedated, on vent  Cardiovascular: irregularly irregular  Pulmonary/Chest: clear to auscultation bilaterally- no wheezes, rales or rhonchi, normal air movement, no respiratory distress  Abdomen: soft, non-tender, non-distended, normal bowel sounds, no masses Extremities: L AKA, left foot  mg, PRN  heparin (porcine), 4,000 Units, PRN  heparin (porcine), 2,000 Units, PRN          Lab Data:    Cardiac Enzymes:  Recent Labs     05/21/24  1900   CKTOTAL 30*       CBC:   Lab Results   Component Value Date/Time    WBC 27.5 05/23/2024 06:22 AM    RBC 4.84 05/23/2024 06:22 AM    RBC 5.49 02/02/2012 11:40 AM    HGB 8.3 05/23/2024 06:22 AM    HGB 16.9 02/02/2012 11:40 AM    HCT 33.4 05/23/2024 06:22 AM     05/23/2024 06:22 AM       CMP:    Lab Results   Component Value Date/Time     05/23/2024 06:22 AM    K 5.6 05/23/2024 06:22 AM    K 3.6 04/15/2024 06:31 AM     05/23/2024 06:22 AM    CO2 15 05/23/2024 06:22 AM    BUN 46 05/23/2024 06:22 AM    CREATININE 1.5 05/23/2024 06:22 AM    GFRAA >60 03/18/2021 12:01 PM    LABGLOM 51 05/23/2024 06:22 AM    LABGLOM 82 04/17/2024 06:50 AM    GLUCOSE 266 05/23/2024 06:22 AM    GLUCOSE 98 02/23/2024 06:30 PM    CALCIUM 7.7 05/23/2024 06:22 AM       Hepatic Function Panel:    Lab Results   Component Value Date/Time    ALKPHOS 183 05/21/2024 07:00 PM     05/21/2024 07:00 PM     05/21/2024 07:00 PM    BILITOT 1.2 05/21/2024 07:00 PM    BILIDIR 0.8 05/21/2024 07:00 PM       Magnesium:    Lab Results   Component Value Date/Time    MG 1.8 05/23/2024 06:22 AM       PT/INR:    Lab Results   Component Value Date/Time    PROTIME 19.5 02/23/2024 06:30 PM    INR 2.52 05/22/2024 12:03 AM       HgBA1c:    Lab Results   Component Value Date/Time    LABA1C 6.8 04/14/2024 04:59 PM       FLP:    Lab Results   Component Value Date/Time    TRIG 55 04/14/2024 09:43 PM    HDL 20 04/14/2024 09:43 PM       TSH:    Lab Results   Component Value Date/Time    TSH 2.090 11/20/2021 12:30 PM         Assessment:    Acute hypoxic resp failure - intubated  Severe sepsis with hypotension  Lactic acidosis  RLE cellulitis, Right foot gangrene  Stage 4 decubitus ulcer  PNA  Acute HFrEF  Afib rvr  Hx PAF  ICMP - ef 30 per TTE 5/13/24  RV enlarged, systolic function mod

## 2024-05-23 NOTE — PROGRESS NOTES
Wound ostomy consulted for sacral wound. Wound was examined per Dr. Enciso, dakins dressing changes ordered BID. States pt needs colostomy, awaiting surgery recommendations. Call wound ostomy as needed.     Sacral wound: Cleanse wound with dakins solution and gauze. Pat dry with clean gauze. Apply dakins moist kerlix into wound bed. Cover with ABD pad, tape in place. Change twice daily and as needed.

## 2024-05-23 NOTE — SIGNIFICANT EVENT
I discussed the patient's goals of care with Kimmie Mancilla, the patient's legal POA (documentation on file). She clarified the following parameters with regards to the patient's goals of care.    The patient will only allow two of the toes on his R foot to be amputated. He will not allow any below or above-the-knee amputation.  No surgery of the lower back (such as surgical debridement) is to be performed.  He does not want to be transferred outside of [facility].  He wishes to be FULL CODE otherwise, with continued medical management.    The above was corroborated with chart review prior to the patient's intubation and ICU transfer.    It should be noted several of the patient's ongoing infections, such as his cellulitis and his ischial wound on his lower back, will require surgical treatment to achieve long-term control of infection. As we are currently limited to medical management and cannot perform meaningful surgical therapies because of the limits established by goals of care, it is expected that his wounds will only progress over time and it will be very difficult to achieve survival.    At this time, the patient is too critically ill to be weaned off the vent.    This was discussed/communicated with the POA Kimmie Mancilla, who expressed understanding and confirmed the above statements.    The following adjustments to the plan of care have been made as well:  - Urine urea for FEUrea, Renal US, and Urine eosinophils ordered for evaluation of BECCA (particularly ATN).  - The patient was recently exposed to contrast which may be contributory to his BECCA as well.  - We will consider adjusting his milrinone drip and albumin supplementation to aid with diuresis.    The above was discussed with Dr. Hahn.    Rowdy Soliman DO, PGY-3

## 2024-05-23 NOTE — PROGRESS NOTES
Progress note: Infectious diseases    Patient - Ruben FRANZ JR. [place]amond,  Age - 69 y.o.    - 1955      Room Number - 4B-05/005-A   MRN -  [id number]   [facility] # - [id number]  Date of Admission -  2024  6:44 PM    SUBJECTIVE:   He is intubated. Discussed with his significant other  OBJECTIVE   VITALS    height is 1.854 m (6' 1\") and weight is 107.7 kg (237 lb 7 oz). His oral temperature is 97.5 °F (36.4 °C). His blood pressure is 107/76 and his pulse is 119 (abnormal). His respiration is 10 and oxygen saturation is 100%.       Wt Readings from Last 3 Encounters:   24 107.7 kg (237 lb 7 oz)   24 102.1 kg (225 lb)   24 102.1 kg (225 lb)       I/O (24 Hours)    Intake/Output Summary (Last 24 hours) at 2024 0900  Last data filed at 2024 0600  Gross per 24 hour   Intake 0 ml   Output 510 ml   Net -510 ml       General Appearance  chronically sick looking on vent  HEENT - normocephalic, atraumatic, pale conjunctiva,  anicteric sclera  Neck - Supple, no mass  Lungs -  Bilateral   air entry, on vent  Cardiovascular - Heart sounds are normal.  Regular rate and rhythm without murmur, gallop or rub.  Abdomen - soft, edematous abdominal wall  Neurologic -on vent  Skin - No bruising or bleeding  Extremities - + edema, necrotic gangrenous digits on the right , left AKA  Stage 4 ischial wound on the left    MEDICATIONS:      sodium zirconium cyclosilicate  10 g Oral TID    hydrocortisone sodium succinate PF  100 mg IntraVENous Q8H    insulin regular  10 Units IntraVENous Once    nicotine  1 patch TransDERmal Daily    vancomycin  1,000 mg IntraVENous Q12H    fluconazole  200 mg IntraVENous Q24H    albuterol sulfate HFA  2 puff Inhalation TID RT    insulin lispro  0-16 Units SubCUTAneous TID WC    insulin lispro  0-4 Units SubCUTAneous Nightly    tiotropium-olodaterol  2 puff Inhalation Daily    sodium  input(s): \"LABA1C\" in the last 72 hours.  INR:   Recent Labs     05/21/24  1900 05/22/24  0003   INR 2.59* 2.52*     Hepatic:   Recent Labs     05/21/24 1900   ALKPHOS 183*   *   *   BILITOT 1.2   BILIDIR 0.8*     Amylase and Lipase:  Recent Labs     05/23/24  0622   LACTA 6.1*     Lactic Acid:   Recent Labs     05/23/24  0622   LACTA 6.1*     Troponin:   Recent Labs     05/21/24  1900   CKTOTAL 30*     BNP: No results for input(s): \"BNP\" in the last 72 hours.    CULTURES:   UA:   Recent Labs     05/21/24  2330   PHUR 5.0   COLORU DK YELLOW*   PROTEINU 30*   BLOODU NEGATIVE   RBCUA 0-2   WBCUA 15-25   BACTERIA NONE SEEN   NITRU NEGATIVE   GLUCOSEU >= 1000*   BILIRUBINUR NEGATIVE   UROBILINOGEN 1.0   KETUA TRACE*     Micro:   Lab Results   Component Value Date/Time    BC No growth 24 hours. 05/21/2024 08:35 PM         IMAGING:         Problem list of patient:     Patient Active Problem List   Diagnosis Code    Seizure disorder ([facility]) G40.909    Osteoarthritis M19.90    COPD without exacerbation ([facility]) J44.9    Lumbar foraminal stenosis M48.061    Lumbar radiculopathy M54.16    Tobacco abuse Z72.0    GERD (gastroesophageal reflux disease) K21.9    Skin ulceration ([facility]) L98.499    Decubitus ulcer of ischial area, left, unstageable ([facility]) L89.320    Ischemic rest pain of lower extremity M79.606, I99.8    Current smoker F17.200    Dyslipidemia E78.5    Abnormal cardiovascular function study R94.30    Obesity (BMI 30-39.9) E66.9    PVD (peripheral vascular disease) ([facility]) I73.9    Coronary artery disease involving native coronary artery of native heart without angina pectoris I25.10    Chronic total occlusion of artery of the extremities ([facility]) I70.92    Coronary artery chronic total occlusion I25.82    DAVILA (dyspnea on exertion) R06.09    Essential hypertension I10    Discitis of cervical region M46.42    Noncompliance by refusing intervention or support Z91.199    Lactic acidosis E87.20    Pressure injury of

## 2024-05-23 NOTE — PROGRESS NOTES
[facility]   PROGRESS NOTE      Patient: Ruben FRANZ JR. [place]  Room #: 4B-05/005-A            YOB: 1955  Age: 69 y.o.  Gender: male            Admit Date & Time: 5/21/2024  6:44 PM    Assessment:    The patient was intubated in his bed. He had his left leg amputated and his right leg was bandages as if a recent amputation had occurred. The patient's existing leg was red and showing other signs that concerned the . The patient's faience of 14 years shared her concerns and difficulties as the patient's POA.     Interventions:  The patient's faience/agent shared the patient has shared a desire to be in [place] no matter what. According to what the  was told the patient only wants his toes amputated and does not want to leave [place]. The patient has been told that he would need care from a more advanced facility due to the nature of his back injuries and medical infirmities. The  explored if the agent understood that his decline of care could lead to his death. The agent seemed to be overwhelmed as the agent and at multiple times shared concerns related to being yelled at for making the wrong choice or if he improves that she will be yelled at for missing limbs.     The  pointed out that the agent shared the patient yelling at her in the stories was reliant on him healing. Thus that could be the best case scenario with the patient better off. The patient then shared how he did not want it. The agent then shared how the patient had vacillated his view on intubation and avoided making decisions. The patient in many other examples seemed to avoid making decisions to the point of ramifications for not decision being made replaces the previous choices.     The agent also shared how she could lose her house without his SSI and her concerns with being homeless again (3rd time). She also shared that she has never lived alone in her life and was  anxious about this situation. Again she is struggling but at times her decision making realizes the patient is catastrophically ill and she is beginning to make plans without the patient. The agent may need assistance of a  or someone well versed in . This will assist the agent make decisions based on logic and less on survival.     Outcomes:  The agent  was thankful for the provided care.     Plan:  1.Spiritual care will continue to follow the patient according to [facility] spiritual care SOP.       Electronically signed by Chaplain Malissa, on 5/23/2024 at 1:35 PM.  Spiritual Care Department  [facility]  [phone]     05/23/24 1325   Encounter Summary   Encounter Overview/Reason Initial Encounter   Service Provided For Patient;Significant other;Patient and family together   [facility]   Support System Significant other;Children   Last Encounter  05/23/24   Complexity of Encounter High   Begin Time 1240   End Time  1330   Total Time Calculated 50 min   Spiritual/Emotional needs   Type Emotional Distress   Grief, Loss, and Adjustments   Type End of Life;Life Adjustments   Assessment/Intervention/Outcome   Assessment Anxious;Complicated grieving;Compromised coping;Concerns with suffering;Decisional conflict;Despair;Fearful;Impaired social interaction;Interrupted family processes;Shock;Tearful;Stress overload   Intervention Active listening;Confronted/Challenged;Discussed illness injury and it’s impact;Empowerment;End of Life Care;Explored/Affirmed feelings, thoughts, concerns;Explored Coping Skills/Resources;Sustaining Presence/Ministry of presence   Outcome Deescalated;Engaged in conversation;Expressed Gratitude;Venting emotion

## 2024-05-23 NOTE — PROGRESS NOTES
[facility]   Pharmacy Pharmacokinetic Monitoring Service - Vancomycin    Indication: Gangrenous right foot with severe PVD, Stage 4 ischial wound  Target Concentration: Goal AUC/HALLEY 400-600 mg*hr/L  Day of Therapy: 2  Additional Antimicrobials:   DC 5/23 DC 5/23Piperacillin-tazobactam    Pertinent Laboratory Values:   Wt Readings from Last 1 Encounters:   05/22/24 107.7 kg (237 lb 7 oz)     Temp Readings from Last 1 Encounters:   05/23/24 97.6 °F (36.4 °C) (Axillary)     Estimated Creatinine Clearance: 62 mL/min (A) (based on SCr of 1.5 mg/dL (H)).  Recent Labs     05/22/24  0622 05/22/24  2250 05/23/24 0622   CREATININE  --  1.3* 1.5*   BUN  --  40* 46*   WBC 22.7*  --  27.5*     Pertinent Cultures:  Date Source Results   5/22 Urine Culture NGTD   5/22 Blood x2 NGTD   5/22 Foot wound NGTD   5/23 Resp  NGTD   MRSA Nasal Swab: showed MRSA positive result on 5/22/24    Recent vancomycin administrations                     vancomycin (VANCOCIN) 1,000 mg in sodium chloride 0.9 % 250 mL IVPB (Urve5Ewf) (mg) 1,000 mg New Bag 05/23/24 0336     1,000 mg New Bag 05/22/24 1259    vancomycin (VANCOCIN) 2,500 mg in sodium chloride 0.9 % 500 mL IVPB (mg) 2,500 mg New Bag 05/22/24 0003                    Assessment:  Date/Time Current Dose Concentration Timing of Concentration (h) AUC C trough,ss   5/23/2024 1000mg q12h 28.7 ~3 hours 786 27.3   Note: Serum concentrations collected for AUC dosing may appear elevated if collected in close proximity to the dose administered, this is not necessarily an indication of toxicity    Plan:  Current dosing regimen is supra-therapeutic  Increasing SCR noted  Decrease dose to 1000mg Q24H  to yield anticipated steady state AUC of 416 mg*hr/L and trough of 12.2 mg/L  Repeat vancomycin concentration ordered for 5/25 @ 0600   Pharmacy will continue to monitor patient and adjust therapy as indicated    Thank you for the consult,  Dallin Mcmahan, PharmD, RPH, BCPS, BCCCP   5/23/2024  10:17 AM

## 2024-05-24 NOTE — PROGRESS NOTES
CRITICAL CARE PROGRESS NOTE      Patient:  Ruben FRANZ JR. Wittman    Unit/Bed:4B-05/005-A  YOB: 1955  MRN: [id number]   PCP: Fred Evans MD  Date of Admission: 5/21/2024  Chief Complaint:- LE Weakness, Fecal and urinary incontinence, debility     Assessment and Plan:      Septic Shock: Worsening. Patient was admitted to the ICU for pressor requirements, was started on IV Levophed and IV milrinone. SOFA score = 13, has multiple infection sources with pressor requirements. O2 saturation on VBG was 73 (normal), indicating that his shock is primarily septic in nature as opposed to cardiogenic. He has also developed evidence of end-organ damage (transaminitis, BECCA). The patient's pressor requirements have increased; his Levophed requirements are increasing and he has now been started on Vasopressin.  - Continue IV Levophed gtt, with goal of maintaining MAP > 65.  - Start IV vasopressin gtt to assist with the above goal.  - Stop IV milrinone to optimize BP control in the context of starting vasopressin.  - Continue IV Vancomycin and IV Zosyn. Clindamycin and Diflucan stopped by ID.  - Continue IV Hydrocortisone 100 mg Q8H for 7 days.  - Continue IV albumin 25 g Q6H for 8 doses.  - It should be noted several of the patient's ongoing infections, such as his cellulitis and his ischial wound on his lower back, will require surgical treatment to achieve long-term control of infection. As we are currently limited to medical management and cannot perform meaningful surgical therapies because of the limits established by the patient's goals of care that were confirmed by his POA (see Significant Event note on 5/23 for more details), it is expected that his infections and shock will only progress over time and he will continue to decline.   - Cardiology and ID following, appreciate recommendations.     Sepsis: SOFA Score = 13, with multiple infection sources identified (RLE cellulitis, ischial wound,  after which he was transitioned to full code.  - Patient is too critically ill to be weaned off the vent at this time.  - Lung protection strategies.  - Management of underlying conditions as described below.     Acute HFrEF Exacerbation: Due to ischemic cardiomyopathy. Last TTE 4/24 showed EF 25-30% with severely reduced LV systolic function and moderately reduced RV systolic function. Was only on beta blocker and Farxiga for GDMT, not on diuretics. [place] [address], and CXR showed cardiomegaly with moderate pulmonary vascular congestion and bilateral pleural effusions. Was reportedly significantly volume overloaded on arrival. Although his shock at this time is primarily septic in nature, he remains at risk for progression into cardiogenic shock given his decompensated heart failure and pressor requirements, as described above.  - Cardiology following, appreciate recommendations.  - Continue IV Bumex gtt.  - Metoprolol held by Cardiology due to acute decompensation.  - Stop IV milrinone to optimize BP control in the context of starting vasopressin.  - Holding Farxiga.  - Strict intake and output, daily weights, cardiac diet.  - Monitor electrolytes. Keep K > 4, Mg > 2.  - See \"Shock\" entry for additional details.     Multifocal Pneumonia: Patient reports increased SOB and cough. Was noted to have Multifocal PNA in the RML, RLL, and SIRISHA on CT. MRSA screen was positive, but no explicit organism identified on cultures so far. Likely contributory to sepsis at this time.  - Cultures pending.  - Continue IV Vancomycin and IV Zosyn (started 5/21/24).  - ID following, appreciate recommendations.     Paroxysmal Atrial Fibrillation with RVR: CHADS2-VASc score = 5. EKG on arrival showed A.Fib with RVR with . Likely triggered by stress from his current shock. Is on Eliquis for OAC but is noncompliant. On Lopressor 50 mg BID for rate control. HR is currently around 120-130. Control of his rate will be  patient and are recommending amputation of right foot versus BKA, at this time patient refusing BKA and is only interested in amputation of toes.  Patient admitted to ICU for further monitoring and evaluation at this time.\"     Per discussion with RN, the patient had adamantly refused to have his foot amputated prior to being intubated. He is also contradictory with regards to his goals of care.    I had an extensive goals of care discussion with Kimmie Mancilla, patient's legal POA (documentation on file) on 5/23/24. She clarified the following parameters with regards to the patient's goals of care, which were corroborated by the patient on chart review prior to intubation and ICU transfer.    The patient will only allow two of the toes on his R foot to be amputated. He will not allow any below or above-the-knee amputation.  No surgery of the lower back (such as surgical debridement) is to be performed.  He does not want to be transferred outside of [facility].  He wishes to be FULL CODE otherwise, with continued medical management.    Past Medical History: As HPI.  Family History: Hx of DM2, CAD, stroke, cancer, COPD.  Social History: Smoker.    ROS   Intubated and sedated. Pressor requirements have increased.    Scheduled Meds:   hydrocortisone sodium succinate PF  100 mg IntraVENous Q8H    vancomycin  1,000 mg IntraVENous Q24H    albumin human 25%  25 g IntraVENous Q6H    sodium hypochlorite   Irrigation BID    lansoprazole  30 mg Oral QAM AC    nicotine  1 patch TransDERmal Daily    albuterol sulfate HFA  2 puff Inhalation TID RT    insulin lispro  0-16 Units SubCUTAneous TID WC    insulin lispro  0-4 Units SubCUTAneous Nightly    tiotropium-olodaterol  2 puff Inhalation Daily    sodium chloride flush  5-40 mL IntraVENous 2 times per day    piperacillin-tazobactam  4,500 mg IntraVENous Q8H    vancomycin (VANCOCIN) intermittent dosing (placeholder)   Other RX Placeholder    [Held by provider] isosorbide mononitrate  30 mg  Oral Daily    gabapentin  100 mg Oral BID    traZODone  50 mg Oral Nightly    [Held by provider] atorvastatin  40 mg Oral Daily    povidone-iodine   Topical Daily    [Held by provider] metoprolol tartrate  50 mg Oral BID     Continuous Infusions:   VASOpressin 20 Units in sodium chloride 0.9 % 100 mL infusion 0.03 Units/min (05/24/24 0812)    propofol 30 mcg/kg/min (05/24/24 0314)    norepinephrine 35 mcg/min (05/24/24 0722)    amiodarone 0.5 mg/min (05/24/24 0949)    bumetanide (BUMEX) 12.5 mg in sodium chloride 0.9 % 125 mL infusion 0.5 mg/hr (05/23/24 1833)    milrinone 0.125 mcg/kg/min (05/23/24 1833)    sodium chloride Stopped (05/23/24 1722)    dextrose      heparin (PORCINE) Infusion 16 Units/kg/hr (05/24/24 0431)       PHYSICAL EXAMINATION:  T:  97.6F.  P:  127. RR:  88/50. B/P:  100/71.  FiO2:  70%. O2 Sat:  94%.  I/O:  +4689.6/-720  Body mass index is 32.17 kg/m².   GCS:   8    Mode: PCV+  Pcontrol: 10, PEEP: 8, FiO2: 50%, RR: 12    General:  Acute on chronically severely ill-appearing. Intubated and sedated.  HEENT:  normocephalic and atraumatic.  No scleral icterus. PERR  Neck: supple.  No Thyromegaly.  Lungs: Diminished lungs bilaterally. No obvious wheezes, rales, rhonchi noted.  Cardiac: Irregular rate and rhythm.  No JVD.  Abdomen: soft.  Nontender.  Extremities: +2 bilateral RLE edema with significant erythema and warmth. Left AKA.  Vasculature: capillary refill < 3 seconds. Palpable dorsalis pedis pulses.  Skin:  Gangrenous, ischemic right toe, currently wrapped. L lateral wound noted. Erythema and swelling noted on skin of RLE.  Psych: Intubated and sedated.  Lymph:  No supraclavicular adenopathy.  Neurologic: Intubated and sedated. UE and LE weakness and paresthesia noted prior to intubation. Bowel and urinary incontinence as well.     Data: (All radiographs, tracings, PFTs, and imaging are personally viewed and interpreted unless otherwise noted).   BMP - Na 138, K 4.6, Cl 100, CO2 21, BUN 58,

## 2024-05-24 NOTE — PLAN OF CARE
Problem: Respiratory - Adult  Goal: Will be able to breathe spontaneously, without ventilator support  Description: Will be able to breathe spontaneously, without ventilator support  5/24/2024 0755 by Soraida Whatley, GREGORIO  Outcome: Progressing                                                Patient Weaning Progress    The patient's vent settings was able to be weaned this shift.      Ventilator settings that were weaned              [] Mode   [x] Pressure support weaned   [] Fio2 weaned   [] Peep weaned      Spontaneous weaning trial  was not attempted.     due to defined parameters for SBT (spontaneous breathing trial) not being met.     *Results of SBT documented under SBTOUTCOME note.    Reason that defined ventilator parameters for SBT was not met              [] Patient condition requires increased ventilator settings  [x] Requires increased sedation   [x] Settings not within weaning range   [x] SAT not completed   [] Physician orders      Unable to get agreement for goals because no family is present and patient cannot respond.

## 2024-05-24 NOTE — PLAN OF CARE
Problem: Discharge Planning  Goal: Discharge to home or other facility with appropriate resources  Outcome: Progressing  Flowsheets (Taken 5/23/2024 2000)  Discharge to home or other facility with appropriate resources:   Identify barriers to discharge with patient and caregiver   Arrange for needed discharge resources and transportation as appropriate   Identify discharge learning needs (meds, wound care, etc)   Refer to discharge planning if patient needs post-hospital services based on physician order or complex needs related to functional status, cognitive ability or social support system     Problem: Pain  Goal: Verbalizes/displays adequate comfort level or baseline comfort level  Outcome: Progressing  Flowsheets (Taken 5/23/2024 2000)  Verbalizes/displays adequate comfort level or baseline comfort level:   Assess pain using appropriate pain scale   Administer analgesics based on type and severity of pain and evaluate response   Implement non-pharmacological measures as appropriate and evaluate response   Consider cultural and social influences on pain and pain management   Notify Licensed Independent Practitioner if interventions unsuccessful or patient reports new pain     Problem: Safety - Adult  Goal: Free from fall injury  Outcome: Progressing  Flowsheets (Taken 5/23/2024 2000)  Free From Fall Injury:   Instruct family/caregiver on patient safety   Based on caregiver fall risk screen, instruct family/caregiver to ask for assistance with transferring infant if caregiver noted to have fall risk factors     Problem: ABCDS Injury Assessment  Goal: Absence of physical injury  Outcome: Progressing  Flowsheets (Taken 5/23/2024 2000)  Absence of Physical Injury: Implement safety measures based on patient assessment     Problem: Risk for Elopement  Goal: Patient will not exit the unit/facility without proper excort  5/23/2024 2041 by Vini Beckham, RN  Outcome: Progressing  Flowsheets (Taken 5/23/2024  2000)  Nursing Interventions for Elopement Risk:   Assist with personal care needs such as toileting, eating, dressing, as needed to reduce the risk of wandering   Collaborate with family members/caregivers to mitigate the elopement risk  5/23/2024 1038 by Joann Bennett RN  Outcome: Progressing     Problem: Skin/Tissue Integrity  Goal: Absence of new skin breakdown  Description: 1.  Monitor for areas of redness and/or skin breakdown  2.  Assess vascular access sites hourly  3.  Every 4-6 hours minimum:  Change oxygen saturation probe site  4.  Every 4-6 hours:  If on nasal continuous positive airway pressure, respiratory therapy assess nares and determine need for appliance change or resting period.  5/23/2024 2041 by Vini Beckham RN  Outcome: Progressing  5/23/2024 1038 by Joann Bennett RN  Outcome: Progressing     Problem: Chronic Conditions and Co-morbidities  Goal: Patient's chronic conditions and co-morbidity symptoms are monitored and maintained or improved  5/23/2024 2041 by Vini Beckham RN  Outcome: Progressing  Flowsheets (Taken 5/23/2024 2000)  Care Plan - Patient's Chronic Conditions and Co-Morbidity Symptoms are Monitored and Maintained or Improved:   Monitor and assess patient's chronic conditions and comorbid symptoms for stability, deterioration, or improvement   Collaborate with multidisciplinary team to address chronic and comorbid conditions and prevent exacerbation or deterioration   Update acute care plan with appropriate goals if chronic or comorbid symptoms are exacerbated and prevent overall improvement and discharge  5/23/2024 1038 by Joann Bennett RN  Outcome: Progressing     Problem: Nutrition Deficit:  Goal: Optimize nutritional status  5/23/2024 2041 by Vini Beckham RN  Outcome: Progressing  Flowsheets (Taken 5/23/2024 1153 by Schwab, Allison C, RD, LD)  Nutrient intake appropriate for improving, restoring, or maintaining nutritional needs:   Assess nutritional

## 2024-05-24 NOTE — SIGNIFICANT EVENT
I spoke with Kimmie Mancilla, the patient's POA, and her brother who was also present. The POA expressed understanding that the patient is continuing to decline despite being on multiple pressors due to our inability to perform long-term source control.    The POA expressed understanding and understood that the patient may very well die from his disease, but stated that the patient's current parameters should be continued. This includes keeping the patient FULL CODE and coding him if he arrests, despite his advanced disease and the lack of guarantee that he may recover.    The POA expressed understanding and instructed us to continue with FULL CODE. She also added an addition statement that none of the patient's family should be allowed to contact him without going through her first.    Rowdy Soliman DO, PGY-3

## 2024-05-24 NOTE — PLAN OF CARE
Problem: Respiratory - Adult  Goal: Will be able to breathe spontaneously, without ventilator support  Description: Will be able to breathe spontaneously, without ventilator support  Outcome: Progressing     Problem: Respiratory - Adult  Goal: Achieves optimal ventilation and oxygenation  Description: INTERVENTIONS: wean ventilator settings as tolerated and per protocol.   Outcome: Progressing                                                Patient Weaning Progress    The patient's vent settings was not able to be weaned this shift.      Ventilator settings that were weaned              [] Mode   [] Pressure support weaned   [x] Fio2 weaned   [x] Peep weaned      Spontaneous weaning trial  was not attempted.     due to defined parameters for SBT (spontaneous breathing trial) not being met.    Reason that defined ventilator parameters for SBT was not met              [x] Patient condition requires increased ventilator settings (PEEP of 8, and Fio2 of 50%)  [] Requires increased sedation   [] Settings not within weaning range   [] SAT not completed   [] Physician orders    Evac tube was not  hooked up with continuous low suction(20-30mmHg)      Cuff was not  deflated to determine cuff leak.     Unable to get agreement for goals because no family is present and patient cannot respond.

## 2024-05-24 NOTE — PLAN OF CARE
Problem: Discharge Planning  Goal: Discharge to home or other facility with appropriate resources  Outcome: Progressing     Problem: Pain  Goal: Verbalizes/displays adequate comfort level or baseline comfort level  Outcome: Progressing     Problem: Safety - Adult  Goal: Free from fall injury  Outcome: Progressing     Problem: ABCDS Injury Assessment  Goal: Absence of physical injury  Outcome: Progressing     Problem: Risk for Elopement  Goal: Patient will not exit the unit/facility without proper excort  Outcome: Progressing     Problem: Skin/Tissue Integrity  Goal: Absence of new skin breakdown  Description: 1.  Monitor for areas of redness and/or skin breakdown  2.  Assess vascular access sites hourly  3.  Every 4-6 hours minimum:  Change oxygen saturation probe site  4.  Every 4-6 hours:  If on nasal continuous positive airway pressure, respiratory therapy assess nares and determine need for appliance change or resting period.  Outcome: Progressing     Problem: Chronic Conditions and Co-morbidities  Goal: Patient's chronic conditions and co-morbidity symptoms are monitored and maintained or improved  Outcome: Progressing     Problem: Nutrition Deficit:  Goal: Optimize nutritional status  Outcome: Progressing     Problem: Safety - Medical Restraint  Goal: Remains free of injury from restraints (Restraint for Interference with Medical Device)  Description: INTERVENTIONS:  1. Determine that other, less restrictive measures have been tried or would not be effective before applying the restraint  2. Evaluate the patient's condition at the time of restraint application  3. Inform patient/family regarding the reason for restraint  4. Q2H: Monitor safety, psychosocial status, comfort, nutrition and hydration  Outcome: Progressing  Flowsheets (Taken 5/24/2024 1351)  Remains free of injury from restraints (restraint for interference with medical device):   Determine that other, less restrictive measures have been tried

## 2024-05-24 NOTE — CARE COORDINATION
5/24/24, 2:09 PM EDT    DISCHARGE ON GOING EVALUATION    Ruben FRANZ JR. [facility] day: 3  Location: -05/005-A Reason for admit: Sepsis (HCC) [A41.9]     Procedures:   5/23 Intubated  5/23 CVC RIJ    Imaging since last note:   5/22 RLE Venous doppler: Neg for DVT  5/23 Renal US: Normal  5/24 CXR: Mild progression in infiltrates vs edema     Barriers to Discharge: Patient had increasing O2 requirements. Was taken to CT scan and unable to lie flat. Hypotensive in 80's. Refusing HFNC. Transferred to ICU. Patient was intubated early on 5/23, rodolfo and CVC placed. ID recommending Right AKA and diverting ostomy. Patient's fiance/POA states she and Ruben had discussed prior to his intubation and he will only allow 2 toes of right foot to be amputated, no BKA or AKA, no surgery of lower back (such as surgical debridement), no transfer outside of [facility]. Increasing vasopressor requirements today, vasopressin added. Primacor drip stopped today.     Remains on vent w/ETT on AC/PC, peep 10, FIO2 65%, sats 94%. Afebrile. Afib 120's. Unable to follow commands; HERNANDEZ x4 to painful stim. Dietitian, Palliative Care, and Wound Care following. Intensivist, ID, Cardiology, Ortho, and Podiatry following. Foot wound +gram neg bacilli & staphylococcus. Urine +yeast. +MRSA nares. Telemetry, CVC, rodolfo, OG w/TF, tinoco, wound care, SCDs. IVF, amio @ 0.5 mg/min, bumex @ 0.5 mg/hr, heparin drip, levo @ 33 mcg/min, diprivan @ 20 mcg/kg/min, vasopressin @ 0.03 units/min, IV albumin Q6H, inhaler, IV solucortef 100 mg Q8H, SSI, lansoprazole, prn IV lopressor, nicotine patch, IV zosyn, betadine daily, dakins dressing change bid to sacral wound, trazodone, IV vancomycin, Electrolyte replacement protocols. BUN 61, Creat 2.2, alk phos 187, alt 458, ast 692, total bili 1.4, direct bili 1, wbc 25.6, hgb 7.3, INR 2.19, FDP 5-20.     PCP: Fred Evans MD  Readmission Risk Score: 23.8    Patient Goals/Plan/Treatment Preferences:

## 2024-05-25 LAB
BACTERIA SPEC AEROBE CULT: ABNORMAL
BACTERIA SPEC ANAEROBE CULT: ABNORMAL
BACTERIA SPEC RESP CULT: ABNORMAL
BACTERIA SPEC RESP CULT: ABNORMAL
GRAM STN SPEC: ABNORMAL
GRAM STN SPEC: ABNORMAL
ORGANISM: ABNORMAL

## 2024-05-25 NOTE — CODE DOCUMENTATION
Resuscitation/Code Status Note on Ruben [place] (YOB: 1955)    At 7PM on 05/24/24, resuscitation/code status decision was based on a thorough discussion with the patient, patient's healthcare power of  - Kimmie Mancilla, and her 2 sons, brother in law, and pts daughter .  The code status was made DNR-CC No additional code details. There was a long discussion about goals of care and possible transfer to different hospital and pursuing surgical treatment options as well as dialysis treatment if needed, however after a long talk with pts family they decided to not pursue further care and would like to switch code status to DNR-CC with comfort measures in place as well as terminal extubation. Comfort Orders were placed with family including pts daughter in agreement.     Electronically signed by Kelly Lorenzo MD on 5/24/24 at 9:30 PM EDT

## 2024-05-25 NOTE — SIGNIFICANT EVENT
[facility]  Notice of Patient Passing      Patient Name- Ruben FRANZ JR. [place]   Acct Number- [id number]   Attending Physician- Elliot Hahn MD    Admitted on-5/21/2024  6:44 PM     On 5/24/2024 at 2143 patient was found in 4b05 with:   Absence of vital signs.   Absence of neurological response.    Confirmed time of death at 2143.   Physician or On-call Physician notified of time of death- yes    Family present at time of death- yes,    Spiritual care present at time of death- no    Physician was notified and orders were obtained to release the body.   Post-Mortem documentation completed; form printed, signed, and given to admitting.    Cass Alberto RN RN Nursing Supervisor/ Manager  5/24/24   10:49 PM

## 2024-05-25 NOTE — DISCHARGE SUMMARY
CRITICAL CARE DISCHARGE NOTE      Patient:  Ruben FRANZ JR. [place]    Unit/Bed:4B-05/005-A  YOB: 1955  MRN: [id number]   PCP: Fred Evans MD  Date of Admission: 5/21/2024  Chief Complaint:- LE weakness, fecal and urinary incontinence. Debility     Assessment and Plan:      Septic Shock  RLE Cellulitis with Ulcers  Ischial Wound  Dry Gangrene R Great Toe  Acute Hypoxic Respiratory Failure  Acute HFrEF Exacerbation  Multifocal Pneumonia  Paroxysmal Atrial Fibrillation with RVR  Fungal Urine Colonization  BECCA  Transaminitis  HAGMA/Lactic Acidosis  Obstructive CAD  Lumbar Spinal Stenosis w/ Bowel and Urinary Incontinence  PAD: S/P Left AKA  Non-Insulin Requiring Type 2 DM  Acute Mild Hyperkalemia  Diabetic Neuropathy  Suspected COPD  Mild Mitral Regurgitation  HLD  Noncompliance  Physical Deconditioning  GERD  Insomnia  Tobacco Abuse  Obesity      INITIAL H AND P AND ICU COURSE:  Ruben Zendejas is a 69-year-old male with PMHx of anemia, PAD s/p left BKA, DM2, COPD, hypotension, noncompliance, ischial wound who presented to [facility] on 5/21/2024 for evaluation of worsening urinary and bowel incontinence as well as decreased sensation to lower extremities.  Patient was recently hospitalized at [facility] this month and transferred to [facility] for similar complaints.  Patient left AMA from [facility].  At this time patient having worsening urinary and bowel incontinence, has decreased sensation.  MRI lumbar shows severe spinal canal stenosis.  On arrival patient was found to be in A-fib with RVR.  Right toe gangrene was also noted.  Patient was given antibiotics and fluid therapy in the ED.  However patient is severely fluid overloaded, per recommendation of cardiology patient was transferred to ICU for Primacor and Bumex gtt.  Orthopedics evaluated patient for lumbar spinal stenosis and recommend transfer to [facility] and do not recommend immediate surgery as patient is undergoing active infection.  Infectious  disease saw patient today and recommended continuing antibiotics as well as possible surgical evaluation for diverting colostomy for ischial wound.  Podiatry evaluated patient and are recommending amputation of right foot versus BKA, at this time patient refusing BKA and is only interested in amputation of toes.  Patient admitted to ICU for further monitoring and evaluation at this time. During his hospital course, pt was intubated and sedated overnight on his day of admission, He was being diuresed with Bumex gtt and Milrinone per recs from cardio. However pt does not improve and was started on Levophed, during his hospital course. His kidney function, infection control and heart failure did not improve. After prolonged discussion with family about transferring pt out of hospital for surgical procedures that they refused, they decided to change code status to comfort care and wanted to withdraw care. On 9:43 PM on 05/24/24 pt passed away with family at bedside.       Past Medical History: As HPI.  Family History: Hx of DM2, CAD, stroke, cancer, COPD.  Social History: Smoker.    Scheduled Meds:  Continuous Infusions:   morphine 1 mg/hr (05/24/24 2124)    propofol 15 mcg/kg/min (05/24/24 1804)     Consults:   IP CONSULT TO SOCIAL WORK  PHARMACY TO DOSE VANCOMYCIN  IP CONSULT TO PODIATRY  IP CONSULT TO ORTHOPEDIC SURGERY  IP CONSULT TO INFECTIOUS DISEASES  IP CONSULT TO CARDIOLOGY  IP CONSULT TO DIETITIAN      Code Status:  DNR-CC     Follow-up visits:  No follow-up provider specified.       Time Spent on discharge is 45 minutes in the examination, evaluation, counseling and review of medications and discharge plan.    Thank you Fred Evans MD for the opportunity to be involved in this patient's care.    Case and plan discussed with Dr. Hahn.    Electronically signed by Kelly Lorenzo MD  CRITICAL CARE SPECIALIST

## 2024-05-25 NOTE — PLAN OF CARE
Problem: Discharge Planning  Goal: Discharge to home or other facility with appropriate resources  5/24/2024 2115 by Vini Beckham RN  Outcome: Progressing  Flowsheets (Taken 5/24/2024 2000)  Discharge to home or other facility with appropriate resources:   Identify barriers to discharge with patient and caregiver   Arrange for needed discharge resources and transportation as appropriate   Identify discharge learning needs (meds, wound care, etc)   Refer to discharge planning if patient needs post-hospital services based on physician order or complex needs related to functional status, cognitive ability or social support system  5/24/2024 1353 by Joann Bennett RN  Outcome: Progressing     Problem: Pain  Goal: Verbalizes/displays adequate comfort level or baseline comfort level  5/24/2024 2115 by Vini Beckham RN  Outcome: Progressing  5/24/2024 1353 by Joann Bennett RN  Outcome: Progressing     Problem: Safety - Adult  Goal: Free from fall injury  5/24/2024 2115 by Vini Beckham RN  Outcome: Progressing  Flowsheets (Taken 5/24/2024 2000)  Free From Fall Injury: Instruct family/caregiver on patient safety  5/24/2024 1353 by Joann Bennett RN  Outcome: Progressing     Problem: ABCDS Injury Assessment  Goal: Absence of physical injury  5/24/2024 2115 by Vini Beckham RN  Outcome: Progressing  Flowsheets (Taken 5/24/2024 2000)  Absence of Physical Injury: Implement safety measures based on patient assessment  5/24/2024 1353 by Joann Bennett RN  Outcome: Progressing     Problem: Risk for Elopement  Goal: Patient will not exit the unit/facility without proper excort  5/24/2024 2115 by Vini Beckham RN  Outcome: Progressing  Flowsheets (Taken 5/24/2024 2000)  Nursing Interventions for Elopement Risk:   Assist with personal care needs such as toileting, eating, dressing, as needed to reduce the risk of wandering   Collaborate with family members/caregivers to mitigate the elopement  risk  5/24/2024 1353 by Joann Bennett RN  Outcome: Progressing     Problem: Skin/Tissue Integrity  Goal: Absence of new skin breakdown  Description: 1.  Monitor for areas of redness and/or skin breakdown  2.  Assess vascular access sites hourly  3.  Every 4-6 hours minimum:  Change oxygen saturation probe site  4.  Every 4-6 hours:  If on nasal continuous positive airway pressure, respiratory therapy assess nares and determine need for appliance change or resting period.  5/24/2024 2115 by Vini Beckham RN  Outcome: Progressing  5/24/2024 1353 by Joann Bennett RN  Outcome: Progressing     Problem: Chronic Conditions and Co-morbidities  Goal: Patient's chronic conditions and co-morbidity symptoms are monitored and maintained or improved  5/24/2024 2115 by Vini Beckham RN  Outcome: Progressing  Flowsheets (Taken 5/24/2024 2000)  Care Plan - Patient's Chronic Conditions and Co-Morbidity Symptoms are Monitored and Maintained or Improved:   Monitor and assess patient's chronic conditions and comorbid symptoms for stability, deterioration, or improvement   Collaborate with multidisciplinary team to address chronic and comorbid conditions and prevent exacerbation or deterioration   Update acute care plan with appropriate goals if chronic or comorbid symptoms are exacerbated and prevent overall improvement and discharge  5/24/2024 1353 by Joann Bennett RN  Outcome: Progressing     Problem: Nutrition Deficit:  Goal: Optimize nutritional status  5/24/2024 2115 by Vini Beckham RN  Outcome: Progressing  5/24/2024 1353 by Joann Bennett RN  Outcome: Progressing     Problem: Safety - Medical Restraint  Goal: Remains free of injury from restraints (Restraint for Interference with Medical Device)  Description: INTERVENTIONS:  1. Determine that other, less restrictive measures have been tried or would not be effective before applying the restraint  2. Evaluate the patient's condition at the time of  restraint application  3. Inform patient/family regarding the reason for restraint  4. Q2H: Monitor safety, psychosocial status, comfort, nutrition and hydration  5/24/2024 2115 by Vini Beckham RN  Outcome: Progressing  5/24/2024 1353 by Joann Bennett RN  Outcome: Progressing  Flowsheets (Taken 5/24/2024 1351)  Remains free of injury from restraints (restraint for interference with medical device):   Determine that other, less restrictive measures have been tried or would not be effective before applying the restraint   Evaluate the patient's condition at the time of restraint application   Inform patient/family regarding the reason for restraint   Every 2 hours: Monitor safety, psychosocial status, comfort, nutrition and hydration     Problem: Respiratory - Adult  Goal: Will be able to breathe spontaneously, without ventilator support  Description: Will be able to breathe spontaneously, without ventilator support  5/24/2024 2115 by Vini Beckham RN  Outcome: Progressing  5/24/2024 1353 by Joann Bennett RN  Outcome: Progressing  5/24/2024 0755 by Soraida Whatley [facility]  Outcome: Progressing  Goal: Achieves optimal ventilation and oxygenation  Description: INTERVENTIONS:  5/24/2024 2115 by Vini Beckham RN  Outcome: Progressing  5/24/2024 1353 by Joann Bennett RN  Outcome: Progressing

## 2024-05-25 NOTE — DEATH NOTES
Death Pronouncement Note  Patient's Name: Ruben FRANZ JR. [place]   Patient's YOB: 1955  MRN Number: [id number]    Admitting Provider: No admitting provider for patient encounter.  Attending Provider: Elliot Hahn MD    Patient was examined and the following were absent: Pulses, Blood Pressure, and Respiratory effort.     I declared the patient dead on 5/24/2024 at 9:43 PM    Preliminary Cause of Death: Cardiopulmonary arrest (HCC)     Electronically signed by Kelly Lorenzo MD on 5/24/24 at 9:53 PM EDT

## 2024-05-26 LAB
BACTERIA BLD AEROBE CULT: NORMAL
BACTERIA UR CULT: ABNORMAL
BACTERIA UR CULT: ABNORMAL
ORGANISM: ABNORMAL

## 2024-05-27 LAB — BACTERIA BLD AEROBE CULT: NORMAL

## (undated) DEVICE — PREP SOL PVP IODINE 4%  4 OZ/BTL

## (undated) DEVICE — BLADE SAW L510MM S STL GIGLI FOR BNE CUT

## (undated) DEVICE — PAD,ABDOMINAL,5"X9",ST,LF,25/BX: Brand: MEDLINE INDUSTRIES, INC.

## (undated) DEVICE — WAX SURG 2.5GM HEMSTAT BNE BEESWAX PARAFFIN ISO PALMITATE

## (undated) DEVICE — PACK PROCEDURE SURG ORTH BASIC SRHP LF

## (undated) DEVICE — GLOVE SURG SZ 7.5 L11.73IN FNGR THK9.8MIL STRW LTX POLYMER

## (undated) DEVICE — SUTURE VCRL SZ 0 L45CM ABSRB VLT OS-6 L36.4MM 1/2 CIR REV J711T

## (undated) DEVICE — BANDAGE,GAUZE,4.5"X4.1YD,STERILE,LF: Brand: MEDLINE

## (undated) DEVICE — Device

## (undated) DEVICE — SPONGE LAP W18XL18IN WHT COT 4 PLY FLD STRUNG RADPQ DISP ST

## (undated) DEVICE — LAPAROSCOPIC DISSECTOR: Brand: DEROYAL

## (undated) DEVICE — SUTURE VCRL + SZ 0 L18IN ABSRB TIE VCP106G

## (undated) DEVICE — HYPODERMIC SAFETY NEEDLE: Brand: MAGELLAN

## (undated) DEVICE — SOLUTION SURG PREP POV IOD 7.5% 4 OZ

## (undated) DEVICE — BANDAGE COBAN 4 IN COMPR W4INXL5YD FOAM COHESIVE QUIK STK SELF ADH SFT

## (undated) DEVICE — SUTURE VCRL + SZ 0 L27IN ABSRB VLT L36MM CT-1 1/2 CIR VCPB260H

## (undated) DEVICE — BANDAGE COMPR W6INXL5YD WHT BGE POLY COT M E WRP WV HK AND

## (undated) DEVICE — PACK-MAJOR

## (undated) DEVICE — IMPREGNATED GAUZE DRESSING: Brand: CUTICERIN 7.5X20CM CTN 50

## (undated) DEVICE — PREMIUM DRY TRAY LF: Brand: MEDLINE INDUSTRIES, INC.